# Patient Record
Sex: FEMALE | Race: WHITE | Employment: OTHER | URBAN - METROPOLITAN AREA
[De-identification: names, ages, dates, MRNs, and addresses within clinical notes are randomized per-mention and may not be internally consistent; named-entity substitution may affect disease eponyms.]

---

## 2019-07-12 ENCOUNTER — HOSPITAL ENCOUNTER (OUTPATIENT)
Dept: MAMMOGRAPHY | Age: 79
Discharge: HOME | End: 2019-07-12
Attending: FAMILY MEDICINE
Payer: MEDICARE

## 2019-07-12 DIAGNOSIS — Z78.0 POSTMENOPAUSAL: ICD-10-CM

## 2019-07-12 PROCEDURE — 77080 DXA BONE DENSITY AXIAL: CPT

## 2019-08-09 ENCOUNTER — HOSPITAL ENCOUNTER (OUTPATIENT)
Dept: MAMMOGRAPHY | Age: 79
Discharge: HOME | End: 2019-08-09
Attending: FAMILY MEDICINE
Payer: MEDICARE

## 2019-08-09 DIAGNOSIS — R92.8 ABNORMAL MAMMOGRAM: ICD-10-CM

## 2019-08-09 DIAGNOSIS — Z85.3 PERSONAL HISTORY OF MALIGNANT NEOPLASM OF BREAST: ICD-10-CM

## 2019-08-09 PROCEDURE — 77062 BREAST TOMOSYNTHESIS BI: CPT

## 2019-08-12 ENCOUNTER — OFFICE VISIT (OUTPATIENT)
Dept: SURGERY | Age: 79
End: 2019-08-12

## 2019-08-12 VITALS
WEIGHT: 152 LBS | BODY MASS INDEX: 25.95 KG/M2 | DIASTOLIC BLOOD PRESSURE: 67 MMHG | SYSTOLIC BLOOD PRESSURE: 133 MMHG | HEART RATE: 78 BPM | HEIGHT: 64 IN

## 2019-08-12 DIAGNOSIS — Z17.0 STAGE 1 BREAST CANCER, ER+, RIGHT (HCC): Primary | ICD-10-CM

## 2019-08-12 DIAGNOSIS — C50.911 STAGE 1 BREAST CANCER, ER+, RIGHT (HCC): Primary | ICD-10-CM

## 2019-08-12 PROBLEM — Z85.3 HISTORY OF RIGHT BREAST CANCER: Status: ACTIVE | Noted: 2019-08-12

## 2019-08-12 RX ORDER — OLMESARTAN MEDOXOMIL AND HYDROCHLOROTHIAZIDE 20/12.5 20; 12.5 MG/1; MG/1
TABLET ORAL DAILY
COMMUNITY
End: 2022-04-30

## 2019-08-12 RX ORDER — ERGOCALCIFEROL 1.25 MG/1
CAPSULE ORAL
COMMUNITY
End: 2022-09-14

## 2019-08-12 RX ORDER — ALBUTEROL SULFATE 90 UG/1
AEROSOL, METERED RESPIRATORY (INHALATION)
Status: ON HOLD | COMMUNITY
End: 2022-10-27

## 2019-08-12 RX ORDER — ACETAMINOPHEN 500 MG
500 TABLET ORAL
COMMUNITY

## 2019-08-12 RX ORDER — ESOMEPRAZOLE MAGNESIUM 40 MG/1
CAPSULE, DELAYED RELEASE ORAL
COMMUNITY
End: 2021-01-13 | Stop reason: SDUPTHER

## 2019-08-12 RX ORDER — CALCITONIN SALMON 200 [IU]/.09ML
1 SPRAY, METERED NASAL DAILY
COMMUNITY
End: 2022-09-14

## 2019-08-12 RX ORDER — METRONIDAZOLE 7.5 MG/G
CREAM TOPICAL 2 TIMES DAILY
COMMUNITY

## 2019-08-12 RX ORDER — BISMUTH SUBSALICYLATE 262 MG
1 TABLET,CHEWABLE ORAL DAILY
COMMUNITY
End: 2022-09-28

## 2019-08-12 RX ORDER — ANASTROZOLE 1 MG/1
TABLET ORAL
COMMUNITY
End: 2019-12-11 | Stop reason: SDUPTHER

## 2019-08-12 RX ORDER — MOMETASONE FUROATE 50 UG/1
SPRAY, METERED NASAL
COMMUNITY
End: 2022-09-14

## 2019-08-12 RX ORDER — LEVOTHYROXINE SODIUM 25 UG/1
TABLET ORAL
COMMUNITY
End: 2022-09-14

## 2019-08-12 RX ORDER — CELECOXIB 200 MG/1
CAPSULE ORAL
COMMUNITY
End: 2020-08-17 | Stop reason: ALTCHOICE

## 2019-08-12 RX ORDER — DICLOFENAC SODIUM 75 MG/1
TABLET, DELAYED RELEASE ORAL
COMMUNITY
End: 2022-05-01

## 2019-08-12 RX ORDER — MONTELUKAST SODIUM 10 MG/1
10 TABLET ORAL DAILY
COMMUNITY

## 2019-08-12 RX ORDER — IBANDRONATE SODIUM 150 MG/1
TABLET, FILM COATED ORAL
COMMUNITY
End: 2022-09-14

## 2019-08-12 RX ORDER — DICLOFENAC SODIUM 10 MG/G
GEL TOPICAL
COMMUNITY

## 2019-08-12 RX ORDER — FLUTICASONE PROPIONATE AND SALMETEROL XINAFOATE 230; 21 UG/1; UG/1
AEROSOL, METERED RESPIRATORY (INHALATION)
Refills: 2 | Status: ON HOLD | COMMUNITY
Start: 2019-07-22 | End: 2021-06-14

## 2019-08-12 NOTE — LETTER
8/14/2019 1:15 PM 
 
Patient:  Cornel Ceja YOB: 1940 Date of Visit: 8/12/2019 Dear Dr. Pasquale Stone: Thank you for referring Ms. Gilda Wang to me for evaluation/treatment. Below are the relevant portions of my assessment and plan of care. HISTORY OF PRESENT ILLNESS Cornel Ceja is a 78 y.o. female. HPI 
NEW patient presents for consultation and to establish care, at the request of Dr. Kyle Romo, for RIGHT breast cancer. She is s/p lumpectomy, SNBX and then re-excision in 2015 when she was living in [place]. She did not have XRT following the lumpectomy but was placed on anastrozole. Does have osteoporosis and is on Boniva and Miacalcin for this. Pt states she will see Dr. Carter Haney on 08/26/19 to discuss adjuvant treatment and bone density. She is not having any problems today. Feels no breast lumps, has no nipple discharge or skin change. Says that her RIGHT nipple has been retracted after surgery and still is retracted. Surgeon suggested surgery for this, however the patient cares for her  with dementia so is not interested in this. MammaPrint 9/11/2015 was low risk Luminal A. Is here today with her daughter-in-law. She has recently moved to [place]. 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 MammaPrint: Low risk, luminal type A. Treated in [place] with lumpectomy and SLNBx, re-excision, and adjuvant Anastrozole. On Boniva and Miacalcin for osteoporosis. [facility] Results (most recent): 
    
Results from Hospital Encounter encounter on 08/09/19 [facility] 3D BETTY W MAMMO BI DX INCL CAD  
  Narrative INDICATION:  Personal history of malignant neoplasm of breast and abnormal 
mammo. Personal history of malignant neoplasm of breast.  
COMPARISON: Mammogram(s), most recent, 7-18. Felicia Red COMPOSITION:  
There are scattered fibroglandular densities.  
. 
TECHNIQUE:  
Standard 2D, CC and MLO views of the each breast as well as spot magnification 
views of the right breast were performed with digital technique and subjected to 
computer-aided detection. Tomosynthesis of each breast was performed in CC and 
MLO projections. Azul Del Toro FINDINGS:   
Postsurgical changes in the retroareolar right breast 
No new visualized mass, suspicious microcalcification or architectural 
distortion. .  
  Impression IMPRESSION:    
1. BI-RADS Category 2 - Benign findings. . 
RECOMMENDATION:   
Routine surveillance with a diagnostic mammogram in 1 year. 
   
  
 
Past Medical History:  
Diagnosis Date  Breast cancer ([facility] [address] 2015 Right Past Surgical History:  
Procedure Laterality Date  HX BREAST LUMPECTOMY Right 08/24/2015  HX CATARACT REMOVAL  2013 Bilateral   
 HX DILATION AND CURETTAGE    
 x2  
 HX ORTHOPAEDIC  2017 RIGHT foot  hammertoe Social History Socioeconomic History  Marital status:  Spouse name: Not on file  Number of children: Not on file  Years of education: Not on file  Highest education level: Not on file Occupational History  Not on file Social Needs  Financial resource strain: Not on file  Food insecurity:  
  Worry: Not on file Inability: Not on file  Transportation needs:  
  Medical: Not on file Non-medical: Not on file Tobacco Use  Smoking status: Former Smoker Types: Cigarettes Start date: 8/12/2000  Smokeless tobacco: Never Used Substance and Sexual Activity  Alcohol use: Yes  Drug use: Not on file  Sexual activity: Not on file Lifestyle  Physical activity:  
  Days per week: Not on file Minutes per session: Not on file  Stress: Not on file Relationships  Social connections:  
  Talks on phone: Not on file Gets together: Not on file Attends Mosque service: Not on file Active member of club or organization: Not on file Attends meetings of clubs or organizations: Not on file Relationship status: Not on file  Intimate partner violence:  
  Fear of current or ex partner: Not on file Emotionally abused: Not on file Physically abused: Not on file Forced sexual activity: Not on file Other Topics Concern  Not on file Social History Narrative  Not on file Current Outpatient Medications on File Prior to Visit Medication Sig Dispense Refill  metroNIDAZOLE (METROCREAM) 0.75 % topical cream metronidazole 0.75 % topical cream    
 anastrozole (ARIMIDEX) 1 mg tablet anastrozole 1 mg tablet  celecoxib (CELEBREX) 200 mg capsule celecoxib 200 mg capsule  diclofenac EC (VOLTAREN) 75 mg EC tablet diclofenac sodium 75 mg tablet,delayed release  ergocalciferol (ERGOCALCIFEROL) 50,000 unit capsule ergocalciferol (vitamin D2) 50,000 unit capsule  esomeprazole (NEXIUM) 40 mg capsule esomeprazole magnesium 40 mg capsule,delayed release  ibandronate (BONIVA) 150 mg tablet ibandronate 150 mg tablet  levothyroxine (SYNTHROID) 25 mcg tablet levothyroxine 25 mcg tablet  mometasone (NASONEX) 50 mcg/actuation nasal spray mometasone 50 mcg/actuation nasal spray    
 montelukast (SINGULAIR) 10 mg tablet montelukast 10 mg tablet  olmesartan-hydroCHLOROthiazide (BENICAR HCT) 20-12.5 mg per tablet olmesartan 20 mg-hydrochlorothiazide 12.5 mg tablet  tiotropium bromide (SPIRIVA RESPIMAT) 2.5 mcg/actuation inhaler Spiriva Respimat 2.5 mcg/actuation solution for inhalation [address] 053-40 mcg/actuation inhaler INHALE 2 PUFFS BY MOUTH TWICE A DAY  2  
 calcitonin, salmon, (MIACALCIN) nasal 1 [place] by IntraNASal route daily.  multivitamin (ONE A DAY) tablet Take 1 Tab by mouth daily.  vitamin E acetate (VITAMIN E PO) Take  by mouth.  docosahexanoic acid/epa (FISH OIL PO) Take  by mouth.  calcium carbonate/vitamin D3 (CALCIUM + D PO) Take  by mouth.  vitamin B complex (VITAMINS B COMPLEX PO) Take  by mouth.  albuterol (PROAIR HFA) 90 mcg/actuation inhaler Take  by inhalation.  diclofenac (VOLTAREN) 1 % gel Apply  to affected area four (4) times daily.  polysorbate 80/glycerin (REFRESH DRY EYE THERAPY OP) Apply  to eye.  acetaminophen (TYLENOL EXTRA STRENGTH PO) Take  by mouth. No current facility-administered medications on file prior to visit. Allergies Allergen Reactions  Ace Inhibitors Cough  Iodine Rash  Penicillins Rash OB History None Obstetric Comments Menarche?, LMP 1989, # of children 2, age of 1st delivery ?, Hysterectomy/oophorectomy No/No, Breast bx Yes, history of breast feeding ?, BCP ?, Hormone therapy No 
  
  
  
 
 
ROS Constitutional: Negative. HENT: Negative. Eyes: Negative. Respiratory: Negative. Cardiovascular: Negative. Gastrointestinal: Positive for heartburn. Genitourinary: Positive for frequency. Musculoskeletal: Positive for back pain, joint pain and myalgias. Skin: Negative. Neurological: Negative. Endo/Heme/Allergies: Negative. Psychiatric/Behavioral: Negative. Physical Exam  
Cardiovascular: Normal rate, regular rhythm and normal heart sounds. Pulmonary/Chest: Breath sounds normal. Right breast exhibits inverted nipple. Right breast exhibits no mass, no nipple discharge, no skin change and no tenderness. Left breast exhibits no inverted nipple, no mass, no nipple discharge, no skin change and no tenderness. Breasts are symmetrical.  
 
 
Lymphadenopathy:  
  She has no cervical adenopathy. Right cervical: No superficial cervical, no deep cervical and no posterior cervical adenopathy present. Left cervical: No superficial cervical, no deep cervical and no posterior cervical adenopathy present. She has no axillary adenopathy. Right axillary: No pectoral and no lateral adenopathy present. Left axillary: No pectoral and no lateral adenopathy present. ASSESSMENT and PLAN 
  ICD-10-CM ICD-9-CM 1. Stage 1 breast cancer, ER+, right (HCC) C50.911 174.9 [facility] 3D BETTY W MAMMO BI DX INCL CAD  
 Z17.0 V86.0 New patient presents for consultation and to establish care for RIGHT breast cancer, and is doing well overall. Well healed circumareolar incision at RIGHT breast 12:00 s/p lumpectomy, no evidence of local recurrence. RIGHT post-op nipple inversion, pt states she is not interested in further surgery to correct this. Recent mammogram was normal. F/U in 1 year, after BL diagnostic mammogram. This plan was reviewed with the patient and patient agrees. All questions were answered. Written by Valerio Palomares, as dictated by Dr. Madelaine Medina MD. 
 
 
 
 
If you have questions, please do not hesitate to call me. I look forward to following Ms. Shazia Roman along with you.  
 
 
 
Sincerely, 
 
 
Colin Hdz MD

## 2019-08-12 NOTE — PROGRESS NOTES
HISTORY OF PRESENT ILLNESS Ruddy Adams is a 78 y.o. female. Physical Exam 
 
ASSESSMENT and PLAN 
{ASSESSMENT/PLAN:[id number]}

## 2019-08-12 NOTE — PROGRESS NOTES
HISTORY OF PRESENT ILLNESS  Fiona Smart is a 78 y.o. female. HPI  NEW patient presents for consultation and to establish care, at the request of Dr. Rose Deal, for RIGHT breast cancer. She is s/p lumpectomy, SNBX and then re-excision in 2015 when she was living in [place]. She did not have XRT following the lumpectomy but was placed on anastrozole. Does have osteoporosis and is on Boniva and Miacalcin for this. Pt states she will see Dr. Singh Walker on 08/26/19 to discuss adjuvant treatment and bone density. She is not having any problems today. Feels no breast lumps, has no nipple discharge or skin change. Says that her RIGHT nipple has been retracted after surgery and still is retracted. Surgeon suggested surgery for this, however the patient cares for her  with dementia so is not interested in this. MammaPrint 9/11/2015 was low risk Luminal A. Is here today with her daughter-in-law. She has recently moved to [facility]. 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 MammaPrint: Low risk, luminal type A. Treated in [place] with lumpectomy and SLNBx, re-excision, and adjuvant Anastrozole. On Boniva and Miacalcin for osteoporosis. [facility] Results (most recent):       Results from [place] encounter on 08/09/19   [facility] 3D BETTY W MAMMO BI DX INCL CAD     Narrative INDICATION:  Personal history of malignant neoplasm of breast and abnormal  mammo. Personal history of malignant neoplasm of breast.   COMPARISON: Mammogram(s), most recent, 7-18. Susie Santizo COMPOSITION:   There are scattered fibroglandular densities. .  TECHNIQUE:   Standard 2D, CC and MLO views of the each breast as well as spot magnification  views of the right breast were performed with digital technique and subjected to  computer-aided detection. Tomosynthesis of each breast was performed in CC and  MLO projections. Susie Santizo   FINDINGS:    Postsurgical changes in the retroareolar right breast  No new visualized mass, suspicious microcalcification or architectural  distortion. .     Impression IMPRESSION:     1. BI-RADS Category 2 - Benign findings. .  RECOMMENDATION:    Routine surveillance with a diagnostic mammogram in 1 year.           Past Medical History:   Diagnosis Date    Breast cancer ([facility] [address] 2015    Right        Past Surgical History:   Procedure Laterality Date    HX BREAST LUMPECTOMY Right 08/24/2015    HX CATARACT REMOVAL  2013    Bilateral     HX DILATION AND CURETTAGE      x2    HX ORTHOPAEDIC  2017    RIGHT foot  hammertoe       Social History     Socioeconomic History    Marital status:      Spouse name: Not on file    Number of children: Not on file    Years of education: Not on file    Highest education level: Not on file   Occupational History    Not on file   Social Needs    Financial resource strain: Not on file    Food insecurity:     Worry: Not on file     Inability: Not on file    Transportation needs:     Medical: Not on file     Non-medical: Not on file   Tobacco Use    Smoking status: Former Smoker     Types: Cigarettes     Start date: 8/12/2000    Smokeless tobacco: Never Used   Substance and Sexual Activity    Alcohol use:  Yes    Drug use: Not on file    Sexual activity: Not on file   Lifestyle    Physical activity:     Days per week: Not on file     Minutes per session: Not on file    Stress: Not on file   Relationships    Social connections:     Talks on phone: Not on file     Gets together: Not on file     Attends Anabaptist service: Not on file     Active member of club or organization: Not on file     Attends meetings of clubs or organizations: Not on file     Relationship status: Not on file    Intimate partner violence:     Fear of current or ex partner: Not on file     Emotionally abused: Not on file     Physically abused: Not on file     Forced sexual activity: Not on file   Other Topics Concern    Not on file   Social History Narrative    Not on file       Current Outpatient Medications on File Prior to Visit   Medication Sig Dispense Refill    metroNIDAZOLE (METROCREAM) 0.75 % topical cream metronidazole 0.75 % topical cream      anastrozole (ARIMIDEX) 1 mg tablet anastrozole 1 mg tablet      celecoxib (CELEBREX) 200 mg capsule celecoxib 200 mg capsule      diclofenac EC (VOLTAREN) 75 mg EC tablet diclofenac sodium 75 mg tablet,delayed release      ergocalciferol (ERGOCALCIFEROL) 50,000 unit capsule ergocalciferol (vitamin D2) 50,000 unit capsule      esomeprazole (NEXIUM) 40 mg capsule esomeprazole magnesium 40 mg capsule,delayed release      ibandronate (BONIVA) 150 mg tablet ibandronate 150 mg tablet      levothyroxine (SYNTHROID) 25 mcg tablet levothyroxine 25 mcg tablet      mometasone (NASONEX) 50 mcg/actuation nasal spray mometasone 50 mcg/actuation nasal spray      montelukast (SINGULAIR) 10 mg tablet montelukast 10 mg tablet      olmesartan-hydroCHLOROthiazide (BENICAR HCT) 20-12.5 mg per tablet olmesartan 20 mg-hydrochlorothiazide 12.5 mg tablet      tiotropium bromide (SPIRIVA RESPIMAT) 2.5 mcg/actuation inhaler Spiriva Respimat 2.5 mcg/actuation solution for inhalation      ADVAIR -21 mcg/actuation inhaler INHALE 2 PUFFS BY MOUTH TWICE A DAY  2    calcitonin, salmon, (MIACALCIN) nasal 1 [place] by IntraNASal route daily.  multivitamin (ONE A DAY) tablet Take 1 Tab by mouth daily.  vitamin E acetate (VITAMIN E PO) Take  by mouth.  docosahexanoic acid/epa (FISH OIL PO) Take  by mouth.  calcium carbonate/vitamin D3 (CALCIUM + D PO) Take  by mouth.  vitamin B complex (VITAMINS B COMPLEX PO) Take  by mouth.  albuterol (PROAIR HFA) 90 mcg/actuation inhaler Take  by inhalation.  diclofenac (VOLTAREN) 1 % gel Apply  to affected area four (4) times daily.  polysorbate 80/glycerin (REFRESH DRY EYE THERAPY OP) Apply  to eye.  acetaminophen (TYLENOL EXTRA STRENGTH PO) Take  by mouth. No current facility-administered medications on file prior to visit. Allergies   Allergen Reactions    Ace Inhibitors Cough    Iodine Rash    Penicillins Rash       OB History    None      Obstetric Comments   Menarche?, LMP 1989, # of children 2, age of 1st delivery ?, Hysterectomy/oophorectomy No/No, Breast bx Yes, history of breast feeding ?, BCP ?, Hormone therapy No               ROS  Constitutional: Negative. HENT: Negative. Eyes: Negative. Respiratory: Negative. Cardiovascular: Negative. Gastrointestinal: Positive for heartburn. Genitourinary: Positive for frequency. Musculoskeletal: Positive for back pain, joint pain and myalgias. Skin: Negative. Neurological: Negative. Endo/Heme/Allergies: Negative. Psychiatric/Behavioral: Negative. Physical Exam   Cardiovascular: Normal rate, regular rhythm and normal heart sounds. Pulmonary/Chest: Breath sounds normal. Right breast exhibits inverted nipple. Right breast exhibits no mass, no nipple discharge, no skin change and no tenderness. Left breast exhibits no inverted nipple, no mass, no nipple discharge, no skin change and no tenderness. Breasts are symmetrical.       Lymphadenopathy:     She has no cervical adenopathy. Right cervical: No superficial cervical, no deep cervical and no posterior cervical adenopathy present. Left cervical: No superficial cervical, no deep cervical and no posterior cervical adenopathy present. She has no axillary adenopathy. Right axillary: No pectoral and no lateral adenopathy present. Left axillary: No pectoral and no lateral adenopathy present. ASSESSMENT and PLAN    ICD-10-CM ICD-9-CM    1. Stage 1 breast cancer, ER+, right ([facility]) C50.911 174.9 [facility] 3D BETTY W MAMMO BI DX INCL CAD    Z17.0 V86.0       New patient presents for consultation and to establish care for RIGHT breast cancer, and is doing well overall.  Well healed circumareolar incision at RIGHT breast 12:00 s/p lumpectomy, no evidence of local recurrence. RIGHT post-op nipple inversion, pt states she is not interested in further surgery to correct this. Recent mammogram was normal. F/U in 1 year, after BL diagnostic mammogram. This plan was reviewed with the patient and patient agrees. All questions were answered.     Written by Loly Whittington, as dictated by Dr. Venus Henry MD.

## 2019-08-12 NOTE — PROGRESS NOTES
HISTORY OF PRESENT ILLNESS Rabia Cotton is a 78 y.o. female. HPI    NEW patient presents for consultation at the request of Dr. Hilda Novoa for RIGHT breast cancer, S/P lumpectomy, SNBX and then re-excision in 2015 when she was living in [place]. She did not have XRT following the lumpectomy but was placed on anastrozole. Does have osteoporosis and is on Boniva and Miacalcin for this. She is not having any problems today. Feels no breast lumps, has no nipple discharge or skin change. Says that her RIGHT nipple has been retracted after surgery and still is retracted. Surgeon suggested surgery for this, however the patient cares for her  with dementia so is not interested in this. MammaPrint 9/11/2015 was low risk Pj ZUÑIGA Is here today with her daughter-in-law. She has recently moved to [place]. [facility] Results (most recent): 
Results from Hospital Encounter encounter on 08/09/19 [facility] 3D BETTY W MAMMO BI DX INCL CAD Narrative INDICATION:  Personal history of malignant neoplasm of breast and abnormal 
mammo. Personal history of malignant neoplasm of breast.  
COMPARISON: Mammogram(s), most recent, 7-18. Palak Hoyles COMPOSITION:  
There are scattered fibroglandular densities. . 
TECHNIQUE:  
Standard 2D, CC and MLO views of the each breast as well as spot magnification 
views of the right breast were performed with digital technique and subjected to 
computer-aided detection. Tomosynthesis of each breast was performed in CC and 
MLO projections. Palak Hoyles FINDINGS:   
Postsurgical changes in the retroareolar right breast 
No new visualized mass, suspicious microcalcification or architectural 
distortion. .  
 Impression IMPRESSION:    
1. BI-RADS Category 2 - Benign findings. . 
RECOMMENDATION:   
Routine surveillance with a diagnostic mammogram in 1 year. Review of Systems Constitutional: Negative. HENT: Negative. Eyes: Negative. Respiratory: Negative. Cardiovascular: Negative. Gastrointestinal: Positive for heartburn. Genitourinary: Positive for frequency. Musculoskeletal: Positive for back pain, joint pain and myalgias. Skin: Negative. Neurological: Negative. Endo/Heme/Allergies: Negative. Psychiatric/Behavioral: Negative. Physical Exam 
 
ASSESSMENT and PLAN 
{ASSESSMENT/PLAN:[id number]}

## 2019-08-14 PROBLEM — C50.911 INVASIVE DUCTAL CARCINOMA OF RIGHT BREAST (HCC): Status: ACTIVE | Noted: 2019-08-12

## 2019-08-14 NOTE — COMMUNICATION BODY
HISTORY OF PRESENT ILLNESS  Gus Ewing is a 78 y.o. female. HPI  NEW patient presents for consultation and to establish care, at the request of Dr. Patricia Dooley, for RIGHT breast cancer. She is s/p lumpectomy, SNBX and then re-excision in 2015 when she was living in [place]. She did not have XRT following the lumpectomy but was placed on anastrozole. Does have osteoporosis and is on Boniva and Miacalcin for this. Pt states she will see Dr. Livia Pascal on 08/26/19 to discuss adjuvant treatment and bone density. She is not having any problems today. Feels no breast lumps, has no nipple discharge or skin change. Says that her RIGHT nipple has been retracted after surgery and still is retracted. Surgeon suggested surgery for this, however the patient cares for her  with dementia so is not interested in this. MammaPrint 9/11/2015 was low risk Luminal A. Is here today with her daughter-in-law. She has recently moved to [place]. 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 MammaPrint: Low risk, luminal type A. Treated in [place] with lumpectomy and SLNBx, re-excision, and adjuvant Anastrozole. On Boniva and Miacalcin for osteoporosis. [facility] Results (most recent):       Results from [place] encounter on 08/09/19   [facility] 3D BETTY W MAMMO BI DX INCL CAD     Narrative INDICATION:  Personal history of malignant neoplasm of breast and abnormal  mammo. Personal history of malignant neoplasm of breast.   COMPARISON: Mammogram(s), most recent, 7-18. Erika Doll COMPOSITION:   There are scattered fibroglandular densities. .  TECHNIQUE:   Standard 2D, CC and MLO views of the each breast as well as spot magnification  views of the right breast were performed with digital technique and subjected to  computer-aided detection. Tomosynthesis of each breast was performed in CC and  MLO projections. Erika Doll   FINDINGS:    Postsurgical changes in the retroareolar right breast  No new visualized mass, suspicious microcalcification or architectural  distortion. .     Impression IMPRESSION:     1. BI-RADS Category 2 - Benign findings. .  RECOMMENDATION:    Routine surveillance with a diagnostic mammogram in 1 year.           Past Medical History:   Diagnosis Date    Breast cancer ([facility] [address] 2015    Right        Past Surgical History:   Procedure Laterality Date    HX BREAST LUMPECTOMY Right 08/24/2015    HX CATARACT REMOVAL  2013    Bilateral     HX DILATION AND CURETTAGE      x2    HX ORTHOPAEDIC  2017    RIGHT foot  hammertoe       Social History     Socioeconomic History    Marital status:      Spouse name: Not on file    Number of children: Not on file    Years of education: Not on file    Highest education level: Not on file   Occupational History    Not on file   Social Needs    Financial resource strain: Not on file    Food insecurity:     Worry: Not on file     Inability: Not on file    Transportation needs:     Medical: Not on file     Non-medical: Not on file   Tobacco Use    Smoking status: Former Smoker     Types: Cigarettes     Start date: 8/12/2000    Smokeless tobacco: Never Used   Substance and Sexual Activity    Alcohol use:  Yes    Drug use: Not on file    Sexual activity: Not on file   Lifestyle    Physical activity:     Days per week: Not on file     Minutes per session: Not on file    Stress: Not on file   Relationships    Social connections:     Talks on phone: Not on file     Gets together: Not on file     Attends Confucianism service: Not on file     Active member of club or organization: Not on file     Attends meetings of clubs or organizations: Not on file     Relationship status: Not on file    Intimate partner violence:     Fear of current or ex partner: Not on file     Emotionally abused: Not on file     Physically abused: Not on file     Forced sexual activity: Not on file   Other Topics Concern    Not on file   Social History Narrative    Not on file       Current Outpatient Medications on File Prior to Visit   Medication Sig Dispense Refill    metroNIDAZOLE (METROCREAM) 0.75 % topical cream metronidazole 0.75 % topical cream      anastrozole (ARIMIDEX) 1 mg tablet anastrozole 1 mg tablet      celecoxib (CELEBREX) 200 mg capsule celecoxib 200 mg capsule      diclofenac EC (VOLTAREN) 75 mg EC tablet diclofenac sodium 75 mg tablet,delayed release      ergocalciferol (ERGOCALCIFEROL) 50,000 unit capsule ergocalciferol (vitamin D2) 50,000 unit capsule      esomeprazole (NEXIUM) 40 mg capsule esomeprazole magnesium 40 mg capsule,delayed release      ibandronate (BONIVA) 150 mg tablet ibandronate 150 mg tablet      levothyroxine (SYNTHROID) 25 mcg tablet levothyroxine 25 mcg tablet      mometasone (NASONEX) 50 mcg/actuation nasal spray mometasone 50 mcg/actuation nasal spray      montelukast (SINGULAIR) 10 mg tablet montelukast 10 mg tablet      olmesartan-hydroCHLOROthiazide (BENICAR HCT) 20-12.5 mg per tablet olmesartan 20 mg-hydrochlorothiazide 12.5 mg tablet      tiotropium bromide (SPIRIVA RESPIMAT) 2.5 mcg/actuation inhaler Spiriva Respimat 2.5 mcg/actuation solution for inhalation      ADVAIR -21 mcg/actuation inhaler INHALE 2 PUFFS BY MOUTH TWICE A DAY  2    calcitonin, salmon, (MIACALCIN) nasal 1 [place] by IntraNASal route daily.  multivitamin (ONE A DAY) tablet Take 1 Tab by mouth daily.  vitamin E acetate (VITAMIN E PO) Take  by mouth.  docosahexanoic acid/epa (FISH OIL PO) Take  by mouth.  calcium carbonate/vitamin D3 (CALCIUM + D PO) Take  by mouth.  vitamin B complex (VITAMINS B COMPLEX PO) Take  by mouth.  albuterol (PROAIR HFA) 90 mcg/actuation inhaler Take  by inhalation.  diclofenac (VOLTAREN) 1 % gel Apply  to affected area four (4) times daily.  polysorbate 80/glycerin (REFRESH DRY EYE THERAPY OP) Apply  to eye.  acetaminophen (TYLENOL EXTRA STRENGTH PO) Take  by mouth. No current facility-administered medications on file prior to visit. Allergies   Allergen Reactions    Ace Inhibitors Cough    Iodine Rash    Penicillins Rash       OB History    None      Obstetric Comments   Menarche?, LMP 1989, # of children 2, age of 1st delivery ?, Hysterectomy/oophorectomy No/No, Breast bx Yes, history of breast feeding ?, BCP ?, Hormone therapy No               ROS  Constitutional: Negative. HENT: Negative. Eyes: Negative. Respiratory: Negative. Cardiovascular: Negative. Gastrointestinal: Positive for heartburn. Genitourinary: Positive for frequency. Musculoskeletal: Positive for back pain, joint pain and myalgias. Skin: Negative. Neurological: Negative. Endo/Heme/Allergies: Negative. Psychiatric/Behavioral: Negative. Physical Exam   Cardiovascular: Normal rate, regular rhythm and normal heart sounds. Pulmonary/Chest: Breath sounds normal. Right breast exhibits inverted nipple. Right breast exhibits no mass, no nipple discharge, no skin change and no tenderness. Left breast exhibits no inverted nipple, no mass, no nipple discharge, no skin change and no tenderness. Breasts are symmetrical.       Lymphadenopathy:     She has no cervical adenopathy. Right cervical: No superficial cervical, no deep cervical and no posterior cervical adenopathy present. Left cervical: No superficial cervical, no deep cervical and no posterior cervical adenopathy present. She has no axillary adenopathy. Right axillary: No pectoral and no lateral adenopathy present. Left axillary: No pectoral and no lateral adenopathy present. ASSESSMENT and PLAN    ICD-10-CM ICD-9-CM    1. Stage 1 breast cancer, ER+, right ([facility]) C50.911 174.9 [facility] 3D BETTY W MAMMO BI DX INCL CAD    Z17.0 V86.0       New patient presents for consultation and to establish care for RIGHT breast cancer, and is doing well overall.  Well healed circumareolar incision at RIGHT breast 12:00 s/p lumpectomy, no evidence of local recurrence. RIGHT post-op nipple inversion, pt states she is not interested in further surgery to correct this. Recent mammogram was normal. F/U in 1 year, after BL diagnostic mammogram. This plan was reviewed with the patient and patient agrees. All questions were answered.     Written by Wilner Tucker, as dictated by Dr. Franky Hardwick MD.

## 2019-08-26 ENCOUNTER — OFFICE VISIT (OUTPATIENT)
Dept: ONCOLOGY | Age: 79
End: 2019-08-26

## 2019-08-26 VITALS
WEIGHT: 154 LBS | DIASTOLIC BLOOD PRESSURE: 74 MMHG | HEIGHT: 64 IN | HEART RATE: 76 BPM | RESPIRATION RATE: 16 BRPM | OXYGEN SATURATION: 99 % | BODY MASS INDEX: 26.29 KG/M2 | TEMPERATURE: 97.6 F | SYSTOLIC BLOOD PRESSURE: 139 MMHG

## 2019-08-26 DIAGNOSIS — C50.911 INVASIVE DUCTAL CARCINOMA OF RIGHT BREAST (HCC): Primary | ICD-10-CM

## 2019-08-26 RX ORDER — VENLAFAXINE HYDROCHLORIDE 37.5 MG/1
37.5 CAPSULE, EXTENDED RELEASE ORAL DAILY
Qty: 30 CAP | Refills: 6 | Status: SHIPPED | OUTPATIENT
Start: 2019-08-26 | End: 2019-09-20 | Stop reason: SDUPTHER

## 2019-08-26 RX ORDER — ANASTROZOLE 1 MG/1
1 TABLET ORAL DAILY
Qty: 90 TAB | Refills: 3 | Status: SHIPPED | OUTPATIENT
Start: 2019-08-26 | End: 2020-02-26 | Stop reason: SDUPTHER

## 2019-08-26 NOTE — PROGRESS NOTES
[facility] [place] at [facility]  [address], [address] [facility] [address] [facility] [place]: [phone]  F: [phone]      Reason for Visit:   Sona Lundy is a 78 y.o. female who is seen in consultation at the request of Dr. Odette Jauregui for evaluation of right breast cancer. Treatment History:   · 7/1/15 right breast bx:  IDC, gr 2, ER + at 89%, [place] + at 40%, HER 2 negative by IHC and FISH; ki67 34%  · S/p lumpectomy, SNBX, re-excision in 2015 in [place]:  8/10/15 Right breast IDC, 1 cm, and 0.3 cm,  0/2 LN, pT1b pN0 cM0  · Re-ex negative on 8/24/15  · anatrozole 2015-  · mammaprint 9/11/15 low risk luminal A    History of Present Illness:   She recently moved to [place] to be near her daughter in law and son. Has mild hot flashes with anastrozole. Past Medical History:   Diagnosis Date    Breast cancer ([address] 2015    Right       Past Surgical History:   Procedure Laterality Date    HX BREAST LUMPECTOMY Right 08/24/2015    HX CATARACT REMOVAL  2013    Bilateral     HX DILATION AND CURETTAGE      x2    HX ORTHOPAEDIC  2017    RIGHT foot  hammertoe      Social History     Tobacco Use    Smoking status: Former Smoker     Types: Cigarettes     Start date: 8/12/2000    Smokeless tobacco: Never Used   Substance Use Topics    Alcohol use: Yes      No family history on file. Current Outpatient Medications   Medication Sig    venlafaxine-SR (EFFEXOR-XR) 37.5 mg capsule Take 1 Cap by mouth daily.  anastrozole (ARIMIDEX) 1 mg tablet Take 1 mg by mouth daily.     anastrozole (ARIMIDEX) 1 mg tablet anastrozole 1 mg tablet    celecoxib (CELEBREX) 200 mg capsule celecoxib 200 mg capsule    diclofenac EC (VOLTAREN) 75 mg EC tablet diclofenac sodium 75 mg tablet,delayed release    ergocalciferol (ERGOCALCIFEROL) 50,000 unit capsule ergocalciferol (vitamin D2) 50,000 unit capsule    esomeprazole (NEXIUM) 40 mg capsule esomeprazole magnesium 40 mg capsule,delayed release    ibandronate (BONIVA) 150 mg tablet ibandronate 150 mg tablet    levothyroxine (SYNTHROID) 25 mcg tablet levothyroxine 25 mcg tablet    mometasone (NASONEX) 50 mcg/actuation nasal spray mometasone 50 mcg/actuation nasal spray    montelukast (SINGULAIR) 10 mg tablet montelukast 10 mg tablet    olmesartan-hydroCHLOROthiazide (BENICAR HCT) 20-12.5 mg per tablet olmesartan 20 mg-hydrochlorothiazide 12.5 mg tablet    tiotropium bromide (SPIRIVA RESPIMAT) 2.5 mcg/actuation inhaler Spiriva Respimat 2.5 mcg/actuation solution for inhalation    ADVAIR -21 mcg/actuation inhaler INHALE 2 PUFFS BY MOUTH TWICE A DAY    calcitonin, salmon, (MIACALCIN) nasal 1 [place] by IntraNASal route daily.  multivitamin (ONE A DAY) tablet Take 1 Tab by mouth daily.  calcium carbonate/vitamin D3 (CALCIUM + D PO) Take  by mouth.  vitamin B complex (VITAMINS B COMPLEX PO) Take  by mouth.  albuterol (PROAIR HFA) 90 mcg/actuation inhaler Take  by inhalation.  diclofenac (VOLTAREN) 1 % gel Apply  to affected area four (4) times daily.  polysorbate 80/glycerin (REFRESH DRY EYE THERAPY OP) Apply  to eye.  acetaminophen (TYLENOL EXTRA STRENGTH PO) Take  by mouth.  metroNIDAZOLE (METROCREAM) 0.75 % topical cream metronidazole 0.75 % topical cream    vitamin E acetate (VITAMIN E PO) Take  by mouth.  docosahexanoic acid/epa (FISH OIL PO) Take  by mouth. No current facility-administered medications for this visit. Allergies   Allergen Reactions    Ace Inhibitors Cough    Iodine Rash    Penicillins Rash        Review of Systems: A complete review of systems was obtained, negative except as described above.     Physical Exam:     Visit Vitals  /74   Pulse 76   Temp 97.6 °F (36.4 °C) (Oral)   Resp 16   Ht 5' 4\" (1.626 m)   Wt 154 lb (69.9 kg)   SpO2 99%   BMI 26.43 kg/m²     ECOG PS: 0  General: No distress  Eyes: PERRLA, anicteric sclerae  HENT: Atraumatic, OP clear  Neck: Supple  Lymphatic: No cervical, supraclavicular adenopathy  Respiratory: CTAB, normal respiratory effort  CV: Normal rate, regular rhythm, no murmurs, no peripheral edema  GI: Soft, nontender, nondistended, no masses, no hepatomegaly, no splenomegaly; + BS  MS:  Digits without clubbing or cyanosis. Skin: No rashes, ecchymoses, or petechiae. Normal temperature, turgor, and texture. Psych: Alert, oriented, appropriate affect, normal judgment/insight    Results:   No results found for: WBC, HGB, HCT, PLT, MCV, ANEU, HGBPOC, HCTPOC, HGBEXT, HCTEXT, PLTEXT, HGBEXT, HCTEXT, PLTEXT  No results found for: NA, K, CL, CO2, GLU, BUN, CREA, GFRAA, GFRNA, CA, NAPOC, KPOCT, CLPOC, GLUCPOC, IBUN, CREAPOC, ICAI  No results found for: TBILI, ALT, SGOT, AP, TP, ALB, GLOB     8/9/19 3D mammogram  Negative    7/12/19 dexa     Femoral Neck Left:  Bone mineral density (gm/cm2):  0.769  % of peak bone mass:  74  % for age matched controls:  101  T-score:  -1.9  Z-score:  0.1     Femoral Neck Right:  Bone mineral density (gm/cm2):  0.757  % of peak bone mass:  73  % for age matched controls:  100  T-score:  -2.0  Z-score:  0.0     Total Hip Left:  Bone mineral density (gm/cm2):  0.879  % of peak bone mass:  87  % for age matched controls:  113  T-score:  -1.0  Z-score:  0.8     Total Hip Right:  Bone mineral density (gm/cm2):  0.842  % of peak bone mass:  84  % for age matched controls:  108  T-score:  -1.3  Z-score:  0.5     33% Radius Left:  Bone mineral density (gm/cm2):  0.497  % of peak bone mass:  70  % for age matched controls:  70  T-score:  -3.0  Z-score:  -0.4     IMPRESSION  Impression: This patient is osteoporotic using the World Health Organization criteria        Records reviewed and summarized above. Pathology report(s) reviewed above. Radiology report(s) reviewed above. Assessment/plan:   1.  Right breast cancer:  Stage IA (both), 1 cm, ER +, MO +, HER 2 negative, 0/2 LN, low risk mammaprint    We explained to the patient that the goal of systemic adjuvant therapy is to improve the chances for cure and decrease the risk of relapse. We explained why a patient can have microscopic cancer spread now even though physical examination, laboratory studies and imaging studies are negative for cancer. We explained that the same treatments used now as adjuvant or preventive treatments rarely if ever are curative in women who develop metastases. The risks and benefits of aromatase inhibitors (anastrozole, letrozole, and exemestane) were discussed in detail and the patient was informed of the following: Risks include the development of painful muscles and joints (arthralgia/myalgia) and bone loss. Muscle and joint pain can be severe but rarely result in any tissue damage; symptoms usually resolve in several weeks when the medication is stopped. Bone loss is common and a bone density test is recommended as a baseline and then yearly to every several years depending on initial results. The risk of fractures is increased by a few percent in patients taking these drugs, but careful monitoring of bone density and using bone protecting agents when indicated can minimize these risks. Unlike tamoxifen there is no increased risk of blood clots or endometrial cancer. AIs can cause or worsen vaginal dryness but women using these drugs should not use vaginal estrogen preparations for these symptoms. AIs can also cause or increase hot flashes. Any other symptoms should be reported. Would recommend to continue anastrozole for a total of 5 years, until fall of 2020. Refilled today    DENYS    2. Emotional well being:  She has excellent support and is coping well with her disease    3. Osteoporosis:  L wrist only, rest osteopenia; On boniva; has been on since 2016; just started on miacalcin with Dr. Mary Miller    4. Hot flashes:  Mild, discussed effexor XR 37.5 mg daily    I appreciate the opportunity to participate in Ms. Kelley Frausto's care.     Signed By: Arminda Medina MD      No orders of the defined types were placed in this encounter.

## 2019-09-20 DIAGNOSIS — C50.911 INVASIVE DUCTAL CARCINOMA OF RIGHT BREAST (HCC): ICD-10-CM

## 2019-09-20 RX ORDER — VENLAFAXINE HYDROCHLORIDE 37.5 MG/1
37.5 CAPSULE, EXTENDED RELEASE ORAL DAILY
Qty: 90 CAP | Refills: 3 | Status: SHIPPED | OUTPATIENT
Start: 2019-09-20 | End: 2020-08-26

## 2019-12-11 DIAGNOSIS — C50.911 INVASIVE DUCTAL CARCINOMA OF RIGHT BREAST (HCC): ICD-10-CM

## 2019-12-11 RX ORDER — ANASTROZOLE 1 MG/1
TABLET ORAL
Qty: 90 TAB | Refills: 1 | Status: SHIPPED | OUTPATIENT
Start: 2019-12-11 | End: 2020-08-26

## 2020-02-26 ENCOUNTER — OFFICE VISIT (OUTPATIENT)
Dept: ONCOLOGY | Age: 80
End: 2020-02-26

## 2020-02-26 VITALS
DIASTOLIC BLOOD PRESSURE: 89 MMHG | TEMPERATURE: 96.2 F | BODY MASS INDEX: 27.01 KG/M2 | SYSTOLIC BLOOD PRESSURE: 166 MMHG | OXYGEN SATURATION: 95 % | WEIGHT: 158.2 LBS | HEIGHT: 64 IN | RESPIRATION RATE: 16 BRPM | HEART RATE: 78 BPM

## 2020-02-26 DIAGNOSIS — T45.1X5A HOT FLASHES RELATED TO AROMATASE INHIBITOR THERAPY: ICD-10-CM

## 2020-02-26 DIAGNOSIS — C50.911 INVASIVE DUCTAL CARCINOMA OF RIGHT BREAST (HCC): Primary | ICD-10-CM

## 2020-02-26 DIAGNOSIS — R23.2 HOT FLASHES RELATED TO AROMATASE INHIBITOR THERAPY: ICD-10-CM

## 2020-02-26 NOTE — PROGRESS NOTES
Merwin Fleischer is a 78 y.o. female Follow up for the Evaluation for Breast Cancer. 1. Have you been to the ER, urgent care clinic since your last visit? Hospitalized since your last visit? No    2. Have you seen or consulted any other health care providers outside of the [address] [place] [address] since your last visit? Include any pap smears or colon screening.  No

## 2020-02-26 NOTE — PROGRESS NOTES
[facility] [place] at [address], [address] [facility] [address] [facility]  [place]: [phone]  F: [phone]      Reason for Visit:   Sherlean Gosselin is a 78 y.o. female who is seen in consultation at the request of Dr. Renae Leone for evaluation of right breast cancer. Treatment History:   · 7/1/15 right breast bx:  IDC, gr 2, ER + at 89%, AR + at 40%, HER 2 negative by IHC and FISH; ki67 34%  · S/p lumpectomy, SNBX, re-excision in 2015 in [place]:  8/10/15 Right breast IDC, 1 cm, and 0.3 cm,  0/2 LN, pT1b pN0 cM0  · Re-ex negative on 8/24/15  · anatrozole 2015-  · mammaprint 9/11/15 low risk luminal A    History of Present Illness:   She recently moved to [place] to be near her daughter in law and son. Has mild hot flashes with anastrozole. Interval history: In today for follow up. Complains of gr 2 hot flashes, gr 1 insomnia, gr 1 urinary frequency, gr 1 urinary leakage. Past Medical History:   Diagnosis Date    Breast cancer ([facility] [address] 2015    Right       Past Surgical History:   Procedure Laterality Date    HX BREAST LUMPECTOMY Right 08/24/2015    HX CATARACT REMOVAL  2013    Bilateral     HX DILATION AND CURETTAGE      x2    HX ORTHOPAEDIC  2017    RIGHT foot  hammertoe      Social History     Tobacco Use    Smoking status: Former Smoker     Types: Cigarettes     Start date: 8/12/2000    Smokeless tobacco: Never Used   Substance Use Topics    Alcohol use: Yes      History reviewed. No pertinent family history.   Current Outpatient Medications   Medication Sig    anastrozole (ARIMIDEX) 1 mg tablet anastrozole 1 mg tablet    metroNIDAZOLE (METROCREAM) 0.75 % topical cream metronidazole 0.75 % topical cream    celecoxib (CELEBREX) 200 mg capsule celecoxib 200 mg capsule    diclofenac EC (VOLTAREN) 75 mg EC tablet diclofenac sodium 75 mg tablet,delayed release    ergocalciferol (ERGOCALCIFEROL) 50,000 unit capsule ergocalciferol (vitamin D2) 50,000 unit capsule    esomeprazole (NEXIUM) 40 mg capsule esomeprazole magnesium 40 mg capsule,delayed release    ibandronate (BONIVA) 150 mg tablet ibandronate 150 mg tablet    levothyroxine (SYNTHROID) 25 mcg tablet levothyroxine 25 mcg tablet    montelukast (SINGULAIR) 10 mg tablet montelukast 10 mg tablet    olmesartan-hydroCHLOROthiazide (BENICAR HCT) 20-12.5 mg per tablet olmesartan 20 mg-hydrochlorothiazide 12.5 mg tablet    tiotropium bromide (SPIRIVA RESPIMAT) 2.5 mcg/actuation inhaler Spiriva Respimat 2.5 mcg/actuation solution for inhalation    ADVAIR -21 mcg/actuation inhaler INHALE 2 PUFFS BY MOUTH TWICE A DAY    calcitonin, salmon, (MIACALCIN) nasal 1 [place] by IntraNASal route daily.  multivitamin (ONE A DAY) tablet Take 1 Tab by mouth daily.  vitamin E acetate (VITAMIN E PO) Take  by mouth.  docosahexanoic acid/epa (FISH OIL PO) Take  by mouth.  calcium carbonate/vitamin D3 (CALCIUM + D PO) Take  by mouth.  vitamin B complex (VITAMINS B COMPLEX PO) Take  by mouth.  albuterol (PROAIR HFA) 90 mcg/actuation inhaler Take  by inhalation.  diclofenac (VOLTAREN) 1 % gel Apply  to affected area four (4) times daily.  polysorbate 80/glycerin (REFRESH DRY EYE THERAPY OP) Apply  to eye.  acetaminophen (TYLENOL EXTRA STRENGTH PO) Take  by mouth.  venlafaxine-SR (EFFEXOR-XR) 37.5 mg capsule Take 1 Cap by mouth daily.  mometasone (NASONEX) 50 mcg/actuation nasal spray mometasone 50 mcg/actuation nasal spray     No current facility-administered medications for this visit. Allergies   Allergen Reactions    Ace Inhibitors Cough    Iodine Rash    Penicillins Rash        Review of Systems: A complete review of systems was obtained, negative except as described above.     Physical Exam:     Visit Vitals  /89 (BP 1 Location: Left arm, BP Patient Position: Sitting)   Pulse 78   Temp 96.2 °F (35.7 °C) (Temporal)   Resp 16   Ht 5' 4\" (1.626 m)   Wt 158 lb 3.2 oz (71.8 kg)   SpO2 95% BMI 27.15 kg/m²     ECOG PS: 0  General: No distress  Eyes: PERRLA, anicteric sclerae  HENT: Atraumatic, OP clear  Neck: Supple  Lymphatic: No cervical, supraclavicular adenopathy  Respiratory: CTAB, normal respiratory effort  CV: Normal rate, regular rhythm, no murmurs, no peripheral edema  GI: Soft, nontender, nondistended, no masses, no hepatomegaly, no splenomegaly; + BS  MS:  Digits without clubbing or cyanosis. Skin: No rashes, ecchymoses, or petechiae. Normal temperature, turgor, and texture. Psych: Alert, oriented, appropriate affect, normal judgment/insight    Results:   No results found for: WBC, HGB, HCT, PLT, MCV, ANEU, HGBPOC, HCTPOC, HGBEXT, HCTEXT, PLTEXT, HGBEXT, HCTEXT, PLTEXT  No results found for: NA, K, CL, CO2, GLU, BUN, CREA, GFRAA, GFRNA, CA, NAPOC, KPOCT, CLPOC, GLUCPOC, IBUN, CREAPOC, ICAI  No results found for: TBILI, ALT, SGOT, AP, TP, ALB, GLOB     8/9/19 3D mammogram  Negative    7/12/19 dexa     Femoral Neck Left:  Bone mineral density (gm/cm2):  0.769  % of peak bone mass:  74  % for age matched controls:  101  T-score:  -1.9  Z-score:  0.1     Femoral Neck Right:  Bone mineral density (gm/cm2):  0.757  % of peak bone mass:  73  % for age matched controls:  100  T-score:  -2.0  Z-score:  0.0     Total Hip Left:  Bone mineral density (gm/cm2):  0.879  % of peak bone mass:  87  % for age matched controls:  113  T-score:  -1.0  Z-score:  0.8     Total Hip Right:  Bone mineral density (gm/cm2):  0.842  % of peak bone mass:  84  % for age matched controls:  108  T-score:  -1.3  Z-score:  0.5     33% Radius Left:  Bone mineral density (gm/cm2):  0.497  % of peak bone mass:  70  % for age matched controls:  70  T-score:  -3.0  Z-score:  -0.4     IMPRESSION  Impression: This patient is osteoporotic using the World Health Organization criteria    8/9/19 Bilateral mammo: benign          Records reviewed and summarized above. Pathology report(s) reviewed above.   Radiology report(s) reviewed above. Assessment/plan:   1. Right breast cancer:  Stage IA (both), 1 cm, ER +, [place] +, HER 2 negative, 0/2 LN, low risk mammaprint    We explained to the patient that the goal of systemic adjuvant therapy is to improve the chances for cure and decrease the risk of relapse. We explained why a patient can have microscopic cancer spread now even though physical examination, laboratory studies and imaging studies are negative for cancer. We explained that the same treatments used now as adjuvant or preventive treatments rarely if ever are curative in women who develop metastases. Would recommend to continue anastrozole for a total of 5 years, until fall of 2020, will see her back in 6 months and stop this then.     DENYS. Bilateral mammo in 8/2019 benign, next scheduled for 6/95/7722 with . 2. Emotional well being:  She has excellent support and is coping well with her disease    3. Osteoporosis:  L wrist only, rest osteopenia; On boniva; has been on since 2016; just started on miacalcin with Dr. Ramonita Brittle. Last DEXA 7/2019. 4. Hot flashes:  Mild, discussed effexor XR 37.5 mg daily, rx in but she did not start this. She will think about this, she is leaning towards not starting this. > 15 min were spent with this patient with > 50% of that time spent in face to face counseling      I appreciate the opportunity to participate in Ms. Helena Frausto's care. Signed By: Nataliya Willett MD      No orders of the defined types were placed in this encounter.

## 2020-07-16 ENCOUNTER — HOSPITAL ENCOUNTER (OUTPATIENT)
Dept: MRI IMAGING | Age: 80
Discharge: HOME | End: 2020-07-16
Attending: ORTHOPAEDIC SURGERY
Payer: MEDICARE

## 2020-07-16 DIAGNOSIS — M48.062 SPINAL STENOSIS OF LUMBAR REGION WITH NEUROGENIC CLAUDICATION: ICD-10-CM

## 2020-07-16 PROCEDURE — 72148 MRI LUMBAR SPINE W/O DYE: CPT

## 2020-08-17 ENCOUNTER — HOSPITAL ENCOUNTER (OUTPATIENT)
Dept: MAMMOGRAPHY | Age: 80
Discharge: HOME | End: 2020-08-17
Attending: SURGERY
Payer: MEDICARE

## 2020-08-17 ENCOUNTER — OFFICE VISIT (OUTPATIENT)
Dept: SURGERY | Age: 80
End: 2020-08-17
Payer: MEDICARE

## 2020-08-17 VITALS
SYSTOLIC BLOOD PRESSURE: 128 MMHG | DIASTOLIC BLOOD PRESSURE: 68 MMHG | HEART RATE: 74 BPM | BODY MASS INDEX: 28 KG/M2 | WEIGHT: 164 LBS | HEIGHT: 64 IN | TEMPERATURE: 97.3 F

## 2020-08-17 DIAGNOSIS — Z85.3 HX OF BREAST CANCER: ICD-10-CM

## 2020-08-17 DIAGNOSIS — C50.911 INVASIVE DUCTAL CARCINOMA OF RIGHT BREAST (HCC): ICD-10-CM

## 2020-08-17 PROCEDURE — 1090F PRES/ABSN URINE INCON ASSESS: CPT | Performed by: SURGERY

## 2020-08-17 PROCEDURE — G8427 DOCREV CUR MEDS BY ELIG CLIN: HCPCS | Performed by: SURGERY

## 2020-08-17 PROCEDURE — G8536 NO DOC ELDER MAL SCRN: HCPCS | Performed by: SURGERY

## 2020-08-17 PROCEDURE — 1101F PT FALLS ASSESS-DOCD LE1/YR: CPT | Performed by: SURGERY

## 2020-08-17 PROCEDURE — G8419 CALC BMI OUT NRM PARAM NOF/U: HCPCS | Performed by: SURGERY

## 2020-08-17 PROCEDURE — G8432 DEP SCR NOT DOC, RNG: HCPCS | Performed by: SURGERY

## 2020-08-17 PROCEDURE — G8399 PT W/DXA RESULTS DOCUMENT: HCPCS | Performed by: SURGERY

## 2020-08-17 PROCEDURE — 77062 BREAST TOMOSYNTHESIS BI: CPT

## 2020-08-17 PROCEDURE — 99213 OFFICE O/P EST LOW 20 MIN: CPT | Performed by: SURGERY

## 2020-08-17 RX ORDER — METOPROLOL SUCCINATE 25 MG/1
25 TABLET, EXTENDED RELEASE ORAL DAILY
COMMUNITY
Start: 2020-08-09 | End: 2022-05-01

## 2020-08-17 RX ORDER — RIVAROXABAN 20 MG/1
20 TABLET, FILM COATED ORAL
COMMUNITY
Start: 2020-07-31

## 2020-08-17 RX ORDER — AMIODARONE HYDROCHLORIDE 200 MG/1
200 TABLET ORAL DAILY
COMMUNITY
Start: 2020-07-29 | End: 2021-04-28

## 2020-08-17 NOTE — PROGRESS NOTES
HISTORY OF PRESENT ILLNESS Mariely Dillon is a [de-identified] y.o. female. HPI  ESTABLISHED patient here for annual follow up for LEFT breast cancer. Denies palpable lumps. No pain. Saw dermatologist for yeast infection to her LEFT breast. Treated with  Ketocanazole cream.  
 
Recent history of Afib and hospitalized at [address] [place] [address]. 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 MammaPrint: Low risk, luminal type A. Treated in [place] with lumpectomy and SLNBx, re-excision, and adjuvant Anastrozole. On Boniva and Miacalcin for osteoporosis. [facility] Results (most recent): 
Results from Hospital Encounter encounter on 08/17/20 [facility] 3D BETTY W MAMMO BI DX INCL CAD Narrative INDICATION: . Personal history of malignant neoplasm of breast. 
COMPARISON: Mammogram(s), most recent, 8/9/2019. Lesle Gulling COMPOSITION:  
There are scattered fibroglandular densities. . 
TECHNIQUE:  
Standard 2D, CC and MLO views of the each breast as well as spot magnification 
views of the right breast were performed with digital technique and subjected to 
computer-aided detection. Tomosynthesis of the each breast was performed in CC 
and MLO projections. Lesle Gulling FINDINGS:   
Postsurgical changes in the right breast. 
Asymmetry in the left breast is not significant changed. No new visualized mass, suspicious microcalcification or architectural 
distortion. .  
 Impression IMPRESSION:    
1. BI-RADS Category 2 - Benign findings. . 
RECOMMENDATION:   
Routine surveillance with a screening mammogram in one year. ROS Physical Exam 
 
ASSESSMENT and PLAN 
{ASSESSMENT/PLAN:[id number]}

## 2020-08-17 NOTE — PROGRESS NOTES
HISTORY OF PRESENT ILLNESS  Miriam Garcia is a [de-identified] y.o. female. HPI  ESTABLISHED patient here for annual follow up for RIGHT breast cancer. Denies palpable lumps. No pain. Saw dermatologist for yeast infection to her LEFT breast. Treated with  Ketocanazole cream.      Recent history of Afib and hospitalized at [facility]. Taking Xarelto.       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 MammaPrint: Low risk, luminal type A. Treated in [place] with lumpectomy and SLNBx, re-excision, and adjuvant Anastrozole. On Boniva and Miacalcin for osteoporosis.     [facility] Results (most recent):       Results from Hospital Encounter encounter on 08/17/20   [facility] 3D BETTY W MAMMO BI DX INCL CAD     Narrative INDICATION: . Personal history of malignant neoplasm of breast.  COMPARISON: Mammogram(s), most recent, 8/9/2019. Yi Dat COMPOSITION:   There are scattered fibroglandular densities. .  TECHNIQUE:   Standard 2D, CC and MLO views of the each breast as well as spot magnification  views of the right breast were performed with digital technique and subjected to  computer-aided detection. Tomosynthesis of the each breast was performed in CC  and MLO projections. Yi Dat FINDINGS:    Postsurgical changes in the right breast.  Asymmetry in the left breast is not significant changed. No new visualized mass, suspicious microcalcification or architectural  distortion. .     Impression IMPRESSION:     1. BI-RADS Category 2 - Benign findings. .  RECOMMENDATION:    Routine surveillance with a screening mammogram in one year.        Past Medical History:   Diagnosis Date    Arrhythmia 05/2020    afib    Breast cancer ([address] 2015    Right     Congestive heart failure ([address] 05/2020       Past Surgical History:   Procedure Laterality Date    HX BREAST LUMPECTOMY Right 08/24/2015    HX CATARACT REMOVAL  2013    Bilateral     HX DILATION AND CURETTAGE      x2    HX ORTHOPAEDIC  2017    RIGHT foot  hammertoe Social History     Socioeconomic History    Marital status:      Spouse name: Not on file    Number of children: Not on file    Years of education: Not on file    Highest education level: Not on file   Occupational History    Not on file   Social Needs    Financial resource strain: Not on file    Food insecurity     Worry: Not on file     Inability: Not on file    Transportation needs     Medical: Not on file     Non-medical: Not on file   Tobacco Use    Smoking status: Former Smoker     Types: Cigarettes     Start date: 8/12/2000    Smokeless tobacco: Never Used   Substance and Sexual Activity    Alcohol use: Yes    Drug use: Not on file    Sexual activity: Not on file   Lifestyle    Physical activity     Days per week: Not on file     Minutes per session: Not on file    Stress: Not on file   Relationships    Social connections     Talks on phone: Not on file     Gets together: Not on file     Attends Muslim service: Not on file     Active member of club or organization: Not on file     Attends meetings of clubs or organizations: Not on file     Relationship status: Not on file    Intimate partner violence     Fear of current or ex partner: Not on file     Emotionally abused: Not on file     Physically abused: Not on file     Forced sexual activity: Not on file   Other Topics Concern    Not on file   Social History Narrative    Not on file       Current Outpatient Medications on File Prior to Visit   Medication Sig Dispense Refill    amiodarone (CORDARONE) 200 mg tablet Take 200 mg by mouth daily.  metoprolol succinate (TOPROL-XL) 25 mg XL tablet Take 25 mg by mouth daily. 1/2 tab daily      Xarelto 20 mg tab tablet Take 20 mg by mouth daily (with breakfast).       anastrozole (ARIMIDEX) 1 mg tablet anastrozole 1 mg tablet 90 Tab 1    metroNIDAZOLE (METROCREAM) 0.75 % topical cream metronidazole 0.75 % topical cream      diclofenac EC (VOLTAREN) 75 mg EC tablet diclofenac sodium 75 mg tablet,delayed release      ergocalciferol (ERGOCALCIFEROL) 50,000 unit capsule ergocalciferol (vitamin D2) 50,000 unit capsule      esomeprazole (NEXIUM) 40 mg capsule esomeprazole magnesium 40 mg capsule,delayed release      ibandronate (BONIVA) 150 mg tablet ibandronate 150 mg tablet      levothyroxine (SYNTHROID) 25 mcg tablet levothyroxine 25 mcg tablet      mometasone (NASONEX) 50 mcg/actuation nasal spray mometasone 50 mcg/actuation nasal spray      montelukast (SINGULAIR) 10 mg tablet montelukast 10 mg tablet      olmesartan-hydroCHLOROthiazide (BENICAR HCT) 20-12.5 mg per tablet Take  by mouth daily. Takes 1/2 tab daily      tiotropium bromide (SPIRIVA RESPIMAT) 2.5 mcg/actuation inhaler Spiriva Respimat 2.5 mcg/actuation solution for inhalation      ADVAIR -21 mcg/actuation inhaler INHALE 2 PUFFS BY MOUTH TWICE A DAY  2    calcitonin, salmon, (MIACALCIN) nasal 1 [place] by IntraNASal route daily.  multivitamin (ONE A DAY) tablet Take 1 Tab by mouth daily.  vitamin E acetate (VITAMIN E PO) Take  by mouth.  docosahexanoic acid/epa (FISH OIL PO) Take  by mouth.  calcium carbonate/vitamin D3 (CALCIUM + D PO) Take  by mouth.  vitamin B complex (VITAMINS B COMPLEX PO) Take  by mouth.  albuterol (PROAIR HFA) 90 mcg/actuation inhaler Take  by inhalation.  diclofenac (VOLTAREN) 1 % gel Apply  to affected area four (4) times daily.  polysorbate 80/glycerin (REFRESH DRY EYE THERAPY OP) Apply  to eye.  acetaminophen (TYLENOL EXTRA STRENGTH PO) Take  by mouth.  venlafaxine-SR (EFFEXOR-XR) 37.5 mg capsule Take 1 Cap by mouth daily. 90 Cap 3    [DISCONTINUED] celecoxib (CELEBREX) 200 mg capsule celecoxib 200 mg capsule       No current facility-administered medications on file prior to visit. Allergies   Allergen Reactions    Ace Inhibitors Cough    Iodine Rash    Penicillins Rash       OB History    No obstetric history on file. Obstetric Comments   Menarche?, LMP 1989, # of children 2, age of 1st delivery ?, Hysterectomy/oophorectomy No/No, Breast bx Yes, history of breast feeding ?, BCP ?, Hormone therapy No               ROS    Physical Exam  Exam conducted with a chaperone present. Cardiovascular:      Rate and Rhythm: Normal rate and regular rhythm. Heart sounds: Normal heart sounds. Pulmonary:      Breath sounds: Normal breath sounds. Chest:      Breasts: Breasts are symmetrical.         Right: Inverted nipple present. No swelling, bleeding, mass, nipple discharge, skin change or tenderness. Left: Normal. No swelling, bleeding, inverted nipple, mass, nipple discharge, skin change or tenderness. Lymphadenopathy:      Cervical:      Right cervical: No superficial, deep or posterior cervical adenopathy. Left cervical: No superficial, deep or posterior cervical adenopathy. Upper Body:      Right upper body: No supraclavicular or axillary adenopathy. Left upper body: No supraclavicular or axillary adenopathy. ASSESSMENT and PLAN    ICD-10-CM ICD-9-CM    1. Invasive ductal carcinoma of right breast ([facility] [address]  C50.911 174.9       Patient presents to f/u on RIGHT breast cancer, and is doing well overall. Well healed incision s/p lumpectomy, no evidence of local recurrence. Pt is 5 years DENYS. Pt to continue annual mammography. F/U PRN. This plan was reviewed with the patient and patient agrees. All questions were answered.     Written by Tra Mendosa, as dictated by Dr. David Alfaro MD.

## 2020-08-17 NOTE — PATIENT INSTRUCTIONS

## 2020-08-19 ENCOUNTER — TELEPHONE (OUTPATIENT)
Dept: ONCOLOGY | Age: 80
End: 2020-08-19

## 2020-08-19 NOTE — TELEPHONE ENCOUNTER
Patient called and wanted to know if she still needs to be on the anastrozole. She also wanted to know if she could virtual for her appointment of next Wednesday.   CB#[phone]

## 2020-08-21 ENCOUNTER — TELEPHONE (OUTPATIENT)
Dept: ONCOLOGY | Age: 80
End: 2020-08-21

## 2020-08-21 NOTE — TELEPHONE ENCOUNTER
LVM for patient about a virtual appointment but need to be scheduled from 8:15am though 8:30am then 2:45pm though 3pm every 15 minutes but do not double book

## 2020-08-26 ENCOUNTER — VIRTUAL VISIT (OUTPATIENT)
Dept: ONCOLOGY | Age: 80
End: 2020-08-26
Payer: MEDICARE

## 2020-08-26 DIAGNOSIS — C50.911 INVASIVE DUCTAL CARCINOMA OF RIGHT BREAST (HCC): Primary | ICD-10-CM

## 2020-08-26 DIAGNOSIS — I48.91 ATRIAL FIBRILLATION, UNSPECIFIED TYPE (HCC): ICD-10-CM

## 2020-08-26 PROCEDURE — G8432 DEP SCR NOT DOC, RNG: HCPCS | Performed by: INTERNAL MEDICINE

## 2020-08-26 PROCEDURE — G8427 DOCREV CUR MEDS BY ELIG CLIN: HCPCS | Performed by: INTERNAL MEDICINE

## 2020-08-26 PROCEDURE — 1101F PT FALLS ASSESS-DOCD LE1/YR: CPT | Performed by: INTERNAL MEDICINE

## 2020-08-26 PROCEDURE — 1090F PRES/ABSN URINE INCON ASSESS: CPT | Performed by: INTERNAL MEDICINE

## 2020-08-26 PROCEDURE — G8536 NO DOC ELDER MAL SCRN: HCPCS | Performed by: INTERNAL MEDICINE

## 2020-08-26 PROCEDURE — 99214 OFFICE O/P EST MOD 30 MIN: CPT | Performed by: INTERNAL MEDICINE

## 2020-08-26 PROCEDURE — G8419 CALC BMI OUT NRM PARAM NOF/U: HCPCS | Performed by: INTERNAL MEDICINE

## 2020-08-26 PROCEDURE — G8399 PT W/DXA RESULTS DOCUMENT: HCPCS | Performed by: INTERNAL MEDICINE

## 2020-08-26 NOTE — PROGRESS NOTES
Cancer [place] [facility]  [address] [facility], [address] [facility] Aw: [phone]  F: [phone]        Reason for Visit:   Annalise Avila is a [de-identified] y.o. female who is seen by synchronous (real-time) audio-video technology for follow up of breast cancer    Breast surgeon:  Dr. Yola Apple    Treatment History:   · 7/1/15 right breast bx:  IDC, gr 2, ER + at 89%, TX + at 40%, HER 2 negative by IHC and FISH; ki67 34%  · S/p lumpectomy, SNBX, re-excision in 2015 in [place]:  8/10/15 Right breast IDC, 1 cm, and 0.3 cm,  0/2 LN, pT1b pN0 cM0  · Re-ex negative on 8/24/15  · anatrozole 2015-8/31/20  · mammaprint 9/11/15 low risk luminal A    History of Present Illness:   She moved to [place] to be near her daughter in law and son. Has mild hot flashes with anastrozole. Interval history: complains of hot flashes, gr 2, worse at night    Past Medical History:   Diagnosis Date    Arrhythmia 05/2020    afib    Breast cancer ([facility] 2015    Right     Congestive heart failure ([address] 05/2020      Past Surgical History:   Procedure Laterality Date    HX BREAST LUMPECTOMY Right 08/24/2015    HX CATARACT REMOVAL  2013    Bilateral     HX DILATION AND CURETTAGE      x2    HX ORTHOPAEDIC  2017    RIGHT foot  hammertoe      Social History     Tobacco Use    Smoking status: Former Smoker     Types: Cigarettes     Start date: 8/12/2000    Smokeless tobacco: Never Used   Substance Use Topics    Alcohol use: Yes      History reviewed. No pertinent family history. Current Outpatient Medications   Medication Sig    amiodarone (CORDARONE) 200 mg tablet Take 200 mg by mouth daily.  metoprolol succinate (TOPROL-XL) 25 mg XL tablet Take 25 mg by mouth daily. 1/2 tab daily    Xarelto 20 mg tab tablet Take 20 mg by mouth daily (with breakfast).     anastrozole (ARIMIDEX) 1 mg tablet anastrozole 1 mg tablet    metroNIDAZOLE (METROCREAM) 0.75 % topical cream metronidazole 0.75 % topical cream    diclofenac EC (VOLTAREN) 75 mg EC tablet diclofenac sodium 75 mg tablet,delayed release    ergocalciferol (ERGOCALCIFEROL) 50,000 unit capsule ergocalciferol (vitamin D2) 50,000 unit capsule    esomeprazole (NEXIUM) 40 mg capsule esomeprazole magnesium 40 mg capsule,delayed release    ibandronate (BONIVA) 150 mg tablet ibandronate 150 mg tablet    levothyroxine (SYNTHROID) 25 mcg tablet levothyroxine 25 mcg tablet    mometasone (NASONEX) 50 mcg/actuation nasal spray mometasone 50 mcg/actuation nasal spray    montelukast (SINGULAIR) 10 mg tablet montelukast 10 mg tablet    olmesartan-hydroCHLOROthiazide (BENICAR HCT) 20-12.5 mg per tablet Take  by mouth daily. Takes 1/2 tab daily    tiotropium bromide (SPIRIVA RESPIMAT) 2.5 mcg/actuation inhaler Spiriva Respimat 2.5 mcg/actuation solution for inhalation    ADVAIR -21 mcg/actuation inhaler INHALE 2 PUFFS BY MOUTH TWICE A DAY    calcitonin, salmon, (MIACALCIN) nasal 1 [place] by IntraNASal route daily.  multivitamin (ONE A DAY) tablet Take 1 Tab by mouth daily.  vitamin E acetate (VITAMIN E PO) Take  by mouth.  docosahexanoic acid/epa (FISH OIL PO) Take  by mouth.  calcium carbonate/vitamin D3 (CALCIUM + D PO) Take  by mouth.  vitamin B complex (VITAMINS B COMPLEX PO) Take  by mouth.  albuterol (PROAIR HFA) 90 mcg/actuation inhaler Take  by inhalation.  diclofenac (VOLTAREN) 1 % gel Apply  to affected area four (4) times daily.  polysorbate 80/glycerin (REFRESH DRY EYE THERAPY OP) Apply  to eye.  acetaminophen (TYLENOL EXTRA STRENGTH PO) Take  by mouth. No current facility-administered medications for this visit. Allergies   Allergen Reactions    Ace Inhibitors Cough    Iodine Rash    Penicillins Rash        Review of Systems: A complete review of systems was obtained, negative except as described above. Physical Exam:     There were no vitals taken for this visit.   ECOG PS: 0  General: alert, cooperative, no distress   Mental  status: normal mood, behavior, speech, dress, motor activity, and thought processes, able to follow commands   HENT: NCAT   Neck: no visualized mass   Resp: no respiratory distress   Neuro: no gross deficits   Skin: no discoloration or lesions of concern on visible areas   Psychiatric: normal affect, consistent with stated mood, no evidence of hallucinations       Due to this being a TeleHealth evaluation (During [facility]- public health emergency), many elements of the physical examination are unable to be assessed. Evaluation of the following organ systems was limited: Vitals/Constitutional/EENT/Resp/CV/GI//MS/Neuro/Skin/Heme-Lymph-Imm. Results:   No results found for: WBC, HGB, HCT, PLT, MCV, ANEU, HGBPOC, HCTPOC, HGBEXT, HCTEXT, PLTEXT, HGBEXT, HCTEXT, PLTEXT  No results found for: NA, K, CL, CO2, GLU, BUN, CREA, GFRAA, GFRNA, CA, NAPOC, KPOCT, CLPOC, GLUCPOC, IBUN, CREAPOC, ICAI  No results found for: TBILI, ALT, AP, TP, ALB, GLOB     8/9/19 3D mammogram  Negative    7/12/19 dexa     Femoral Neck Left:  Bone mineral density (gm/cm2):  0.769  % of peak bone mass:  74  % for age matched controls:  101  T-score:  -1.9  Z-score:  0.1     Femoral Neck Right:  Bone mineral density (gm/cm2):  0.757  % of peak bone mass:  73  % for age matched controls:  100  T-score:  -2.0  Z-score:  0.0     Total Hip Left:  Bone mineral density (gm/cm2):  0.879  % of peak bone mass:  87  % for age matched controls:  113  T-score:  -1.0  Z-score:  0.8     Total Hip Right:  Bone mineral density (gm/cm2):  0.842  % of peak bone mass:  84  % for age matched controls:  108  T-score:  -1.3  Z-score:  0.5     33% Radius Left:  Bone mineral density (gm/cm2):  0.497  % of peak bone mass:  70  % for age matched controls:  70  T-score:  -3.0  Z-score:  -0.4     IMPRESSION  Impression:      This patient is osteoporotic using the World Health Organization criteria    8/9/19 Bilateral mammo: benign          Records reviewed and summarized above. Pathology report(s) reviewed above. Radiology report(s) reviewed above. Assessment/plan:   1. Right breast cancer:  Stage IA (both), 1 cm, ER +, ID +, HER 2 negative, 0/2 LN, low risk mammaprint    We explained to the patient that the goal of systemic adjuvant therapy is to improve the chances for cure and decrease the risk of relapse. We explained why a patient can have microscopic cancer spread now even though physical examination, laboratory studies and imaging studies are negative for cancer. We explained that the same treatments used now as adjuvant or preventive treatments rarely if ever are curative in women who develop metastases. Would recommend to continue anastrozole for a total of 5 years, until fall of 2020, will stop at the end of aug 2020    DENYS. Bilateral mammo in 8/2020 benign, repeat yearly    She will now follow up her PCP. 2. Emotional well being:  She has excellent support and is coping well with her disease    3. Osteoporosis:  L wrist only, rest osteopenia; On boniva; has been on since 2016; just started on miacalcin with Dr. Lou Shipley. Last DEXA 7/2019. 4. Hot flashes:  Will stop anastrozole    5. Afib: on xarelto    I was in the office while conducting this encounter. The patient was at her home. Consent:  She and/or her healthcare decision maker is aware that this patient-initiated Telehealth encounter is a billable service, with coverage as determined by her insurance carrier. She is aware that she may receive a bill and has provided verbal consent to proceed: Yes    Pursuant to the emergency declaration under the [address] and the [facility] [address] waiver authority and the [employer] Act, this Virtual  Visit was conducted, with patient's (and/or legal guardian's) consent, to reduce the patient's risk of exposure to COVID-19 and provide necessary medical care. Services were provided through a video synchronous discussion virtually to substitute for in-person visit. I appreciate the opportunity to participate in Ms. Carol Frausto's care. Signed By: Manuela Gatica MD      No orders of the defined types were placed in this encounter.

## 2020-11-16 ENCOUNTER — TRANSCRIBE ORDER (OUTPATIENT)
Dept: SCHEDULING | Age: 80
End: 2020-11-16

## 2020-11-16 DIAGNOSIS — R05.9 COUGH: Primary | ICD-10-CM

## 2020-11-19 ENCOUNTER — HOSPITAL ENCOUNTER (OUTPATIENT)
Dept: CT IMAGING | Age: 80
Discharge: HOME | End: 2020-11-19
Attending: FAMILY MEDICINE
Payer: MEDICARE

## 2020-11-19 DIAGNOSIS — R05.9 COUGH: ICD-10-CM

## 2020-11-19 PROCEDURE — 71250 CT THORAX DX C-: CPT

## 2020-12-28 ENCOUNTER — TELEPHONE (OUTPATIENT)
Dept: SURGERY | Age: 80
End: 2020-12-28

## 2020-12-28 DIAGNOSIS — Z12.31 VISIT FOR SCREENING MAMMOGRAM: Primary | ICD-10-CM

## 2020-12-28 NOTE — TELEPHONE ENCOUNTER
Returned patient's call. I let her know that she does not need to be seen since she is not having any problems. She is also due for a screening mammogram this year instead of a diagnostic. I will put the order in for her so she can schedule. She was appreciative.

## 2021-01-06 ENCOUNTER — HOSPITAL ENCOUNTER (EMERGENCY)
Age: 81
Discharge: HOME | End: 2021-01-06
Attending: EMERGENCY MEDICINE | Admitting: EMERGENCY MEDICINE
Payer: MEDICARE

## 2021-01-06 ENCOUNTER — APPOINTMENT (OUTPATIENT)
Dept: GENERAL RADIOLOGY | Age: 81
End: 2021-01-06
Attending: EMERGENCY MEDICINE
Payer: MEDICARE

## 2021-01-06 ENCOUNTER — APPOINTMENT (OUTPATIENT)
Dept: ULTRASOUND IMAGING | Age: 81
End: 2021-01-06
Attending: EMERGENCY MEDICINE
Payer: MEDICARE

## 2021-01-06 VITALS
RESPIRATION RATE: 18 BRPM | HEART RATE: 80 BPM | BODY MASS INDEX: 29.02 KG/M2 | SYSTOLIC BLOOD PRESSURE: 168 MMHG | OXYGEN SATURATION: 96 % | HEIGHT: 64 IN | WEIGHT: 170 LBS | TEMPERATURE: 97.3 F | DIASTOLIC BLOOD PRESSURE: 81 MMHG

## 2021-01-06 DIAGNOSIS — R11.0 NAUSEA WITHOUT VOMITING: ICD-10-CM

## 2021-01-06 DIAGNOSIS — R05.9 COUGH: Primary | ICD-10-CM

## 2021-01-06 LAB
ALBUMIN SERPL-MCNC: 3.5 G/DL (ref 3.5–5)
ALBUMIN/GLOB SERPL: 1.1 {RATIO} (ref 1.1–2.2)
ALP SERPL-CCNC: 87 U/L (ref 45–117)
ALT SERPL-CCNC: 19 U/L (ref 12–78)
ANION GAP SERPL CALC-SCNC: 6 MMOL/L (ref 5–15)
AST SERPL-CCNC: 17 U/L (ref 15–37)
BASOPHILS # BLD: 0.1 K/UL (ref 0–0.1)
BASOPHILS NFR BLD: 1 % (ref 0–1)
BILIRUB SERPL-MCNC: 0.4 MG/DL (ref 0.2–1)
BNP SERPL-MCNC: 415 PG/ML
BUN SERPL-MCNC: 7 MG/DL (ref 6–20)
BUN/CREAT SERPL: 13 (ref 12–20)
CALCIUM SERPL-MCNC: 8.2 MG/DL (ref 8.5–10.1)
CHLORIDE SERPL-SCNC: 93 MMOL/L (ref 97–108)
CO2 SERPL-SCNC: 25 MMOL/L (ref 21–32)
COMMENT, HOLDF: NORMAL
CREAT SERPL-MCNC: 0.55 MG/DL (ref 0.55–1.02)
DIFFERENTIAL METHOD BLD: ABNORMAL
EOSINOPHIL # BLD: 0.1 K/UL (ref 0–0.4)
EOSINOPHIL NFR BLD: 1 % (ref 0–7)
ERYTHROCYTE [DISTWIDTH] IN BLOOD BY AUTOMATED COUNT: 12.9 % (ref 11.5–14.5)
GLOBULIN SER CALC-MCNC: 3.3 G/DL (ref 2–4)
GLUCOSE SERPL-MCNC: 110 MG/DL (ref 65–100)
HCT VFR BLD AUTO: 36.7 % (ref 35–47)
HGB BLD-MCNC: 12.3 G/DL (ref 11.5–16)
IMM GRANULOCYTES # BLD AUTO: 0.1 K/UL (ref 0–0.04)
IMM GRANULOCYTES NFR BLD AUTO: 1 % (ref 0–0.5)
LIPASE SERPL-CCNC: 75 U/L (ref 73–393)
LYMPHOCYTES # BLD: 0.8 K/UL (ref 0.8–3.5)
LYMPHOCYTES NFR BLD: 10 % (ref 12–49)
MAGNESIUM SERPL-MCNC: 1.8 MG/DL (ref 1.6–2.4)
MCH RBC QN AUTO: 31.1 PG (ref 26–34)
MCHC RBC AUTO-ENTMCNC: 33.5 G/DL (ref 30–36.5)
MCV RBC AUTO: 92.9 FL (ref 80–99)
MONOCYTES # BLD: 0.6 K/UL (ref 0–1)
MONOCYTES NFR BLD: 8 % (ref 5–13)
NEUTS SEG # BLD: 6.3 K/UL (ref 1.8–8)
NEUTS SEG NFR BLD: 79 % (ref 32–75)
NRBC # BLD: 0 K/UL (ref 0–0.01)
NRBC BLD-RTO: 0 PER 100 WBC
PLATELET # BLD AUTO: 351 K/UL (ref 150–400)
PMV BLD AUTO: 9 FL (ref 8.9–12.9)
POTASSIUM SERPL-SCNC: 3.8 MMOL/L (ref 3.5–5.1)
PROT SERPL-MCNC: 6.8 G/DL (ref 6.4–8.2)
RBC # BLD AUTO: 3.95 M/UL (ref 3.8–5.2)
RBC MORPH BLD: ABNORMAL
SAMPLES BEING HELD,HOLD: NORMAL
SODIUM SERPL-SCNC: 124 MMOL/L (ref 136–145)
TROPONIN I SERPL-MCNC: <0.05 NG/ML
WBC # BLD AUTO: 8 K/UL (ref 3.6–11)

## 2021-01-06 PROCEDURE — 74018 RADEX ABDOMEN 1 VIEW: CPT

## 2021-01-06 PROCEDURE — 85025 COMPLETE CBC W/AUTO DIFF WBC: CPT

## 2021-01-06 PROCEDURE — 99283 EMERGENCY DEPT VISIT LOW MDM: CPT

## 2021-01-06 PROCEDURE — 84484 ASSAY OF TROPONIN QUANT: CPT

## 2021-01-06 PROCEDURE — 96374 THER/PROPH/DIAG INJ IV PUSH: CPT

## 2021-01-06 PROCEDURE — 76705 ECHO EXAM OF ABDOMEN: CPT

## 2021-01-06 PROCEDURE — 74011250636 HC RX REV CODE- 250/636: Performed by: EMERGENCY MEDICINE

## 2021-01-06 PROCEDURE — 71046 X-RAY EXAM CHEST 2 VIEWS: CPT

## 2021-01-06 PROCEDURE — 80053 COMPREHEN METABOLIC PANEL: CPT

## 2021-01-06 PROCEDURE — 83690 ASSAY OF LIPASE: CPT

## 2021-01-06 PROCEDURE — 93005 ELECTROCARDIOGRAM TRACING: CPT

## 2021-01-06 PROCEDURE — 36415 COLL VENOUS BLD VENIPUNCTURE: CPT

## 2021-01-06 PROCEDURE — 83880 ASSAY OF NATRIURETIC PEPTIDE: CPT

## 2021-01-06 PROCEDURE — 83735 ASSAY OF MAGNESIUM: CPT

## 2021-01-06 RX ORDER — ONDANSETRON 4 MG/1
4 TABLET, ORALLY DISINTEGRATING ORAL
Qty: 20 TAB | Refills: 0 | Status: SHIPPED | OUTPATIENT
Start: 2021-01-06 | End: 2021-04-28 | Stop reason: SDUPTHER

## 2021-01-06 RX ORDER — ONDANSETRON 2 MG/ML
4 INJECTION INTRAMUSCULAR; INTRAVENOUS
Status: COMPLETED | OUTPATIENT
Start: 2021-01-06 | End: 2021-01-06

## 2021-01-06 RX ADMIN — ONDANSETRON 4 MG: 2 INJECTION INTRAMUSCULAR; INTRAVENOUS at 21:48

## 2021-01-07 ENCOUNTER — TRANSCRIBE ORDER (OUTPATIENT)
Dept: SCHEDULING | Age: 81
End: 2021-01-07

## 2021-01-07 DIAGNOSIS — J18.0 BRONCHIAL PNEUMONIA: Primary | ICD-10-CM

## 2021-01-07 LAB
ATRIAL RATE: 79 BPM
CALCULATED P AXIS, ECG09: 35 DEGREES
CALCULATED R AXIS, ECG10: -16 DEGREES
CALCULATED T AXIS, ECG11: 26 DEGREES
DIAGNOSIS, 93000: NORMAL
P-R INTERVAL, ECG05: 164 MS
Q-T INTERVAL, ECG07: 400 MS
QRS DURATION, ECG06: 86 MS
QTC CALCULATION (BEZET), ECG08: 458 MS
VENTRICULAR RATE, ECG03: 79 BPM

## 2021-01-07 NOTE — ED TRIAGE NOTES
Pt ambulatory to triage with mask, c/o nausea, cough, elevated BP, and upper back pain on and off x 1 month. Pt states \" my nausea is so bad and had dizziness this afternoon. \"

## 2021-01-07 NOTE — ED PROVIDER NOTES
59-year-old female with a history of A. fib and CHF presents with a chief complaint of cough. The patient has had a cough since May. She has been seen by cardiology and her family physician for this. She has had multiple imaging studies including chest x-ray and CT scans. She was treated with a course of steroids and antibiotics for presumed pneumonia. Her cough is persisted. Today she developed some nausea which is new. She also complains of some right upper quadrant abdominal pain. She states that it is worse when she lays on her right side. She is currently anticoagulated due to A. fib. She has not had any fevers, diarrhea, constipation or urinary symptoms. Past Medical History:   Diagnosis Date    Arrhythmia 05/2020    afib    Breast cancer ([facility] [address] 2015    Right     Congestive heart failure ([facility] [address] 05/2020       Past Surgical History:   Procedure Laterality Date    HX BREAST LUMPECTOMY Right 08/24/2015    HX CATARACT REMOVAL  2013    Bilateral     HX DILATION AND CURETTAGE      x2    HX ORTHOPAEDIC  2017    RIGHT foot  hammertoe         No family history on file. Social History     Socioeconomic History    Marital status:      Spouse name: Not on file    Number of children: Not on file    Years of education: Not on file    Highest education level: Not on file   Occupational History    Not on file   Social Needs    Financial resource strain: Not on file    Food insecurity     Worry: Not on file     Inability: Not on file    Transportation needs     Medical: Not on file     Non-medical: Not on file   Tobacco Use    Smoking status: Former Smoker     Types: Cigarettes     Start date: 8/12/2000    Smokeless tobacco: Never Used   Substance and Sexual Activity    Alcohol use:  Yes    Drug use: Not on file    Sexual activity: Not on file   Lifestyle    Physical activity     Days per week: Not on file     Minutes per session: Not on file    Stress: Not on file Relationships    Social connections     Talks on phone: Not on file     Gets together: Not on file     Attends Baptism service: Not on file     Active member of club or organization: Not on file     Attends meetings of clubs or organizations: Not on file     Relationship status: Not on file    Intimate partner violence     Fear of current or ex partner: Not on file     Emotionally abused: Not on file     Physically abused: Not on file     Forced sexual activity: Not on file   Other Topics Concern    Not on file   Social History Narrative    Not on file         ALLERGIES: Ace inhibitors, Iodine, and Penicillins    Review of Systems   Constitutional: Negative for fever. HENT: Negative for rhinorrhea. Respiratory: Positive for cough and shortness of breath. Cardiovascular: Negative for chest pain. Gastrointestinal: Positive for abdominal pain. Genitourinary: Negative for dysuria. Musculoskeletal: Negative for back pain. Skin: Negative for wound. Neurological: Negative for headaches. Psychiatric/Behavioral: Negative for confusion. Vitals:    01/1940   BP: (!) 168/81   Pulse: 80   Resp: 18   Temp: 97.3 °F (36.3 °C)   SpO2: 96%   Weight: 77.1 kg (170 lb)   Height: 5' 4\" (1.626 m)            Physical Exam  Vitals signs and nursing note reviewed. Constitutional:       General: She is not in acute distress. Appearance: Normal appearance. She is not ill-appearing, toxic-appearing or diaphoretic. HENT:      Head: Normocephalic and atraumatic. Eyes:      Extraocular Movements: Extraocular movements intact. Neck:      Musculoskeletal: Normal range of motion. Cardiovascular:      Rate and Rhythm: Normal rate and regular rhythm. Pulses: Normal pulses. Heart sounds: Normal heart sounds. No murmur. No friction rub. No gallop. Pulmonary:      Effort: Pulmonary effort is normal. No respiratory distress. Breath sounds: Normal breath sounds.  No wheezing, rhonchi or rales.   Abdominal:      General: Abdomen is flat. Bowel sounds are normal. There is no distension. Palpations: Abdomen is soft. Tenderness: There is abdominal tenderness (Right upper quadrant). Musculoskeletal: Normal range of motion. Skin:     General: Skin is warm and dry. Neurological:      Mental Status: She is alert and oriented to person, place, and time. Psychiatric:         Mood and Affect: Mood normal.          MDM  Number of Diagnoses or Management Options  Cough  Nausea without vomiting  Diagnosis management comments: EKG shows a normal sinus rhythm at a rate of 79, normal intervals, normal axis, no evidence of active ischemia. 35-year-old female presents with chronic cough and nausea. Her list of differentials is quite large. She does have some right upper quadrant tenderness. Pneumonia and cholecystitis are the first things that come to mind. Chest x-ray will be obtained along with right upper quadrant ultrasound labs. Laboratory studies are unremarkable. KUB, chest x-ray and right upper quadrant ultrasound showed no acute abnormality. At this point the patient is stable for discharge for follow-up with family medicine. She is comfortable and agreeable to plan of care. I did prescribe her Zofran for nausea control at home.        Amount and/or Complexity of Data Reviewed  Clinical lab tests: ordered and reviewed  Tests in the radiology section of CPT®: ordered           Procedures

## 2021-01-08 ENCOUNTER — HOSPITAL ENCOUNTER (OUTPATIENT)
Dept: CT IMAGING | Age: 81
Discharge: HOME | End: 2021-01-08
Attending: FAMILY MEDICINE
Payer: MEDICARE

## 2021-01-08 DIAGNOSIS — J18.0 BRONCHIAL PNEUMONIA: ICD-10-CM

## 2021-01-08 PROCEDURE — 71250 CT THORAX DX C-: CPT

## 2021-01-08 NOTE — ROUTINE PROCESS
Tired to contact office to confirm order since patient is allergic to contrast. Was in the queue for 30 minutes and then asked to leave a message. Pt will be scanned without contrast since without was done on 19 Nov 2020. If with contrast is needed.  Premedicated pt and schedule for CT Chest with contrast

## 2021-01-11 ENCOUNTER — APPOINTMENT (OUTPATIENT)
Dept: GENERAL RADIOLOGY | Age: 81
End: 2021-01-11
Attending: EMERGENCY MEDICINE
Payer: MEDICARE

## 2021-01-11 ENCOUNTER — APPOINTMENT (OUTPATIENT)
Dept: CT IMAGING | Age: 81
End: 2021-01-11
Attending: EMERGENCY MEDICINE
Payer: MEDICARE

## 2021-01-11 ENCOUNTER — HOSPITAL ENCOUNTER (EMERGENCY)
Age: 81
Discharge: HOME | End: 2021-01-11
Attending: EMERGENCY MEDICINE
Payer: MEDICARE

## 2021-01-11 VITALS
RESPIRATION RATE: 16 BRPM | TEMPERATURE: 98 F | OXYGEN SATURATION: 92 % | SYSTOLIC BLOOD PRESSURE: 152 MMHG | HEART RATE: 69 BPM | BODY MASS INDEX: 29.18 KG/M2 | WEIGHT: 170 LBS | DIASTOLIC BLOOD PRESSURE: 66 MMHG

## 2021-01-11 DIAGNOSIS — R07.9 CHEST PAIN, UNSPECIFIED TYPE: Primary | ICD-10-CM

## 2021-01-11 DIAGNOSIS — K80.20 CALCULUS OF GALLBLADDER WITHOUT CHOLECYSTITIS WITHOUT OBSTRUCTION: ICD-10-CM

## 2021-01-11 DIAGNOSIS — R10.11 RUQ ABDOMINAL PAIN: ICD-10-CM

## 2021-01-11 LAB
ALBUMIN SERPL-MCNC: 3.8 G/DL (ref 3.5–5)
ALBUMIN/GLOB SERPL: 1.2 {RATIO} (ref 1.1–2.2)
ALP SERPL-CCNC: 76 U/L (ref 45–117)
ALT SERPL-CCNC: 27 U/L (ref 12–78)
ANION GAP SERPL CALC-SCNC: 7 MMOL/L (ref 5–15)
AST SERPL-CCNC: 23 U/L (ref 15–37)
ATRIAL RATE: 74 BPM
ATRIAL RATE: 87 BPM
BASOPHILS # BLD: 0 K/UL (ref 0–0.1)
BASOPHILS NFR BLD: 0 % (ref 0–1)
BILIRUB SERPL-MCNC: 0.5 MG/DL (ref 0.2–1)
BNP SERPL-MCNC: 615 PG/ML
BUN SERPL-MCNC: 11 MG/DL (ref 6–20)
BUN/CREAT SERPL: 18 (ref 12–20)
CALCIUM SERPL-MCNC: 9 MG/DL (ref 8.5–10.1)
CALCULATED P AXIS, ECG09: 32 DEGREES
CALCULATED R AXIS, ECG10: -11 DEGREES
CALCULATED R AXIS, ECG10: -12 DEGREES
CALCULATED T AXIS, ECG11: 28 DEGREES
CALCULATED T AXIS, ECG11: 55 DEGREES
CHLORIDE SERPL-SCNC: 95 MMOL/L (ref 97–108)
CO2 SERPL-SCNC: 27 MMOL/L (ref 21–32)
COMMENT, HOLDF: NORMAL
CREAT SERPL-MCNC: 0.61 MG/DL (ref 0.55–1.02)
DIAGNOSIS, 93000: NORMAL
DIAGNOSIS, 93000: NORMAL
DIFFERENTIAL METHOD BLD: ABNORMAL
EOSINOPHIL # BLD: 0 K/UL (ref 0–0.4)
EOSINOPHIL NFR BLD: 0 % (ref 0–7)
ERYTHROCYTE [DISTWIDTH] IN BLOOD BY AUTOMATED COUNT: 13.2 % (ref 11.5–14.5)
GLOBULIN SER CALC-MCNC: 3.2 G/DL (ref 2–4)
GLUCOSE SERPL-MCNC: 128 MG/DL (ref 65–100)
HCT VFR BLD AUTO: 37.8 % (ref 35–47)
HGB BLD-MCNC: 12.9 G/DL (ref 11.5–16)
IMM GRANULOCYTES # BLD AUTO: 0.1 K/UL (ref 0–0.04)
IMM GRANULOCYTES NFR BLD AUTO: 1 % (ref 0–0.5)
LIPASE SERPL-CCNC: 120 U/L (ref 73–393)
LYMPHOCYTES # BLD: 0.9 K/UL (ref 0.8–3.5)
LYMPHOCYTES NFR BLD: 12 % (ref 12–49)
MCH RBC QN AUTO: 30.9 PG (ref 26–34)
MCHC RBC AUTO-ENTMCNC: 34.1 G/DL (ref 30–36.5)
MCV RBC AUTO: 90.6 FL (ref 80–99)
MONOCYTES # BLD: 0.7 K/UL (ref 0–1)
MONOCYTES NFR BLD: 9 % (ref 5–13)
NEUTS SEG # BLD: 5.8 K/UL (ref 1.8–8)
NEUTS SEG NFR BLD: 78 % (ref 32–75)
NRBC # BLD: 0 K/UL (ref 0–0.01)
NRBC BLD-RTO: 0 PER 100 WBC
P-R INTERVAL, ECG05: 134 MS
PLATELET # BLD AUTO: 452 K/UL (ref 150–400)
PMV BLD AUTO: 8.8 FL (ref 8.9–12.9)
POTASSIUM SERPL-SCNC: 3.7 MMOL/L (ref 3.5–5.1)
PROT SERPL-MCNC: 7 G/DL (ref 6.4–8.2)
Q-T INTERVAL, ECG07: 346 MS
Q-T INTERVAL, ECG07: 406 MS
QRS DURATION, ECG06: 86 MS
QRS DURATION, ECG06: 86 MS
QTC CALCULATION (BEZET), ECG08: 418 MS
QTC CALCULATION (BEZET), ECG08: 450 MS
RBC # BLD AUTO: 4.17 M/UL (ref 3.8–5.2)
SAMPLES BEING HELD,HOLD: NORMAL
SODIUM SERPL-SCNC: 129 MMOL/L (ref 136–145)
TROPONIN I SERPL-MCNC: <0.05 NG/ML
TROPONIN I SERPL-MCNC: <0.05 NG/ML
VENTRICULAR RATE, ECG03: 74 BPM
VENTRICULAR RATE, ECG03: 88 BPM
WBC # BLD AUTO: 7.4 K/UL (ref 3.6–11)

## 2021-01-11 PROCEDURE — 80053 COMPREHEN METABOLIC PANEL: CPT

## 2021-01-11 PROCEDURE — 70450 CT HEAD/BRAIN W/O DYE: CPT

## 2021-01-11 PROCEDURE — 93005 ELECTROCARDIOGRAM TRACING: CPT

## 2021-01-11 PROCEDURE — 83690 ASSAY OF LIPASE: CPT

## 2021-01-11 PROCEDURE — 85025 COMPLETE CBC W/AUTO DIFF WBC: CPT

## 2021-01-11 PROCEDURE — 99285 EMERGENCY DEPT VISIT HI MDM: CPT

## 2021-01-11 PROCEDURE — 83880 ASSAY OF NATRIURETIC PEPTIDE: CPT

## 2021-01-11 PROCEDURE — 96375 TX/PRO/DX INJ NEW DRUG ADDON: CPT

## 2021-01-11 PROCEDURE — 74011250637 HC RX REV CODE- 250/637: Performed by: EMERGENCY MEDICINE

## 2021-01-11 PROCEDURE — 36415 COLL VENOUS BLD VENIPUNCTURE: CPT

## 2021-01-11 PROCEDURE — 71045 X-RAY EXAM CHEST 1 VIEW: CPT

## 2021-01-11 PROCEDURE — 96372 THER/PROPH/DIAG INJ SC/IM: CPT

## 2021-01-11 PROCEDURE — 84484 ASSAY OF TROPONIN QUANT: CPT

## 2021-01-11 PROCEDURE — 74011250636 HC RX REV CODE- 250/636: Performed by: EMERGENCY MEDICINE

## 2021-01-11 PROCEDURE — 96374 THER/PROPH/DIAG INJ IV PUSH: CPT

## 2021-01-11 RX ORDER — ONDANSETRON 2 MG/ML
4 INJECTION INTRAMUSCULAR; INTRAVENOUS
Status: COMPLETED | OUTPATIENT
Start: 2021-01-11 | End: 2021-01-11

## 2021-01-11 RX ORDER — FAMOTIDINE 10 MG/ML
20 INJECTION INTRAVENOUS
Status: COMPLETED | OUTPATIENT
Start: 2021-01-11 | End: 2021-01-11

## 2021-01-11 RX ORDER — DICYCLOMINE HYDROCHLORIDE 10 MG/ML
20 INJECTION INTRAMUSCULAR
Status: COMPLETED | OUTPATIENT
Start: 2021-01-11 | End: 2021-01-11

## 2021-01-11 RX ORDER — ONDANSETRON 4 MG/1
4 TABLET, ORALLY DISINTEGRATING ORAL
Qty: 20 TAB | Refills: 0 | Status: SHIPPED | OUTPATIENT
Start: 2021-01-11 | End: 2022-09-14

## 2021-01-11 RX ORDER — GUAIFENESIN 100 MG/5ML
324 LIQUID (ML) ORAL
Status: COMPLETED | OUTPATIENT
Start: 2021-01-11 | End: 2021-01-11

## 2021-01-11 RX ORDER — DICYCLOMINE HYDROCHLORIDE 20 MG/1
20 TABLET ORAL
Qty: 20 TAB | Refills: 0 | Status: SHIPPED | OUTPATIENT
Start: 2021-01-11 | End: 2022-09-14

## 2021-01-11 RX ORDER — SODIUM CHLORIDE 0.9 % (FLUSH) 0.9 %
5-40 SYRINGE (ML) INJECTION AS NEEDED
Status: DISCONTINUED | OUTPATIENT
Start: 2021-01-11 | End: 2021-01-11 | Stop reason: HOSPADM

## 2021-01-11 RX ORDER — SODIUM CHLORIDE 0.9 % (FLUSH) 0.9 %
5-40 SYRINGE (ML) INJECTION EVERY 8 HOURS
Status: DISCONTINUED | OUTPATIENT
Start: 2021-01-11 | End: 2021-01-11 | Stop reason: HOSPADM

## 2021-01-11 RX ORDER — MECLIZINE HYDROCHLORIDE 25 MG/1
50 TABLET ORAL
Status: COMPLETED | OUTPATIENT
Start: 2021-01-11 | End: 2021-01-11

## 2021-01-11 RX ADMIN — DICYCLOMINE HYDROCHLORIDE 20 MG: 20 INJECTION, SOLUTION INTRAMUSCULAR at 05:53

## 2021-01-11 RX ADMIN — NITROGLYCERIN 1 INCH: 20 OINTMENT TOPICAL at 04:19

## 2021-01-11 RX ADMIN — MECLIZINE HYDROCHLORIDE 50 MG: 25 TABLET ORAL at 06:53

## 2021-01-11 RX ADMIN — ONDANSETRON 4 MG: 2 INJECTION INTRAMUSCULAR; INTRAVENOUS at 04:18

## 2021-01-11 RX ADMIN — ASPIRIN 324 MG: 81 TABLET, CHEWABLE ORAL at 04:17

## 2021-01-11 RX ADMIN — Medication 10 ML: at 04:19

## 2021-01-11 RX ADMIN — FAMOTIDINE 20 MG: 10 INJECTION INTRAVENOUS at 05:53

## 2021-01-11 NOTE — DISCHARGE INSTRUCTIONS
We hope that we have addressed all of your medical concerns. The examination and treatment you received in the Emergency Department were for an emergent problem and were not intended as complete care. It is important that you follow up with your healthcare provider(s) for ongoing care. If your symptoms worsen or do not improve as expected, and you are unable to reach your usual health care provider(s), you should return to the Emergency Department. Today's healthcare is undergoing tremendous change, and patient satisfaction surveys are one of the many tools to assess the quality of medical care. You may receive a survey from the [employer] regarding your experience in the Emergency Department. I hope that your experience has been completely positive, particularly the medical care that I provided. As such, please participate in the survey; anything less than excellent does not meet my expectations or intentions. [facility] and [facility] participate in nationally recognized quality of care measures. If your blood pressure is greater than 120/80, as reported below, we urge that you seek medical care to address the potential of high blood pressure, commonly known as hypertension. Hypertension can be hereditary or can be caused by certain medical conditions, pain, stress, or \"white coat syndrome. \"       Please make an appointment with your health care provider(s) for follow up of your Emergency Department visit. VITALS:   Patient Vitals for the past 8 hrs:   Temp Pulse Resp BP SpO2   01/11/21 0445 -- 75 16 (!) 167/82 95 %   01/11/21 0415 -- 77 15 (!) 166/85 94 %   01/11/21 0332 98 °F (36.7 °C) 94 20 (!) 175/92 98 %          Thank you for allowing us to provide you with medical care today. We realize that you have many choices for your emergency care needs. Please choose us in the future for any continued health care needs.   Regards,           Miki Mcpherson DO    [facility]   Office: [phone]            Recent Results (from the past 24 hour(s))   EKG, 12 LEAD, INITIAL    Collection Time: 01/11/21  3:33 AM   Result Value Ref Range    Ventricular Rate 88 BPM    Atrial Rate 87 BPM    QRS Duration 86 ms    Q-T Interval 346 ms    QTC Calculation (Bezet) 418 ms    Calculated R Axis -12 degrees    Calculated T Axis 55 degrees    Diagnosis       Accelerated Junctional rhythm  ST elevation, consider lateral injury or acute infarct  ** ** ACUTE MI / STEMI ** **  Abnormal ECG  When compared with ECG of 06-JAN-2021 21:53,  Junctional rhythm has replaced Sinus rhythm  Non-specific change in ST segment in Lateral leads     SAMPLES BEING HELD    Collection Time: 01/11/21  3:40 AM   Result Value Ref Range    SAMPLES BEING HELD 1SST     COMMENT        Add-on orders for these samples will be processed based on acceptable specimen integrity and analyte stability, which may vary by analyte.   TROPONIN I    Collection Time: 01/11/21  3:40 AM   Result Value Ref Range    Troponin-I, Qt. <0.05 <0.05 ng/mL   CBC WITH AUTOMATED DIFF    Collection Time: 01/11/21  3:40 AM   Result Value Ref Range    WBC 7.4 3.6 - 11.0 K/uL    RBC 4.17 3.80 - 5.20 M/uL    HGB 12.9 11.5 - 16.0 g/dL    HCT 37.8 35.0 - 47.0 %    MCV 90.6 80.0 - 99.0 FL    MCH 30.9 26.0 - 34.0 PG    MCHC 34.1 30.0 - 36.5 g/dL    RDW 13.2 11.5 - 14.5 %    PLATELET 452 (H) 150 - 400 K/uL    MPV 8.8 (L) 8.9 - 12.9 FL    NRBC 0.0 0  WBC    ABSOLUTE NRBC 0.00 0.00 - 0.01 K/uL    NEUTROPHILS 78 (H) 32 - 75 %    LYMPHOCYTES 12 12 - 49 %    MONOCYTES 9 5 - 13 %    EOSINOPHILS 0 0 - 7 %    BASOPHILS 0 0 - 1 %    IMMATURE GRANULOCYTES 1 (H) 0.0 - 0.5 %    ABS. NEUTROPHILS 5.8 1.8 - 8.0 K/UL    ABS. LYMPHOCYTES 0.9 0.8 - 3.5 K/UL    ABS. MONOCYTES 0.7 0.0 - 1.0 K/UL    ABS. EOSINOPHILS 0.0 0.0 - 0.4 K/UL    ABS. BASOPHILS 0.0 0.0 - 0.1 K/UL    ABS. IMM. GRANS. 0.1  (H) 0.00 - 0.04 K/UL    DF AUTOMATED     METABOLIC PANEL, COMPREHENSIVE    Collection Time: 01/11/21  3:40 AM   Result Value Ref Range    Sodium 129 (L) 136 - 145 mmol/L    Potassium 3.7 3.5 - 5.1 mmol/L    Chloride 95 (L) 97 - 108 mmol/L    CO2 27 21 - 32 mmol/L    Anion gap 7 5 - 15 mmol/L    Glucose 128 (H) 65 - 100 mg/dL    BUN 11 6 - 20 MG/DL    Creatinine 0.61 0.55 - 1.02 MG/DL    BUN/Creatinine ratio 18 12 - 20      GFR est AA >60 >60 ml/min/1.73m2    GFR est non-AA >60 >60 ml/min/1.73m2    Calcium 9.0 8.5 - 10.1 MG/DL    Bilirubin, total 0.5 0.2 - 1.0 MG/DL    ALT (SGPT) 27 12 - 78 U/L    AST (SGOT) 23 15 - 37 U/L    Alk. phosphatase 76 45 - 117 U/L    Protein, total 7.0 6.4 - 8.2 g/dL    Albumin 3.8 3.5 - 5.0 g/dL    Globulin 3.2 2.0 - 4.0 g/dL    A-G Ratio 1.2 1.1 - 2.2     LIPASE    Collection Time: 01/11/21  3:40 AM   Result Value Ref Range    Lipase 120 73 - 393 U/L   NT-PRO BNP    Collection Time: 01/11/21  3:45 AM   Result Value Ref Range    NT pro- (H) <450 PG/ML   TROPONIN I    Collection Time: 01/11/21  5:59 AM   Result Value Ref Range    Troponin-I, Qt. <0.05 <0.05 ng/mL       Ct Head Wo Cont    Result Date: 1/11/2021  EXAM: CT HEAD WO CONT INDICATION: Dizziness, palpitations, nausea, and right breast carcinoma in remission. COMPARISON: None TECHNIQUE: Noncontrast head CT. Coronal and sagittal reformats. CT dose reduction was achieved through the use of a standardized protocol tailored for this examination and automatic exposure control for dose modulation. FINDINGS: The ventricles and sulci are age-appropriate without hydrocephalus. There is no mass effect or midline shift. There is no intracranial hemorrhage or extra-axial fluid collection. Patchy hypoattenuation in the cerebral white matter is periventricular and subcortical. No loss of gray-white differentiation. The calvarium is intact. The visualized paranasal sinuses and mastoid air cells are clear.      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 No acute intracranial hemorrhage or mass effect. Xr Chest Port    Result Date: 1/11/2021  EXAM: XR CHEST PORT INDICATION: Palpitations, dizziness, chest pain, and nausea. Right breast carcinoma. CHF. COMPARISON: CT chest on 1/8/2021. Chest views on 1/6/2021. TECHNIQUE: Upright portable chest AP view FINDINGS: Cardiac monitoring wires overlie the thorax. The cardiomediastinal and hilar contours are within normal limits. The pulmonary vasculature is within normal limits. Pulmonary edema is moderate and increased. No focal pneumonia. No pneumothorax. The visualized bones and upper abdomen are age-appropriate. IMPRESSION: Increased moderate pulmonary edema more likely than interstitial pneumonia.

## 2021-01-11 NOTE — ED NOTES
Discharge paperwork provided and reviewed    Patient awaiting daughter in law to      Verbal report given to "[employer]" RN  (oncoming nurse) by Fco Fajardo RN (offgoing nurse).  Report included the following information Elia DELUNA

## 2021-01-11 NOTE — ED PROVIDER NOTES
This is a 72-year-old female comes emergency room with chief complaint of palpitations, dizziness, and nausea. Patient also complains of chest pain. Patient is also complained of epigastric pain. Patient was recently diagnosed with gallstones. Patient denies any fever chills. Patient denies any urinary symptoms. Palpitations   Associated symptoms include chest pain, irregular heartbeat, nausea and dizziness. Pertinent negatives include no diaphoresis, no fever, no numbness, no claudication, no near-syncope, no abdominal pain, no vomiting, no back pain, no weakness, no cough and no shortness of breath. She has tried nothing for the symptoms. Dizziness  This is a new problem. The current episode started more than 2 days ago. The problem has not changed since onset. There was no focality noted. Pertinent negatives include no focal weakness, no loss of sensation, no loss of balance, no slurred speech, no speech difficulty, no memory loss, no movement disorder, no agitation, no visual change, no auditory change, no mental status change, no unresponsiveness and no disorientation. There has been no fever. Associated symptoms include chest pain and nausea. Pertinent negatives include no shortness of breath, no vomiting and no confusion. Past Medical History:   Diagnosis Date    Arrhythmia 05/2020    afib    Breast cancer ([facility] [address] 2015    Right     Congestive heart failure ([address] 05/2020       Past Surgical History:   Procedure Laterality Date    HX BREAST LUMPECTOMY Right 08/24/2015    HX CATARACT REMOVAL  2013    Bilateral     HX DILATION AND CURETTAGE      x2    HX ORTHOPAEDIC  2017    RIGHT foot  hammertoe         No family history on file.     Social History     Socioeconomic History    Marital status:      Spouse name: Not on file    Number of children: Not on file    Years of education: Not on file    Highest education level: Not on file   Occupational History    Not on file   Social Needs    Financial resource strain: Not on file    Food insecurity     Worry: Not on file     Inability: Not on file    Transportation needs     Medical: Not on file     Non-medical: Not on file   Tobacco Use    Smoking status: Former Smoker     Types: Cigarettes     Start date: 8/12/2000    Smokeless tobacco: Never Used   Substance and Sexual Activity    Alcohol use: Yes    Drug use: Not on file    Sexual activity: Not on file   Lifestyle    Physical activity     Days per week: Not on file     Minutes per session: Not on file    Stress: Not on file   Relationships    Social connections     Talks on phone: Not on file     Gets together: Not on file     Attends Sabianism service: Not on file     Active member of club or organization: Not on file     Attends meetings of clubs or organizations: Not on file     Relationship status: Not on file    Intimate partner violence     Fear of current or ex partner: Not on file     Emotionally abused: Not on file     Physically abused: Not on file     Forced sexual activity: Not on file   Other Topics Concern    Not on file   Social History Narrative    Not on file     ALLERGIES: Ace inhibitors, Iodine, and Penicillins    Review of Systems   Constitutional: Negative for appetite change, chills, diaphoresis, fever and unexpected weight change. HENT: Negative for ear pain, hearing loss, rhinorrhea and trouble swallowing. Eyes: Negative for pain and visual disturbance. Respiratory: Negative for cough, chest tightness and shortness of breath. Cardiovascular: Positive for chest pain and palpitations. Negative for claudication and near-syncope. Gastrointestinal: Positive for nausea. Negative for abdominal distention, abdominal pain, blood in stool and vomiting. Genitourinary: Negative for dysuria, hematuria and urgency. Musculoskeletal: Negative for back pain and myalgias. Skin: Negative for rash. Neurological: Positive for dizziness.  Negative for focal weakness, syncope, speech difficulty, weakness, numbness and loss of balance. Psychiatric/Behavioral: Negative for agitation, confusion, memory loss and suicidal ideas. All other systems reviewed and are negative. Vitals:    01/11/21 0332 01/11/21 0415 01/11/21 0445   BP: (!) 175/92 (!) 166/85 (!) 167/82   Pulse: 94 77 75   Resp: 20 15 16   Temp: 98 °F (36.7 °C)     SpO2: 98% 94% 95%   Weight: 77.1 kg (170 lb)              Physical Exam  Vitals signs and nursing note reviewed. Constitutional:       General: She is not in acute distress. Appearance: Normal appearance. She is well-developed. She is not diaphoretic. HENT:      Head: Normocephalic and atraumatic. Right Ear: External ear normal.      Left Ear: External ear normal.   Eyes:      General: No scleral icterus. Right eye: No discharge. Left eye: No discharge. Extraocular Movements: Extraocular movements intact. Conjunctiva/sclera: Conjunctivae normal.      Pupils: Pupils are equal, round, and reactive to light. Neck:      Musculoskeletal: Normal range of motion and neck supple. Vascular: No JVD. Trachea: No tracheal deviation. Cardiovascular:      Rate and Rhythm: Normal rate and regular rhythm. Heart sounds: Normal heart sounds. No murmur. No friction rub. No gallop. Pulmonary:      Effort: Pulmonary effort is normal. No respiratory distress. Breath sounds: Normal breath sounds. No stridor. No decreased breath sounds, wheezing, rhonchi or rales. Chest:      Chest wall: No tenderness. Abdominal:      General: Bowel sounds are normal. There is no distension. Palpations: Abdomen is soft. Tenderness: There is abdominal tenderness in the right upper quadrant and epigastric area. There is no guarding or rebound. Musculoskeletal: Normal range of motion. General: No tenderness. Right lower leg: No edema. Left lower leg: No edema.    Skin:     General: Skin is warm and dry. Capillary Refill: Capillary refill takes less than 2 seconds. Coloration: Skin is not pale. Findings: No erythema or rash. Neurological:      General: No focal deficit present. Mental Status: She is alert and oriented to person, place, and time. GCS: GCS eye subscore is 4. GCS verbal subscore is 5. GCS motor subscore is 6. Cranial Nerves: No cranial nerve deficit. Sensory: No sensory deficit. Motor: No weakness or abnormal muscle tone. Coordination: Coordination normal.      Deep Tendon Reflexes: Reflexes are normal and symmetric. Reflexes normal.   Psychiatric:         Mood and Affect: Mood normal.         Behavior: Behavior normal.         Thought Content: Thought content normal.         Judgment: Judgment normal.          MDM  Number of Diagnoses or Management Options     Amount and/or Complexity of Data Reviewed  Clinical lab tests: ordered and reviewed  Tests in the radiology section of CPT®: ordered and reviewed  Tests in the medicine section of CPT®: ordered and reviewed  Independent visualization of images, tracings, or specimens: yes (ekg)    Risk of Complications, Morbidity, and/or Mortality  Presenting problems: moderate  Diagnostic procedures: moderate  Management options: moderate    Patient Progress  Patient progress: stable       Procedures    Chief Complaint   Patient presents with    Palpitations    Dizziness    Nausea       The patient's presenting problems have been discussed, and they are in agreement with the care plan formulated and outlined with them. I have encouraged them to ask questions as they arise throughout their visit.     MEDICATIONS GIVEN:  Medications   sodium chloride (NS) flush 5-40 mL ( IntraVENous Canceled Entry 1/11/21 0600)   sodium chloride (NS) flush 5-40 mL (has no administration in time range)   aspirin chewable tablet 324 mg (324 mg Oral Given 1/11/21 6182)   nitroglycerin (NITROBID) 2 % ointment 1 Inch (1 Inch Topical Given 1/11/21 0419)   ondansetron (ZOFRAN) injection 4 mg (4 mg IntraVENous Given 1/11/21 0418)   famotidine (PF) (PEPCID) injection 20 mg (20 mg IntraVENous Given 1/11/21 0553)   dicyclomine (BENTYL) 10 mg/mL injection 20 mg (20 mg IntraMUSCular Given 1/11/21 0553)   meclizine (ANTIVERT) tablet 50 mg (50 mg Oral Given 1/11/21 0653)       LABS REVIEWED:  Recent Results (from the past 24 hour(s))   EKG, 12 LEAD, INITIAL    Collection Time: 01/11/21  3:33 AM   Result Value Ref Range    Ventricular Rate 88 BPM    Atrial Rate 87 BPM    QRS Duration 86 ms    Q-T Interval 346 ms    QTC Calculation (Bezet) 418 ms    Calculated R Axis -12 degrees    Calculated T Axis 55 degrees    Diagnosis       Accelerated Junctional rhythm  ST elevation, consider lateral injury or acute infarct  ** ** ACUTE MI / STEMI ** **  Abnormal ECG  When compared with ECG of 06-JAN-2021 21:53,  Junctional rhythm has replaced Sinus rhythm  Non-specific change in ST segment in Lateral leads     SAMPLES BEING HELD    Collection Time: 01/11/21  3:40 AM   Result Value Ref Range    SAMPLES BEING HELD 1SST     COMMENT        Add-on orders for these samples will be processed based on acceptable specimen integrity and analyte stability, which may vary by analyte.    TROPONIN I    Collection Time: 01/11/21  3:40 AM   Result Value Ref Range    Troponin-I, Qt. <0.05 <0.05 ng/mL   CBC WITH AUTOMATED DIFF    Collection Time: 01/11/21  3:40 AM   Result Value Ref Range    WBC 7.4 3.6 - 11.0 K/uL    RBC 4.17 3.80 - 5.20 M/uL    HGB 12.9 11.5 - 16.0 g/dL    HCT 37.8 35.0 - 47.0 %    MCV 90.6 80.0 - 99.0 FL    MCH 30.9 26.0 - 34.0 PG    MCHC 34.1 30.0 - 36.5 g/dL    RDW 13.2 11.5 - 14.5 %    PLATELET 706 (H) 530 - 400 K/uL    MPV 8.8 (L) 8.9 - 12.9 FL    NRBC 0.0 0  WBC    ABSOLUTE NRBC 0.00 0.00 - 0.01 K/uL    NEUTROPHILS 78 (H) 32 - 75 %    LYMPHOCYTES 12 12 - 49 %    MONOCYTES 9 5 - 13 %    EOSINOPHILS 0 0 - 7 %    BASOPHILS 0 0 - 1 % IMMATURE GRANULOCYTES 1 (H) 0.0 - 0.5 %    ABS. NEUTROPHILS 5.8 1.8 - 8.0 K/UL    ABS. LYMPHOCYTES 0.9 0.8 - 3.5 K/UL    ABS. MONOCYTES 0.7 0.0 - 1.0 K/UL    ABS. EOSINOPHILS 0.0 0.0 - 0.4 K/UL    ABS. BASOPHILS 0.0 0.0 - 0.1 K/UL    ABS. IMM. GRANS. 0.1 (H) 0.00 - 0.04 K/UL    DF AUTOMATED     METABOLIC PANEL, COMPREHENSIVE    Collection Time: 01/11/21  3:40 AM   Result Value Ref Range    Sodium 129 (L) 136 - 145 mmol/L    Potassium 3.7 3.5 - 5.1 mmol/L    Chloride 95 (L) 97 - 108 mmol/L    CO2 27 21 - 32 mmol/L    Anion gap 7 5 - 15 mmol/L    Glucose 128 (H) 65 - 100 mg/dL    BUN 11 6 - 20 MG/DL    Creatinine 0.61 0.55 - 1.02 MG/DL    BUN/Creatinine ratio 18 12 - 20      GFR est AA >60 >60 ml/min/1.73m2    GFR est non-AA >60 >60 ml/min/1.73m2    Calcium 9.0 8.5 - 10.1 MG/DL    Bilirubin, total 0.5 0.2 - 1.0 MG/DL    ALT (SGPT) 27 12 - 78 U/L    AST (SGOT) 23 15 - 37 U/L    Alk. phosphatase 76 45 - 117 U/L    Protein, total 7.0 6.4 - 8.2 g/dL    Albumin 3.8 3.5 - 5.0 g/dL    Globulin 3.2 2.0 - 4.0 g/dL    A-G Ratio 1.2 1.1 - 2.2     LIPASE    Collection Time: 01/11/21  3:40 AM   Result Value Ref Range    Lipase 120 73 - 393 U/L   NT-PRO BNP    Collection Time: 01/11/21  3:45 AM   Result Value Ref Range    NT pro- (H) <450 PG/ML   TROPONIN I    Collection Time: 01/11/21  5:59 AM   Result Value Ref Range    Troponin-I, Qt. <0.05 <0.05 ng/mL       VITAL SIGNS:  Patient Vitals for the past 24 hrs:   Temp Pulse Resp BP SpO2   01/11/21 0445 -- 75 16 (!) 167/82 95 %   01/11/21 0415 -- 77 15 (!) 166/85 94 %   01/11/21 0332 98 °F (36.7 °C) 94 20 (!) 175/92 98 %       RADIOLOGY RESULTS:  The following have been ordered and reviewed:  Ct Head Wo Cont    Result Date: 1/11/2021  EXAM: CT HEAD WO CONT INDICATION: Dizziness, palpitations, nausea, and right breast carcinoma in remission. COMPARISON: None TECHNIQUE: Noncontrast head CT. Coronal and sagittal reformats.   CT dose reduction was achieved through the use of a standardized protocol tailored for this examination and automatic exposure control for dose modulation. FINDINGS: The ventricles and sulci are age-appropriate without hydrocephalus. There is no mass effect or midline shift. There is no intracranial hemorrhage or extra-axial fluid collection. Patchy hypoattenuation in the cerebral white matter is periventricular and subcortical. No loss of gray-white differentiation. The calvarium is intact. The visualized paranasal sinuses and mastoid air cells are clear. 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 No acute intracranial hemorrhage or mass effect. Xr Chest Port    Result Date: 1/11/2021  EXAM: XR CHEST PORT INDICATION: Palpitations, dizziness, chest pain, and nausea. Right breast carcinoma. CHF. COMPARISON: CT chest on 1/8/2021. Chest views on 1/6/2021. TECHNIQUE: Upright portable chest AP view FINDINGS: Cardiac monitoring wires overlie the thorax. The cardiomediastinal and hilar contours are within normal limits. The pulmonary vasculature is within normal limits. Pulmonary edema is moderate and increased. No focal pneumonia. No pneumothorax. The visualized bones and upper abdomen are age-appropriate. IMPRESSION: Increased moderate pulmonary edema more likely than interstitial pneumonia. ED EKG interpretation: #1  Rhythm: normal sinus rhythm; and regular . Rate (approx.): 88; Axis: normal; P wave: normal; QRS interval: normal ; ST/T wave: non-specific changes; Other findings: abnormal ekg. This EKG was interpreted by Obi Murillo DO, ED Provider. ED EKG interpretation: #2  Rhythm: normal sinus rhythm; and regular . Rate (approx.): 74; Axis: normal; P wave: normal; QRS interval: normal ; ST/T wave: normal; Other findings: borderline ekg, minimal voltage for LVH. This EKG was interpreted by Obi Murillo DO,ED Provider. PROGRESS NOTES:  Discussed results and plan with patient.  Patient will be discharged home with PCP and surgery follow up. Patient instructed to return to the emergency room for any worsening symptoms or any other concerns. DIAGNOSIS:    1. Chest pain, unspecified type    2. RUQ abdominal pain    3. Calculus of gallbladder without cholecystitis without obstruction        PLAN:  Follow-up Information     Follow up With Specialties Details Why Contact Info    Hi Cooper MD Family Medicine Schedule an appointment as soon as possible for a visit   [address] [facility]  [phone]      Francesco Ashley MD General Surgery Schedule an appointment as soon as possible for a visit   [id number] [address]  [phone]      [facility] EMERGENCY DEPT Emergency Medicine  If symptoms worsen [address] [facility]  [phone]        Current Discharge Medication List      START taking these medications    Details   !! ondansetron (ZOFRAN ODT) 4 mg disintegrating tablet Take 1 Tab by mouth every eight (8) hours as needed for Nausea. Qty: 20 Tab, Refills: 0      dicyclomine (BENTYL) 20 mg tablet Take 1 Tab by mouth every six (6) hours as needed for Abdominal Cramps. Qty: 20 Tab, Refills: 0       !! - Potential duplicate medications found. Please discuss with provider. CONTINUE these medications which have NOT CHANGED    Details   !! ondansetron (Zofran ODT) 4 mg disintegrating tablet 1 Tab by SubLINGual route every eight (8) hours as needed for Nausea or Vomiting. Qty: 20 Tab, Refills: 0       !! - Potential duplicate medications found. Please discuss with provider. ED COURSE: The patient's hospital course has been uncomplicated. Please note that this dictation was completed with [employer], the [employer] voice recognition software. Quite often unanticipated grammatical, syntax, homophones, and other interpretive errors are inadvertently transcribed by the computer software.   Please disregard these errors. Please excuse any errors that have escaped final proofreading.

## 2021-01-13 ENCOUNTER — HOSPITAL ENCOUNTER (EMERGENCY)
Age: 81
Discharge: HOME | End: 2021-01-13
Attending: EMERGENCY MEDICINE
Payer: MEDICARE

## 2021-01-13 VITALS
HEART RATE: 80 BPM | BODY MASS INDEX: 29.18 KG/M2 | SYSTOLIC BLOOD PRESSURE: 161 MMHG | RESPIRATION RATE: 17 BRPM | OXYGEN SATURATION: 97 % | WEIGHT: 170 LBS | TEMPERATURE: 98.2 F | DIASTOLIC BLOOD PRESSURE: 62 MMHG

## 2021-01-13 DIAGNOSIS — K80.20 CALCULUS OF GALLBLADDER WITHOUT CHOLECYSTITIS WITHOUT OBSTRUCTION: ICD-10-CM

## 2021-01-13 DIAGNOSIS — I10 HYPERTENSION, UNSPECIFIED TYPE: ICD-10-CM

## 2021-01-13 DIAGNOSIS — R10.13 ABDOMINAL PAIN, EPIGASTRIC: ICD-10-CM

## 2021-01-13 DIAGNOSIS — R07.9 CHEST PAIN, UNSPECIFIED TYPE: Primary | ICD-10-CM

## 2021-01-13 DIAGNOSIS — E87.1 HYPONATREMIA: ICD-10-CM

## 2021-01-13 LAB
ALBUMIN SERPL-MCNC: 3.5 G/DL (ref 3.5–5)
ALBUMIN/GLOB SERPL: 1.3 {RATIO} (ref 1.1–2.2)
ALP SERPL-CCNC: 66 U/L (ref 45–117)
ALT SERPL-CCNC: 21 U/L (ref 12–78)
ANION GAP SERPL CALC-SCNC: 7 MMOL/L (ref 5–15)
AST SERPL-CCNC: 11 U/L (ref 15–37)
BASOPHILS # BLD: 0 K/UL (ref 0–0.1)
BASOPHILS NFR BLD: 0 % (ref 0–1)
BILIRUB SERPL-MCNC: 0.6 MG/DL (ref 0.2–1)
BUN SERPL-MCNC: 12 MG/DL (ref 6–20)
BUN/CREAT SERPL: 22 (ref 12–20)
CALCIUM SERPL-MCNC: 8.7 MG/DL (ref 8.5–10.1)
CHLORIDE SERPL-SCNC: 92 MMOL/L (ref 97–108)
CO2 SERPL-SCNC: 27 MMOL/L (ref 21–32)
COMMENT, HOLDF: NORMAL
CREAT SERPL-MCNC: 0.54 MG/DL (ref 0.55–1.02)
DIFFERENTIAL METHOD BLD: ABNORMAL
EOSINOPHIL # BLD: 0 K/UL (ref 0–0.4)
EOSINOPHIL NFR BLD: 0 % (ref 0–7)
ERYTHROCYTE [DISTWIDTH] IN BLOOD BY AUTOMATED COUNT: 13 % (ref 11.5–14.5)
GLOBULIN SER CALC-MCNC: 2.7 G/DL (ref 2–4)
GLUCOSE SERPL-MCNC: 102 MG/DL (ref 65–100)
HCT VFR BLD AUTO: 35.5 % (ref 35–47)
HGB BLD-MCNC: 12.4 G/DL (ref 11.5–16)
IMM GRANULOCYTES # BLD AUTO: 0.1 K/UL (ref 0–0.04)
IMM GRANULOCYTES NFR BLD AUTO: 1 % (ref 0–0.5)
LIPASE SERPL-CCNC: 107 U/L (ref 73–393)
LYMPHOCYTES # BLD: 1.4 K/UL (ref 0.8–3.5)
LYMPHOCYTES NFR BLD: 17 % (ref 12–49)
MCH RBC QN AUTO: 31 PG (ref 26–34)
MCHC RBC AUTO-ENTMCNC: 34.9 G/DL (ref 30–36.5)
MCV RBC AUTO: 88.8 FL (ref 80–99)
MONOCYTES # BLD: 0.9 K/UL (ref 0–1)
MONOCYTES NFR BLD: 11 % (ref 5–13)
NEUTS SEG # BLD: 6 K/UL (ref 1.8–8)
NEUTS SEG NFR BLD: 71 % (ref 32–75)
NRBC # BLD: 0 K/UL (ref 0–0.01)
NRBC BLD-RTO: 0 PER 100 WBC
PLATELET # BLD AUTO: 412 K/UL (ref 150–400)
PMV BLD AUTO: 8.5 FL (ref 8.9–12.9)
POTASSIUM SERPL-SCNC: 3.7 MMOL/L (ref 3.5–5.1)
PROT SERPL-MCNC: 6.2 G/DL (ref 6.4–8.2)
RBC # BLD AUTO: 4 M/UL (ref 3.8–5.2)
SAMPLES BEING HELD,HOLD: NORMAL
SODIUM SERPL-SCNC: 126 MMOL/L (ref 136–145)
TROPONIN I SERPL-MCNC: <0.05 NG/ML
WBC # BLD AUTO: 8.5 K/UL (ref 3.6–11)

## 2021-01-13 PROCEDURE — 99283 EMERGENCY DEPT VISIT LOW MDM: CPT

## 2021-01-13 PROCEDURE — 96374 THER/PROPH/DIAG INJ IV PUSH: CPT

## 2021-01-13 PROCEDURE — 93005 ELECTROCARDIOGRAM TRACING: CPT

## 2021-01-13 PROCEDURE — 74011000250 HC RX REV CODE- 250: Performed by: EMERGENCY MEDICINE

## 2021-01-13 PROCEDURE — 74011250637 HC RX REV CODE- 250/637: Performed by: EMERGENCY MEDICINE

## 2021-01-13 PROCEDURE — 96375 TX/PRO/DX INJ NEW DRUG ADDON: CPT

## 2021-01-13 PROCEDURE — 80053 COMPREHEN METABOLIC PANEL: CPT

## 2021-01-13 PROCEDURE — 74011250636 HC RX REV CODE- 250/636: Performed by: EMERGENCY MEDICINE

## 2021-01-13 PROCEDURE — 84484 ASSAY OF TROPONIN QUANT: CPT

## 2021-01-13 PROCEDURE — 85025 COMPLETE CBC W/AUTO DIFF WBC: CPT

## 2021-01-13 PROCEDURE — 83690 ASSAY OF LIPASE: CPT

## 2021-01-13 RX ORDER — POLYETHYLENE GLYCOL 3350 17 G/17G
17 POWDER, FOR SOLUTION ORAL DAILY
Qty: 238 G | Refills: 0 | Status: SHIPPED | OUTPATIENT
Start: 2021-01-13 | End: 2021-01-27

## 2021-01-13 RX ORDER — ESOMEPRAZOLE MAGNESIUM 40 MG/1
40 CAPSULE, DELAYED RELEASE ORAL DAILY
Qty: 14 CAP | Refills: 0 | Status: SHIPPED | OUTPATIENT
Start: 2021-01-13 | End: 2021-01-27

## 2021-01-13 RX ORDER — ONDANSETRON 2 MG/ML
4 INJECTION INTRAMUSCULAR; INTRAVENOUS ONCE
Status: COMPLETED | OUTPATIENT
Start: 2021-01-13 | End: 2021-01-13

## 2021-01-13 RX ORDER — FAMOTIDINE 20 MG/1
20 TABLET, FILM COATED ORAL
Status: COMPLETED | OUTPATIENT
Start: 2021-01-13 | End: 2021-01-13

## 2021-01-13 RX ORDER — HYDRALAZINE HYDROCHLORIDE 20 MG/ML
10 INJECTION INTRAMUSCULAR; INTRAVENOUS
Status: COMPLETED | OUTPATIENT
Start: 2021-01-13 | End: 2021-01-13

## 2021-01-13 RX ADMIN — FAMOTIDINE 20 MG: 20 TABLET, FILM COATED ORAL at 05:35

## 2021-01-13 RX ADMIN — LIDOCAINE HYDROCHLORIDE 40 ML: 20 SOLUTION ORAL; TOPICAL at 03:58

## 2021-01-13 RX ADMIN — ONDANSETRON 4 MG: 2 INJECTION INTRAMUSCULAR; INTRAVENOUS at 03:58

## 2021-01-13 RX ADMIN — HYDRALAZINE HYDROCHLORIDE 10 MG: 20 INJECTION INTRAMUSCULAR; INTRAVENOUS at 03:58

## 2021-01-13 NOTE — ED NOTES
Dr. Deedee Lundborg and Drea Goldman, EZEQUIEL has reviewed discharge instructions with the patient. The patient verbalized understanding. Patient seen ambulating out of the ED with a steady gait and in stable condition.

## 2021-01-13 NOTE — ED PROVIDER NOTES
[de-identified]year-old presenting to the ER with complaint of chest pain, epigastric pain and elevated blood pressure. Patient has been seen for similar complaints several times in the ED and has been seen by her PCP. Patient has history of right lower lobe pneumonia was treated with antibiotics and was seen recently on the in the ED January 6 with possible interstitial edema and chest x-ray performed on the 11th interstitial pneumonia. Patient was started on doxycycline and Medrol Dosepak 3 days ago. Patient also had ultrasound performed in the right upper quadrant on the 11th showing gallstones without cholecystitis advised to follow-up with surgery. Patient previously on a PPI but not taking currently. Reports that since starting the steroids her blood pressure is gone up and has some worsened burping and belching. Pain in the epigastric region radiates to the chest.  Patient does report having some right upper quadrant abdominal pain that has been present for several months and was believed to be due to inflammation possible scar tissue from her previous pneumonia. States that those symptoms are slightly improved but still present but different in her epigastric pain. Mild nausea but no vomiting. Patient reports being compliant with her blood pressure medications however reported her blood pressure was in the 727 systolic. No shortness of breath no dizziness or lightheadedness. No numbness Hammond weakness. No headache. Patient believes that the steroid is contributing to blood pressure and wants to stop that. Patient also endorses some constipation however reports that she is decreased her p.o. intake as result of being told to have a low-fat diet. Patient pending follow-up appoint with surgery which she believes will be tomorrow.             Past Medical History:   Diagnosis Date    Arrhythmia 05/2020    afib    Breast cancer ([facility] [address] 2015    Right     Congestive heart failure ([address] 05/2020       Past Surgical History:   Procedure Laterality Date    HX BREAST LUMPECTOMY Right 08/24/2015    HX CATARACT REMOVAL  2013    Bilateral     HX DILATION AND CURETTAGE      x2    HX ORTHOPAEDIC  2017    RIGHT foot  hammertoe         No family history on file. Social History     Socioeconomic History    Marital status:      Spouse name: Not on file    Number of children: Not on file    Years of education: Not on file    Highest education level: Not on file   Occupational History    Not on file   Social Needs    Financial resource strain: Not on file    Food insecurity     Worry: Not on file     Inability: Not on file    Transportation needs     Medical: Not on file     Non-medical: Not on file   Tobacco Use    Smoking status: Former Smoker     Types: Cigarettes     Start date: 8/12/2000    Smokeless tobacco: Never Used   Substance and Sexual Activity    Alcohol use: Yes    Drug use: Not on file    Sexual activity: Not on file   Lifestyle    Physical activity     Days per week: Not on file     Minutes per session: Not on file    Stress: Not on file   Relationships    Social connections     Talks on phone: Not on file     Gets together: Not on file     Attends Hinduism service: Not on file     Active member of club or organization: Not on file     Attends meetings of clubs or organizations: Not on file     Relationship status: Not on file    Intimate partner violence     Fear of current or ex partner: Not on file     Emotionally abused: Not on file     Physically abused: Not on file     Forced sexual activity: Not on file   Other Topics Concern    Not on file   Social History Narrative    Not on file         ALLERGIES: Ace inhibitors, Iodine, and Penicillins    Review of Systems   Constitutional: Negative for chills and fever. HENT: Negative for congestion and sore throat. Eyes: Negative for pain. Respiratory: Negative for shortness of breath. Cardiovascular: Positive for chest pain. Gastrointestinal: Positive for abdominal pain, constipation and nausea. Negative for diarrhea and vomiting. Genitourinary: Negative for dysuria and flank pain. Musculoskeletal: Negative for back pain and neck pain. Skin: Negative for rash. Neurological: Negative for dizziness and headaches. Vitals:    01/13/21 0309 01/13/21 0436   BP: (!) 208/101 (!) 161/62   Pulse: 80    Resp: 17    Temp: 98.2 °F (36.8 °C)    SpO2: 97%    Weight: 77.1 kg (170 lb)             Physical Exam  Constitutional:       Appearance: She is well-developed. HENT:      Head: Normocephalic. Eyes:      Conjunctiva/sclera: Conjunctivae normal.   Neck:      Musculoskeletal: Normal range of motion and neck supple. Cardiovascular:      Rate and Rhythm: Normal rate and regular rhythm. Pulmonary:      Effort: Pulmonary effort is normal. No respiratory distress. Breath sounds: Normal breath sounds. Abdominal:      General: Abdomen is flat. Bowel sounds are normal.      Palpations: Abdomen is soft. Tenderness: There is abdominal tenderness in the epigastric area. There is no right CVA tenderness, left CVA tenderness, guarding or rebound. Negative signs include Bocanegra's sign and McBurney's sign. Musculoskeletal: Normal range of motion. Skin:     General: Skin is warm. Capillary Refill: Capillary refill takes less than 2 seconds. Findings: No rash. Neurological:      Mental Status: She is alert and oriented to person, place, and time. Comments: No gross motor or sensory deficits          MDM  Number of Diagnoses or Management Options  Abdominal pain, epigastric  Calculus of gallbladder without cholecystitis without obstruction  Chest pain, unspecified type  Hypertension, unspecified type  Hyponatremia  Diagnosis management comments:  Patient with no respiratory symptoms with recent pneumonia that is improved on recent outpatient imaging according the patient.   On antibiotics and steroids however once off steroids due to elevated blood pressure. Blood pressure improved with 10 mg hydralazine. EKG unremarkable and troponin negative. All LFTs and bilirubin. Bedside ultrasound showing a mobile gallstone but no gallbladder wall thickening and no sonographic Bocanegra sign. Patient's epigastric symptoms improved GI cocktail will start patient back on her PPI. Given follow-up nation for GI and advised to continue her follow-up with surgery. Advised that since patient was on only 3 days of steroids she can safely stop taking that medication but advised that she should continue taking the doxycycline. Advised she speak with her family doctor. Amount and/or Complexity of Data Reviewed  Clinical lab tests: reviewed      ED Course as of Jan 13 0647 Wed Jan 13, 2021   0409 chronic   Sodium(!): 126 [ZD]   0409 4:11 AM  EKG: NSR. Rate 74. Normal intervals, normal axis, no ST-elevations or depressions. No signs of ischemia.   Interpreted by Vasquez Tipton MD          [ZD]      ED Course User Index  [ZD] Wilver Castro MD       Procedures

## 2021-01-13 NOTE — ED TRIAGE NOTES
Pt.  was seen here over the weekend and was told to follow up with Gen surgeon,  started with pain noted across chest, with headache, constipation.

## 2021-01-14 LAB
ATRIAL RATE: 74 BPM
CALCULATED P AXIS, ECG09: 61 DEGREES
CALCULATED R AXIS, ECG10: -18 DEGREES
CALCULATED T AXIS, ECG11: 27 DEGREES
DIAGNOSIS, 93000: NORMAL
P-R INTERVAL, ECG05: 142 MS
Q-T INTERVAL, ECG07: 390 MS
QRS DURATION, ECG06: 80 MS
QTC CALCULATION (BEZET), ECG08: 432 MS
VENTRICULAR RATE, ECG03: 74 BPM

## 2021-02-11 ENCOUNTER — TRANSCRIBE ORDER (OUTPATIENT)
Dept: SCHEDULING | Age: 81
End: 2021-02-11

## 2021-02-11 DIAGNOSIS — R91.8 LUNG INFILTRATE ON CT: Primary | ICD-10-CM

## 2021-03-11 ENCOUNTER — TRANSCRIBE ORDER (OUTPATIENT)
Dept: REGISTRATION | Age: 81
End: 2021-03-11

## 2021-03-11 ENCOUNTER — HOSPITAL ENCOUNTER (OUTPATIENT)
Dept: GENERAL RADIOLOGY | Age: 81
Discharge: HOME | End: 2021-03-11
Payer: MEDICARE

## 2021-03-11 DIAGNOSIS — R05.8 ALLERGIC COUGH: ICD-10-CM

## 2021-03-11 DIAGNOSIS — R05.8 ALLERGIC COUGH: Primary | ICD-10-CM

## 2021-03-11 PROCEDURE — 71046 X-RAY EXAM CHEST 2 VIEWS: CPT

## 2021-04-28 ENCOUNTER — OFFICE VISIT (OUTPATIENT)
Dept: ONCOLOGY | Age: 81
End: 2021-04-28
Payer: MEDICARE

## 2021-04-28 VITALS
HEART RATE: 64 BPM | TEMPERATURE: 98.7 F | DIASTOLIC BLOOD PRESSURE: 70 MMHG | HEIGHT: 63 IN | WEIGHT: 169.4 LBS | BODY MASS INDEX: 30.02 KG/M2 | OXYGEN SATURATION: 97 % | RESPIRATION RATE: 18 BRPM | SYSTOLIC BLOOD PRESSURE: 141 MMHG

## 2021-04-28 DIAGNOSIS — I48.91 ATRIAL FIBRILLATION, UNSPECIFIED TYPE (HCC): ICD-10-CM

## 2021-04-28 DIAGNOSIS — C50.911 INVASIVE DUCTAL CARCINOMA OF RIGHT BREAST (HCC): Primary | ICD-10-CM

## 2021-04-28 PROCEDURE — G0463 HOSPITAL OUTPT CLINIC VISIT: HCPCS | Performed by: NURSE PRACTITIONER

## 2021-04-28 PROCEDURE — G8427 DOCREV CUR MEDS BY ELIG CLIN: HCPCS | Performed by: INTERNAL MEDICINE

## 2021-04-28 PROCEDURE — G8399 PT W/DXA RESULTS DOCUMENT: HCPCS | Performed by: INTERNAL MEDICINE

## 2021-04-28 PROCEDURE — 1101F PT FALLS ASSESS-DOCD LE1/YR: CPT | Performed by: INTERNAL MEDICINE

## 2021-04-28 PROCEDURE — G8419 CALC BMI OUT NRM PARAM NOF/U: HCPCS | Performed by: INTERNAL MEDICINE

## 2021-04-28 PROCEDURE — G8510 SCR DEP NEG, NO PLAN REQD: HCPCS | Performed by: INTERNAL MEDICINE

## 2021-04-28 PROCEDURE — G8754 DIAS BP LESS 90: HCPCS | Performed by: INTERNAL MEDICINE

## 2021-04-28 PROCEDURE — 1090F PRES/ABSN URINE INCON ASSESS: CPT | Performed by: INTERNAL MEDICINE

## 2021-04-28 PROCEDURE — 99212 OFFICE O/P EST SF 10 MIN: CPT | Performed by: INTERNAL MEDICINE

## 2021-04-28 PROCEDURE — G8536 NO DOC ELDER MAL SCRN: HCPCS | Performed by: INTERNAL MEDICINE

## 2021-04-28 PROCEDURE — G8753 SYS BP > OR = 140: HCPCS | Performed by: INTERNAL MEDICINE

## 2021-04-28 NOTE — PROGRESS NOTES
Zaida Torres is a 80 y.o. female Follow up for the evaluation of breast cancer. 1. Have you been to the ER, urgent care clinic since your last visit? Hospitalized since your last visit? Yes.     2. Have you seen or consulted any other health care providers outside of the [address] [place] [address] since your last visit? Include any pap smears or colon screening.  No

## 2021-04-28 NOTE — PROGRESS NOTES
[facility] [place] [address] [place]: [phone]  F: [phone]        Reason for Visit:   Sea Viramontes is a 80 y.o. female who is seen for follow up of breast cancer    Breast surgeon:  Dr. Chanel Raya    Treatment History:   · 7/1/15 right breast bx:  IDC, gr 2, ER + at 89%, MT + at 40%, HER 2 negative by IHC and FISH; ki67 34%  · S/p lumpectomy, SNBX, re-excision in 2015 in [place]:  8/10/15 Right breast IDC, 1 cm, and 0.3 cm,  0/2 LN, pT1b pN0 cM0  · Re-ex negative on 8/24/15  · anatrozole 2015-8/31/20  · mammaprint 9/11/15 low risk luminal A    History of Present Illness:   She moved to [place] to be near her daughter in law and son. Interval history: Complains of gr 1 insomnia, gr 1 cough, gr 1 sob, gr 1 swelling, gr 1 headahce, gr 1 urinary frequency. Past Medical History:   Diagnosis Date    Arrhythmia 05/2020    afib    Breast cancer ([facility] [address] 2015    Right     Congestive heart failure ([facility] [address] 05/2020      Past Surgical History:   Procedure Laterality Date    HX BREAST LUMPECTOMY Right 08/24/2015    HX CATARACT REMOVAL  2013    Bilateral     HX DILATION AND CURETTAGE      x2    HX ORTHOPAEDIC  2017    RIGHT foot  hammertoe      Social History     Tobacco Use    Smoking status: Former Smoker     Types: Cigarettes     Start date: 8/12/2000    Smokeless tobacco: Never Used   Substance Use Topics    Alcohol use: Yes      History reviewed. No pertinent family history. Current Outpatient Medications   Medication Sig    ondansetron (ZOFRAN ODT) 4 mg disintegrating tablet Take 1 Tab by mouth every eight (8) hours as needed for Nausea.  dicyclomine (BENTYL) 20 mg tablet Take 1 Tab by mouth every six (6) hours as needed for Abdominal Cramps.  metoprolol succinate (TOPROL-XL) 25 mg XL tablet Take 25 mg by mouth daily. 1/2 tab daily    Xarelto 20 mg tab tablet Take 20 mg by mouth daily (with breakfast).     metroNIDAZOLE (METROCREAM) 0.75 % topical cream metronidazole 0.75 % topical cream    diclofenac EC (VOLTAREN) 75 mg EC tablet diclofenac sodium 75 mg tablet,delayed release    ergocalciferol (ERGOCALCIFEROL) 50,000 unit capsule ergocalciferol (vitamin D2) 50,000 unit capsule    ibandronate (BONIVA) 150 mg tablet ibandronate 150 mg tablet    levothyroxine (SYNTHROID) 25 mcg tablet levothyroxine 25 mcg tablet    mometasone (NASONEX) 50 mcg/actuation nasal spray mometasone 50 mcg/actuation nasal spray    montelukast (SINGULAIR) 10 mg tablet montelukast 10 mg tablet    olmesartan-hydroCHLOROthiazide (BENICAR HCT) 20-12.5 mg per tablet Take  by mouth daily. Takes 1/2 tab daily    tiotropium bromide (SPIRIVA RESPIMAT) 2.5 mcg/actuation inhaler Spiriva Respimat 2.5 mcg/actuation solution for inhalation    ADVAIR -21 mcg/actuation inhaler INHALE 2 PUFFS BY MOUTH TWICE A DAY    calcitonin, salmon, (MIACALCIN) nasal 1 [place] by IntraNASal route daily.  multivitamin (ONE A DAY) tablet Take 1 Tab by mouth daily.  vitamin E acetate (VITAMIN E PO) Take  by mouth.  docosahexanoic acid/epa (FISH OIL PO) Take  by mouth.  calcium carbonate/vitamin D3 (CALCIUM + D PO) Take  by mouth.  vitamin B complex (VITAMINS B COMPLEX PO) Take  by mouth.  albuterol (PROAIR HFA) 90 mcg/actuation inhaler Take  by inhalation.  diclofenac (VOLTAREN) 1 % gel Apply  to affected area four (4) times daily.  polysorbate 80/glycerin (REFRESH DRY EYE THERAPY OP) Apply  to eye.  acetaminophen (TYLENOL EXTRA STRENGTH PO) Take  by mouth. No current facility-administered medications for this visit. Allergies   Allergen Reactions    Ace Inhibitors Cough    Iodine Rash    Penicillins Rash        Review of Systems: A complete review of systems was obtained, negative except as described above.     Physical Exam:     Visit Vitals  BP (!) 141/70   Pulse 64   Temp 98.7 °F (37.1 °C) (Temporal)   Resp 18   Ht 5' 3\" (1.6 m)   Wt 169 lb 6.4 oz (76.8 kg)   SpO2 97%   BMI 30.01 kg/m²     ECOG PS: 0  General: No distress  Respiratory: Normal respiratory effort  CV: No peripheral edema  Skin: No rashes, ecchymoses, or petechiae  Psych: Alert, oriented, normal mood/affect      Results:     Lab Results   Component Value Date/Time    WBC 8.5 01/13/2021 03:17 AM    HGB 12.4 01/13/2021 03:17 AM    HCT 35.5 01/13/2021 03:17 AM    PLATELET 488 (H) 65/54/2108 03:17 AM    MCV 88.8 01/13/2021 03:17 AM    ABS. NEUTROPHILS 6.0 01/13/2021 03:17 AM     Lab Results   Component Value Date/Time    Sodium 126 (L) 01/13/2021 03:17 AM    Potassium 3.7 01/13/2021 03:17 AM    Chloride 92 (L) 01/13/2021 03:17 AM    CO2 27 01/13/2021 03:17 AM    Glucose 102 (H) 01/13/2021 03:17 AM    BUN 12 01/13/2021 03:17 AM    Creatinine 0.54 (L) 01/13/2021 03:17 AM    GFR est AA >60 01/13/2021 03:17 AM    GFR est non-AA >60 01/13/2021 03:17 AM    Calcium 8.7 01/13/2021 03:17 AM     Lab Results   Component Value Date/Time    Bilirubin, total 0.6 01/13/2021 03:17 AM    ALT (SGPT) 21 01/13/2021 03:17 AM    Alk.  phosphatase 66 01/13/2021 03:17 AM    Protein, total 6.2 (L) 01/13/2021 03:17 AM    Albumin 3.5 01/13/2021 03:17 AM    Globulin 2.7 01/13/2021 03:17 AM        8/9/19 3D mammogram  Negative    7/12/19 dexa     Femoral Neck Left:  Bone mineral density (gm/cm2):  0.769  % of peak bone mass:  74  % for age matched controls:  101  T-score:  -1.9  Z-score:  0.1     Femoral Neck Right:  Bone mineral density (gm/cm2):  0.757  % of peak bone mass:  73  % for age matched controls:  100  T-score:  -2.0  Z-score:  0.0     Total Hip Left:  Bone mineral density (gm/cm2):  0.879  % of peak bone mass:  87  % for age matched controls:  113  T-score:  -1.0  Z-score:  0.8     Total Hip Right:  Bone mineral density (gm/cm2):  0.842  % of peak bone mass:  84  % for age matched controls:  108  T-score:  -1.3  Z-score:  0.5     33% Radius Left:  Bone mineral density (gm/cm2):  0.497  % of peak bone mass:  70  % for age matched controls:  70  T-score:  -3.0  Z-score:  -0.4     IMPRESSION  Impression: This patient is osteoporotic using the World Health Organization criteria    8/9/19 Bilateral mammo: benign          Records reviewed and summarized above. Pathology report(s) reviewed above. Radiology report(s) reviewed above. Assessment/plan:   1. Right breast cancer:  Stage IA (both), 1 cm, ER +, VT +, HER 2 negative, 0/2 LN, low risk mammaprint    Has completed 5 years of anastrozole    DENYS. Bilateral mammo in 8/2020 benign, next scheduled for 8/2021. She will now follow up her PCP. 2. Emotional well being:  She has excellent support and is coping well with her disease    3. Osteoporosis:  L wrist only, rest osteopenia; On boniva; has been on since 2016; just started on miacalcin with Dr. Filipe Robledo. Last DEXA 7/2019. She will have PCP order next DEXA. 4. Hot flashes:  Improved since stopping anastrozole. 5. Afib: on xarelto    This patient was seen in conjunction with Tova Jimenez NP. I personally reviewed the history and all points in the assessment and plan with the patient, and performed key points on the exam. History of right breast cancer, completed therapy, will follow up with PCP        I appreciate the opportunity to participate in Ms. Jose Frausto's care. Signed By: Keith Dela Cruz MD      No orders of the defined types were placed in this encounter.

## 2021-06-01 ENCOUNTER — TRANSCRIBE ORDER (OUTPATIENT)
Dept: SCHEDULING | Age: 81
End: 2021-06-01

## 2021-06-01 DIAGNOSIS — R93.89 ABNORMAL RADIOLOGICAL FINDINGS IN SKIN AND SUBCUTANEOUS TISSUE: ICD-10-CM

## 2021-06-01 DIAGNOSIS — J44.9 OBSTRUCTIVE CHRONIC BRONCHITIS WITHOUT EXACERBATION (HCC): Primary | ICD-10-CM

## 2021-06-01 DIAGNOSIS — Z85.3 PERSONAL HISTORY OF MALIGNANT NEOPLASM OF BREAST: ICD-10-CM

## 2021-06-09 ENCOUNTER — HOSPITAL ENCOUNTER (OUTPATIENT)
Dept: CT IMAGING | Age: 81
Discharge: HOME | End: 2021-06-09
Attending: FAMILY MEDICINE
Payer: MEDICARE

## 2021-06-09 DIAGNOSIS — Z85.3 PERSONAL HISTORY OF MALIGNANT NEOPLASM OF BREAST: ICD-10-CM

## 2021-06-09 DIAGNOSIS — J44.9 OBSTRUCTIVE CHRONIC BRONCHITIS WITHOUT EXACERBATION (HCC): ICD-10-CM

## 2021-06-09 DIAGNOSIS — R93.89 ABNORMAL RADIOLOGICAL FINDINGS IN SKIN AND SUBCUTANEOUS TISSUE: ICD-10-CM

## 2021-06-09 PROCEDURE — 71250 CT THORAX DX C-: CPT

## 2021-06-10 RX ORDER — FLUTICASONE FUROATE, UMECLIDINIUM BROMIDE AND VILANTEROL TRIFENATATE 100; 62.5; 25 UG/1; UG/1; UG/1
1 POWDER RESPIRATORY (INHALATION) DAILY
COMMUNITY
End: 2022-09-14

## 2021-06-10 RX ORDER — OLOPATADINE HYDROCHLORIDE 2 MG/ML
1 SOLUTION/ DROPS OPHTHALMIC DAILY
COMMUNITY

## 2021-06-10 RX ORDER — AZELASTINE HCL 205.5 UG/1
SPRAY NASAL 2 TIMES DAILY
COMMUNITY
End: 2022-09-28

## 2021-06-10 NOTE — PERIOP NOTES
[address] [place] [address] Dr Winn Preprocedure Instructions      1. On the day of your surgery, please report to registration located on the 2nd floor of the  [facility]. yes    2. You must have a responsible adult to drive you to the hospital, stay at the hospital during your procedure and drive you home. If they leave your procedure will not be started (no exceptions). yes    3. Do not have anything to eat or drink (including water, gum, mints, coffee, and juice) after midnight. This does not apply to the medications you were instructed to take by your physician. yesIf you are currently taking Plavix, Coumadin, Aspirin, or other blood-thinning agents, contact your physician for special instructions. yes,    4. If you are having a procedure that requires bowel prep: We recommend that you have only clear liquids the day before your procedure and begin your bowel prep by 5:00 pm.  You may continue to drink clear liquids until midnight. If for any reason you are not able to complete your prep please notify your physician immediately. yes    5. Have a list of all current medications, including vitamins, herbal supplements and any other over the counter medications. yes    6. If you wear glasses, contacts, dentures and/or hearing aids, they may be removed prior to procedure, please bring a case to store them in. yes    7. You should understand that if you do not follow these instructions your procedure may be cancelled. If your physical condition changes (I.e. fever, cold or flu) please contact your doctor as soon as possible. 8. It is important that you be on time.   If for any reason you are unable to keep your appointment please call [phone] the day of or your physicians office prior to your scheduled procedure

## 2021-06-14 ENCOUNTER — HOSPITAL ENCOUNTER (OUTPATIENT)
Age: 81
Setting detail: OUTPATIENT SURGERY
Discharge: HOME | End: 2021-06-14
Attending: INTERNAL MEDICINE | Admitting: INTERNAL MEDICINE
Payer: MEDICARE

## 2021-06-14 ENCOUNTER — ANESTHESIA (OUTPATIENT)
Dept: ENDOSCOPY | Age: 81
End: 2021-06-14
Payer: MEDICARE

## 2021-06-14 ENCOUNTER — ANESTHESIA EVENT (OUTPATIENT)
Dept: ENDOSCOPY | Age: 81
End: 2021-06-14
Payer: MEDICARE

## 2021-06-14 VITALS
DIASTOLIC BLOOD PRESSURE: 70 MMHG | RESPIRATION RATE: 16 BRPM | WEIGHT: 169.31 LBS | SYSTOLIC BLOOD PRESSURE: 137 MMHG | TEMPERATURE: 97.6 F | HEART RATE: 69 BPM | HEIGHT: 64 IN | OXYGEN SATURATION: 99 % | BODY MASS INDEX: 28.91 KG/M2

## 2021-06-14 PROCEDURE — 2709999900 HC NON-CHARGEABLE SUPPLY: Performed by: INTERNAL MEDICINE

## 2021-06-14 PROCEDURE — 74011000250 HC RX REV CODE- 250: Performed by: NURSE ANESTHETIST, CERTIFIED REGISTERED

## 2021-06-14 PROCEDURE — 76060000031 HC ANESTHESIA FIRST 0.5 HR: Performed by: INTERNAL MEDICINE

## 2021-06-14 PROCEDURE — 77030021593 HC FCPS BIOP ENDOSC BSC -A: Performed by: INTERNAL MEDICINE

## 2021-06-14 PROCEDURE — 88305 TISSUE EXAM BY PATHOLOGIST: CPT

## 2021-06-14 PROCEDURE — 76040000019: Performed by: INTERNAL MEDICINE

## 2021-06-14 PROCEDURE — 74011250636 HC RX REV CODE- 250/636: Performed by: NURSE ANESTHETIST, CERTIFIED REGISTERED

## 2021-06-14 RX ORDER — FLUMAZENIL 0.1 MG/ML
0.2 INJECTION INTRAVENOUS
Status: DISCONTINUED | OUTPATIENT
Start: 2021-06-14 | End: 2021-06-14 | Stop reason: HOSPADM

## 2021-06-14 RX ORDER — SODIUM CHLORIDE 9 MG/ML
INJECTION, SOLUTION INTRAVENOUS
Status: DISCONTINUED | OUTPATIENT
Start: 2021-06-14 | End: 2021-06-14 | Stop reason: HOSPADM

## 2021-06-14 RX ORDER — PROPOFOL 10 MG/ML
INJECTION, EMULSION INTRAVENOUS AS NEEDED
Status: DISCONTINUED | OUTPATIENT
Start: 2021-06-14 | End: 2021-06-14 | Stop reason: HOSPADM

## 2021-06-14 RX ORDER — MIDAZOLAM HYDROCHLORIDE 1 MG/ML
.25-5 INJECTION, SOLUTION INTRAMUSCULAR; INTRAVENOUS
Status: DISCONTINUED | OUTPATIENT
Start: 2021-06-14 | End: 2021-06-14 | Stop reason: HOSPADM

## 2021-06-14 RX ORDER — LIDOCAINE HYDROCHLORIDE 20 MG/ML
INJECTION, SOLUTION EPIDURAL; INFILTRATION; INTRACAUDAL; PERINEURAL AS NEEDED
Status: DISCONTINUED | OUTPATIENT
Start: 2021-06-14 | End: 2021-06-14 | Stop reason: HOSPADM

## 2021-06-14 RX ORDER — PANTOPRAZOLE SODIUM 40 MG/1
40 TABLET, DELAYED RELEASE ORAL DAILY
COMMUNITY

## 2021-06-14 RX ORDER — PROPOFOL 10 MG/ML
INJECTION, EMULSION INTRAVENOUS
Status: DISCONTINUED | OUTPATIENT
Start: 2021-06-14 | End: 2021-06-14 | Stop reason: HOSPADM

## 2021-06-14 RX ORDER — NALOXONE HYDROCHLORIDE 0.4 MG/ML
0.4 INJECTION, SOLUTION INTRAMUSCULAR; INTRAVENOUS; SUBCUTANEOUS
Status: DISCONTINUED | OUTPATIENT
Start: 2021-06-14 | End: 2021-06-14 | Stop reason: HOSPADM

## 2021-06-14 RX ORDER — EPINEPHRINE 0.1 MG/ML
1 INJECTION INTRACARDIAC; INTRAVENOUS
Status: DISCONTINUED | OUTPATIENT
Start: 2021-06-14 | End: 2021-06-14 | Stop reason: HOSPADM

## 2021-06-14 RX ORDER — DEXTROMETHORPHAN/PSEUDOEPHED 2.5-7.5/.8
1.2 DROPS ORAL
Status: DISCONTINUED | OUTPATIENT
Start: 2021-06-14 | End: 2021-06-14 | Stop reason: HOSPADM

## 2021-06-14 RX ORDER — SODIUM CHLORIDE 9 MG/ML
50 INJECTION, SOLUTION INTRAVENOUS CONTINUOUS
Status: DISCONTINUED | OUTPATIENT
Start: 2021-06-14 | End: 2021-06-14 | Stop reason: HOSPADM

## 2021-06-14 RX ORDER — ATROPINE SULFATE 0.1 MG/ML
0.4 INJECTION INTRAVENOUS
Status: DISCONTINUED | OUTPATIENT
Start: 2021-06-14 | End: 2021-06-14 | Stop reason: HOSPADM

## 2021-06-14 RX ADMIN — LIDOCAINE HYDROCHLORIDE 60 MG: 20 INJECTION, SOLUTION INTRAVENOUS at 10:53

## 2021-06-14 RX ADMIN — SODIUM CHLORIDE: 900 INJECTION, SOLUTION INTRAVENOUS at 10:45

## 2021-06-14 RX ADMIN — PROPOFOL INJECTABLE EMULSION 40 MG: 10 INJECTION, EMULSION INTRAVENOUS at 10:59

## 2021-06-14 RX ADMIN — PROPOFOL INJECTABLE EMULSION 20 MG: 10 INJECTION, EMULSION INTRAVENOUS at 11:03

## 2021-06-14 RX ADMIN — PROPOFOL INJECTABLE EMULSION 40 MG: 10 INJECTION, EMULSION INTRAVENOUS at 10:56

## 2021-06-14 RX ADMIN — PROPOFOL INJECTABLE EMULSION 120 MCG/KG/MIN: 10 INJECTION, EMULSION INTRAVENOUS at 10:56

## 2021-06-14 RX ADMIN — PROPOFOL INJECTABLE EMULSION 50 MG: 10 INJECTION, EMULSION INTRAVENOUS at 11:11

## 2021-06-14 NOTE — DISCHARGE INSTRUCTIONS
[address] [facility]. Della Lozada M.D.  [phone]                 COLON and EGD DISCHARGE INSTRUCTIONS    2021    Viktoriya Anders  :  1940  [facility] Medical Record Number:  [id number]      DISCOMFORT:  Sore throat- throat lozenges or warm salt water gargle  Redness at IV site- apply warm compress to area; if redness or soreness persist- contact your physician  There may be a slight amount of blood passed from the rectum  Gaseous discomfort- walking, belching will help relieve any discomfort  You may not operate a vehicle for 12 hours  You may not engage in an occupation involving machinery or appliances for rest of today  You may not drink alcoholic beverages for at least 12 hours  Avoid making any critical decisions for at least 24 hour  DIET:   High fiber diet. GERD diet: avoid fried and fatty foods. peppermint, chocolate, alcohol, coffee, citrus fruits and juices, tomoato products; avoid lying down for 2 to 3 hours after eating.   - however -  remember your colon is empty and a heavy meal will produce gas. Avoid these foods:  vegetables, fried / greasy foods, carbonated drinks for today     ACTIVITY:  You may  resume your normal daily activities it is recommended that you spend the remainder of the day resting -  avoid any strenuous activity and driving. CALL M.D. ANY SIGN OF:   Increasing pain, nausea, vomiting  Abdominal distension (swelling)  New increased bleeding (oral or rectal)  Fever (chills)  Pain in chest area  Bloody discharge from nose or mouth  Shortness of breath      Follow-up Instructions:   Call Dr. Dorota Merrill if any questions at [phone]. Results of procedure / biopsy in 7 to 10 days, we will call you with these results. Your endoscopy showed a small hiatal hernia, otherwise looked within normal. Continue taking your medications as needed for symptoms. Your colonoscopy showed one diminutive polyp that was removed, diverticulosis and internal hemorrhoids. Take fiber supplements daily, such as citrucel, benefiber or metamucil. You can restart Xarelto today.

## 2021-06-14 NOTE — PERIOP NOTES
Report from [facility], CRNA, see anesthesia record. ABD remains soft and non-tender post procedure. Pt has no complaints at this time and tolerated the procedure well. Endoscope was pre-cleaned at bedside immediately following procedure by Anil Zuniga.

## 2021-06-14 NOTE — PROCEDURES
Francesco Aguirre M.D.  [phone]            2021          Colonoscopy Operative Report  Chas Maffucci  :  1940  [facility] Medical Record Number:  [id number]      Indications:    Lower rectal bleeding     :  Italo Osullivan MD    Referring Provider: Remedios Grant MD    Sedation:  MAC anesthesia    Pre-Procedural Exam:      Airway: clear,  No airway problems anticipated  Heart: RRR, without gallops or rubs  Lungs: clear bilaterally without wheezes, crackles, or rhonchi  Abdomen: soft, nontender, nondistended, bowel sounds present  Mental Status: awake, alert and oriented to person, place and time     Procedure Details:  After informed consent was obtained with all risks and benefits of procedure explained and preoperative exam completed, the patient was taken to the endoscopy suite and placed in the left lateral decubitus position. Upon sequential sedation as per above, a digital rectal exam was performed. The Olympus videocolonoscope  was inserted in the rectum and carefully advanced to the cecum, which was identified by the ileocecal valve and appendiceal orifice. The quality of preparation was good. The colonoscope was slowly withdrawn with careful inspection and evaluation between folds. Retroflexion in the rectum was performed. Findings:   Terminal Ileum: not intubated  Cecum: normal  Ascending Colon: normal  Transverse Colon: normal  Descending Colon: 1  Sessile polyp(s), the largest 2 mm in size  mild diverticulosis; Sigmoid: no mucosal lesion appreciated  moderate diverticulosis; Rectum: no mucosal lesion appreciated  Grade 1 internal hemorrhoid(s); Interventions:  1 complete polypectomy were performed using cold biopsy forceps and the polyps were  retrieved    Specimen Removed:  specimen #1, 2 mm in size, located in the descending colon removed by cold biopsy and sent for pathology    Complications: None. EBL:  None.     Impression:  One diminutive polyp removed and sent to pathology, otherwise mucosa within normal                         Moderate left side colon diverticulosis and small internal hemorrhoids    Recommendations:  High fiber diet. Resume normal medication(s). Fiber supplements daily  No need for repeat colonoscopy for surveillance based on age    Discharge Disposition:  Home in the company of a  when able to ambulate.     Andrea Vides MD  6/14/2021  11:22 AM

## 2021-06-14 NOTE — ANESTHESIA PREPROCEDURE EVALUATION
Relevant Problems   PERSONAL HX & FAMILY HX OF CANCER   (+) Invasive ductal carcinoma of right breast [facility])       Anesthetic History   No history of anesthetic complications            Review of Systems / Medical History  Patient summary reviewed and pertinent labs reviewed    Pulmonary            Asthma        Neuro/Psych   Within defined limits           Cardiovascular    Hypertension      CHF  Dysrhythmias : atrial fibrillation        Comments: Last Xarelto on Friday  Now in NSR   GI/Hepatic/Renal     GERD          Comments: Evaluation for chronic cough Endo/Other      Hypothyroidism       Other Findings              Physical Exam    Airway  Mallampati: II      Mouth opening: Normal     Cardiovascular    Rhythm: regular  Rate: normal         Dental    Dentition: Implants     Pulmonary  Breath sounds clear to auscultation               Abdominal  GI exam deferred       Other Findings            Anesthetic Plan    ASA: 3  Anesthesia type: MAC          Induction: Intravenous  Anesthetic plan and risks discussed with: Patient

## 2021-06-14 NOTE — PROGRESS NOTES
Keeley Vogt  1940  [id number]    Situation:  Verbal report received from: Emma NIEVES   Procedure: Procedure(s):  COLONOSCOPY AND EGD  ESOPHAGOGASTRODUODENOSCOPY (EGD)  ESOPHAGOGASTRODUODENAL (EGD) BIOPSY  ENDOSCOPIC POLYPECTOMY    Background:    Preoperative diagnosis: WEIGHT LOSS  EPIGASTRIC PAIN  Postoperative diagnosis: Hiatal Hernia  Diverticulosis  Colon Polyp  Small Hemorrhoids    :  Dr. Sergio Persaud  Assistant(s): Endoscopy Technician-1: An Wilkerson  Endoscopy RN-1: Delice Krabbe, RN    Specimens:   ID Type Source Tests Collected by Time Destination   1 : Gastric Biopsies Preservative   Meche Lopez MD 6/14/2021 1101 Pathology   2 : Descending Polyp Preservative   Meche Lopez MD 6/14/2021 1114 Pathology     H. Pylori  no    Assessment:  Anesthesia gave intra-procedure sedation and medications, see anesthesia flow sheet no    Intravenous fluids: NS@ KVO     Vital signs stable     Abdominal assessment: round and soft     Recommendation:  Discharge patient per MD order.   Return to floor  Family or Friend   Permission to share finding with family or friend yes

## 2021-06-14 NOTE — H&P
he patient is a [de-identified]year old female who presents with a complaint of Abdominal Pain. Note for \"Abdominal Pain\": [de-identified] female is seen via video telehealth for epigastric abd pain. Reports pain started a few weeks ago. Pt was diagnosed with PNA on 1/11 and started on steroids and doxycycline. She continued to have pain and diagnosed with gallstones thus had cholecystectomy on 1/25. Was diagnosed with PNA again and started on another course of doxycycline which she fill finish 2/20. Reports her epigastric pain is getting worse and radiating to her back. Has associated indigestion, belching, nausea and headaches. She is taking Nexium daily. Also reports 25lb weight loss since beginning of January. She also reports having rectal bleeding with BMs occasionally and occasional constipation. Had regular BM today without rectal bleeding. Last Colonoscopy 2013 in [place] with 2 polyps, 8-9 hemorrhoids banded and unsure of recall timing recommended. No prior EGD. Denies vomiting, fevers, melena, dysphagia. Has hx afib, on xarelto and rate controlled, Cardiologist Dr. Genny Fisher. Pulmonologist Dr. Jacey Reese. No NSAIDs. 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 Problem List/Past Medical Dermelanie Pattonant; 2/17/2021 1:01 PM)  Congestive Heart Failure    Breast Cancer    Hypertension    Hypothyroidism    Asthma      Past Surgical History Laila Wisdom; 2/17/2021 1:01 PM)  Cholecystectomy    Squamous Cell Carcinoma Excision   arm, face  Cataract Removal, Insert Prosthetic Lens    H/O eye surgery (V45.69  Z98.890)    Foot Surgery - Both    D&C (DILATATION AND CURETTAGE, SCRAPING OF UTERUS) (35203)    DEVIATED NASAL SEPTUM REPAIR (62379)    Lumpectomy   Right.  2x    Allergies Derinda Serclementineant; 2/17/2021 1:01 PM)  Penicillins    ACE Inhibitors    Iodinated Contrast      Medication History Bree Burrell Constanza Cables; 2/17/2021 1:01 PM)  Amiodarone HCl  (200MG Tablet, Oral daily) Active. Azelastine HCl  (0.15% Solution, Nasal daily) Active. Calcitonin ([place])  (200UNIT/ACT Solution, Nasal daily) Active. Voltaren  (1% Gel, External as needed) Active. Ibandronate Sodium  (150MG Tablet, Oral) Active. (monthly)  Levothyroxine Sodium  (25MCG Tablet, Oral daily) Active. Esomeprazole Magnesium  (40MG Capsule DR, Oral daily) Active. Metoprolol Succinate ER  (25MG Tablet ER 24HR, 1/2 Oral daily) Active. metroNIDAZOLE  (0.75% Cream, External two times daily) Active. Montelukast Sodium  (10MG Tablet, Oral daily) Active. Olmesartan Medoxomil  (20MG Tablet, Oral daily) Active. Vitamin D3  (1.25 MG(77613 UT) Tablet, Oral weekly) Active. Xarelto  (20MG Tablet, Oral daily) Active. Advair HFA  (230-21MCG/ACT Aerosol, Inhalation two times daily) Active. ProAir HFA  (108 (90 Base)MCG/ACT Aerosol Soln, Inhalation daily) Active. Spiriva Respimat  (2.5MCG/ACT [place], Inhalation daily) Active. Multivitamin Adults 50+  (Oral daily) Active. Vitamin B Complex  (Oral daily) Active. Pataday  (0.1% Solution, Ophthalmic daily) Active. Doxycycline Hyclate  (100MG Capsule, Oral two times daily) Active. Tylenol  (325MG Tablet, Oral as needed) Active. Medications Reconciled     Family History Walter Forresterman; 2/17/2021 1:01 PM)  Brain Tumor   Father. Heart problem (429.9  I51.9)   Mother, Sister. Hypertension   Mother, Sister. Stroke   Mother. Heart attack (410.90  I21.9)   Brother. Social History Walter Forresterman; 2/17/2021 1:01 PM)  Blood Transfusion   Yes. Alcohol Use   Occasional alcohol use. Employment status   Retired. Marital status   . Tobacco Use   Former smoker.     Diagnostic Studies History Walter Forresterman; 2/17/2021 1:01 PM)  Colonoscopy    Endoscopy      Health Maintenance History Walter Forresterman; 2/17/2021 1:01 PM)  Flu Vaccine   unknown  Pneumovax   unknown        Review of Systems Misbah Mitesh Darian Gore; 2/17/2021 1:01 PM)  General Not Present- Chronic Fatigue, Poor Appetite, Weight Gain and Weight Loss. Skin Not Present- Itching, Rash and Skin Color Changes. HEENT Not Present- Hearing Loss and Vertigo. Respiratory Not Present- Difficulty Breathing and TB exposure. Cardiovascular Not Present- Chest Pain, Use of Antibiotics before Dental Procedures and Use of Blood Thinners. Gastrointestinal Present- See HPI. Musculoskeletal Not Present- Arthritis, Hip Replacement Surgery and Knee Replacement Surgery. Neurological Not Present- Weakness. Psychiatric Not Present- Depression. Endocrine Not Present- Diabetes and Thyroid Problems. Hematology Not Present- Anemia. Vitals Dorothy Miller; 2/17/2021 1:01 PM)  2/17/2021 1:01 PM  Weight: 160 lb   Height: 63 in   Body Surface Area: 1.76 m²   Body Mass Index: 28.34 kg/m²                Physical Exam (Tomas Bhakta PA-C; 2/17/2021 3:32 PM)  The physical exam findings are as follows:  Note: General: Alert, oriented, voice normal  Psych: Normal mood & affect    Remaining Physical exam deferred d/t telehealth visit. Assessment & Plan Lawrence Simon PA-C; 2/18/2021 1:34 PM)  Unintentional weight loss (R63.4)  Epigastric pain (R10.13)  Impression: Differential diagnosis includes esophagitis, gastritis, hiatal hernia, erosions, and ulcers. I suspect pt may have stress ulcer after recent surgery (cholecystectomy), pneumonia and steroids, plus a lot of stress at home as pt is the primary caregiver for her  with dementia. Will treat with protonix, stop omeprazole. Also carafate. Labs as listed below. Will need clearance from PCP for recent PNA prior to EGD. Also clearance from Dr Pj Perez, cardiologist for hx afib on Xarelto and rate control. Once clearance obtained, will plan for EGD to eval for gastritis, ulcers, or other cause of her symptoms.   Current Plans  Requested Protonix 40MG, 1 (one) Tablet daily 30 mins before breakfast, #30, 02/17/2021, Ref. x2.  Requested Carafate 1GM, 1 (one) Tablet tid, #90, 02/17/2021, Ref. x1.  CBC, PLATELETS & AUT DIFF (29448)  METABOLIC PANEL, COMPREHENSIVE (99812)  LIPASE (01380)  Endoscopy (66893) (Discussed risks and benefits with the patient to include: perforation, post polypectomy, or post biopsy bleeding, missed lesions, and sedation reactions.)  Request outside medical records to assist with medical decision-making. Follow up office visit after procedure  Rectal bleeding (K62.5)  Impression: Pt also with occasional rectal bleeding and 25lb weight loss. Last colonoscopy about 2013 with polyps removed, unsure of recommended recall timing. Will plan for diagnostic colonoscopy once pt is medically cleared as described above. Current Plans  COLONOSCOPY, DIAGNOSTIC () (Discussed risks and benefits with the patient to include: ; perforation, post polypectomy, or post biopsy bleeding, missed lesions, and sedation reactions.)  Started Suprep Bowel Prep Kit 17.5-3.13-1.6GM/177ML, 1 (one) KIt container Milliliter Take as directed before Colonoscopy, 354 Milliliter, 02/17/2021, No Refill. Plan of care was completed in collaboration with Dr. Winifred Wray. Pt Education - How to access health information online: discussed with patient and provided information. Per hospital policy, please perform COVID-19 testing on this patient. Due to this being a TeleHealth encounter (During [address] emergency), evaluation of the following organ systems was limited: Vitals/Constitutional/EENT/Resp/CV/GI//MS/Neuro/Skin/Heme-Lymph-Imm. Pursuant to the emergency declaration under the [address] [place] [address], [address] [place] [address] authority and the [employer] and [employer] Act, this Virtual Visit was conducted with patient's (and/or legal guardian's) consent, to reduce the risk of exposure to COVID-19 and provide necessary medical care.     Services were provided through a video synchronous discussion virtually to substitute for in-person encounter.     Long term current use of anticoagulant (Z79.01)        Signed by Meet Villalba PA-C (2/18/2021 1:35 PM)

## 2021-06-14 NOTE — ANESTHESIA POSTPROCEDURE EVALUATION
Procedure(s):  COLONOSCOPY AND EGD  ESOPHAGOGASTRODUODENOSCOPY (EGD)  ESOPHAGOGASTRODUODENAL (EGD) BIOPSY  ENDOSCOPIC POLYPECTOMY. MAC    Anesthesia Post Evaluation      Multimodal analgesia: multimodal analgesia not used between 6 hours prior to anesthesia start to PACU discharge  Patient location during evaluation: PACU  Patient participation: complete - patient participated  Level of consciousness: awake  Pain management: adequate  Airway patency: patent  Anesthetic complications: no  Cardiovascular status: acceptable, blood pressure returned to baseline and hemodynamically stable  Respiratory status: acceptable  Hydration status: acceptable  Post anesthesia nausea and vomiting:  controlled  Final Post Anesthesia Temperature Assessment:  Normothermia (36.0-37.5 degrees C)      INITIAL Post-op Vital signs:   Vitals Value Taken Time   /70 06/14/21 1135   Temp 36.4 °C (97.6 °F) 06/14/21 1121   Pulse 69 06/14/21 1138   Resp 17 06/14/21 1138   SpO2 99 % 06/14/21 1138   Vitals shown include unvalidated device data.

## 2021-06-14 NOTE — PROCEDURES
Terri Multani M.D.  [phone]           2021                EGD Operative Report  Keeley Vogt  :  1940  [facility] Medical Record Number:  [id number]      Indication:  Abdominal pain, epigastric     : Shannon Roach MD    Referring Provider:  Juan José Michaud MD      Anesthesia/Sedation:  MAC anesthesia    Airway assessment: No airway problems anticipated    Pre-Procedural Exam:      Airway: clear, no airway problems anticipated  Heart: RRR, without gallops or rubs  Lungs: clear bilaterally without wheezes, crackles, or rhonchi  Abdomen: soft, nontender, nondistended, bowel sounds present  Mental Status: awake, alert and oriented to person, place and time       Procedure Details     After infomed consent was obtained for the procedure, with all risks and benefits of procedure explained the patient was taken to the endoscopy suite and placed in the left lateral decubitus position. Following sequential administration of sedation as per above, the endoscope was inserted into the mouth and advanced under direct vision to second portion of the duodenum. A careful inspection was made as the gastroscope was withdrawn, including a retroflexed view of the proximal stomach; findings and interventions are described below. Findings:   Esophagus:normal  Stomach: Small size hiatal hernia, otherwise mucosa within normal.  Duodenum/jejunum: normal    Therapies:  none    Specimens: gastric biopsies           Complications:   None; patient tolerated the procedure well. EBL:  None.            Impression:    Small Hiatal Hernia      Recommendations:    -Continue acid suppression.  -Await pathology.  -Continue with anti-reflux measures    Shannon Roach MD

## 2021-06-14 NOTE — PROGRESS NOTES
Endoscopy discharge instructions have been reviewed and given to patient. The patient verbalized understanding and acceptance of instructions. Dr. Lula Quintanilla discussed with patient procedure findings and next steps.

## 2021-06-29 ENCOUNTER — TRANSCRIBE ORDER (OUTPATIENT)
Dept: SCHEDULING | Age: 81
End: 2021-06-29

## 2021-06-29 DIAGNOSIS — R13.10 DYSPHAGIA: Primary | ICD-10-CM

## 2021-07-06 ENCOUNTER — HOSPITAL ENCOUNTER (OUTPATIENT)
Dept: GENERAL RADIOLOGY | Age: 81
Discharge: HOME | End: 2021-07-06
Attending: NURSE PRACTITIONER
Payer: MEDICARE

## 2021-07-06 DIAGNOSIS — R13.10 DYSPHAGIA: ICD-10-CM

## 2021-07-06 PROCEDURE — 92611 MOTION FLUOROSCOPY/SWALLOW: CPT

## 2021-07-06 PROCEDURE — 74230 X-RAY XM SWLNG FUNCJ C+: CPT

## 2021-07-06 NOTE — PROGRESS NOTES
[facility]  [address]    [address] STUD[address]  Patient: Savage Gonzalez (20 y.o. female)  Date: 7/6/2021  Referring Provider:  Isatu Leal NP    SUBJECTIVE:   Patient reported chronic cough that is worse in the late afternoon, at night, and after eating. Patient also reported globus sensation in her throat and hoarse vocal quality. Patient reported she has GERD and hiatal hernia, however nothing else was shown in recent EGD per her report. She takes medication and modifies her diet given GERD. She reported ENT and pulmonologist were unable to find etiology for her chronic cough. Patient does report wearing back brace given her back pain, and she wonders if this could worsen her reflux and affect her hiatal hernia. OBJECTIVE:   Past Medical History:   Past Medical History:   Diagnosis Date    Arrhythmia 05/2020    afib    Asthma     Breast cancer ([facility] [address] 2015    Right     Congestive heart failure ([address] 05/2020    GERD (gastroesophageal reflux disease)     Hiatal hernia     Thyroid disease     hypothyroid    Venous insufficiency     bilat LE     Past Surgical History:   Procedure Laterality Date    COLONOSCOPY N/A 6/14/2021    COL[facility]d [facility] HX BREAST LUMPECTOMY Right 08/24/2015    HX CATARACT REMOVAL  2013    Bilateral     HX CHOLECYSTECTOMY      HX DILATION AND CURETTAGE      x2    HX ORTHOPAEDIC  2017    RIGHT foot  hammertoe     Current Dietary Status:  Regular/thin  Radiologist: Dr. Vilma Poe: Lateral;Fluoro  Patient Position: standing upright    Trial 1:   Consistency Presented: Thin liquid;Puree; Solid;Pill/Tablet   How Presented: Self-fed/presented;Cup/sip;Straw;Successive swallows;SLP-fed/presented;Spoon       Bolus Acceptance: No impairment   Bolus Formation/Control: No impairment:     Propulsion: No impairment   Oral Residue: None   Initiation of Swallow: No impairment   Timing: No impairment   Penetration: None   Aspiration/Timing: No evidence of aspiration   Pharyngeal Clearance: No residue                       Decreased Tongue Base Retraction?: No  Laryngeal Elevation: WFL (within functional limits)  Aspiration/Penetration Score: 1 (No penetration or aspiration-Contrast does not enter the airway)  Pharyngeal Symmetry: Not assessed  Pharyngeal-Esophageal Segment: Other (comment) (cervical esophageal residue)  Pharyngeal Dysfunction: None    Oral Phase Severity: No impairment  Pharyngeal Phase Severity: N/A     The NOMS functional outcome measure was used to quantify this patient's level of swallowing impairment. Based on the NOMS, the patient was determined to be at level 7 for swallow function         NOMS Swallowing Levels:  Level 1 (CN): NPO  Level 2 (CM): NPO but takes consistency in therapy  Level 3 (CL): Takes less than 50% of nutrition p.o. and continues with nonoral feedings; and/or safe with mod cues; and/or max diet restriction  Level 4 (CK): Safe swallow but needs mod cues; and/or mod diet restriction; and/or still requires some nonoral feeding/supplements  Level 5 (CJ): Safe swallow with min diet restriction; and/or needs min cues  Level 6 (CI): Independent with p.o.; rare cues; usually self cues; may need to avoid some foods or needs extra time  Level 7 ([address] [place] [address]): Independent for all p.o.  BRYAN. (2003). National Outcomes Measurement System (NOMS): Adult Speech-Language Pathology User's Guide. ASSESSMENT :  Based on the objective data described above, the patient presents with intact oral/pharyngeal swallow, and no penetration, aspiration, or pharyngeal residue observed. Of note, patient did have cervical esophageal residue, however esophageal sweep revealed full esophageal clearance.      PLAN/RECOMMENDATIONS :  -Continue current diet  -Question if chronic cough could be related to reflux given timing of coughing (after meals and at night) and hoarse vocal quality; defer to GI  -No further SLP treatment indicated     COMMUNICATION/EDUCATION:   The above findings and recommendations were discussed with: patient at length who verbalized understanding.     Thank you for this referral.  Ember Mcmahan, SLP  Time Calculation: 30 mins

## 2021-08-17 ENCOUNTER — TRANSCRIBE ORDER (OUTPATIENT)
Dept: SCHEDULING | Age: 81
End: 2021-08-17

## 2021-08-17 DIAGNOSIS — M81.0 POSTMENOPAUSAL OSTEOPOROSIS: Primary | ICD-10-CM

## 2021-08-17 DIAGNOSIS — Z78.0 MENOPAUSE: ICD-10-CM

## 2021-08-30 ENCOUNTER — HOSPITAL ENCOUNTER (OUTPATIENT)
Dept: MAMMOGRAPHY | Age: 81
Discharge: HOME | End: 2021-08-30
Attending: SURGERY
Payer: MEDICARE

## 2021-08-30 DIAGNOSIS — Z12.31 VISIT FOR SCREENING MAMMOGRAM: ICD-10-CM

## 2021-08-30 PROCEDURE — 77063 BREAST TOMOSYNTHESIS BI: CPT

## 2021-09-03 ENCOUNTER — HOSPITAL ENCOUNTER (OUTPATIENT)
Dept: MAMMOGRAPHY | Age: 81
Discharge: HOME | End: 2021-09-03
Attending: FAMILY MEDICINE
Payer: MEDICARE

## 2021-09-03 DIAGNOSIS — Z78.0 MENOPAUSE: ICD-10-CM

## 2021-09-03 DIAGNOSIS — M81.0 POSTMENOPAUSAL OSTEOPOROSIS: ICD-10-CM

## 2021-09-03 PROCEDURE — 77080 DXA BONE DENSITY AXIAL: CPT

## 2021-09-16 ENCOUNTER — TRANSCRIBE ORDER (OUTPATIENT)
Dept: SCHEDULING | Age: 81
End: 2021-09-16

## 2021-09-16 DIAGNOSIS — A43.9 NOCARDIA INFECTION: Primary | ICD-10-CM

## 2021-09-23 ENCOUNTER — TRANSCRIBE ORDER (OUTPATIENT)
Dept: SCHEDULING | Age: 81
End: 2021-09-23

## 2021-09-23 DIAGNOSIS — J18.9 PULMONARY INFECTION: Primary | ICD-10-CM

## 2021-09-24 ENCOUNTER — TRANSCRIBE ORDER (OUTPATIENT)
Dept: SCHEDULING | Age: 81
End: 2021-09-24

## 2021-09-24 DIAGNOSIS — A43.9 NOCARDIA INFECTION: Primary | ICD-10-CM

## 2021-09-27 ENCOUNTER — TRANSCRIBE ORDER (OUTPATIENT)
Dept: SCHEDULING | Age: 81
End: 2021-09-27

## 2021-09-27 DIAGNOSIS — J18.9 LUNG INFECTION: Primary | ICD-10-CM

## 2021-10-05 ENCOUNTER — HOSPITAL ENCOUNTER (OUTPATIENT)
Dept: CT IMAGING | Age: 81
Discharge: HOME | End: 2021-10-05
Attending: INTERNAL MEDICINE
Payer: MEDICARE

## 2021-10-05 DIAGNOSIS — J18.9 LUNG INFECTION: ICD-10-CM

## 2021-10-05 PROCEDURE — 71250 CT THORAX DX C-: CPT

## 2022-01-05 ENCOUNTER — TRANSCRIBE ORDER (OUTPATIENT)
Dept: SCHEDULING | Age: 82
End: 2022-01-05

## 2022-01-05 DIAGNOSIS — J18.1 UNRESOLVED LOBAR PNEUMONIA (HCC): ICD-10-CM

## 2022-01-05 DIAGNOSIS — R10.9 ABDOMINAL PAIN: ICD-10-CM

## 2022-01-05 DIAGNOSIS — R07.89 CHEST WALL PAIN: Primary | ICD-10-CM

## 2022-01-06 ENCOUNTER — TRANSCRIBE ORDER (OUTPATIENT)
Dept: SCHEDULING | Age: 82
End: 2022-01-06

## 2022-01-06 DIAGNOSIS — R07.89 OTHER CHEST PAIN: Primary | ICD-10-CM

## 2022-01-06 DIAGNOSIS — R10.9 STOMACH ACHE: ICD-10-CM

## 2022-01-07 ENCOUNTER — HOSPITAL ENCOUNTER (OUTPATIENT)
Dept: CT IMAGING | Age: 82
Discharge: HOME | End: 2022-01-07
Attending: FAMILY MEDICINE
Payer: MEDICARE

## 2022-01-07 DIAGNOSIS — R10.9 STOMACH ACHE: ICD-10-CM

## 2022-01-07 DIAGNOSIS — R91.8 LUNG INFILTRATE ON CT: ICD-10-CM

## 2022-01-07 PROCEDURE — 74176 CT ABD & PELVIS W/O CONTRAST: CPT

## 2022-01-07 PROCEDURE — 71250 CT THORAX DX C-: CPT

## 2022-01-07 NOTE — PROGRESS NOTES
Pt's son came 1/6/2022 to  oral contrast and I advised him the orders are for CT scans with IV contrast and if that is the case, pt will have to be premedicated. Gave him my phone number and explained to him the CTA of the Chest, they are usually looking for blood clots and we can't see those if we do scan without. He understood and will go to Dr's off and give them the information. He then called me back, said he spoke with Dr. Rosenda Michael office and they do not want pt to have contrast and no need to premedicate. I then called the Dr. Nickolas Barnett and left message for new order. Pt arrived today and the new order was still not in connect care and the [facility] off was closed for the day. I will leave for Monday staff to get the correct order.

## 2022-01-20 ENCOUNTER — TRANSCRIBE ORDER (OUTPATIENT)
Dept: SCHEDULING | Age: 82
End: 2022-01-20

## 2022-01-20 DIAGNOSIS — R91.8 LUNG INFILTRATE: Primary | ICD-10-CM

## 2022-01-27 ENCOUNTER — TRANSCRIBE ORDER (OUTPATIENT)
Dept: SCHEDULING | Age: 82
End: 2022-01-27

## 2022-01-27 DIAGNOSIS — R11.0 NAUSEA: ICD-10-CM

## 2022-01-27 DIAGNOSIS — K44.9 HIATAL HERNIA: Primary | ICD-10-CM

## 2022-01-28 ENCOUNTER — TRANSCRIBE ORDER (OUTPATIENT)
Dept: SCHEDULING | Age: 82
End: 2022-01-28

## 2022-01-28 DIAGNOSIS — Z12.31 SCREENING MAMMOGRAM FOR HIGH-RISK PATIENT: Primary | ICD-10-CM

## 2022-02-04 ENCOUNTER — HOSPITAL ENCOUNTER (OUTPATIENT)
Dept: GENERAL RADIOLOGY | Age: 82
Discharge: HOME | End: 2022-02-04
Attending: PHYSICIAN ASSISTANT
Payer: MEDICARE

## 2022-02-04 DIAGNOSIS — R11.0 NAUSEA: ICD-10-CM

## 2022-02-04 DIAGNOSIS — K44.9 HIATAL HERNIA: ICD-10-CM

## 2022-02-04 PROCEDURE — 74240 X-RAY XM UPR GI TRC 1CNTRST: CPT

## 2022-03-18 ENCOUNTER — HOSPITAL ENCOUNTER (OUTPATIENT)
Dept: CT IMAGING | Age: 82
Discharge: HOME | End: 2022-03-18
Attending: NURSE PRACTITIONER
Payer: MEDICARE

## 2022-03-18 DIAGNOSIS — R91.8 LUNG INFILTRATE: ICD-10-CM

## 2022-03-18 PROCEDURE — 71250 CT THORAX DX C-: CPT

## 2022-04-30 ENCOUNTER — HOSPITAL ENCOUNTER (OUTPATIENT)
Age: 82
Setting detail: OBSERVATION
Discharge: HOME | End: 2022-05-01
Attending: EMERGENCY MEDICINE | Admitting: HOSPITALIST
Payer: MEDICARE

## 2022-04-30 ENCOUNTER — APPOINTMENT (OUTPATIENT)
Dept: GENERAL RADIOLOGY | Age: 82
End: 2022-04-30
Attending: EMERGENCY MEDICINE
Payer: MEDICARE

## 2022-04-30 ENCOUNTER — APPOINTMENT (OUTPATIENT)
Dept: CT IMAGING | Age: 82
End: 2022-04-30
Attending: EMERGENCY MEDICINE
Payer: MEDICARE

## 2022-04-30 ENCOUNTER — APPOINTMENT (OUTPATIENT)
Dept: NON INVASIVE DIAGNOSTICS | Age: 82
End: 2022-04-30
Attending: HOSPITALIST
Payer: MEDICARE

## 2022-04-30 DIAGNOSIS — I48.91 ATRIAL FIBRILLATION WITH RAPID VENTRICULAR RESPONSE (HCC): Primary | ICD-10-CM

## 2022-04-30 PROBLEM — C50.911 INVASIVE DUCTAL CARCINOMA OF RIGHT BREAST (HCC): Status: RESOLVED | Noted: 2019-08-12 | Resolved: 2022-04-30

## 2022-04-30 PROBLEM — I48.0 PAROXYSMAL ATRIAL FIBRILLATION (HCC): Status: RESOLVED | Noted: 2022-04-30 | Resolved: 2022-04-30

## 2022-04-30 PROBLEM — I48.0 PAROXYSMAL ATRIAL FIBRILLATION (HCC): Status: ACTIVE | Noted: 2022-04-30

## 2022-04-30 PROBLEM — I50.9 CONGESTIVE HEART FAILURE (HCC): Status: ACTIVE | Noted: 2020-05-01

## 2022-04-30 PROBLEM — E87.1 HYPONATREMIA: Status: ACTIVE | Noted: 2022-04-30

## 2022-04-30 PROBLEM — D64.9 ANEMIA: Status: ACTIVE | Noted: 2022-04-30

## 2022-04-30 LAB
ALBUMIN SERPL-MCNC: 3.3 G/DL (ref 3.5–5)
ALBUMIN/GLOB SERPL: 1.1 {RATIO} (ref 1.1–2.2)
ALP SERPL-CCNC: 62 U/L (ref 45–117)
ALT SERPL-CCNC: 19 U/L (ref 12–78)
AMPHET UR QL SCN: NEGATIVE
ANION GAP SERPL CALC-SCNC: 6 MMOL/L (ref 5–15)
APPEARANCE UR: CLEAR
AST SERPL-CCNC: 15 U/L (ref 15–37)
BACTERIA URNS QL MICRO: NEGATIVE /HPF
BARBITURATES UR QL SCN: NEGATIVE
BASOPHILS # BLD: 0.1 K/UL (ref 0–0.1)
BASOPHILS NFR BLD: 1 % (ref 0–1)
BENZODIAZ UR QL: NEGATIVE
BILIRUB SERPL-MCNC: 0.3 MG/DL (ref 0.2–1)
BILIRUB UR QL: NEGATIVE
BUN SERPL-MCNC: 13 MG/DL (ref 6–20)
BUN/CREAT SERPL: 23 (ref 12–20)
CALCIUM SERPL-MCNC: 8.8 MG/DL (ref 8.5–10.1)
CANNABINOIDS UR QL SCN: NEGATIVE
CHLORIDE SERPL-SCNC: 102 MMOL/L (ref 97–108)
CO2 SERPL-SCNC: 25 MMOL/L (ref 21–32)
COCAINE UR QL SCN: NEGATIVE
COLOR UR: ABNORMAL
COMMENT, HOLDF: NORMAL
CREAT SERPL-MCNC: 0.56 MG/DL (ref 0.55–1.02)
DIFFERENTIAL METHOD BLD: ABNORMAL
DRUG SCRN COMMENT,DRGCM: NORMAL
ECHO AO ARCH DIAM: 2.3 CM
ECHO AO ASC DIAM: 3.7 CM
ECHO AO ASCENDING AORTA INDEX: 2.11 CM/M2
ECHO AV AREA PEAK VELOCITY: 1.8 CM2
ECHO AV AREA/BSA PEAK VELOCITY: 1 CM2/M2
ECHO AV PEAK GRADIENT: 7 MMHG
ECHO AV PEAK VELOCITY: 1.4 M/S
ECHO AV VELOCITY RATIO: 0.57
ECHO LA DIAMETER INDEX: 1.6 CM/M2
ECHO LA DIAMETER: 2.8 CM
ECHO LA VOL 2C: 29 ML (ref 22–52)
ECHO LA VOL 4C: 28 ML (ref 22–52)
ECHO LA VOL BP: 29 ML (ref 22–52)
ECHO LA VOL/BSA BIPLANE: 17 ML/M2 (ref 16–34)
ECHO LA VOLUME AREA LENGTH: 32 ML
ECHO LA VOLUME INDEX A2C: 17 ML/M2 (ref 16–34)
ECHO LA VOLUME INDEX A4C: 16 ML/M2 (ref 16–34)
ECHO LA VOLUME INDEX AREA LENGTH: 18 ML/M2 (ref 16–34)
ECHO LV E' LATERAL VELOCITY: 12 CM/S
ECHO LV E' SEPTAL VELOCITY: 9 CM/S
ECHO LV EDV A2C: 64 ML
ECHO LV EDV A4C: 68 ML
ECHO LV EDV BP: 67 ML (ref 56–104)
ECHO LV EDV INDEX A4C: 39 ML/M2
ECHO LV EDV INDEX BP: 38 ML/M2
ECHO LV EDV NDEX A2C: 37 ML/M2
ECHO LV EJECTION FRACTION A2C: 50 %
ECHO LV EJECTION FRACTION A4C: 31 %
ECHO LV EJECTION FRACTION BIPLANE: 39 % (ref 55–100)
ECHO LV ESV A2C: 32 ML
ECHO LV ESV A4C: 47 ML
ECHO LV ESV BP: 41 ML (ref 19–49)
ECHO LV ESV INDEX A2C: 18 ML/M2
ECHO LV ESV INDEX A4C: 27 ML/M2
ECHO LV ESV INDEX BP: 23 ML/M2
ECHO LV FRACTIONAL SHORTENING: 16 % (ref 28–44)
ECHO LV INTERNAL DIMENSION DIASTOLE INDEX: 2.91 CM/M2
ECHO LV INTERNAL DIMENSION DIASTOLIC: 5.1 CM (ref 3.9–5.3)
ECHO LV INTERNAL DIMENSION SYSTOLIC INDEX: 2.46 CM/M2
ECHO LV INTERNAL DIMENSION SYSTOLIC: 4.3 CM
ECHO LV IVSD: 0.8 CM (ref 0.6–0.9)
ECHO LV MASS 2D: 140.5 G (ref 67–162)
ECHO LV MASS INDEX 2D: 80.3 G/M2 (ref 43–95)
ECHO LV POSTERIOR WALL DIASTOLIC: 0.8 CM (ref 0.6–0.9)
ECHO LV RELATIVE WALL THICKNESS RATIO: 0.31
ECHO LVOT AREA: 3.1 CM2
ECHO LVOT DIAM: 2 CM
ECHO LVOT PEAK GRADIENT: 2 MMHG
ECHO LVOT PEAK VELOCITY: 0.8 M/S
ECHO MV E DECELERATION TIME (DT): 198.1 MS
ECHO MV E VELOCITY: 1.09 M/S
ECHO MV E/E' LATERAL: 9.08
ECHO MV E/E' RATIO (AVERAGED): 10.6
ECHO MV E/E' SEPTAL: 12.11
ECHO MV REGURGITANT PEAK GRADIENT: 112 MMHG
ECHO MV REGURGITANT PEAK VELOCITY: 5.3 M/S
ECHO PULMONARY ARTERY END DIASTOLIC PRESSURE: 3 MMHG
ECHO PV MAX VELOCITY: 0.9 M/S
ECHO PV PEAK GRADIENT: 4 MMHG
ECHO PV REGURGITANT MAX VELOCITY: 0.8 M/S
ECHO RV FREE WALL PEAK S': 9 CM/S
ECHO RV INTERNAL DIMENSION: 3.5 CM
ECHO RV TAPSE: 1.3 CM (ref 1.7–?)
ECHO TV REGURGITANT MAX VELOCITY: 2.48 M/S
ECHO TV REGURGITANT PEAK GRADIENT: 25 MMHG
EOSINOPHIL # BLD: 0.2 K/UL (ref 0–0.4)
EOSINOPHIL NFR BLD: 3 % (ref 0–7)
EPITH CASTS URNS QL MICRO: ABNORMAL /LPF
ERYTHROCYTE [DISTWIDTH] IN BLOOD BY AUTOMATED COUNT: 12.9 % (ref 11.5–14.5)
FERRITIN SERPL-MCNC: 70 NG/ML (ref 8–252)
FOLATE SERPL-MCNC: 27.5 NG/ML (ref 5–21)
GLOBULIN SER CALC-MCNC: 2.9 G/DL (ref 2–4)
GLUCOSE SERPL-MCNC: 126 MG/DL (ref 65–100)
GLUCOSE UR STRIP.AUTO-MCNC: NEGATIVE MG/DL
HAPTOGLOB SERPL-MCNC: 189 MG/DL (ref 30–200)
HCT VFR BLD AUTO: 31 % (ref 35–47)
HGB BLD-MCNC: 10.4 G/DL (ref 11.5–16)
HGB UR QL STRIP: NEGATIVE
IMM GRANULOCYTES # BLD AUTO: 0 K/UL (ref 0–0.04)
IMM GRANULOCYTES NFR BLD AUTO: 0 % (ref 0–0.5)
IRON SATN MFR SERPL: 21 % (ref 20–50)
IRON SERPL-MCNC: 52 UG/DL (ref 35–150)
KETONES UR QL STRIP.AUTO: NEGATIVE MG/DL
LDH SERPL L TO P-CCNC: 178 U/L (ref 81–246)
LEUKOCYTE ESTERASE UR QL STRIP.AUTO: ABNORMAL
LYMPHOCYTES # BLD: 0.9 K/UL (ref 0.8–3.5)
LYMPHOCYTES NFR BLD: 12 % (ref 12–49)
MAGNESIUM SERPL-MCNC: 2 MG/DL (ref 1.6–2.4)
MCH RBC QN AUTO: 30 PG (ref 26–34)
MCHC RBC AUTO-ENTMCNC: 33.5 G/DL (ref 30–36.5)
MCV RBC AUTO: 89.3 FL (ref 80–99)
METHADONE UR QL: NEGATIVE
MONOCYTES # BLD: 0.6 K/UL (ref 0–1)
MONOCYTES NFR BLD: 9 % (ref 5–13)
NEUTS SEG # BLD: 5.5 K/UL (ref 1.8–8)
NEUTS SEG NFR BLD: 75 % (ref 32–75)
NITRITE UR QL STRIP.AUTO: NEGATIVE
NRBC # BLD: 0 K/UL (ref 0–0.01)
NRBC BLD-RTO: 0 PER 100 WBC
OPIATES UR QL: NEGATIVE
OTHER,OTHU: ABNORMAL
PCP UR QL: NEGATIVE
PH UR STRIP: 8 [PH] (ref 5–8)
PLATELET # BLD AUTO: 283 K/UL (ref 150–400)
PMV BLD AUTO: 9.4 FL (ref 8.9–12.9)
POTASSIUM SERPL-SCNC: 3.6 MMOL/L (ref 3.5–5.1)
PROCALCITONIN SERPL-MCNC: <0.05 NG/ML
PROT SERPL-MCNC: 6.2 G/DL (ref 6.4–8.2)
PROT UR STRIP-MCNC: NEGATIVE MG/DL
RBC # BLD AUTO: 3.47 M/UL (ref 3.8–5.2)
RBC #/AREA URNS HPF: ABNORMAL /HPF (ref 0–5)
RETICS # AUTO: 0.05 M/UL (ref 0.02–0.08)
RETICS/RBC NFR AUTO: 1.5 % (ref 0.7–2.1)
SAMPLES BEING HELD,HOLD: NORMAL
SODIUM SERPL-SCNC: 133 MMOL/L (ref 136–145)
SP GR UR REFRACTOMETRY: 1.01 (ref 1–1.03)
TIBC SERPL-MCNC: 253 UG/DL (ref 250–450)
TROPONIN-HIGH SENSITIVITY: 11 NG/L (ref 0–51)
TROPONIN-HIGH SENSITIVITY: 22 NG/L (ref 0–51)
TROPONIN-HIGH SENSITIVITY: 23 NG/L (ref 0–51)
TSH SERPL DL<=0.05 MIU/L-ACNC: 1.6 UIU/ML (ref 0.36–3.74)
UROBILINOGEN UR QL STRIP.AUTO: 0.2 EU/DL (ref 0.2–1)
VIT B12 SERPL-MCNC: 643 PG/ML (ref 193–986)
WBC # BLD AUTO: 7.3 K/UL (ref 3.6–11)
WBC URNS QL MICRO: ABNORMAL /HPF (ref 0–4)

## 2022-04-30 PROCEDURE — 80053 COMPREHEN METABOLIC PANEL: CPT

## 2022-04-30 PROCEDURE — 96372 THER/PROPH/DIAG INJ SC/IM: CPT

## 2022-04-30 PROCEDURE — 97530 THERAPEUTIC ACTIVITIES: CPT

## 2022-04-30 PROCEDURE — 74011250637 HC RX REV CODE- 250/637: Performed by: HOSPITALIST

## 2022-04-30 PROCEDURE — 83540 ASSAY OF IRON: CPT

## 2022-04-30 PROCEDURE — 80307 DRUG TEST PRSMV CHEM ANLYZR: CPT

## 2022-04-30 PROCEDURE — G0378 HOSPITAL OBSERVATION PER HR: HCPCS

## 2022-04-30 PROCEDURE — 83615 LACTATE (LD) (LDH) ENZYME: CPT

## 2022-04-30 PROCEDURE — 77030013140 HC MSK NEB VYRM -A

## 2022-04-30 PROCEDURE — 74011250636 HC RX REV CODE- 250/636: Performed by: HOSPITALIST

## 2022-04-30 PROCEDURE — 74011000250 HC RX REV CODE- 250: Performed by: INTERNAL MEDICINE

## 2022-04-30 PROCEDURE — 83735 ASSAY OF MAGNESIUM: CPT

## 2022-04-30 PROCEDURE — 83010 ASSAY OF HAPTOGLOBIN QUANT: CPT

## 2022-04-30 PROCEDURE — 94664 DEMO&/EVAL PT USE INHALER: CPT

## 2022-04-30 PROCEDURE — 74011000250 HC RX REV CODE- 250: Performed by: EMERGENCY MEDICINE

## 2022-04-30 PROCEDURE — 99285 EMERGENCY DEPT VISIT HI MDM: CPT

## 2022-04-30 PROCEDURE — 81001 URINALYSIS AUTO W/SCOPE: CPT

## 2022-04-30 PROCEDURE — 93306 TTE W/DOPPLER COMPLETE: CPT

## 2022-04-30 PROCEDURE — 36415 COLL VENOUS BLD VENIPUNCTURE: CPT

## 2022-04-30 PROCEDURE — 74011000250 HC RX REV CODE- 250: Performed by: HOSPITALIST

## 2022-04-30 PROCEDURE — 74011250636 HC RX REV CODE- 250/636: Performed by: INTERNAL MEDICINE

## 2022-04-30 PROCEDURE — 99223 1ST HOSP IP/OBS HIGH 75: CPT | Performed by: INTERNAL MEDICINE

## 2022-04-30 PROCEDURE — 82728 ASSAY OF FERRITIN: CPT

## 2022-04-30 PROCEDURE — 84443 ASSAY THYROID STIM HORMONE: CPT

## 2022-04-30 PROCEDURE — 84145 PROCALCITONIN (PCT): CPT

## 2022-04-30 PROCEDURE — 96375 TX/PRO/DX INJ NEW DRUG ADDON: CPT

## 2022-04-30 PROCEDURE — 96366 THER/PROPH/DIAG IV INF ADDON: CPT

## 2022-04-30 PROCEDURE — 96374 THER/PROPH/DIAG INJ IV PUSH: CPT

## 2022-04-30 PROCEDURE — 87086 URINE CULTURE/COLONY COUNT: CPT

## 2022-04-30 PROCEDURE — 74011250637 HC RX REV CODE- 250/637: Performed by: INTERNAL MEDICINE

## 2022-04-30 PROCEDURE — 97116 GAIT TRAINING THERAPY: CPT

## 2022-04-30 PROCEDURE — 70450 CT HEAD/BRAIN W/O DYE: CPT

## 2022-04-30 PROCEDURE — 97161 PT EVAL LOW COMPLEX 20 MIN: CPT

## 2022-04-30 PROCEDURE — 96365 THER/PROPH/DIAG IV INF INIT: CPT

## 2022-04-30 PROCEDURE — 65270000046 HC RM TELEMETRY

## 2022-04-30 PROCEDURE — 94761 N-INVAS EAR/PLS OXIMETRY MLT: CPT

## 2022-04-30 PROCEDURE — 94640 AIRWAY INHALATION TREATMENT: CPT

## 2022-04-30 PROCEDURE — 74011250636 HC RX REV CODE- 250/636: Performed by: EMERGENCY MEDICINE

## 2022-04-30 PROCEDURE — 85045 AUTOMATED RETICULOCYTE COUNT: CPT

## 2022-04-30 PROCEDURE — 85027 COMPLETE CBC AUTOMATED: CPT

## 2022-04-30 PROCEDURE — 84484 ASSAY OF TROPONIN QUANT: CPT

## 2022-04-30 PROCEDURE — 82607 VITAMIN B-12: CPT

## 2022-04-30 PROCEDURE — 96376 TX/PRO/DX INJ SAME DRUG ADON: CPT

## 2022-04-30 PROCEDURE — 93306 TTE W/DOPPLER COMPLETE: CPT | Performed by: INTERNAL MEDICINE

## 2022-04-30 PROCEDURE — 85025 COMPLETE CBC W/AUTO DIFF WBC: CPT

## 2022-04-30 PROCEDURE — 71045 X-RAY EXAM CHEST 1 VIEW: CPT

## 2022-04-30 PROCEDURE — 74011000258 HC RX REV CODE- 258: Performed by: EMERGENCY MEDICINE

## 2022-04-30 PROCEDURE — 93005 ELECTROCARDIOGRAM TRACING: CPT

## 2022-04-30 PROCEDURE — 82746 ASSAY OF FOLIC ACID SERUM: CPT

## 2022-04-30 RX ORDER — SODIUM CHLORIDE 0.9 % (FLUSH) 0.9 %
5-40 SYRINGE (ML) INJECTION AS NEEDED
Status: DISCONTINUED | OUTPATIENT
Start: 2022-04-30 | End: 2022-05-01 | Stop reason: HOSPADM

## 2022-04-30 RX ORDER — BUDESONIDE 0.5 MG/2ML
500 INHALANT ORAL
Status: DISCONTINUED | OUTPATIENT
Start: 2022-04-30 | End: 2022-05-01 | Stop reason: HOSPADM

## 2022-04-30 RX ORDER — ARFORMOTEROL TARTRATE 15 UG/2ML
15 SOLUTION RESPIRATORY (INHALATION)
Status: DISCONTINUED | OUTPATIENT
Start: 2022-04-30 | End: 2022-05-01 | Stop reason: HOSPADM

## 2022-04-30 RX ORDER — SODIUM CHLORIDE 0.9 % (FLUSH) 0.9 %
5-40 SYRINGE (ML) INJECTION EVERY 8 HOURS
Status: DISCONTINUED | OUTPATIENT
Start: 2022-04-30 | End: 2022-05-01 | Stop reason: HOSPADM

## 2022-04-30 RX ORDER — ACETAMINOPHEN 325 MG/1
650 TABLET ORAL
Status: DISCONTINUED | OUTPATIENT
Start: 2022-04-30 | End: 2022-05-01 | Stop reason: HOSPADM

## 2022-04-30 RX ORDER — IPRATROPIUM BROMIDE AND ALBUTEROL SULFATE 2.5; .5 MG/3ML; MG/3ML
3 SOLUTION RESPIRATORY (INHALATION)
Status: DISCONTINUED | OUTPATIENT
Start: 2022-04-30 | End: 2022-05-01

## 2022-04-30 RX ORDER — DILTIAZEM HYDROCHLORIDE 5 MG/ML
10 INJECTION INTRAVENOUS ONCE
Status: COMPLETED | OUTPATIENT
Start: 2022-04-30 | End: 2022-04-30

## 2022-04-30 RX ORDER — LEVOTHYROXINE SODIUM 50 UG/1
25 TABLET ORAL
Status: DISCONTINUED | OUTPATIENT
Start: 2022-04-30 | End: 2022-04-30

## 2022-04-30 RX ORDER — PANTOPRAZOLE SODIUM 40 MG/1
40 TABLET, DELAYED RELEASE ORAL DAILY
Status: DISCONTINUED | OUTPATIENT
Start: 2022-04-30 | End: 2022-05-01 | Stop reason: HOSPADM

## 2022-04-30 RX ORDER — ALBUTEROL SULFATE 0.83 MG/ML
3 SOLUTION RESPIRATORY (INHALATION)
Status: ON HOLD | COMMUNITY
Start: 2022-03-29 | End: 2022-10-27

## 2022-04-30 RX ORDER — MONTELUKAST SODIUM 10 MG/1
10 TABLET ORAL
Status: DISCONTINUED | OUTPATIENT
Start: 2022-04-30 | End: 2022-05-01 | Stop reason: HOSPADM

## 2022-04-30 RX ORDER — SODIUM CHLORIDE 9 MG/ML
125 INJECTION, SOLUTION INTRAVENOUS CONTINUOUS
Status: DISCONTINUED | OUTPATIENT
Start: 2022-04-30 | End: 2022-05-01

## 2022-04-30 RX ORDER — ONDANSETRON 2 MG/ML
4 INJECTION INTRAMUSCULAR; INTRAVENOUS
Status: COMPLETED | OUTPATIENT
Start: 2022-04-30 | End: 2022-04-30

## 2022-04-30 RX ORDER — ONDANSETRON 2 MG/ML
6 INJECTION INTRAMUSCULAR; INTRAVENOUS
Status: DISCONTINUED | OUTPATIENT
Start: 2022-04-30 | End: 2022-05-01 | Stop reason: HOSPADM

## 2022-04-30 RX ORDER — ENOXAPARIN SODIUM 100 MG/ML
40 INJECTION SUBCUTANEOUS EVERY 24 HOURS
Status: DISCONTINUED | OUTPATIENT
Start: 2022-04-30 | End: 2022-05-01

## 2022-04-30 RX ORDER — METOPROLOL SUCCINATE 25 MG/1
25 TABLET, EXTENDED RELEASE ORAL DAILY
Status: DISCONTINUED | OUTPATIENT
Start: 2022-04-30 | End: 2022-05-01

## 2022-04-30 RX ADMIN — SODIUM CHLORIDE, PRESERVATIVE FREE 10 ML: 5 INJECTION INTRAVENOUS at 21:13

## 2022-04-30 RX ADMIN — SODIUM CHLORIDE 125 ML/HR: 9 INJECTION, SOLUTION INTRAVENOUS at 16:20

## 2022-04-30 RX ADMIN — METOPROLOL SUCCINATE 25 MG: 25 TABLET, EXTENDED RELEASE ORAL at 08:23

## 2022-04-30 RX ADMIN — SODIUM CHLORIDE, PRESERVATIVE FREE 10 ML: 5 INJECTION INTRAVENOUS at 07:38

## 2022-04-30 RX ADMIN — SODIUM CHLORIDE, PRESERVATIVE FREE 10 ML: 5 INJECTION INTRAVENOUS at 14:50

## 2022-04-30 RX ADMIN — DILTIAZEM HYDROCHLORIDE 7.5 MG/HR: 5 INJECTION INTRAVENOUS at 17:04

## 2022-04-30 RX ADMIN — MONTELUKAST 10 MG: 10 TABLET, FILM COATED ORAL at 21:13

## 2022-04-30 RX ADMIN — ONDANSETRON 6 MG: 2 INJECTION INTRAMUSCULAR; INTRAVENOUS at 21:28

## 2022-04-30 RX ADMIN — DILTIAZEM HYDROCHLORIDE 10 MG: 5 INJECTION INTRAVENOUS at 05:08

## 2022-04-30 RX ADMIN — ACETAMINOPHEN 650 MG: 325 TABLET ORAL at 09:16

## 2022-04-30 RX ADMIN — PANTOPRAZOLE SODIUM 40 MG: 40 TABLET, DELAYED RELEASE ORAL at 08:23

## 2022-04-30 RX ADMIN — SODIUM CHLORIDE 125 ML/HR: 9 INJECTION, SOLUTION INTRAVENOUS at 07:45

## 2022-04-30 RX ADMIN — ONDANSETRON 4 MG: 2 INJECTION INTRAMUSCULAR; INTRAVENOUS at 05:21

## 2022-04-30 RX ADMIN — ACETAMINOPHEN 650 MG: 325 TABLET ORAL at 21:18

## 2022-04-30 RX ADMIN — ARFORMOTEROL TARTRATE 15 MCG: 15 SOLUTION RESPIRATORY (INHALATION) at 08:12

## 2022-04-30 RX ADMIN — DILTIAZEM HYDROCHLORIDE 2.5 MG/HR: 5 INJECTION INTRAVENOUS at 05:12

## 2022-04-30 RX ADMIN — ARFORMOTEROL TARTRATE 15 MCG: 15 SOLUTION RESPIRATORY (INHALATION) at 19:40

## 2022-04-30 RX ADMIN — BUDESONIDE 500 MCG: 0.5 SUSPENSION RESPIRATORY (INHALATION) at 08:12

## 2022-04-30 RX ADMIN — ENOXAPARIN SODIUM 40 MG: 100 INJECTION SUBCUTANEOUS at 21:00

## 2022-04-30 RX ADMIN — BUDESONIDE 500 MCG: 0.5 SUSPENSION RESPIRATORY (INHALATION) at 19:40

## 2022-04-30 NOTE — PROGRESS NOTES
[facility]    Medical Progress Note      NAME: Anneliese Sanchez   :  1940  MRM:  [id number]    Date/Time of service 2022  11:00 AM          Assessment and Plan:     Paroxysmal atrial fibrillation with RVR / Chronic anticoagulation - POA, triggered by a combination of her stopping metoprolol, adding prednisone, q4hr albuterol and cough. Check TSH, ECHO, Procalcitonin, UA. Monitor tele. Cardiology consult. Diltiazem drip. Continue BB. Hold Xarelto and add lovenox, since she plans a spinal procedure in 5 days. Anemia - POA, mild, no active bleeding. Check serologies and hemoccult, but she did have recent negative endoscopies. Hyponatremia - POA, mild and likely reflects mild IVVF. Hydrate. COPD / Chronic bronchitis with bronchiectasis / Asthma -  For now use brovana and pulmicort in place of Trelegy and Spiriva. Prn duonebs. Flonase. Followed by pulmonary. Has recently been on prenisone and prophylactic tobramycin nebs. Hypothyroid - Stop homeopathic dose of synthroid. Check TSH    Congestive heart failure / hypotension - Dx in chart, but no ECHO in chart. Check ECHO. Her metoprolol was stopped, but olmesartan continued, recently when she recently had orthostatic hypotension and dizziness. GERD (gastroesophageal reflux disease) / Hiatal hernia - Recent negative EGD and colon by Dr Elida Gambino. PPI. Hold NSAIDS and bentyl    Venous insufficiency - Supportive care    HX: breast cancer - In remission. Followed by Dr Nunez Figures       Subjective:     Chief Complaint:  Palpitations gonr    ROS:  (bold if positive, if negative)    Tolerating PT  Tolerating Diet        Objective:     Last 24hrs VS reviewed since prior progress note.  Most recent are:    Visit Vitals  /61 (BP 1 Location: Left upper arm, BP Patient Position: At rest)   Pulse 86   Temp 98.2 °F (36.8 °C)   Resp 18   Ht 5' 4\" (1.626 m)   Wt 69.9 kg (154 lb 1.6 oz)   SpO2 96%   BMI 26.45 kg/m²     SpO2 Readings from Last 6 Encounters:   04/30/22 96%   06/14/21 99%   04/28/21 97%   01/13/21 97%   01/11/21 92%   01/06/21 96%            Intake/Output Summary (Last 24 hours) at 4/30/2022 1228  Last data filed at 4/30/2022 1025  Gross per 24 hour   Intake 240 ml   Output 150 ml   Net 90 ml        Physical Exam:    Gen:  Well-developed, well-nourished, in no acute distress  HEENT:  Pink conjunctivae, PERRL, hearing intact to voice, moist mucous membranes  Neck:  Supple, without masses, thyroid non-tender  Resp:  No accessory muscle use, clear breath sounds without wheezes rales or rhonchi  Card:  No murmurs, irregular tachycardic S1, S2 without thrills, bruits or peripheral edema  Abd:  Soft, non-tender, non-distended, normoactive bowel sounds are present, no mass  Lymph:  No cervical or inguinal adenopathy  Musc:  No cyanosis or clubbing  Skin:  No rashes or ulcers, skin turgor is good  Neuro:  Cranial nerves are grossly intact, no focal motor weakness, follows commands appropriately  Psych:  Good insight, oriented to person, place and time, alert    Telemetry reviewed:   AFIB  __________________________________________________________________  Medications Reviewed: (see below)  Medications:     Current Facility-Administered Medications   Medication Dose Route Frequency    dilTIAZem (CARDIZEM) 125 mg in 0.9% sodium chloride 125 mL infusion  0-15 mg/hr IntraVENous TITRATE    metoprolol succinate (TOPROL-XL) XL tablet 25 mg  25 mg Oral DAILY    montelukast (SINGULAIR) tablet 10 mg  10 mg Oral QHS    pantoprazole (PROTONIX) tablet 40 mg  40 mg Oral DAILY    rivaroxaban (XARELTO) tablet 20 mg  20 mg Oral DAILY    sodium chloride (NS) flush 5-40 mL  5-40 mL IntraVENous Q8H    sodium chloride (NS) flush 5-40 mL  5-40 mL IntraVENous PRN    0.9% sodium chloride infusion  125 mL/hr IntraVENous CONTINUOUS    arformoteroL (BROVANA) neb solution 15 mcg  15 mcg Nebulization BID RT    budesonide (PULMICORT) 500 mcg/2 ml nebulizer suspension  500 mcg Nebulization BID RT    albuterol-ipratropium (DUO-NEB) 2.5 MG-0.5 MG/3 ML  3 mL Nebulization Q6H PRN    acetaminophen (TYLENOL) tablet 650 mg  650 mg Oral Q6H PRN    ondansetron (ZOFRAN) injection 6 mg  6 mg IntraVENous Q6H PRN        Lab Data Reviewed: (see below)  Lab Review:     Recent Labs     04/30/22 0417   WBC 7.3   HGB 10.4*   HCT 31.0*        Recent Labs     04/30/22 0417   *   K 3.6      CO2 25   *   BUN 13   CREA 0.56   CA 8.8   MG 2.0   ALB 3.3*   TBILI 0.3   ALT 19     No results found for: GLUCPOC  No results for input(s): PH, PCO2, PO2, HCO3, FIO2 in the last 72 hours. No results for input(s): INR, INREXT, INREXT in the last 72 hours. All Micro Results     Procedure Component Value Units Date/Time    CULTURE, URINE [[id number]] Collected: 04/30/22 0948    Order Status: Completed Specimen: Urine from Clean catch Updated: 04/30/22 1008          Other pertinent lab: none    Total time spent with patient: 45 Minutes I personally rounded from 10:15 to 11:00 AM, in addition to the time spent by my partner, Dr Jt Cook, earlier today. I personally reviewed chart, notes, data and current medications in the medical record. I have personally examined and treated the patient at bedside during this period. I personally made additional diagnoses, placed additional orders and had additional discussions.                  Care Plan discussed with: Patient, Care Manager, Nursing Staff, Consultant/Specialist and >50% of time spent in counseling and coordination of care    Discussed:  Care Plan and D/C Planning    Prophylaxis:  H2B/PPI and xarelto    Disposition:  Home w/Family           ___________________________________________________    Attending Physician: Patricia Castillo MD

## 2022-04-30 NOTE — H&P
Vishal [facility] [place], [place] [address]  [address] [facility], [address] [place] [address]  [phone]    Admission History and Physical      NAME:  Elie Bingham   :   [birth year]   MRN:  [id number]     PCP:  Amelia Moncada MD     Date/Time of service:  2022  6:59 AM        Subjective:     CHIEF COMPLAINT: palpitation, chest tightness     HISTORY OF PRESENT ILLNESS:     Ms. Santo Brown is a 80 y.o.  female who is admitted with afib rvr . Ms. Santo Brown presented to the Emergency Department today complaining of  Palpitation which started this morning. Patient was awoken from the palpation this morning. She has noticed also a chest tightness since yesterday across her upper chest. She has not had the tightness before. She does has hx of afib 2 years ago needing admission and was treated with Cardizem and metoprolol. Patient is also on xarelto chronically. She was noticed to have increased breathing problems since yesterday. When she checked her pulse this morning it was in the 150's and she presented to the ED. She was started on Cardizem drip in the ed. Of note patient had previous cardioversion in the hospital but was unsuccessful. She was on anticoagulation and it was repeated several weeks later and she was in NSR. She was recently taken of metoprolol due to low bp according to the patient. She has been having nausea for the past 2 years and has been worse over the past 6 months. She had egd and colonoscopy which was normal. She has lost 35 lb as a result which has lead her to come of bp medication. Allergies   Allergen Reactions    Ace Inhibitors Cough    Iodine Rash    Penicillins Rash       Prior to Admission medications    Medication Sig Start Date End Date Taking? Authorizing Provider   albuterol 2.5 mg /3 mL (0.083 %) nebu 3 mL, albuterol-ipratropium 2.5 mg-0.5 mg/3 ml nebu 3 mL Take 1 Inhalation by inhalation every four (4) hours as needed.  10/1/21  Yes Other, MD Abby   albuterol (PROVENTIL VENTOLIN) 2.5 mg /3 mL (0.083 %) nebu Take 3 mL by inhalation every four (4) hours as needed. 3/29/22  Yes Other, MD Abby   diclofenac EC (VOLTAREN) 75 mg EC tablet diclofenac sodium 75 mg tablet,delayed release   Yes Provider, Historical   pantoprazole (PROTONIX) 40 mg tablet Take 40 mg by mouth daily. Provider, Historical   azelastine (ASTEPRO) 205.5 mcg (0.15 %) two (2) times a day. Provider, Historical   fluticasone (VERAMYST) 27.5 mcg/actuation nasal spray 2 Sprays by Nasal route daily. Provider, Historical   olopatadine (Pataday) 0.2 % drop ophthalmic solution Administer 1 Drop to both eyes daily. Provider, Historical   fluticasone-umeclidinium-vilanterol (Trelegy Ellipta) 100-62.5-25 mcg inhaler Take 1 Puff by inhalation daily. Provider, Historical   ondansetron (ZOFRAN ODT) 4 mg disintegrating tablet Take 1 Tab by mouth every eight (8) hours as needed for Nausea. Patient not taking: Reported on 6/10/2021 1/11/21   Savannah Turpin, DO   dicyclomine (BENTYL) 20 mg tablet Take 1 Tab by mouth every six (6) hours as needed for Abdominal Cramps. Patient not taking: Reported on 6/10/2021 1/11/21   Savannah Turpin, DO   metoprolol succinate (TOPROL-XL) 25 mg XL tablet Take 25 mg by mouth daily. 1/2 tab daily 8/9/20   Provider, Historical   Xarelto 20 mg tab tablet Take 20 mg by mouth daily (with breakfast).  7/31/20   Provider, Historical   metroNIDAZOLE (METROCREAM) 0.75 % topical cream metronidazole 0.75 % topical cream    Provider, Historical   ergocalciferol (ERGOCALCIFEROL) 50,000 unit capsule ergocalciferol (vitamin D2) 50,000 unit capsule    Provider, Historical   ibandronate (BONIVA) 150 mg tablet ibandronate 150 mg tablet    Provider, Historical   levothyroxine (SYNTHROID) 25 mcg tablet levothyroxine 25 mcg tablet    Provider, Historical   mometasone (NASONEX) 50 mcg/actuation nasal spray mometasone 50 mcg/actuation nasal spray    Provider, Historical   montelukast (SINGULAIR) 10 mg tablet montelukast 10 mg tablet    Provider, Historical   tiotropium bromide (SPIRIVA RESPIMAT) 2.5 mcg/actuation inhaler Spiriva Respimat 2.5 mcg/actuation solution for inhalation  Patient not taking: Reported on 6/10/2021    Provider, Historical   calcitonin, salmon, (MIACALCIN) nasal 1 [place] by IntraNASal route daily. Provider, Historical   multivitamin (ONE A DAY) tablet Take 1 Tab by mouth daily. Provider, Historical   vitamin E acetate (VITAMIN E PO) Take  by mouth. Patient not taking: Reported on 6/10/2021    Provider, Historical   docosahexanoic acid/epa (FISH OIL PO) Take  by mouth. Patient not taking: Reported on 6/10/2021    Provider, Historical   calcium carbonate/vitamin D3 (CALCIUM + D PO) Take  by mouth. Provider, Historical   vitamin B complex (VITAMINS B COMPLEX PO) Take  by mouth. Provider, Historical   albuterol (PROAIR HFA) 90 mcg/actuation inhaler Take  by inhalation. Provider, Historical   diclofenac (VOLTAREN) 1 % gel Apply  to affected area four (4) times daily. Provider, Historical   polysorbate 80/glycerin (REFRESH DRY EYE THERAPY OP) Apply  to eye. Patient not taking: Reported on 6/10/2021    Provider, Historical   acetaminophen (TYLENOL EXTRA STRENGTH PO) Take  by mouth.     Provider, Historical       Past Medical History:   Diagnosis Date    Arrhythmia 05/2020    afib    Asthma     Breast cancer ([facility] [address] 2015    Right     Congestive heart failure ([facility] [address] 05/2020    GERD (gastroesophageal reflux disease)     Hiatal hernia     Thyroid disease     hypothyroid    Venous insufficiency     bilat LE        Past Surgical History:   Procedure Laterality Date    COLONOSCOPY N/A 6/14/2021    COLONOSCOPY AND EGD performed by Mary Sexton MD at [facility] HX BREAST LUMPECTOMY Right 08/24/2015    HX CATARACT REMOVAL  2013    Bilateral     HX CHOLECYSTECTOMY      HX DILATION AND CURETTAGE      x2    HX ORTHOPAEDIC  2017    RIGHT foot  hammertoe Social History     Tobacco Use    Smoking status: Former Smoker     Types: Cigarettes     Start date: 2000     Quit date:      Years since quittin.3    Smokeless tobacco: Never Used   Substance Use Topics    Alcohol use: Not Currently        No family history on file. Review of Systems:  Review of Systems   Constitutional: Negative. HENT: Negative. Eyes: Negative. Respiratory: Positive for shortness of breath. Cardiovascular: Positive for chest pain and palpitations. Gastrointestinal: Negative. Genitourinary: Negative. Musculoskeletal: Negative. Skin: Negative. Neurological: Negative. Endo/Heme/Allergies: Negative. Psychiatric/Behavioral: Negative.              Objective:      VITALS:    Vital signs reviewed; most recent are:    Visit Vitals  BP (!) 141/85   Pulse (!) 141   Temp 97.6 °F (36.4 °C)   Resp 19   Ht 5' 4\" (1.626 m)   Wt 69.9 kg (154 lb)   SpO2 93%   BMI 26.43 kg/m²     SpO2 Readings from Last 6 Encounters:   22 93%   21 99%   21 97%   21 97%   21 92%   21 96%        No intake or output data in the 24 hours ending 22 0659     Exam:     Physical Exam:    Gen:  Well-developed, well-nourished, in no acute distress  HEENT:  Pink conjunctivae, PERRL, hearing intact to voice, moist mucous membranes  Neck:  Supple, without masses, thyroid non-tender  Resp:  No accessory muscle use, breath sounds decreased throughout   Card:  No murmurs, normal S1, S2 without thrills, bruits or peripheral edema, irregularly irregular rate, tachycardic   Abd:  Soft, non-tender, non-distended, normoactive bowel sounds are present, no palpable organomegaly and no detectable hernias  Lymph:  No cervical adenopathy  Musc:  No cyanosis or clubbing  Skin:  No rashes or ulcers, skin turgor is good  Neuro:  Cranial nerves 3-12 are grossly intact,  strength is 5/5 bilaterally, dorsi / plantarflexion strength is 5/5 bilaterally, follows commands appropriately  Psych:  Alert with good insight. Oriented to person, place, and time      Labs:    Recent Labs     04/30/22 0417   WBC 7.3   HGB 10.4*   HCT 31.0*        Recent Labs     04/30/22 0417   *   K 3.6      CO2 25   *   BUN 13   CREA 0.56   CA 8.8   MG 2.0   ALB 3.3*   TBILI 0.3   ALT 19     No results found for: GLUCPOC  No results for input(s): PH, PCO2, PO2, HCO3, FIO2 in the last 72 hours. No results for input(s): INR, INREXT in the last 72 hours.     Radiology and EKG reviewed:   Chest X-ray: Normal. EKG shows afib rvr, no acute ischemic changes noticed     **Old Records reviewed in [facility]**       Assessment/Plan:       Active Problems:    Paroxysmal atrial fibrillation ([facility] [address] (4/30/2022)      A-fib ([facility] [address] (4/30/2022)         Risk of deterioration: medium      Total time spent with patient: 30 Minutes **I personally saw and examined the patient during this time period**                 Care Plan discussed with: Patient and Family    Discussed:  Code Status and Care Plan    Prophylaxis:  xarelto    Probable Disposition:  Home w/Family           ___________________________________________________    Attending Physician: Dahlia Tom MD

## 2022-04-30 NOTE — ED TRIAGE NOTES
Pt presents to ED c/o headache, neck pain, and possible afib. Hx of afib. Losartan has been decreased by her doctor, and metoprolol was stopped x2 months ago. Takes xarelto. Pt woke up from sleep and has a machine that told her she was possibly in afib so she came to ED.

## 2022-04-30 NOTE — ED PROVIDER NOTES
70-year-old female with a history of atrial fibrillation, congestive heart failure, venous insufficiency presents with palpitations and heart racing. States that it started a couple hours ago. She also complains of headache and neck pain. She states that is chronic. Her neck pain is in the middle and goes across to her shoulders. States that it is worse than normal.  She also has a headache which is similar to previous but worse. She is on Xarelto. Headache     Neck Pain   Associated symptoms include headaches. Past Medical History:   Diagnosis Date    Arrhythmia 2020    afib    Asthma     Breast cancer ([facility] [address] 2015    Right     Congestive heart failure ([facility] [address] 2020    GERD (gastroesophageal reflux disease)     Hiatal hernia     Thyroid disease     hypothyroid    Venous insufficiency     bilat LE       Past Surgical History:   Procedure Laterality Date    COLONOSCOPY N/A 2021    COLONOSCOPY AND EGD performed by Arvin Chawla MD at [address] HX BREAST LUMPECTOMY Right 2015    HX CATARACT REMOVAL  2013    Bilateral     HX CHOLECYSTECTOMY      HX DILATION AND CURETTAGE      x2    HX ORTHOPAEDIC  2017    RIGHT foot  hammertoe         No family history on file.     Social History     Socioeconomic History    Marital status:      Spouse name: Not on file    Number of children: Not on file    Years of education: Not on file    Highest education level: Not on file   Occupational History    Not on file   Tobacco Use    Smoking status: Former Smoker     Types: Cigarettes     Start date: 2000     Quit date: 2000     Years since quittin.3    Smokeless tobacco: Never Used   Vaping Use    Vaping Use: Never used   Substance and Sexual Activity    Alcohol use: Not Currently    Drug use: Never    Sexual activity: Not on file   Other Topics Concern    Not on file   Social History Narrative    Not on file     Social Determinants of Health     Financial Resource Strain:     Difficulty of Paying Living Expenses: Not on file   Food Insecurity:     Worried About Running Out of Food in the Last Year: Not on file    [place] of Food in the Last Year: Not on file   Transportation Needs:     Lack of Transportation (Medical): Not on file    Lack of Transportation (Non-Medical): Not on file   Physical Activity:     Days of Exercise per Week: Not on file    Minutes of Exercise per Session: Not on file   Stress:     Feeling of Stress : Not on file   Social Connections:     Frequency of Communication with Friends and Family: Not on file    Frequency of Social Gatherings with Friends and Family: Not on file    Attends Gnosticist Services: Not on file    Active Member of [address] [place] [address] [employer] or Organizations: Not on file    Attends Club or Organization Meetings: Not on file    Marital Status: Not on file   Intimate Partner Violence:     Fear of Current or Ex-Partner: Not on file    Emotionally Abused: Not on file    Physically Abused: Not on file    Sexually Abused: Not on file   Housing Stability:     Unable to Pay for Housing in the Last Year: Not on file    Number of [place] in the Last Year: Not on file    Unstable Housing in the Last Year: Not on file         ALLERGIES: Ace inhibitors, Iodine, and Penicillins    Review of Systems   Musculoskeletal: Positive for neck pain. Neurological: Positive for headaches. All other systems reviewed and are negative. Vitals:    04/30/22 0407 04/30/22 0408 04/30/22 0508 04/30/22 0509   BP:   121/65 121/65   Pulse:   (!) 165 (!) 148   Resp:    22   Temp:  97.6 °F (36.4 °C)     SpO2:    98%   Weight: 69.9 kg (154 lb)      Height: 5' 4\" (1.626 m)               Physical Exam  Vitals and nursing note reviewed. Constitutional:       General: She is not in acute distress. HENT:      Head: Normocephalic and atraumatic. Mouth/Throat:      Mouth: Mucous membranes are moist.   Eyes:      General: No scleral icterus. Conjunctiva/sclera: Conjunctivae normal.      Pupils: Pupils are equal, round, and reactive to light. Neck:      Trachea: No tracheal deviation. Cardiovascular:      Rate and Rhythm: Normal rate and regular rhythm. Pulmonary:      Effort: Pulmonary effort is normal. No respiratory distress. Breath sounds: No wheezing or rales. Abdominal:      General: There is no distension. Palpations: Abdomen is soft. Tenderness: There is no abdominal tenderness. Genitourinary:     Comments: deferred  Musculoskeletal:         General: No deformity. Cervical back: Neck supple. Skin:     General: Skin is warm and dry. Neurological:      General: No focal deficit present. Mental Status: She is alert and oriented to person, place, and time. Cranial Nerves: No cranial nerve deficit. Sensory: No sensory deficit. Motor: No weakness. Coordination: Coordination normal.      Comments: Normal finger-to-nose, no pronator drift   Psychiatric:         Mood and Affect: Mood normal.          MDM       Procedures               Perfect Serve Consult for Admission  5:22 AM    ED Room Number: ER06/06  Patient Name and age: Gilberto Franklin 80 y.o.  female  Working Diagnosis:   1. Atrial fibrillation with rapid ventricular response ([address]        COVID-19 Suspicion:  no  Sepsis present:  no  Reassessment needed: N/A  Code Status:  Full Code  Readmission: no  Isolation Requirements:  no  Recommended Level of Care:  telemetry  Department:[facility] ED - [phone]  Other: On cardizem gtt      PROGRESS NOTE:  5:29 AM he did patient meets criteria for admission. Discussed with patient/ family and they are in agreement with plan.       LABORATORY TESTS:  Labs Reviewed   CBC WITH AUTOMATED DIFF - Abnormal; Notable for the following components:       Result Value    RBC 3.47 (*)     HGB 10.4 (*)     HCT 31.0 (*)     All other components within normal limits   METABOLIC PANEL, COMPREHENSIVE - Abnormal; Notable for the following components:    Sodium 133 (*)     Glucose 126 (*)     BUN/Creatinine ratio 23 (*)     Protein, total 6.2 (*)     Albumin 3.3 (*)     All other components within normal limits   TROPONIN-HIGH SENSITIVITY   MAGNESIUM   SAMPLES BEING HELD   TSH 3RD GENERATION       IMAGING RESULTS:  XR CHEST PORT   Final Result      No acute process. CT HEAD WO CONT    (Results Pending)       MEDICATIONS GIVEN:  Medications   dilTIAZem (CARDIZEM) 125 mg in 0.9% sodium chloride 125 mL infusion (2.5 mg/hr IntraVENous New Bag 4/30/22 0512)   dilTIAZem (CARDIZEM) injection 10 mg (10 mg IntraVENous Given 4/30/22 1568)   ondansetron (ZOFRAN) injection 4 mg (4 mg IntraVENous Given 4/30/22 5283)         IMPRESSION/ PLAN:  1. Atrial fibrillation with rapid ventricular response ([facility] [address]      - admit to hospitalist    Total critical care time spent exclusive of procedures:  32 minutes      Ra Yeboah MD

## 2022-04-30 NOTE — PROGRESS NOTES
Problem: Mobility Impaired (Adult and Pediatric)  Goal: *Acute Goals and Plan of Care (Insert Text)  Description: FUNCTIONAL STATUS PRIOR TO ADMISSION: Patient was independent and active without use of DME.    HOME SUPPORT PRIOR TO ADMISSION: The patient lived with  but did not require assist. Patient is primary caregiver for  who has dementia. Physical Therapy Goals  Initiated 4/30/2022  1. Patient will move from supine to sit and sit to supine  in bed with independence within 7 day(s). 2.  Patient will transfer from bed to chair and chair to bed with independence using the least restrictive device within 7 day(s). 3.  Patient will perform sit to stand with independence within 7 day(s). 4.  Patient will ambulate with independence for 350 feet with the least restrictive device within 7 day(s). 5.  Patient will ascend/descend 4 stairs with bilateral handrail(s) with independence within 7 day(s). 4/30/2022 1337 by Raji Mckeon PT  Outcome: Progressing Towards Goal     PHYSICAL THERAPY EVALUATION  Patient: Wilber Fragoso (80 y.o. female)  Date: 4/30/2022  Primary Diagnosis: A-fib [facility]) [I48.91]        Precautions: falls         ASSESSMENT  Based on the objective data described below, the patient presents with decreased activity tolerance and high risk for falls with acute a-fib in setting of history of chronic a-fib. Patient found supine in bed with son in room and agreeable to mobilizing with PT. Patient states previously she was independent without use of AD within her home, but does use a rollator for ambulation in the community. Patient transferred from supine > sit with supervision and assistance managing lines/leads. Patient completed sit <> stand with SBA and RW, and ambulated a total of approximately 25 feet in room with RW and CGA with need for safe management of lines and leads.  Per RN request patient did nor participate in extended gait training due to patient receiving IV treatment for HR management. Patient completed stand > sit with supervision and was positioned to comfort and safety in recliner chair in room with call bell in reach and all needs met. Patient will require further gait training and stair training. Of note, patient stated she was primary caregiver for her  who she reports to have dementia, including assistance with transfers and ADLs. Current Level of Function Impacting Discharge (mobility/balance): ambulates 25 feet with CGA and RW; needs stairs     Functional Outcome Measure: The patient scored Total Score: 19/28 on the Tinetti outcome measure which is indicative of moderate fall risk. Other factors to consider for discharge: Patient states she is primary caregiver for  who has dementia. Patient will benefit from skilled therapy intervention to address the above noted impairments. PLAN :  Recommendations and Planned Interventions: bed mobility training, transfer training, gait training, therapeutic exercises, neuromuscular re-education, patient and family training/education, and therapeutic activities      Frequency/Duration: Patient will be followed by physical therapy:  3 times a week to address goals. Recommendation for discharge: (in order for the patient to meet his/her long term goals)  No skilled physical therapy/ follow up rehabilitation needs identified at this time. This discharge recommendation:  A follow-up discussion with the attending provider and/or case management is planned    IF patient discharges home will need the following DME: patient owns DME required for discharge         SUBJECTIVE:   Patient stated I use my rollator when I go places but I don't use it at home.     OBJECTIVE DATA SUMMARY:   HISTORY:    Past Medical History:   Diagnosis Date    Asthma     Chronic a-fib (HCC)     Chronic anticoagulation     Congestive heart failure ([facility] [address] 05/2020    GERD (gastroesophageal reflux disease)     Hiatal hernia     HX: breast cancer 2015    Right     Hypothyroid     Paroxysmal A-fib (HCC)     Venous insufficiency     bilat LE     Past Surgical History:   Procedure Laterality Date    COLONOSCOPY N/A 6/14/2021    COLONOSCOPY AND EGD performed by Jeanmarie Pizano MD at [address] HX BREAST LUMPECTOMY Right 08/24/2015    HX CATARACT REMOVAL  2013    Bilateral     HX CHOLECYSTECTOMY      HX DILATION AND CURETTAGE      x2    HX ORTHOPAEDIC  2017    RIGHT foot  hammertoe       Personal factors and/or comorbidities impacting plan of care: see above    Home Situation  Home Environment: Private residence  # Steps to Enter: 4  Rails to Enter: Yes  Hand Rails : Bilateral  One/Two Story Residence: One story  Living Alone: No  Support Systems: Child(cornell) (Patient is caregiver to  with dementia)  Patient Expects to be Discharged to[de-identified] Home  Current DME Used/Available at Home: [place], rollator (Only out of the house)  Tub or Shower Type: Shower    EXAMINATION/PRESENTATION/DECISION MAKING:   Critical Behavior:              Hearing:     Skin:    Edema:   Range Of Motion:  AROM: Within functional limits  RLE Assessment (WDL): Exceptions to WDL        PROM: Within functional limits     LLE Assessment (WDL): Exception to WDL     Strength:    Strength:  Within functional limits     RLE Assessment (WDL): Exceptions to WDL  RLE Strength  R Hip Flexion: 3+  R Hip ABduction: 4  R Hip ADduction: 5  R Knee Flexion: 4  R Knee Extension: 4+  R Ankle Dorsiflexion: 5  R Ankle Plantar Flexion: 5     LLE Assessment (WDL): Exception to WDL  LLE Strength  L Hip Flexion: 3+  L Hip ABduction: 4  L Hip ADduction: 5  L Knee Flexion: 4  L Knee Extension: 4+  L Ankle Dorsiflexion: 5  L Ankle Plantar Flexion: 5  Tone & Sensation:   Tone: Normal              Sensation: Intact  RLE Assessment (WDL): Exceptions to WDL     LLE Assessment (WDL): Exception to WDL      Coordination:  Coordination: Within functional limits  Vision: Functional Mobility:  Bed Mobility:     Supine to Sit: Supervision  Sit to Supine: Supervision     Transfers:  Sit to Stand: Stand-by assistance  Stand to Sit: Stand-by assistance        Bed to Chair: Contact guard assistance              Balance:   Sitting: Intact  Standing: Intact; With support  Ambulation/Gait Training:  Distance (ft): 25 Feet (ft)  Assistive Device: Walker, rolling;Gait belt  Ambulation - Level of Assistance: Contact guard assistance        Gait Abnormalities: Decreased step clearance        Base of Support: Widened     Speed/Ivory: Slow          Therapeutic Exercises:       Functional Measure:  Tinetti test:    Sitting Balance: 1  Arises: 1  Attempts to Rise: 1  Immediate Standing Balance: 1  Standing Balance: 1  Nudged: 2  Eyes Closed: 1  Turn 360 Degrees - Continuous/Discontinuous: 1  Turn 360 Degrees - Steady/Unsteady: 1  Sitting Down: 1  Balance Score: 11 Balance total score  Indication of Gait: 1  R Step Length/Height: 1  L Step Length/Height: 1  R Foot Clearance: 1  L Foot Clearance: 1  Step Symmetry: 1  Step Continuity: 1  Path: 1  Trunk: 0  Walking Time: 0  Gait Score: 8 Gait total score  Total Score: 19/28 Overall total score         Tinetti Tool Score Risk of Falls  <19 = High Fall Risk  19-24 = Moderate Fall Risk  25-28 = Low Fall Risk  Tinetti ME. Performance-Oriented Assessment of Mobility Problems in Elderly Patients. [place] [address]; [id number].  (Scoring Description: PT Bulletin Feb. 10, 1993)    Older adults: Xavier Bowie et al, 2009; n = 1000 [facility] elderly evaluated with ABC, CARLOTTA, ADL, and IADL)  · Mean CARLOTTA score for males aged 69-68 years = 26.21(3.40)  · Mean CARLOTTA score for females age 69-68 years = 25.16(4.30)  · Mean CARLOTTA score for males over 80 years = 23.29(6.02)  · Mean CARLOTTA score for females over 80 years = 17.20(8.32)        Physical Therapy Evaluation Charge Determination   History Examination Presentation Decision-Making   MEDIUM  Complexity : 1-2 comorbidities / personal factors will impact the outcome/ POC  LOW Complexity : 1-2 Standardized tests and measures addressing body structure, function, activity limitation and / or participation in recreation  LOW Complexity : Stable, uncomplicated  Other outcome measures Tinetti  MEDIUM      Based on the above components, the patient evaluation is determined to be of the following complexity level: LOW     Pain Ratin/10    Activity Tolerance:   Fair      After treatment patient left in no apparent distress:   Sitting in chair and Call bell within reach    COMMUNICATION/EDUCATION:   The patients plan of care was discussed with: Registered nurse. Fall prevention education was provided and the patient/caregiver indicated understanding.     Thank you for this referral.  Leo Khan, PT   Time Calculation: 37 mins

## 2022-04-30 NOTE — PROGRESS NOTES
2593: Report received from [facility], [address] [facility]. Troponin labs completed as well as urine sample to be sent prior to coming to floor. 1836: Placed phone call to Dr. Xochitl Love MD in regards to patients BP. No answer, left voicemail. Will continue to monitor.

## 2022-04-30 NOTE — ED NOTES
Patient care transferred to [facility]. Report accepted with all questions answered.

## 2022-04-30 NOTE — PROGRESS NOTES
Reason to see patient: Chest pain, Palpitations. HPI: Jim Zimmerman is a 80 y.o. female with PMH of PAF, CHF, admitted with symptoms of palpitations and chest pain. She was awaken by palpitations today morning and felt chest tightness across her chest. No SOB or lightheadedness. Had PAF 2 yrs ago and follows with Dr. Sumi Hendricks. She had failed DCCV 2 yrs ago and was placed on medications and had a successful DCCV after that she remained in SR. She was on Xeralto prior to admissions. EKG: Afib, , Nonspecific ST changes. Cbc, cmp personally reviewed. Plan:    1. Afib with RVR: Started on Cardizem gtt for rate control. Continue home Metoprolol. Holding Mullin for upcoming spine injection. Stared on Lovenox . Echo pending. ATTENTION:   This medical record was transcribed using an electronic medical records/speech recognition system. Although proofread, it may and can contain electronic, spelling and other errors. Corrections may be executed at a later time. Please feel free to contact us for any clarifications as needed. Past Medical History:   Diagnosis Date    Asthma     Chronic a-fib (HCC)     Chronic anticoagulation     Congestive heart failure ([address] 05/2020    GERD (gastroesophageal reflux disease)     Hiatal hernia     HX: breast cancer 2015    Right     Hypothyroid     Paroxysmal A-fib ([address]     Venous insufficiency     bilat LE            Past Surgical History:   Procedure Laterality Date    COLONOSCOPY N/A 6/14/2021    COLONOSCOPY AND EGD performed by Mary Sexton MD at [address] [facility] HX BREAST LUMPECTOMY Right 08/24/2015    HX CATARACT REMOVAL  2013    Bilateral     HX CHOLECYSTECTOMY      HX DILATION AND CURETTAGE      x2    HX ORTHOPAEDIC  2017    RIGHT foot  hammertoe             No family history on file.         Social History     Socioeconomic History    Marital status:      Spouse name: Not on file    Number of children: Not on file    Years of education: Not on file    Highest education level: Not on file   Occupational History    Not on file   Tobacco Use    Smoking status: Former Smoker     Types: Cigarettes     Start date: 2000     Quit date:      Years since quittin.3    Smokeless tobacco: Never Used   Vaping Use    Vaping Use: Never used   Substance and Sexual Activity    Alcohol use: Not Currently    Drug use: Never    Sexual activity: Not on file   Other Topics Concern    Not on file   Social History Narrative    Not on file     Social Determinants of Health     Financial Resource Strain:     Difficulty of Paying Living Expenses: Not on file   Food Insecurity:     Worried About [address] [employer] in the Last Year: Not on file    [place] of Food in the Last Year: Not on file   Transportation Needs:     Lack of Transportation (Medical): Not on file    Lack of Transportation (Non-Medical):  Not on file   Physical Activity:     Days of Exercise per Week: Not on file    Minutes of Exercise per Session: Not on file   Stress:     Feeling of Stress : Not on file   Social Connections:     Frequency of Communication with Friends and Family: Not on file    Frequency of Social Gatherings with Friends and Family: Not on file    Attends [employer] Services: Not on file    Active Member of [address] [place] [address] [employer] or Organizations: Not on file    Attends Club or Organization Meetings: Not on file    Marital Status: Not on file   Intimate Partner Violence:     Fear of Current or Ex-Partner: Not on file    Emotionally Abused: Not on file    Physically Abused: Not on file    Sexually Abused: Not on file   Housing Stability:     Unable to Pay for Housing in the Last Year: Not on file    Number of [place] in the Last Year: Not on file    Unstable Housing in the Last Year: Not on file         Allergies   Allergen Reactions    Ace Inhibitors Cough    Iodine Rash    Penicillins Rash            Current Facility-Administered Medications Medication Dose Route Frequency Provider Last Rate Last Admin    dilTIAZem (CARDIZEM) 125 mg in 0.9% sodium chloride 125 mL infusion  0-15 mg/hr IntraVENous TITRATE Rosalie Jiang MD 10 mL/hr at 04/30/22 1132 10 mg/hr at 04/30/22 1132    metoprolol succinate (TOPROL-XL) XL tablet 25 mg  25 mg Oral DAILY Tita Carmen MD   25 mg at 04/30/22 0823    montelukast (SINGULAIR) tablet 10 mg  10 mg Oral QHS Tita Carmen MD        pantoprazole (PROTONIX) tablet 40 mg  40 mg Oral DAILY Tita Carmen MD   40 mg at 04/30/22 0823    sodium chloride (NS) flush 5-40 mL  5-40 mL IntraVENous Q8H Tita Carmen MD   10 mL at 04/30/22 0738    sodium chloride (NS) flush 5-40 mL  5-40 mL IntraVENous PRN Tita Carmen MD        0.9% sodium chloride infusion  125 mL/hr IntraVENous CONTINUOUS Tita Carmen  mL/hr at 04/30/22 0745 125 mL/hr at 04/30/22 0745    arformoteroL (BROVANA) neb solution 15 mcg  15 mcg Nebulization BID RT Shweta Holloway MD   15 mcg at 04/30/22 1188    budesonide (PULMICORT) 500 mcg/2 ml nebulizer suspension  500 mcg Nebulization BID RT Shweta Holloway MD   500 mcg at 04/30/22 2592    albuterol-ipratropium (DUO-NEB) 2.5 MG-0.5 MG/3 ML  3 mL Nebulization Q6H PRN Shweta Holloway MD        acetaminophen (TYLENOL) tablet 650 mg  650 mg Oral Q6H PRN Shweta Holloway MD   650 mg at 04/30/22 0916    ondansetron (ZOFRAN) injection 6 mg  6 mg IntraVENous Q6H PRN Shweta Holloway MD        enoxaparin (LOVENOX) injection 40 mg  40 mg SubCUTAneous Q24H Shweta Holloway MD            ROS:  12 point review of systems was performed.  All negative except for HPI     Physical Exam:  Visit Vitals  /61 (BP 1 Location: Left upper arm, BP Patient Position: At rest)   Pulse 86   Temp 98.2 °F (36.8 °C)   Resp 18   Ht 5' 4\" (1.626 m)   Wt 154 lb 1.6 oz (69.9 kg)   SpO2 96%   BMI 26.45 kg/m²       Gen:  Well-developed, well-nourished, in no acute distress  HEENT:  Pink conjunctivae, PERRL, hearing intact to voice, moist mucous membranes  Neck:  Supple, without masses, thyroid non-tender  Resp:  No accessory muscle use, clear breath sounds without wheezes rales or rhonchi  Card:  No murmurs, normal S1, S2 without thrills, bruits or peripheral edema  Abd:  Soft, non-tender, non-distended, normoactive bowel sounds are present, no palpable organomegaly and no detectable hernias  Lymph:  No cervical or inguinal adenopathy  Musc:  No cyanosis or clubbing  Skin:  No rashes or ulcers, skin turgor is good  Neuro:  Cranial nerves are grossly intact, no focal motor weakness, follows commands appropriately  Psych:  Good insight, oriented to person, place and time, alert     Labs:     Lab Results   Component Value Date/Time    WBC 7.3 04/30/2022 04:17 AM    HGB 10.4 (L) 04/30/2022 04:17 AM    HCT 31.0 (L) 04/30/2022 04:17 AM    PLATELET 923 52/58/9137 04:17 AM    MCV 89.3 04/30/2022 04:17 AM     Lab Results   Component Value Date/Time    Glucose 126 (H) 04/30/2022 04:17 AM    Creatinine 0.56 04/30/2022 04:17 AM      No results found for: CHOL, CHOLPOCT, HDL, LDL, LDLC, LDLCPOC, LDLCEXT, TRIGL, TGLPOCT, CHHD, CHHDX  Lab Results   Component Value Date/Time    ALT (SGPT) 19 04/30/2022 04:17 AM    Alk.  phosphatase 62 04/30/2022 04:17 AM    Bilirubin, total 0.3 04/30/2022 04:17 AM    Albumin 3.3 (L) 04/30/2022 04:17 AM    Protein, total 6.2 (L) 04/30/2022 04:17 AM    PLATELET 454 07/21/6012 04:17 AM     No results found for: INR, INREXT, PTMR, PTP, PT1, PT2, INREXT   Lab Results   Component Value Date/Time    GFR est non-AA >60 04/30/2022 04:17 AM    GFR est AA >60 04/30/2022 04:17 AM    Creatinine 0.56 04/30/2022 04:17 AM    BUN 13 04/30/2022 04:17 AM    Sodium 133 (L) 04/30/2022 04:17 AM    Potassium 3.6 04/30/2022 04:17 AM    Chloride 102 04/30/2022 04:17 AM    CO2 25 04/30/2022 04:17 AM    Magnesium 2.0 04/30/2022 04:17 AM     No results found for: PSA, PSA2, PSAR1, PSA1, PSAR2, PSA3, PSAR3, [id number], [id number]  Lab Results   Component Value Date/Time    TSH 1.60 04/30/2022 07:34 AM      Lab Results   Component Value Date/Time    Glucose 126 (H) 04/30/2022 04:17 AM      Lab Results   Component Value Date/Time    Troponin-I, Qt. <0.05 01/13/2021 03:17 AM      Lab Results   Component Value Date/Time    NT pro- (H) 01/11/2021 03:45 AM    NT pro- 01/06/2021 08:51 PM      Lab Results   Component Value Date/Time    Sodium 133 (L) 04/30/2022 04:17 AM    Potassium 3.6 04/30/2022 04:17 AM    Chloride 102 04/30/2022 04:17 AM    CO2 25 04/30/2022 04:17 AM    Anion gap 6 04/30/2022 04:17 AM    Glucose 126 (H) 04/30/2022 04:17 AM    BUN 13 04/30/2022 04:17 AM    Creatinine 0.56 04/30/2022 04:17 AM    BUN/Creatinine ratio 23 (H) 04/30/2022 04:17 AM    GFR est AA >60 04/30/2022 04:17 AM    GFR est non-AA >60 04/30/2022 04:17 AM    Calcium 8.8 04/30/2022 04:17 AM      Lab Results   Component Value Date/Time    Sodium 133 (L) 04/30/2022 04:17 AM    Potassium 3.6 04/30/2022 04:17 AM    Chloride 102 04/30/2022 04:17 AM    CO2 25 04/30/2022 04:17 AM    Anion gap 6 04/30/2022 04:17 AM    Glucose 126 (H) 04/30/2022 04:17 AM    BUN 13 04/30/2022 04:17 AM    Creatinine 0.56 04/30/2022 04:17 AM    BUN/Creatinine ratio 23 (H) 04/30/2022 04:17 AM    GFR est AA >60 04/30/2022 04:17 AM    GFR est non-AA >60 04/30/2022 04:17 AM    Calcium 8.8 04/30/2022 04:17 AM    Bilirubin, total 0.3 04/30/2022 04:17 AM    ALT (SGPT) 19 04/30/2022 04:17 AM    Alk. phosphatase 62 04/30/2022 04:17 AM    Protein, total 6.2 (L) 04/30/2022 04:17 AM    Albumin 3.3 (L) 04/30/2022 04:17 AM    Globulin 2.9 04/30/2022 04:17 AM    A-G Ratio 1.1 04/30/2022 04:17 AM      No results found for: HBA1C, WXF0XAUR, DMB2QIMQ, UEW1ZUGR      No results for input(s): CPK, CKMB, TROIQ in the last 72 hours.     No lab exists for component: CKQMB, CPKMB        Problem List:     Problem List  Date Reviewed: 4/30/2022          Codes Class Noted    Asthma ICD-10-CM: J45.909  ICD-9-CM: 493.90  Unknown        * (Principal) Paroxysmal atrial fibrillation with RVR (HCC) ICD-10-CM: I48.0  ICD-9-CM: 427.31  Unknown        GERD (gastroesophageal reflux disease) ICD-10-CM: K21.9  ICD-9-CM: 530.81  Unknown        Hiatal hernia ICD-10-CM: K44.9  ICD-9-CM: 553.3  Unknown        Hypothyroid ICD-10-CM: E03.9  ICD-9-CM: 244.9  Unknown        Venous insufficiency ICD-10-CM: I87.2  ICD-9-CM: 459.81  Unknown    Overview Signed 4/30/2022  7:09 AM by Stacy Rivera MD     bilat LE             Anemia ICD-10-CM: D64.9  ICD-9-CM: 285.9  4/30/2022        Chronic anticoagulation ICD-10-CM: Z79.01  ICD-9-CM: V58.61  Unknown        Hyponatremia ICD-10-CM: E87.1  ICD-9-CM: 276.1  4/30/2022        Congestive heart failure ([facility] [address] ICD-10-CM: I50.9  ICD-9-CM: 428.0  5/2020        HX: breast cancer ICD-10-CM: Z85.3  ICD-9-CM: V10.3  2015    Overview Signed 4/30/2022  7:09 AM by Stacy Rivera MD     Right                      Óscar Aguilar MD, [facility]

## 2022-05-01 VITALS
SYSTOLIC BLOOD PRESSURE: 104 MMHG | RESPIRATION RATE: 21 BRPM | TEMPERATURE: 98 F | HEIGHT: 64 IN | OXYGEN SATURATION: 96 % | DIASTOLIC BLOOD PRESSURE: 66 MMHG | BODY MASS INDEX: 25.78 KG/M2 | HEART RATE: 97 BPM | WEIGHT: 151.01 LBS

## 2022-05-01 LAB
ANION GAP SERPL CALC-SCNC: 5 MMOL/L (ref 5–15)
BACTERIA SPEC CULT: NORMAL
BASOPHILS # BLD: 0.1 K/UL (ref 0–0.1)
BASOPHILS NFR BLD: 1 % (ref 0–1)
BNP SERPL-MCNC: 2784 PG/ML
BUN SERPL-MCNC: 6 MG/DL (ref 6–20)
BUN/CREAT SERPL: 12 (ref 12–20)
CALCIUM SERPL-MCNC: 8.4 MG/DL (ref 8.5–10.1)
CC UR VC: NORMAL
CHLORIDE SERPL-SCNC: 107 MMOL/L (ref 97–108)
CO2 SERPL-SCNC: 23 MMOL/L (ref 21–32)
COMMENT, HOLDF: NORMAL
CREAT SERPL-MCNC: 0.51 MG/DL (ref 0.55–1.02)
DIFFERENTIAL METHOD BLD: ABNORMAL
EOSINOPHIL # BLD: 0.2 K/UL (ref 0–0.4)
EOSINOPHIL NFR BLD: 4 % (ref 0–7)
ERYTHROCYTE [DISTWIDTH] IN BLOOD BY AUTOMATED COUNT: 12.9 % (ref 11.5–14.5)
ERYTHROCYTE [DISTWIDTH] IN BLOOD BY AUTOMATED COUNT: 13 % (ref 11.5–14.5)
GLUCOSE SERPL-MCNC: 93 MG/DL (ref 65–100)
HCT VFR BLD AUTO: 30.4 % (ref 35–47)
HCT VFR BLD AUTO: 32.6 % (ref 35–47)
HGB BLD-MCNC: 10.8 G/DL (ref 11.5–16)
HGB BLD-MCNC: 9.7 G/DL (ref 11.5–16)
IMM GRANULOCYTES # BLD AUTO: 0 K/UL (ref 0–0.04)
IMM GRANULOCYTES NFR BLD AUTO: 1 % (ref 0–0.5)
LYMPHOCYTES # BLD: 1 K/UL (ref 0.8–3.5)
LYMPHOCYTES NFR BLD: 17 % (ref 12–49)
MAGNESIUM SERPL-MCNC: 2 MG/DL (ref 1.6–2.4)
MCH RBC QN AUTO: 29.8 PG (ref 26–34)
MCH RBC QN AUTO: 30.3 PG (ref 26–34)
MCHC RBC AUTO-ENTMCNC: 31.9 G/DL (ref 30–36.5)
MCHC RBC AUTO-ENTMCNC: 33.1 G/DL (ref 30–36.5)
MCV RBC AUTO: 91.3 FL (ref 80–99)
MCV RBC AUTO: 93.5 FL (ref 80–99)
MONOCYTES # BLD: 0.5 K/UL (ref 0–1)
MONOCYTES NFR BLD: 9 % (ref 5–13)
NEUTS SEG # BLD: 3.9 K/UL (ref 1.8–8)
NEUTS SEG NFR BLD: 68 % (ref 32–75)
NRBC # BLD: 0 K/UL (ref 0–0.01)
NRBC # BLD: 0 K/UL (ref 0–0.01)
NRBC BLD-RTO: 0 PER 100 WBC
NRBC BLD-RTO: 0 PER 100 WBC
PLATELET # BLD AUTO: 301 K/UL (ref 150–400)
PLATELET # BLD AUTO: 314 K/UL (ref 150–400)
PMV BLD AUTO: 9.3 FL (ref 8.9–12.9)
PMV BLD AUTO: 9.6 FL (ref 8.9–12.9)
POTASSIUM SERPL-SCNC: 4.1 MMOL/L (ref 3.5–5.1)
RBC # BLD AUTO: 3.25 M/UL (ref 3.8–5.2)
RBC # BLD AUTO: 3.57 M/UL (ref 3.8–5.2)
SAMPLES BEING HELD,HOLD: NORMAL
SERVICE CMNT-IMP: NORMAL
SODIUM SERPL-SCNC: 135 MMOL/L (ref 136–145)
WBC # BLD AUTO: 5.3 K/UL (ref 3.6–11)
WBC # BLD AUTO: 5.7 K/UL (ref 3.6–11)

## 2022-05-01 PROCEDURE — 96366 THER/PROPH/DIAG IV INF ADDON: CPT

## 2022-05-01 PROCEDURE — 74011000250 HC RX REV CODE- 250: Performed by: EMERGENCY MEDICINE

## 2022-05-01 PROCEDURE — 74011250636 HC RX REV CODE- 250/636: Performed by: HOSPITALIST

## 2022-05-01 PROCEDURE — 74011000250 HC RX REV CODE- 250: Performed by: INTERNAL MEDICINE

## 2022-05-01 PROCEDURE — 97530 THERAPEUTIC ACTIVITIES: CPT

## 2022-05-01 PROCEDURE — 80048 BASIC METABOLIC PNL TOTAL CA: CPT

## 2022-05-01 PROCEDURE — 83735 ASSAY OF MAGNESIUM: CPT

## 2022-05-01 PROCEDURE — 74011250637 HC RX REV CODE- 250/637: Performed by: INTERNAL MEDICINE

## 2022-05-01 PROCEDURE — 74011000258 HC RX REV CODE- 258: Performed by: EMERGENCY MEDICINE

## 2022-05-01 PROCEDURE — 97535 SELF CARE MNGMENT TRAINING: CPT

## 2022-05-01 PROCEDURE — 85025 COMPLETE CBC W/AUTO DIFF WBC: CPT

## 2022-05-01 PROCEDURE — 99232 SBSQ HOSP IP/OBS MODERATE 35: CPT | Performed by: INTERNAL MEDICINE

## 2022-05-01 PROCEDURE — 97165 OT EVAL LOW COMPLEX 30 MIN: CPT

## 2022-05-01 PROCEDURE — 74011000250 HC RX REV CODE- 250: Performed by: HOSPITALIST

## 2022-05-01 PROCEDURE — 74011250637 HC RX REV CODE- 250/637: Performed by: HOSPITALIST

## 2022-05-01 PROCEDURE — G0378 HOSPITAL OBSERVATION PER HR: HCPCS

## 2022-05-01 PROCEDURE — 83880 ASSAY OF NATRIURETIC PEPTIDE: CPT

## 2022-05-01 PROCEDURE — 94640 AIRWAY INHALATION TREATMENT: CPT

## 2022-05-01 RX ORDER — METOPROLOL SUCCINATE 50 MG/1
50 TABLET, EXTENDED RELEASE ORAL DAILY
Status: DISCONTINUED | OUTPATIENT
Start: 2022-05-02 | End: 2022-05-01 | Stop reason: HOSPADM

## 2022-05-01 RX ORDER — FUROSEMIDE 20 MG/1
20 TABLET ORAL DAILY
Status: DISCONTINUED | OUTPATIENT
Start: 2022-05-01 | End: 2022-05-01 | Stop reason: HOSPADM

## 2022-05-01 RX ORDER — LEVOTHYROXINE SODIUM 25 UG/1
25 TABLET ORAL
Status: DISCONTINUED | OUTPATIENT
Start: 2022-05-02 | End: 2022-05-01 | Stop reason: HOSPADM

## 2022-05-01 RX ORDER — FUROSEMIDE 20 MG/1
20 TABLET ORAL DAILY
Status: DISCONTINUED | OUTPATIENT
Start: 2022-05-02 | End: 2022-05-01

## 2022-05-01 RX ORDER — ALBUTEROL SULFATE 1.25 MG/3ML
1.25 SOLUTION RESPIRATORY (INHALATION)
Status: DISCONTINUED | OUTPATIENT
Start: 2022-05-01 | End: 2022-05-01 | Stop reason: HOSPADM

## 2022-05-01 RX ORDER — METOPROLOL SUCCINATE 25 MG/1
25 TABLET, EXTENDED RELEASE ORAL ONCE
Status: COMPLETED | OUTPATIENT
Start: 2022-05-01 | End: 2022-05-01

## 2022-05-01 RX ORDER — METOPROLOL SUCCINATE 50 MG/1
50 TABLET, EXTENDED RELEASE ORAL DAILY
Qty: 30 TABLET | Refills: 0 | Status: SHIPPED | OUTPATIENT
Start: 2022-05-02 | End: 2022-08-23

## 2022-05-01 RX ADMIN — ACETAMINOPHEN 650 MG: 325 TABLET ORAL at 05:02

## 2022-05-01 RX ADMIN — ARFORMOTEROL TARTRATE 15 MCG: 15 SOLUTION RESPIRATORY (INHALATION) at 07:25

## 2022-05-01 RX ADMIN — SODIUM CHLORIDE 125 ML/HR: 9 INJECTION, SOLUTION INTRAVENOUS at 00:34

## 2022-05-01 RX ADMIN — IPRATROPIUM BROMIDE AND ALBUTEROL SULFATE 3 ML: .5; 3 SOLUTION RESPIRATORY (INHALATION) at 08:18

## 2022-05-01 RX ADMIN — PANTOPRAZOLE SODIUM 40 MG: 40 TABLET, DELAYED RELEASE ORAL at 09:02

## 2022-05-01 RX ADMIN — SODIUM CHLORIDE, PRESERVATIVE FREE 10 ML: 5 INJECTION INTRAVENOUS at 13:01

## 2022-05-01 RX ADMIN — METOPROLOL SUCCINATE 25 MG: 25 TABLET, EXTENDED RELEASE ORAL at 09:02

## 2022-05-01 RX ADMIN — FUROSEMIDE 20 MG: 20 TABLET ORAL at 15:07

## 2022-05-01 RX ADMIN — METOPROLOL SUCCINATE 25 MG: 25 TABLET, EXTENDED RELEASE ORAL at 13:00

## 2022-05-01 RX ADMIN — SODIUM CHLORIDE, PRESERVATIVE FREE 10 ML: 5 INJECTION INTRAVENOUS at 06:03

## 2022-05-01 RX ADMIN — DILTIAZEM HYDROCHLORIDE 2.5 MG/HR: 5 INJECTION INTRAVENOUS at 06:03

## 2022-05-01 RX ADMIN — BUDESONIDE 500 MCG: 0.5 SUSPENSION RESPIRATORY (INHALATION) at 07:25

## 2022-05-01 NOTE — PROGRESS NOTES
Vishal Colunga [facility] 79  [address]  [phone]      Medical Progress Note      NAME: Jeanie Durán   :  1940  MRM:  [id number]    Date/Time: 2022  1:42 PM         Subjective:     Chief Complaint:  \"I'm okay\"     Pt seen and examined. HR improving. Off dilt gtt. HR mostly in the 90s but reaching the 110's at times without activity. Denies CP/SOB     ROS:  (bold if positive, if negative)           Objective:       Vitals:          Last 24hrs VS reviewed since prior progress note. Most recent are:    Visit Vitals  /64 (BP 1 Location: Left lower arm, BP Patient Position: Sitting)   Pulse (!) 109   Temp 97.6 °F (36.4 °C)   Resp 17   Ht 5' 4\" (1.626 m)   Wt 69.9 kg (154 lb 1.6 oz)   SpO2 96%   BMI 26.45 kg/m²     SpO2 Readings from Last 6 Encounters:   22 96%   21 99%   21 97%   21 97%   21 92%   21 96%            Intake/Output Summary (Last 24 hours) at 2022 1342  Last data filed at 2022 0400  Gross per 24 hour   Intake 3183.79 ml   Output 600 ml   Net 2583.79 ml          Exam:     Physical Exam:    Gen:  Well-developed, well-nourished, in no acute distress  HEENT:  Pink conjunctivae, PERRL, hearing intact to voice, moist mucous membranes  Neck:  Supple, without masses, thyroid non-tender  Resp:  No accessory muscle use, clear breath sounds without wheezes rales or rhonchi  Card: Tachycardic.  Irregularly irregular   Abd:  Soft, non-tender, non-distended, normoactive bowel sounds are present  Musc:  No cyanosis or clubbing  Skin:  No rashes or ulcers, skin turgor is good  Neuro:  Cranial nerves 3-12 are grossly intact,  strength is 5/5 bilaterally and dorsi / plantarflexion is 5/5 bilaterally, follows commands appropriately  Psych:  Good insight, oriented to person, place and time, alert      Medications Reviewed: (see below)    Lab Data Reviewed: (see below)    ______________________________________________________________________    Medications:     Current Facility-Administered Medications   Medication Dose Route Frequency    [START ON 5/2/2022] metoprolol succinate (TOPROL-XL) XL tablet 50 mg  50 mg Oral DAILY    rivaroxaban (XARELTO) tablet 20 mg  20 mg Oral QHS    albuterol (ACCUNEB) nebulizer solution 1.25 mg  1.25 mg Nebulization Q2H PRN    montelukast (SINGULAIR) tablet 10 mg  10 mg Oral QHS    pantoprazole (PROTONIX) tablet 40 mg  40 mg Oral DAILY    sodium chloride (NS) flush 5-40 mL  5-40 mL IntraVENous Q8H    sodium chloride (NS) flush 5-40 mL  5-40 mL IntraVENous PRN    arformoteroL (BROVANA) neb solution 15 mcg  15 mcg Nebulization BID RT    budesonide (PULMICORT) 500 mcg/2 ml nebulizer suspension  500 mcg Nebulization BID RT    acetaminophen (TYLENOL) tablet 650 mg  650 mg Oral Q6H PRN    ondansetron (ZOFRAN) injection 6 mg  6 mg IntraVENous Q6H PRN            Lab Review:     Recent Labs     05/01/22  1206 04/30/22  2350 04/30/22  0417   WBC 5.7 5.3 7.3   HGB 10.8* 9.7* 10.4*   HCT 32.6* 30.4* 31.0*    301 283     Recent Labs     05/01/22  1206 04/30/22  0417   * 133*   K 4.1 3.6    102   CO2 23 25   GLU 93 126*   BUN 6 13   CREA 0.51* 0.56   CA 8.4* 8.8   MG 2.0 2.0   ALB  --  3.3*   ALT  --  19     No components found for: GLPOC         Assessment / Plan:   A-fib with RVR   -s/p dilt gtt   -on metoprolol; uptitrate to 50mg daily   -TSH WNL  -K and Mg WNL   -TTE with EF 40-45%       Asthma () - NOT wheezing   -continue outpt meds       Congestive heart failure (HCC) (5/2020) - TTE with EF 40-45%  -on beta blocker   -resume ARB if BP will tolerate; she will log BPs for outpt cardiologist       GERD (gastroesophageal reflux disease) ()  -on PPI       Hypothyroid ()  -TSH WNL   -continue levothyroxine       Venous insufficiency ()      Overview: bilat LE  -elevate LE's       Anemia (4/30/2022) - at baseline   -monitor Chronic anticoagulation ()  -resume Xarelto as was on hold for spinal procedure that now is on hold for a-fib       Hyponatremia (4/30/2022)  -resolved; monitor     Total time spent with patient: 35 [address] [place] [address] discussed with: Patient and Family    Discussed:  Care Plan    Prophylaxis:  Xarelto    Disposition:  Home w/Family           ___________________________________________________    Attending Physician: Jasmyne Morfin MD

## 2022-05-01 NOTE — PROGRESS NOTES
Cardiology Daily Progress Note      Aye Klein is a 80 y.o. female with PMH of PAF, CHF, admitted with symptoms of palpitations and chest pain.           Visit Vitals  /64 (BP 1 Location: Left lower arm, BP Patient Position: Sitting)   Pulse (!) 109   Temp 97.6 °F (36.4 °C)   Resp 17   Ht 5' 4\" (1.626 m)   Wt 154 lb 1.6 oz (69.9 kg)   SpO2 96%   BMI 26.45 kg/m²       Intake/Output Summary (Last 24 hours) at 5/1/2022 1418  Last data filed at 5/1/2022 0400  Gross per 24 hour   Intake 3183.79 ml   Output 600 ml   Net 2583.79 ml     General appearance - alert, well appearing, and in no distress  Mental status - affect appropriate to mood  Eyes - sclera anicteric, moist mucous membranes  Neck - supple, no significant adenopathy  Chest - clear to auscultation, no wheezes, rales or rhonchi  Heart - normal rate, regular rhythm, normal S1, S2, no murmurs, rubs, clicks or gallops  Abdomen - soft, nontender, nondistended, no masses or organomegaly  Extremities - peripheral pulses normal, no pedal edema    Current Facility-Administered Medications   Medication Dose Route Frequency    [START ON 5/2/2022] metoprolol succinate (TOPROL-XL) XL tablet 50 mg  50 mg Oral DAILY    rivaroxaban (XARELTO) tablet 20 mg  20 mg Oral QHS    albuterol (ACCUNEB) nebulizer solution 1.25 mg  1.25 mg Nebulization Q2H PRN    furosemide (LASIX) tablet 20 mg  20 mg Oral DAILY    [START ON 5/2/2022] levothyroxine (SYNTHROID) tablet 25 mcg  25 mcg Oral 6am    montelukast (SINGULAIR) tablet 10 mg  10 mg Oral QHS    pantoprazole (PROTONIX) tablet 40 mg  40 mg Oral DAILY    sodium chloride (NS) flush 5-40 mL  5-40 mL IntraVENous Q8H    sodium chloride (NS) flush 5-40 mL  5-40 mL IntraVENous PRN    arformoteroL (BROVANA) neb solution 15 mcg  15 mcg Nebulization BID RT    budesonide (PULMICORT) 500 mcg/2 ml nebulizer suspension  500 mcg Nebulization BID RT    acetaminophen (TYLENOL) tablet 650 mg  650 mg Oral Q6H PRN    ondansetron [facility] injection 6 mg  6 mg IntraVENous Q6H PRN        No specialty comments available. Assessment:    - Afib      Plan:     - Rate conrolled. DC Cardizem. Increase Lopressor to 50mg po daily. Resume Xeralto. - OK to DC home today. F/u with Dr. Nidhi Delacruz.            ___________________________________________________    Dave Zaidi.  Bridget Ashby MD, [facility]

## 2022-05-01 NOTE — PROGRESS NOTES
OCCUPATIONAL THERAPY EVALUATION/DISCHARGE  Patient: Dwain Mcintosh (38 y.o. female)  Date: 5/1/2022  Primary Diagnosis: A-fib [facility]) [I48.91]       Precautions: fall       ASSESSMENT  Based on the objective data described below, the patient presents with good overall activity tolerance following admission on 4/30/22 for Afib with c/o headache and chest tightness. Patient was received up to the chair with daughter-in-law present. She was alert, oriented x4, following all commands, and highly motivated for activity. She performed transfers without LOB or physical assistance needed. Patient elevated HR ranging from  bpm with activity; All other vitals stable. Patient was educated on energy conservation techniques, general home safety, and general home adaptations as they relate to her caregiver responsibilities. Patient has no further skilled OT needs and cleared from OT services at this time. Current Level of Function (ADLs/self-care): Patient is independent to mod I level for ADLs and functional mobility. Functional Outcome Measure: The patient scored 95/100 on the Barthel Index outcome measure. PLAN :    Recommendation for discharge: (in order for the patient to meet his/her long term goals)  No skilled occupational therapy/ follow up rehabilitation needs identified at this time. This discharge recommendation:  Has been made in collaboration with the attending provider and/or case management    IF patient discharges home will need the following DME: none       SUBJECTIVE:   Patient stated I write everything down.   Patient is a great historian and has a good understanding as far as her medical literacy.       OBJECTIVE DATA SUMMARY:   HISTORY:   Past Medical History:   Diagnosis Date    Asthma     [facility] ([facility] [address]     Chronic anticoagulation     Congestive heart failure ([facility] [address] 05/2020    GERD (gastroesophageal reflux disease)     Hiatal hernia     HX: breast cancer 2015 Right     Hypothyroid     Paroxysmal A-fib (HCC)     Venous insufficiency     bilat LE     Past Surgical History:   Procedure Laterality Date    COLONOSCOPY N/A 6/14/2021    COLONOSCOPY AND EGD performed by Sakshi Martinez MD at [facility] HX BREAST LUMPECTOMY Right 08/24/2015    HX CATARACT REMOVAL  2013    Bilateral     HX CHOLECYSTECTOMY      HX DILATION AND CURETTAGE      x2    HX ORTHOPAEDIC  2017    RIGHT foot  hammertoe       Prior Level of Function/Environment/Context: Patient lives with ;  has dementia and she 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 Expanded or extensive additional review of patient history:   Home Situation  Home Environment: Private residence  # Steps to Enter: 4  Rails to Enter: Yes  Hand Rails : Bilateral  One/Two Story Residence: One story  Living Alone: No  Support Systems: Child(cornell) (Patient is caregiver to  with dementia)  Patient Expects to be Discharged to[de-identified] Home  Current DME Used/Available at Home: Velta Aver, rollator (Only out of the house)  Tub or Shower Type: Shower    Hand dominance: Right    EXAMINATION OF PERFORMANCE DEFICITS:  Cognitive/Behavioral Status:  Neurologic State: Alert  Orientation Level: Oriented X4  Cognition: Follows commands  Perception: Appears intact  Perseveration: No perseveration noted  Safety/Judgement: Awareness of environment    Skin: Intact in the uppers    Edema: None noted in the uppers    Vision/Perceptual:    Tracking: Able to track stimulus in all quadrants w/o difficulty    Diplopia: No    Acuity: Within Defined Limits       Range of Motion:  WDL in the uppers    Strength:  WDL in the uppers     Coordination:  Fine Motor Skills-Upper: Left Intact; Right Intact    Gross Motor Skills-Upper: Left Intact; Right Intact    Tone & Sensation:  Tone: normal  Sensation: intact     Balance:  Sitting: Intact  Standing: Intact    Functional Mobility and Transfers for ADLs:  Bed Mobility:  Rolling:  (pt was received up and remained up)  Scooting: Modified independent    Transfers:  Sit to Stand: Modified independent  Stand to Sit: Modified independent  Bed to Chair: Modified independent  Bathroom Mobility: Modified independent  Toilet Transfer : Modified independent    ADL Assessment:  Feeding: Independent    Oral Facial Hygiene/Grooming: Independent    Bathing: Modified independent    Upper Body Dressing: Independent    Lower Body Dressing: Modified independent    Toileting: Modified independent     Cognitive Retraining  Safety/Judgement: Awareness of environment    Functional Measure:    Barthel Index:  Bathin  Bladder: 10  Bowels: 10  Groomin  Dressing: 10  Feeding: 10  Mobility: 15  Stairs: 5  Toilet Use: 10  Transfer (Bed to Chair and Back): 15  Total: 95/100      The Barthel ADL Index: Guidelines  1. The index should be used as a record of what a patient does, not as a record of what a patient could do. 2. The main aim is to establish degree of independence from any help, physical or verbal, however minor and for whatever reason. 3. The need for supervision renders the patient not independent. 4. A patient's performance should be established using the best available evidence. Asking the patient, friends/relatives and nurses are the usual sources, but direct observation and common sense are also important. However direct testing is not needed. 5. Usually the patient's performance over the preceding 24-48 hours is important, but occasionally longer periods will be relevant. 6. Middle categories imply that the patient supplies over 50 per cent of the effort. 7. Use of aids to be independent is allowed. Score Interpretation (from [address] [address])    Independent   60-79 Minimally independent   40-59 Partially dependent   20-39 Very dependent   <20 Totally dependent     -Real Olivas., Barthel, D.W. (1965). Functional evaluation: the Barthel Index. [address] [facility] ([address] [facility] [address] (1997). The Barthel activities of daily living index: self-reporting versus actual performance in the old (> or = 75 years). Journal [address]), [address] [facility], .STEPHANIE, Estrellita Foley., Praveen Arriola. (1999). Measuring the change in disability after inpatient rehabilitation; comparison of the responsiveness of the Barthel Index and Functional [place] Measure. Journal of Neurology, Neurosurgery, and Psychiatry, 66(4), 606-850. JEF Cat, RUBEN Bermudez, & Soniya Johnston M.A. (2004) Assessment of post-stroke quality of life in cost-effectiveness studies: The usefulness of the Barthel Index and the EuroQoL-5D. Quality of Life Research, 15, 574-09       Occupational Therapy Evaluation Charge Determination   History Examination Decision-Making   LOW Complexity : Brief history review  LOW Complexity : 1-3 performance deficits relating to physical, cognitive , or psychosocial skils that result in activity limitations and / or participation restrictions  LOW Complexity : No comorbidities that affect functional and no verbal or physical assistance needed to complete eval tasks       Based on the above components, the patient evaluation is determined to be of the following complexity level: LOW     Activity Tolerance:   Good    After treatment patient left in no apparent distress:    Sitting in chair, Call bell within reach, Bed / chair alarm activated and Caregiver / family present    COMMUNICATION/EDUCATION:   The patients plan of care was discussed with: Registered nurse and patient. .     Thank you for this referral.  ESTHER Hidalgo/L  Time Calculation: 39 mins

## 2022-05-01 NOTE — DISCHARGE INSTRUCTIONS
HOSPITALIST DISCHARGE INSTRUCTIONS  NAME: Vicenta Runner   :  1940   MRN:  [id number]     Date/Time:  2022 4:10 PM    ADMIT DATE: 2022     DISCHARGE DATE: 2022     ADMITTING DIAGNOSIS:  Atrial fibrillation with rapid ventricular response     DISCHARGE DIAGNOSIS:  As above     MEDICATIONS:    · It is important that you take the medication exactly as they are prescribed. · Keep your medication in the bottles provided by the pharmacist and keep a list of the medication names, dosages, and times to be taken in your wallet. · Do not take other medications without consulting your doctor. Pain Management: per above medications    What to do at Home    Recommended diet: regular diet    Recommended activity: as tolerated     If you experience any of the following symptoms then please call your primary care physician or return to the emergency room if you cannot get hold of your doctor:  Fever, chills, nausea, vomiting, diarrhea, change in mentation, falling, bleeding, shortness of breath, chest pain     Follow Up:  Dr. Denise Anders MD  you are to call and set up an appointment to see them in 2 weeks. Please follow-up with your cardiologist as soon as able     Information obtained by :  I understand that if any problems occur once I am at home I am to contact my physician. I understand and acknowledge receipt of the instructions indicated above.                                                                                                                                            Physician's or R.N.'s Signature                                                                  Date/Time                                                                                                                                              Patient or Representative Signature                                                          Date/Time

## 2022-05-01 NOTE — PROGRESS NOTES
Bedside and Verbal shift change report given to EZEQUIEL Hernández (oncoming nurse) by Praveena Carter (offgoing nurse). Report included the following information SBAR, Kardex, ED Summary, MAR, Recent Results and Cardiac Rhythm A Fib. This patient was assisted with Intentional Toileting every 2 hours during this shift as appropriate. Documentation of ambulation and output reflected on Flowsheet as appropriate. Purposeful hourly rounding was completed using AIDET and 5Ps. Outcomes of PHR documented as they occurred. Bed alarm in use as appropriate. Dual Suction and ambubag in place. I have reviewed discharge instructions with the patient. The patient verbalized understanding.

## 2022-05-01 NOTE — PROGRESS NOTES
1905 Bedside and Verbal shift change report given to EZEQUIEL Monique (oncoming nurse) by RN (offgoing nurse). Report included the following information SBAR, Intake/Output, Med Rec Status, Cardiac Rhythm Afib and Alarm Parameters . See flow sheet for assessment and documentation  See MAR for medication administration    0700 Bedside and Verbal shift change report given to ,RN (oncoming nurse) by Penny Michael (offgoing nurse). Report included the following information SBAR, Intake/Output, Recent Results, Cardiac Rhythm afib and Alarm Parameters . Problem: Patient Education: Go to Patient Education Activity  Goal: Patient/Family Education  Outcome: Progressing Towards Goal     Problem: Falls - Risk of  Goal: *Absence of Falls  Description: Document Minda Locket Fall Risk and appropriate interventions in the flowsheet.   Outcome: Progressing Towards Goal  Note: Fall Risk Interventions:            Medication Interventions: Assess postural VS orthostatic hypotension,Bed/chair exit alarm,Evaluate medications/consider consulting pharmacy,Teach patient to arise slowly,Utilize gait belt for transfers/ambulation,Patient to call before getting OOB                   Problem: Patient Education: Go to Patient Education Activity  Goal: Patient/Family Education  Outcome: Progressing Towards Goal

## 2022-05-02 LAB
CALCULATED R AXIS, ECG10: 9 DEGREES
CALCULATED T AXIS, ECG11: 90 DEGREES
DIAGNOSIS, 93000: NORMAL
Q-T INTERVAL, ECG07: 268 MS
QRS DURATION, ECG06: 70 MS
QTC CALCULATION (BEZET), ECG08: 437 MS
VENTRICULAR RATE, ECG03: 160 BPM

## 2022-05-06 NOTE — DISCHARGE SUMMARY
Physician Discharge Summary     Patient ID:  Vicenta Runner  [id number]  43 y.o.  1940    Admit date: 4/30/2022    Discharge date and time: 5/1/2022    Admission Diagnoses: A-fib [facility]) [I48.91]    Discharge Diagnoses/Hospital Course   Mr. Chely Villegas is a 80 y female with PMH with a-fib on anticoagulation, asthma, CHF, GERD, hypothyroid admitted with a-fib on RVR. Initially started on dilt gtt and transitioned to home metoprolol. TSH WNL. K and Mg WNL. TTE with EF 40-45%. Since BP would not tolerate re-initiation of ARB will hold for now until able to adequate assess BP trends with increased dose of metoprolol/resumption of metoprolol    PCP: Denise Anders MD     Consults: Cardiology    Discharge Exam:  Visit Vitals  /66 (BP 1 Location: Left lower arm, BP Patient Position: Sitting)   Pulse 97   Temp 98 °F (36.7 °C)   Resp 21   Ht 5' 4\" (1.626 m)   Wt 68.5 kg (151 lb 0.2 oz)   SpO2 96%   BMI 25.92 kg/m²     Gen:  Well-developed, well-nourished, in no acute distress  HEENT:  Pink conjunctivae, PERRL, hearing intact to voice, moist mucous membranes  Neck:  Supple, without masses, thyroid non-tender  Resp:  No accessory muscle use, clear breath sounds without wheezes rales or rhonchi  Card: HR . Irregularly irregular  Abd:  Soft, non-tender, non-distended, normoactive bowel sounds are present  Musc:  No cyanosis or clubbing  Skin:  No rashes or ulcers, skin turgor is good  Neuro:  Cranial nerves 3-12 are grossly intact,  strength is 5/5 bilaterally and dorsi / plantarflexion is 5/5 bilaterally, follows commands appropriately  Psych:  Good insight, oriented to person, place and time, alert    Disposition: home    Patient Instructions:   Discharge Medication List as of 5/1/2022  4:37 PM      CONTINUE these medications which have CHANGED    Details   metoprolol succinate (TOPROL-XL) 50 mg XL tablet Take 1 Tablet by mouth daily. , Normal, Disp-30 Tablet, R-0         CONTINUE these medications which have NOT CHANGED    Details   albuterol 2.5 mg /3 mL (0.083 %) nebu 3 mL, albuterol-ipratropium 2.5 mg-0.5 mg/3 ml nebu 3 mL Take 1 Inhalation by inhalation every four (4) hours as needed., Historical Med      albuterol (PROVENTIL VENTOLIN) 2.5 mg /3 mL (0.083 %) nebu Take 3 mL by inhalation every four (4) hours as needed., Historical Med      pantoprazole (PROTONIX) 40 mg tablet Take 40 mg by mouth daily. , Historical Med      azelastine (ASTEPRO) 205.5 mcg (0.15 %) two (2) times a day., Historical Med      fluticasone (VERAMYST) 27.5 mcg/actuation nasal spray 2 Sprays by Nasal route daily. , Historical Med      olopatadine (Pataday) 0.2 % drop ophthalmic solution Administer 1 Drop to both eyes daily. , Historical Med      fluticasone-umeclidinium-vilanterol (Trelegy Ellipta) 100-62.5-25 mcg inhaler Take 1 Puff by inhalation daily. , Historical Med      ondansetron (ZOFRAN ODT) 4 mg disintegrating tablet Take 1 Tab by mouth every eight (8) hours as needed for Nausea. , Print, Disp-20 Tab, R-0      dicyclomine (BENTYL) 20 mg tablet Take 1 Tab by mouth every six (6) hours as needed for Abdominal Cramps., Print, Disp-20 Tab, R-0      Xarelto 20 mg tab tablet Take 20 mg by mouth daily (with dinner). , Historical Med, HELEN      metroNIDAZOLE (METROCREAM) 0.75 % topical cream metronidazole 0.75 % topical cream, Historical Med      ergocalciferol (ERGOCALCIFEROL) 50,000 unit capsule ergocalciferol (vitamin D2) 50,000 unit capsule, Historical Med      ibandronate (BONIVA) 150 mg tablet ibandronate 150 mg tablet, Historical Med      levothyroxine (SYNTHROID) 25 mcg tablet levothyroxine 25 mcg tablet, Historical Med      mometasone (NASONEX) 50 mcg/actuation nasal spray mometasone 50 mcg/actuation nasal spray, Historical Med      montelukast (SINGULAIR) 10 mg tablet montelukast 10 mg tablet, Historical Med      tiotropium bromide (SPIRIVA RESPIMAT) 2.5 mcg/actuation inhaler Spiriva Respimat 2.5 mcg/actuation solution for inhalation, Historical Med      calcitonin, salmon, (MIACALCIN) nasal 1 [place] by IntraNASal route daily. , Historical Med      multivitamin (ONE A DAY) tablet Take 1 Tab by mouth daily. , Historical Med      vitamin E acetate (VITAMIN E PO) Take  by mouth., Historical Med      docosahexanoic acid/epa (FISH OIL PO) Take  by mouth., Historical Med      calcium carbonate/vitamin D3 (CALCIUM + D PO) Take  by mouth., Historical Med      vitamin B complex (VITAMINS B COMPLEX PO) Take  by mouth., Historical Med      albuterol (PROAIR HFA) 90 mcg/actuation inhaler Take  by inhalation. , Historical Med      diclofenac (VOLTAREN) 1 % gel Apply  to affected area four (4) times daily. , Historical Med      polysorbate 80/glycerin (REFRESH DRY EYE THERAPY OP) Apply  to eye., Historical Med      acetaminophen (TYLENOL EXTRA STRENGTH PO) Take  by mouth., Historical Med         STOP taking these medications       diclofenac EC (VOLTAREN) 75 mg EC tablet Comments:   Reason for Stopping:               Activity: Activity as tolerated  Diet: Resume previous diet  Wound Care: None needed    Follow-up with Marshall Ashraf MD   Follow-up Information     Follow up With Specialties Details Why Contact Info    Marshall Ashraf, [facility] [address]  [phone]            Approximate time spent in patient care on day of discharge: 35 minutes     Signed:  Parris Diaz MD  5/6/2022  1:24 PM

## 2022-07-15 ENCOUNTER — TRANSCRIBE ORDER (OUTPATIENT)
Dept: SCHEDULING | Age: 82
End: 2022-07-15

## 2022-07-15 DIAGNOSIS — R19.4 CHANGE IN BOWEL HABITS: ICD-10-CM

## 2022-07-15 DIAGNOSIS — R11.0 NAUSEA: Primary | ICD-10-CM

## 2022-07-15 DIAGNOSIS — R63.4 UNINTENTIONAL WEIGHT LOSS: ICD-10-CM

## 2022-07-18 ENCOUNTER — HOSPITAL ENCOUNTER (OUTPATIENT)
Dept: GENERAL RADIOLOGY | Age: 82
Discharge: HOME | End: 2022-07-18
Payer: MEDICARE

## 2022-07-18 ENCOUNTER — TRANSCRIBE ORDER (OUTPATIENT)
Dept: REGISTRATION | Age: 82
End: 2022-07-18

## 2022-07-18 DIAGNOSIS — R05.9 COUGH: ICD-10-CM

## 2022-07-18 DIAGNOSIS — R05.9 COUGH: Primary | ICD-10-CM

## 2022-07-18 PROCEDURE — 71046 X-RAY EXAM CHEST 2 VIEWS: CPT

## 2022-07-20 ENCOUNTER — HOSPITAL ENCOUNTER (OUTPATIENT)
Dept: GENERAL RADIOLOGY | Age: 82
Discharge: HOME | End: 2022-07-20
Attending: PHYSICIAN ASSISTANT
Payer: MEDICARE

## 2022-07-20 DIAGNOSIS — R63.4 UNINTENTIONAL WEIGHT LOSS: ICD-10-CM

## 2022-07-20 DIAGNOSIS — R11.0 NAUSEA: ICD-10-CM

## 2022-07-20 DIAGNOSIS — R19.4 CHANGE IN BOWEL HABITS: ICD-10-CM

## 2022-07-20 PROCEDURE — 74240 X-RAY XM UPR GI TRC 1CNTRST: CPT

## 2022-08-05 ENCOUNTER — TRANSCRIBE ORDER (OUTPATIENT)
Dept: SCHEDULING | Age: 82
End: 2022-08-05

## 2022-08-05 DIAGNOSIS — R91.8 PULMONARY INFILTRATE: Primary | ICD-10-CM

## 2022-08-10 ENCOUNTER — ANESTHESIA EVENT (OUTPATIENT)
Dept: ENDOSCOPY | Age: 82
End: 2022-08-10
Payer: MEDICARE

## 2022-08-10 ENCOUNTER — ANESTHESIA (OUTPATIENT)
Dept: ENDOSCOPY | Age: 82
End: 2022-08-10
Payer: MEDICARE

## 2022-08-10 ENCOUNTER — HOSPITAL ENCOUNTER (OUTPATIENT)
Age: 82
Setting detail: OUTPATIENT SURGERY
Discharge: HOME | End: 2022-08-10
Attending: SPECIALIST | Admitting: SPECIALIST
Payer: MEDICARE

## 2022-08-10 VITALS
HEART RATE: 80 BPM | HEIGHT: 64 IN | DIASTOLIC BLOOD PRESSURE: 84 MMHG | RESPIRATION RATE: 20 BRPM | BODY MASS INDEX: 23.71 KG/M2 | SYSTOLIC BLOOD PRESSURE: 142 MMHG | OXYGEN SATURATION: 94 % | WEIGHT: 138.89 LBS | TEMPERATURE: 98.6 F

## 2022-08-10 PROCEDURE — 74011000250 HC RX REV CODE- 250: Performed by: NURSE ANESTHETIST, CERTIFIED REGISTERED

## 2022-08-10 PROCEDURE — 76060000031 HC ANESTHESIA FIRST 0.5 HR: Performed by: SPECIALIST

## 2022-08-10 PROCEDURE — 74011250636 HC RX REV CODE- 250/636: Performed by: NURSE ANESTHETIST, CERTIFIED REGISTERED

## 2022-08-10 PROCEDURE — 76040000019: Performed by: SPECIALIST

## 2022-08-10 PROCEDURE — 2709999900 HC NON-CHARGEABLE SUPPLY: Performed by: SPECIALIST

## 2022-08-10 PROCEDURE — 88305 TISSUE EXAM BY PATHOLOGIST: CPT

## 2022-08-10 PROCEDURE — 77030021593 HC FCPS BIOP ENDOSC BSC -A: Performed by: SPECIALIST

## 2022-08-10 RX ORDER — PROPOFOL 10 MG/ML
INJECTION, EMULSION INTRAVENOUS AS NEEDED
Status: DISCONTINUED | OUTPATIENT
Start: 2022-08-10 | End: 2022-08-10 | Stop reason: HOSPADM

## 2022-08-10 RX ORDER — MIDAZOLAM HYDROCHLORIDE 1 MG/ML
.25-5 INJECTION, SOLUTION INTRAMUSCULAR; INTRAVENOUS AS NEEDED
Status: DISCONTINUED | OUTPATIENT
Start: 2022-08-10 | End: 2022-08-10 | Stop reason: HOSPADM

## 2022-08-10 RX ORDER — DEXTROMETHORPHAN/PSEUDOEPHED 2.5-7.5/.8
1.2 DROPS ORAL
Status: DISCONTINUED | OUTPATIENT
Start: 2022-08-10 | End: 2022-08-10 | Stop reason: HOSPADM

## 2022-08-10 RX ORDER — SODIUM CHLORIDE 9 MG/ML
INJECTION, SOLUTION INTRAVENOUS
Status: DISCONTINUED | OUTPATIENT
Start: 2022-08-10 | End: 2022-08-10 | Stop reason: HOSPADM

## 2022-08-10 RX ORDER — NALOXONE HYDROCHLORIDE 0.4 MG/ML
0.4 INJECTION, SOLUTION INTRAMUSCULAR; INTRAVENOUS; SUBCUTANEOUS
Status: DISCONTINUED | OUTPATIENT
Start: 2022-08-10 | End: 2022-08-10 | Stop reason: HOSPADM

## 2022-08-10 RX ORDER — SODIUM CHLORIDE 9 MG/ML
50 INJECTION, SOLUTION INTRAVENOUS CONTINUOUS
Status: DISCONTINUED | OUTPATIENT
Start: 2022-08-10 | End: 2022-08-10 | Stop reason: HOSPADM

## 2022-08-10 RX ORDER — GLUCOSAMINE SULFATE 1500 MG
POWDER IN PACKET (EA) ORAL DAILY
COMMUNITY
End: 2022-09-28

## 2022-08-10 RX ORDER — FENTANYL CITRATE 50 UG/ML
25 INJECTION, SOLUTION INTRAMUSCULAR; INTRAVENOUS AS NEEDED
Status: DISCONTINUED | OUTPATIENT
Start: 2022-08-10 | End: 2022-08-10 | Stop reason: HOSPADM

## 2022-08-10 RX ORDER — SOTALOL HYDROCHLORIDE 80 MG/1
TABLET ORAL 2 TIMES DAILY
Status: ON HOLD | COMMUNITY
End: 2022-10-27

## 2022-08-10 RX ORDER — FLUMAZENIL 0.1 MG/ML
0.2 INJECTION INTRAVENOUS
Status: DISCONTINUED | OUTPATIENT
Start: 2022-08-10 | End: 2022-08-10 | Stop reason: HOSPADM

## 2022-08-10 RX ORDER — LIDOCAINE HYDROCHLORIDE 20 MG/ML
INJECTION, SOLUTION EPIDURAL; INFILTRATION; INTRACAUDAL; PERINEURAL AS NEEDED
Status: DISCONTINUED | OUTPATIENT
Start: 2022-08-10 | End: 2022-08-10 | Stop reason: HOSPADM

## 2022-08-10 RX ADMIN — LIDOCAINE HYDROCHLORIDE 60 MG: 20 INJECTION, SOLUTION EPIDURAL; INFILTRATION; INTRACAUDAL; PERINEURAL at 10:31

## 2022-08-10 RX ADMIN — PROPOFOL 150 MCG/KG/MIN: 10 INJECTION, EMULSION INTRAVENOUS at 10:32

## 2022-08-10 RX ADMIN — SODIUM CHLORIDE: 9 INJECTION, SOLUTION INTRAVENOUS at 10:30

## 2022-08-10 RX ADMIN — PROPOFOL 70 MG: 10 INJECTION, EMULSION INTRAVENOUS at 10:31

## 2022-08-10 NOTE — ANESTHESIA POSTPROCEDURE EVALUATION
Procedure(s):  ESOPHAGOGASTRODUODENOSCOPY (EGD)  ESOPHAGOGASTRODUODENAL (EGD) BIOPSY. MAC, general - backup    Anesthesia Post Evaluation      Multimodal analgesia: multimodal analgesia not used between 6 hours prior to anesthesia start to PACU discharge  Patient location during evaluation: bedside  Patient participation: complete - patient participated  Level of consciousness: awake  Pain score: 0  Pain management: adequate  Airway patency: patent  Anesthetic complications: no  Cardiovascular status: acceptable  Respiratory status: acceptable  Hydration status: acceptable  Post anesthesia nausea and vomiting:  none  Final Post Anesthesia Temperature Assessment:  Normothermia (36.0-37.5 degrees C)      INITIAL Post-op Vital signs:   Vitals Value Taken Time   /74 08/10/22 1108   Temp 37 °C (98.6 °F) 08/10/22 1045   Pulse 78 08/10/22 1113   Resp 25 08/10/22 1113   SpO2 96 % 08/10/22 1113   Vitals shown include unvalidated device data.

## 2022-08-10 NOTE — H&P
Date: 2022 11:00 AM  Patient Name: Braulio Archer  Account #: [id number]  Gender: Female   (age): 1940 (82)  Provider:    Franklyn Gee PA-C     Referring Physician:    [facility]    [address], [place], [address] [facility]  [phone] (phone)  [phone] (fax)     Chief Complaint:    2nd opinion on nausa     History of Present Illness:  Here with daughter for second opinion on nausea, lack of appetite, weight loss, bowel habit change. Seen by Latonia a couple times in clinic and had EGD/colonoscopy in . She had a normal EGD with benign stomach bx. She had one sessile polyp in the descending colon, which was an adenoma, mild diverticula and internal hemorrhoids. Went on to have a barium swallow/UGI after that showing a small paraesophageal hernia and a small amt of reflux. Small amts of penetration and possible aspiration noted-silent with recommendation for MBS and also a prominent cricopharyngeas was noted likely from reflux. She had a CT a/p without contrast in January of this year which showed a large paraesophageal hernia and prominent sigmoid diverticula but otherwise normal from GI standpoint. CT head w/o contrast in april of this year negative.  symptoms are not post prandial. Some days has no nausea and never vomiting. Has burning/tenderness/soreness below her right ribs and fullness in the epigastric area. She is s/p azael in effort to control these symptoms but no affect. Zofran doesn't seem to help any longer. She takes pantoprazole daily, unsure if it's helpful. She thinks carafate was most helpful but is not on that any longer. She is followed by pulm. for chronic cough and bronchiectasis. She takes albuterol daily and tobramycin every couple of days for that. Says she is constipated. Has to take miralax daily and stool softeners and if she misses these meds, she has rock hard stools. USed to have several BMS/day. She is on xarelto for AFib. Possible [place] in the near future. Past Medical History  Medical Conditions:   Arthritis  Asthma  Atrial Fibrillation  Congestive Heart Failure  COPD/Emphysema  Diverticulitis  High blood pressure  Thyroid disease  Surgical Procedures:   R Foot Hammertoe Reconstruction, 2017  KISHORE Eye Cataract, 2013  Dx Studies:   Abdominal U/S  Barium Swallow  Colonoscopy, 06/2021  CT Scan  Enteroscopy, 6/2021  MRI  Medications:   albuterol sulfate 2.5 mg/3 mL (0.083 %) Administer 1 applicator into mouth once a day  calcitonin (salmon) 200 unit/actuation Instill 1 spray into both nostrils once a day  diclofenac sodium 1% Apply 1 gram to affected area as needed  ibandronate 150 mg Take 1 tablet by mouth once a day  levothyroxine 25 mcg Take 1 tablet by mouth at bedtime  metronidazole 0.75% Apply 1 gram to affected area twice a day  montelukast 10 mg Take 1 tablet by mouth once a day  olmesartan 20 mg Take 1 tablet by mouth once a day  ondansetron HCl 8 mg Take 1 tablet by mouth as needed  pantoprazole 40 mg Take 1 tablet by mouth every morning  sotalol 80 mg Take 1 tablet by mouth twice a day  tobramycin in 0.225 % NaCl 300 mg/5 mL Apply 1 applicator into both nostrils once a day  Trelegy Ellipta 200-62.5-25 mcg Inhale 1 puff by mouth as needed  Xarelto 20 mg Take 1 tablet by mouth once a day  Allergies: Ace Inhibitors  Iodine And Iodide Containing Products  Penicillins  Immunizations:   COVID Vaccine, 4/02/2022  Influenza, seasonal, injectable, 1/05/2021  Social History  Alcohol:   None  Tobacco:   Former smoker  Drugs:   None  Exercise:   Exercise 3 or more times a week. Caffeine:   None  Marital Status:         Family History   No Knowledge Of Family History  Review of Systems:  Cardiovascular: Presents suffers from irregular heart beat, peripheral edema, Sweats. Denies chest pain, palpitations, syncope. Constitutional: Presents suffers from fatigue, loss of appetite, weight loss.  Denies fever, weight gain.  ENMT: Denies nose bleeds, sore throat, hearing loss. Endocrine: Denies excessive thirst, heat intolerance. Eyes: Denies loss of vision. Gastrointestinal: Presents suffers from change in bowel habits, constipation, diarrhea, Bloating/gas, heartburn, nausea. Denies abdominal pain, abdominal swelling, jaundice, rectal bleeding, stomach cramps, vomiting, dysphagia, rectal pain, Stool incontinence, hematemesis. Genitourinary: Presents suffers from incontinence. Denies dark urine, dysuria, frequent urination, hematuria. Hematologic/Lymphatic: Presents suffers from easy bruising. Denies prolonged bleeding. Integumentary: Denies itching, rashes, sun sensitivity. Musculoskeletal: Presents suffers from arthritis, back pain, joint pain, muscle weakness, stiffness. Denies gout. Neurological: Presents suffers from frequent headaches. Denies dizziness, fainting, memory loss. Psychiatric: Presents suffers from difficulty sleeping. Denies anxiety, depression, hallucinations, nervousness, panic attacks, paranoia. Respiratory: Presents suffers from cough, dyspnea, wheezing. .  Vital Signs:  BP  (mmHg)  Pulse  (bpm) Rhythm Weight (lbs/oz) Height (ft/in) BMI Temp  125/66 66 Regular 142 / 8 5 / 4 24.46 98.4 (F)  Physical Exam:  Constitutional:  Appearance: No distress, appears comfortable. Communication: Understands/receives spoken information. Skin:  Inspection: No rash, no jaundice. Palpation: No subcutaneous nodules. Head/face: Inspection: Normacephalic, atraumatic. Palpation: normal.  Facial strength: appears normal.  Eyes:  Conjunctivae/lids: Normal.  Pupils/irises: Pupils equal, round and normal.  ENMT:  External: Normal.  Hearing: Normal.  Oropharynx: normal tongue, hard and soft palate; posterior pharynx without erythema, exudate or lesions. Neck:  Neck: Normal appearance, trachea midline. Thyroid: Normal to palpation.   Respiratory:  Effort: Normal respiratory effort, comfortable, speaks in complete sentences. Auscultation: normal breath sounds, no rubs, wheezes or rhonchi. Cardiovascular: Auscultation: normal, S1 and S2,no gallops,no rubs or murmurs. Peripheral: no edema. Gastrointestinal/Abdomen:  Abdomen: non-distended, nontender. Liver/Spleen: normal,normal size,Liver size and consistency normal, spleen is non-palpable. Hernias: no hernias appreciated. Musculoskeletal:  Gait/station: normal.  Muscles: normal strength and tone, no atrophy or abnormal movements. Extremities:  RLE: Normal.  LLE: Normal.  Digits/Nails: Normal.  Psychiatric:  Judgment/insight: Normal,normal judgement, normal insight. Orientation: oriented to time, space and person. Memory: normal short term memory, normal long term memory, no memory loss. Mood and affect: Normal mood, affect full,no evidence of depression, anxiety or agitation. Lymphatic:  Neck: No lymphadenopathy in the cervical or supraclavicular chain. Other: No periumbilic lymphadenopathy. Lab Results:   No Electronic Results  Impressions:   Nausea  Unintentional weight loss  Change in bowel habit  Assessment:   REviewed prior testing. On one report noted to have a small hernia, on another it is noted to be large. ? if this is contributing to symptoms. Will rule out endocrine sources of nausea with labs. Already had brain imaging. Get GES. will also repeat Bariumswallow/UGI again and offer repeat EGD.   Refilled pantoprazole though can start to taper given she is unsure it is effective and give Carafate QID  Plan:   TSH, Third Generation ([facility] #[id number])  Parathyroid plus Calcium ([facility] #[id number])  Cortisol, AM ([facility] #[id number])  4 Hour Gastric Emptying Scan-[facility] if able  pantoprazole 40 mg Take 1 by mouth every morning for 30 days  sucralfate 1 gram Take 1 tablet by mouth four times a day as directed for 180 days  Celiac Disease Comprehensive Antibody Profile ([facility] #[id number])  Barium Swallow with Barium Tablet  Upper GI Series  EGD  Risk & Medical Necessity:    The level of medical decision making for this visit is moderate. The number and complexity of problems addressed are moderate. The amount and/or complexity of data to be reviewed and analyzed is moderate. The risk of complications and/or morbidity or mortality of patient management is moderate. Notes:    Dr. Sugey Kent was available for consultation during the visit.

## 2022-08-10 NOTE — PROGRESS NOTES
Report from [address], CRNA, see anesthesia record. ABD remains soft and non-tender post procedure. Pt has no complaints at this time and tolerated the procedure well. Endoscope was pre-cleaned at bedside immediately following procedure by BERNIE AT [facility].

## 2022-08-10 NOTE — INTERVAL H&P NOTE
Pre-Endoscopy H&P Update  Chief complaint/HPI/ROS:  The indication for the procedure, the patient's history and the patient's current medications are reviewed prior to the procedure and that data is reported on the H&P to which this document is attached. Any significant complaints with regard to organ systems will be noted, and if not mentioned then a review of systems is not contributory. Past Medical History:   Diagnosis Date    Asthma     Chronic a-fib (HCC)     Chronic anticoagulation     Congestive heart failure ([address] 2020    GERD (gastroesophageal reflux disease)     Hiatal hernia     HX: breast cancer     Right     Hypothyroid     Paroxysmal A-fib (HCC)     Venous insufficiency     bilat LE      Past Surgical History:   Procedure Laterality Date    COLONOSCOPY N/A 2021    COLONOSCOPY AND EGD performed by Sara Portillo MD at [facility] 2    HX BREAST LUMPECTOMY Right 2015    HX CATARACT REMOVAL  2013    Bilateral     HX CHOLECYSTECTOMY      HX DILATION AND CURETTAGE      x2    HX ORTHOPAEDIC  2017    RIGHT foot  hammertoe     Social   Social History     Tobacco Use    Smoking status: Former     Types: Cigarettes     Start date: 2000     Quit date: 2000     Years since quittin.7    Smokeless tobacco: Never   Substance Use Topics    Alcohol use: Not Currently      History reviewed. No pertinent family history. Allergies   Allergen Reactions    Ace Inhibitors Cough    Iodine Rash    Penicillins Rash      Prior to Admission Medications   Prescriptions Last Dose Informant Patient Reported? Taking? Xarelto 20 mg tab tablet 2022  Yes No   Sig: Take 20 mg by mouth daily (with dinner). acetaminophen (TYLENOL EXTRA STRENGTH PO) 2022  Yes Yes   Sig: Take  by mouth. albuterol (PROVENTIL HFA, VENTOLIN HFA, PROAIR HFA) 90 mcg/actuation inhaler 2022  Yes Yes   Sig: Take  by inhalation.    albuterol (PROVENTIL VENTOLIN) 2.5 mg /3 mL (0.083 %) nebu 2022  Yes Yes   Sig: Take 3 mL by inhalation every four (4) hours as needed. albuterol 2.5 mg /3 mL (0.083 %) nebu 3 mL, albuterol-ipratropium 2.5 mg-0.5 mg/3 ml nebu 3 mL 2022  Yes Yes   Sig: Take 1 Inhalation by inhalation every four (4) hours as needed. azelastine (ASTEPRO) 205.5 mcg (0.15 %) 2022  Yes Yes   Sig: two (2) times a day. calcitonin, salmon, (MIACALCIN) nasal Not Taking  Yes No   Si [place] by IntraNASal route daily. Patient not taking: Reported on 8/10/2022   calcium carbonate/vitamin D3 (CALCIUM + D PO) Not Taking  Yes No   Sig: Take  by mouth. Patient not taking: Reported on 8/10/2022   cholecalciferol (Vitamin D3) 25 mcg (1,000 unit) cap 8/3/2022  Yes Yes   Sig: Take  by mouth daily. diclofenac (VOLTAREN) 1 % gel 8/3/2022  Yes Yes   Sig: Apply  to affected area four (4) times daily. dicyclomine (BENTYL) 20 mg tablet Not Taking  No No   Sig: Take 1 Tab by mouth every six (6) hours as needed for Abdominal Cramps. Patient not taking: No sig reported   docosahexanoic acid/epa (FISH OIL PO) Not Taking  Yes No   Sig: Take  by mouth. Patient not taking: No sig reported   ergocalciferol (ERGOCALCIFEROL) 50,000 unit capsule Not Taking  Yes No   Sig: ergocalciferol (vitamin D2) 50,000 unit capsule   Patient not taking: Reported on 8/10/2022   fluticasone (VERAMYST) 27.5 mcg/actuation nasal spray Not Taking  Yes No   Si Sprays by Nasal route daily. Patient not taking: Reported on 8/10/2022   fluticasone-umeclidinium-vilanterol (Trelegy Ellipta) 100-62.5-25 mcg inhaler 8/3/2022  Yes Yes   Sig: Take 1 Puff by inhalation daily. ibandronate (BONIVA) 150 mg tablet Not Taking  Yes No   Sig: ibandronate 150 mg tablet   Patient not taking: Reported on 8/10/2022   levothyroxine (SYNTHROID) 25 mcg tablet 2022  Yes Yes   Sig: levothyroxine 25 mcg tablet   metoprolol succinate (TOPROL-XL) 50 mg XL tablet Not Taking  No No   Sig: Take 1 Tablet by mouth daily.    Patient not taking: Reported on 8/10/2022 metroNIDAZOLE (METROCREAM) 0.75 % topical cream 8/10/2022  Yes Yes   Sig: metronidazole 0.75 % topical cream   mometasone (NASONEX) 50 mcg/actuation nasal spray Not Taking  Yes No   Sig: mometasone 50 mcg/actuation nasal spray   Patient not taking: Reported on 8/10/2022   montelukast (SINGULAIR) 10 mg tablet 8/9/2022  Yes Yes   Sig: montelukast 10 mg tablet   multivitamin (ONE A DAY) tablet 8/3/2022  Yes Yes   Sig: Take 1 Tab by mouth daily. olopatadine (PATADAY) 0.2 % drop ophthalmic solution Not Taking  Yes No   Sig: Administer 1 Drop to both eyes daily. Patient not taking: Reported on 8/10/2022   ondansetron (ZOFRAN ODT) 4 mg disintegrating tablet 8/9/2022  No Yes   Sig: Take 1 Tab by mouth every eight (8) hours as needed for Nausea. pantoprazole (PROTONIX) 40 mg tablet 8/9/2022  Yes Yes   Sig: Take 40 mg by mouth daily. polysorbate 80/glycerin (REFRESH DRY EYE THERAPY OP) Not Taking  Yes No   Sig: Apply  to eye. Patient not taking: No sig reported   sotaloL (BETAPACE) 80 mg tablet 8/10/2022  Yes Yes   Sig: Take  by mouth two (2) times a day. tiotropium bromide (SPIRIVA RESPIMAT) 2.5 mcg/actuation inhaler Not Taking  Yes No   Sig: Spiriva Respimat 2.5 mcg/actuation solution for inhalation   Patient not taking: No sig reported   vitamin B complex (VITAMINS B COMPLEX PO) Not Taking  Yes No   Sig: Take  by mouth. Patient not taking: Reported on 8/10/2022   vitamin E acetate (VITAMIN E PO) Not Taking  Yes No   Sig: Take  by mouth. Patient not taking: No sig reported      Facility-Administered Medications: None       PHYSICAL EXAM:  The patient is examined immediately prior to the procedure. Visit Vitals  Pulse 76   Temp 98 °F (36.7 °C)   Resp 21   Ht 5' 4\" (1.626 m)   Wt 63 kg (138 lb 14.2 oz)   SpO2 97%   Breastfeeding No   BMI 23.84 kg/m²     Gen: Appears comfortable, no distress.   Pulm: comfortable respirations with no abnormal audible breath sounds  HEART: well perfused, no abnormal audible heart sounds  GI: abdomen flat. PLAN:  Informed consent discussion held, patient afforded an opportunity to ask questions and all questions answered. After being advised of the risks, benefits, and alternatives, the patient requested that we proceed and indicated so on a written consent form. Will proceed with procedure as planned.   Jonah Metz MD

## 2022-08-10 NOTE — ANESTHESIA PREPROCEDURE EVALUATION
Relevant Problems   RESPIRATORY SYSTEM   (+) Asthma      CARDIOVASCULAR   (+) Congestive heart failure (HCC)   (+) Paroxysmal atrial fibrillation with RVR (HCC)      GASTROINTESTINAL   (+) GERD (gastroesophageal reflux disease)   (+) Hiatal hernia      ENDOCRINE   (+) Hypothyroid      HEMATOLOGY   (+) Anemia       Anesthetic History   No history of anesthetic complications            Review of Systems / Medical History  Patient summary reviewed, nursing notes reviewed and pertinent labs reviewed    Pulmonary    COPD: moderate        Asthma : well controlled  Pertinent negatives: No smoker (quit 20 years ago)  Comments: covid in June, lung infection in beginning of July. Per patient she feels like she has been in her normal state of health for the past few weeks   Neuro/Psych              Cardiovascular          CHF  Dysrhythmias : atrial fibrillation      Exercise tolerance: >4 METS  Comments: Echo 4/30/22: Moderately reduced left ventricular systolic function with a visually estimated EF of 40 - 45%. Left ventricle size is normal. Normal wall thickness. Mild global hypokinesis present.   Aortic Valve: Valve structure is normal. Mild sclerosis of the aortic valve cusp.   Mitral Valve: Mild regurgitation.      GI/Hepatic/Renal     GERD      Hiatal hernia     Endo/Other      Hypothyroidism  Cancer (breast CA)     Other Findings            Physical Exam    Airway  Mallampati: II  TM Distance: 4 - 6 cm  Neck ROM: normal range of motion   Mouth opening: Normal     Cardiovascular  Regular rate and rhythm,  S1 and S2 normal,  no murmur, click, rub, or gallop  Rhythm: regular  Rate: normal         Dental    Dentition: Lower partial plate     Pulmonary  Breath sounds clear to auscultation               Abdominal  GI exam deferred       Other Findings            Anesthetic Plan    ASA: 3  Anesthesia type: MAC and general - backup          Induction: Intravenous  Anesthetic plan and risks discussed with: Patient

## 2022-08-10 NOTE — PROGRESS NOTES
Robin Christianson  1940  [id number]    Situation:  Verbal report received from:   Carmelita Andrea RN   Procedure: Procedure(s):  ESOPHAGOGASTRODUODENOSCOPY (EGD)  ESOPHAGOGASTRODUODENAL (EGD) BIOPSY    Background:    Preoperative diagnosis: Unintentional weight loss [R63.4]  Change in bowel habit [R19.4]  Postoperative diagnosis: Hiatal Hernia    :  Dr. Moises Costa   Assistant(s): Endoscopy Technician-1: Yassine Villeda  Endoscopy RN-1: Jennifer Correia RN    Specimens:   ID Type Source Tests Collected by Time Destination   1 : Duodenum Biopsies Preservative   Ceci Vargas MD 8/10/2022 1034 Pathology   2 : Gastric Antrum Biopsies Preservative   Ceci Vargas MD 8/10/2022 1034 Pathology   3 : EG Junction Biopsies Preservative   Ceci Vargas MD 8/10/2022 1036 Pathology     H. Pylori  no    Assessment:  Intra-procedure medications     Anesthesia gave intra-procedure sedation and medications, see anesthesia flow sheet yes    Intravenous fluids: NS@ KVO     Vital signs stable   yes    Abdominal assessment: round and soft   yes    Recommendation:  Discharge patient per MD order  yes.   Return to floor  outpatient  Family or Friend   family  Permission to share finding with family or friend yes

## 2022-08-10 NOTE — PROGRESS NOTES
Endoscopy discharge instructions have been reviewed and given to patient. The patient verbalized understanding and acceptance of instructions. Dr. Sam Olivera discussed with family procedure findings and next steps.

## 2022-08-10 NOTE — PROCEDURES
[address], [place]  [phone]      August 10, 2022    Esophagogastroduodenoscopy (EGD) Procedure Note  Roena Oppenheim  : 1940  [facility] Medical Record Number: [id number]      Indications:    Nausea  Referring Physician:  Breanne Keen MD  Anesthesia/Sedation:  Conscious sedation/deep sedation/monitored anesthesia -- see notes. Endoscopist:  Dr. Santo Vargas  Complications:  None  Estimated Blood Loss:  None    Permit:  The indications, risks, benefits and alternatives were reviewed with the patient or their decision maker who was provided an opportunity to ask questions and all questions were answered. The specific risks of esophagogastroduodenoscopy with conscious sedation were reviewed, including but not limited to anesthetic complication, bleeding, adverse drug reaction, missed lesion, infection, IV site reactions, and intestinal perforation which would lead to the need for surgical repair. Alternatives to EGD including radiographic imaging, observation without testing, or laboratory testing were reviewed as well as the limitations of those alternatives discussed. After considering the options and having all their questions answered, the patient or their decision maker provided both verbal and written consent to proceed. Procedure in Detail:  After obtaining informed consent, positioning of the patient in the left lateral decubitus position, and conduction of a pre-procedure pause or \"time out\" the endoscope was introduced into the mouth and advanced to the duodenum. A careful inspection was made, and findings or interventions are described below. Findings:   Esophagus:Hiatal hernia, without any visual evidence of paraesophageal hernia nor gastric torsion. Normal mucosa aside from dried saliva/secretions. Cold forceps biopsies at the EG junction.     Stomach: normal - cold forceps biopsies of the antrum taken for histology. Duodenum/jejunum: normal - cold forceps biopsies of D2 taken for histology. Specimens: See above    Impression: Hiatus hernia without other findings. Recommendations:  -Await pathology. Thank you for entrusting me with this patient's care. Please do not hesitate to contact me with any questions or if I can be of assistance with any of your other patients' GI needs. Signed By: Jacquelyn Lopez MD                        August 10, 2022     Surgical assistant none. Implants none unless specified.

## 2022-08-10 NOTE — DISCHARGE INSTRUCTIONS
[address] [facility] BENITO Silva MD  [phone]      August 10, 2022     [address]: 1940    ENDOSCOPY DISCHARGE INSTRUCTIONS    If there is redness at IV site you should apply warm compress to area. If redness or soreness persist contact Dr. Gogo Silva' or your primary care doctor. Gaseous discomfort may develop, but walking, belching will help relieve this. You may not operate a vehicle for 12 hours  You may not operate machinery or dangerous appliances for rest of today  You may not drink alcoholic beverages for 12 hours  Avoid making any critical decisions for 24 hours    DIET:  You may resume your normal diet, but some patients find that heavy or large meals may lead to indigestion or vomiting. I suggest a light meal as first food intake. MEDICATIONS:  The use of some over-the-counter pain medication may lead to bleeding after biopsies or other procedures you may have had done. Tylenol (also called acetaminophen) is safe to take and will not lead to bleeding. Based on the procedure you had today you may not safely take aspirin or aspirin-like products for the next ten (10) days. ACTIVITY:  You may resume your normal household activities, but it is recommended that you spend the remainder of the day resting -  avoid any strenuous activity. CALL DR. Myrtle Sky' OFFICE IF:  Increasing pain, nausea, vomiting  Abdominal distension (swelling)  Significant new or increased bleeding (oral or rectal)  Fever/Chills  Chest pain/shortness of breath                   Additional instructions:   No aspirin 10 days. We found no serious problems today, just a small hiatal hernia; but I took a number of biopsies to make sure that the tissue is healthy under the microscope and I'll contact you with those results in 10-14 days. It was an honor to be your doctor today.   Please let me or my office staff know if you have any feedback about today's procedure.     Raina Chicas MD

## 2022-08-19 ENCOUNTER — HOSPITAL ENCOUNTER (INPATIENT)
Age: 82
LOS: 4 days | Discharge: HOME | DRG: 194 | End: 2022-08-23
Attending: EMERGENCY MEDICINE | Admitting: FAMILY MEDICINE
Payer: MEDICARE

## 2022-08-19 ENCOUNTER — APPOINTMENT (OUTPATIENT)
Dept: GENERAL RADIOLOGY | Age: 82
DRG: 194 | End: 2022-08-19
Attending: EMERGENCY MEDICINE
Payer: MEDICARE

## 2022-08-19 DIAGNOSIS — E87.1 HYPONATREMIA: ICD-10-CM

## 2022-08-19 DIAGNOSIS — J18.9 PNEUMONIA OF LEFT UPPER LOBE DUE TO INFECTIOUS ORGANISM: Primary | ICD-10-CM

## 2022-08-19 DIAGNOSIS — R11.2 NAUSEA AND VOMITING, UNSPECIFIED VOMITING TYPE: ICD-10-CM

## 2022-08-19 LAB
ALBUMIN SERPL-MCNC: 2.5 G/DL (ref 3.5–5)
ALBUMIN/GLOB SERPL: 0.6 {RATIO} (ref 1.1–2.2)
ALP SERPL-CCNC: 118 U/L (ref 45–117)
ALT SERPL-CCNC: 12 U/L (ref 12–78)
ANION GAP SERPL CALC-SCNC: 7 MMOL/L (ref 5–15)
AST SERPL-CCNC: 15 U/L (ref 15–37)
BASOPHILS # BLD: 0 K/UL (ref 0–0.1)
BASOPHILS NFR BLD: 0 % (ref 0–1)
BILIRUB SERPL-MCNC: 0.4 MG/DL (ref 0.2–1)
BUN SERPL-MCNC: 8 MG/DL (ref 6–20)
BUN/CREAT SERPL: 16 (ref 12–20)
CALCIUM SERPL-MCNC: 9 MG/DL (ref 8.5–10.1)
CHLORIDE SERPL-SCNC: 92 MMOL/L (ref 97–108)
CO2 SERPL-SCNC: 29 MMOL/L (ref 21–32)
COMMENT, HOLDF: NORMAL
COVID-19 RAPID TEST, COVR: NOT DETECTED
CREAT SERPL-MCNC: 0.5 MG/DL (ref 0.55–1.02)
DIFFERENTIAL METHOD BLD: ABNORMAL
EOSINOPHIL # BLD: 0.1 K/UL (ref 0–0.4)
EOSINOPHIL NFR BLD: 1 % (ref 0–7)
ERYTHROCYTE [DISTWIDTH] IN BLOOD BY AUTOMATED COUNT: 14.5 % (ref 11.5–14.5)
GLOBULIN SER CALC-MCNC: 4.5 G/DL (ref 2–4)
GLUCOSE SERPL-MCNC: 102 MG/DL (ref 65–100)
HCT VFR BLD AUTO: 31.1 % (ref 35–47)
HGB BLD-MCNC: 10.3 G/DL (ref 11.5–16)
IMM GRANULOCYTES # BLD AUTO: 0.1 K/UL (ref 0–0.04)
IMM GRANULOCYTES NFR BLD AUTO: 1 % (ref 0–0.5)
LIPASE SERPL-CCNC: 86 U/L (ref 73–393)
LYMPHOCYTES # BLD: 1.2 K/UL (ref 0.8–3.5)
LYMPHOCYTES NFR BLD: 11 % (ref 12–49)
MAGNESIUM SERPL-MCNC: 1.7 MG/DL (ref 1.6–2.4)
MCH RBC QN AUTO: 28.3 PG (ref 26–34)
MCHC RBC AUTO-ENTMCNC: 33.1 G/DL (ref 30–36.5)
MCV RBC AUTO: 85.4 FL (ref 80–99)
MONOCYTES # BLD: 1.1 K/UL (ref 0–1)
MONOCYTES NFR BLD: 10 % (ref 5–13)
NEUTS SEG # BLD: 8.2 K/UL (ref 1.8–8)
NEUTS SEG NFR BLD: 77 % (ref 32–75)
NRBC # BLD: 0 K/UL (ref 0–0.01)
NRBC BLD-RTO: 0 PER 100 WBC
PLATELET # BLD AUTO: 625 K/UL (ref 150–400)
PMV BLD AUTO: 8.7 FL (ref 8.9–12.9)
POTASSIUM SERPL-SCNC: 3.3 MMOL/L (ref 3.5–5.1)
PROT SERPL-MCNC: 7 G/DL (ref 6.4–8.2)
RBC # BLD AUTO: 3.64 M/UL (ref 3.8–5.2)
RBC MORPH BLD: ABNORMAL
SAMPLES BEING HELD,HOLD: NORMAL
SODIUM SERPL-SCNC: 128 MMOL/L (ref 136–145)
SOURCE, COVRS: NORMAL
TROPONIN-HIGH SENSITIVITY: 14 NG/L (ref 0–51)
WBC # BLD AUTO: 10.7 K/UL (ref 3.6–11)

## 2022-08-19 PROCEDURE — 71046 X-RAY EXAM CHEST 2 VIEWS: CPT

## 2022-08-19 PROCEDURE — 93005 ELECTROCARDIOGRAM TRACING: CPT

## 2022-08-19 PROCEDURE — 84484 ASSAY OF TROPONIN QUANT: CPT

## 2022-08-19 PROCEDURE — 83690 ASSAY OF LIPASE: CPT

## 2022-08-19 PROCEDURE — 96361 HYDRATE IV INFUSION ADD-ON: CPT

## 2022-08-19 PROCEDURE — 96375 TX/PRO/DX INJ NEW DRUG ADDON: CPT

## 2022-08-19 PROCEDURE — 99285 EMERGENCY DEPT VISIT HI MDM: CPT

## 2022-08-19 PROCEDURE — 96366 THER/PROPH/DIAG IV INF ADDON: CPT

## 2022-08-19 PROCEDURE — 87635 SARS-COV-2 COVID-19 AMP PRB: CPT

## 2022-08-19 PROCEDURE — 74011250636 HC RX REV CODE- 250/636: Performed by: EMERGENCY MEDICINE

## 2022-08-19 PROCEDURE — 74011250637 HC RX REV CODE- 250/637: Performed by: FAMILY MEDICINE

## 2022-08-19 PROCEDURE — 83735 ASSAY OF MAGNESIUM: CPT

## 2022-08-19 PROCEDURE — 74011000250 HC RX REV CODE- 250: Performed by: EMERGENCY MEDICINE

## 2022-08-19 PROCEDURE — 80053 COMPREHEN METABOLIC PANEL: CPT

## 2022-08-19 PROCEDURE — 85025 COMPLETE CBC W/AUTO DIFF WBC: CPT

## 2022-08-19 PROCEDURE — 84145 PROCALCITONIN (PCT): CPT

## 2022-08-19 PROCEDURE — 74011000258 HC RX REV CODE- 258: Performed by: EMERGENCY MEDICINE

## 2022-08-19 PROCEDURE — 74011250636 HC RX REV CODE- 250/636: Performed by: FAMILY MEDICINE

## 2022-08-19 PROCEDURE — 96365 THER/PROPH/DIAG IV INF INIT: CPT

## 2022-08-19 PROCEDURE — 65270000029 HC RM PRIVATE

## 2022-08-19 PROCEDURE — 36415 COLL VENOUS BLD VENIPUNCTURE: CPT

## 2022-08-19 RX ORDER — SOTALOL HYDROCHLORIDE 80 MG/1
80 TABLET ORAL EVERY 12 HOURS
Status: DISCONTINUED | OUTPATIENT
Start: 2022-08-19 | End: 2022-08-19

## 2022-08-19 RX ORDER — POLYETHYLENE GLYCOL 3350 17 G/17G
17 POWDER, FOR SOLUTION ORAL DAILY PRN
Status: DISCONTINUED | OUTPATIENT
Start: 2022-08-19 | End: 2022-08-23 | Stop reason: HOSPADM

## 2022-08-19 RX ORDER — GUAIFENESIN 600 MG/1
600 TABLET, EXTENDED RELEASE ORAL EVERY 12 HOURS
Status: DISCONTINUED | OUTPATIENT
Start: 2022-08-19 | End: 2022-08-23 | Stop reason: HOSPADM

## 2022-08-19 RX ORDER — VANCOMYCIN/0.9 % SOD CHLORIDE 1.5G/250ML
1500 PLASTIC BAG, INJECTION (ML) INTRAVENOUS
Status: COMPLETED | OUTPATIENT
Start: 2022-08-19 | End: 2022-08-19

## 2022-08-19 RX ORDER — LEVOTHYROXINE SODIUM 25 UG/1
25 TABLET ORAL
Status: DISCONTINUED | OUTPATIENT
Start: 2022-08-20 | End: 2022-08-23 | Stop reason: HOSPADM

## 2022-08-19 RX ORDER — ONDANSETRON 2 MG/ML
4 INJECTION INTRAMUSCULAR; INTRAVENOUS
Status: DISCONTINUED | OUTPATIENT
Start: 2022-08-19 | End: 2022-08-23 | Stop reason: HOSPADM

## 2022-08-19 RX ORDER — ACETAMINOPHEN 650 MG/1
650 SUPPOSITORY RECTAL
Status: DISCONTINUED | OUTPATIENT
Start: 2022-08-19 | End: 2022-08-23 | Stop reason: HOSPADM

## 2022-08-19 RX ORDER — SODIUM CHLORIDE 450 MG/100ML
75 INJECTION, SOLUTION INTRAVENOUS CONTINUOUS
Status: DISCONTINUED | OUTPATIENT
Start: 2022-08-19 | End: 2022-08-19

## 2022-08-19 RX ORDER — ONDANSETRON 4 MG/1
4 TABLET, ORALLY DISINTEGRATING ORAL
Status: DISCONTINUED | OUTPATIENT
Start: 2022-08-19 | End: 2022-08-23 | Stop reason: HOSPADM

## 2022-08-19 RX ORDER — AZITHROMYCIN 250 MG/1
1000 TABLET, FILM COATED ORAL
Status: DISCONTINUED | OUTPATIENT
Start: 2022-08-19 | End: 2022-08-19

## 2022-08-19 RX ORDER — SODIUM CHLORIDE 0.9 % (FLUSH) 0.9 %
5-40 SYRINGE (ML) INJECTION EVERY 8 HOURS
Status: DISCONTINUED | OUTPATIENT
Start: 2022-08-19 | End: 2022-08-23 | Stop reason: HOSPADM

## 2022-08-19 RX ORDER — ACETAMINOPHEN 325 MG/1
650 TABLET ORAL
Status: DISCONTINUED | OUTPATIENT
Start: 2022-08-19 | End: 2022-08-23 | Stop reason: HOSPADM

## 2022-08-19 RX ORDER — METOPROLOL SUCCINATE 25 MG/1
50 TABLET, EXTENDED RELEASE ORAL DAILY
Status: DISCONTINUED | OUTPATIENT
Start: 2022-08-20 | End: 2022-08-19

## 2022-08-19 RX ORDER — FUROSEMIDE 10 MG/ML
20 INJECTION INTRAMUSCULAR; INTRAVENOUS DAILY
Status: DISCONTINUED | OUTPATIENT
Start: 2022-08-20 | End: 2022-08-21

## 2022-08-19 RX ORDER — SODIUM CHLORIDE 0.9 % (FLUSH) 0.9 %
5-40 SYRINGE (ML) INJECTION AS NEEDED
Status: DISCONTINUED | OUTPATIENT
Start: 2022-08-19 | End: 2022-08-23 | Stop reason: HOSPADM

## 2022-08-19 RX ORDER — ENOXAPARIN SODIUM 100 MG/ML
40 INJECTION SUBCUTANEOUS DAILY
Status: DISCONTINUED | OUTPATIENT
Start: 2022-08-20 | End: 2022-08-19

## 2022-08-19 RX ORDER — DICYCLOMINE HYDROCHLORIDE 20 MG/1
20 TABLET ORAL
Status: DISCONTINUED | OUTPATIENT
Start: 2022-08-19 | End: 2022-08-23 | Stop reason: HOSPADM

## 2022-08-19 RX ORDER — POTASSIUM CHLORIDE 750 MG/1
10 TABLET, FILM COATED, EXTENDED RELEASE ORAL
Status: COMPLETED | OUTPATIENT
Start: 2022-08-19 | End: 2022-08-19

## 2022-08-19 RX ORDER — PANTOPRAZOLE SODIUM 40 MG/1
40 TABLET, DELAYED RELEASE ORAL DAILY
Status: DISCONTINUED | OUTPATIENT
Start: 2022-08-20 | End: 2022-08-23 | Stop reason: HOSPADM

## 2022-08-19 RX ADMIN — GUAIFENESIN 600 MG: 600 TABLET ORAL at 20:54

## 2022-08-19 RX ADMIN — POTASSIUM CHLORIDE 10 MEQ: 750 TABLET, FILM COATED, EXTENDED RELEASE ORAL at 19:10

## 2022-08-19 RX ADMIN — DOXYCYCLINE 100 MG: 100 INJECTION, POWDER, LYOPHILIZED, FOR SOLUTION INTRAVENOUS at 15:47

## 2022-08-19 RX ADMIN — VANCOMYCIN HYDROCHLORIDE 1500 MG: 10 INJECTION, POWDER, LYOPHILIZED, FOR SOLUTION INTRAVENOUS at 19:12

## 2022-08-19 RX ADMIN — ACETAMINOPHEN 650 MG: 325 TABLET ORAL at 19:11

## 2022-08-19 RX ADMIN — SODIUM CHLORIDE 1 G: 9 INJECTION INTRAMUSCULAR; INTRAVENOUS; SUBCUTANEOUS at 15:47

## 2022-08-19 RX ADMIN — SODIUM CHLORIDE 500 ML: 9 INJECTION, SOLUTION INTRAVENOUS at 11:04

## 2022-08-19 NOTE — H&P
Admission History and Physical      Patient: Gab Campoverde               Sex: female          DOA: 8/19/2022       YOB: 1940      Age:  80 y.o.        LOS:  LOS: 0 days      HPI:     CHIEF COMPLAINT:   Persistent nausea and vomiting  Cough    HISTORY OF PRESENT ILLNESS:     Ms. Malcolm Mendoza is a 80 y.o.  female who presents with history significant for COPD and asthma not on home oxygen follows up with Dr. Marlow Phoenix, paroxysmal atrial fibrillation on chronic anticoagulation with Xarelto, hypothyroidism, reflux disease, congestive heart failure who was brought to the emergency department because of persistent nausea and vomiting, she recently had upper endoscopy done August 10, 2022 and was told she has thrush she was told to continue to use nystatin swish and swallow by gastroenterology. She was recently diagnosed with COVID 19 infection. She presents to the emergency department today with persistent nausea and vomiting, she had recently stopped her Trelegy and with some adjustment to COPD medication thinking that was was making her have persistent nausea and vomiting. She did come in today because she is not better she is lost about 40 pounds in the last couple of months. Associated chest pressure. In the emergency department she was given medication for nausea, with minimal elevated BNP  Patient has been admitted to the hospital for further management.       Past Medical History:   Diagnosis Date    Asthma     Chronic a-fib (HCC)     Chronic anticoagulation     Congestive heart failure ([address] 05/2020    GERD (gastroesophageal reflux disease)     Hiatal hernia     HX: breast cancer 2015    Right     Hypothyroid     Paroxysmal A-fib (HCC)     Venous insufficiency     bilat LE        Past Surgical History:   Procedure Laterality Date    COLONOSCOPY N/A 6/14/2021    COLONOSCOPY AND EGD performed by Flaco Contreras MD at [address] LUMPECTOMY Right 08/24/2015    HX CATARACT REMOVAL 2013    Bilateral     HX CHOLECYSTECTOMY      HX DILATION AND CURETTAGE      x2    HX ORTHOPAEDIC  2017    RIGHT foot  hammertoe       Social History     Tobacco Use    Smoking status: Former     Types: Cigarettes     Start date: 2000     Quit date: 2000     Years since quittin.7    Smokeless tobacco: Never   Substance Use Topics    Alcohol use: Not Currently        No family history on file. Allergies   Allergen Reactions    Ace Inhibitors Cough    Iodine Rash    Penicillins Rash        Prior to Admission medications    Medication Sig Start Date End Date Taking? Authorizing Provider   sotaloL (BETAPACE) 80 mg tablet Take  by mouth two (2) times a day. Provider, Historical   cholecalciferol (Vitamin D3) 25 mcg (1,000 unit) cap Take  by mouth daily. Provider, Historical   metoprolol succinate (TOPROL-XL) 50 mg XL tablet Take 1 Tablet by mouth daily. Patient not taking: Reported on 8/10/2022 5/2/22   Magalis Vizcaino MD   albuterol 2.5 mg /3 mL (0.083 %) nebu 3 mL, albuterol-ipratropium 2.5 mg-0.5 mg/3 ml nebu 3 mL Take 1 Inhalation by inhalation every four (4) hours as needed. 10/1/21   Other, MD Abby   albuterol (PROVENTIL VENTOLIN) 2.5 mg /3 mL (0.083 %) nebu Take 3 mL by inhalation every four (4) hours as needed. 3/29/22   Other, MD Abby   pantoprazole (PROTONIX) 40 mg tablet Take 40 mg by mouth daily. Provider, Historical   azelastine (ASTEPRO) 205.5 mcg (0.15 %) two (2) times a day. Provider, Historical   fluticasone (VERAMYST) 27.5 mcg/actuation nasal spray 2 Sprays by Nasal route daily. Patient not taking: Reported on 8/10/2022    Provider, Historical   olopatadine (PATADAY) 0.2 % drop ophthalmic solution Administer 1 Drop to both eyes daily. Patient not taking: Reported on 8/10/2022    Provider, Historical   fluticasone-umeclidinium-vilanterol (Trelegy Ellipta) 100-62.5-25 mcg inhaler Take 1 Puff by inhalation daily.     Provider, Historical   ondansetron (ZOFRAN ODT) 4 mg disintegrating tablet Take 1 Tab by mouth every eight (8) hours as needed for Nausea. 1/11/21   Maria Victoria Barriosk, DO   dicyclomine (BENTYL) 20 mg tablet Take 1 Tab by mouth every six (6) hours as needed for Abdominal Cramps. Patient not taking: No sig reported 1/11/21   Maria Victoria Denisk, DO   Xarelto 20 mg tab tablet Take 20 mg by mouth daily (with dinner). 7/31/20   Provider, Historical   metroNIDAZOLE (METROCREAM) 0.75 % topical cream metronidazole 0.75 % topical cream    Provider, Historical   ergocalciferol (ERGOCALCIFEROL) 50,000 unit capsule ergocalciferol (vitamin D2) 50,000 unit capsule  Patient not taking: Reported on 8/10/2022    Provider, Historical   ibandronate (BONIVA) 150 mg tablet ibandronate 150 mg tablet  Patient not taking: Reported on 8/10/2022    Provider, Historical   levothyroxine (SYNTHROID) 25 mcg tablet levothyroxine 25 mcg tablet    Provider, Historical   mometasone (NASONEX) 50 mcg/actuation nasal spray mometasone 50 mcg/actuation nasal spray  Patient not taking: Reported on 8/10/2022    Provider, Historical   montelukast (SINGULAIR) 10 mg tablet montelukast 10 mg tablet    Provider, Historical   tiotropium bromide (SPIRIVA RESPIMAT) 2.5 mcg/actuation inhaler Spiriva Respimat 2.5 mcg/actuation solution for inhalation  Patient not taking: No sig reported    Provider, Historical   calcitonin, salmon, (MIACALCIN) nasal 1 [place] by IntraNASal route daily. Patient not taking: Reported on 8/10/2022    Provider, Historical   multivitamin (ONE A DAY) tablet Take 1 Tab by mouth daily. Provider, Historical   vitamin E acetate (VITAMIN E PO) Take  by mouth. Patient not taking: No sig reported    Provider, Historical   docosahexanoic acid/epa (FISH OIL PO) Take  by mouth. Patient not taking: No sig reported    Provider, Historical   calcium carbonate/vitamin D3 (CALCIUM + D PO) Take  by mouth.   Patient not taking: Reported on 8/10/2022    Provider, Historical   vitamin B complex (VITAMINS B COMPLEX PO) Take  by mouth. Patient not taking: Reported on 8/10/2022    Provider, Historical   albuterol (PROVENTIL HFA, VENTOLIN HFA, PROAIR HFA) 90 mcg/actuation inhaler Take  by inhalation. Provider, Historical   diclofenac (VOLTAREN) 1 % gel Apply  to affected area four (4) times daily. Provider, Historical   polysorbate 80/glycerin (REFRESH DRY EYE THERAPY OP) Apply  to eye. Patient not taking: No sig reported    Provider, Historical   acetaminophen (TYLENOL EXTRA STRENGTH PO) Take  by mouth.     Provider, Historical       REVIEW OF SYSTEMS:     General: negative for fever, chills, sweats, weakness  Eyes: negative for blurred vision, eye pain, loss of vision, diplopia  Ear Nose and Throat: negative for rhinorrhea, pharyngitis, otalgia, tinnitus, speech or swallowing difficulties  Respiratory:  positive for cough and sob  Cardiology:  negative for chest pain, palpitations, orthopnea, PND, edema, syncope   Gastrointestinal: negative for abdominal pain, dysphagia, change in bowel habits, bleeding + Nausea and vomiting  Genitourinary: negative for frequency, urgency, dysuria, hematuria, incontinence  Muskuloskeletal : negative for arthralgia, myalgia  Hematology: negative for easy bruising, bleeding, lymphadenopathy  Dermatological: negative for rash, ulceration, mole change, new lesion  Endocrine: negative for hot flashes or polydipsia  Neurological: negative for headache, dizziness, confusion, focal weakness, paresthesia, memory loss, gait disturbance  Psychological: negative for anxiety, depression, agitation      Physical Exam:     VITALS:    Vital signs reviewed; most recent are:    Visit Vitals  BP (!) 150/86 (BP 1 Location: Right arm, BP Patient Position: At rest)   Pulse 74   Temp 97 °F (36.1 °C)   Resp 24   Ht 5' 4\" (1.626 m)   Wt 62.6 kg (138 lb)   SpO2 92%   BMI 23.69 kg/m²     SpO2 Readings from Last 6 Encounters:   08/19/22 92%   08/10/22 94%   05/01/22 96%   06/14/21 99%   04/28/21 97%   01/13/21 97%        No intake or output data in the 24 hours ending 08/19/22 1830     Physical Exam:   General:  Alert, cooperative, no distress, appears stated age. Eyes:  Conjunctivae/corneas clear. PERRL, EOMs intact. Fundi benign   Ears:  Normal TMs and external ear canals both ears. Nose: Nares normal. Septum midline. Mucosa normal. No drainage or sinus tenderness. Mouth/Throat: Lips, mucosa, and tongue normal. Teeth and gums normal.   Neck: Supple, symmetrical, trachea midline, no adenopathy, thyroid: no enlargement/tenderness/nodules, no carotid bruit and no JVD. Back:   Symmetric, no curvature. ROM normal. No CVA tenderness. Lungs:   Clear to auscultation bilaterally. Heart:  Regular rate and rhythm, S1, S2 normal, no murmur, click, rub or gallop. Abdomen:   Soft, non-tender. Bowel sounds normal. No masses,  No organomegaly. Extremities: Extremities normal, atraumatic, no cyanosis or edema. Pulses: 2+ and symmetric all extremities. Skin: Skin color, texture, turgor normal. No rashes or lesions   Lymph nodes: Cervical, supraclavicular, and axillary nodes normal.   Neurologic: CNII-XII intact. Normal strength, sensation and reflexes throughout.        Labs:  BMP:   Lab Results   Component Value Date/Time     (L) 08/19/2022 10:54 AM    K 3.3 (L) 08/19/2022 10:54 AM    CL 92 (L) 08/19/2022 10:54 AM    CO2 29 08/19/2022 10:54 AM    AGAP 7 08/19/2022 10:54 AM     (H) 08/19/2022 10:54 AM    BUN 8 08/19/2022 10:54 AM    CREA 0.50 (L) 08/19/2022 10:54 AM    GFRAA >60 08/19/2022 10:54 AM    GFRNA >60 08/19/2022 10:54 AM     CBC:   Lab Results   Component Value Date/Time    WBC 10.7 08/19/2022 10:54 AM    HGB 10.3 (L) 08/19/2022 10:54 AM    HCT 31.1 (L) 08/19/2022 10:54 AM     (H) 08/19/2022 10:54 AM     All Cardiac Markers in the last 24 hours: No results found for: CPK, CK, CKMMB, CKMB, RCK3, CKMBT, CKNDX, CKND1, GUDELIA, TROPT, TROIQ, CINDY, TROPT, TNIPOC, BNP, BNPP  Recent Glucose Results:   Lab Results   Component Value Date/Time     (H) 08/19/2022 10:54 AM     ABG: No results found for: PH, PHI, PCO2, PCO2I, PO2, PO2I, HCO3, HCO3I, FIO2, FIO2I  COAGS: No results found for: APTT, PTP, INR, INREXT, INREXT    Telemetry reviewed:   normal sinus rhythm  Tubes:    None  X-Ray:    PCXR  Left upper lobe parenchymal opacity is increased most likely related to  pneumonia. Emphysematous changes    Procedures:    None    Assessment:       Active Problems:    Pneumonia (8/19/2022)  Admit and monitor medical floor  Patient presenting with persistent cough, history of nausea and vomiting rule out aspiration pneumonia  Patient started on vancomycin and Zithromax (allergy to penicillin), she was given doxycycline emergency department  Send sputum for culture send urine for strep and Legionella  Continue oxygen via nasal cannula. Portable chest x-ray shows Left upper lobe parenchymal opacity is increased most likely related to  pneumonia.  Emphysematous changes  Patient followed by pulmonary    COPD with mild exacerbation  Continue breathing treatment  Continue oxygen management:  Continue steroid taper  Continue Mucinex    Acute on chronic CHF exacerbation with systolic dysfunction  EF from April 2020 to about 40 to 45%  Continue diuresis with Lasix  Continue daily weights  Continue treatment and output  Continue beta-blocker    Paroxysmal atrial fibrillation  Well-controlled with metoprolol  Continue anticoagulation with Xarelto    Hypothyroidism  Continue Synthroid    Persistent nausea and vomiting secondary to esophageal candidiasis  Continue nystatin swish and swallow  Follow-up with GI, consult placed  Recent endoscopy April 10, 2022    Hypokalemia  Potassium of 3.3  Will correct and recheck    Plan:      Risk of deterioration: high      Total time spent with patient: [phone] discussed with: Patient, Family, Nursing Staff, Consultant/Specialist, and >50% of time spent in counseling and coordination of care    Discussed:  Code Status, Care Plan, and D/C Planning    Prophylaxis:  SCD's and Xarelto     Disposition:  Home w/Family           ___________________________________________________    Attending Physician: Kari Arredondo MD

## 2022-08-19 NOTE — ED PROVIDER NOTES
Indu Betancourt is an 81 yo F with h/o COPD, bronchiectasis, CHF,  paroxysmal atrial fibrillation and recent diagnoses of candidal infection at the Gastroesophageal junction who presents to the ED following episode of coughing, gagging  and spitting up mucous. She is not sure if it was coming from her lungs for her stomach but she was worried that the strain was putting her into atrial fibrillation. She is feeling better now. She has had chronic nausea for over a year and 47lb weight loss over the past 6 months. Past Medical History:   Diagnosis Date    Asthma     Chronic a-fib (HCC)     Chronic anticoagulation     Congestive heart failure [address] [address] 2020    GERD (gastroesophageal reflux disease)     Hiatal hernia     HX: breast cancer     Right     Hypothyroid     Paroxysmal A-fib ([facility] [address]     Venous insufficiency     bilat LE       Past Surgical History:   Procedure Laterality Date    COLONOSCOPY N/A 2021    COLONOSCOPY AND EGD performed by Majo Hazel MD at [address] BREAST LUMPECTOMY Right 2015    HX CATARACT REMOVAL  2013    Bilateral     HX CHOLECYSTECTOMY      HX DILATION AND CURETTAGE      x2    HX ORTHOPAEDIC  2017    RIGHT foot  hammertoe         No family history on file.     Social History     Socioeconomic History    Marital status:      Spouse name: Not on file    Number of children: Not on file    Years of education: Not on file    Highest education level: Not on file   Occupational History    Not on file   Tobacco Use    Smoking status: Former     Types: Cigarettes     Start date: 2000     Quit date: 2000     Years since quittin.7    Smokeless tobacco: Never   Vaping Use    Vaping Use: Never used   Substance and Sexual Activity    Alcohol use: Not Currently    Drug use: Never    Sexual activity: Not on file   Other Topics Concern    Not on file   Social History Narrative    Not on file     Social Determinants of Health     Financial Resource Strain: Not on file   Food Insecurity: Not on file   Transportation Needs: Not on file   Physical Activity: Not on file   Stress: Not on file   Social Connections: Not on file   Intimate Partner Violence: Not on file   Housing Stability: Not on file         ALLERGIES: Ace inhibitors, Iodine, and Penicillins    Review of Systems   Constitutional:  Negative for fever. HENT:  Negative for sore throat. Eyes:  Negative for visual disturbance. Respiratory:  Negative for cough. Cardiovascular:  Negative for chest pain. Gastrointestinal:  Positive for nausea. Negative for abdominal pain. Genitourinary:  Negative for dysuria. Musculoskeletal:  Negative for back pain. Skin:  Negative for rash. Neurological:  Negative for headaches. Vitals:    08/19/22 1043   BP: (!) 150/86   Pulse: 74   Resp: 24   Temp: 97 °F (36.1 °C)   SpO2: 92%   Weight: 62.6 kg (138 lb)   Height: 5' 4\" (1.626 m)            Physical Exam  Vitals and nursing note reviewed. Constitutional:       General: She is not in acute distress. Appearance: She is well-developed. HENT:      Head: Normocephalic and atraumatic. Mouth/Throat:      Mouth: Mucous membranes are moist.   Eyes:      Extraocular Movements: Extraocular movements intact. Conjunctiva/sclera: Conjunctivae normal.   Neck:      Trachea: Phonation normal.   Cardiovascular:      Rate and Rhythm: Normal rate. Pulmonary:      Effort: Pulmonary effort is normal. No respiratory distress. Breath sounds: Rhonchi present. No wheezing. Abdominal:      General: There is no distension. Tenderness: There is abdominal tenderness in the epigastric area. Musculoskeletal:         General: No tenderness. Normal range of motion. Cervical back: Normal range of motion. Skin:     General: Skin is warm and dry. Neurological:      General: No focal deficit present. Mental Status: She is alert. She is not disoriented. Motor: No abnormal muscle tone. MDM       Perfect Serve Consult for Admission  2:09 PM    ED Room Number: CW/CW  Patient Name and age: Gab Campoverde 80 y.o.  female  Working Diagnosis:   1. Pneumonia of left upper lobe due to infectious organism    2. Hyponatremia    3. Nausea and vomiting, unspecified vomiting type        COVID-19 Suspicion:  Other Pending  Sepsis present:  no  Reassessment needed: N/A  Code Status:  Full Code  Readmission: no  Isolation Requirements:  Other PENDING COVID  Recommended Level of Care:  telemetry  Department:[facility] - [phone]  Other:  Has had chronic nausea x 1 year. Recently diagnosed with candida at gastroesophageal junction on  endoscopy. Increased dyspnea with exertion and had coughing episode bringing up clear phlegm with brown chucks and then vomited. , CXR with JONEL PNA. Have ordered 500ml IVNS bolus and rocephin and doxycycline IV.     Procedures

## 2022-08-19 NOTE — ED TRIAGE NOTES
Pt arrives to ED with complaints of nausea and was told she has thrush at the bottom of her esophagus. Pt has hx of COPD. Pt reports having episodes of gagging with mucus. Pt had endoscopy and has been started on Nystatin.

## 2022-08-19 NOTE — PROGRESS NOTES
[address] [id number] [facility] Pharmacy Progress Note: Antimicrobial Stewardship    Consult for antibiotic dosing of vancomycin by Dr. Shira Patton  Indication: CAP  Day of Therapy: 1    Plan:  Vancomycin therapy:   Start with loading dose of vancomycin 1500 mg IV (25 mg/kg, max 2.5 gm) x 1 now   Follow with maintenance dose of vancomycin 750 mg IV every 12 hours    Dose calculated to approximate a   Target AUC/RISHI of 400-600  Trough n/a    Plan:  Current regimen predicts  per Bayesian kinetics calculations. SCr ordered every other day per protocol. Pharmacy to follow daily and will make changes to dose and/or frequency based on clinical status. Date Dose & Interval Measured (mcg/mL) Extrapolated (mcg/mL)   ? ? ? ?   ? ? ? ?   ? ? ? ? Other Antimicrobial  (not dosed by pharmacist)   Azithromycin 500 mg IV Q24H  Doxy x 1   Ceftriaxone x 1   Cultures        Serum Creatinine     Lab Results   Component Value Date/Time    Creatinine 0.50 (L) 08/19/2022 10:54 AM       Creatinine Clearance Estimated Creatinine Clearance: 53.5 mL/min (A) (by C-G formula based on SCr of 0.5 mg/dL (L)). Procalcitonin    Lab Results   Component Value Date/Time    Procalcitonin <0.05 04/30/2022 07:34 AM        Temp   97 °F (36.1 °C)    WBC   Lab Results   Component Value Date/Time    WBC 10.7 08/19/2022 10:54 AM       For Antifungals, Metronidazole and Nafcillin: Lab Results   Component Value Date/Time    ALT (SGPT) 12 08/19/2022 10:54 AM    AST (SGOT) 15 08/19/2022 10:54 AM    Alk.  phosphatase 118 (H) 08/19/2022 10:54 AM    Bilirubin, total 0.4 08/19/2022 10:54 AM         Pharmacist: Signed [address] [facility]

## 2022-08-20 LAB
ALBUMIN SERPL-MCNC: 2.2 G/DL (ref 3.5–5)
ALBUMIN/GLOB SERPL: 0.5 {RATIO} (ref 1.1–2.2)
ALP SERPL-CCNC: 105 U/L (ref 45–117)
ALT SERPL-CCNC: 10 U/L (ref 12–78)
ANION GAP SERPL CALC-SCNC: 9 MMOL/L (ref 5–15)
AST SERPL-CCNC: 11 U/L (ref 15–37)
BILIRUB SERPL-MCNC: 0.4 MG/DL (ref 0.2–1)
BUN SERPL-MCNC: 5 MG/DL (ref 6–20)
BUN/CREAT SERPL: 14 (ref 12–20)
CALCIUM SERPL-MCNC: 8.6 MG/DL (ref 8.5–10.1)
CHLORIDE SERPL-SCNC: 97 MMOL/L (ref 97–108)
CO2 SERPL-SCNC: 25 MMOL/L (ref 21–32)
CREAT SERPL-MCNC: 0.37 MG/DL (ref 0.55–1.02)
ERYTHROCYTE [DISTWIDTH] IN BLOOD BY AUTOMATED COUNT: 14.1 % (ref 11.5–14.5)
GLOBULIN SER CALC-MCNC: 4.1 G/DL (ref 2–4)
GLUCOSE SERPL-MCNC: 116 MG/DL (ref 65–100)
HCT VFR BLD AUTO: 28.2 % (ref 35–47)
HGB BLD-MCNC: 9.3 G/DL (ref 11.5–16)
MCH RBC QN AUTO: 27.8 PG (ref 26–34)
MCHC RBC AUTO-ENTMCNC: 33 G/DL (ref 30–36.5)
MCV RBC AUTO: 84.4 FL (ref 80–99)
NRBC # BLD: 0 K/UL (ref 0–0.01)
NRBC BLD-RTO: 0 PER 100 WBC
PLATELET # BLD AUTO: 522 K/UL (ref 150–400)
PMV BLD AUTO: 8.7 FL (ref 8.9–12.9)
POTASSIUM SERPL-SCNC: 3.3 MMOL/L (ref 3.5–5.1)
PROCALCITONIN SERPL-MCNC: <0.05 NG/ML
PROT SERPL-MCNC: 6.3 G/DL (ref 6.4–8.2)
RBC # BLD AUTO: 3.34 M/UL (ref 3.8–5.2)
SODIUM SERPL-SCNC: 131 MMOL/L (ref 136–145)
WBC # BLD AUTO: 8.3 K/UL (ref 3.6–11)

## 2022-08-20 PROCEDURE — 77030027138 HC INCENT SPIROMETER -A

## 2022-08-20 PROCEDURE — 74011000250 HC RX REV CODE- 250: Performed by: FAMILY MEDICINE

## 2022-08-20 PROCEDURE — 74011250636 HC RX REV CODE- 250/636: Performed by: FAMILY MEDICINE

## 2022-08-20 PROCEDURE — 74011000258 HC RX REV CODE- 258: Performed by: FAMILY MEDICINE

## 2022-08-20 PROCEDURE — 94640 AIRWAY INHALATION TREATMENT: CPT

## 2022-08-20 PROCEDURE — 74011000250 HC RX REV CODE- 250: Performed by: INTERNAL MEDICINE

## 2022-08-20 PROCEDURE — 36415 COLL VENOUS BLD VENIPUNCTURE: CPT

## 2022-08-20 PROCEDURE — 85027 COMPLETE CBC AUTOMATED: CPT

## 2022-08-20 PROCEDURE — 80053 COMPREHEN METABOLIC PANEL: CPT

## 2022-08-20 PROCEDURE — 94761 N-INVAS EAR/PLS OXIMETRY MLT: CPT

## 2022-08-20 PROCEDURE — 94664 DEMO&/EVAL PT USE INHALER: CPT

## 2022-08-20 PROCEDURE — 74011250637 HC RX REV CODE- 250/637: Performed by: INTERNAL MEDICINE

## 2022-08-20 PROCEDURE — 74011250637 HC RX REV CODE- 250/637: Performed by: FAMILY MEDICINE

## 2022-08-20 PROCEDURE — 65270000029 HC RM PRIVATE

## 2022-08-20 PROCEDURE — 74011250636 HC RX REV CODE- 250/636: Performed by: INTERNAL MEDICINE

## 2022-08-20 RX ORDER — POTASSIUM CHLORIDE 750 MG/1
40 TABLET, FILM COATED, EXTENDED RELEASE ORAL
Status: COMPLETED | OUTPATIENT
Start: 2022-08-20 | End: 2022-08-20

## 2022-08-20 RX ORDER — IPRATROPIUM BROMIDE AND ALBUTEROL SULFATE 2.5; .5 MG/3ML; MG/3ML
3 SOLUTION RESPIRATORY (INHALATION)
Status: DISCONTINUED | OUTPATIENT
Start: 2022-08-20 | End: 2022-08-23 | Stop reason: HOSPADM

## 2022-08-20 RX ORDER — IPRATROPIUM BROMIDE 0.5 MG/2.5ML
0.5 SOLUTION RESPIRATORY (INHALATION)
Status: DISCONTINUED | OUTPATIENT
Start: 2022-08-20 | End: 2022-08-20

## 2022-08-20 RX ORDER — BUDESONIDE 0.5 MG/2ML
500 INHALANT ORAL
Status: DISCONTINUED | OUTPATIENT
Start: 2022-08-20 | End: 2022-08-20

## 2022-08-20 RX ORDER — SOTALOL HYDROCHLORIDE 80 MG/1
80 TABLET ORAL EVERY 12 HOURS
Status: DISCONTINUED | OUTPATIENT
Start: 2022-08-20 | End: 2022-08-23 | Stop reason: HOSPADM

## 2022-08-20 RX ORDER — ARFORMOTEROL TARTRATE 15 UG/2ML
15 SOLUTION RESPIRATORY (INHALATION)
Status: DISCONTINUED | OUTPATIENT
Start: 2022-08-20 | End: 2022-08-20

## 2022-08-20 RX ORDER — NYSTATIN 100000 [USP'U]/ML
500000 SUSPENSION ORAL 4 TIMES DAILY
Status: DISCONTINUED | OUTPATIENT
Start: 2022-08-20 | End: 2022-08-23 | Stop reason: HOSPADM

## 2022-08-20 RX ORDER — IPRATROPIUM BROMIDE AND ALBUTEROL SULFATE 2.5; .5 MG/3ML; MG/3ML
3 SOLUTION RESPIRATORY (INHALATION)
Status: DISCONTINUED | OUTPATIENT
Start: 2022-08-20 | End: 2022-08-20

## 2022-08-20 RX ORDER — NYSTATIN 100000 [USP'U]/ML
500000 SUSPENSION ORAL 4 TIMES DAILY
Status: DISCONTINUED | OUTPATIENT
Start: 2022-08-20 | End: 2022-08-20

## 2022-08-20 RX ADMIN — DOXYCYCLINE 100 MG: 100 INJECTION, POWDER, LYOPHILIZED, FOR SOLUTION INTRAVENOUS at 09:14

## 2022-08-20 RX ADMIN — GUAIFENESIN 600 MG: 600 TABLET ORAL at 09:08

## 2022-08-20 RX ADMIN — NYSTATIN 500000 UNITS: 100000 SUSPENSION ORAL at 20:29

## 2022-08-20 RX ADMIN — SODIUM CHLORIDE, PRESERVATIVE FREE 10 ML: 5 INJECTION INTRAVENOUS at 22:19

## 2022-08-20 RX ADMIN — SOTALOL HYDROCHLORIDE 80 MG: 80 TABLET ORAL at 15:46

## 2022-08-20 RX ADMIN — FLUTICASONE FUROATE, UMECLIDINIUM BROMIDE AND VILANTEROL TRIFENATATE 1 PUFF: 200; 62.5; 25 POWDER RESPIRATORY (INHALATION) at 13:39

## 2022-08-20 RX ADMIN — POTASSIUM CHLORIDE 40 MEQ: 750 TABLET, FILM COATED, EXTENDED RELEASE ORAL at 09:08

## 2022-08-20 RX ADMIN — METHYLPREDNISOLONE SODIUM SUCCINATE 30 MG: 40 INJECTION, POWDER, FOR SOLUTION INTRAMUSCULAR; INTRAVENOUS at 22:15

## 2022-08-20 RX ADMIN — SODIUM CHLORIDE, PRESERVATIVE FREE 10 ML: 5 INJECTION INTRAVENOUS at 13:50

## 2022-08-20 RX ADMIN — VANCOMYCIN HYDROCHLORIDE 750 MG: 750 INJECTION, POWDER, LYOPHILIZED, FOR SOLUTION INTRAVENOUS at 09:10

## 2022-08-20 RX ADMIN — NYSTATIN 500000 UNITS: 100000 SUSPENSION ORAL at 15:45

## 2022-08-20 RX ADMIN — FUROSEMIDE 20 MG: 10 INJECTION, SOLUTION INTRAMUSCULAR; INTRAVENOUS at 09:09

## 2022-08-20 RX ADMIN — GUAIFENESIN 600 MG: 600 TABLET ORAL at 21:13

## 2022-08-20 RX ADMIN — VANCOMYCIN HYDROCHLORIDE 750 MG: 750 INJECTION, POWDER, LYOPHILIZED, FOR SOLUTION INTRAVENOUS at 21:12

## 2022-08-20 RX ADMIN — IPRATROPIUM BROMIDE AND ALBUTEROL SULFATE 3 ML: .5; 3 SOLUTION RESPIRATORY (INHALATION) at 11:01

## 2022-08-20 RX ADMIN — METHYLPREDNISOLONE SODIUM SUCCINATE 40 MG: 40 INJECTION, POWDER, FOR SOLUTION INTRAMUSCULAR; INTRAVENOUS at 00:18

## 2022-08-20 RX ADMIN — RIVAROXABAN 20 MG: 10 TABLET, FILM COATED ORAL at 09:07

## 2022-08-20 RX ADMIN — SODIUM CHLORIDE, PRESERVATIVE FREE 10 ML: 5 INJECTION INTRAVENOUS at 00:19

## 2022-08-20 RX ADMIN — DOXYCYCLINE 100 MG: 100 INJECTION, POWDER, LYOPHILIZED, FOR SOLUTION INTRAVENOUS at 21:12

## 2022-08-20 RX ADMIN — BUDESONIDE 500 MCG: 0.5 SUSPENSION RESPIRATORY (INHALATION) at 11:06

## 2022-08-20 RX ADMIN — LEVOTHYROXINE SODIUM 25 MCG: 0.03 TABLET ORAL at 05:59

## 2022-08-20 RX ADMIN — PANTOPRAZOLE SODIUM 40 MG: 40 TABLET, DELAYED RELEASE ORAL at 09:09

## 2022-08-20 RX ADMIN — ARFORMOTEROL TARTRATE 15 MCG: 15 SOLUTION RESPIRATORY (INHALATION) at 11:06

## 2022-08-20 RX ADMIN — SODIUM CHLORIDE, PRESERVATIVE FREE 10 ML: 5 INJECTION INTRAVENOUS at 05:59

## 2022-08-20 RX ADMIN — IPRATROPIUM BROMIDE AND ALBUTEROL SULFATE 3 ML: .5; 3 SOLUTION RESPIRATORY (INHALATION) at 21:47

## 2022-08-20 RX ADMIN — METHYLPREDNISOLONE SODIUM SUCCINATE 40 MG: 40 INJECTION, POWDER, FOR SOLUTION INTRAMUSCULAR; INTRAVENOUS at 05:57

## 2022-08-20 NOTE — PROGRESS NOTES
[facility]  [address] [facility] [address] [place] [address]  [phone]      Medical Progress Note      NAME: Juan Maciel   :  1940  MRM:  [id number]    Date of service: 2022  9:22 AM       Assessment and Plan:   Pneumonia (2022). Check pneumonia workup, procal.sputum for gram stain and culture. chest x-ray shows Left upper lobe parenchymal opacity is increased most likely related to pneumonia. Cont ABx. 2.  Acute COPD with mild exacerbation. Cont duo neb, pulmicort, brovana and solumderol(taper). 3.  Chronic systolic CHF. EF from 2020 to about 40 to 45%. Continue Lasix and BB. Monitor dilay wt and I/O     4. Paroxysmal atrial fibrillation. Rate is well controlled. On metoprolol and xarelto. 5.  Hypothyroidism. Continue Synthroid     6. Persistent nausea and vomiting secondary to esophageal candidiasis. Continue nystatin swish and swallow. GI consulted. Had recent endoscopy April 10, 2022     7. Hypokalemia. Replete           Subjective:     Chief Complaint[de-identified] Patient was seen and examined as a follow up for CAP. Chart was reviewed. feels better, but still has cough     ROS:  (bold if positive, if negative)    Cough Tolerating PT  Tolerating Diet        Objective:     Last 24hrs VS reviewed since prior progress note.  Most recent are:    Visit Vitals  BP (!) 152/68 (BP 1 Location: Left upper arm, BP Patient Position: At rest;Semi fowlers)   Pulse 83   Temp 97.9 °F (36.6 °C)   Resp 18   Ht 5' 4\" (1.626 m)   Wt 62.6 kg (138 lb)   SpO2 94%   BMI 23.69 kg/m²     SpO2 Readings from Last 6 Encounters:   22 94%   08/10/22 94%   22 96%   21 99%   21 97%   21 97%          Intake/Output Summary (Last 24 hours) at 2022 6793  Last data filed at 2022 0626  Gross per 24 hour   Intake --   Output 0 ml   Net 0 ml        Physical Exam:    Gen:  Well-developed, well-nourished, in no acute distress  HEENT:  Pink conjunctivae, PERRL, hearing intact to voice, moist mucous membranes  Neck:  Supple, without masses, thyroid non-tender  Resp:  No accessory muscle use,  rales BL.   Card:  No murmurs, normal S1, S2 without thrills, bruits or peripheral edema  Abd:  Soft, non-tender, non-distended, normoactive bowel sounds are present, no palpable organomegaly and no detectable hernias  Lymph:  No cervical or inguinal adenopathy  Musc:  No cyanosis or clubbing  Skin:  No rashes or ulcers, skin turgor is good  Neuro:  Cranial nerves are grossly intact, no focal motor weakness, follows commands appropriately  Psych:  Good insight, oriented to person, place and time, alert  __________________________________________________________________  Medications Reviewed: (see below)  Medications:     Current Facility-Administered Medications   Medication Dose Route Frequency    ondansetron (ZOFRAN) injection 4 mg  4 mg IntraVENous Q4H PRN    dicyclomine (BENTYL) tablet 20 mg  20 mg Oral Q6H PRN    sodium chloride (NS) flush 5-40 mL  5-40 mL IntraVENous Q8H    sodium chloride (NS) flush 5-40 mL  5-40 mL IntraVENous PRN    acetaminophen (TYLENOL) tablet 650 mg  650 mg Oral Q6H PRN    Or    acetaminophen (TYLENOL) suppository 650 mg  650 mg Rectal Q6H PRN    polyethylene glycol (MIRALAX) packet 17 g  17 g Oral DAILY PRN    ondansetron (ZOFRAN ODT) tablet 4 mg  4 mg Oral Q8H PRN    Or    ondansetron (ZOFRAN) injection 4 mg  4 mg IntraVENous Q6H PRN    guaiFENesin ER (MUCINEX) tablet 600 mg  600 mg Oral Q12H    methylPREDNISolone (PF) (SOLU-MEDROL) injection 40 mg  40 mg IntraVENous Q8H    levothyroxine (SYNTHROID) tablet 25 mcg  25 mcg Oral 6am    rivaroxaban (XARELTO) tablet 20 mg  20 mg Oral DAILY WITH BREAKFAST    pantoprazole (PROTONIX) tablet 40 mg  40 mg Oral DAILY    vancomycin (VANCOCIN) 750 mg in 0.9% sodium chloride 250 mL (Gneq2Lwt)  750 mg IntraVENous Q12H    furosemide (LASIX) injection 20 mg  20 mg IntraVENous DAILY    doxycycline (VIBRAMYCIN) 100 mg in 0.9% sodium chloride (MBP/ADV) 100 mL MBP  100 mg IntraVENous Q12H        Lab Data Reviewed: (see below)  Lab Review:     Recent Labs     08/20/22  0519 08/19/22  1054   WBC 8.3 10.7   HGB 9.3* 10.3*   HCT 28.2* 31.1*   * 625*     Recent Labs     08/20/22  0519 08/19/22  1054   * 128*   K 3.3* 3.3*   CL 97 92*   CO2 25 29   * 102*   BUN 5* 8   CREA 0.37* 0.50*   CA 8.6 9.0   MG  --  1.7   ALB 2.2* 2.5*   TBILI 0.4 0.4   ALT 10* 12     No results found for: GLUCPOC  No results for input(s): PH, PCO2, PO2, HCO3, FIO2 in the last 72 hours. No results for input(s): INR, INREXT in the last 72 hours. All Micro Results       Procedure Component Value Units Date/Time    COVID-19 RAPID TEST [[id number]] Collected: 08/19/22 1758    Order Status: Completed Specimen: Nasopharyngeal Updated: 08/19/22 1827     Specimen source Nasopharyngeal        COVID-19 rapid test Not detected        Comment: Rapid Abbott ID Now       Rapid NAAT:  The specimen is NEGATIVE for SARS-CoV-2, the novel coronavirus associated with COVID-19. Negative results should be treated as presumptive and, if inconsistent with clinical signs and symptoms or necessary for patient management, should be tested with an alternative molecular assay. Negative results do not preclude SARS-CoV-2 infection and should not be used as the sole basis for patient management decisions. This test has been authorized by the FDA under an Emergency Use Authorization (EUA) for use by authorized laboratories. Fact sheet for Healthcare Providers: ConventionUpdate.co.nz  Fact sheet for Patients: ConventionUpdate.co.nz       Methodology: Isothermal Nucleic Acid Amplification                 I have reviewed notes of prior 24hr. Other pertinent lab: Total time spent with patient: 35 minutes I personally reviewed chart, notes, data and current medications in the medical record.   I have personally examined and treated the patient at bedside during this period.                  Care Plan discussed with: Patient, Nursing Staff, and >50% of time spent in counseling and coordination of care    Discussed:  Care Plan    Prophylaxis:   Xarelto     Disposition:  Home w/Family           ___________________________________________________    Attending Physician: Fifi Victor MD

## 2022-08-20 NOTE — PROGRESS NOTES
8/20/22  12:31 PM    Care Management Initial Evaluation:    CM reviewed EMR and met with patient in the room to complete the initial evaluation. Confirmed charted demographics and explained role of CM. Reason for Admission:  Pneumonia  PMH of COPD, Asthma, Chronic anticoagulation, Congestive heart failure, GERD, Hiatal Hernia, hx of breast cancer, Hypothyroid, Paroxysmal A-Fib, Venous insufficiency                   RUR Score:   14%  Level: Low    Payor: [employer] Medicare        Plan for utilizing home health:   Patient used [facility] in home therapy about three years ago, no recent in home therapy. PCP: First and Last name:  Kristy Mendenhall MD     Name of Practice:    Are you a current patient: Yes/No: Yes   Approximate date of last visit: No recent in office visit, communicates via the portal   Can you participate in a virtual visit with your PCP:                     Current Advanced Directive/Advance Care Plan: Full Code      Healthcare Decision Maker:   Click here to complete [address] including selection of the Healthcare Decision Maker Relationship (ie \"Primary\")    Ahmed Libman: Son- [phone]                          Transition of Care Plan:                    Patient lives with her spouse in a one story residence with 4 entry steps. 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 Her son and DIL live nearby and provide support when needed. Patient is usually independent with all ADL's and drives. She has had caregivers sporadically to assist in caring for her spouse but no regular assistance other than family. She uses a rollator when out in the community to assist with stability but is otherwise independent with ambulation. She has a diagnosis of COPD but does not need O2. She uses [facility] [address] for prescription needs. 1). Patient was admitted for emergent care  2). GI and pulmonary consulted  3). Family will transport at dc  4).  CM following for dc needs    Cecilia Lynne, MSW  Care Manager

## 2022-08-20 NOTE — PROGRESS NOTES
Received order change to perform duoNeb Q4h. In speaking with patient she is very concerned with doing treatment so often as previously she went into A-Fib performing DuoNeb Q4h. Nurse & CM made aware and call made to MD.  Patient in no acute respiratory distress. 1113hrs. MD changed DuoNeb aerosol tx to Q12hr.

## 2022-08-20 NOTE — ED NOTES
TRANSFER - OUT REPORT:    Verbal report given to IJ RN(name) on [place]  being transferred to Med Surg(unit) for routine progression of care       Report consisted of patients Situation, Background, Assessment and   Recommendations(SBAR). Information from the following report(s) SBAR, Kardex, ED Summary, Intake/Output, MAR, and Recent Results was reviewed with the receiving nurse. Lines:   Peripheral IV 08/19/22 Left Antecubital (Active)        Opportunity for questions and clarification was provided.       Patient transported with:   Registered Nurse

## 2022-08-20 NOTE — PROGRESS NOTES
Bedside and Verbal shift change report given to [address] [facility] [address] (oncoming nurse) by Earl Ronquillo RN (offgoing nurse). Report included the following information SBAR, Kardex, Intake/Output, and Recent Results.

## 2022-08-20 NOTE — PROGRESS NOTES
Around 0005, pt c/o of excessive cough and sob. Assessment completed; vitals stable, O2sat sitting at 92, head of bed elevated. Incentive spirometer provided,patient was health educated on using them and verbaliseed understanding. Patient reported feeling better afterwards. Will continue to monitor and f/up with needs as they arise. Rechecked later;pt sleeping calmly in bed and appears to be in no obvious distress. Bedside, Verbal, and Written shift change report given to Tatyana NIEVES (oncoming nurse) by Reuben Waters RN (offgoing nurse). Report included the following information SBAR, Kardex, Procedure Summary, Intake/Output, MAR, Accordion, Recent Results, Med Rec Status, Alarm Parameters , Pre Procedure Checklist, Procedure Verification, and Quality Measures.

## 2022-08-20 NOTE — CONSULTS
Nicole Grande. Bryan Correa MD  [phone] office  [phone] voicemail   Gastroenterology Consultation Note      Admit Date: 8/19/2022  Consult Date: 8/20/2022   I greatly appreciate your asking me to see Lorna Ignacio, thank you very much for the opportunity to participate in her care. Narrative Assessment and Plan   Chronic nausea - etiology not clear, but may be related to electrolyte abnormalities, candidal esophagitis (just discovered on EGD this month) side effect of medications. Pneumonia and other pulmonary problems (COPD, bronchiectasis, pseudomonas colonization)  Osteoarthritis  Atrial dysrhythmia - her use of sotalol precludes treatment with fluconazole due to drug/drug interaction  Would suggest treatment of pneumonia, correction of electrolytes, supportive care and continuation of nystatin therapy for plan of at least 21 days  If nausea symptoms persist would arrange gastric emptying study as next step. Subjective:     Chief Complaint: Nausea    History of Present Illness: Nausea since 2019. Recent egd showing uncomplicated hiatal hernia and candidal esophagitis. Admitted with cough and imaging suggestive of pulmonary infection, na/k outside normal (she reports this is common).     PCP:  Annita Cogan, MD    Past Medical History:   Diagnosis Date    Asthma     Chronic a-fib ([address]     Chronic anticoagulation     Congestive heart failure ([address] 05/2020    GERD (gastroesophageal reflux disease)     Hiatal hernia     HX: breast cancer 2015    Right     Hypothyroid     Paroxysmal A-fib ([address]     Venous insufficiency     bilat LE        Past Surgical History:   Procedure Laterality Date    COLONOSCOPY N/A 6/14/2021    COLONOSCOPY AND EGD performed by Italo Steven MD at [address] [place] [address],[address] BREAST LUMPECTOMY Right 08/24/2015    HX CATARACT REMOVAL  2013    Bilateral     HX CHOLECYSTECTOMY      HX DILATION AND CURETTAGE      x2    HX ORTHOPAEDIC  2017    RIGHT foot kina       Social History     Tobacco Use    Smoking status: Former     Types: Cigarettes     Start date: 2000     Quit date: 2000     Years since quittin.7    Smokeless tobacco: Never   Substance Use Topics    Alcohol use: Not Currently        No family history on file. Allergies   Allergen Reactions    Ace Inhibitors Cough    Iodine Rash    Penicillins Rash            Home Medications:  Prior to Admission Medications   Prescriptions Last Dose Informant Patient Reported? Taking? Xarelto 20 mg tab tablet 2022  Yes Yes   Sig: Take 20 mg by mouth daily (with dinner). acetaminophen (TYLENOL EXTRA STRENGTH PO) 2022  Yes Yes   Sig: Take  by mouth. albuterol (PROVENTIL HFA, VENTOLIN HFA, PROAIR HFA) 90 mcg/actuation inhaler 2022  Yes Yes   Sig: Take  by inhalation. albuterol (PROVENTIL VENTOLIN) 2.5 mg /3 mL (0.083 %) nebu 2022  Yes Yes   Sig: Take 3 mL by inhalation every four (4) hours as needed. albuterol 2.5 mg /3 mL (0.083 %) nebu 3 mL, albuterol-ipratropium 2.5 mg-0.5 mg/3 ml nebu 3 mL 2022  Yes Yes   Sig: Take 1 Inhalation by inhalation every four (4) hours as needed. azelastine (ASTEPRO) 205.5 mcg (0.15 %)   Yes No   Sig: two (2) times a day. calcitonin, salmon, (MIACALCIN) nasal   Yes No   Si McRoberts by IntraNASal route daily. Patient not taking: Reported on 8/10/2022   calcium carbonate/vitamin D3 (CALCIUM + D PO) Not Taking  Yes No   Sig: Take  by mouth. Patient not taking: No sig reported   cholecalciferol (VITAMIN D3) 25 mcg (1,000 unit) cap 2022  Yes Yes   Sig: Take  by mouth daily. diclofenac (VOLTAREN) 1 % gel 2022  Yes Yes   Sig: Apply  to affected area four (4) times daily. dicyclomine (BENTYL) 20 mg tablet Not Taking  No No   Sig: Take 1 Tab by mouth every six (6) hours as needed for Abdominal Cramps. Patient not taking: No sig reported   docosahexanoic acid/epa (FISH OIL PO) Not Taking  Yes No   Sig: Take  by mouth. Patient not taking: Reported on 2022   ergocalciferol (ERGOCALCIFEROL) 50,000 unit capsule Not Taking  Yes No   Sig: ergocalciferol (vitamin D2) 50,000 unit capsule   Patient not taking: No sig reported   fluticasone (VERAMYST) 27.5 mcg/actuation nasal spray Not Taking  Yes No   Si Sprays by Nasal route daily. Patient not taking: No sig reported   fluticasone-umeclidinium-vilanterol (Trelegy Ellipta) 100-62.5-25 mcg inhaler 2022  Yes Yes   Sig: Take 1 Puff by inhalation daily. ibandronate (BONIVA) 150 mg tablet Not Taking  Yes No   Sig: ibandronate 150 mg tablet   Patient not taking: No sig reported   levothyroxine (SYNTHROID) 25 mcg tablet 2022  Yes Yes   Sig: levothyroxine 25 mcg tablet   metoprolol succinate (TOPROL-XL) 50 mg XL tablet Not Taking  No No   Sig: Take 1 Tablet by mouth daily. Patient not taking: No sig reported   metroNIDAZOLE (METROCREAM) 0.75 % topical cream 2022  Yes Yes   Sig: metronidazole 0.75 % topical cream   mometasone (NASONEX) 50 mcg/actuation nasal spray Not Taking  Yes No   Sig: mometasone 50 mcg/actuation nasal spray   Patient not taking: No sig reported   montelukast (SINGULAIR) 10 mg tablet 2022  Yes Yes   Sig: montelukast 10 mg tablet   multivitamin (ONE A DAY) tablet 2022  Yes Yes   Sig: Take 1 Tab by mouth daily. olopatadine (PATADAY) 0.2 % drop ophthalmic solution Not Taking  Yes No   Sig: Administer 1 Drop to both eyes daily. Patient not taking: No sig reported   ondansetron (ZOFRAN ODT) 4 mg disintegrating tablet   No No   Sig: Take 1 Tab by mouth every eight (8) hours as needed for Nausea. pantoprazole (PROTONIX) 40 mg tablet 2022  Yes Yes   Sig: Take 40 mg by mouth daily. polysorbate 80/glycerin (REFRESH DRY EYE THERAPY OP) Not Taking  Yes No   Sig: Apply  to eye. Patient not taking: No sig reported   sotaloL (BETAPACE) 80 mg tablet 2022  Yes Yes   Sig: Take  by mouth two (2) times a day.    tiotropium bromide (SPIRIVA RESPIMAT) 2.5 mcg/actuation inhaler 8/19/2022  Yes Yes   Sig: Spiriva Respimat 2.5 mcg/actuation solution for inhalation   vitamin B complex (VITAMINS B COMPLEX PO) Not Taking  Yes No   Sig: Take  by mouth. Patient not taking: No sig reported   vitamin E acetate (VITAMIN E PO) Not Taking  Yes No   Sig: Take  by mouth.    Patient not taking: No sig reported      Facility-Administered Medications: None       Hospital Medications:  Current Facility-Administered Medications   Medication Dose Route Frequency    methylPREDNISolone (PF) (SOLU-MEDROL) injection 30 mg  30 mg IntraVENous Q12H    albuterol-ipratropium (DUO-NEB) 2.5 MG-0.5 MG/3 ML  3 mL Nebulization BID RT    fluticasone-umeclidin-vilanter (TRELEGY ELLIPTA) inhaler 1 Puff  1 Puff Inhalation DAILY    ondansetron (ZOFRAN) injection 4 mg  4 mg IntraVENous Q4H PRN    dicyclomine (BENTYL) tablet 20 mg  20 mg Oral Q6H PRN    sodium chloride (NS) flush 5-40 mL  5-40 mL IntraVENous Q8H    sodium chloride (NS) flush 5-40 mL  5-40 mL IntraVENous PRN    acetaminophen (TYLENOL) tablet 650 mg  650 mg Oral Q6H PRN    Or    acetaminophen (TYLENOL) suppository 650 mg  650 mg Rectal Q6H PRN    polyethylene glycol (MIRALAX) packet 17 g  17 g Oral DAILY PRN    ondansetron (ZOFRAN ODT) tablet 4 mg  4 mg Oral Q8H PRN    Or    ondansetron (ZOFRAN) injection 4 mg  4 mg IntraVENous Q6H PRN    guaiFENesin ER (MUCINEX) tablet 600 mg  600 mg Oral Q12H    levothyroxine (SYNTHROID) tablet 25 mcg  25 mcg Oral 6am    rivaroxaban (XARELTO) tablet 20 mg  20 mg Oral DAILY WITH BREAKFAST    pantoprazole (PROTONIX) tablet 40 mg  40 mg Oral DAILY    vancomycin (VANCOCIN) 750 mg in 0.9% sodium chloride 250 mL ([id number])  750 mg IntraVENous Q12H    furosemide (LASIX) injection 20 mg  20 mg IntraVENous DAILY    doxycycline (VIBRAMYCIN) 100 mg in 0.9% sodium chloride (MBP/ADV) 100 mL MBP  100 mg IntraVENous Q12H       Review of Systems: Admission ROS by Chioma Gastelum MD from 8/19/2022 were reviewed with the patient and changes (other than per HPI) include: none      Objective:     Physical Exam:  Visit Vitals  BP (!) 145/70 (BP 1 Location: Left upper arm, BP Patient Position: At rest;Semi fowlers)   Pulse 86   Temp 98.3 °F (36.8 °C)   Resp 18   Ht 5' 4\" (1.626 m)   Wt 62.6 kg (138 lb)   SpO2 93%   BMI 23.69 kg/m²     SpO2 Readings from Last 6 Encounters:   08/20/22 93%   08/10/22 94%   05/01/22 96%   06/14/21 99%   04/28/21 97%   01/13/21 97%          Intake/Output Summary (Last 24 hours) at 8/20/2022 1429  Last data filed at 8/20/2022 1400  Gross per 24 hour   Intake 830 ml   Output 0 ml   Net 830 ml      General: no distress, comfortable  Skin:  No rash or palpable dermatologic mass lesions  HEENT: Pupils equal, sclera anicteric, oropharynx with no gross lesions  Cardiovascular: No abnormal audible heart sounds, well perfused, no edema  Respiratory:  No abnormal audible breath sounds, normal respiratory effort, no throacic deformity  GI:  Abdomen nondistended, nontender, no mass, no free fluid, no rebound or guarding. Musculoskeletal:  No skeletal deformity nor acute arthritis noted.   Neurological:  Motor and sensory function intact in upper extremeties  Psychiatric:  Normal affect, memory intact, appears to have insight into current illness  Lymphatic:  No cervical, supraclavicular, or periumbilic lymphadenopathy    Laboratory:    Recent Results (from the past 24 hour(s))   COVID-19 RAPID TEST    Collection Time: 08/19/22  5:58 PM   Result Value Ref Range    Specimen source Nasopharyngeal      COVID-19 rapid test Not detected NOTD     METABOLIC PANEL, COMPREHENSIVE    Collection Time: 08/20/22  5:19 AM   Result Value Ref Range    Sodium 131 (L) 136 - 145 mmol/L    Potassium 3.3 (L) 3.5 - 5.1 mmol/L    Chloride 97 97 - 108 mmol/L    CO2 25 21 - 32 mmol/L    Anion gap 9 5 - 15 mmol/L    Glucose 116 (H) 65 - 100 mg/dL    BUN 5 (L) 6 - 20 MG/DL    Creatinine 0.37 (L) 0.55 - 1.02 MG/DL BUN/Creatinine ratio 14 12 - 20      GFR est AA >60 >60 ml/min/1.73m2    GFR est non-AA >60 >60 ml/min/1.73m2    Calcium 8.6 8.5 - 10.1 MG/DL    Bilirubin, total 0.4 0.2 - 1.0 MG/DL    ALT (SGPT) 10 (L) 12 - 78 U/L    AST (SGOT) 11 (L) 15 - 37 U/L    Alk. phosphatase 105 45 - 117 U/L    Protein, total 6.3 (L) 6.4 - 8.2 g/dL    Albumin 2.2 (L) 3.5 - 5.0 g/dL    Globulin 4.1 (H) 2.0 - 4.0 g/dL    A-G Ratio 0.5 (L) 1.1 - 2.2     CBC W/O DIFF    Collection Time: 08/20/22  5:19 AM   Result Value Ref Range    WBC 8.3 3.6 - 11.0 K/uL    RBC 3.34 (L) 3.80 - 5.20 M/uL    HGB 9.3 (L) 11.5 - 16.0 g/dL    HCT 28.2 (L) 35.0 - 47.0 %    MCV 84.4 80.0 - 99.0 FL    MCH 27.8 26.0 - 34.0 PG    MCHC 33.0 30.0 - 36.5 g/dL    RDW 14.1 11.5 - 14.5 %    PLATELET 931 (H) 120 - 400 K/uL    MPV 8.7 (L) 8.9 - 12.9 FL    NRBC 0.0 0  WBC    ABSOLUTE NRBC 0.00 0.00 - 0.01 K/uL         Assessment/Plan:     Active Problems:    Pneumonia (8/19/2022)         See above narrative for full detail.

## 2022-08-21 LAB
ANION GAP SERPL CALC-SCNC: 9 MMOL/L (ref 5–15)
BASOPHILS # BLD: 0 K/UL (ref 0–0.1)
BASOPHILS NFR BLD: 0 % (ref 0–1)
BUN SERPL-MCNC: 10 MG/DL (ref 6–20)
BUN/CREAT SERPL: 20 (ref 12–20)
CALCIUM SERPL-MCNC: 8.8 MG/DL (ref 8.5–10.1)
CHLORIDE SERPL-SCNC: 98 MMOL/L (ref 97–108)
CO2 SERPL-SCNC: 25 MMOL/L (ref 21–32)
CREAT SERPL-MCNC: 0.49 MG/DL (ref 0.55–1.02)
DIFFERENTIAL METHOD BLD: ABNORMAL
EOSINOPHIL # BLD: 0 K/UL (ref 0–0.4)
EOSINOPHIL NFR BLD: 0 % (ref 0–7)
ERYTHROCYTE [DISTWIDTH] IN BLOOD BY AUTOMATED COUNT: 14.3 % (ref 11.5–14.5)
GLUCOSE SERPL-MCNC: 160 MG/DL (ref 65–100)
HCT VFR BLD AUTO: 28.4 % (ref 35–47)
HGB BLD-MCNC: 9.4 G/DL (ref 11.5–16)
IMM GRANULOCYTES # BLD AUTO: 0.2 K/UL (ref 0–0.04)
IMM GRANULOCYTES NFR BLD AUTO: 1 % (ref 0–0.5)
LYMPHOCYTES # BLD: 1 K/UL (ref 0.8–3.5)
LYMPHOCYTES NFR BLD: 6 % (ref 12–49)
MAGNESIUM SERPL-MCNC: 1.6 MG/DL (ref 1.6–2.4)
MCH RBC QN AUTO: 28.8 PG (ref 26–34)
MCHC RBC AUTO-ENTMCNC: 33.1 G/DL (ref 30–36.5)
MCV RBC AUTO: 87.1 FL (ref 80–99)
MONOCYTES # BLD: 0.5 K/UL (ref 0–1)
MONOCYTES NFR BLD: 3 % (ref 5–13)
NEUTS SEG # BLD: 15.5 K/UL (ref 1.8–8)
NEUTS SEG NFR BLD: 90 % (ref 32–75)
NRBC # BLD: 0 K/UL (ref 0–0.01)
NRBC BLD-RTO: 0 PER 100 WBC
PLATELET # BLD AUTO: 600 K/UL (ref 150–400)
PMV BLD AUTO: 8.9 FL (ref 8.9–12.9)
POTASSIUM SERPL-SCNC: 3.6 MMOL/L (ref 3.5–5.1)
RBC # BLD AUTO: 3.26 M/UL (ref 3.8–5.2)
RBC MORPH BLD: ABNORMAL
SODIUM SERPL-SCNC: 132 MMOL/L (ref 136–145)
VANCOMYCIN SERPL-MCNC: 17.7 UG/ML
WBC # BLD AUTO: 17.2 K/UL (ref 3.6–11)

## 2022-08-21 PROCEDURE — 94640 AIRWAY INHALATION TREATMENT: CPT

## 2022-08-21 PROCEDURE — 80202 ASSAY OF VANCOMYCIN: CPT

## 2022-08-21 PROCEDURE — 74011250636 HC RX REV CODE- 250/636: Performed by: INTERNAL MEDICINE

## 2022-08-21 PROCEDURE — 65270000029 HC RM PRIVATE

## 2022-08-21 PROCEDURE — 85025 COMPLETE CBC W/AUTO DIFF WBC: CPT

## 2022-08-21 PROCEDURE — 74011000250 HC RX REV CODE- 250: Performed by: FAMILY MEDICINE

## 2022-08-21 PROCEDURE — 74011250636 HC RX REV CODE- 250/636: Performed by: FAMILY MEDICINE

## 2022-08-21 PROCEDURE — 94761 N-INVAS EAR/PLS OXIMETRY MLT: CPT

## 2022-08-21 PROCEDURE — 74011250637 HC RX REV CODE- 250/637: Performed by: INTERNAL MEDICINE

## 2022-08-21 PROCEDURE — 74011000258 HC RX REV CODE- 258: Performed by: FAMILY MEDICINE

## 2022-08-21 PROCEDURE — 74011000250 HC RX REV CODE- 250: Performed by: INTERNAL MEDICINE

## 2022-08-21 PROCEDURE — 74011250637 HC RX REV CODE- 250/637: Performed by: FAMILY MEDICINE

## 2022-08-21 PROCEDURE — 36415 COLL VENOUS BLD VENIPUNCTURE: CPT

## 2022-08-21 PROCEDURE — 80048 BASIC METABOLIC PNL TOTAL CA: CPT

## 2022-08-21 PROCEDURE — 83735 ASSAY OF MAGNESIUM: CPT

## 2022-08-21 RX ORDER — AMLODIPINE BESYLATE 5 MG/1
5 TABLET ORAL DAILY
Status: DISCONTINUED | OUTPATIENT
Start: 2022-08-21 | End: 2022-08-23 | Stop reason: HOSPADM

## 2022-08-21 RX ADMIN — NYSTATIN 500000 UNITS: 100000 SUSPENSION ORAL at 13:00

## 2022-08-21 RX ADMIN — ACETAMINOPHEN 650 MG: 325 TABLET ORAL at 21:24

## 2022-08-21 RX ADMIN — NYSTATIN 500000 UNITS: 100000 SUSPENSION ORAL at 21:24

## 2022-08-21 RX ADMIN — AMLODIPINE BESYLATE 5 MG: 5 TABLET ORAL at 12:13

## 2022-08-21 RX ADMIN — SOTALOL HYDROCHLORIDE 80 MG: 80 TABLET ORAL at 06:28

## 2022-08-21 RX ADMIN — NYSTATIN 500000 UNITS: 100000 SUSPENSION ORAL at 09:57

## 2022-08-21 RX ADMIN — VANCOMYCIN HYDROCHLORIDE 750 MG: 750 INJECTION, POWDER, LYOPHILIZED, FOR SOLUTION INTRAVENOUS at 20:05

## 2022-08-21 RX ADMIN — SODIUM CHLORIDE, PRESERVATIVE FREE 10 ML: 5 INJECTION INTRAVENOUS at 21:31

## 2022-08-21 RX ADMIN — SODIUM CHLORIDE, PRESERVATIVE FREE 10 ML: 5 INJECTION INTRAVENOUS at 06:29

## 2022-08-21 RX ADMIN — LEVOTHYROXINE SODIUM 25 MCG: 0.03 TABLET ORAL at 06:25

## 2022-08-21 RX ADMIN — GUAIFENESIN 600 MG: 600 TABLET ORAL at 09:56

## 2022-08-21 RX ADMIN — METHYLPREDNISOLONE SODIUM SUCCINATE 30 MG: 40 INJECTION, POWDER, FOR SOLUTION INTRAMUSCULAR; INTRAVENOUS at 09:57

## 2022-08-21 RX ADMIN — VANCOMYCIN HYDROCHLORIDE 750 MG: 750 INJECTION, POWDER, LYOPHILIZED, FOR SOLUTION INTRAVENOUS at 09:58

## 2022-08-21 RX ADMIN — GUAIFENESIN 600 MG: 600 TABLET ORAL at 20:13

## 2022-08-21 RX ADMIN — NYSTATIN 500000 UNITS: 100000 SUSPENSION ORAL at 16:51

## 2022-08-21 RX ADMIN — DOXYCYCLINE 100 MG: 100 INJECTION, POWDER, LYOPHILIZED, FOR SOLUTION INTRAVENOUS at 09:58

## 2022-08-21 RX ADMIN — IPRATROPIUM BROMIDE AND ALBUTEROL SULFATE 3 ML: .5; 3 SOLUTION RESPIRATORY (INHALATION) at 08:02

## 2022-08-21 RX ADMIN — METHYLPREDNISOLONE SODIUM SUCCINATE 30 MG: 40 INJECTION, POWDER, FOR SOLUTION INTRAMUSCULAR; INTRAVENOUS at 20:05

## 2022-08-21 RX ADMIN — ACETAMINOPHEN 650 MG: 325 TABLET ORAL at 11:05

## 2022-08-21 RX ADMIN — FUROSEMIDE 20 MG: 10 INJECTION, SOLUTION INTRAMUSCULAR; INTRAVENOUS at 09:56

## 2022-08-21 RX ADMIN — RIVAROXABAN 20 MG: 10 TABLET, FILM COATED ORAL at 09:58

## 2022-08-21 RX ADMIN — PANTOPRAZOLE SODIUM 40 MG: 40 TABLET, DELAYED RELEASE ORAL at 09:57

## 2022-08-21 RX ADMIN — SODIUM CHLORIDE, PRESERVATIVE FREE 10 ML: 5 INJECTION INTRAVENOUS at 13:00

## 2022-08-21 RX ADMIN — SOTALOL HYDROCHLORIDE 80 MG: 80 TABLET ORAL at 16:51

## 2022-08-21 RX ADMIN — FLUTICASONE FUROATE, UMECLIDINIUM BROMIDE AND VILANTEROL TRIFENATATE 1 PUFF: 200; 62.5; 25 POWDER RESPIRATORY (INHALATION) at 08:13

## 2022-08-21 RX ADMIN — DOXYCYCLINE 100 MG: 100 INJECTION, POWDER, LYOPHILIZED, FOR SOLUTION INTRAVENOUS at 21:24

## 2022-08-21 RX ADMIN — ACETAMINOPHEN 650 MG: 325 TABLET ORAL at 04:24

## 2022-08-21 RX ADMIN — IPRATROPIUM BROMIDE AND ALBUTEROL SULFATE 3 ML: .5; 3 SOLUTION RESPIRATORY (INHALATION) at 21:28

## 2022-08-21 NOTE — PROGRESS NOTES
Vishal Colunga [facility] [address]  [address] [facility], [place], [address] [place] [address]  [phone]      Medical Progress Note      NAME: Lorna Ignacio   :  1940  MRM:  [id number]    Date of service: 2022  9:22 AM       Assessment and Plan:   Pneumonia (2022). Check pneumonia workup, procal.sputum for gram stain and culture. chest x-ray shows Left upper lobe parenchymal opacity is increased most likely related to pneumonia. Cont ABx. Consult pulmonary      2. Acute COPD with mild exacerbation. Cont duo neb, pulmicort, brovana and solumderol(taper). 3.  Chronic systolic CHF. EF from 2020 to about 40 to 45%. Monitor dilay wt and I/O     4. Paroxysmal atrial fibrillation. Rate is well controlled. On sotolol and xarelto. 5.  Hypothyroidism. Continue Synthroid     6. Persistent nausea and vomiting secondary to esophageal candidiasis. Continue nystatin swish and swallow. Evaluated by GI. Had recent endoscopy April 10, 2022     7. Hypokalemia. Replete           Subjective:     Chief Complaint[de-identified] Patient was seen and examined as a follow up for CAP. Chart was reviewed. cough is getting better     ROS:  (bold if positive, if negative)    Cough Tolerating PT  Tolerating Diet        Objective:     Last 24hrs VS reviewed since prior progress note.  Most recent are:    Visit Vitals  BP (!) 144/70 (BP 1 Location: Left upper arm)   Pulse 71   Temp 97.6 °F (36.4 °C)   Resp 18   Ht 5' 4\" (1.626 m)   Wt 61.6 kg (135 lb 11.8 oz)   SpO2 94%   BMI 23.30 kg/m²     SpO2 Readings from Last 6 Encounters:   22 94%   08/10/22 94%   22 96%   21 99%   21 97%   21 97%          Intake/Output Summary (Last 24 hours) at 2022 0959  Last data filed at 2022 1753  Gross per 24 hour   Intake 480 ml   Output --   Net 480 ml          Physical Exam:    Gen:  Well-developed, well-nourished, in no acute distress  HEENT:  Pink conjunctivae, PERRL, hearing intact to voice, moist mucous membranes  Neck:  Supple, without masses, thyroid non-tender  Resp:  No accessory muscle use,  rales BL.   Card:  No murmurs, normal S1, S2 without thrills, bruits or peripheral edema  Abd:  Soft, non-tender, non-distended, normoactive bowel sounds are present, no palpable organomegaly and no detectable hernias  Lymph:  No cervical or inguinal adenopathy  Musc:  No cyanosis or clubbing  Skin:  No rashes or ulcers, skin turgor is good  Neuro:  Cranial nerves are grossly intact, no focal motor weakness, follows commands appropriately  Psych:  Good insight, oriented to person, place and time, alert  __________________________________________________________________  Medications Reviewed: (see below)  Medications:     Current Facility-Administered Medications   Medication Dose Route Frequency    methylPREDNISolone (PF) (SOLU-MEDROL) injection 30 mg  30 mg IntraVENous Q12H    sotaloL (BETAPACE) tablet 80 mg  80 mg Oral Q12H    albuterol-ipratropium (DUO-NEB) 2.5 MG-0.5 MG/3 ML  3 mL Nebulization BID RT    fluticasone-umeclidin-vilanter (TRELEGY ELLIPTA) inhaler 1 Puff  1 Puff Inhalation DAILY    nystatin (MYCOSTATIN) 100,000 unit/mL oral suspension 500,000 Units  500,000 Units Oral QID    ondansetron (ZOFRAN) injection 4 mg  4 mg IntraVENous Q4H PRN    dicyclomine (BENTYL) tablet 20 mg  20 mg Oral Q6H PRN    sodium chloride (NS) flush 5-40 mL  5-40 mL IntraVENous Q8H    sodium chloride (NS) flush 5-40 mL  5-40 mL IntraVENous PRN    acetaminophen (TYLENOL) tablet 650 mg  650 mg Oral Q6H PRN    Or    acetaminophen (TYLENOL) suppository 650 mg  650 mg Rectal Q6H PRN    polyethylene glycol (MIRALAX) packet 17 g  17 g Oral DAILY PRN    ondansetron (ZOFRAN ODT) tablet 4 mg  4 mg Oral Q8H PRN    Or    ondansetron (ZOFRAN) injection 4 mg  4 mg IntraVENous Q6H PRN    guaiFENesin ER (MUCINEX) tablet 600 mg  600 mg Oral Q12H    levothyroxine (SYNTHROID) tablet 25 mcg  25 mcg Oral 6am    rivaroxaban (XARELTO) tablet 20 mg  20 mg Oral DAILY WITH BREAKFAST    pantoprazole (PROTONIX) tablet 40 mg  40 mg Oral DAILY    vancomycin (VANCOCIN) 750 mg in 0.9% sodium chloride 250 mL (Aeri4Ars)  750 mg IntraVENous Q12H    furosemide (LASIX) injection 20 mg  20 mg IntraVENous DAILY    doxycycline (VIBRAMYCIN) 100 mg in 0.9% sodium chloride (MBP/ADV) 100 mL MBP  100 mg IntraVENous Q12H        Lab Data Reviewed: (see below)  Lab Review:     Recent Labs     08/21/22 0432 08/20/22 0519 08/19/22  1054   WBC 17.2* 8.3 10.7   HGB 9.4* 9.3* 10.3*   HCT 28.4* 28.2* 31.1*   * 522* 625*       Recent Labs     08/21/22 0432 08/20/22 0519 08/19/22  1054   * 131* 128*   K 3.6 3.3* 3.3*   CL 98 97 92*   CO2 25 25 29   * 116* 102*   BUN 10 5* 8   CREA 0.49* 0.37* 0.50*   CA 8.8 8.6 9.0   MG 1.6  --  1.7   ALB  --  2.2* 2.5*   TBILI  --  0.4 0.4   ALT  --  10* 12       No results found for: GLUCPOC  No results for input(s): PH, PCO2, PO2, HCO3, FIO2 in the last 72 hours. No results for input(s): INR, INREXT, INREXT in the last 72 hours. All Micro Results       Procedure Component Value Units Date/Time    COVID-19 RAPID TEST [[id number]] Collected: 08/19/22 7078    Order Status: Completed Specimen: Nasopharyngeal Updated: 08/19/22 7001     Specimen source Nasopharyngeal        COVID-19 rapid test Not detected        Comment: Rapid Abbott ID Now       Rapid NAAT:  The specimen is NEGATIVE for SARS-CoV-2, the novel coronavirus associated with COVID-19. Negative results should be treated as presumptive and, if inconsistent with clinical signs and symptoms or necessary for patient management, should be tested with an alternative molecular assay. Negative results do not preclude SARS-CoV-2 infection and should not be used as the sole basis for patient management decisions. This test has been authorized by the FDA under an Emergency Use Authorization (EUA) for use by authorized laboratories.    Fact sheet for Healthcare Providers: ConventionUpdate.co.nz  Fact sheet for Patients: ConventionUpdate.co.nz       Methodology: Isothermal Nucleic Acid Amplification                 I have reviewed notes of prior 24hr. Other pertinent lab: Total time spent with patient: 35 minutes I personally reviewed chart, notes, data and current medications in the medical record. I have personally examined and treated the patient at bedside during this period.                  Care Plan discussed with: Patient, Nursing Staff, and >50% of time spent in counseling and coordination of care    Discussed:  Care Plan    Prophylaxis:   Xarelto     Disposition:  Home w/Family           ___________________________________________________    Attending Physician: Dayton Oneil MD

## 2022-08-21 NOTE — PROGRESS NOTES
Arnaldo Gilmore. Reanna Balderrama MD  [phone] office  [phone] [facility]     Gastroenterology Progress Note    August 21, 2022  Admit Date: 8/19/2022         Narrative Assessment and Plan   Nausea - seems improved. She reports she is eating  Candidal esophagitis in concert with oropharyngeal candidiasis - nystatin swish/swallow 21 days    She seems to be of the mind that she is being discharged. I have no objections but see no orders for such. GI will see again if needed, call if so; but for now I am signing off. Subjective:   CC: nausea, but she reports improvement. Location:  no vomiting  Duration: good breakfast this am  Timing: looking forward to lunch  Radiation: no bleeding   Associated Symptoms:   Aggravating/Alleviating factors:    ROS:  The previous review of systems on initial consultation / H&P is noted and reviewed. Specific changes noted above in HPI.     Current Medications:     Current Facility-Administered Medications   Medication Dose Route Frequency    amLODIPine (NORVASC) tablet 5 mg  5 mg Oral DAILY    methylPREDNISolone (PF) (SOLU-MEDROL) injection 30 mg  30 mg IntraVENous Q12H    sotaloL (BETAPACE) tablet 80 mg  80 mg Oral Q12H    albuterol-ipratropium (DUO-NEB) 2.5 MG-0.5 MG/3 ML  3 mL Nebulization BID RT    fluticasone-umeclidin-vilanter (TRELEGY ELLIPTA) inhaler 1 Puff  1 Puff Inhalation DAILY    nystatin (MYCOSTATIN) 100,000 unit/mL oral suspension 500,000 Units  500,000 Units Oral QID    ondansetron (ZOFRAN) injection 4 mg  4 mg IntraVENous Q4H PRN    dicyclomine (BENTYL) tablet 20 mg  20 mg Oral Q6H PRN    sodium chloride (NS) flush 5-40 mL  5-40 mL IntraVENous Q8H    sodium chloride (NS) flush 5-40 mL  5-40 mL IntraVENous PRN    acetaminophen (TYLENOL) tablet 650 mg  650 mg Oral Q6H PRN    Or    acetaminophen (TYLENOL) suppository 650 mg  650 mg Rectal Q6H PRN    polyethylene glycol (MIRALAX) packet 17 g  17 g Oral DAILY PRN    ondansetron (ZOFRAN ODT) tablet 4 mg  4 mg Oral Q8H PRN    Or    ondansetron (ZOFRAN) injection 4 mg  4 mg IntraVENous Q6H PRN    guaiFENesin ER (MUCINEX) tablet 600 mg  600 mg Oral Q12H    levothyroxine (SYNTHROID) tablet 25 mcg  25 mcg Oral 6am    rivaroxaban (XARELTO) tablet 20 mg  20 mg Oral DAILY WITH BREAKFAST    pantoprazole (PROTONIX) tablet 40 mg  40 mg Oral DAILY    vancomycin (VANCOCIN) 750 mg in 0.9% sodium chloride 250 mL ([id number])  750 mg IntraVENous Q12H    doxycycline (VIBRAMYCIN) 100 mg in 0.9% sodium chloride (MBP/ADV) 100 mL MBP  100 mg IntraVENous Q12H       Objective:     VITALS:   Last 24hrs VS reviewed since prior progress note. Most recent are:  Visit Vitals  BP (!) 153/66   Pulse 64   Temp 97.7 °F (36.5 °C)   Resp 18   Ht 5' 4\" (1.626 m)   Wt 61.6 kg (135 lb 11.8 oz)   SpO2 91%   BMI 23.30 kg/m²     Temp (24hrs), Av °F (36.7 °C), Min:97.6 °F (36.4 °C), Max:98.9 °F (37.2 °C)      Intake/Output Summary (Last 24 hours) at 2022 1304  Last data filed at 2022 1753  Gross per 24 hour   Intake 480 ml   Output --   Net 480 ml       EXAM:  General:  Comfortable, no distress    HEENT:  Atraumatic skull, pupils equal  Psych:    Good insight. Not anxious nor agitated  Derm:   No rash, jaundice, nor palpable dermatologic mass    Lab Data Reviewed:   Recent Labs     22  0432 22  0519 22  1054   WBC 17.2* 8.3 10.7   HGB 9.4* 9.3* 10.3*   HCT 28.4* 28.2* 31.1*   * 522* 625*     Recent Labs     22  0432 22  0519 22  1054   * 131* 128*   K 3.6 3.3* 3.3*   CL 98 97 92*   CO2 25 25 29   * 116* 102*   BUN 10 5* 8   CREA 0.49* 0.37* 0.50*   CA 8.8 8.6 9.0   MG 1.6  --  1.7   ALB  --  2.2* 2.5*   TBILI  --  0.4 0.4   ALT  --  10* 12     No results found for: GLUCPOC  No results for input(s): PH, PCO2, PO2, HCO3, FIO2 in the last 72 hours. No results for input(s): INR, INREXT in the last 72 hours.         Assessment:   (See above)  Active Problems:    Pneumonia (8/19/2022)        Plan:   (See above)      Signed By: Dinah Mota MD     8/21/2022  1:04 PM

## 2022-08-21 NOTE — PROGRESS NOTES
Problem: Falls - Risk of  Goal: *Absence of Falls  Description: Document Greene Reason Fall Risk and appropriate interventions in the flowsheet.   8/21/2022 0935 by Audrey Johnson RN  Outcome: Progressing Towards Goal  Note: Fall Risk Interventions:  Mobility Interventions: Bed/chair exit alarm, Patient to call before getting OOB         Medication Interventions: Evaluate medications/consider consulting pharmacy, Teach patient to arise slowly         History of Falls Interventions: Door open when patient unattended, Investigate reason for fall, Evaluate medications/consider consulting pharmacy      8/21/2022 0935 by Audrey Johnson RN  Outcome: Progressing Towards Goal  Note: Fall Risk Interventions:  Mobility Interventions: Bed/chair exit alarm, Patient to call before getting OOB         Medication Interventions: Evaluate medications/consider consulting pharmacy, Teach patient to arise slowly         History of Falls Interventions: Door open when patient unattended, Investigate reason for fall, Evaluate medications/consider consulting pharmacy         Problem: Patient Education: Go to Patient Education Activity  Goal: Patient/Family Education  8/21/2022 0935 by Audrey Johnson RN  Outcome: Progressing Towards Goal  8/21/2022 0935 by Audrey Johnson RN  Outcome: Progressing Towards Goal     Problem: Pain  Goal: *Control of Pain  Outcome: Progressing Towards Goal

## 2022-08-21 NOTE — PROGRESS NOTES
[facility] RX Pharmacy Progress Note: Antimicrobial Stewardship    Consult for antibiotic dosing of vancomycin by Dr. Mariza Damico  Indication: CAP  Day of Therapy: 3    Plan:  Vancomycin therapy:  Start with loading dose of vancomycin 1500 mg IV (25 mg/kg, max 2.5 gm) x 1; Given 8/19 @ 1912  Follow with maintenance dose of vancomycin 750 mg IV every 12 hours   Dose calculated to approximate a   Target AUC/RISHI of 400-600  Trough n/a   Assess/Plan:  Vancomycin random level drawn 8/21 at 0432 = 17.7 mcg/mL. [facility] calculated AUC to be 535, which is within therapeutic target goal of 400-600. Continue current regimen - Dose = 12 mg/kg. Renal stable; SCr ordered every other day per protocol. Subsequent level in ~7 days or sooner if clinically warranted. Pharmacy to follow daily and will make changes to dose and/or frequency based on clinical status. Date Dose & Interval Measured (mcg/mL) Extrapolated (mcg/mL)   ? ? ? ?   ? ? ? ?   ? ? ? ? Other Antimicrobial  (not dosed by pharmacist)   Doxy 100mg IV Q12H  Ceftriaxone IV x 1 (8/19)   Cultures     None   Serum Creatinine     Lab Results   Component Value Date/Time    Creatinine 0.37 (L) 08/20/2022 05:19 AM       Creatinine Clearance Estimated Creatinine Clearance: 53.5 mL/min (A) (by C-G formula based on SCr of 0.37 mg/dL (L)). Procalcitonin    Lab Results   Component Value Date/Time    Procalcitonin <0.05 08/19/2022 10:54 AM        Temp   98.3 °F (36.8 °C)    WBC   Lab Results   Component Value Date/Time    WBC 8.3 08/20/2022 05:19 AM       For Antifungals, Metronidazole and Nafcillin: Lab Results   Component Value Date/Time    ALT (SGPT) 10 (L) 08/20/2022 05:19 AM    AST (SGOT) 11 (L) 08/20/2022 05:19 AM    Alk.  phosphatase 105 08/20/2022 05:19 AM    Bilirubin, total 0.4 08/20/2022 05:19 AM         Pharmacist: Marifer Chandra

## 2022-08-21 NOTE — ROUTINE PROCESS
Bedside and verbal shift change report given to EZEQUIEL Lozada (oncoming nurse) by Katerine Sandoval RN (offgoing nurse). Report included the following information: SBAR, Kardex, Intake/Output, MAR, Recent Results, and Med Rec Status.

## 2022-08-21 NOTE — PROGRESS NOTES
Bedside and Verbal shift change report given to Cheryl Clarke RN (oncoming nurse) by Antoinette Herzog RN (offgoing nurse). Report included the following information SBAR, Kardex, ED Summary, Intake/Output, MAR, and Recent Results.

## 2022-08-21 NOTE — PROGRESS NOTES
Vancomycin Pharmacy Dosing Note    Vancomycin random level drawn 8/21 at 0432 = 17.7 mcg/mL. [facility] calculates AUC to be 535, which is within therapeutic target goal of 400-600. Continue current regimen - dose = 12 mg/kg.     Thank you,  Yury Moss, PHARMD

## 2022-08-22 LAB
ANION GAP SERPL CALC-SCNC: 8 MMOL/L (ref 5–15)
ATRIAL RATE: 74 BPM
BASOPHILS # BLD: 0 K/UL (ref 0–0.1)
BASOPHILS NFR BLD: 0 % (ref 0–1)
BUN SERPL-MCNC: 14 MG/DL (ref 6–20)
BUN/CREAT SERPL: 25 (ref 12–20)
CALCIUM SERPL-MCNC: 8.5 MG/DL (ref 8.5–10.1)
CALCULATED P AXIS, ECG09: 25 DEGREES
CALCULATED R AXIS, ECG10: -9 DEGREES
CALCULATED T AXIS, ECG11: 35 DEGREES
CHLORIDE SERPL-SCNC: 94 MMOL/L (ref 97–108)
CO2 SERPL-SCNC: 26 MMOL/L (ref 21–32)
COMMENT, HOLDF: NORMAL
CREAT SERPL-MCNC: 0.56 MG/DL (ref 0.55–1.02)
DIAGNOSIS, 93000: NORMAL
DIFFERENTIAL METHOD BLD: ABNORMAL
EOSINOPHIL # BLD: 0 K/UL (ref 0–0.4)
EOSINOPHIL NFR BLD: 0 % (ref 0–7)
ERYTHROCYTE [DISTWIDTH] IN BLOOD BY AUTOMATED COUNT: 14.5 % (ref 11.5–14.5)
GLUCOSE SERPL-MCNC: 153 MG/DL (ref 65–100)
HCT VFR BLD AUTO: 31.2 % (ref 35–47)
HGB BLD-MCNC: 10.2 G/DL (ref 11.5–16)
IMM GRANULOCYTES # BLD AUTO: 0.1 K/UL (ref 0–0.04)
IMM GRANULOCYTES NFR BLD AUTO: 1 % (ref 0–0.5)
LYMPHOCYTES # BLD: 1 K/UL (ref 0.8–3.5)
LYMPHOCYTES NFR BLD: 7 % (ref 12–49)
MCH RBC QN AUTO: 29 PG (ref 26–34)
MCHC RBC AUTO-ENTMCNC: 32.7 G/DL (ref 30–36.5)
MCV RBC AUTO: 88.6 FL (ref 80–99)
MONOCYTES # BLD: 0.4 K/UL (ref 0–1)
MONOCYTES NFR BLD: 3 % (ref 5–13)
NEUTS SEG # BLD: 12.2 K/UL (ref 1.8–8)
NEUTS SEG NFR BLD: 89 % (ref 32–75)
NRBC # BLD: 0 K/UL (ref 0–0.01)
NRBC BLD-RTO: 0 PER 100 WBC
OSMOLALITY SERPL: 274 MOSM/KG H2O
OSMOLALITY UR: 342 MOSM/KG H2O
P-R INTERVAL, ECG05: 148 MS
PLATELET # BLD AUTO: 629 K/UL (ref 150–400)
PMV BLD AUTO: 9.1 FL (ref 8.9–12.9)
POTASSIUM SERPL-SCNC: 3.6 MMOL/L (ref 3.5–5.1)
Q-T INTERVAL, ECG07: 364 MS
QRS DURATION, ECG06: 78 MS
QTC CALCULATION (BEZET), ECG08: 404 MS
RBC # BLD AUTO: 3.52 M/UL (ref 3.8–5.2)
RBC MORPH BLD: ABNORMAL
SAMPLES BEING HELD,HOLD: NORMAL
SODIUM SERPL-SCNC: 128 MMOL/L (ref 136–145)
SODIUM UR-SCNC: 37 MMOL/L
UR CULT HOLD, URHOLD: NORMAL
VENTRICULAR RATE, ECG03: 74 BPM
WBC # BLD AUTO: 13.7 K/UL (ref 3.6–11)

## 2022-08-22 PROCEDURE — 36415 COLL VENOUS BLD VENIPUNCTURE: CPT

## 2022-08-22 PROCEDURE — 83930 ASSAY OF BLOOD OSMOLALITY: CPT

## 2022-08-22 PROCEDURE — 74011250637 HC RX REV CODE- 250/637: Performed by: INTERNAL MEDICINE

## 2022-08-22 PROCEDURE — 86738 MYCOPLASMA ANTIBODY: CPT

## 2022-08-22 PROCEDURE — 74011250636 HC RX REV CODE- 250/636: Performed by: FAMILY MEDICINE

## 2022-08-22 PROCEDURE — 84300 ASSAY OF URINE SODIUM: CPT

## 2022-08-22 PROCEDURE — 87899 AGENT NOS ASSAY W/OPTIC: CPT

## 2022-08-22 PROCEDURE — 87186 SC STD MICRODIL/AGAR DIL: CPT

## 2022-08-22 PROCEDURE — 65270000029 HC RM PRIVATE

## 2022-08-22 PROCEDURE — 85025 COMPLETE CBC W/AUTO DIFF WBC: CPT

## 2022-08-22 PROCEDURE — 74011250636 HC RX REV CODE- 250/636: Performed by: INTERNAL MEDICINE

## 2022-08-22 PROCEDURE — 94640 AIRWAY INHALATION TREATMENT: CPT

## 2022-08-22 PROCEDURE — 74011000250 HC RX REV CODE- 250: Performed by: FAMILY MEDICINE

## 2022-08-22 PROCEDURE — 87070 CULTURE OTHR SPECIMN AEROBIC: CPT

## 2022-08-22 PROCEDURE — 74011000258 HC RX REV CODE- 258: Performed by: FAMILY MEDICINE

## 2022-08-22 PROCEDURE — 83935 ASSAY OF URINE OSMOLALITY: CPT

## 2022-08-22 PROCEDURE — 74011000250 HC RX REV CODE- 250: Performed by: INTERNAL MEDICINE

## 2022-08-22 PROCEDURE — 74011250637 HC RX REV CODE- 250/637: Performed by: FAMILY MEDICINE

## 2022-08-22 PROCEDURE — 87077 CULTURE AEROBIC IDENTIFY: CPT

## 2022-08-22 PROCEDURE — 87449 NOS EACH ORGANISM AG IA: CPT

## 2022-08-22 PROCEDURE — 80048 BASIC METABOLIC PNL TOTAL CA: CPT

## 2022-08-22 RX ORDER — MAGNESIUM SULFATE HEPTAHYDRATE 40 MG/ML
2 INJECTION, SOLUTION INTRAVENOUS ONCE
Status: COMPLETED | OUTPATIENT
Start: 2022-08-22 | End: 2022-08-23

## 2022-08-22 RX ORDER — SODIUM CHLORIDE 9 MG/ML
75 INJECTION, SOLUTION INTRAVENOUS CONTINUOUS
Status: DISCONTINUED | OUTPATIENT
Start: 2022-08-22 | End: 2022-08-23 | Stop reason: HOSPADM

## 2022-08-22 RX ADMIN — ACETAMINOPHEN 650 MG: 325 TABLET ORAL at 04:49

## 2022-08-22 RX ADMIN — VANCOMYCIN HYDROCHLORIDE 750 MG: 750 INJECTION, POWDER, LYOPHILIZED, FOR SOLUTION INTRAVENOUS at 09:40

## 2022-08-22 RX ADMIN — DOXYCYCLINE 100 MG: 100 INJECTION, POWDER, LYOPHILIZED, FOR SOLUTION INTRAVENOUS at 22:33

## 2022-08-22 RX ADMIN — METHYLPREDNISOLONE SODIUM SUCCINATE 30 MG: 40 INJECTION, POWDER, FOR SOLUTION INTRAMUSCULAR; INTRAVENOUS at 09:40

## 2022-08-22 RX ADMIN — SODIUM CHLORIDE, PRESERVATIVE FREE 10 ML: 5 INJECTION INTRAVENOUS at 05:49

## 2022-08-22 RX ADMIN — NYSTATIN 500000 UNITS: 100000 SUSPENSION ORAL at 09:41

## 2022-08-22 RX ADMIN — SODIUM CHLORIDE, PRESERVATIVE FREE 10 ML: 5 INJECTION INTRAVENOUS at 15:40

## 2022-08-22 RX ADMIN — RIVAROXABAN 20 MG: 10 TABLET, FILM COATED ORAL at 09:40

## 2022-08-22 RX ADMIN — GUAIFENESIN 600 MG: 600 TABLET ORAL at 09:41

## 2022-08-22 RX ADMIN — AMLODIPINE BESYLATE 5 MG: 5 TABLET ORAL at 09:41

## 2022-08-22 RX ADMIN — NYSTATIN 500000 UNITS: 100000 SUSPENSION ORAL at 12:37

## 2022-08-22 RX ADMIN — GUAIFENESIN 600 MG: 600 TABLET ORAL at 22:33

## 2022-08-22 RX ADMIN — FLUTICASONE FUROATE, UMECLIDINIUM BROMIDE AND VILANTEROL TRIFENATATE 1 PUFF: 200; 62.5; 25 POWDER RESPIRATORY (INHALATION) at 07:51

## 2022-08-22 RX ADMIN — POLYETHYLENE GLYCOL 3350 17 G: 17 POWDER, FOR SOLUTION ORAL at 17:55

## 2022-08-22 RX ADMIN — IPRATROPIUM BROMIDE AND ALBUTEROL SULFATE 3 ML: .5; 3 SOLUTION RESPIRATORY (INHALATION) at 20:31

## 2022-08-22 RX ADMIN — IPRATROPIUM BROMIDE AND ALBUTEROL SULFATE 3 ML: .5; 3 SOLUTION RESPIRATORY (INHALATION) at 07:44

## 2022-08-22 RX ADMIN — VANCOMYCIN HYDROCHLORIDE 750 MG: 750 INJECTION, POWDER, LYOPHILIZED, FOR SOLUTION INTRAVENOUS at 22:33

## 2022-08-22 RX ADMIN — LEVOTHYROXINE SODIUM 25 MCG: 0.03 TABLET ORAL at 05:49

## 2022-08-22 RX ADMIN — NYSTATIN 500000 UNITS: 100000 SUSPENSION ORAL at 22:33

## 2022-08-22 RX ADMIN — SODIUM CHLORIDE 75 ML/HR: 9 INJECTION, SOLUTION INTRAVENOUS at 12:37

## 2022-08-22 RX ADMIN — MAGNESIUM SULFATE HEPTAHYDRATE 2 G: 40 INJECTION, SOLUTION INTRAVENOUS at 12:37

## 2022-08-22 RX ADMIN — SODIUM CHLORIDE, PRESERVATIVE FREE 10 ML: 5 INJECTION INTRAVENOUS at 22:35

## 2022-08-22 RX ADMIN — DOXYCYCLINE 100 MG: 100 INJECTION, POWDER, LYOPHILIZED, FOR SOLUTION INTRAVENOUS at 09:40

## 2022-08-22 RX ADMIN — NYSTATIN 500000 UNITS: 100000 SUSPENSION ORAL at 17:56

## 2022-08-22 RX ADMIN — SOTALOL HYDROCHLORIDE 80 MG: 80 TABLET ORAL at 17:56

## 2022-08-22 RX ADMIN — METHYLPREDNISOLONE SODIUM SUCCINATE 30 MG: 40 INJECTION, POWDER, FOR SOLUTION INTRAMUSCULAR; INTRAVENOUS at 22:33

## 2022-08-22 RX ADMIN — SOTALOL HYDROCHLORIDE 80 MG: 80 TABLET ORAL at 05:49

## 2022-08-22 RX ADMIN — PANTOPRAZOLE SODIUM 40 MG: 40 TABLET, DELAYED RELEASE ORAL at 09:41

## 2022-08-22 NOTE — PROGRESS NOTES
[address] [address] RX Pharmacy Progress Note: Antimicrobial Stewardship 8/22/2022    Consult for antibiotic dosing of vancomycin by Dr. Tari Boone  Indication: CAP  Day of Therapy: 4    Plan:  Vancomycin therapy:  Start with loading dose of vancomycin 1500 mg IV (25 mg/kg, max 2.5 gm) x 1; Given 8/19 @ 1912  Follow with maintenance dose of vancomycin 750 mg IV every 12 hours   Dose calculated to approximate a   Target AUC/RISHI of 400-600  Trough n/a   Assess/Plan:  Renal fxn stable, vancomycin level therapeutic on current dose. Noted likely plan to discharge soon on PO abx on rounds. MRSA screen pending. Pharmacy to follow daily and will make changes to dose and/or frequency based on clinical status. Date Dose & Interval Measured (mcg/mL) Extrapolated (mcg/mL)   ? 8/21 750 mg Q12 hrs? 17.7? ?   ? ? ? ?   ? ? ? ? Other Antimicrobial  (not dosed by pharmacist)   Doxy 100mg IV Q12H  Ceftriaxone IV x 1 (8/19)   Cultures     None   Serum Creatinine     Lab Results   Component Value Date/Time    Creatinine 0.37 (L) 08/20/2022 05:19 AM       Creatinine Clearance Estimated Creatinine Clearance: 53.5 mL/min (A) (by C-G formula based on SCr of 0.37 mg/dL (L)). Procalcitonin    Lab Results   Component Value Date/Time    Procalcitonin <0.05 08/19/2022 10:54 AM        Temp   98.3 °F (36.8 °C)    WBC   Lab Results   Component Value Date/Time    WBC 8.3 08/20/2022 05:19 AM       For Antifungals, Metronidazole and Nafcillin: Lab Results   Component Value Date/Time    ALT (SGPT) 10 (L) 08/20/2022 05:19 AM    AST (SGOT) 11 (L) 08/20/2022 05:19 AM    Alk.  phosphatase 105 08/20/2022 05:19 AM    Bilirubin, total 0.4 08/20/2022 05:19 AM         Thanks,  Pharmacist: Roosevelt Hamman

## 2022-08-22 NOTE — PROGRESS NOTES
Comprehensive Nutrition Assessment    Type and Reason for Visit: Initial, Positive nutrition screen    Nutrition Recommendations/Plan:   Continue regular diet  Declined ONS  Monitor Na, nausea, PO intake     Malnutrition Assessment:  Malnutrition Status: Moderate malnutrition (08/22/22 1540)    Context:  Chronic illness     Findings of the 6 clinical characteristics of malnutrition:   Energy Intake:  75% or less est energy requirements for 1 month or longer  Weight Loss:  7.5% over 3 months     Body Fat Loss:  Mild body fat loss,     Muscle Mass Loss:  Mild muscle mass loss,    Fluid Accumulation:  No significant fluid accumulation,     Strength:  Not performed     Nutrition Assessment:         Pt admitted with Pneumonia [J18.9]    Past Medical History:   Diagnosis Date    Asthma     Chronic a-fib (HCC)     Chronic anticoagulation     Congestive heart failure ([facility] [address] 05/2020    GERD (gastroesophageal reflux disease)     Hiatal hernia     HX: breast cancer 2015    Right     Hypothyroid     Paroxysmal A-fib (HCC)     Venous insufficiency     bilat LE     Pt reports significant wt loss \"50 lbs in less than 6 months. \" EMR indicates pt has lost 12 lbs over last 3 months and 30 lbs over the last 14 months. Pt with poor PO PTA, pt states possibly related to nausea caused by low Na. 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 Pt does drink some Ensure at home but lucero not want any during admission. Pt reports feeling better and eating fair/well currently. Denies n/v currently, No known food allergies, denies chew/swallow difficulties.          Last BM: 08/21/22, Soft    PO intake: Patient Vitals for the past 168 hrs:   % Diet Eaten   08/22/22 0908 76 - 100%   08/20/22 1753 51 - 75%   08/20/22 1400 51 - 75%   08/20/22 0902 26 - 50%       Wt Readings from Last 30 Encounters:   08/21/22 63.2 kg (139 lb 6.4 oz)   08/10/22 63 kg (138 lb 14.2 oz)   05/01/22 68.5 kg (151 lb 0.2 oz)   06/14/21 76.8 kg (169 lb 5 oz)   04/28/21 76.8 kg (169 lb 6.4 oz)   01/13/21 77.1 kg (170 lb)   01/11/21 77.1 kg (170 lb)   01/06/21 77.1 kg (170 lb)   08/17/20 74.4 kg (164 lb)   02/26/20 71.8 kg (158 lb 3.2 oz)   08/26/19 69.9 kg (154 lb)   08/12/19 68.9 kg (152 lb)           Nutrition Related Findings:      Wound Type: None    Edema: LLE: Trace (8/21/2022  7:38 PM)  RLE: Trace (8/21/2022  7:38 PM)        Current Nutrition Intake & Therapies:  Average Meal Intake: 51-75%  Average Supplement Intake: None ordered  ADULT DIET Regular    Anthropometric Measures:  Height: 5' 4.02\" (162.6 cm)  Ideal Body Weight (IBW): 120 lbs (55 kg)     Current Body Wt:  63.2 kg (139 lb 5.3 oz), 116.1 % IBW.  Bed scale  Current BMI (kg/m2): 23.9        Weight Adjustment: No adjustment                 BMI Category: Normal weight (BMI 22.0-24.9) age over 72    Estimated Daily Nutrient Needs:  Energy Requirements Based On: Kcal/kg  Weight Used for Energy Requirements: Current  Energy (kcal/day): 1580  Weight Used for Protein Requirements: Current  Protein (g/day): 63-76  Method Used for Fluid Requirements: 1 ml/kcal  Fluid (ml/day): 2226    Nutrition Diagnosis:   Inadequate energy intake related to altered GI function as evidenced by poor intake prior to admission, weight loss    Nutrition Interventions:   Food and/or Nutrient Delivery: Continue current diet  Nutrition Education/Counseling: No recommendations at this time  Coordination of Nutrition Care: Continue to monitor while inpatient, Interdisciplinary rounds       Goals:     Goals: PO intake 50% or greater, Meet at least 75% of estimated needs, by next RD assessment       Nutrition Monitoring and Evaluation:   Behavioral-Environmental Outcomes: None identified  Food/Nutrient Intake Outcomes: Food and nutrient intake  Physical Signs/Symptoms Outcomes: Weight, GI status, Biochemical data    Discharge Planning:    Continue current diet    Mariam Lamar, [address]: [phone]

## 2022-08-22 NOTE — PROGRESS NOTES
Bedside and Verbal shift change report given to Lonnie Bermeo RN (oncoming nurse) by Manjinder Fairbanks RN (offgoing nurse). Report included the following information SBAR, Kardex, ED Summary, Intake/Output, MAR, and Recent Results.

## 2022-08-22 NOTE — PROGRESS NOTES
8/22/2022   CARE MANAGEMENT NOTE:  CM reviewed EMR and handoff received from previous  Tye Diamond). Pt was admitted with PNA, acute COPD, CHF, Afib. Reportedly, pt resides with her  who has dementia. Son Jono Duarte ([phone]) is the primary family contact. RUR 13%    Transition Plan of Care:  GI, Pulmonary following for medical management  Plan is for pt to return home with her   Pt does not have home oxygen (currently on room air)  Outpatient follow up  Family will transport pt home    CM will continue to follow pt until discharged.   Filiberto

## 2022-08-22 NOTE — PROGRESS NOTES
Vishal Colunga el [address] 79  [address] [facility], [place], [address] [place] [address]  [phone]      Medical Progress Note      NAME: Gab Campoverde   :  1940  MRM:  [id number]    Date of service: 2022  9:22 AM       Assessment and Plan:   Pneumonia (2022). Check pneumonia workup, procal.sputum for gram stain and culture. chest x-ray shows Left upper lobe parenchymal opacity is increased most likely related to pneumonia. Cont ABx. Consult pulmonary      2. Acute COPD with mild exacerbation. Cont duo neb, pulmicort, brovana and solumderol(taper). 3.  Chronic systolic CHF. EF from 2020 to about 40 to 45%. Monitor dilay wt and I/O     4. Paroxysmal atrial fibrillation. Rate is well controlled. On sotolol and xarelto. 5.  Hypothyroidism. Continue Synthroid     6. Persistent nausea and vomiting secondary to esophageal candidiasis. Continue nystatin swish and swallow. Evaluated by GI. Had recent endoscopy April 10, 2022     7. Hypokalemia. Replete      8. Hyponatremia, this is chronic. Check urin and serum osmolalities, urine sodium and TSH . Subjective:     Chief Complaint[de-identified] Patient was seen and examined as a follow up for CAP. Chart was reviewed. feels better     ROS:  (bold if positive, if negative)    Cough Tolerating PT  Tolerating Diet        Objective:     Last 24hrs VS reviewed since prior progress note.  Most recent are:    Visit Vitals  BP (!) 147/75 (BP 1 Location: Right upper arm, BP Patient Position: Sitting)   Pulse 67   Temp 97.4 °F (36.3 °C)   Resp 18   Ht 5' 4\" (1.626 m)   Wt 63.2 kg (139 lb 6.4 oz)   SpO2 94%   BMI 23.93 kg/m²     SpO2 Readings from Last 6 Encounters:   22 94%   08/10/22 94%   22 96%   21 99%   21 97%   21 97%          Intake/Output Summary (Last 24 hours) at 2022 0938  Last data filed at 2022 0908  Gross per 24 hour   Intake 340 ml   Output --   Net 340 ml          Physical Exam:    Gen: Well-developed, well-nourished, in no acute distress  HEENT:  Pink conjunctivae, PERRL, hearing intact to voice, moist mucous membranes  Neck:  Supple, without masses, thyroid non-tender  Resp:  No accessory muscle use,  rales BL.   Card:  No murmurs, normal S1, S2 without thrills, bruits or peripheral edema  Abd:  Soft, non-tender, non-distended, normoactive bowel sounds are present, no palpable organomegaly and no detectable hernias  Lymph:  No cervical or inguinal adenopathy  Musc:  No cyanosis or clubbing  Skin:  No rashes or ulcers, skin turgor is good  Neuro:  Cranial nerves are grossly intact, no focal motor weakness, follows commands appropriately  Psych:  Good insight, oriented to person, place and time, alert  __________________________________________________________________  Medications Reviewed: (see below)  Medications:     Current Facility-Administered Medications   Medication Dose Route Frequency    amLODIPine (NORVASC) tablet 5 mg  5 mg Oral DAILY    methylPREDNISolone (PF) (SOLU-MEDROL) injection 30 mg  30 mg IntraVENous Q12H    sotaloL (BETAPACE) tablet 80 mg  80 mg Oral Q12H    albuterol-ipratropium (DUO-NEB) 2.5 MG-0.5 MG/3 ML  3 mL Nebulization BID RT    fluticasone-umeclidin-vilanter (TRELEGY ELLIPTA) inhaler 1 Puff  1 Puff Inhalation DAILY    nystatin (MYCOSTATIN) 100,000 unit/mL oral suspension 500,000 Units  500,000 Units Oral QID    ondansetron (ZOFRAN) injection 4 mg  4 mg IntraVENous Q4H PRN    dicyclomine (BENTYL) tablet 20 mg  20 mg Oral Q6H PRN    sodium chloride (NS) flush 5-40 mL  5-40 mL IntraVENous Q8H    sodium chloride (NS) flush 5-40 mL  5-40 mL IntraVENous PRN    acetaminophen (TYLENOL) tablet 650 mg  650 mg Oral Q6H PRN    Or    acetaminophen (TYLENOL) suppository 650 mg  650 mg Rectal Q6H PRN    polyethylene glycol (MIRALAX) packet 17 g  17 g Oral DAILY PRN    ondansetron (ZOFRAN ODT) tablet 4 mg  4 mg Oral Q8H PRN    Or    ondansetron (ZOFRAN) injection 4 mg  4 mg IntraVENous Q6H PRN    guaiFENesin ER (MUCINEX) tablet 600 mg  600 mg Oral Q12H    levothyroxine (SYNTHROID) tablet 25 mcg  25 mcg Oral 6am    rivaroxaban (XARELTO) tablet 20 mg  20 mg Oral DAILY WITH BREAKFAST    pantoprazole (PROTONIX) tablet 40 mg  40 mg Oral DAILY    vancomycin (VANCOCIN) 750 mg in 0.9% sodium chloride 250 mL ([id number])  750 mg IntraVENous Q12H    doxycycline (VIBRAMYCIN) 100 mg in 0.9% sodium chloride (MBP/ADV) 100 mL MBP  100 mg IntraVENous Q12H        Lab Data Reviewed: (see below)  Lab Review:     Recent Labs     08/22/22 0200 08/21/22 0432 08/20/22 0519   WBC 13.7* 17.2* 8.3   HGB 10.2* 9.4* 9.3*   HCT 31.2* 28.4* 28.2*   * 600* 522*       Recent Labs     08/22/22 0200 08/21/22 0432 08/20/22 0519 08/19/22  1054   * 132* 131* 128*   K 3.6 3.6 3.3* 3.3*   CL 94* 98 97 92*   CO2 26 25 25 29   * 160* 116* 102*   BUN 14 10 5* 8   CREA 0.56 0.49* 0.37* 0.50*   CA 8.5 8.8 8.6 9.0   MG  --  1.6  --  1.7   ALB  --   --  2.2* 2.5*   TBILI  --   --  0.4 0.4   ALT  --   --  10* 12       No results found for: GLUCPOC  No results for input(s): PH, PCO2, PO2, HCO3, FIO2 in the last 72 hours. No results for input(s): INR, INREXT, INREXT in the last 72 hours. All Micro Results       Procedure Component Value Units Date/Time    LEGIONELLA PNEUMOPHILA Mynor Roe URINE [[id number]]     Order Status: Sent Specimen: Urine     CULTURE, RESPIRATORY/SPUTUM/BRONCH Zygmunt Liner [[id number]]     Order Status: Sent Specimen: Sputum     CULTURE, MRSA [[id number]]     Order Status: Sent Specimen: Nasal from Nares     PAKO Santiago Numbers, UR/CSF [[id number]]     Order Status: Sent     COVID-19 RAPID TEST [[id number]] Collected: 08/19/22 1758    Order Status: Completed Specimen: Nasopharyngeal Updated: 08/19/22 1827     Specimen source Nasopharyngeal        COVID-19 rapid test Not detected        Comment: Rapid Abbott ID Now       Rapid NAAT:  The specimen is NEGATIVE for SARS-CoV-2, the novel coronavirus associated with COVID-19. Negative results should be treated as presumptive and, if inconsistent with clinical signs and symptoms or necessary for patient management, should be tested with an alternative molecular assay. Negative results do not preclude SARS-CoV-2 infection and should not be used as the sole basis for patient management decisions. This test has been authorized by the FDA under an Emergency Use Authorization (EUA) for use by authorized laboratories. Fact sheet for Healthcare Providers: ConventionDataMotiondate.co.nz  Fact sheet for Patients: ConventionDataMotiondate.co.nz       Methodology: Isothermal Nucleic Acid Amplification                 I have reviewed notes of prior 24hr. Other pertinent lab: Total time spent with patient: 35 minutes I personally reviewed chart, notes, data and current medications in the medical record. I have personally examined and treated the patient at bedside during this period.                  Care Plan discussed with: Patient, Nursing Staff, and >50% of time spent in counseling and coordination of care    Discussed:  Care Plan    Prophylaxis:   Xarelto     Disposition:  Home w/Family           ___________________________________________________    Attending Physician: Edison Rankin MD

## 2022-08-22 NOTE — CONSULTS
Name: Pat Rodriguez: [facility]   : 1940 Admit Date: 2022   Phone: [phone]  Room: [facility]/   PCP: Lieutenant Esau MD  MRN: [id number]   Date: 2022  Code: Full Code          Chart and notes reviewed. Data reviewed. I review the patient's current medications in the medical record at each encounter. I have evaluated and examined the patient. HPI:    8:07 AM       History was obtained from patient. I was asked by Alva Olivera MD to see Paige Lade in consultation for a chief complaint of pneumonia. History of Present Illness:  Ms. Radha Osborne is an  yo woman with a history of asthma/COPD, pseudomonas pneumonia/colonization, bronchiectasis, former tobacco use, afib, breast cancer s/p chemo/radiation, seronegative RA and GERD who presented with nausea/vomiting and found to have PNA. She is followed by Dr. Figueroa Reynoso by Ruba Garcia and was last seen . She is managed on Trelegy (this was stopped for a period of time due to concern for GI issues, but pulmonary symptoms worsened when on Advair/Spiriva). She is on jeff nebs (this is her rest week) for pseudomonas. She recently had an EGD that showed candidal esophagitis. On Friday she describes that she felt like she was choking on her sputum and then subsequently had several episodes of emesis. She describes the sputum as initially thick and brown, it was somewhat yellow over the weekend and for the past day she hasn't been expectorating much sputum. The cough is improved and she denies nausea/vomiting. She feels as if she is getting her appetite back. Denies shortness of breat at rest. Has had some intermittent wheezing, but none currently.     Labs: WBC 13.7 (improved from 17.2), Hgb 10.2, , Na 128, cr 0.56, rapid COVID negative    Images reviewed:  CXR 2022: JONEL opacity on the background of chronic changes      Past Medical History:   Diagnosis Date    Asthma     Chronic a-fib (HCC)     Chronic anticoagulation     Congestive heart failure ([facility] [address] 2020    GERD (gastroesophageal reflux disease)     Hiatal hernia     HX: breast cancer     Right     Hypothyroid     Paroxysmal A-fib ([facility] [address]     Venous insufficiency     bilat LE       Past Surgical History:   Procedure Laterality Date    COLONOSCOPY N/A 2021    COLONOSCOPY AND EGD performed by Clark Reeves MD at [id number] [place] [address] Drive[address] BREAST LUMPECTOMY Right 2015    HX CATARACT REMOVAL  2013    Bilateral     HX CHOLECYSTECTOMY      HX DILATION AND CURETTAGE      x2    HX ORTHOPAEDIC  2017    RIGHT foot  hammertoe       No family history on file.     Social History     Tobacco Use    Smoking status: Former     Types: Cigarettes     Start date: 2000     Quit date: 2000     Years since quittin.7    Smokeless tobacco: Never   Substance Use Topics    Alcohol use: Not Currently       Allergies   Allergen Reactions    Ace Inhibitors Cough    Iodine Rash    Penicillins Rash       Current Facility-Administered Medications   Medication Dose Route Frequency    amLODIPine (NORVASC) tablet 5 mg  5 mg Oral DAILY    methylPREDNISolone (PF) (SOLU-MEDROL) injection 30 mg  30 mg IntraVENous Q12H    sotaloL (BETAPACE) tablet 80 mg  80 mg Oral Q12H    albuterol-ipratropium (DUO-NEB) 2.5 MG-0.5 MG/3 ML  3 mL Nebulization BID RT    fluticasone-umeclidin-vilanter (TRELEGY ELLIPTA) inhaler 1 Puff  1 Puff Inhalation DAILY    nystatin (MYCOSTATIN) 100,000 unit/mL oral suspension 500,000 Units  500,000 Units Oral QID    ondansetron (ZOFRAN) injection 4 mg  4 mg IntraVENous Q4H PRN    dicyclomine (BENTYL) tablet 20 mg  20 mg Oral Q6H PRN    sodium chloride (NS) flush 5-40 mL  5-40 mL IntraVENous Q8H    sodium chloride (NS) flush 5-40 mL  5-40 mL IntraVENous PRN    acetaminophen (TYLENOL) tablet 650 mg  650 mg Oral Q6H PRN    Or    acetaminophen (TYLENOL) suppository 650 mg  650 mg Rectal Q6H PRN    polyethylene glycol (MIRALAX) packet 17 g  17 g Oral DAILY PRN ondansetron (ZOFRAN ODT) tablet 4 mg  4 mg Oral Q8H PRN    Or    ondansetron (ZOFRAN) injection 4 mg  4 mg IntraVENous Q6H PRN    guaiFENesin ER (MUCINEX) tablet 600 mg  600 mg Oral Q12H    levothyroxine (SYNTHROID) tablet 25 mcg  25 mcg Oral 6am    rivaroxaban (XARELTO) tablet 20 mg  20 mg Oral DAILY WITH BREAKFAST    pantoprazole (PROTONIX) tablet 40 mg  40 mg Oral DAILY    vancomycin (VANCOCIN) 750 mg in 0.9% sodium chloride 250 mL ([id number])  750 mg IntraVENous Q12H    doxycycline (VIBRAMYCIN) 100 mg in 0.9% sodium chloride (MBP/ADV) 100 mL MBP  100 mg IntraVENous Q12H         REVIEW OF SYSTEMS   12 point ROS negative except as stated in the HPI. Physical Exam:   Visit Vitals  /79 (BP 1 Location: Left upper arm, BP Patient Position: At rest)   Pulse (!) 104   Temp 97.6 °F (36.4 °C)   Resp 18   Ht 5' 4\" (1.626 m)   Wt 63.2 kg (139 lb 6.4 oz)   SpO2 94%   BMI 23.93 kg/m²       General:  Alert, cooperative, no distress, appears stated age. Head:  Normocephalic, without obvious abnormality, atraumatic. Eyes:  Conjunctivae/corneas clear. Nose: Nares normal. Septum midline. Throat: Lips, mucosa, and tongue normal.    Neck: Supple, symmetrical, trachea midline   Lungs:   Clear to auscultation bilaterally. Chest wall:  No tenderness or deformity. Heart:  Regular rate and rhythm, S1, S2 normal, no murmur, click, rub or gallop. Abdomen:   Soft, non-tender. Bowel sounds normal.    Extremities: Extremities normal, atraumatic, no cyanosis or edema. Pulses: 2+ and symmetric all extremities.    Skin: Skin color, texture, turgor normal. No rashes or lesions       Neurologic: Grossly nonfocal       Lab Results   Component Value Date/Time    Sodium 128 (L) 08/22/2022 02:00 AM    Potassium 3.6 08/22/2022 02:00 AM    Chloride 94 (L) 08/22/2022 02:00 AM    CO2 26 08/22/2022 02:00 AM    BUN 14 08/22/2022 02:00 AM    Creatinine 0.56 08/22/2022 02:00 AM    Glucose 153 (H) 08/22/2022 02:00 AM    Calcium 8.5 08/22/2022 02:00 AM    Magnesium 1.6 08/21/2022 04:32 AM       Lab Results   Component Value Date/Time    WBC 13.7 (H) 08/22/2022 02:00 AM    HGB 10.2 (L) 08/22/2022 02:00 AM    PLATELET 552 (H) 91/72/2976 02:00 AM    MCV 88.6 08/22/2022 02:00 AM       Lab Results   Component Value Date/Time    Alk.  phosphatase 105 08/20/2022 05:19 AM    Protein, total 6.3 (L) 08/20/2022 05:19 AM    Albumin 2.2 (L) 08/20/2022 05:19 AM    Globulin 4.1 (H) 08/20/2022 05:19 AM       Lab Results   Component Value Date/Time    Iron 52 04/30/2022 07:34 AM    TIBC 253 04/30/2022 07:34 AM    Iron % saturation 21 04/30/2022 07:34 AM    Ferritin 70 04/30/2022 07:36 AM       Lab Results   Component Value Date/Time    TSH 1.60 04/30/2022 07:34 AM        No results found for: PH, PHI, PCO2, PCO2I, PO2, PO2I, HCO3, HCO3I, FIO2, FIO2I    Lab Results   Component Value Date/Time    Troponin-I, Qt. <0.05 01/13/2021 03:17 AM        Lab Results   Component Value Date/Time    Culture result: MIXED UROGENITAL CANDELARIO ISOLATED 04/30/2022 09:48 AM       No results found for: TOXA1, RPR, HBCM, HBSAG, HAAB, HCAB1, HAAT, G6PD, CRYAC, HIVGT, HIVR, HIV1, HIV12, HIVPC, HIVRPI    No results found for: VANCT, CPK    Lab Results   Component Value Date/Time    Color YELLOW/STRAW 04/30/2022 09:48 AM    Appearance CLEAR 04/30/2022 09:48 AM    pH (UA) 8.0 04/30/2022 09:48 AM    Protein Negative 04/30/2022 09:48 AM    Glucose Negative 04/30/2022 09:48 AM    Ketone Negative 04/30/2022 09:48 AM    Bilirubin Negative 04/30/2022 09:48 AM    Blood Negative 04/30/2022 09:48 AM    Urobilinogen 0.2 04/30/2022 09:48 AM    Nitrites Negative 04/30/2022 09:48 AM    Leukocyte Esterase SMALL (A) 04/30/2022 09:48 AM    WBC 0-4 04/30/2022 09:48 AM    RBC 0-5 04/30/2022 09:48 AM    Bacteria Negative 04/30/2022 09:48 AM       IMPRESSION  Pneumonia  COPD/asthma  History of pseudomonas pneumonia/colonization  GERD  Nausea/vomiting  Candidal esophagitis    PLAN  Goal sats 88% or higher, doing well on RA  She is clinically improving from a pulmonary standpoint, so I would continue the Doxy. Given history of pseudomonas, considered levquin but she describes significant should pain when taking that. She has a history of penicillin allergies and would be interested in outpatient allergy testing to see if she could potentially take this is then future. Will check MRSA screen and can stop vanc if negative. Will check PNA labs and sputum culture if she does produce a sample  Trelegy, she prefers to take this than long acting nebs and has been taking BID duonebs without issues (has afib and is concerned about taking them more frequently)  IV steroids, can likely transition to PO tomorrow  Needs follow-up CXR in 4-6 weeks  She is due to see Dr. Figueroa Reynoso tomorrow and will have this recheduled      Thank you for allowing us to participate in the care of this patient. We will be happy to follow along in his/her progress with you.     Lizy Pastrana MD

## 2022-08-22 NOTE — PROGRESS NOTES
Problem: Falls - Risk of  Goal: *Absence of Falls  Description: Document Xuan Wang Fall Risk and appropriate interventions in the flowsheet.   8/22/2022 0844 by Mallory Wagoner RN  Outcome: Progressing Towards Goal  Note: Fall Risk Interventions:  Mobility Interventions: Bed/chair exit alarm, Patient to call before getting OOB         Medication Interventions: Evaluate medications/consider consulting pharmacy, Teach patient to arise slowly         History of Falls Interventions: Door open when patient unattended, Evaluate medications/consider consulting pharmacy, Investigate reason for fall      8/22/2022 0550 by Mallory Wagoner RN  Outcome: Progressing Towards Goal  Note: Fall Risk Interventions:  Mobility Interventions: Bed/chair exit alarm, Patient to call before getting OOB         Medication Interventions: Evaluate medications/consider consulting pharmacy, Teach patient to arise slowly         History of Falls Interventions: Door open when patient unattended, Evaluate medications/consider consulting pharmacy, Investigate reason for fall         Problem: Pain  Goal: *Control of Pain  8/22/2022 0844 by Mallory Wagoner RN  Outcome: Progressing Towards Goal  8/22/2022 0550 by Mallory Wagoner RN  Outcome: Progressing Towards Goal     Problem: Patient Education: Go to Patient Education Activity  Goal: Patient/Family Education  8/22/2022 0844 by Mallory Wagoner RN  Outcome: Progressing Towards Goal  8/22/2022 0550 by Mallory Wagoner RN  Outcome: Progressing Towards Goal

## 2022-08-23 VITALS
RESPIRATION RATE: 18 BRPM | TEMPERATURE: 97.8 F | HEIGHT: 64 IN | WEIGHT: 141.31 LBS | DIASTOLIC BLOOD PRESSURE: 66 MMHG | SYSTOLIC BLOOD PRESSURE: 135 MMHG | BODY MASS INDEX: 24.13 KG/M2 | OXYGEN SATURATION: 95 % | HEART RATE: 82 BPM

## 2022-08-23 LAB
ANION GAP SERPL CALC-SCNC: 8 MMOL/L (ref 5–15)
BACTERIA SPEC CULT: NORMAL
BACTERIA SPEC CULT: NORMAL
BUN SERPL-MCNC: 16 MG/DL (ref 6–20)
BUN/CREAT SERPL: 30 (ref 12–20)
CALCIUM SERPL-MCNC: 8.8 MG/DL (ref 8.5–10.1)
CHLORIDE SERPL-SCNC: 100 MMOL/L (ref 97–108)
CO2 SERPL-SCNC: 23 MMOL/L (ref 21–32)
CREAT SERPL-MCNC: 0.54 MG/DL (ref 0.55–1.02)
GLUCOSE SERPL-MCNC: 113 MG/DL (ref 65–100)
M PNEUMO IGG SER IA-ACNC: <100 U/ML (ref 0–99)
M PNEUMO IGM SER IA-ACNC: <770 U/ML (ref 0–769)
MAGNESIUM SERPL-MCNC: 2 MG/DL (ref 1.6–2.4)
POTASSIUM SERPL-SCNC: 3.6 MMOL/L (ref 3.5–5.1)
SERVICE CMNT-IMP: NORMAL
SODIUM SERPL-SCNC: 131 MMOL/L (ref 136–145)

## 2022-08-23 PROCEDURE — 36415 COLL VENOUS BLD VENIPUNCTURE: CPT

## 2022-08-23 PROCEDURE — 80048 BASIC METABOLIC PNL TOTAL CA: CPT

## 2022-08-23 PROCEDURE — 83735 ASSAY OF MAGNESIUM: CPT

## 2022-08-23 PROCEDURE — 94640 AIRWAY INHALATION TREATMENT: CPT

## 2022-08-23 PROCEDURE — 74011250637 HC RX REV CODE- 250/637: Performed by: INTERNAL MEDICINE

## 2022-08-23 PROCEDURE — 74011250636 HC RX REV CODE- 250/636: Performed by: FAMILY MEDICINE

## 2022-08-23 PROCEDURE — 74011000258 HC RX REV CODE- 258: Performed by: FAMILY MEDICINE

## 2022-08-23 PROCEDURE — 74011000250 HC RX REV CODE- 250: Performed by: INTERNAL MEDICINE

## 2022-08-23 PROCEDURE — 74011250637 HC RX REV CODE- 250/637: Performed by: FAMILY MEDICINE

## 2022-08-23 PROCEDURE — 94664 DEMO&/EVAL PT USE INHALER: CPT

## 2022-08-23 PROCEDURE — 74011250636 HC RX REV CODE- 250/636: Performed by: INTERNAL MEDICINE

## 2022-08-23 RX ORDER — DOXYCYCLINE 100 MG/1
100 CAPSULE ORAL 2 TIMES DAILY
Qty: 14 CAPSULE | Refills: 0 | Status: SHIPPED | OUTPATIENT
Start: 2022-08-23 | End: 2022-09-14

## 2022-08-23 RX ORDER — NYSTATIN 100000 [USP'U]/ML
500000 SUSPENSION ORAL 4 TIMES DAILY
Qty: 400 ML | Refills: 0 | Status: ON HOLD
Start: 2022-08-23 | End: 2022-09-14

## 2022-08-23 RX ORDER — PREDNISONE 5 MG/1
10 TABLET ORAL
Status: DISCONTINUED | OUTPATIENT
Start: 2022-08-29 | End: 2022-08-23 | Stop reason: HOSPADM

## 2022-08-23 RX ORDER — PREDNISONE 20 MG/1
20 TABLET ORAL
Status: DISCONTINUED | OUTPATIENT
Start: 2022-08-26 | End: 2022-08-23 | Stop reason: HOSPADM

## 2022-08-23 RX ADMIN — SODIUM CHLORIDE 75 ML/HR: 9 INJECTION, SOLUTION INTRAVENOUS at 06:20

## 2022-08-23 RX ADMIN — ACETAMINOPHEN 650 MG: 325 TABLET ORAL at 02:29

## 2022-08-23 RX ADMIN — NYSTATIN 500000 UNITS: 100000 SUSPENSION ORAL at 08:11

## 2022-08-23 RX ADMIN — LEVOTHYROXINE SODIUM 25 MCG: 0.03 TABLET ORAL at 06:18

## 2022-08-23 RX ADMIN — SOTALOL HYDROCHLORIDE 80 MG: 80 TABLET ORAL at 06:18

## 2022-08-23 RX ADMIN — VANCOMYCIN HYDROCHLORIDE 750 MG: 750 INJECTION, POWDER, LYOPHILIZED, FOR SOLUTION INTRAVENOUS at 08:11

## 2022-08-23 RX ADMIN — METHYLPREDNISOLONE SODIUM SUCCINATE 30 MG: 40 INJECTION, POWDER, FOR SOLUTION INTRAMUSCULAR; INTRAVENOUS at 08:11

## 2022-08-23 RX ADMIN — PANTOPRAZOLE SODIUM 40 MG: 40 TABLET, DELAYED RELEASE ORAL at 08:11

## 2022-08-23 RX ADMIN — RIVAROXABAN 20 MG: 10 TABLET, FILM COATED ORAL at 08:11

## 2022-08-23 RX ADMIN — IPRATROPIUM BROMIDE AND ALBUTEROL SULFATE 3 ML: .5; 3 SOLUTION RESPIRATORY (INHALATION) at 07:33

## 2022-08-23 RX ADMIN — DOXYCYCLINE 100 MG: 100 INJECTION, POWDER, LYOPHILIZED, FOR SOLUTION INTRAVENOUS at 08:11

## 2022-08-23 RX ADMIN — AMLODIPINE BESYLATE 5 MG: 5 TABLET ORAL at 08:11

## 2022-08-23 RX ADMIN — FLUTICASONE FUROATE, UMECLIDINIUM BROMIDE AND VILANTEROL TRIFENATATE 1 PUFF: 200; 62.5; 25 POWDER RESPIRATORY (INHALATION) at 07:34

## 2022-08-23 RX ADMIN — GUAIFENESIN 600 MG: 600 TABLET ORAL at 08:11

## 2022-08-23 NOTE — PROGRESS NOTES
Problem: Falls - Risk of  Goal: *Absence of Falls  Description: Document Dimitris Vides Fall Risk and appropriate interventions in the flowsheet.   Outcome: Progressing Towards Goal  Note: Fall Risk Interventions:  Mobility Interventions: Bed/chair exit alarm, Patient to call before getting OOB         Medication Interventions: Evaluate medications/consider consulting pharmacy         History of Falls Interventions: Door open when patient unattended         Problem: Patient Education: Go to Patient Education Activity  Goal: Patient/Family Education  Outcome: Progressing Towards Goal     Problem: Pain  Goal: *Control of Pain  Outcome: Progressing Towards Goal     Problem: Patient Education: Go to Patient Education Activity  Goal: Patient/Family Education  Outcome: Progressing Towards Goal

## 2022-08-23 NOTE — PROGRESS NOTES
8/23/2022   CARE MANAGEMENT NOTE:  CM reviewed EMR. Pt was admitted with PNA, acute COPD, CHF, Afib. Reportedly, pt resides with her  who has dementia. Son Mariluz Wiley ([phone]) is the primary family contact. RUR 12%     Transition Plan of Care:  GI, Pulmonary following for medical management  Plan is for pt to return home with her   Pt does not have home oxygen (currently on room air)  Outpatient follow up  Family will transport pt home     No further post discharge needs indicated.   Filiberto

## 2022-08-23 NOTE — PROGRESS NOTES
AVS reviewed with pt at bedside, verbalized understanding of new medications and follow up appointments. Denies further questions at this time. PIV removed, belongings gathered at bedside.  To be discharged to main entrance for d/c when ride is available

## 2022-08-23 NOTE — CONSULTS
Name: Cecilio Wiggins: Life[employer]   : 1940 Admit Date: 2022   Phone: [phone]  Room: On license of [facility]/   PCP: Keeley Mcmahan MD  MRN: [id number]   Date: 2022  Code: Full Code          Chart and notes reviewed. Data reviewed. I review the patient's current medications in the medical record at each encounter. I have evaluated and examined the patient. History of Present Illness:  Ms. Rich Ingram is an  yo woman with a history of asthma/COPD, pseudomonas pneumonia/colonization, bronchiectasis, former tobacco use, afib, breast cancer s/p chemo/radiation, seronegative RA and GERD who presented with nausea/vomiting and found to have PNA. She is followed by Dr. Author Xiao by [facility] and was last seen . She is managed on Trelegy (this was stopped for a period of time due to concern for GI issues, but pulmonary symptoms worsened when on Advair/Spiriva). She is on jeff nebs (this is her rest week) for pseudomonas. She recently had an EGD that showed candidal esophagitis. On Friday she describes that she felt like she was choking on her sputum and then subsequently had several episodes of emesis. She describes the sputum as initially thick and brown, it was somewhat yellow over the weekend and for the past day she hasn't been expectorating much sputum. The cough is improved and she denies nausea/vomiting. She feels as if she is getting her appetite back. Denies shortness of breat at rest. Has had some intermittent wheezing, but none currently. Labs: WBC 13.7 (improved from 17.2), Hgb 10.2, , Na 128, cr 0.56, rapid COVID negative    Images reviewed:  CXR 2022: JONEL opacity on the background of chronic changes    Interval history  Afebrile  BP stable  Sats 94-95% on RA  Na 131 - slightly better  MRSA cx in process  Sputum cx in process  Pna work up in process    ROS: Reports some dyspnea, but remains on RA.   Reports productive cough with darker sputum initially, now a lighter green.  Denies fever or chills. Ambulated to the bathroom. Denies CP. Reports some LE edema. Past Medical History:   Diagnosis Date    Asthma     Chronic a-fib (HCC)     Chronic anticoagulation     Congestive heart failure ([facility] 2020    GERD (gastroesophageal reflux disease)     Hiatal hernia     HX: breast cancer     Right     Hypothyroid     Paroxysmal A-fib ([facility]     Venous insufficiency     bilat LE       Past Surgical History:   Procedure Laterality Date    COLONOSCOPY N/A 2021    COLONOSCOPY AND EGD performed by Magalie Sierra MD at [address] [place] [address] Drive,[address] BREAST LUMPECTOMY Right 2015    HX CATARACT REMOVAL  2013    Bilateral     HX CHOLECYSTECTOMY      HX DILATION AND CURETTAGE      x2    HX ORTHOPAEDIC  2017    RIGHT foot  hammertoe       No family history on file.     Social History     Tobacco Use    Smoking status: Former     Types: Cigarettes     Start date: 2000     Quit date: 2000     Years since quittin.7    Smokeless tobacco: Never   Substance Use Topics    Alcohol use: Not Currently       Allergies   Allergen Reactions    Ace Inhibitors Cough    Iodine Rash    Penicillins Rash       Current Facility-Administered Medications   Medication Dose Route Frequency    0.9% sodium chloride infusion  75 mL/hr IntraVENous CONTINUOUS    amLODIPine (NORVASC) tablet 5 mg  5 mg Oral DAILY    methylPREDNISolone (PF) (SOLU-MEDROL) injection 30 mg  30 mg IntraVENous Q12H    sotaloL (BETAPACE) tablet 80 mg  80 mg Oral Q12H    albuterol-ipratropium (DUO-NEB) 2.5 MG-0.5 MG/3 ML  3 mL Nebulization BID RT    fluticasone-umeclidin-vilanter (TRELEGY ELLIPTA) inhaler 1 Puff  1 Puff Inhalation DAILY    nystatin (MYCOSTATIN) 100,000 unit/mL oral suspension 500,000 Units  500,000 Units Oral QID    ondansetron (ZOFRAN) injection 4 mg  4 mg IntraVENous Q4H PRN    dicyclomine (BENTYL) tablet 20 mg  20 mg Oral Q6H PRN    sodium chloride (NS) flush 5-40 mL  5-40 mL IntraVENous Q8H    sodium chloride (NS) flush 5-40 mL  5-40 mL IntraVENous PRN    acetaminophen (TYLENOL) tablet 650 mg  650 mg Oral Q6H PRN    Or    acetaminophen (TYLENOL) suppository 650 mg  650 mg Rectal Q6H PRN    polyethylene glycol (MIRALAX) packet 17 g  17 g Oral DAILY PRN    ondansetron (ZOFRAN ODT) tablet 4 mg  4 mg Oral Q8H PRN    Or    ondansetron (ZOFRAN) injection 4 mg  4 mg IntraVENous Q6H PRN    guaiFENesin ER (MUCINEX) tablet 600 mg  600 mg Oral Q12H    levothyroxine (SYNTHROID) tablet 25 mcg  25 mcg Oral 6am    rivaroxaban (XARELTO) tablet 20 mg  20 mg Oral DAILY WITH BREAKFAST    pantoprazole (PROTONIX) tablet 40 mg  40 mg Oral DAILY    vancomycin (VANCOCIN) 750 mg in 0.9% sodium chloride 250 mL (Fdly0Eok)  750 mg IntraVENous Q12H    doxycycline (VIBRAMYCIN) 100 mg in 0.9% sodium chloride (MBP/ADV) 100 mL MBP  100 mg IntraVENous Q12H         REVIEW OF SYSTEMS   12 point ROS negative except as stated in the HPI. Physical Exam:   Visit Vitals  /66 (BP 1 Location: Left upper arm, BP Patient Position: At rest)   Pulse 82   Temp 97.8 °F (36.6 °C)   Resp 18   Ht 5' 4.02\" (1.626 m)   Wt 64.1 kg (141 lb 5 oz)   SpO2 95%   BMI 24.24 kg/m²       General:  Alert, cooperative, no distress, appears stated age. Head:  Normocephalic, without obvious abnormality, atraumatic. Eyes:  Conjunctivae/corneas clear. Nose: Nares normal. Septum midline. Throat: Lips, mucosa, and tongue normal.    Neck: Supple, symmetrical, trachea midline   Lungs:   Clear to auscultation bilaterally. Chest wall:  No tenderness or deformity. Heart:  Regular rate and rhythm, S1, S2 normal, no murmur, click, rub or gallop. Abdomen:   Soft, non-tender. Bowel sounds normal.    Extremities: Extremities normal, atraumatic, no cyanosis or edema. Pulses: 2+ and symmetric all extremities.    Skin: Skin color, texture, turgor normal. No rashes or lesions   Neurologic: Grossly nonfocal       Lab Results   Component Value Date/Time    Sodium 131 (L) 08/23/2022 02:23 AM    Potassium 3.6 08/23/2022 02:23 AM    Chloride 100 08/23/2022 02:23 AM    CO2 23 08/23/2022 02:23 AM    BUN 16 08/23/2022 02:23 AM    Creatinine 0.54 (L) 08/23/2022 02:23 AM    Glucose 113 (H) 08/23/2022 02:23 AM    Calcium 8.8 08/23/2022 02:23 AM    Magnesium 2.0 08/23/2022 02:23 AM       Lab Results   Component Value Date/Time    WBC 13.7 (H) 08/22/2022 02:00 AM    HGB 10.2 (L) 08/22/2022 02:00 AM    PLATELET 427 (H) 44/64/7308 02:00 AM    MCV 88.6 08/22/2022 02:00 AM       Lab Results   Component Value Date/Time    Alk.  phosphatase 105 08/20/2022 05:19 AM    Protein, total 6.3 (L) 08/20/2022 05:19 AM    Albumin 2.2 (L) 08/20/2022 05:19 AM    Globulin 4.1 (H) 08/20/2022 05:19 AM       Lab Results   Component Value Date/Time    Iron 52 04/30/2022 07:34 AM    TIBC 253 04/30/2022 07:34 AM    Iron % saturation 21 04/30/2022 07:34 AM    Ferritin 70 04/30/2022 07:36 AM       Lab Results   Component Value Date/Time    TSH 1.60 04/30/2022 07:34 AM        No results found for: PH, PHI, PCO2, PCO2I, PO2, PO2I, HCO3, HCO3I, FIO2, FIO2I    Lab Results   Component Value Date/Time    Troponin-I, Qt. <0.05 01/13/2021 03:17 AM        Lab Results   Component Value Date/Time    Culture result: PENDING 08/22/2022 12:30 PM    Culture result: MIXED UROGENITAL CANDELARIO ISOLATED 04/30/2022 09:48 AM       No results found for: TOXA1, RPR, HBCM, HBSAG, HAAB, HCAB1, HAAT, G6PD, CRYAC, HIVGT, HIVR, HIV1, HIV12, HIVPC, HIVRPI    No results found for: VANCT, CPK    Lab Results   Component Value Date/Time    Color YELLOW/STRAW 04/30/2022 09:48 AM    Appearance CLEAR 04/30/2022 09:48 AM    pH (UA) 8.0 04/30/2022 09:48 AM    Protein Negative 04/30/2022 09:48 AM    Glucose Negative 04/30/2022 09:48 AM    Ketone Negative 04/30/2022 09:48 AM    Bilirubin Negative 04/30/2022 09:48 AM    Blood Negative 04/30/2022 09:48 AM    Urobilinogen 0.2 04/30/2022 09:48 AM    Nitrites Negative 04/30/2022 09:48 AM    Leukocyte Esterase SMALL (A) 04/30/2022 09:48 AM    WBC 0-4 04/30/2022 09:48 AM    RBC 0-5 04/30/2022 09:48 AM    Bacteria Negative 04/30/2022 09:48 AM       IMPRESSION  Pneumonia  COPD/asthma  History of pseudomonas pneumonia/colonization  GERD  Nausea/vomiting  Candidal esophagitis    PLAN  Goal sats 88% or higher, doing well on RA  She is clinically improving from a pulmonary standpoint, so would continue the Doxy. Given history of pseudomonas, considered levquin but she describes significant should pain when taking that. She has a history of penicillin allergies and would be interested in outpatient allergy testing to see if she could potentially take this in the future. Fu MRSA screen and can stop vanc if negative. Fu PNA labs and sputum culture  Continue Trelegy.   She prefers to take this than long acting nebs and has been taking BID duonebs without issues (has afib and is concerned about taking them more frequently)  Switch IV steroids to prednisone taper  Needs follow-up CXR in 4-6 weeks      Clint Baezama

## 2022-08-23 NOTE — ROUTINE PROCESS
Bedside and Verbal shift change report given to Geraldo Mcgraw RN (oncoming nurse) by Donna Mendoza RN (offgoing nurse). Report included the following information SBAR, Kardex, ED Summary, Intake/Output, MAR, and Recent Results.

## 2022-08-23 NOTE — DISCHARGE INSTRUCTIONS
ACUTE DIAGNOSES:  Pneumonia [J18.9]    CHRONIC MEDICAL DIAGNOSES:  [unfilled]    DISCHARGE MEDICATIONS:          It is important that you take the medication exactly as they are prescribed. Keep your medication in the bottles provided by the pharmacist and keep a list of the medication names, dosages, and times to be taken in your wallet. Do not take other medications without consulting your doctor. DIET:  Regular Diet    ACTIVITY: Activity as tolerated    ADDITIONAL INFORMATION: If you experience any of the following symptoms then please call your primary care physician or return to the emergency room if you cannot get hold of your doctor: Fever, chills, nausea, vomiting, diarrhea, change in mentation, falling, bleeding, shortness of breath. FOLLOW UP CARE:  Primary care physician. you are to call and set up an appointment to see them in 5 days. Follow-up with Dr Yonathan Pineda obtained by :  I understand that if any problems occur once I am at home I am to contact my physician. I understand and acknowledge receipt of the instructions indicated above.                                                                                                                                            Physician's or R.N.'s Signature                                                                  Date/Time                                                                                                                                              Patient or Representative Signature                                                          Date/Time

## 2022-08-23 NOTE — PROGRESS NOTES
Bedside shift change report given to Geno RN (oncoming nurse) by Amy Card RN (offgoing nurse). Report included the following information SBAR, Kardex, Procedure Summary, Intake/Output, MAR, Recent Results, and Med Rec Status.

## 2022-08-23 NOTE — PROGRESS NOTES
Physician Progress Note      PATIENT:               Beverley Solomon  [facility] #:                  [id number]  :                       1940  ADMIT DATE:       2022 10:46 AM  DISCH DATE:  RESPONDING  PROVIDER #:        DAJUAN Barnett MD          QUERY TEXT:    Patient admitted with pneumonia, noted  RD assessment and reports of recent weight loss. If possible, please document in progress notes and discharge summary if you are evaluating and /or treating any of the following: The medical record reflects the following:  Risk Factors: advanced age, weight loss, COPD  Clinical Indicators:  RD- Malnutrition Status: Moderate malnutrition (22 1540)  Context:  Chronic illness  Findings of the 6 clinical characteristics of malnutrition:  Energy Intake:  75% or less est energy requirements for 1 month or longer  Weight Loss:  7.5% over 3 months  Body Fat Loss:  Mild body fat loss,  Muscle Mass Loss:  Mild muscle mass loss  Treatment: RD assessment with recommendation to continue regular diet- pt declined ONS, monitoring    ASPEN Criteria:  https://aspenjournals. onlinelibrary. solis. com/doi/full/10.1177/6202530983958882      Thank you,    Murray Carey RN  CDI  [phone]  Options provided:  -- Protein calorie malnutrition mild  -- Protein calorie malnutrition moderate  -- Other - I will add my own diagnosis  -- Disagree - Not applicable / Not valid  -- Disagree - Clinically unable to determine / Unknown  -- Refer to Clinical Documentation Reviewer    PROVIDER RESPONSE TEXT:    This patient has moderate protein calorie malnutrition.     Query created by: Aliene Dandy on 2022 11:25 AM      Electronically signed by:  Pastor Jah NARVAEZ 2022 11:39 AM

## 2022-08-23 NOTE — DISCHARGE SUMMARY
Hospitalist Discharge Summary     Patient ID:    Mino Mayes  [id number]  80 y.o.  1940    Admit date of service: 8/19/2022    Discharge date of service: 8/23/2022    Admission Diagnoses: Pneumonia [J18.9]    Chronic Diagnoses:    Problem List as of 8/23/2022 Date Reviewed: 8/10/2022            Codes Class Noted - Resolved    Pneumonia ICD-10-CM: J18.9  ICD-9-CM: 486  8/19/2022 - Present        Asthma ICD-10-CM: J45.909  ICD-9-CM: 493.90  Unknown - Present        Paroxysmal atrial fibrillation with RVR ([facility] ICD-10-CM: I48.0  ICD-9-CM: 427.31  Unknown - Present        GERD (gastroesophageal reflux disease) ICD-10-CM: K21.9  ICD-9-CM: 530.81  Unknown - Present        Hiatal hernia ICD-10-CM: K44.9  ICD-9-CM: 553.3  Unknown - Present        Hypothyroid ICD-10-CM: E03.9  ICD-9-CM: 244.9  Unknown - Present        Venous insufficiency ICD-10-CM: I87.2  ICD-9-CM: 459.81  Unknown - Present    Overview Signed 4/30/2022  7:09 AM by James Champion MD     bilat LE             Anemia ICD-10-CM: D64.9  ICD-9-CM: 285.9  4/30/2022 - Present        Chronic anticoagulation ICD-10-CM: Z79.01  ICD-9-CM: V58.61  Unknown - Present        Hyponatremia ICD-10-CM: E87.1  ICD-9-CM: 276.1  4/30/2022 - Present        Congestive heart failure ([facility] ICD-10-CM: I50.9  ICD-9-CM: 428.0  5/2020 - Present        HX: breast cancer ICD-10-CM: Z85.3  ICD-9-CM: V10.3  2015 - Present    Overview Signed 4/30/2022  7:09 AM by James Champion MD     Right              RESOLVED: Paroxysmal atrial fibrillation ([facility] ICD-10-CM: I48.0  ICD-9-CM: 427.31  4/30/2022 - 4/30/2022        RESOLVED: A-fib ([facility] ICD-10-CM: I48.91  ICD-9-CM: 427.31  4/30/2022 - 4/30/2022        RESOLVED: Invasive ductal carcinoma of right breast ([facility] ICD-10-CM: C50.911  ICD-9-CM: 174.9  8/12/2019 - 4/30/2022    Overview Addendum 8/17/2020 10:59 AM by Elmira Camp RN     2015: RIGHT breast IDC, 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 MammaPrint: Low risk, luminal type A. Treated in [place] with lumpectomy and SLNBx, re-excision, and adjuvant Anastrozole. On Boniva and Miacalcin for osteoporosis. Discharge Medications:   Current Discharge Medication List        START taking these medications    Details   nystatin (MYCOSTATIN) 100,000 unit/mL suspension Take 5 mL by mouth four (4) times daily for 20 days. swish and spit  Qty: 400 mL, Refills: 0           CONTINUE these medications which have NOT CHANGED    Details   sotaloL (BETAPACE) 80 mg tablet Take  by mouth two (2) times a day. cholecalciferol (VITAMIN D3) 25 mcg (1,000 unit) cap Take  by mouth daily. albuterol 2.5 mg /3 mL (0.083 %) nebu 3 mL, albuterol-ipratropium 2.5 mg-0.5 mg/3 ml nebu 3 mL Take 1 Inhalation by inhalation every four (4) hours as needed. albuterol (PROVENTIL VENTOLIN) 2.5 mg /3 mL (0.083 %) nebu Take 3 mL by inhalation every four (4) hours as needed. pantoprazole (PROTONIX) 40 mg tablet Take 40 mg by mouth daily. fluticasone-umeclidinium-vilanterol (Trelegy Ellipta) 100-62.5-25 mcg inhaler Take 1 Puff by inhalation daily. Xarelto 20 mg tab tablet Take 20 mg by mouth daily (with dinner). metroNIDAZOLE (METROCREAM) 0.75 % topical cream metronidazole 0.75 % topical cream      levothyroxine (SYNTHROID) 25 mcg tablet levothyroxine 25 mcg tablet      montelukast (SINGULAIR) 10 mg tablet montelukast 10 mg tablet      tiotropium bromide (SPIRIVA RESPIMAT) 2.5 mcg/actuation inhaler Spiriva Respimat 2.5 mcg/actuation solution for inhalation      multivitamin (ONE A DAY) tablet Take 1 Tab by mouth daily. albuterol (PROVENTIL HFA, VENTOLIN HFA, PROAIR HFA) 90 mcg/actuation inhaler Take  by inhalation. diclofenac (VOLTAREN) 1 % gel Apply  to affected area four (4) times daily. acetaminophen (TYLENOL EXTRA STRENGTH PO) Take  by mouth. azelastine (ASTEPRO) 205.5 mcg (0.15 %) two (2) times a day.       fluticasone (VERAMYST) 27.5 mcg/actuation nasal spray 2 Sprays by Nasal route daily. olopatadine (PATADAY) 0.2 % drop ophthalmic solution Administer 1 Drop to both eyes daily. ondansetron (ZOFRAN ODT) 4 mg disintegrating tablet Take 1 Tab by mouth every eight (8) hours as needed for Nausea. Qty: 20 Tab, Refills: 0      dicyclomine (BENTYL) 20 mg tablet Take 1 Tab by mouth every six (6) hours as needed for Abdominal Cramps. Qty: 20 Tab, Refills: 0      ergocalciferol (ERGOCALCIFEROL) 50,000 unit capsule ergocalciferol (vitamin D2) 50,000 unit capsule      ibandronate (BONIVA) 150 mg tablet ibandronate 150 mg tablet      mometasone (NASONEX) 50 mcg/actuation nasal spray mometasone 50 mcg/actuation nasal spray      calcitonin, salmon, (MIACALCIN) nasal 1 [place] by IntraNASal route daily. vitamin E acetate (VITAMIN E PO) Take  by mouth. docosahexanoic acid/epa (FISH OIL PO) Take  by mouth.      calcium carbonate/vitamin D3 (CALCIUM + D PO) Take  by mouth.      vitamin B complex (VITAMINS B COMPLEX PO) Take  by mouth.      polysorbate 80/glycerin (REFRESH DRY EYE THERAPY OP) Apply  to eye. STOP taking these medications       metoprolol succinate (TOPROL-XL) 50 mg XL tablet Comments:   Reason for Stopping: Follow up Care:    1. Brenden Sin MD in 1-2 weeks  2. Pulmonary      Diet:  Cardiac Diet    Disposition:  Home. Advanced Directive:    Discharge Exam:  See today's note. CONSULTATIONS: Pulmonary/Intensive care    Significant Diagnostic Studies:   Recent Labs     08/22/22  0200 08/21/22  0432   WBC 13.7* 17.2*   HGB 10.2* 9.4*   HCT 31.2* 28.4*   * 600*     Recent Labs     08/23/22  0223 08/22/22  0200 08/21/22  0432   * 128* 132*   K 3.6 3.6 3.6    94* 98   CO2 23 26 25   BUN 16 14 10   CREA 0.54* 0.56 0.49*   * 153* 160*   CA 8.8 8.5 8.8   MG 2.0  --  1.6     No results for input(s): ALT, AP, TBIL, TBILI, TP, ALB, GLOB, GGT, AML, LPSE in the last 72 hours.     No lab exists for component: SGOT, GPT, AMYP, HLPSE  No results for input(s): INR, PTP, APTT, INREXT in the last 72 hours. No results for input(s): FE, TIBC, PSAT, FERR in the last 72 hours. No results for input(s): PH, PCO2, PO2 in the last 72 hours. No results for input(s): CPK, CKMB in the last 72 hours. No lab exists for component: TROPONINI  No results found for: GLUCPOC  . Cardiology         HOSPITAL COURSE & TREATMENT RENDERED:   Pneumonia (8/19/2022). chest x-ray shows Left upper lobe parenchymal opacity is increased most likely related to pneumonia. Cont doxycycline. Evaluated by pulmonary      2. Acute COPD with mild exacerbation. Cont neb, pulmicort, brovana. Finished solumderol      3. Chronic systolic CHF. EF from April 2020 to about 40 to 45%. Monitor dilay wt and I/O     4. Paroxysmal atrial fibrillation. Rate is well controlled. On sotolol and xarelto. 5.  Hypothyroidism. Continue Synthroid     6. Persistent nausea and vomiting secondary to esophageal candidiasis. Continue nystatin swish and swallow. Evaluated by GI. Had recent endoscopy April 10, 2022     7. Hypokalemia. Replete       8. Hyponatremia, this is chronic. Possible SAIDH. Was on fluid restriction. Discharged in improved condition.     Spent 35 minutes    Signed:  Yael Carroll MD  8/23/2022  10:59 AM

## 2022-08-23 NOTE — PROGRESS NOTES
Vishal Colunga [facility] 79  [address] [facility], [employer], [address] [place] [address]  [phone]      Medical Progress Note      NAME: Carlus Dandy   :  1940  MRM:  [id number]    Date of service: 2022  9:22 AM       Assessment and Plan:   Pneumonia (2022). chest x-ray shows Left upper lobe parenchymal opacity is increased most likely related to pneumonia. Cont ABx. Evaluated by pulmonary      2. Acute COPD with mild exacerbation. Cont duo neb, pulmicort, brovana and solumderol(taper). 3.  Chronic systolic CHF. EF from 2020 to about 40 to 45%. Monitor dilay wt and I/O     4. Paroxysmal atrial fibrillation. Rate is well controlled. On sotolol and xarelto. 5.  Hypothyroidism. Continue Synthroid     6. Persistent nausea and vomiting secondary to esophageal candidiasis. Continue nystatin swish and swallow. Evaluated by GI. Had recent endoscopy April 10, 2022     7. Hypokalemia. Replete      8. Hyponatremia, this is chronic. Check urin and serum osmolalities, urine sodium and TSH . Subjective:     Chief Complaint[de-identified] Patient was seen and examined as a follow up for CAP. Chart was reviewed. feels better     ROS:  (bold if positive, if negative)    Cough Tolerating PT  Tolerating Diet        Objective:     Last 24hrs VS reviewed since prior progress note.  Most recent are:    Visit Vitals  /66 (BP 1 Location: Left upper arm, BP Patient Position: At rest)   Pulse 82   Temp 97.8 °F (36.6 °C)   Resp 18   Ht 5' 4.02\" (1.626 m)   Wt 64.1 kg (141 lb 5 oz)   SpO2 95%   BMI 24.24 kg/m²     SpO2 Readings from Last 6 Encounters:   22 95%   08/10/22 94%   22 96%   21 99%   21 97%   21 97%          Intake/Output Summary (Last 24 hours) at 2022 1019  Last data filed at 2022 3185  Gross per 24 hour   Intake 1040 ml   Output 400 ml   Net 640 ml          Physical Exam:    Gen:  Well-developed, well-nourished, in no acute distress  HEENT:  Pink conjunctivae, PERRL, hearing intact to voice, moist mucous membranes  Neck:  Supple, without masses, thyroid non-tender  Resp:  No accessory muscle use,  rales BL.   Card:  No murmurs, normal S1, S2 without thrills, bruits or peripheral edema  Abd:  Soft, non-tender, non-distended, normoactive bowel sounds are present, no palpable organomegaly and no detectable hernias  Lymph:  No cervical or inguinal adenopathy  Musc:  No cyanosis or clubbing  Skin:  No rashes or ulcers, skin turgor is good  Neuro:  Cranial nerves are grossly intact, no focal motor weakness, follows commands appropriately  Psych:  Good insight, oriented to person, place and time, alert  __________________________________________________________________  Medications Reviewed: (see below)  Medications:     Current Facility-Administered Medications   Medication Dose Route Frequency    predniSONE (DELTASONE) tablet 30 mg  30 mg Oral DAILY WITH BREAKFAST    [START ON 8/26/2022] predniSONE (DELTASONE) tablet 20 mg  20 mg Oral DAILY WITH BREAKFAST    [START ON 8/29/2022] predniSONE (DELTASONE) tablet 10 mg  10 mg Oral DAILY WITH BREAKFAST    0.9% sodium chloride infusion  75 mL/hr IntraVENous CONTINUOUS    amLODIPine (NORVASC) tablet 5 mg  5 mg Oral DAILY    sotaloL (BETAPACE) tablet 80 mg  80 mg Oral Q12H    albuterol-ipratropium (DUO-NEB) 2.5 MG-0.5 MG/3 ML  3 mL Nebulization BID RT    fluticasone-umeclidin-vilanter (TRELEGY ELLIPTA) inhaler 1 Puff  1 Puff Inhalation DAILY    nystatin (MYCOSTATIN) 100,000 unit/mL oral suspension 500,000 Units  500,000 Units Oral QID    ondansetron (ZOFRAN) injection 4 mg  4 mg IntraVENous Q4H PRN    dicyclomine (BENTYL) tablet 20 mg  20 mg Oral Q6H PRN    sodium chloride (NS) flush 5-40 mL  5-40 mL IntraVENous Q8H    sodium chloride (NS) flush 5-40 mL  5-40 mL IntraVENous PRN    acetaminophen (TYLENOL) tablet 650 mg  650 mg Oral Q6H PRN    Or    acetaminophen (TYLENOL) suppository 650 mg  650 mg Rectal Q6H PRN polyethylene glycol (MIRALAX) packet 17 g  17 g Oral DAILY PRN    ondansetron (ZOFRAN ODT) tablet 4 mg  4 mg Oral Q8H PRN    Or    ondansetron (ZOFRAN) injection 4 mg  4 mg IntraVENous Q6H PRN    guaiFENesin ER (MUCINEX) tablet 600 mg  600 mg Oral Q12H    levothyroxine (SYNTHROID) tablet 25 mcg  25 mcg Oral 6am    rivaroxaban (XARELTO) tablet 20 mg  20 mg Oral DAILY WITH BREAKFAST    pantoprazole (PROTONIX) tablet 40 mg  40 mg Oral DAILY    vancomycin (VANCOCIN) 750 mg in 0.9% sodium chloride 250 mL (Txod0Bqt)  750 mg IntraVENous Q12H    doxycycline (VIBRAMYCIN) 100 mg in 0.9% sodium chloride (MBP/ADV) 100 mL MBP  100 mg IntraVENous Q12H        Lab Data Reviewed: (see below)  Lab Review:     Recent Labs     08/22/22  0200 08/21/22  0432   WBC 13.7* 17.2*   HGB 10.2* 9.4*   HCT 31.2* 28.4*   * 600*       Recent Labs     08/23/22  0223 08/22/22  0200 08/21/22  0432   * 128* 132*   K 3.6 3.6 3.6    94* 98   CO2 23 26 25   * 153* 160*   BUN 16 14 10   CREA 0.54* 0.56 0.49*   CA 8.8 8.5 8.8   MG 2.0  --  1.6       No results found for: GLUCPOC  No results for input(s): PH, PCO2, PO2, HCO3, FIO2 in the last 72 hours. No results for input(s): INR, INREXT, INREXT in the last 72 hours.   All Micro Results       Procedure Component Value Units Date/Time    CULTURE, RESPIRATORY/SPUTUM/BRONCH Sharlot Jason [[id number]] Collected: 08/22/22 1230    Order Status: Completed Specimen: Sputum Updated: 08/22/22 2310     Special Requests: NO SPECIAL REQUESTS        GRAM STAIN OCCASIONAL WBCS SEEN               OCCASIONAL EPITHELIAL CELLS SEEN            2+ GRAM NEGATIVE RODS        Culture result: PENDING    CULTURE, MRSA [[id number]] Collected: 08/22/22 1000    Order Status: Sent Specimen: Nasal from Nares Updated: 08/22/22 1554    URINE CULTURE HOLD SAMPLE [[id number]] Collected: 08/22/22 0956    Order Status: Completed Specimen: Serum Updated: 08/22/22 1247     Urine culture hold       Urine on hold in Microbiology dept for 2 days. If unpreserved urine is submitted, it cannot be used for addtional testing after 24 hours, recollection will be required. PAKO Ashford, UR/CSF [[id number]] Collected: 08/22/22 2784    Order Status: Sent Updated: 08/22/22 1213    LEGIONELLA PNEUMOPHILA Neri Childes [[id number]] Collected: 08/22/22 0956    Order Status: Sent Specimen: Urine Updated: 08/22/22 1212    COVID-19 RAPID TEST [[id number]] Collected: 08/19/22 1758    Order Status: Completed Specimen: Nasopharyngeal Updated: 08/19/22 1827     Specimen source Nasopharyngeal        COVID-19 rapid test Not detected        Comment: Rapid Abbott ID Now       Rapid NAAT:  The specimen is NEGATIVE for SARS-CoV-2, the novel coronavirus associated with COVID-19. Negative results should be treated as presumptive and, if inconsistent with clinical signs and symptoms or necessary for patient management, should be tested with an alternative molecular assay. Negative results do not preclude SARS-CoV-2 infection and should not be used as the sole basis for patient management decisions. This test has been authorized by the FDA under an Emergency Use Authorization (EUA) for use by authorized laboratories. Fact sheet for Healthcare Providers: ConventionUpdate.co.nz  Fact sheet for Patients: ConventionUpdate.co.nz       Methodology: Isothermal Nucleic Acid Amplification                 I have reviewed notes of prior 24hr. Other pertinent lab: Total time spent with patient: 35 minutes I personally reviewed chart, notes, data and current medications in the medical record. I have personally examined and treated the patient at bedside during this period.                  Care Plan discussed with: Patient, Nursing Staff, and >50% of time spent in counseling and coordination of care    Discussed:  Care Plan    Prophylaxis:   Xarelto     Disposition:  Home w/Family           ___________________________________________________    Attending Physician: Bethany Drew MD

## 2022-08-24 LAB
FLUID CULTURE, SPNG2: NORMAL
L PNEUMO1 AG UR QL IA: NEGATIVE
ORGANISM ID, SPNG3: NORMAL
PLEASE NOTE, SPNG4: NORMAL
S PNEUM AG SPEC QL LA: NEGATIVE
SPECIMEN SOURCE: NORMAL
SPECIMEN SOURCE: NORMAL
SPECIMEN, SPNG1: NORMAL

## 2022-08-24 NOTE — PROGRESS NOTES
Physician Progress Note      PATIENT:               Suhail Box  [facility] #:                  [id number]  :                       1940  ADMIT DATE:       2022 10:46 AM  DISCH DATE:        2022 1:31 PM  RESPONDING  PROVIDER #:        Julianna Gilmore MD          QUERY TEXT:    Patient admitted with pna. Documentation reflects \"acute on chronic CHF exacerbation with systolic dysfunction\" in H&P and dropped from subsequent PN and DCS. If possible, please document in the progress notes and discharge summary if CHF exacerbation was: The medical record reflects the following:  Risk Factors: CHF, advanced age  Clinical Indicators: pt admitted with pna  - ED notes breath sounds with rhonchi  - H&P notes assessment without edema  - H&P with acute on chronic CHF exacerbation with systolic dysfunction and dropped from subsequent PN  - Lasix 20 mg IV x2  Treatment: CXR, Lasix 20 mg IV x2, monitoring      Thank you,    Alanna Thakur RN  CDI  [phone]  Options provided:  -- Acute on chronic CHF exacerbation with systolic dysfunction confirmed after study as evidenced by, Please document evidence. -- Acute CHF exacerbation with systolic dysfunction ruled out after study  -- Other - I will add my own diagnosis  -- Disagree - Not applicable / Not valid  -- Disagree - Clinically unable to determine / Unknown  -- Refer to Clinical Documentation Reviewer    PROVIDER RESPONSE TEXT:    Acute CHF exacerbation with systolic dysfunction ruled out after study.     Query created by: Luiz Vieira on 2022 9:15 AM      Electronically signed by:  Julianna Gilmore MD 2022 9:49 AM

## 2022-08-28 LAB
BACTERIA SPEC CULT: ABNORMAL
BACTERIA SPEC CULT: ABNORMAL
GRAM STN SPEC: ABNORMAL
SERVICE CMNT-IMP: ABNORMAL

## 2022-09-01 ENCOUNTER — HOSPITAL ENCOUNTER (OUTPATIENT)
Dept: MAMMOGRAPHY | Age: 82
Discharge: HOME | End: 2022-09-01
Attending: FAMILY MEDICINE
Payer: MEDICARE

## 2022-09-01 DIAGNOSIS — Z12.31 SCREENING MAMMOGRAM FOR HIGH-RISK PATIENT: ICD-10-CM

## 2022-09-01 PROCEDURE — 77063 BREAST TOMOSYNTHESIS BI: CPT

## 2022-09-04 ENCOUNTER — APPOINTMENT (OUTPATIENT)
Dept: GENERAL RADIOLOGY | Age: 82
DRG: 391 | End: 2022-09-04
Attending: INTERNAL MEDICINE
Payer: MEDICARE

## 2022-09-04 ENCOUNTER — HOSPITAL ENCOUNTER (INPATIENT)
Age: 82
LOS: 9 days | Discharge: HOME HEALTH | DRG: 391 | End: 2022-09-14
Attending: EMERGENCY MEDICINE | Admitting: INTERNAL MEDICINE
Payer: MEDICARE

## 2022-09-04 ENCOUNTER — APPOINTMENT (OUTPATIENT)
Dept: GENERAL RADIOLOGY | Age: 82
DRG: 391 | End: 2022-09-04
Attending: EMERGENCY MEDICINE
Payer: MEDICARE

## 2022-09-04 DIAGNOSIS — I48.19 PERSISTENT ATRIAL FIBRILLATION (HCC): ICD-10-CM

## 2022-09-04 DIAGNOSIS — G93.40 ENCEPHALOPATHY ACUTE: ICD-10-CM

## 2022-09-04 DIAGNOSIS — I48.0 PAROXYSMAL ATRIAL FIBRILLATION (HCC): ICD-10-CM

## 2022-09-04 DIAGNOSIS — E87.6 HYPOKALEMIA: ICD-10-CM

## 2022-09-04 DIAGNOSIS — J15.9 BACTERIAL PNEUMONIA: ICD-10-CM

## 2022-09-04 DIAGNOSIS — E83.42 HYPOMAGNESEMIA: ICD-10-CM

## 2022-09-04 DIAGNOSIS — E87.1 HYPONATREMIA: ICD-10-CM

## 2022-09-04 DIAGNOSIS — I50.9 CONGESTIVE HEART FAILURE, UNSPECIFIED HF CHRONICITY, UNSPECIFIED HEART FAILURE TYPE (HCC): Primary | ICD-10-CM

## 2022-09-04 PROBLEM — D72.829 LEUKOCYTOSIS: Status: ACTIVE | Noted: 2022-09-04

## 2022-09-04 PROBLEM — R07.9 CHEST PAIN: Status: ACTIVE | Noted: 2022-09-04

## 2022-09-04 PROBLEM — R06.00 DYSPNEA: Status: ACTIVE | Noted: 2022-09-04

## 2022-09-04 PROBLEM — R63.4 WEIGHT LOSS: Status: ACTIVE | Noted: 2022-09-04

## 2022-09-04 PROBLEM — J18.9 PNEUMONIA: Status: RESOLVED | Noted: 2022-01-01 | Resolved: 2022-01-01

## 2022-09-04 PROBLEM — R11.0 NAUSEA: Status: ACTIVE | Noted: 2022-09-04

## 2022-09-04 PROBLEM — I50.22 CHF (CONGESTIVE HEART FAILURE), NYHA CLASS I, CHRONIC, SYSTOLIC (HCC): Status: ACTIVE | Noted: 2022-01-01

## 2022-09-04 LAB
ALBUMIN SERPL-MCNC: 2.4 G/DL (ref 3.5–5)
ALBUMIN/GLOB SERPL: 0.6 {RATIO} (ref 1.1–2.2)
ALP SERPL-CCNC: 135 U/L (ref 45–117)
ALT SERPL-CCNC: 11 U/L (ref 12–78)
ANION GAP SERPL CALC-SCNC: 6 MMOL/L (ref 5–15)
AST SERPL-CCNC: 13 U/L (ref 15–37)
BASOPHILS # BLD: 0 K/UL (ref 0–0.1)
BASOPHILS NFR BLD: 0 % (ref 0–1)
BILIRUB SERPL-MCNC: 0.5 MG/DL (ref 0.2–1)
BNP SERPL-MCNC: 2712 PG/ML
BUN SERPL-MCNC: 7 MG/DL (ref 6–20)
BUN/CREAT SERPL: 14 (ref 12–20)
CALCIUM SERPL-MCNC: 8.5 MG/DL (ref 8.5–10.1)
CHLORIDE SERPL-SCNC: 93 MMOL/L (ref 97–108)
CO2 SERPL-SCNC: 30 MMOL/L (ref 21–32)
COMMENT, HOLDF: NORMAL
COVID-19 RAPID TEST, COVR: NOT DETECTED
CREAT SERPL-MCNC: 0.49 MG/DL (ref 0.55–1.02)
D DIMER PPP FEU-MCNC: 0.44 MG/L FEU (ref 0–0.65)
DIFFERENTIAL METHOD BLD: ABNORMAL
EOSINOPHIL # BLD: 0.2 K/UL (ref 0–0.4)
EOSINOPHIL NFR BLD: 1 % (ref 0–7)
ERYTHROCYTE [DISTWIDTH] IN BLOOD BY AUTOMATED COUNT: 14.8 % (ref 11.5–14.5)
FERRITIN SERPL-MCNC: 196 NG/ML (ref 8–252)
FOLATE SERPL-MCNC: 25.7 NG/ML (ref 5–21)
GLOBULIN SER CALC-MCNC: 4.1 G/DL (ref 2–4)
GLUCOSE SERPL-MCNC: 134 MG/DL (ref 65–100)
HAPTOGLOB SERPL-MCNC: 418 MG/DL (ref 30–200)
HCT VFR BLD AUTO: 29 % (ref 35–47)
HGB BLD-MCNC: 9.7 G/DL (ref 11.5–16)
IMM GRANULOCYTES # BLD AUTO: 0.1 K/UL (ref 0–0.04)
IMM GRANULOCYTES NFR BLD AUTO: 1 % (ref 0–0.5)
IRON SATN MFR SERPL: 18 % (ref 20–50)
IRON SERPL-MCNC: 35 UG/DL (ref 35–150)
LACTATE BLD-SCNC: 0.97 MMOL/L (ref 0.4–2)
LDH SERPL L TO P-CCNC: 191 U/L (ref 81–246)
LYMPHOCYTES # BLD: 1.1 K/UL (ref 0.8–3.5)
LYMPHOCYTES NFR BLD: 8 % (ref 12–49)
MAGNESIUM SERPL-MCNC: 1.4 MG/DL (ref 1.6–2.4)
MCH RBC QN AUTO: 28.6 PG (ref 26–34)
MCHC RBC AUTO-ENTMCNC: 33.4 G/DL (ref 30–36.5)
MCV RBC AUTO: 85.5 FL (ref 80–99)
MONOCYTES # BLD: 1 K/UL (ref 0–1)
MONOCYTES NFR BLD: 7 % (ref 5–13)
NEUTS SEG # BLD: 10.7 K/UL (ref 1.8–8)
NEUTS SEG NFR BLD: 83 % (ref 32–75)
NRBC # BLD: 0 K/UL (ref 0–0.01)
NRBC BLD-RTO: 0 PER 100 WBC
PLATELET # BLD AUTO: 445 K/UL (ref 150–400)
PMV BLD AUTO: 8.9 FL (ref 8.9–12.9)
POTASSIUM SERPL-SCNC: 3.3 MMOL/L (ref 3.5–5.1)
PROCALCITONIN SERPL-MCNC: 0.17 NG/ML
PROT SERPL-MCNC: 6.5 G/DL (ref 6.4–8.2)
RBC # BLD AUTO: 3.39 M/UL (ref 3.8–5.2)
RETICS # AUTO: 0.1 M/UL (ref 0.02–0.08)
RETICS/RBC NFR AUTO: 2.7 % (ref 0.7–2.1)
SAMPLES BEING HELD,HOLD: NORMAL
SODIUM SERPL-SCNC: 129 MMOL/L (ref 136–145)
SOURCE, COVRS: NORMAL
TIBC SERPL-MCNC: 193 UG/DL (ref 250–450)
TROPONIN-HIGH SENSITIVITY: 7 NG/L (ref 0–51)
TSH SERPL DL<=0.05 MIU/L-ACNC: 2.2 UIU/ML (ref 0.36–3.74)
VIT B12 SERPL-MCNC: 1529 PG/ML (ref 193–986)
WBC # BLD AUTO: 13 K/UL (ref 3.6–11)

## 2022-09-04 PROCEDURE — 87040 BLOOD CULTURE FOR BACTERIA: CPT

## 2022-09-04 PROCEDURE — 74011250636 HC RX REV CODE- 250/636: Performed by: EMERGENCY MEDICINE

## 2022-09-04 PROCEDURE — 85045 AUTOMATED RETICULOCYTE COUNT: CPT

## 2022-09-04 PROCEDURE — 82746 ASSAY OF FOLIC ACID SERUM: CPT

## 2022-09-04 PROCEDURE — 83615 LACTATE (LD) (LDH) ENZYME: CPT

## 2022-09-04 PROCEDURE — 83605 ASSAY OF LACTIC ACID: CPT

## 2022-09-04 PROCEDURE — 74011250637 HC RX REV CODE- 250/637: Performed by: INTERNAL MEDICINE

## 2022-09-04 PROCEDURE — 36415 COLL VENOUS BLD VENIPUNCTURE: CPT

## 2022-09-04 PROCEDURE — G0378 HOSPITAL OBSERVATION PER HR: HCPCS

## 2022-09-04 PROCEDURE — 96376 TX/PRO/DX INJ SAME DRUG ADON: CPT

## 2022-09-04 PROCEDURE — 96374 THER/PROPH/DIAG INJ IV PUSH: CPT

## 2022-09-04 PROCEDURE — 93005 ELECTROCARDIOGRAM TRACING: CPT

## 2022-09-04 PROCEDURE — 94640 AIRWAY INHALATION TREATMENT: CPT

## 2022-09-04 PROCEDURE — 80053 COMPREHEN METABOLIC PANEL: CPT

## 2022-09-04 PROCEDURE — 74011250636 HC RX REV CODE- 250/636: Performed by: INTERNAL MEDICINE

## 2022-09-04 PROCEDURE — 87635 SARS-COV-2 COVID-19 AMP PRB: CPT

## 2022-09-04 PROCEDURE — 85379 FIBRIN DEGRADATION QUANT: CPT

## 2022-09-04 PROCEDURE — 84443 ASSAY THYROID STIM HORMONE: CPT

## 2022-09-04 PROCEDURE — 96375 TX/PRO/DX INJ NEW DRUG ADDON: CPT

## 2022-09-04 PROCEDURE — 71045 X-RAY EXAM CHEST 1 VIEW: CPT

## 2022-09-04 PROCEDURE — 82728 ASSAY OF FERRITIN: CPT

## 2022-09-04 PROCEDURE — 82607 VITAMIN B-12: CPT

## 2022-09-04 PROCEDURE — 74011250637 HC RX REV CODE- 250/637: Performed by: EMERGENCY MEDICINE

## 2022-09-04 PROCEDURE — 83880 ASSAY OF NATRIURETIC PEPTIDE: CPT

## 2022-09-04 PROCEDURE — 84145 PROCALCITONIN (PCT): CPT

## 2022-09-04 PROCEDURE — 83010 ASSAY OF HAPTOGLOBIN QUANT: CPT

## 2022-09-04 PROCEDURE — 83735 ASSAY OF MAGNESIUM: CPT

## 2022-09-04 PROCEDURE — 83540 ASSAY OF IRON: CPT

## 2022-09-04 PROCEDURE — 99285 EMERGENCY DEPT VISIT HI MDM: CPT

## 2022-09-04 PROCEDURE — 84484 ASSAY OF TROPONIN QUANT: CPT

## 2022-09-04 PROCEDURE — 74011000250 HC RX REV CODE- 250: Performed by: INTERNAL MEDICINE

## 2022-09-04 PROCEDURE — C9113 INJ PANTOPRAZOLE SODIUM, VIA: HCPCS | Performed by: INTERNAL MEDICINE

## 2022-09-04 PROCEDURE — 85025 COMPLETE CBC W/AUTO DIFF WBC: CPT

## 2022-09-04 PROCEDURE — 74011000250 HC RX REV CODE- 250: Performed by: EMERGENCY MEDICINE

## 2022-09-04 RX ORDER — SODIUM CHLORIDE 0.9 % (FLUSH) 0.9 %
5-40 SYRINGE (ML) INJECTION AS NEEDED
Status: DISCONTINUED | OUTPATIENT
Start: 2022-09-04 | End: 2022-09-14 | Stop reason: HOSPADM

## 2022-09-04 RX ORDER — POLYETHYLENE GLYCOL 3350 17 G/17G
17 POWDER, FOR SOLUTION ORAL DAILY PRN
Status: DISCONTINUED | OUTPATIENT
Start: 2022-09-04 | End: 2022-09-14 | Stop reason: HOSPADM

## 2022-09-04 RX ORDER — IPRATROPIUM BROMIDE AND ALBUTEROL SULFATE 2.5; .5 MG/3ML; MG/3ML
3 SOLUTION RESPIRATORY (INHALATION)
Status: DISCONTINUED | OUTPATIENT
Start: 2022-09-04 | End: 2022-09-11

## 2022-09-04 RX ORDER — SOTALOL HYDROCHLORIDE 80 MG/1
80 TABLET ORAL EVERY 12 HOURS
Status: DISCONTINUED | OUTPATIENT
Start: 2022-09-04 | End: 2022-09-14 | Stop reason: HOSPADM

## 2022-09-04 RX ORDER — IPRATROPIUM BROMIDE AND ALBUTEROL SULFATE 2.5; .5 MG/3ML; MG/3ML
3 SOLUTION RESPIRATORY (INHALATION)
Status: COMPLETED | OUTPATIENT
Start: 2022-09-04 | End: 2022-09-04

## 2022-09-04 RX ORDER — MAGNESIUM SULFATE 1 G/100ML
1 INJECTION INTRAVENOUS
Status: COMPLETED | OUTPATIENT
Start: 2022-09-04 | End: 2022-09-04

## 2022-09-04 RX ORDER — ACETAMINOPHEN 325 MG/1
650 TABLET ORAL
Status: DISCONTINUED | OUTPATIENT
Start: 2022-09-04 | End: 2022-09-14 | Stop reason: HOSPADM

## 2022-09-04 RX ORDER — SODIUM CHLORIDE 0.9 % (FLUSH) 0.9 %
5-40 SYRINGE (ML) INJECTION EVERY 8 HOURS
Status: DISCONTINUED | OUTPATIENT
Start: 2022-09-04 | End: 2022-09-14 | Stop reason: HOSPADM

## 2022-09-04 RX ORDER — MONTELUKAST SODIUM 10 MG/1
10 TABLET ORAL
Status: DISCONTINUED | OUTPATIENT
Start: 2022-09-04 | End: 2022-09-14 | Stop reason: HOSPADM

## 2022-09-04 RX ORDER — ARFORMOTEROL TARTRATE 15 UG/2ML
15 SOLUTION RESPIRATORY (INHALATION)
Status: DISCONTINUED | OUTPATIENT
Start: 2022-09-04 | End: 2022-09-14 | Stop reason: HOSPADM

## 2022-09-04 RX ORDER — BUDESONIDE 0.5 MG/2ML
500 INHALANT ORAL
Status: DISCONTINUED | OUTPATIENT
Start: 2022-09-04 | End: 2022-09-14 | Stop reason: HOSPADM

## 2022-09-04 RX ORDER — ACETAMINOPHEN 650 MG/1
650 SUPPOSITORY RECTAL
Status: DISCONTINUED | OUTPATIENT
Start: 2022-09-04 | End: 2022-09-05

## 2022-09-04 RX ORDER — POTASSIUM CHLORIDE 750 MG/1
20 TABLET, FILM COATED, EXTENDED RELEASE ORAL 3 TIMES DAILY
Status: DISCONTINUED | OUTPATIENT
Start: 2022-09-04 | End: 2022-09-06

## 2022-09-04 RX ORDER — ONDANSETRON 4 MG/1
4 TABLET, ORALLY DISINTEGRATING ORAL
Status: DISCONTINUED | OUTPATIENT
Start: 2022-09-04 | End: 2022-09-05

## 2022-09-04 RX ORDER — POTASSIUM CHLORIDE 750 MG/1
40 TABLET, FILM COATED, EXTENDED RELEASE ORAL
Status: COMPLETED | OUTPATIENT
Start: 2022-09-04 | End: 2022-09-04

## 2022-09-04 RX ORDER — FUROSEMIDE 10 MG/ML
40 INJECTION INTRAMUSCULAR; INTRAVENOUS ONCE
Status: COMPLETED | OUTPATIENT
Start: 2022-09-04 | End: 2022-09-04

## 2022-09-04 RX ORDER — ONDANSETRON 2 MG/ML
4 INJECTION INTRAMUSCULAR; INTRAVENOUS
Status: DISCONTINUED | OUTPATIENT
Start: 2022-09-04 | End: 2022-09-14 | Stop reason: HOSPADM

## 2022-09-04 RX ADMIN — BUDESONIDE 500 MCG: 0.5 SUSPENSION RESPIRATORY (INHALATION) at 20:16

## 2022-09-04 RX ADMIN — SODIUM CHLORIDE 40 MG: 9 INJECTION, SOLUTION INTRAMUSCULAR; INTRAVENOUS; SUBCUTANEOUS at 18:23

## 2022-09-04 RX ADMIN — ARFORMOTEROL TARTRATE 15 MCG: 15 SOLUTION RESPIRATORY (INHALATION) at 20:16

## 2022-09-04 RX ADMIN — SODIUM CHLORIDE, PRESERVATIVE FREE 10 ML: 5 INJECTION INTRAVENOUS at 20:45

## 2022-09-04 RX ADMIN — MAGNESIUM SULFATE HEPTAHYDRATE 1 G: 1 INJECTION, SOLUTION INTRAVENOUS at 16:05

## 2022-09-04 RX ADMIN — RIVAROXABAN 20 MG: 10 TABLET, FILM COATED ORAL at 18:25

## 2022-09-04 RX ADMIN — FUROSEMIDE 40 MG: 10 INJECTION, SOLUTION INTRAMUSCULAR; INTRAVENOUS at 15:55

## 2022-09-04 RX ADMIN — IPRATROPIUM BROMIDE AND ALBUTEROL SULFATE 3 ML: .5; 3 SOLUTION RESPIRATORY (INHALATION) at 13:57

## 2022-09-04 RX ADMIN — POTASSIUM CHLORIDE 20 MEQ: 750 TABLET, FILM COATED, EXTENDED RELEASE ORAL at 20:44

## 2022-09-04 RX ADMIN — ACETAMINOPHEN 650 MG: 325 TABLET, FILM COATED ORAL at 18:18

## 2022-09-04 RX ADMIN — POTASSIUM CHLORIDE 40 MEQ: 750 TABLET, FILM COATED, EXTENDED RELEASE ORAL at 15:44

## 2022-09-04 RX ADMIN — SOTALOL HYDROCHLORIDE 80 MG: 80 TABLET ORAL at 18:26

## 2022-09-04 RX ADMIN — MAGNESIUM SULFATE HEPTAHYDRATE 1 G: 1 INJECTION, SOLUTION INTRAVENOUS at 18:27

## 2022-09-04 RX ADMIN — MAGNESIUM SULFATE HEPTAHYDRATE 1 G: 1 INJECTION, SOLUTION INTRAVENOUS at 20:44

## 2022-09-04 NOTE — ED TRIAGE NOTES
Pt to ED for SOB, CP, nausea worsening last several days.  Recently treated for pneumonia and completed antibiotics

## 2022-09-04 NOTE — ED PROVIDER NOTES
Lianne Cope is an 81 yo F with h/o CHF, GERD, COPD and paroxysmal atrial fibrillation who presents to the ED with 2 days shortness of breath, nausea and palpitations. She was admitted to the hospital on  and discharged on  on doxycycline which she completed on .  2 nights ago she developed worsened cough and shortness of breath. She messaged her doctor yesterday and today she called in a prescription for a new antibiotic but she has not picked it up yet. She has felt her heart racing, nausea and short of breath. Past Medical History:   Diagnosis Date    Asthma     Chronic a-fib (HCC)     Chronic anticoagulation     Congestive heart failure ([facility] [address] 2020    GERD (gastroesophageal reflux disease)     Hiatal hernia     HX: breast cancer     Right     Hypothyroid     Paroxysmal A-fib ([facility] [address]     Venous insufficiency     bilat LE       Past Surgical History:   Procedure Laterality Date    COLONOSCOPY N/A 2021    COLONOSCOPY AND EGD performed by Sanjiv Alcantara MD at [address] [facility] HX BREAST LUMPECTOMY Right 2015    HX CATARACT REMOVAL  2013    Bilateral     HX CHOLECYSTECTOMY      HX DILATION AND CURETTAGE      x2    HX ORTHOPAEDIC  2017    RIGHT foot  hammertoe         No family history on file.     Social History     Socioeconomic History    Marital status:      Spouse name: Not on file    Number of children: Not on file    Years of education: Not on file    Highest education level: Not on file   Occupational History    Not on file   Tobacco Use    Smoking status: Former     Types: Cigarettes     Start date: 2000     Quit date: 2000     Years since quittin.7    Smokeless tobacco: Never   Vaping Use    Vaping Use: Never used   Substance and Sexual Activity    Alcohol use: Not Currently    Drug use: Never    Sexual activity: Not on file   Other Topics Concern    Not on file   Social History Narrative    Not on file     Social Determinants of Health     Financial Resource Strain: Not on file   Food Insecurity: Not on file   Transportation Needs: Not on file   Physical Activity: Not on file   Stress: Not on file   Social Connections: Not on file   Intimate Partner Violence: Not on file   Housing Stability: Not on file         ALLERGIES: Ace inhibitors, Iodine, and Penicillins    Review of Systems   Constitutional:  Negative for fever. HENT:  Negative for sore throat. Eyes:  Negative for visual disturbance. Respiratory:  Positive for shortness of breath. Negative for cough. Cardiovascular:  Positive for palpitations. Negative for chest pain. Gastrointestinal:  Positive for nausea. Negative for abdominal pain. Genitourinary:  Negative for dysuria. Musculoskeletal:  Negative for back pain. Skin:  Negative for rash. Neurological:  Negative for headaches. Vitals:    09/04/22 1323   BP: (!) 152/88   Pulse: 80   Resp: (!) 34   Temp: 98.8 °F (37.1 °C)   SpO2: 93%            Physical Exam  Vitals and nursing note reviewed. Constitutional:       General: She is not in acute distress. Appearance: She is well-developed. HENT:      Head: Normocephalic and atraumatic. Mouth/Throat:      Mouth: Mucous membranes are moist.   Eyes:      Extraocular Movements: Extraocular movements intact. Conjunctiva/sclera: Conjunctivae normal.   Neck:      Trachea: Phonation normal.   Cardiovascular:      Rate and Rhythm: Normal rate. Pulmonary:      Effort: Pulmonary effort is normal. No respiratory distress. Breath sounds: Wheezing present. Abdominal:      General: There is no distension. Musculoskeletal:         General: No tenderness. Normal range of motion. Cervical back: Normal range of motion. Skin:     General: Skin is warm and dry. Neurological:      General: No focal deficit present. Mental Status: She is alert. She is not disoriented. Motor: No abnormal muscle tone.         ED EKG interpretation:  Rhythm: normal sinus rhythm and PAC's; and regular . Rate (approx.): 84; Axis: normal; P wave: normal; QRS interval: normal ; ST/T wave: normal; Other findings: otherwise normal. This EKG was interpreted by Selene Castillo MD,ED Provider. MDM           Perfect Serve Consult for Admission  3:22 PM    ED Room Number: ER09/09  Patient Name and age: Katlyn Jackson 80 y.o.  female  Working Diagnosis:   1. Congestive heart failure, unspecified HF chronicity, unspecified heart failure type ([address]    2. Hypokalemia    3. Hypomagnesemia    4. Hyponatremia        COVID-19 Suspicion:  no  Sepsis present:  no  Reassessment needed: N/A  Code Status:  Full Code  Readmission: yes  Isolation Requirements:  no  Recommended Level of Care:  telemetry  Department:[facility] ED - [phone]  Other:  h/o  COPD and recent admissions for pneumonia. Dx CHF in May as well. Has increased shortness of breath for past 2 days. Worse when lying down. Wheezing on arrival but cleared with 1 neb but persistent rales, O2 sat 91% on room air. CXR with pulmonary edema. BNP 2712 Na 129, K 3.3, Mg 1.4. I have ordered lasix 40mgIV, PO 1g Magnesium IV and 40meq potassium PO.     Procedures

## 2022-09-04 NOTE — H&P
[facility]  [address], [place] [address]  [phone]    Hospitalist Admission Note      NAME:  Samy Lopez   :      MRN:  [id number]     PCP:  Claire Oppenheim, MD     Date/Time of service:  2022 4:15 PM          Subjective:     CHIEF COMPLAINT: dyspnea     HISTORY OF PRESENT ILLNESS:     Ms. Bella Almaraz is a 80 y.o.  female who presented to the Emergency Department complaining of dyspnea. Worsening over a 2 days. She was discharged from here 2 weeks ago after treatment for PNA. Persistent coughing, particularly triggered when she tries to lay flat. She has had to sleep in a recliner. She also reports mild midsternal constant chest pain and nausea. She reports 50lb wt loss over 6 months that is being aggressively worked up by GI. A 4 hr gastric transit study has been scheduled in 10 days. ER finds slightly abnormal chest xray, but otherwise workup is unremarkable. We will admit her for observation.       Past Medical History:   Diagnosis Date    Asthma     CHF (congestive heart failure), NYHA class I, chronic, systolic (HCC)     Chronic anticoagulation     GERD (gastroesophageal reflux disease)     Hiatal hernia     HX: breast cancer 2015    Right     Hypothyroid     Paroxysmal A-fib (HCC)     Venous insufficiency     bilat LE        Past Surgical History:   Procedure Laterality Date    COLONOSCOPY N/A 2021    COLONOSCOPY AND EGD performed by Bernardino Hazel MD at [facility]    HX BREAST LUMPECTOMY Right 2015    HX CATARACT REMOVAL  2013    Bilateral     HX CHOLECYSTECTOMY      HX DILATION AND CURETTAGE      x2    HX ORTHOPAEDIC  2017    RIGHT foot  hammertoe       Social History     Tobacco Use    Smoking status: Former     Types: Cigarettes     Start date: 2000     Quit date: 2000     Years since quittin.7    Smokeless tobacco: Never   Substance Use Topics    Alcohol use: Not Currently        No family history on file.   Family hx cannot be fully assessed, since the patient cannot provide information    Allergies   Allergen Reactions    Ace Inhibitors Cough    Iodine Rash    Penicillins Rash        Prior to Admission medications    Medication Sig Start Date End Date Taking? Authorizing Provider   nystatin (MYCOSTATIN) 100,000 unit/mL suspension Take 5 mL by mouth four (4) times daily for 20 days. swish and spit 8/23/22 9/12/22  Renetta Mahoney MD   doxycycline (MONODOX) 100 mg capsule Take 1 Capsule by mouth two (2) times a day. 8/23/22   Renetta Mahoney MD   sotaloL (BETAPACE) 80 mg tablet Take  by mouth two (2) times a day. Provider, Historical   cholecalciferol (VITAMIN D3) 25 mcg (1,000 unit) cap Take  by mouth daily. Provider, Historical   albuterol 2.5 mg /3 mL (0.083 %) nebu 3 mL, albuterol-ipratropium 2.5 mg-0.5 mg/3 ml nebu 3 mL Take 1 Inhalation by inhalation every four (4) hours as needed. 10/1/21   Other, MD Abby   albuterol (PROVENTIL VENTOLIN) 2.5 mg /3 mL (0.083 %) nebu Take 3 mL by inhalation every four (4) hours as needed. 3/29/22   Abby Huntley MD   pantoprazole (PROTONIX) 40 mg tablet Take 40 mg by mouth daily. Provider, Historical   azelastine (ASTEPRO) 205.5 mcg (0.15 %) two (2) times a day. Provider, Historical   fluticasone (VERAMYST) 27.5 mcg/actuation nasal spray 2 Sprays by Nasal route daily. Patient not taking: No sig reported    Provider, Historical   olopatadine (PATADAY) 0.2 % drop ophthalmic solution Administer 1 Drop to both eyes daily. Patient not taking: No sig reported    Provider, Historical   fluticasone-umeclidinium-vilanterol (Trelegy Ellipta) 100-62.5-25 mcg inhaler Take 1 Puff by inhalation daily. Provider, Historical   ondansetron (ZOFRAN ODT) 4 mg disintegrating tablet Take 1 Tab by mouth every eight (8) hours as needed for Nausea.  1/11/21   Jer Paredes DO   dicyclomine (BENTYL) 20 mg tablet Take 1 Tab by mouth every six (6) hours as needed for Abdominal Cramps. Patient not taking: No sig reported 1/11/21   Keshawn Sheppard,    Xarelto 20 mg tab tablet Take 20 mg by mouth daily (with dinner). 7/31/20   Provider, Historical   metroNIDAZOLE (METROCREAM) 0.75 % topical cream metronidazole 0.75 % topical cream    Provider, Historical   ergocalciferol (ERGOCALCIFEROL) 50,000 unit capsule ergocalciferol (vitamin D2) 50,000 unit capsule  Patient not taking: No sig reported    Provider, Historical   ibandronate (BONIVA) 150 mg tablet ibandronate 150 mg tablet  Patient not taking: No sig reported    Provider, Historical   levothyroxine (SYNTHROID) 25 mcg tablet levothyroxine 25 mcg tablet    Provider, Historical   mometasone (NASONEX) 50 mcg/actuation nasal spray mometasone 50 mcg/actuation nasal spray  Patient not taking: No sig reported    Provider, Historical   montelukast (SINGULAIR) 10 mg tablet montelukast 10 mg tablet    Provider, Historical   tiotropium bromide (SPIRIVA RESPIMAT) 2.5 mcg/actuation inhaler Spiriva Respimat 2.5 mcg/actuation solution for inhalation    Provider, Historical   calcitonin, salmon, (MIACALCIN) nasal 1 [place] by IntraNASal route daily. Patient not taking: Reported on 8/10/2022    Provider, Historical   multivitamin (ONE A DAY) tablet Take 1 Tab by mouth daily. Provider, Historical   vitamin E acetate (VITAMIN E PO) Take  by mouth. Patient not taking: No sig reported    Provider, Historical   docosahexanoic acid/epa (FISH OIL PO) Take  by mouth. Patient not taking: Reported on 8/19/2022    Provider, Historical   calcium carbonate/vitamin D3 (CALCIUM + D PO) Take  by mouth. Patient not taking: No sig reported    Provider, Historical   vitamin B complex (VITAMINS B COMPLEX PO) Take  by mouth. Patient not taking: No sig reported    Provider, Historical   albuterol (PROVENTIL HFA, VENTOLIN HFA, PROAIR HFA) 90 mcg/actuation inhaler Take  by inhalation.     Provider, Historical   diclofenac (VOLTAREN) 1 % gel Apply  to affected area four (4) times daily. Provider, Historical   polysorbate 80/glycerin (REFRESH DRY EYE THERAPY OP) Apply  to eye. Patient not taking: No sig reported    Provider, Historical   acetaminophen (TYLENOL EXTRA STRENGTH PO) Take  by mouth.     Provider, Historical       Review of Systems:  (bold if positive, if negative)    Gen:  weight loss,fatigue  Eyes:  ENT:  CVS:   chest pain,Pulm:  Cough,dyspneaGI:  nauseaGERDGU:  MS:  Skin:  Psych:  Endo:  Hem:  Renal:  Neuro:  weakness      Objective:      VITALS:    Vital signs reviewed; most recent are:    Visit Vitals  BP (!) 161/71   Pulse 82   Temp 98.8 °F (37.1 °C)   Resp 24   Ht 5' 2\" (1.575 m)   Wt 60.8 kg (134 lb)   SpO2 95%   BMI 24.51 kg/m²     SpO2 Readings from Last 6 Encounters:   09/04/22 95%   08/23/22 95%   08/10/22 94%   05/01/22 96%   06/14/21 99%   04/28/21 97%        No intake or output data in the 24 hours ending 09/04/22 1615     Exam:     Physical Exam:    Gen:  Frail, in no acute distress  HEENT:  Pink conjunctivae, PERRL, hearing intact to voice, moist mucous membranes  Neck:  Supple, without masses, thyroid non-tender  Resp:  No accessory muscle use, clear breath sounds without wheezes rales or rhonchi  Card:  No murmurs, normal S1, S2 without thrills, bruits or peripheral edema  Abd:  Soft, non-tender, non-distended, normoactive bowel sounds are present, no mass  Lymph:  No cervical or inguinal adenopathy  Musc:  No cyanosis or clubbing  Skin:  No rashes or ulcers, skin turgor is good  Neuro:  Cranial nerves are grossly intact, mild motor weakness, follows commands appropriately  Psych:  Good insight, oriented to person, place and time, alert     Labs:    Recent Labs     09/04/22  1339   WBC 13.0*   HGB 9.7*   HCT 29.0*   *     Recent Labs     09/04/22  1339   *   K 3.3*   CL 93*   CO2 30   *   BUN 7   CREA 0.49*   CA 8.5   MG 1.4*   ALB 2.4*   TBILI 0.5   ALT 11*     No results found for: GLUCPOC  No results for input(s): PH, PCO2, PO2, HCO3, FIO2 in the last 72 hours. No results for input(s): INR, INREXT in the last 72 hours. All Micro Results       Procedure Component Value Units Date/Time    CULTURE, BLOOD, PAIRED [[id number]] Collected: 09/04/22 1339    Order Status: Sent Specimen: Blood Updated: 09/04/22 1426    COVID-19 RAPID TEST [[id number]] Collected: 09/04/22 1339    Order Status: Completed Specimen: Nasopharyngeal Updated: 09/04/22 1414     Specimen source Nasopharyngeal        COVID-19 rapid test Not detected        Comment: Rapid Abbott ID Now       Rapid NAAT:  The specimen is NEGATIVE for SARS-CoV-2, the novel coronavirus associated with COVID-19. Negative results should be treated as presumptive and, if inconsistent with clinical signs and symptoms or necessary for patient management, should be tested with an alternative molecular assay. Negative results do not preclude SARS-CoV-2 infection and should not be used as the sole basis for patient management decisions. This test has been authorized by the FDA under an Emergency Use Authorization (EUA) for use by authorized laboratories. Fact sheet for Healthcare Providers: ConventionUpdate.co.nz  Fact sheet for Patients: ConventionUpdate.co.nz       Methodology: Isothermal Nucleic Acid Amplification                 I have reviewed previous records       Assessment and Plan:      Dyspnea / Nausea / Chest pain - Unclear etiology of these symptoms. She is not hypoxic at rest, her sats are equal to those when she discharged recently. Xray with only mild general edema pattern. pBNP slightly elevated, but it has been so in the past too. She has hiatal hernia and GERD, and this may contribute to nausea and chest pain. She has been more immobile, but she take xarelto so DVT/PE is unlikley.   DDX thus includes GERD (which I think is the primary problem), vs PNA, CHF, PE, asthma    CHF (congestive heart failure), NYHA class I, chronic, systolic / Venous insufficiency - Unclear if any exacerbation, I do not think so. Check ECHO. Consult cariology. ER gave lasix, but with her normal sats, low NA and low K, wt loss, 2 days no PO intake, I will not continue that for now. Continue BB      Hyponatremia / Hypokalemia / Hypomagnesemia - POA, likely due to poor PO intake. Weight recorded in ER is actually several pounds lighter than at discharge. She has eaten or drank very little in last 2 days. I do not think she is fluid overloaded. If acute CHF ruled out, I will hydrate. For now add PO K and IV Mg    Anemia - Stable since last discharge, but check serologies. Weight loss / GERD (gastroesophageal reflux disease) / Hiatal hernia - She has very concerning wt loss, anorexia, nausea that persists. Consult GI, who is following her outpatient. PPI by IV. At home she takes bentyl and Zofran as needed. Paroxysmal A-fib / Chronic anticoagulation - Continue sotalol and xarelto    Asthma - Appears stable. For her home Trelegy substitute Brovana, Pulmicort and prn Duonebs    Leukocytosis - POA, mild, without bands, and no other SIRS criteria. Procalcitonin is low. She ha completed Abx for PNA. No Abx for now. I think she is at risk of aspiration PNA. HX: breast cancer - Outpatient follow up. Has been on Boniva, increasing risk of PUD and esophagitis      Hypothyroid - TSH was normal a few months ago. Stop the homeopathic dose of synthroid. Telemetry reviewed:   normal sinus rhythm    Risk of deterioration: high      Total time spent with patient: 48 Minutes I personally reviewed chart, notes, data and current medications in the medical record. I have personally examined and treated the patient at bedside during this period. To assist coordination of care and communication with nursing and staff, this note may be preliminary early in the day, but finalized by end of the day.                  Care Plan discussed with: Patient, Family, Nursing Staff, and >50% of time spent in counseling and coordination of care    Discussed:  Care Plan and D/C Planning       ___________________________________________________    Attending Physician: Jori Deluca MD

## 2022-09-04 NOTE — ED NOTES
TRANSFER - OUT REPORT:    Verbal report given to Mony Ornelas RN(name) on Gab Campoverde  being transferred to 5th floor, [phone] (unit) for routine progression of care       Report consisted of patients Situation, Background, Assessment and   Recommendations(SBAR). Information from the following report(s) SBAR, Kardex, ED Summary, [facility] and Recent Results was reviewed with the receiving nurse. Lines:   Peripheral IV 09/04/22 Left Antecubital (Active)        Opportunity for questions and clarification was provided.       Patient transported with:   Registered Nurse

## 2022-09-05 ENCOUNTER — APPOINTMENT (OUTPATIENT)
Dept: CT IMAGING | Age: 82
DRG: 391 | End: 2022-09-05
Attending: INTERNAL MEDICINE
Payer: MEDICARE

## 2022-09-05 LAB
ALBUMIN SERPL-MCNC: 2.2 G/DL (ref 3.5–5)
ALBUMIN/GLOB SERPL: 0.6 {RATIO} (ref 1.1–2.2)
ALP SERPL-CCNC: 127 U/L (ref 45–117)
ALT SERPL-CCNC: 9 U/L (ref 12–78)
ANION GAP SERPL CALC-SCNC: 8 MMOL/L (ref 5–15)
AST SERPL-CCNC: 12 U/L (ref 15–37)
B PERT DNA SPEC QL NAA+PROBE: NOT DETECTED
BILIRUB SERPL-MCNC: 0.6 MG/DL (ref 0.2–1)
BORDETELLA PARAPERTUSSIS PCR, BORPAR: NOT DETECTED
BUN SERPL-MCNC: 6 MG/DL (ref 6–20)
BUN/CREAT SERPL: 15 (ref 12–20)
C PNEUM DNA SPEC QL NAA+PROBE: NOT DETECTED
CALCIUM SERPL-MCNC: 8.5 MG/DL (ref 8.5–10.1)
CHLORIDE SERPL-SCNC: 89 MMOL/L (ref 97–108)
CO2 SERPL-SCNC: 31 MMOL/L (ref 21–32)
CREAT SERPL-MCNC: 0.41 MG/DL (ref 0.55–1.02)
ERYTHROCYTE [DISTWIDTH] IN BLOOD BY AUTOMATED COUNT: 14.7 % (ref 11.5–14.5)
FLUAV H1 2009 PAND RNA SPEC QL NAA+PROBE: NOT DETECTED
FLUAV H1 RNA SPEC QL NAA+PROBE: NOT DETECTED
FLUAV H3 RNA SPEC QL NAA+PROBE: NOT DETECTED
FLUAV SUBTYP SPEC NAA+PROBE: NOT DETECTED
FLUBV RNA SPEC QL NAA+PROBE: NOT DETECTED
GLOBULIN SER CALC-MCNC: 4 G/DL (ref 2–4)
GLUCOSE SERPL-MCNC: 96 MG/DL (ref 65–100)
HADV DNA SPEC QL NAA+PROBE: NOT DETECTED
HCOV 229E RNA SPEC QL NAA+PROBE: NOT DETECTED
HCOV HKU1 RNA SPEC QL NAA+PROBE: NOT DETECTED
HCOV NL63 RNA SPEC QL NAA+PROBE: NOT DETECTED
HCOV OC43 RNA SPEC QL NAA+PROBE: NOT DETECTED
HCT VFR BLD AUTO: 28.8 % (ref 35–47)
HGB BLD-MCNC: 9.5 G/DL (ref 11.5–16)
HMPV RNA SPEC QL NAA+PROBE: NOT DETECTED
HPIV1 RNA SPEC QL NAA+PROBE: NOT DETECTED
HPIV2 RNA SPEC QL NAA+PROBE: NOT DETECTED
HPIV3 RNA SPEC QL NAA+PROBE: NOT DETECTED
HPIV4 RNA SPEC QL NAA+PROBE: NOT DETECTED
M PNEUMO DNA SPEC QL NAA+PROBE: NOT DETECTED
MAGNESIUM SERPL-MCNC: 2 MG/DL (ref 1.6–2.4)
MCH RBC QN AUTO: 28.2 PG (ref 26–34)
MCHC RBC AUTO-ENTMCNC: 33 G/DL (ref 30–36.5)
MCV RBC AUTO: 85.5 FL (ref 80–99)
NRBC # BLD: 0 K/UL (ref 0–0.01)
NRBC BLD-RTO: 0 PER 100 WBC
PLATELET # BLD AUTO: 408 K/UL (ref 150–400)
PMV BLD AUTO: 8.7 FL (ref 8.9–12.9)
POTASSIUM SERPL-SCNC: 3.9 MMOL/L (ref 3.5–5.1)
PROT SERPL-MCNC: 6.2 G/DL (ref 6.4–8.2)
RBC # BLD AUTO: 3.37 M/UL (ref 3.8–5.2)
RSV RNA SPEC QL NAA+PROBE: NOT DETECTED
RV+EV RNA SPEC QL NAA+PROBE: NOT DETECTED
SARS-COV-2 PCR, COVPCR: NOT DETECTED
SODIUM SERPL-SCNC: 128 MMOL/L (ref 136–145)
WBC # BLD AUTO: 10.1 K/UL (ref 3.6–11)

## 2022-09-05 PROCEDURE — 97530 THERAPEUTIC ACTIVITIES: CPT

## 2022-09-05 PROCEDURE — 83735 ASSAY OF MAGNESIUM: CPT

## 2022-09-05 PROCEDURE — 74011000250 HC RX REV CODE- 250: Performed by: INTERNAL MEDICINE

## 2022-09-05 PROCEDURE — 71250 CT THORAX DX C-: CPT

## 2022-09-05 PROCEDURE — 97116 GAIT TRAINING THERAPY: CPT

## 2022-09-05 PROCEDURE — 36415 COLL VENOUS BLD VENIPUNCTURE: CPT

## 2022-09-05 PROCEDURE — 96376 TX/PRO/DX INJ SAME DRUG ADON: CPT

## 2022-09-05 PROCEDURE — G0378 HOSPITAL OBSERVATION PER HR: HCPCS

## 2022-09-05 PROCEDURE — 74011250636 HC RX REV CODE- 250/636: Performed by: INTERNAL MEDICINE

## 2022-09-05 PROCEDURE — 94640 AIRWAY INHALATION TREATMENT: CPT

## 2022-09-05 PROCEDURE — 0202U NFCT DS 22 TRGT SARS-COV-2: CPT

## 2022-09-05 PROCEDURE — 97535 SELF CARE MNGMENT TRAINING: CPT

## 2022-09-05 PROCEDURE — 97162 PT EVAL MOD COMPLEX 30 MIN: CPT

## 2022-09-05 PROCEDURE — 65270000029 HC RM PRIVATE

## 2022-09-05 PROCEDURE — 97165 OT EVAL LOW COMPLEX 30 MIN: CPT

## 2022-09-05 PROCEDURE — 74011250637 HC RX REV CODE- 250/637: Performed by: INTERNAL MEDICINE

## 2022-09-05 PROCEDURE — 77030027138 HC INCENT SPIROMETER -A

## 2022-09-05 PROCEDURE — 94761 N-INVAS EAR/PLS OXIMETRY MLT: CPT

## 2022-09-05 PROCEDURE — APPSS30 APP SPLIT SHARED TIME 16-30 MINUTES: Performed by: NURSE PRACTITIONER

## 2022-09-05 PROCEDURE — 80053 COMPREHEN METABOLIC PANEL: CPT

## 2022-09-05 PROCEDURE — C9113 INJ PANTOPRAZOLE SODIUM, VIA: HCPCS | Performed by: INTERNAL MEDICINE

## 2022-09-05 PROCEDURE — 85027 COMPLETE CBC AUTOMATED: CPT

## 2022-09-05 PROCEDURE — 99223 1ST HOSP IP/OBS HIGH 75: CPT | Performed by: INTERNAL MEDICINE

## 2022-09-05 RX ORDER — KETOTIFEN FUMARATE 0.35 MG/ML
1 SOLUTION/ DROPS OPHTHALMIC
Status: DISCONTINUED | OUTPATIENT
Start: 2022-09-05 | End: 2022-09-14 | Stop reason: HOSPADM

## 2022-09-05 RX ORDER — TOBRAMYCIN 40 MG/ML
300 INJECTION INTRAMUSCULAR; INTRAVENOUS
Status: DISPENSED | OUTPATIENT
Start: 2022-09-05 | End: 2022-09-09

## 2022-09-05 RX ORDER — NYSTATIN 100000 [USP'U]/ML
500000 SUSPENSION ORAL 4 TIMES DAILY
Status: DISCONTINUED | OUTPATIENT
Start: 2022-09-05 | End: 2022-09-14 | Stop reason: HOSPADM

## 2022-09-05 RX ORDER — IPRATROPIUM BROMIDE AND ALBUTEROL SULFATE 2.5; .5 MG/3ML; MG/3ML
3 SOLUTION RESPIRATORY (INHALATION)
Status: DISCONTINUED | OUTPATIENT
Start: 2022-09-05 | End: 2022-09-11

## 2022-09-05 RX ADMIN — SODIUM CHLORIDE 40 MG: 9 INJECTION, SOLUTION INTRAMUSCULAR; INTRAVENOUS; SUBCUTANEOUS at 20:53

## 2022-09-05 RX ADMIN — POLYETHYLENE GLYCOL 3350 17 G: 17 POWDER, FOR SOLUTION ORAL at 12:05

## 2022-09-05 RX ADMIN — IPRATROPIUM BROMIDE AND ALBUTEROL SULFATE 3 ML: .5; 3 SOLUTION RESPIRATORY (INHALATION) at 13:01

## 2022-09-05 RX ADMIN — NYSTATIN 500000 UNITS: 100000 SUSPENSION ORAL at 18:27

## 2022-09-05 RX ADMIN — SULFAMETHOXAZOLE AND TRIMETHOPRIM 320 MG: 80; 16 INJECTION, SOLUTION, CONCENTRATE INTRAVENOUS at 20:53

## 2022-09-05 RX ADMIN — SODIUM CHLORIDE 40 MG: 9 INJECTION, SOLUTION INTRAMUSCULAR; INTRAVENOUS; SUBCUTANEOUS at 10:16

## 2022-09-05 RX ADMIN — NYSTATIN 500000 UNITS: 100000 SUSPENSION ORAL at 10:16

## 2022-09-05 RX ADMIN — SODIUM CHLORIDE, PRESERVATIVE FREE 10 ML: 5 INJECTION INTRAVENOUS at 21:02

## 2022-09-05 RX ADMIN — ONDANSETRON HYDROCHLORIDE 4 MG: 2 SOLUTION INTRAMUSCULAR; INTRAVENOUS at 18:30

## 2022-09-05 RX ADMIN — POTASSIUM CHLORIDE 20 MEQ: 750 TABLET, FILM COATED, EXTENDED RELEASE ORAL at 10:16

## 2022-09-05 RX ADMIN — ACETAMINOPHEN 650 MG: 325 TABLET, FILM COATED ORAL at 18:30

## 2022-09-05 RX ADMIN — ONDANSETRON HYDROCHLORIDE 4 MG: 2 SOLUTION INTRAMUSCULAR; INTRAVENOUS at 12:15

## 2022-09-05 RX ADMIN — IPRATROPIUM BROMIDE AND ALBUTEROL SULFATE 3 ML: .5; 3 SOLUTION RESPIRATORY (INHALATION) at 20:33

## 2022-09-05 RX ADMIN — BUDESONIDE 500 MCG: 0.5 SUSPENSION RESPIRATORY (INHALATION) at 07:49

## 2022-09-05 RX ADMIN — SODIUM CHLORIDE, PRESERVATIVE FREE 10 ML: 5 INJECTION INTRAVENOUS at 16:08

## 2022-09-05 RX ADMIN — RIVAROXABAN 20 MG: 10 TABLET, FILM COATED ORAL at 16:08

## 2022-09-05 RX ADMIN — ACETAMINOPHEN 650 MG: 325 TABLET, FILM COATED ORAL at 11:01

## 2022-09-05 RX ADMIN — BUDESONIDE 500 MCG: 0.5 SUSPENSION RESPIRATORY (INHALATION) at 20:27

## 2022-09-05 RX ADMIN — TOBRAMYCIN SULFATE 300 MG: 40 INJECTION, SOLUTION INTRAMUSCULAR; INTRAVENOUS at 20:43

## 2022-09-05 RX ADMIN — ONDANSETRON HYDROCHLORIDE 4 MG: 2 SOLUTION INTRAMUSCULAR; INTRAVENOUS at 23:20

## 2022-09-05 RX ADMIN — ACETAMINOPHEN 650 MG: 325 TABLET, FILM COATED ORAL at 01:33

## 2022-09-05 RX ADMIN — POTASSIUM CHLORIDE 20 MEQ: 750 TABLET, FILM COATED, EXTENDED RELEASE ORAL at 21:02

## 2022-09-05 RX ADMIN — MONTELUKAST 10 MG: 10 TABLET, FILM COATED ORAL at 21:03

## 2022-09-05 RX ADMIN — SODIUM CHLORIDE, PRESERVATIVE FREE 10 ML: 5 INJECTION INTRAVENOUS at 23:21

## 2022-09-05 RX ADMIN — SOTALOL HYDROCHLORIDE 80 MG: 80 TABLET ORAL at 10:16

## 2022-09-05 RX ADMIN — ARFORMOTEROL TARTRATE 15 MCG: 15 SOLUTION RESPIRATORY (INHALATION) at 20:27

## 2022-09-05 RX ADMIN — NYSTATIN 500000 UNITS: 100000 SUSPENSION ORAL at 22:05

## 2022-09-05 RX ADMIN — SULFAMETHOXAZOLE AND TRIMETHOPRIM 320 MG: 80; 16 INJECTION, SOLUTION, CONCENTRATE INTRAVENOUS at 12:05

## 2022-09-05 RX ADMIN — ARFORMOTEROL TARTRATE 15 MCG: 15 SOLUTION RESPIRATORY (INHALATION) at 07:49

## 2022-09-05 RX ADMIN — SOTALOL HYDROCHLORIDE 80 MG: 80 TABLET ORAL at 21:02

## 2022-09-05 RX ADMIN — POTASSIUM CHLORIDE 20 MEQ: 750 TABLET, FILM COATED, EXTENDED RELEASE ORAL at 16:08

## 2022-09-05 RX ADMIN — NYSTATIN 500000 UNITS: 100000 SUSPENSION ORAL at 13:55

## 2022-09-05 RX ADMIN — SODIUM CHLORIDE, PRESERVATIVE FREE 10 ML: 5 INJECTION INTRAVENOUS at 05:17

## 2022-09-05 NOTE — PROGRESS NOTES
Cardiology Initial Care Encounter    Patient: Laurel Silva MRN: [id number]     YOB: 1940  Age: 80 y.o. Sex: female      Admit Date: 9/4/2022       Assessment/Plan     Increasing dyspnea. Acute on chronic HF mildly reduced EF: pBNP 2712, TTE ordered. Last EF 40-45% in April while she was in atrial fibrillation. Otherwise previously has had normal LV function. She was diuresed with IV diuretics yesterday and with that her lower remedy edema has improved. Shortness of breath and cough persists and I suspect are mostly driven by nonresolving pneumonia. CT chest was personally reviewed and appears to be more consistent with pneumonia rather than pulmonary edema. She has had poor oral intake for the past many days and overall has lost significant weight as well. My recommendation would be to just continue maintenance diuretic and not give any additional IV diuretic as symptoms are mostly driven by pneumonia rather than heart failure. 2. PAF: on sotalol, Xarelto - cont     3. Recent PNA: admitted about 2 weeks ago, completed abx. Was on steroids. Xray revealing interstitial edema vs PNA, for chest CT this morning. CT scan was personally reviewed and also shows evidence of pneumonia. 4. Weight loss, decreased appetite, nausea: GI consulted   MD time 38 minutes  NP spent 10 minutes reviewing chart, labs, diagnostics and coordination of care . NP spent 12 minutes with pt/family in examination and care plan review. HPI     Laurel Silva is a 80 y.o.  female  with PMH significant for CHF, PAF on Xarelto, asthma, GERD, anemia, hypothyroid and breast CA. Pt presented to the ED with complaints of worsening dyspnea, cough, chest pressure and nausea. Symptoms are associated with feeling lightheaded. She was recently admitted and treated for pneumonia. She had been doing well with return of her appetite until Friday when her symptoms began again.  She notes about a 50 lb weight loss over the last 6 months or so. She received IV diuresis in the ED yesterday and thinks her breathing is somewhat better but cont with persistent cough. Denies any fever, chills. ECHO 22    ECHO ADULT COMPLETE 2022    Interpretation Summary  Formatting of this result is different from the original.      Left Ventricle: Moderately reduced left ventricular systolic function with a visually estimated EF of 40 - 45%. Left ventricle size is normal. Normal wall thickness. Mild global hypokinesis present. Aortic Valve: Valve structure is normal. Mild sclerosis of the aortic valve cusp. Mitral Valve: Mild regurgitation. Signed by: Mert Lester MD on 2022  2:20 PM    The patient has been referred to cardiology for on going management of CHF. Review of Symptoms:  Constitutional: negative  ENT: negative   Respiratory:  per HPI  Gastrointestinal:  +nausea  Genitourinary: dysuria, hematuria, frequency   Musculoskeletal:negative  Neurological: negative  Other systems reviewed and negative except as above. Previous cardiac hx  No specialty comments available. Risk factors: age    Social History     Tobacco Use    Smoking status: Former     Types: Cigarettes     Start date: 2000     Quit date: 2000     Years since quittin.7    Smokeless tobacco: Never   Substance Use Topics    Alcohol use: Not Currently     No family history on file.     Current Facility-Administered Medications   Medication Dose Route Frequency    nystatin (MYCOSTATIN) 100,000 unit/mL oral suspension 500,000 Units  500,000 Units Oral QID    ketotifen (ZADITOR) 0.025 % (0.035 %) ophthalmic solution 1 Drop  1 Drop Both Eyes BID PRN    potassium chloride SR (KLOR-CON 10) tablet 20 mEq  20 mEq Oral TID    arformoteroL (BROVANA) neb solution 15 mcg  15 mcg Nebulization BID RT    budesonide (PULMICORT) 500 mcg/2 ml nebulizer suspension  500 mcg Nebulization BID RT    albuterol-ipratropium (DUO-NEB) 2.5 MG-0.5 MG/3 ML  3 mL Nebulization Q6H PRN    montelukast (SINGULAIR) tablet 10 mg  10 mg Oral QHS    sotaloL (BETAPACE) tablet 80 mg  80 mg Oral Q12H    sodium chloride (NS) flush 5-40 mL  5-40 mL IntraVENous Q8H    sodium chloride (NS) flush 5-40 mL  5-40 mL IntraVENous PRN    acetaminophen (TYLENOL) tablet 650 mg  650 mg Oral Q6H PRN    polyethylene glycol (MIRALAX) packet 17 g  17 g Oral DAILY PRN    ondansetron (ZOFRAN) injection 4 mg  4 mg IntraVENous Q6H PRN    pantoprazole (PROTONIX) 40 mg in 0.9% sodium chloride 10 mL injection  40 mg IntraVENous Q12H    rivaroxaban (XARELTO) tablet 20 mg  20 mg Oral DAILY WITH DINNER       Objective:     Vitals:    09/05/22 0700 09/05/22 0749 09/05/22 0853 09/05/22 0934   BP:    (!) 159/81   Pulse: 75  81 86   Resp:       Temp:   97.4 °F (36.3 °C)    SpO2:  99% 91% 97%   Weight:  58.4 kg (128 lb 12 oz)     Height:            Intake and Output:  Current Shift: No intake/output data recorded. Last three shifts: 09/03 1901 - 09/05 0700  In: 200 [P.O.:200]  Out: -           Gen: Well-developed, well-nourished, in no acute distress  Neck: Supple,No JVD, No Carotid Bruit,   Resp: No accessory muscle use, +rales  Card: Regular Rate,Rythm,Normal S1, S2, No murmurs, rubs or gallop. No thrills. Abd:  Soft, non-tender, non-distended,BS+,   MSK: No cyanosis  Skin: No rashes    Neuro: moving all four extremities , follows commands appropriately  Psych:  Good insight, oriented to person, place , alert, Nml Affect  LE: trace edema    EKG: normal sinus rhythm. TELEMETRY SR    Lab/Data Review: All lab results for the last 24 hours reviewed.      Signed By: Han Johns NP     September 5, 2022

## 2022-09-05 NOTE — PROGRESS NOTES
CT concerning for infection. Will check sputum culture (if able to produce a sample) and check RVP. Given history of pseudomonas and nausea/vomiting, would have a low threshold to broaden if she decompensates.

## 2022-09-05 NOTE — PROGRESS NOTES
Problem: Self Care Deficits Care Plan (Adult)  Goal: *Acute Goals and Plan of Care (Insert Text)  Description: FUNCTIONAL STATUS PRIOR TO ADMISSION: Patient was independent and active without use of DME. She reports recent use of spouse's rollator to move things within home, and sit down as needed. HOME SUPPORT: The patient lived with spouse but did not require assist. Patient spouse reports. Occupational Therapy Goals  Initiated 9/5/2022  1. Patient will perform lower body dressing with modified independence within 7 day(s). 2.  Patient will perform upper body dressing with supervision/set-up within 7 day(s). 3.  Patient will perform toilet transfers with supervision/set-up within 7 day(s). 4.  Patient will perform all aspects of toileting with supervision/set-up within 7 day(s). 5.  Patient will participate in upper extremity therapeutic exercise/activities with supervision/set-up for 10 minutes within 7 day(s). 6.  Patient will utilize energy conservation techniques during functional activities with verbal cues within 7 day(s). Outcome: Progressing Towards Goal   OCCUPATIONAL THERAPY EVALUATION  Patient: Elois Gaucher (69 y.o. female)  Date: 9/5/2022  Primary Diagnosis: Dyspnea [R06.00]       Precautions:         ASSESSMENT  Based on the objective data described below, the patient presents with decreased functional mobility, SOB, and decreased functional mobility. Patient requires only SBA for mobility and ADL tasks today. She reports using spouse's rollator at home due to fatigue, and use of seat option, and to carry items around home. Patient with decreased O2 sats with activity on room air and required 1L supplemental O2. Patient provided with incentive spirometer. Patient with difficulty performing/following instruction for use, but with practice, patient able to perform. She will require reinforcement of tasks.     Current Level of Function Impacting Discharge (ADLs/self-care): SBA    Functional Outcome Measure: The patient scored 70/100 on the Barthel INdex  outcome measure. Other factors to consider for discharge:      Patient will benefit from skilled therapy intervention to address the above noted impairments. PLAN :  Recommendations and Planned Interventions: self care training, functional mobility training, therapeutic exercise, balance training, therapeutic activities, endurance activities, patient education, home safety training, and family training/education    Frequency/Duration: Patient will be followed by occupational therapy 5 times a week to address goals. Recommendation for discharge: (in order for the patient to meet his/her long term goals)  Occupational therapy at least 2 days/week in the home vs none    This discharge recommendation:  Has not yet been discussed the attending provider and/or case management    IF patient discharges home will need the following DME: TBD       SUBJECTIVE:   Patient stated That's been happening too. I'm a little nauseous.     OBJECTIVE DATA SUMMARY:   HISTORY:   Past Medical History:   Diagnosis Date    Asthma     CHF (congestive heart failure), NYHA class I, chronic, systolic (HCC)     Chronic anticoagulation     GERD (gastroesophageal reflux disease)     Hiatal hernia     HX: breast cancer 2015    Right     Hypothyroid     Paroxysmal A-fib (HCC)     Venous insufficiency     bilat LE    Weight loss      Past Surgical History:   Procedure Laterality Date    COLONOSCOPY N/A 6/14/2021    COLONOSCOPY AND EGD performed by Clark Reeves MD at [address] [place], [place] [address]way [address]    HX BREAST LUMPECTOMY Right 08/24/2015    HX CATARACT REMOVAL  2013    Bilateral     HX CHOLECYSTECTOMY      HX DILATION AND CURETTAGE      x2    HX ORTHOPAEDIC  2017    RIGHT foot  hammertoe       Expanded or extensive additional review of patient history:     Home Situation  Home Environment: (P) Private residence ([facility])  # Steps to Enter: (P) 4  One/Two Story Residence: (P) One story  Living Alone: No  Support Systems: (P) Spouse/Significant Other, Child(cornell), Caregiver/Home Care Staff  Patient Expects to be Discharged to[de-identified] Unable to determine at this time  Current DME Used/Available at Home: Rosenda Heber, rollator  Tub or Shower Type: (P) Shower    Hand dominance: Right    EXAMINATION OF PERFORMANCE DEFICITS:  Cognitive/Behavioral Status:  Neurologic State: Alert  Orientation Level: Oriented X4  Cognition: Follows commands  Perception: Appears intact  Perseveration: No perseveration noted  Safety/Judgement: Awareness of environment    Skin: intact as seen    Edema: none noted     Hearing: Auditory  Auditory Impairment: None    Vision/Perceptual:                                     Range of Motion:  AROM: Within functional limits                         Strength:  Strength: Generally decreased, functional                Coordination:  Coordination: Within functional limits  Fine Motor Skills-Upper: Left Intact; Right Intact    Gross Motor Skills-Upper: Left Intact; Right Intact    Tone & Sensation:  Tone: Normal  Sensation: Intact                      Balance:  Sitting: Intact  Standing: Intact    Functional Mobility and Transfers for ADLs:  Bed Mobility:  Supine to Sit: Supervision  Scooting: Supervision    Transfers:  Sit to Stand: Stand-by assistance  Stand to Sit: Stand-by assistance  Bed to Chair: Stand-by assistance  Bathroom Mobility: Stand-by assistance    ADL Assessment:  Feeding: Independent    Oral Facial Hygiene/Grooming: Stand-by assistance    Bathing: Stand-by assistance         Upper Body Dressing: Stand-by assistance    Lower Body Dressing: Stand-by assistance    Toileting: Stand by assistance                  ADL Intervention and task modifications:                                          Cognitive Retraining  Safety/Judgement: Awareness of environment      Therapeutic Exercise:   Functional Measure:    Barthel Index:  Bathin  Bladder: 10  Bowels: 10  Groomin  Dressing: 10  Feeding: 10  Mobility: 0  Stairs: 5  Toilet Use: 10  Transfer (Bed to Chair and Back): 10  Total: 70/100      The Barthel ADL Index: Guidelines  1. The index should be used as a record of what a patient does, not as a record of what a patient could do. 2. The main aim is to establish degree of independence from any help, physical or verbal, however minor and for whatever reason. 3. The need for supervision renders the patient not independent. 4. A patient's performance should be established using the best available evidence. Asking the patient, friends/relatives and nurses are the usual sources, but direct observation and common sense are also important. However direct testing is not needed. 5. Usually the patient's performance over the preceding 24-48 hours is important, but occasionally longer periods will be relevant. 6. Middle categories imply that the patient supplies over 50 per cent of the effort. 7. Use of aids to be independent is allowed. Score Interpretation (from [address] [address])    Independent   60-79 Minimally independent   40-59 Partially dependent   20-39 Very dependent   <20 Totally dependent     -Real Olivas., Barthel, DAnaW. (1965). Functional evaluation: the Barthel Index. [facility] ([address] (). The Barthel activities of daily living index: self-reporting versus actual performance in the old (> or = 75 years). Journal of [address] [place] [address] 45(7), [facility], J.YOVANY, Yonatan Garcia., Pratik Hallman. (1999). Measuring the change in disability after inpatient rehabilitation; comparison of the responsiveness of the Barthel Index and Functional Irion Measure. Journal of Neurology, Neurosurgery, and Psychiatry, 66(4), 452-711. Eddye Helio NDARREN.A, AMAIRANI Bermudez.YE, & Ronald Torres M.A. (2004) Assessment of post-stroke quality of life in cost-effectiveness studies:  The usefulness of the Barthel Index and the EuroQoL-5D. Quality of Life Research, 15, 720-64         Occupational Therapy Evaluation Charge Determination   History Examination Decision-Making   LOW Complexity : Brief history review  LOW Complexity : 1-3 performance deficits relating to physical, cognitive , or psychosocial skils that result in activity limitations and / or participation restrictions  LOW Complexity : No comorbidities that affect functional and no verbal or physical assistance needed to complete eval tasks       Based on the above components, the patient evaluation is determined to be of the following complexity level: LOW   Pain Rating:  None reported     Activity Tolerance:   Good and desaturates with exertion and requires oxygen    After treatment patient left in no apparent distress:    Sitting in chair, Call bell within reach, and Bed / chair alarm activated    COMMUNICATION/EDUCATION:   The patients plan of care was discussed with: Physical therapist and Registered nurse. Home safety education was provided and the patient/caregiver indicated understanding., Patient/family have participated as able in goal setting and plan of care. , and Patient/family agree to work toward stated goals and plan of care. This patients plan of care is appropriate for delegation to [facility].     Thank you for this referral.  Wade Canales OTR/L  Time Calculation: 27 mins

## 2022-09-05 NOTE — PROGRESS NOTES
[address] [address] [facility] Progress Note: Antimicrobial Stewardship    Consult for antibiotic dosing of tobramycin nebs by Dr. Colletta Ridge  Indication: chronic colonization of pseudomonas   Day of Therapy: resumed outpatient, on week is 9/3/22-9/9/22    Plan:    Non-Kinetic Antimicrobial Dosing:   Recommendation: Start tobramycin 300mg via neb BID 9/3/22-9/9/22      Pharmacist: Marifer Coon

## 2022-09-05 NOTE — PROGRESS NOTES
Problem: Mobility Impaired (Adult and Pediatric)  Goal: *Acute Goals and Plan of Care (Insert Text)  Description: FUNCTIONAL STATUS PRIOR TO ADMISSION: Patient was independent and active without use of DME. She reports intermittent use of rollator in the home to assist with brining trays to her spouse or when she needs a seated rest break. HOME SUPPORT PRIOR TO ADMISSION: The patient lived with her spouse with history of dementia and provided meals and ADL assistance for him. Physical Therapy Goals  Initiated 9/5/2022  All goals with intention of being met with least restrictive supplemental oxygen and spo2 >90% or room air  1. Patient will move from supine to sit and sit to supine , scoot up and down, and roll side to side in bed with independence within 7 day(s). 2.  Patient will transfer from bed to chair and chair to bed with modified independence using the least restrictive device within 7 day(s). 3.  Patient will perform sit to stand with modified independence within 7 day(s). 4.  Patient will ambulate with modified independence for 200 feet with the least restrictive device within 7 day(s). 5.  Patient will ascend/descend 3 stairs with 1-2 handrail(s) with minimal assistance/contact guard assist within 7 day(s). Outcome: Not Met   PHYSICAL THERAPY EVALUATION  Patient: Ray Arellano (71 y.o. female)  Date: 9/5/2022  Primary Diagnosis: Dyspnea [R06.00]       Precautions:   Fall (droplet +)      ASSESSMENT  Based on the objective data described below, the patient presents with increased supplemental oxygen needs from baseline (none), good strength, mildly reduced balance, limited activity tolerance and overall functional mobility that is somewhat below her baseline in the setting of hospital admission for dyspnea and + COVID-19 infection. Patient today received on room air, with spo2 sats >90%.  With activity, patient dropping to low 80s as below and requires up to 1L NC to maintain oxygen >90% with activity. She ambulates several times around the room with CGA and no AD. Encouraged pursed lip breathing throughout and provided incentive spirometer. Educated on importance of oxygen perfusion throughout body to meet it's needs. Left on 1 L NC at conclusion of session. Recommend HHPT vs. None upon d/c.     Pulse oximetry assessment   98% at rest on room air (if 88% or less, skip next steps)  82% while ambulating on room air  90-92% while ambulating on 1L NC  98% while at rest on 1L NC      Current Level of Function Impacting Discharge (mobility/balance): CGA/SBA    Functional Outcome Measure: The patient scored Total Score: 24/28 on the Tinetti outcome measure which is indicative of moderate fall risk. Other factors to consider for discharge: none additional     Patient will benefit from skilled therapy intervention to address the above noted impairments. PLAN :  Recommendations and Planned Interventions: bed mobility training, transfer training, gait training, therapeutic exercises, patient and family training/education, and therapeutic activities      Frequency/Duration: Patient will be followed by physical therapy:  5 times a week to address goals. Recommendation for discharge: (in order for the patient to meet his/her long term goals)  Physical therapy at least 2 days/week in the home vs. None pending progress    This discharge recommendation:  Has not yet been discussed the attending provider and/or case management    IF patient discharges home will need the following DME: portable oxygen         SUBJECTIVE:   Patient stated oxygen scares me.     OBJECTIVE DATA SUMMARY:   Patient received supine in bed and was agreeable to participate in PT session. Patient was cleared by nursing to participate in PT session.      Vitals:    09/05/22 0853 09/05/22 0934 09/05/22 1301   BP:  (!) 159/81    BP 1 Location: Left upper arm Left upper arm    BP Patient Position: Lying Sitting  Comment: after activity    Pulse: 81 86    Temp: 97.4 °F (36.3 °C)     Resp:      Height:      Weight:      SpO2: 91% 97% 98%   '    HISTORY:    Past Medical History:   Diagnosis Date    Asthma     CHF (congestive heart failure), NYHA class I, chronic, systolic (HCC)     Chronic anticoagulation     GERD (gastroesophageal reflux disease)     Hiatal hernia     HX: breast cancer 2015    Right     Hypothyroid     Paroxysmal A-fib (HCC)     Venous insufficiency     bilat LE    Weight loss      Past Surgical History:   Procedure Laterality Date    COLONOSCOPY N/A 6/14/2021    COLONOSCOPY AND EGD performed by Meagan Mackenzie MD at [facility] ENDOSCOPY    HX BREAST LUMPECTOMY Right 08/24/2015    HX CATARACT REMOVAL  2013    Bilateral     HX CHOLECYSTECTOMY      HX DILATION AND CURETTAGE      x2    HX ORTHOPAEDIC  2017    RIGHT foot  hammertoe       Personal factors and/or comorbidities impacting plan of care: none additional    Home Situation  Home Environment: Private residence ([facility])  # Steps to Enter: 4  One/Two Story Residence: One story  Living Alone: No  Support Systems: Spouse/Significant Other, Child([place]), Caregiver/Home Care Staff  Patient Expects to be Discharged to[de-identified] Unable to determine at this time  Current DME Used/Available at Home: Theo Opal, rollator  Tub or Shower Type: Shower    EXAMINATION/PRESENTATION/DECISION MAKING:   Critical Behavior:  Neurologic State: Alert  Orientation Level: Oriented X4  Cognition: Follows commands  Safety/Judgement: Awareness of environment  Hearing:   Auditory  Auditory Impairment: None  Skin:  all observed intact  Edema: none apparent   Range Of Motion:  AROM: Within functional limits                       Strength:    Strength: Generally decreased, functional                    Tone & Sensation:   Tone: Normal              Sensation: Intact               Coordination:  Coordination: Within functional limits  Vision:      Functional Mobility:  Bed Mobility:     Supine to Sit: Supervision Scooting: Supervision  Transfers:  Sit to Stand: Stand-by assistance  Stand to Sit: Stand-by assistance        Bed to Chair: Stand-by assistance              Balance:   Sitting: Intact  Standing: Intact  Ambulation/Gait Training:  Distance (ft): 35 Feet (ft) (x4)  Assistive Device: Gait belt ((Patient declines RW use))  Ambulation - Level of Assistance: Contact guard assistance        Gait Abnormalities: Decreased step clearance;Trunk sway increased              Speed/Ivory: Pace decreased (<100 feet/min)  Step Length: Right shortened;Left shortened                     Stairs: Therapeutic Exercises:   Extensively educated on incentive spirometer use    Functional Measure:  Tinetti test:    Sitting Balance: 1  Arises: 2  Attempts to Rise: 2  Immediate Standing Balance: 2  Standing Balance: 1  Nudged: 0  Eyes Closed: 0  Turn 360 Degrees - Continuous/Discontinuous: 1  Turn 360 Degrees - Steady/Unsteady: 1  Sitting Down: 2  Balance Score: 12 Balance total score  Indication of Gait: 1  R Step Length/Height: 1  L Step Length/Height: 1  R Foot Clearance: 1  L Foot Clearance: 1  Step Symmetry: 1  Step Continuity: 1  Path: 2  Trunk: 2  Walking Time: 1  Gait Score: 12 Gait total score  Total Score: 24/28 Overall total score         Tinetti Tool Score Risk of Falls  <19 = High Fall Risk  19-24 = Moderate Fall Risk  25-28 = Low Fall Risk  Tinetti ME. Performance-Oriented Assessment of Mobility Problems in Elderly Patients. [place] 66; [id number].  (Scoring Description: PT Bulletin Feb. 10, 1993)    Older adults: Yonis Noel et al, 2009; n = 1000 [facility] elderly evaluated with ABC, CARLOTTA, ADL, and IADL)  · Mean CARLOTTA score for males aged 69-68 years = 26.21(3.40)  · Mean CARLOTTA score for females age 69-68 years = 25.16(4.30)  · Mean CARLOTTA score for males over 80 years = 23.29(6.02)  · Mean CARLOTTA score for females over 80 years = 17.20(8.32)        Physical Therapy Evaluation Charge Determination   History Examination Presentation Decision-Making   MEDIUM  Complexity : 1-2 comorbidities / personal factors will impact the outcome/ POC  MEDIUM Complexity : 3 Standardized tests and measures addressing body structure, function, activity limitation and / or participation in recreation  MEDIUM Complexity : Evolving with changing characteristics  MEDIUM Complexity : FOTO score of 26-74      Based on the above components, the patient evaluation is determined to be of the following complexity level: MEDIUM    Pain Rating:  Patient without reports of pain during therapy      Activity Tolerance:   Fair, desaturates with exertion and requires oxygen, and requires rest breaks      After treatment patient left in no apparent distress:   Sitting in chair, Call bell within reach, and Bed / chair alarm activated    COMMUNICATION/EDUCATION:   The patients plan of care was discussed with: Occupational therapist and Registered nurse. Fall prevention education was provided and the patient/caregiver indicated understanding. and Patient/family have participated as able in goal setting and plan of care.     Thank you for this referral.  Alec Iglesias PT, DPT   Time Calculation: 30 mins

## 2022-09-05 NOTE — ROUTINE PROCESS
Bedside and Verbal shift change report given to Lisa Gleason (oncoming nurse) by Brendan Barker (offgoing nurse). Report included the following information SBAR and Kardex.

## 2022-09-05 NOTE — CONSULTS
Aris Hodgson. Bill Camargo MD  [phone] office  [phone] voicemail   Gastroenterology Consultation Note      Admit Date: 2022  Consult Date: 2022   I greatly appreciate your asking me to see Dave Phillips, thank you very much for the opportunity to participate in her care. Narrative Assessment and Plan   Nausea  Weight loss    I met her for EGD recently, after her having been evaluated by my colleagues at [facility] for same complaint. EGD showed candidal esophagitis but she cannot take fluconazole in concert with sotalol. Ongoing symptoms despite nystatin. I recommend  1) gastric emptying study, if normal  2) upper GI study as complimentary evaluation of her hiatal hernia  3) consideration for surgical repair of hiatal hernia vs d/c sotalol for fluconazole depending on results    Subjective:     Chief Complaint: Nausea    History of Present Illness: Ongoing nausea, lack of appetite weight loss despite therapy thus far.     PCP:  Gita Cornelius MD    Past Medical History:   Diagnosis Date    Asthma     CHF (congestive heart failure), NYHA class I, chronic, systolic (HCC)     Chronic anticoagulation     GERD (gastroesophageal reflux disease)     Hiatal hernia     HX: breast cancer     Right     Hypothyroid     Paroxysmal A-fib (HCC)     Venous insufficiency     bilat LE    Weight loss         Past Surgical History:   Procedure Laterality Date    COLONOSCOPY N/A 2021    COLONOSCOPY AND EGD performed by Kaylee Cordova MD at [facility] [address]    HX BREAST LUMPECTOMY Right 2015    HX CATARACT REMOVAL  2013    Bilateral     HX CHOLECYSTECTOMY      HX DILATION AND CURETTAGE      x2    HX ORTHOPAEDIC  2017    RIGHT foot  hammertoe       Social History     Tobacco Use    Smoking status: Former     Types: Cigarettes     Start date: 2000     Quit date: 2000     Years since quittin.7    Smokeless tobacco: Never   Substance Use Topics    Alcohol use: Not Currently        No family history on file. Allergies   Allergen Reactions    Ace Inhibitors Cough    Iodine Rash    Penicillins Rash            Home Medications:  Prior to Admission Medications   Prescriptions Last Dose Informant Patient Reported? Taking? Xarelto 20 mg tab tablet 9/3/2022 at [id number]  Yes Yes   Sig: Take 20 mg by mouth daily (with dinner). acetaminophen (TYLENOL EXTRA STRENGTH PO) Unknown  Yes No   Sig: Take  by mouth. albuterol (PROVENTIL HFA, VENTOLIN HFA, PROAIR HFA) 90 mcg/actuation inhaler   Yes No   Sig: Take  by inhalation. albuterol (PROVENTIL VENTOLIN) 2.5 mg /3 mL (0.083 %) nebu Unknown  Yes No   Sig: Take 3 mL by inhalation every four (4) hours as needed. albuterol 2.5 mg /3 mL (0.083 %) nebu 3 mL, albuterol-ipratropium 2.5 mg-0.5 mg/3 ml nebu 3 mL   Yes No   Sig: Take 1 Inhalation by inhalation every four (4) hours as needed. azelastine (ASTEPRO) 205.5 mcg (0.15 %)   Yes No   Sig: two (2) times a day. calcitonin, salmon, (MIACALCIN) nasal Not Taking  Yes No   [place] by IntraNASal route daily. Patient not taking: No sig reported   calcium carbonate/vitamin D3 (CALCIUM + D PO) 2022 at 0800  Yes Yes   Sig: Take  by mouth. cholecalciferol (VITAMIN D3) 25 mcg (1,000 unit) cap   Yes No   Sig: Take  by mouth daily. diclofenac (VOLTAREN) 1 % gel 9/3/2022  Yes Yes   Sig: Apply  to affected area four (4) times daily. dicyclomine (BENTYL) 20 mg tablet Not Taking  No No   Sig: Take 1 Tab by mouth every six (6) hours as needed for Abdominal Cramps. Patient not taking: No sig reported   docosahexanoic acid/epa (FISH OIL PO) Not Taking  Yes No   Sig: Take  by mouth. Patient not taking: No sig reported   doxycycline (MONODOX) 100 mg capsule Not Taking  No No   Sig: Take 1 Capsule by mouth two (2) times a day.    Patient not taking: Reported on 2022   ergocalciferol (ERGOCALCIFEROL) 50,000 unit capsule Not Taking  Yes No   Sig: ergocalciferol (vitamin D2) 50,000 unit capsule   Patient not taking: No sig reported   fluticasone (VERAMYST) 27.5 mcg/actuation nasal spray Not Taking  Yes No   Si Sprays by Nasal route daily. Patient not taking: No sig reported   fluticasone-umeclidinium-vilanterol (Trelegy Ellipta) 100-62.5-25 mcg inhaler Unknown  Yes No   Sig: Take 1 Puff by inhalation daily. Patient not taking: Reported on 2022   ibandronate (BONIVA) 150 mg tablet Not Taking  Yes No   Sig: ibandronate 150 mg tablet   Patient not taking: No sig reported   levothyroxine (SYNTHROID) 25 mcg tablet 9/3/2022 at 2100  Yes Yes   Sig: levothyroxine 25 mcg tablet   metroNIDAZOLE (METROCREAM) 0.75 % topical cream 9/3/2022 at 2100  Yes Yes   Sig: metronidazole 0.75 % topical cream   mometasone (NASONEX) 50 mcg/actuation nasal spray Not Taking  Yes No   Sig: mometasone 50 mcg/actuation nasal spray   Patient not taking: No sig reported   montelukast (SINGULAIR) 10 mg tablet 2022 at 0800  Yes Yes   Sig: montelukast 10 mg tablet   multivitamin (ONE A DAY) tablet 9/3/2022 at 0800  Yes Yes   Sig: Take 1 Tab by mouth daily. nystatin (MYCOSTATIN) 100,000 unit/mL suspension 2022 at 0800  No Yes   Sig: Take 5 mL by mouth four (4) times daily for 20 days. swish and spit   olopatadine (PATADAY) 0.2 % drop ophthalmic solution 2022 at 0800  Yes Yes   Sig: Administer 1 Drop to both eyes daily. ondansetron (ZOFRAN ODT) 4 mg disintegrating tablet Not Taking  No No   Sig: Take 1 Tab by mouth every eight (8) hours as needed for Nausea. Patient not taking: Reported on 2022   pantoprazole (PROTONIX) 40 mg tablet 2022 at 0800  Yes Yes   Sig: Take 40 mg by mouth daily. polysorbate 80/glycerin (REFRESH DRY EYE THERAPY OP) Not Taking  Yes No   Sig: Apply  to eye. Patient not taking: No sig reported   sotaloL (BETAPACE) 80 mg tablet 2022 at 0800  Yes Yes   Sig: Take  by mouth two (2) times a day.    tiotropium bromide (SPIRIVA RESPIMAT) 2.5 mcg/actuation inhaler   Yes No Sig: Spiriva Respimat 2.5 mcg/actuation solution for inhalation   vitamin B complex (VITAMINS B COMPLEX PO) Not Taking  Yes No   Sig: Take  by mouth. Patient not taking: No sig reported   vitamin E acetate (VITAMIN E PO) Not Taking  Yes No   Sig: Take  by mouth.    Patient not taking: No sig reported      Facility-Administered Medications: None       Hospital Medications:  Current Facility-Administered Medications   Medication Dose Route Frequency    nystatin (MYCOSTATIN) 100,000 unit/mL oral suspension 500,000 Units  500,000 Units Oral QID    ketotifen (ZADITOR) 0.025 % (0.035 %) ophthalmic solution 1 Drop  1 Drop Both Eyes BID PRN    potassium chloride SR (KLOR-CON 10) tablet 20 mEq  20 mEq Oral TID    arformoteroL (BROVANA) neb solution 15 mcg  15 mcg Nebulization BID RT    budesonide (PULMICORT) 500 mcg/2 ml nebulizer suspension  500 mcg Nebulization BID RT    albuterol-ipratropium (DUO-NEB) 2.5 MG-0.5 MG/3 ML  3 mL Nebulization Q6H PRN    montelukast (SINGULAIR) tablet 10 mg  10 mg Oral QHS    sotaloL (BETAPACE) tablet 80 mg  80 mg Oral Q12H    sodium chloride (NS) flush 5-40 mL  5-40 mL IntraVENous Q8H    sodium chloride (NS) flush 5-40 mL  5-40 mL IntraVENous PRN    acetaminophen (TYLENOL) tablet 650 mg  650 mg Oral Q6H PRN    polyethylene glycol (MIRALAX) packet 17 g  17 g Oral DAILY PRN    ondansetron (ZOFRAN) injection 4 mg  4 mg IntraVENous Q6H PRN    pantoprazole (PROTONIX) 40 mg in 0.9% sodium chloride 10 mL injection  40 mg IntraVENous Q12H    rivaroxaban (XARELTO) tablet 20 mg  20 mg Oral DAILY WITH DINNER       Review of Systems: Admission ROS by Hyun Sykes MD from 9/4/2022 were reviewed with the patient and changes (other than per HPI) include: none      Objective:     Physical Exam:  Visit Vitals  BP (!) 159/81 (BP 1 Location: Left upper arm, BP Patient Position: Sitting)   Pulse 86   Temp 97.4 °F (36.3 °C)   Resp 17   Ht 5' 2\" (1.575 m)   Wt 58.4 kg (128 lb 12 oz)   SpO2 97%   BMI 23.55 kg/m²     SpO2 Readings from Last 6 Encounters:   09/05/22 97%   08/23/22 95%   08/10/22 94%   05/01/22 96%   06/14/21 99%   04/28/21 97%    O2 Flow Rate (L/min): 1 l/min     Intake/Output Summary (Last 24 hours) at 9/5/2022 1018  Last data filed at 9/5/2022 0331  Gross per 24 hour   Intake 200 ml   Output --   Net 200 ml      General: no distress, comfortable  Skin:  No rash or palpable dermatologic mass lesions  HEENT: Pupils equal, sclera anicteric, oropharynx with no gross lesions  Cardiovascular: No abnormal audible heart sounds, well perfused, no edema  Respiratory:  No abnormal audible breath sounds, normal respiratory effort, no throacic deformity  GI:   Abdomen nondistended, nontender, no mass, no free fluid, no rebound or guarding. Musculoskeletal:  No skeletal deformity nor acute arthritis noted. Neurological:  Motor and sensory function intact in upper extremeties  Psychiatric:  Normal affect, memory intact, appears to have insight into current illness  Lymphatic:  No cervical, supraclavicular, or periumbilic lymphadenopathy    Laboratory:    Recent Results (from the past 24 hour(s))   CBC WITH AUTOMATED DIFF    Collection Time: 09/04/22  1:39 PM   Result Value Ref Range    WBC 13.0 (H) 3.6 - 11.0 K/uL    RBC 3.39 (L) 3.80 - 5.20 M/uL    HGB 9.7 (L) 11.5 - 16.0 g/dL    HCT 29.0 (L) 35.0 - 47.0 %    MCV 85.5 80.0 - 99.0 FL    MCH 28.6 26.0 - 34.0 PG    MCHC 33.4 30.0 - 36.5 g/dL    RDW 14.8 (H) 11.5 - 14.5 %    PLATELET 697 (H) 410 - 400 K/uL    MPV 8.9 8.9 - 12.9 FL    NRBC 0.0 0  WBC    ABSOLUTE NRBC 0.00 0.00 - 0.01 K/uL    NEUTROPHILS 83 (H) 32 - 75 %    LYMPHOCYTES 8 (L) 12 - 49 %    MONOCYTES 7 5 - 13 %    EOSINOPHILS 1 0 - 7 %    BASOPHILS 0 0 - 1 %    IMMATURE GRANULOCYTES 1 (H) 0.0 - 0.5 %    ABS. NEUTROPHILS 10.7 (H) 1.8 - 8.0 K/UL    ABS. LYMPHOCYTES 1.1 0.8 - 3.5 K/UL    ABS. MONOCYTES 1.0 0.0 - 1.0 K/UL    ABS. EOSINOPHILS 0.2 0.0 - 0.4 K/UL    ABS.  BASOPHILS 0.0 0.0 - 0.1 K/UL ABS. IMM. GRANS. 0.1 (H) 0.00 - 0.04 K/UL    DF AUTOMATED     METABOLIC PANEL, COMPREHENSIVE    Collection Time: 09/04/22  1:39 PM   Result Value Ref Range    Sodium 129 (L) 136 - 145 mmol/L    Potassium 3.3 (L) 3.5 - 5.1 mmol/L    Chloride 93 (L) 97 - 108 mmol/L    CO2 30 21 - 32 mmol/L    Anion gap 6 5 - 15 mmol/L    Glucose 134 (H) 65 - 100 mg/dL    BUN 7 6 - 20 MG/DL    Creatinine 0.49 (L) 0.55 - 1.02 MG/DL    BUN/Creatinine ratio 14 12 - 20      GFR est AA >60 >60 ml/min/1.73m2    GFR est non-AA >60 >60 ml/min/1.73m2    Calcium 8.5 8.5 - 10.1 MG/DL    Bilirubin, total 0.5 0.2 - 1.0 MG/DL    ALT (SGPT) 11 (L) 12 - 78 U/L    AST (SGOT) 13 (L) 15 - 37 U/L    Alk. phosphatase 135 (H) 45 - 117 U/L    Protein, total 6.5 6.4 - 8.2 g/dL    Albumin 2.4 (L) 3.5 - 5.0 g/dL    Globulin 4.1 (H) 2.0 - 4.0 g/dL    A-G Ratio 0.6 (L) 1.1 - 2.2     CULTURE, BLOOD, PAIRED    Collection Time: 09/04/22  1:39 PM    Specimen: Blood   Result Value Ref Range    Special Requests: NO SPECIAL REQUESTS      Culture result: NO GROWTH AFTER 18 HOURS     TROPONIN-HIGH SENSITIVITY    Collection Time: 09/04/22  1:39 PM   Result Value Ref Range    Troponin-High Sensitivity 7 0 - 51 ng/L   PROCALCITONIN    Collection Time: 09/04/22  1:39 PM   Result Value Ref Range    Procalcitonin 0.17 ng/mL   COVID-19 RAPID TEST    Collection Time: 09/04/22  1:39 PM   Result Value Ref Range    Specimen source Nasopharyngeal      COVID-19 rapid test Not detected NOTD     MAGNESIUM    Collection Time: 09/04/22  1:39 PM   Result Value Ref Range    Magnesium 1.4 (L) 1.6 - 2.4 mg/dL   SAMPLES BEING HELD    Collection Time: 09/04/22  1:39 PM   Result Value Ref Range    SAMPLES BEING HELD 1BL,1SST     COMMENT        Add-on orders for these samples will be processed based on acceptable specimen integrity and analyte stability, which may vary by analyte.    NT-PRO BNP    Collection Time: 09/04/22  1:39 PM   Result Value Ref Range    NT pro-BNP 2,712 (H) <450 PG/ML   D DIMER    Collection Time: 09/04/22  1:39 PM   Result Value Ref Range    D-dimer 0.44 0.00 - 0.65 mg/L FEU   POC LACTIC ACID    Collection Time: 09/04/22  1:57 PM   Result Value Ref Range    Lactic Acid (POC) 0.97 0.40 - 2.00 mmol/L   FERRITIN    Collection Time: 09/04/22  5:26 PM   Result Value Ref Range    Ferritin 196 8 - 252 NG/ML   FOLATE    Collection Time: 09/04/22  5:26 PM   Result Value Ref Range    Folate 25.7 (H) 5.0 - 21.0 ng/mL   HAPTOGLOBIN    Collection Time: 09/04/22  5:26 PM   Result Value Ref Range    Haptoglobin 418 (H) 30 - 200 mg/dL   IRON PROFILE    Collection Time: 09/04/22  5:26 PM   Result Value Ref Range    Iron 35 35 - 150 ug/dL    TIBC 193 (L) 250 - 450 ug/dL    Iron % saturation 18 (L) 20 - 50 %   VITAMIN B12    Collection Time: 09/04/22  5:26 PM   Result Value Ref Range    Vitamin B12 1,529 (H) 193 - 986 pg/mL   RETICULOCYTE COUNT    Collection Time: 09/04/22  5:26 PM   Result Value Ref Range    Reticulocyte count 2.7 (H) 0.7 - 2.1 %    Absolute Retic Cnt. 0.0958 (H) 0.0164 - 0.0776 M/ul   LD    Collection Time: 09/04/22  5:26 PM   Result Value Ref Range     81 - 246 U/L   TSH 3RD GENERATION    Collection Time: 09/04/22  5:26 PM   Result Value Ref Range    TSH 2.20 0.36 - 0.04 uIU/mL   METABOLIC PANEL, COMPREHENSIVE    Collection Time: 09/05/22  5:18 AM   Result Value Ref Range    Sodium 128 (L) 136 - 145 mmol/L    Potassium 3.9 3.5 - 5.1 mmol/L    Chloride 89 (L) 97 - 108 mmol/L    CO2 31 21 - 32 mmol/L    Anion gap 8 5 - 15 mmol/L    Glucose 96 65 - 100 mg/dL    BUN 6 6 - 20 MG/DL    Creatinine 0.41 (L) 0.55 - 1.02 MG/DL    BUN/Creatinine ratio 15 12 - 20      GFR est AA >60 >60 ml/min/1.73m2    GFR est non-AA >60 >60 ml/min/1.73m2    Calcium 8.5 8.5 - 10.1 MG/DL    Bilirubin, total 0.6 0.2 - 1.0 MG/DL    ALT (SGPT) 9 (L) 12 - 78 U/L    AST (SGOT) 12 (L) 15 - 37 U/L    Alk.  phosphatase 127 (H) 45 - 117 U/L    Protein, total 6.2 (L) 6.4 - 8.2 g/dL    Albumin 2.2 (L) 3.5 - 5.0 g/dL Globulin 4.0 2.0 - 4.0 g/dL    A-G Ratio 0.6 (L) 1.1 - 2.2     MAGNESIUM    Collection Time: 09/05/22  5:18 AM   Result Value Ref Range    Magnesium 2.0 1.6 - 2.4 mg/dL   CBC W/O DIFF    Collection Time: 09/05/22  5:18 AM   Result Value Ref Range    WBC 10.1 3.6 - 11.0 K/uL    RBC 3.37 (L) 3.80 - 5.20 M/uL    HGB 9.5 (L) 11.5 - 16.0 g/dL    HCT 28.8 (L) 35.0 - 47.0 %    MCV 85.5 80.0 - 99.0 FL    MCH 28.2 26.0 - 34.0 PG    MCHC 33.0 30.0 - 36.5 g/dL    RDW 14.7 (H) 11.5 - 14.5 %    PLATELET 898 (H) 297 - 400 K/uL    MPV 8.7 (L) 8.9 - 12.9 FL    NRBC 0.0 0  WBC    ABSOLUTE NRBC 0.00 0.00 - 0.01 K/uL         Assessment/Plan:     Principal Problem:    Dyspnea (9/4/2022)    Active Problems:    Asthma ()      Paroxysmal A-fib (HCC) ()      GERD (gastroesophageal reflux disease) ()      Hiatal hernia ()      HX: breast cancer (2015)      Overview: Right       Hypothyroid ()      Venous insufficiency ()      Overview: bilat LE      Anemia (4/30/2022)      Chronic anticoagulation ()      Hyponatremia (4/30/2022)      Nausea (9/4/2022)      Chest pain (9/4/2022)      Hypokalemia (9/4/2022)      Leukocytosis (9/4/2022)      CHF (congestive heart failure), NYHA class I, chronic, systolic ([facility] (5/2/0956)         See above narrative for full detail.

## 2022-09-05 NOTE — PROGRESS NOTES
Bedside and Verbal shift change report given to   EZEQUIEL Solis (oncoming nurse) by Zack Foss RN (offgoing nurse). Report included the following information SBAR, Kardex, ED Summary, Procedure Summary, Intake/Output, MAR, and Recent Results.

## 2022-09-05 NOTE — PROGRESS NOTES
[facility]  [address]  [phone]    Medical Progress Note      NAME: Sy Costa   :  1940  MRM:  [id number]    Date/Time of service 2022  8:25 AM          Assessment and Plan:     Dyspnea / Nausea / Chest pain - Unclear etiology of these symptoms. She is not hypoxic at rest, her sats are equal to those when she discharged recently. Xray with only mild general edema pattern. pBNP slightly elevated, but it has been so in the past too. She has hiatal hernia and GERD, and this may contribute to cough, nausea and chest pain. She has been more immobile, but she takes xarelto, so DVT/PE is [place]. DDX thus includes GERD (which I think is the primary problem), vs PNA, CHF, PE, asthma     CHF (congestive heart failure), NYHA class I, chronic, systolic / Venous insufficiency - Unclear if any exacerbation, I do not think so. Check ECHO. Consult cariology. ER gave lasix, but with her normal oxygen sats, low NA and low K, wt loss, 2 days no PO intake, I did not continue that. Continue BB      Hyponatremia / Hypokalemia / Hypomagnesemia - POA, likely due to poor PO intake. Weight recorded in ER is actually several pounds lighter than at discharge 2 weeks ago. She has eaten or drank very little in last 2 days. I do not think she is fluid overloaded. If acute CHF ruled out, I will hydrate. For now add PO K and IV Mg     Anemia - Stable since last discharge, and normal serologies. Weight loss / GERD (gastroesophageal reflux disease) / Hiatal hernia - She has very concerning unintentional wt loss, anorexia, nausea that persists. She weighed 170lb last year. 128lb now. Consult GI, who is following her outpatient. PPI by IV. At home she takes bentyl and Zofran as needed. Paroxysmal A-fib / Chronic anticoagulation - Continue sotalol and xarelto     Asthma - Appears stable.   For her home Trelegy substitute Brovana, Pulmicort and prn Duonebs     Leukocytosis - POA, mild, without bands, and no other SIRS criteria, and WBC normalized overnight without any ABx. Procalcitonin is low. She had completed Abx for PNA. No Abx for now. But I think she is at risk of aspiration PNA. HX: breast cancer - Outpatient follow up. Has been on Boniva, increasing risk of PUD and esophagitis      Hypothyroid - TSH was normal a few months ago. Stop the homeopathic dose of synthroid. Subjective:     Chief Complaint:  dyspnea    ROS:  (bold if positive, if negative)    Cough      Sputum   Abd Pain  SOB/DOETolerating some PT  Tolerating minimal Diet        Objective:     Last 24hrs VS reviewed since prior progress note.  Most recent are:    Visit Vitals  /70 (BP 1 Location: Right upper arm, BP Patient Position: At rest)   Pulse 75   Temp 97.6 °F (36.4 °C)   Resp 17   Ht 5' 2\" (1.575 m)   Wt 58.4 kg (128 lb 12 oz)   SpO2 99%   BMI 23.55 kg/m²     SpO2 Readings from Last 6 Encounters:   09/05/22 99%   08/23/22 95%   08/10/22 94%   05/01/22 96%   06/14/21 99%   04/28/21 97%          Intake/Output Summary (Last 24 hours) at 9/5/2022 0825  Last data filed at 9/5/2022 0331  Gross per 24 hour   Intake 200 ml   Output --   Net 200 ml        Physical Exam:    Gen:  Frail, in no acute distress  HEENT:  Pink conjunctivae, PERRL, hearing intact to voice, moist mucous membranes  Neck:  Supple, without masses, thyroid non-tender  Resp:  No accessory muscle use, clear breath sounds without wheezes rales or rhonchi  Card:  No murmurs, normal S1, S2 without thrills, bruits or peripheral edema  Abd:  Soft, non-tender, non-distended, normoactive bowel sounds are present, no mass  Lymph:  No cervical or inguinal adenopathy  Musc:  No cyanosis or clubbing  Skin:  No rashes or ulcers, skin turgor is good  Neuro:  Cranial nerves are grossly intact, general motor weakness, follows commands   Psych:  Good insight, oriented to person, place and time, alert    Telemetry reviewed:   normal sinus rhythm  __________________________________________________________________  Medications Reviewed: (see below)  Medications:     Current Facility-Administered Medications   Medication Dose Route Frequency    potassium chloride SR (KLOR-CON 10) tablet 20 mEq  20 mEq Oral TID    arformoteroL (BROVANA) neb solution 15 mcg  15 mcg Nebulization BID RT    budesonide (PULMICORT) 500 mcg/2 ml nebulizer suspension  500 mcg Nebulization BID RT    albuterol-ipratropium (DUO-NEB) 2.5 MG-0.5 MG/3 ML  3 mL Nebulization Q6H PRN    montelukast (SINGULAIR) tablet 10 mg  10 mg Oral QHS    sotaloL (BETAPACE) tablet 80 mg  80 mg Oral Q12H    sodium chloride (NS) flush 5-40 mL  5-40 mL IntraVENous Q8H    sodium chloride (NS) flush 5-40 mL  5-40 mL IntraVENous PRN    acetaminophen (TYLENOL) tablet 650 mg  650 mg Oral Q6H PRN    Or    acetaminophen (TYLENOL) suppository 650 mg  650 mg Rectal Q6H PRN    polyethylene glycol (MIRALAX) packet 17 g  17 g Oral DAILY PRN    ondansetron (ZOFRAN ODT) tablet 4 mg  4 mg Oral Q8H PRN    Or    ondansetron (ZOFRAN) injection 4 mg  4 mg IntraVENous Q6H PRN    pantoprazole (PROTONIX) 40 mg in 0.9% sodium chloride 10 mL injection  40 mg IntraVENous Q12H    rivaroxaban (XARELTO) tablet 20 mg  20 mg Oral DAILY WITH DINNER        Lab Data Reviewed: (see below)  Lab Review:     Recent Labs     09/05/22 0518 09/04/22  1339   WBC 10.1 13.0*   HGB 9.5* 9.7*   HCT 28.8* 29.0*   * 445*     Recent Labs     09/05/22 0518 09/04/22  1339   * 129*   K 3.9 3.3*   CL 89* 93*   CO2 31 30   GLU 96 134*   BUN 6 7   CREA 0.41* 0.49*   CA 8.5 8.5   MG 2.0 1.4*   ALB 2.2* 2.4*   TBILI 0.6 0.5   ALT 9* 11*     No results found for: GLUCPOC  No results for input(s): PH, PCO2, PO2, HCO3, FIO2 in the last 72 hours. No results for input(s): INR, INREXT in the last 72 hours.   All Micro Results       Procedure Component Value Units Date/Time    CULTURE, BLOOD, PAIRED [[id number]] Collected: 09/04/22 1339    Order Status: Completed Specimen: Blood Updated: 09/05/22 0643     Special Requests: NO SPECIAL REQUESTS        Culture result: NO GROWTH AFTER 16 HOURS       COVID-19 RAPID TEST [[id number]] Collected: 09/04/22 1339    Order Status: Completed Specimen: Nasopharyngeal Updated: 09/04/22 1414     Specimen source Nasopharyngeal        COVID-19 rapid test Not detected        Comment: Rapid Abbott ID Now       Rapid NAAT:  The specimen is NEGATIVE for SARS-CoV-2, the novel coronavirus associated with COVID-19. Negative results should be treated as presumptive and, if inconsistent with clinical signs and symptoms or necessary for patient management, should be tested with an alternative molecular assay. Negative results do not preclude SARS-CoV-2 infection and should not be used as the sole basis for patient management decisions. This test has been authorized by the FDA under an Emergency Use Authorization (EUA) for use by authorized laboratories. Fact sheet for Healthcare Providers: ConventionUpdate.co.nz  Fact sheet for Patients: ConventionUpdate.co.nz       Methodology: Isothermal Nucleic Acid Amplification                 Other pertinent lab: none    Total time spent with patient: 30 Minutes I personally reviewed chart, notes, data and current medications in the medical record. I have personally examined and treated the patient at bedside during this period. To assist coordination of care and communication with nursing and staff, this note may be preliminary early in the day, but finalized by end of the day.                  Care Plan discussed with: Patient, Family, Care Manager, Nursing Staff, Consultant/Specialist, and >50% of time spent in counseling and coordination of care    Discussed:  Care Plan and D/C Planning    Prophylaxis:  Lovenox and H2B/PPI    Disposition:  Home w/Family           ___________________________________________________    Attending Physician: Jadiel Plata MD

## 2022-09-05 NOTE — PROGRESS NOTES
Problem: Falls - Risk of  Goal: *Absence of Falls  Outcome: Progressing Towards Goal  Note: Fall Risk Interventions:  Mobility Interventions: Bed/chair exit alarm, Communicate number of staff needed for ambulation/transfer, Patient to call before getting OOB         Medication Interventions: Patient to call before getting OOB, Bed/chair exit alarm    Elimination Interventions: Bed/chair exit alarm, Call light in reach, Stay With Me (per policy)              Problem: Patient Education: Go to Patient Education Activity  Goal: Patient/Family Education  Outcome: Progressing Towards Goal     Problem: Heart Failure: Discharge Outcomes  Goal: *Demonstrates ability to perform prescribed activity without shortness of breath or discomfort  Outcome: Progressing Towards Goal  Goal: *Left ventricular function assessment completed prior to or during stay, or planned for post-discharge  Outcome: Progressing Towards Goal  Goal: *ACEI prescribed if LVEF less than 40% and no contraindications or ARB prescribed  Outcome: Progressing Towards Goal  Goal: *Verbalizes understanding and describes prescribed diet  Outcome: Progressing Towards Goal  Goal: *Verbalizes understanding/describes prescribed medications  Outcome: Progressing Towards Goal  Goal: *Describes available resources and support systems  Outcome: Progressing Towards Goal  Goal: *Describes smoking cessation resources  Outcome: Progressing Towards Goal  Goal: *Understands and describes signs and symptoms to report to providers(Stroke Metric)  Outcome: Progressing Towards Goal  Goal: *Describes/verbalizes understanding of follow-up/return appt  Outcome: Progressing Towards Goal  Goal: *Describes importance of continuing daily weights and changes to report to physician  Outcome: Progressing Towards Goal     Problem: Heart Failure: Discharge Outcomes  Goal: *Demonstrates ability to perform prescribed activity without shortness of breath or discomfort  Outcome: Progressing Towards Goal  Goal: *Left ventricular function assessment completed prior to or during stay, or planned for post-discharge  Outcome: Progressing Towards Goal  Goal: *ACEI prescribed if LVEF less than 40% and no contraindications or ARB prescribed  Outcome: Progressing Towards Goal  Goal: *Verbalizes understanding and describes prescribed diet  Outcome: Progressing Towards Goal  Goal: *Verbalizes understanding/describes prescribed medications  Outcome: Progressing Towards Goal  Goal: *Describes available resources and support systems  Outcome: Progressing Towards Goal  Goal: *Describes smoking cessation resources  Outcome: Progressing Towards Goal  Goal: *Understands and describes signs and symptoms to report to providers(Stroke Metric)  Outcome: Progressing Towards Goal  Goal: *Describes/verbalizes understanding of follow-up/return appt  Outcome: Progressing Towards Goal  Goal: *Describes importance of continuing daily weights and changes to report to physician  Outcome: Progressing Towards Goal     Problem: Pain  Goal: *Control of Pain  Outcome: Progressing Towards Goal     Problem: Patient Education: Go to Patient Education Activity  Goal: Patient/Family Education  Outcome: Progressing Towards Goal

## 2022-09-05 NOTE — ROUTINE PROCESS
Pt in the unit before 1900 doing her med rec.pt has a lot of questions about her medication. Pt stated we are giving her wrong meds,wrong time and her meds before previous admission ( a week and a half ago) not been ordered.

## 2022-09-05 NOTE — CONSULTS
Name: Julia Moore: [address]   : 1940 Admit Date: 2022   Phone: [phone]  Room: [facility]/   PCP: Breanne Keen MD  MRN: [id number]   Date: 2022  Code: Full Code          Chart and notes reviewed. Data reviewed. I review the patient's current medications in the medical record at each encounter. I have evaluated and examined the patient. HPI:    10:32 AM       History was obtained from patient. I was asked by Art Peck MD to see Roena Oppenheim in consultation for a chief complaint of cough. History of Present Illness:  Ms. Frantz Granado is an  yo woman with a history of asthma/COPD, pseudomonas pneumonia/colonization, bronchiectasis, former tobacco use, afib, breast cancer s/p chemo/radiation, seronegative RA and GERD who presented with dyspnea and cough. She is followed by Dr. Mingo Ramirez by Jennifer Garcia and was last seen . She is managed on Trelegy (this was stopped for a period of time due to concern for GI issues, but pulmonary symptoms worsened when on Advair/Spiriva). She is on jeff nebs for pseudomonas. She recently had an EGD that showed candidal esophagitis, but unable to take fluconazole due to sotalol. She was recently admitted and discharged  for nausea/vomiting and treated for PNA. She was doing well until Friday when she began to feel weak, more short of breath and had increased cough with some chest tightness. Her cough is wet, but has not been notably productive. Has loss additional weight since she was last admitted. Planned for Gastric emptying study and possible upper GI study.      Labs: WBC 10.1, Hgb 9.5, , d-dimer 0.44, cr 0.41, , proBNP 2712, sputum culture  with stenotrophamonas     Images reviewed:  CXR 2022: increased interstitial marking and mild patchy infiltrates (greatest on the R), these are a change from most recent CXR       Past Medical History:   Diagnosis Date    Asthma     CHF (congestive heart failure), NYHA class I, chronic, systolic (HCC)     Chronic anticoagulation     GERD (gastroesophageal reflux disease)     Hiatal hernia     HX: breast cancer     Right     Hypothyroid     Paroxysmal A-fib (HCC)     Venous insufficiency     bilat LE    Weight loss        Past Surgical History:   Procedure Laterality Date    COLONOSCOPY N/A 2021    COLONOSCOPY AND EGD performed by Husam Comer MD at [address] [facility] [address] BREAST LUMPECTOMY Right 2015    HX CATARACT REMOVAL  2013    Bilateral     HX CHOLECYSTECTOMY      HX DILATION AND CURETTAGE      x2    HX ORTHOPAEDIC  2017    RIGHT foot  hammertoe       No family history on file.     Social History     Tobacco Use    Smoking status: Former     Types: Cigarettes     Start date: 2000     Quit date: 2000     Years since quittin.7    Smokeless tobacco: Never   Substance Use Topics    Alcohol use: Not Currently       Allergies   Allergen Reactions    Ace Inhibitors Cough    Iodine Rash    Penicillins Rash       Current Facility-Administered Medications   Medication Dose Route Frequency    nystatin (MYCOSTATIN) 100,000 unit/mL oral suspension 500,000 Units  500,000 Units Oral QID    ketotifen (ZADITOR) 0.025 % (0.035 %) ophthalmic solution 1 Drop  1 Drop Both Eyes BID PRN    potassium chloride SR (KLOR-CON 10) tablet 20 mEq  20 mEq Oral TID    arformoteroL (BROVANA) neb solution 15 mcg  15 mcg Nebulization BID RT    budesonide (PULMICORT) 500 mcg/2 ml nebulizer suspension  500 mcg Nebulization BID RT    albuterol-ipratropium (DUO-NEB) 2.5 MG-0.5 MG/3 ML  3 mL Nebulization Q6H PRN    montelukast (SINGULAIR) tablet 10 mg  10 mg Oral QHS    sotaloL (BETAPACE) tablet 80 mg  80 mg Oral Q12H    sodium chloride (NS) flush 5-40 mL  5-40 mL IntraVENous Q8H    sodium chloride (NS) flush 5-40 mL  5-40 mL IntraVENous PRN    acetaminophen (TYLENOL) tablet 650 mg  650 mg Oral Q6H PRN    polyethylene glycol (MIRALAX) packet 17 g  17 g Oral DAILY PRN ondansetron (ZOFRAN) injection 4 mg  4 mg IntraVENous Q6H PRN    pantoprazole (PROTONIX) 40 mg in 0.9% sodium chloride 10 mL injection  40 mg IntraVENous Q12H    rivaroxaban (XARELTO) tablet 20 mg  20 mg Oral DAILY WITH DINNER         REVIEW OF SYSTEMS   12 point ROS negative except as stated in the HPI. Physical Exam:   Visit Vitals  BP (!) 159/81 (BP 1 Location: Left upper arm, BP Patient Position: Sitting)   Pulse 86   Temp 97.4 °F (36.3 °C)   Resp 17   Ht 5' 2\" (1.575 m)   Wt 58.4 kg (128 lb 12 oz)   SpO2 97%   BMI 23.55 kg/m²       General:  Alert, cooperative, no distress, appears stated age. Head:  Normocephalic, without obvious abnormality, atraumatic. Eyes:  Conjunctivae/corneas clear. Nose: Nares normal. Septum midline. Mucosa normal.    Throat: Lips, mucosa, and tongue normal.    Neck: Supple, symmetrical, trachea midline, no adenopathy. Lungs:   Scattered rhonchi, wet cough   Chest wall:  No tenderness or deformity. Heart:  Regular rate and rhythm, S1, S2 normal, no murmur, click, rub or gallop. Abdomen:   Soft, non-tender. Bowel sounds normal.    Extremities: Extremities normal, atraumatic, no cyanosis or edema. Pulses: 2+ and symmetric all extremities. Skin: Skin color, texture, turgor normal. No rashes or lesions       Neurologic: Grossly nonfocal       Lab Results   Component Value Date/Time    Sodium 128 (L) 09/05/2022 05:18 AM    Potassium 3.9 09/05/2022 05:18 AM    Chloride 89 (L) 09/05/2022 05:18 AM    CO2 31 09/05/2022 05:18 AM    BUN 6 09/05/2022 05:18 AM    Creatinine 0.41 (L) 09/05/2022 05:18 AM    Glucose 96 09/05/2022 05:18 AM    Calcium 8.5 09/05/2022 05:18 AM    Magnesium 2.0 09/05/2022 05:18 AM       Lab Results   Component Value Date/Time    WBC 10.1 09/05/2022 05:18 AM    HGB 9.5 (L) 09/05/2022 05:18 AM    PLATELET 156 (H) 14/86/9183 05:18 AM    MCV 85.5 09/05/2022 05:18 AM       Lab Results   Component Value Date/Time    Alk.  phosphatase 127 (H) 09/05/2022 05:18 AM    Protein, total 6.2 (L) 09/05/2022 05:18 AM    Albumin 2.2 (L) 09/05/2022 05:18 AM    Globulin 4.0 09/05/2022 05:18 AM       Lab Results   Component Value Date/Time    Iron 35 09/04/2022 05:26 PM    TIBC 193 (L) 09/04/2022 05:26 PM    Iron % saturation 18 (L) 09/04/2022 05:26 PM    Ferritin 196 09/04/2022 05:26 PM       Lab Results   Component Value Date/Time    TSH 2.20 09/04/2022 05:26 PM        No results found for: PH, PHI, PCO2, PCO2I, PO2, PO2I, HCO3, HCO3I, FIO2, FIO2I    Lab Results   Component Value Date/Time    Troponin-I, Qt. <0.05 01/13/2021 03:17 AM        Lab Results   Component Value Date/Time    Culture result: NO GROWTH AFTER 18 HOURS 09/04/2022 01:39 PM    Culture result: MODERATE Stenotrophomonas (Xantho.) maltophilia (A) 08/22/2022 12:30 PM    Culture result: MODERATE NORMAL RESPIRATORY CANDELARIO 08/22/2022 12:30 PM       No results found for: TOXA1, RPR, HBCM, HBSAG, HAAB, HCAB1, HAAT, G6PD, CRYAC, HIVGT, HIVR, HIV1, HIV12, HIVPC, HIVRPI    No results found for: VANCT, CPK    Lab Results   Component Value Date/Time    Color YELLOW/STRAW 04/30/2022 09:48 AM    Appearance CLEAR 04/30/2022 09:48 AM    pH (UA) 8.0 04/30/2022 09:48 AM    Protein Negative 04/30/2022 09:48 AM    Glucose Negative 04/30/2022 09:48 AM    Ketone Negative 04/30/2022 09:48 AM    Bilirubin Negative 04/30/2022 09:48 AM    Blood Negative 04/30/2022 09:48 AM    Urobilinogen 0.2 04/30/2022 09:48 AM    Nitrites Negative 04/30/2022 09:48 AM    Leukocyte Esterase SMALL (A) 04/30/2022 09:48 AM    WBC 0-4 04/30/2022 09:48 AM    RBC 0-5 04/30/2022 09:48 AM    Bacteria Negative 04/30/2022 09:48 AM       IMPRESSION  Acute respiratory failure with hypoxia  Abnormal chest imaging  COPD/asthma  Bronchiectasis  History of pseudomonas pneumonia/colonization  GERD  Candidal esophagitis  Nauseas/vomiting  Weight loss    PLAN  Goal sats 88% or higher  Will obtain CT chest to further evaluate new changes demonstrated on CXR  Recent sputum culture grew stenotrophomonas, not treated with bactrim as she was clinically improving at that time but will start this now given CXR findings and clinical change  Would obtain sputum culture if she expectorates sputum  Brovana/Pulmicort, dunoebs  No indication for steroids at this time  Started her week on jeff nebs on 9/3  GI evaluating, gastric emptying study ordered  TTE  Cardiology consulted      Thank you for allowing us to participate in the care of this patient. We will be happy to follow along in his/her progress with you.     Rylee Castaneda MD

## 2022-09-06 ENCOUNTER — APPOINTMENT (OUTPATIENT)
Dept: NON INVASIVE DIAGNOSTICS | Age: 82
DRG: 391 | End: 2022-09-06
Attending: INTERNAL MEDICINE
Payer: MEDICARE

## 2022-09-06 ENCOUNTER — APPOINTMENT (OUTPATIENT)
Dept: NUCLEAR MEDICINE | Age: 82
DRG: 391 | End: 2022-09-06
Attending: SPECIALIST
Payer: MEDICARE

## 2022-09-06 ENCOUNTER — APPOINTMENT (OUTPATIENT)
Dept: MRI IMAGING | Age: 82
DRG: 391 | End: 2022-09-06
Attending: INTERNAL MEDICINE
Payer: MEDICARE

## 2022-09-06 ENCOUNTER — APPOINTMENT (OUTPATIENT)
Dept: CT IMAGING | Age: 82
DRG: 391 | End: 2022-09-06
Attending: INTERNAL MEDICINE
Payer: MEDICARE

## 2022-09-06 LAB
ANION GAP SERPL CALC-SCNC: 7 MMOL/L (ref 5–15)
BUN SERPL-MCNC: 5 MG/DL (ref 6–20)
BUN/CREAT SERPL: 11 (ref 12–20)
CALCIUM SERPL-MCNC: 8.3 MG/DL (ref 8.5–10.1)
CHLORIDE SERPL-SCNC: 88 MMOL/L (ref 97–108)
CO2 SERPL-SCNC: 27 MMOL/L (ref 21–32)
CREAT SERPL-MCNC: 0.44 MG/DL (ref 0.55–1.02)
ECHO AO ARCH DIAM: 2.4 CM
ECHO AO ASC DIAM: 3.5 CM
ECHO AO ASCENDING AORTA INDEX: 2.17 CM/M2
ECHO AV AREA PEAK VELOCITY: 2.2 CM2
ECHO AV AREA/BSA PEAK VELOCITY: 1.4 CM2/M2
ECHO AV PEAK GRADIENT: 6 MMHG
ECHO AV PEAK VELOCITY: 1.2 M/S
ECHO AV VELOCITY RATIO: 0.67
ECHO LA DIAMETER INDEX: 2.24 CM/M2
ECHO LA DIAMETER: 3.6 CM
ECHO LA VOL 2C: 35 ML (ref 22–52)
ECHO LA VOL 4C: 23 ML (ref 22–52)
ECHO LA VOL BP: 29 ML (ref 22–52)
ECHO LA VOL/BSA BIPLANE: 18 ML/M2 (ref 16–34)
ECHO LA VOLUME AREA LENGTH: 32 ML
ECHO LA VOLUME INDEX A2C: 22 ML/M2 (ref 16–34)
ECHO LA VOLUME INDEX A4C: 14 ML/M2 (ref 16–34)
ECHO LA VOLUME INDEX AREA LENGTH: 20 ML/M2 (ref 16–34)
ECHO LV E' LATERAL VELOCITY: 8 CM/S
ECHO LV E' SEPTAL VELOCITY: 6 CM/S
ECHO LV EDV A2C: 69 ML
ECHO LV EDV A4C: 114 ML
ECHO LV EDV BP: 93 ML (ref 56–104)
ECHO LV EDV INDEX A4C: 71 ML/M2
ECHO LV EDV INDEX BP: 58 ML/M2
ECHO LV EDV NDEX A2C: 43 ML/M2
ECHO LV EJECTION FRACTION A2C: 52 %
ECHO LV EJECTION FRACTION A4C: 45 %
ECHO LV EJECTION FRACTION BIPLANE: 49 % (ref 55–100)
ECHO LV ESV A2C: 33 ML
ECHO LV ESV A4C: 63 ML
ECHO LV ESV BP: 48 ML (ref 19–49)
ECHO LV ESV INDEX A2C: 20 ML/M2
ECHO LV ESV INDEX A4C: 39 ML/M2
ECHO LV ESV INDEX BP: 30 ML/M2
ECHO LV FRACTIONAL SHORTENING: 32 % (ref 28–44)
ECHO LV INTERNAL DIMENSION DIASTOLE INDEX: 3.29 CM/M2
ECHO LV INTERNAL DIMENSION DIASTOLIC: 5.3 CM (ref 3.9–5.3)
ECHO LV INTERNAL DIMENSION SYSTOLIC INDEX: 2.24 CM/M2
ECHO LV INTERNAL DIMENSION SYSTOLIC: 3.6 CM
ECHO LV IVSD: 0.8 CM (ref 0.6–0.9)
ECHO LV MASS 2D: 150.1 G (ref 67–162)
ECHO LV MASS INDEX 2D: 93.2 G/M2 (ref 43–95)
ECHO LV POSTERIOR WALL DIASTOLIC: 0.8 CM (ref 0.6–0.9)
ECHO LV RELATIVE WALL THICKNESS RATIO: 0.3
ECHO LVOT AREA: 3.1 CM2
ECHO LVOT DIAM: 2 CM
ECHO LVOT MEAN GRADIENT: 1 MMHG
ECHO LVOT PEAK GRADIENT: 3 MMHG
ECHO LVOT PEAK VELOCITY: 0.8 M/S
ECHO LVOT STROKE VOLUME INDEX: 39 ML/M2
ECHO LVOT SV: 62.8 ML
ECHO LVOT VTI: 20 CM
ECHO MV A VELOCITY: 0.99 M/S
ECHO MV E DECELERATION TIME (DT): 233.9 MS
ECHO MV E VELOCITY: 0.81 M/S
ECHO MV E/A RATIO: 0.82
ECHO MV E/E' LATERAL: 10.13
ECHO MV E/E' RATIO (AVERAGED): 11.81
ECHO MV E/E' SEPTAL: 13.5
ECHO MV REGURGITANT PEAK GRADIENT: 139 MMHG
ECHO MV REGURGITANT PEAK VELOCITY: 5.9 M/S
ECHO PULMONARY ARTERY END DIASTOLIC PRESSURE: 4 MMHG
ECHO PV MAX VELOCITY: 1 M/S
ECHO PV PEAK GRADIENT: 4 MMHG
ECHO PV REGURGITANT MAX VELOCITY: 1 M/S
ECHO RV FREE WALL PEAK S': 13 CM/S
ECHO RV INTERNAL DIMENSION: 2.8 CM
ECHO RV TAPSE: 2.5 CM (ref 1.7–?)
ECHO TV REGURGITANT MAX VELOCITY: 3.16 M/S
ECHO TV REGURGITANT PEAK GRADIENT: 40 MMHG
GLUCOSE BLD STRIP.AUTO-MCNC: 146 MG/DL (ref 65–117)
GLUCOSE SERPL-MCNC: 89 MG/DL (ref 65–100)
POTASSIUM SERPL-SCNC: 4.9 MMOL/L (ref 3.5–5.1)
SERVICE CMNT-IMP: ABNORMAL
SODIUM SERPL-SCNC: 122 MMOL/L (ref 136–145)

## 2022-09-06 PROCEDURE — 80048 BASIC METABOLIC PNL TOTAL CA: CPT

## 2022-09-06 PROCEDURE — 94761 N-INVAS EAR/PLS OXIMETRY MLT: CPT

## 2022-09-06 PROCEDURE — 77030038269 HC DRN EXT URIN PURWCK BARD -A

## 2022-09-06 PROCEDURE — 74011000250 HC RX REV CODE- 250: Performed by: INTERNAL MEDICINE

## 2022-09-06 PROCEDURE — 65270000029 HC RM PRIVATE

## 2022-09-06 PROCEDURE — 93306 TTE W/DOPPLER COMPLETE: CPT

## 2022-09-06 PROCEDURE — 74011250636 HC RX REV CODE- 250/636: Performed by: INTERNAL MEDICINE

## 2022-09-06 PROCEDURE — 94640 AIRWAY INHALATION TREATMENT: CPT

## 2022-09-06 PROCEDURE — 77010033678 HC OXYGEN DAILY

## 2022-09-06 PROCEDURE — 36415 COLL VENOUS BLD VENIPUNCTURE: CPT

## 2022-09-06 PROCEDURE — A9541 TC99M SULFUR COLLOID: HCPCS

## 2022-09-06 PROCEDURE — 70551 MRI BRAIN STEM W/O DYE: CPT

## 2022-09-06 PROCEDURE — 97112 NEUROMUSCULAR REEDUCATION: CPT

## 2022-09-06 PROCEDURE — 82962 GLUCOSE BLOOD TEST: CPT

## 2022-09-06 PROCEDURE — 74011250637 HC RX REV CODE- 250/637: Performed by: INTERNAL MEDICINE

## 2022-09-06 PROCEDURE — 97535 SELF CARE MNGMENT TRAINING: CPT

## 2022-09-06 PROCEDURE — 93306 TTE W/DOPPLER COMPLETE: CPT | Performed by: STUDENT IN AN ORGANIZED HEALTH CARE EDUCATION/TRAINING PROGRAM

## 2022-09-06 PROCEDURE — C9113 INJ PANTOPRAZOLE SODIUM, VIA: HCPCS | Performed by: INTERNAL MEDICINE

## 2022-09-06 PROCEDURE — 70450 CT HEAD/BRAIN W/O DYE: CPT

## 2022-09-06 PROCEDURE — 99223 1ST HOSP IP/OBS HIGH 75: CPT | Performed by: STUDENT IN AN ORGANIZED HEALTH CARE EDUCATION/TRAINING PROGRAM

## 2022-09-06 RX ORDER — GUAIFENESIN 100 MG/5ML
100 SOLUTION ORAL
Status: DISCONTINUED | OUTPATIENT
Start: 2022-09-06 | End: 2022-09-14 | Stop reason: HOSPADM

## 2022-09-06 RX ORDER — PROCHLORPERAZINE EDISYLATE 5 MG/ML
5 INJECTION INTRAMUSCULAR; INTRAVENOUS
Status: DISCONTINUED | OUTPATIENT
Start: 2022-09-06 | End: 2022-09-14 | Stop reason: HOSPADM

## 2022-09-06 RX ORDER — POTASSIUM CHLORIDE 750 MG/1
20 TABLET, FILM COATED, EXTENDED RELEASE ORAL 2 TIMES DAILY
Status: DISCONTINUED | OUTPATIENT
Start: 2022-09-06 | End: 2022-09-06

## 2022-09-06 RX ORDER — TECHNETIUM TC 99M SULFUR COLLOID 2 MG
0.3 KIT MISCELLANEOUS
Status: COMPLETED | OUTPATIENT
Start: 2022-09-06 | End: 2022-09-06

## 2022-09-06 RX ADMIN — ACETAMINOPHEN 650 MG: 325 TABLET, FILM COATED ORAL at 04:00

## 2022-09-06 RX ADMIN — SULFAMETHOXAZOLE AND TRIMETHOPRIM 320 MG: 80; 16 INJECTION, SOLUTION, CONCENTRATE INTRAVENOUS at 20:30

## 2022-09-06 RX ADMIN — BUDESONIDE 500 MCG: 0.5 SUSPENSION RESPIRATORY (INHALATION) at 20:16

## 2022-09-06 RX ADMIN — TOBRAMYCIN SULFATE 300 MG: 40 INJECTION, SOLUTION INTRAMUSCULAR; INTRAVENOUS at 20:22

## 2022-09-06 RX ADMIN — IPRATROPIUM BROMIDE AND ALBUTEROL SULFATE 3 ML: .5; 3 SOLUTION RESPIRATORY (INHALATION) at 20:11

## 2022-09-06 RX ADMIN — PROCHLORPERAZINE EDISYLATE 5 MG: 5 INJECTION INTRAMUSCULAR; INTRAVENOUS at 04:00

## 2022-09-06 RX ADMIN — GUAIFENESIN 100 MG: 200 SOLUTION ORAL at 01:05

## 2022-09-06 RX ADMIN — RIVAROXABAN 20 MG: 10 TABLET, FILM COATED ORAL at 17:09

## 2022-09-06 RX ADMIN — SODIUM CHLORIDE, PRESERVATIVE FREE 5 ML: 5 INJECTION INTRAVENOUS at 21:15

## 2022-09-06 RX ADMIN — MONTELUKAST 10 MG: 10 TABLET, FILM COATED ORAL at 21:14

## 2022-09-06 RX ADMIN — ONDANSETRON HYDROCHLORIDE 4 MG: 2 SOLUTION INTRAMUSCULAR; INTRAVENOUS at 05:25

## 2022-09-06 RX ADMIN — SOTALOL HYDROCHLORIDE 80 MG: 80 TABLET ORAL at 11:05

## 2022-09-06 RX ADMIN — NYSTATIN 500000 UNITS: 100000 SUSPENSION ORAL at 21:14

## 2022-09-06 RX ADMIN — GUAIFENESIN 100 MG: 200 SOLUTION ORAL at 05:25

## 2022-09-06 RX ADMIN — NYSTATIN 500000 UNITS: 100000 SUSPENSION ORAL at 11:05

## 2022-09-06 RX ADMIN — SULFAMETHOXAZOLE AND TRIMETHOPRIM 320 MG: 80; 16 INJECTION, SOLUTION, CONCENTRATE INTRAVENOUS at 04:02

## 2022-09-06 RX ADMIN — SODIUM CHLORIDE 40 MG: 9 INJECTION, SOLUTION INTRAMUSCULAR; INTRAVENOUS; SUBCUTANEOUS at 11:06

## 2022-09-06 RX ADMIN — IPRATROPIUM BROMIDE AND ALBUTEROL SULFATE 3 ML: .5; 3 SOLUTION RESPIRATORY (INHALATION) at 01:05

## 2022-09-06 RX ADMIN — PROCHLORPERAZINE EDISYLATE 5 MG: 5 INJECTION INTRAMUSCULAR; INTRAVENOUS at 17:10

## 2022-09-06 RX ADMIN — SODIUM CHLORIDE 40 MG: 9 INJECTION, SOLUTION INTRAMUSCULAR; INTRAVENOUS; SUBCUTANEOUS at 20:58

## 2022-09-06 RX ADMIN — SODIUM CHLORIDE, PRESERVATIVE FREE 10 ML: 5 INJECTION INTRAVENOUS at 13:48

## 2022-09-06 RX ADMIN — TECHNETIUM TC 99M SULFUR COLLOID 0.3 MILLICURIE: KIT at 08:30

## 2022-09-06 RX ADMIN — SULFAMETHOXAZOLE AND TRIMETHOPRIM 320 MG: 80; 16 INJECTION, SOLUTION, CONCENTRATE INTRAVENOUS at 12:10

## 2022-09-06 RX ADMIN — NYSTATIN 500000 UNITS: 100000 SUSPENSION ORAL at 17:09

## 2022-09-06 RX ADMIN — IPRATROPIUM BROMIDE AND ALBUTEROL SULFATE 3 ML: .5; 3 SOLUTION RESPIRATORY (INHALATION) at 13:18

## 2022-09-06 RX ADMIN — SODIUM CHLORIDE, PRESERVATIVE FREE 10 ML: 5 INJECTION INTRAVENOUS at 04:01

## 2022-09-06 RX ADMIN — ONDANSETRON HYDROCHLORIDE 4 MG: 2 SOLUTION INTRAMUSCULAR; INTRAVENOUS at 22:55

## 2022-09-06 RX ADMIN — SOTALOL HYDROCHLORIDE 80 MG: 80 TABLET ORAL at 20:58

## 2022-09-06 RX ADMIN — ARFORMOTEROL TARTRATE 15 MCG: 15 SOLUTION RESPIRATORY (INHALATION) at 20:16

## 2022-09-06 NOTE — PROGRESS NOTES
Problem: Falls - Risk of  Goal: *Absence of Falls  Outcome: Progressing Towards Goal  Note: Fall Risk Interventions:  Mobility Interventions: Bed/chair exit alarm, Communicate number of staff needed for ambulation/transfer, Patient to call before getting OOB    Mentation Interventions: Bed/chair exit alarm, Door open when patient unattended    Medication Interventions: Evaluate medications/consider consulting pharmacy, Patient to call before getting OOB, Teach patient to arise slowly    Elimination Interventions: Call light in reach    History of Falls Interventions: Bed/chair exit alarm, Room close to nurse's station         Problem: Patient Education: Go to Patient Education Activity  Goal: Patient/Family Education  Outcome: Progressing Towards Goal     Problem: Patient Education: Go to Patient Education Activity  Goal: Patient/Family Education  Outcome: Progressing Towards Goal     Problem: Heart Failure: Day 2  Goal: Off Pathway (Use only if patient is Off Pathway)  Outcome: Progressing Towards Goal  Goal: Activity/Safety  Outcome: Progressing Towards Goal  Goal: Consults, if ordered  Outcome: Progressing Towards Goal  Goal: Diagnostic Test/Procedures  Outcome: Progressing Towards Goal  Goal: Nutrition/Diet  Outcome: Progressing Towards Goal  Goal: Discharge Planning  Outcome: Progressing Towards Goal  Goal: Medications  Outcome: Progressing Towards Goal  Goal: Respiratory  Outcome: Progressing Towards Goal  Goal: Treatments/Interventions/Procedures  Outcome: Progressing Towards Goal  Goal: Psychosocial  Outcome: Progressing Towards Goal  Goal: *Oxygen saturation within defined limits  Outcome: Progressing Towards Goal  Goal: *Hemodynamically stable  Outcome: Progressing Towards Goal  Goal: *Optimal pain control at patient's stated goal  Outcome: Progressing Towards Goal  Goal: *Anxiety reduced or absent  Outcome: Progressing Towards Goal  Goal: *Demonstrates progressive activity  Outcome: Progressing Towards Goal

## 2022-09-06 NOTE — PROGRESS NOTES
9/6/2022  Case Management Note    3:34 PM  Attempted 2nd time to complete assessment, patient off the floor for MRI following code stroke. Per chart, patient was admitted last month and discharged on 8/23 home with family. She lives with her spouse in a single story home w/ 4 steps in and is independent at baseline. Patient does drive and ambulates with a rollator in the community. She uses [facility] on [address]. Will continue to follow for full readmission assessment as able. PACHECO Viera    9:31 AM  Reviewed chart to complete assessment however patient is off the floor. Will follow up later.      PACHECO Viera

## 2022-09-06 NOTE — PROGRESS NOTES
Stroke Education provided to patient and the following topics were discussed    1. Patients personal risk factors for stroke are hypertension and atrial fibrillation    2. Warning signs of Stroke:        * Sudden numbness or weakness of the face, arm or leg, especially on one side of          The body            * Sudden confusion, trouble speaking or understanding        * Sudden trouble seeing in one or both eyes        * Sudden trouble walking, dizziness, loss of balance or coordination        * Sudden severe headache with no known cause      3. Importance of activation Emergency Medical Services ( 9-1-1 ) immediately if experience any warning signs of stroke. 4. Be sure and schedule a follow-up appointment with your primary care doctor or any specialists as instructed. 5. You must take medicine every day to treat your risk factors for stroke. Be sure to take your medicines exactly as your doctor tells you: no more, no less. Know what your medicines are for , what they do. Anti-thrombotics /anticoagulants can help prevent strokes. You are taking the following medicine(s)  Xarelto     6. Smoking and second-hand smoke greatly increase your risk of stroke, cardiovascular disease and death. 7. Information provided was [facility] Stroke Education Binder    8. Documentation of teaching completed in Patient Education Activity and on Care Plan with teaching response noted?   yes

## 2022-09-06 NOTE — PROGRESS NOTES
Problem: Self Care Deficits Care Plan (Adult)  Goal: *Acute Goals and Plan of Care (Insert Text)  Description: FUNCTIONAL STATUS PRIOR TO ADMISSION: Patient was independent and active without use of DME. She reports recent use of spouse's rollator to move things within home, and sit down as needed. HOME SUPPORT: The patient lived with spouse but did not require assist. Patient spouse reports. Occupational Therapy Goals  Initiated 9/5/2022  1. Patient will perform lower body dressing with modified independence within 7 day(s). 2.  Patient will perform upper body dressing with supervision/set-up within 7 day(s). 3.  Patient will perform toilet transfers with supervision/set-up within 7 day(s). 4.  Patient will perform all aspects of toileting with supervision/set-up within 7 day(s). 5.  Patient will participate in upper extremity therapeutic exercise/activities with supervision/set-up for 10 minutes within 7 day(s). 6.  Patient will utilize energy conservation techniques during functional activities with verbal cues within 7 day(s). Outcome: Progressing Towards Goal     OCCUPATIONAL THERAPY TREATMENT  Patient: Belle Dance (29 y.o. female)  Date: 9/6/2022  Diagnosis: Dyspnea [R06.00] Dyspnea      Precautions: Fall (droplet +)  Chart, occupational therapy assessment, plan of care, and goals were reviewed. ASSESSMENT  Patient continues with skilled OT services and is slowly progressing towards goals. Noted Code S called on pt this afternoon due to c/o L side tingling/impaired sensation; CT negative and MRI pending. Pt completes Fugl-Randle and demonstrates functional use of UEs with near symmetrical ROM, strength, and coordination. Sensation intact to light touch with vision occluded but she reports ongoing differing sensation/tingling in L UE/LE. She endorses feeling slightly weaker in L UE, however no focal weakness noted during MMT.  Pt with very mild dysmetria during coordination tasks but otherwise able to perform all fine motor aspects of ADLs without difficulty. She performs SPT to [facility] with overall CGA/min A and is observed to reach out for more stability due to reports of dizziness and nausea. She is returned to supine at end of session for comfort. Will continue to follow in acute setting. Current Level of Function Impacting Discharge (ADLs): overall set-up/supervision to CGA/min A    Other factors to consider for discharge: Code S this afternoon         PLAN :  Patient continues to benefit from skilled intervention to address the above impairments. Continue treatment per established plan of care to address goals. Recommend with staff: OOB to chair for all meals; mobility to bathroom for toileting    Recommend next OT session: standing ADLs in bathroom    Recommendation for discharge: (in order for the patient to meet his/her long term goals)  Occupational therapy at least 2 days/week in the home     This discharge recommendation:  Has been made in collaboration with the attending provider and/or case management    IF patient discharges home will need the following DME: TBD       SUBJECTIVE:   Patient stated I just still feel so nauseous, it's the worst part.     OBJECTIVE DATA SUMMARY:   Cognitive/Behavioral Status:  Neurologic State: Alert  Orientation Level: Oriented X4  Cognition: Appropriate decision making; Appropriate for age attention/concentration; Appropriate safety awareness; Follows commands  Perception: Appears intact  Perseveration: No perseveration noted  Safety/Judgement: Awareness of environment; Fall prevention    Functional Mobility and Transfers for ADLs:  Bed Mobility:  Supine to Sit: Supervision  Sit to Supine: Supervision  Scooting: Supervision    Transfers:  Sit to Stand: Contact guard assistance  Functional Transfers  Toilet Transfer : Contact guard assistance;Minimum assistance (for SPT to [facility])  Bed to Chair: Contact guard assistance;Minimum assistance    Balance:  Sitting: Intact  Standing: Impaired; Without support  Standing - Static: Fair  Standing - Dynamic : Fair    ADL Intervention:  Lower Body Dressing Assistance  Socks: Set-up; Supervision  Leg Crossed Method Used: Yes  Position Performed: Seated edge of bed  Cues: Don    Toileting  Bladder Hygiene: Stand-by assistance (seated)  Clothing Management: Minimum assistance (for stability)  Cues: Tactile cues provided;Verbal cues provided;Visual cues provided  Adaptive Equipment: Other (comment) (BS)    Cognitive Retraining  Safety/Judgement: Awareness of environment; Fall prevention    Functional Outcome Measure:   Fugl-Randle Assessment of Motor Recovery after Stroke:          Reflex Activity  Flexors/Biceps/Fingers: Can be elicited  Extensors/Triceps: Can be elicited  Reflex Subtotal: 4    Volitional Movement Within Synergies  Shoulder Retraction: Full  Shoulder Elevation: Full  Shoulder Abduction (90 degrees): Full  Shoulder External Rotation: Full  Elbow Flexion: Full  Forearm Supination: Full  Shoulder Adduction/Internal Rotation: Full  Elbow Extension: Full  Forearm Pronation: Full  Subtotal: 18    Volitional Movement Mixing Synergies  Hand to Lumbar Spine: Full  Shoulder Flexion (0-90 degrees): Full  Pronation-Supination: Full  Subtotal: 6    Volitional Movement With Little or No Synergy  Shoulder Abduction (0-90 degrees): Full  Shoulder Flexion ( degrees): Full  Pronation/Supination: Full  Subtotal : 6    Normal Reflex Activity  Biceps, Triceps, Finger Flexors:  Full  Subtotal : 2    Upper Extremity Total   Upper Extremity Total: 36    Wrist  Stability at 15 Degree Dorsiflexion: Full  Repeated Dorsiflexion/ Volar Flexion: Full  Stability at 15 Degree Dorsiflexion: Full  Repeated Dorsiflexion/ Volar Flexion: Full  Circumduction: Full  Wrist Total: 10    Hand  Mass Flexion: Full  Mass Extension: Full  Grasp A: Full  Grasp B: Full  Grasp C: Full  Grasp D: Full  Grasp E: Full  Hand Total: 14    Coordination/Speed  Tremor: None  Dysmetria: Slight  Time: 2-5s  Coordination/Speed Total : 4    Total A-D  Total A-D (Motor Function): 64/66       Pain:  Pt denied pain    Activity Tolerance:   Good    After treatment patient left in no apparent distress:   Supine in bed, Call bell within reach, and Side rails x 3    COMMUNICATION/COLLABORATION:   The patients plan of care was discussed with: Physical therapist and Registered nurse.      Olga Freeman, OT  Time Calculation: 23 mins

## 2022-09-06 NOTE — PROGRESS NOTES
Bedside and Verbal shift change report given to Lina MONTEZ RN(oncoming nurse) by Mila Corona (offgoing nurse). Report included the following information SBAR and Kardex.

## 2022-09-06 NOTE — PROGRESS NOTES
1320: Tech had assisted patient to bedside commode. RN responds to room and tech states the patient almost fell while turning. Patient returns to bed with assistance of RN and then states that her left side feels weak. Neuro assessment performed and patient feels strong bilaterally, but patient still insists it feels weak. She also reports tingling and decreased sensation to the left side of her body. Code stroke called. Vitals taken, blood sugar taken (146) and patient transported to CT.      1520:  Patient states her symptoms of tingling in the left arm and leg and decreased sensation are still present, but that the symptoms have decreased. 1620:  Patient states her symptoms have resolved. 1900:  Bedside and Verbal shift change report given to Augustina Durand (oncoming nurse) by Alexandrea Wayne (offgoing nurse). Report included the following information SBAR, Kardex, MAR, Accordion, and Recent Results.

## 2022-09-06 NOTE — PROGRESS NOTES
[address]  [phone]    Medical Progress Note      NAME: Jacoby Koehler   :  1940  MRM:  [id number]    Date/Time of service 2022  8:33 AM          Assessment and Plan:     Dyspnea / Nausea / Chest pain - Unclear etiology of these symptoms. She is not hypoxic at rest, her sats are equal to those when she discharged recently. She has some hypoxia with activity. DDx includes GERD/aspiration (which I think is the primary problem), vs chronic PNA, vs much less likely CHF, PE, asthma    Abnormal CT chest / Leukocytosis - CT reports \"Scattered reticulonodular changes throughout the lungs with areas of slightly more consolidative. Bronchial wall thickening with mucous plugging. Findings are likely infectious or inflammatory\". Pulmonary consulted. Broad differential including chronic simmering infections, vs aspiration. Abx per pulmonary. I note that at admission her WBC count was mild, without bands, and no other SIRS criteria, and WBC normalized in 1st 24hr without any ABx. Procalcitonin is low. She had just completed Abx for PNA. CHF (congestive heart failure), NYHA class I, chronic, systolic / Venous insufficiency - Unclear if any exacerbation, I do not think so. Stable ECHO. Consulted cariology. ER gave lasix, but with her normal oxygen sats, low NA and low K, wt loss, 2 days no PO intake, I did not continue that. Continue BB      Hyponatremia / Hypokalemia / Hypomagnesemia - POA, likely due to poor PO intake. Weight recorded in ER is actually several pounds lighter than at discharge 2 weeks ago. I will hydrate gently. For now add PO K and IV Mg     Anemia - Stable since last discharge, and normal serologies. Weight loss / GERD (gastroesophageal reflux disease) / Hiatal hernia / Candidiasis - She has very concerning unintentional wt loss, anorexia, nausea that persists. She weighed 170lb last year.   128lb now.  Consult GI, who is following her outpatient. PPI by IV. At home she takes bentyl and Zofran as needed. Emptying study ordered for today. She is on PO nystatin. Paroxysmal A-fib / Chronic anticoagulation - Continue sotalol and xarelto. Sotalol prevented the use of fluconazole to treat esophageal candidiasis. Asthma - Appears stable. For her home Trelegy substitute Brovana, Pulmicort and prn Duonebs     HX: breast cancer - Outpatient follow up. Has been on Boniva, increasing risk of PUD and esophagitis      Hypothyroid - TSH was normal a few months ago. Stop the homeopathic dose of synthroid. Subjective:     Chief Complaint:  dyspnea, cough, persist    ROS:  (bold if positive, if negative)    Cough      Sputum   Abd Pain  SOB/DOETolerating some PT  Tolerating minimal Diet        Objective:     Last 24hrs VS reviewed since prior progress note.  Most recent are:    Visit Vitals  BP (!) 170/85 (BP 1 Location: Left upper arm, BP Patient Position: Sitting)   Pulse 83   Temp 97.6 °F (36.4 °C)   Resp 18   Ht 5' 2\" (1.575 m)   Wt 60.6 kg (133 lb 9.6 oz)   SpO2 93%   BMI 24.44 kg/m²     SpO2 Readings from Last 6 Encounters:   09/06/22 93%   08/23/22 95%   08/10/22 94%   05/01/22 96%   06/14/21 99%   04/28/21 97%    O2 Flow Rate (L/min): 2 l/min     Intake/Output Summary (Last 24 hours) at 9/6/2022 9358  Last data filed at 9/6/2022 0415  Gross per 24 hour   Intake 1650 ml   Output --   Net 1650 ml          Physical Exam:    Gen:  Frail, in no acute distress  HEENT:  Pink conjunctivae, PERRL, hearing intact to voice, moist mucous membranes  Neck:  Supple, without masses, thyroid non-tender  Resp:  No accessory muscle use, clear breath sounds without wheezes rales or rhonchi  Card:  No murmurs, normal S1, S2 without thrills, bruits or peripheral edema  Abd:  Soft, non-tender, non-distended, normoactive bowel sounds are present, no mass  Lymph:  No cervical or inguinal adenopathy  Musc:  No cyanosis or clubbing  Skin:  No rashes or ulcers, skin turgor is good  Neuro:  Cranial nerves are grossly intact, general motor weakness, follows commands   Psych:  Good insight, oriented to person, place and time, alert    Telemetry reviewed:   normal sinus rhythm  __________________________________________________________________  Medications Reviewed: (see below)  Medications:     Current Facility-Administered Medications   Medication Dose Route Frequency    guaiFENesin (ROBITUSSIN) 100 mg/5 mL oral liquid 100 mg  100 mg Oral Q4H PRN    prochlorperazine (COMPAZINE) injection 5 mg  5 mg IntraVENous Q6H PRN    nystatin (MYCOSTATIN) 100,000 unit/mL oral suspension 500,000 Units  500,000 Units Oral QID    ketotifen (ZADITOR) 0.025 % (0.035 %) ophthalmic solution 1 Drop  1 Drop Both Eyes BID PRN    albuterol-ipratropium (DUO-NEB) 2.5 MG-0.5 MG/3 ML  3 mL Nebulization Q6H RT    trimethoprim-sulfamethoxazole (BACTRIM) 320 mg in dextrose 5% 500 mL  320 mg IntraVENous Q8H    tobramycin (NEBCIN) injection 300 mg  300 mg Nebulization BID RT    potassium chloride SR (KLOR-CON 10) tablet 20 mEq  20 mEq Oral TID    arformoteroL (BROVANA) neb solution 15 mcg  15 mcg Nebulization BID RT    budesonide (PULMICORT) 500 mcg/2 ml nebulizer suspension  500 mcg Nebulization BID RT    albuterol-ipratropium (DUO-NEB) 2.5 MG-0.5 MG/3 ML  3 mL Nebulization Q6H PRN    montelukast (SINGULAIR) tablet 10 mg  10 mg Oral QHS    sotaloL (BETAPACE) tablet 80 mg  80 mg Oral Q12H    sodium chloride (NS) flush 5-40 mL  5-40 mL IntraVENous Q8H    sodium chloride (NS) flush 5-40 mL  5-40 mL IntraVENous PRN    acetaminophen (TYLENOL) tablet 650 mg  650 mg Oral Q6H PRN    polyethylene glycol (MIRALAX) packet 17 g  17 g Oral DAILY PRN    ondansetron (ZOFRAN) injection 4 mg  4 mg IntraVENous Q6H PRN    pantoprazole (PROTONIX) 40 mg in 0.9% sodium chloride 10 mL injection  40 mg IntraVENous Q12H    rivaroxaban (XARELTO) tablet 20 mg  20 mg Oral DAILY WITH DINNER Lab Data Reviewed: (see below)  Lab Review:     Recent Labs     09/05/22  0518 09/04/22  1339   WBC 10.1 13.0*   HGB 9.5* 9.7*   HCT 28.8* 29.0*   * 445*       Recent Labs     09/06/22  0207 09/05/22 0518 09/04/22  1339   * 128* 129*   K 4.9 3.9 3.3*   CL 88* 89* 93*   CO2 27 31 30   GLU 89 96 134*   BUN 5* 6 7   CREA 0.44* 0.41* 0.49*   CA 8.3* 8.5 8.5   MG  --  2.0 1.4*   ALB  --  2.2* 2.4*   TBILI  --  0.6 0.5   ALT  --  9* 11*       No results found for: GLUCPOC  No results for input(s): PH, PCO2, PO2, HCO3, FIO2 in the last 72 hours. No results for input(s): INR, INREXT, INREXT in the last 72 hours.   All Micro Results       Procedure Component Value Units Date/Time    CULTURE, BLOOD, PAIRED [[id number]] Collected: 09/04/22 1339    Order Status: Completed Specimen: Blood Updated: 09/06/22 0620     Special Requests: NO SPECIAL REQUESTS        Culture result: NO GROWTH 2 DAYS       RESPIRATORY VIRUS PANEL W/COVID-19, PCR [[id number]] Collected: 09/05/22 1237    Order Status: Completed Specimen: Nasopharyngeal Updated: 09/05/22 2021     Adenovirus Not detected        Coronavirus 229E Not detected        Coronavirus HKU1 Not detected        Coronavirus CVNL63 Not detected        Coronavirus OC43 Not detected        SARS-CoV-2, PCR Not detected        Metapneumovirus Not detected        Rhinovirus and Enterovirus Not detected        Influenza A Not detected        Influenza A, subtype H1 Not detected        Influenza A, subtype H3 Not detected        INFLUENZA A H1N1 PCR Not detected        Influenza B Not detected        Parainfluenza 1 Not detected        Parainfluenza 2 Not detected        Parainfluenza 3 Not detected        Parainfluenza virus 4 Not detected        RSV by PCR Not detected        B. parapertussis, PCR Not detected        Bordetella pertussis - PCR Not detected        Chlamydophila pneumoniae DNA, QL, PCR Not detected        Mycoplasma pneumoniae DNA, QL, PCR Not detected CULTURE, RESPIRATORY/SPUTUM/BRONCH [address] [[id number]]     Order Status: Sent Specimen: Sputum     COVID-19 RAPID TEST [[id number]] Collected: 09/04/22 1339    Order Status: Completed Specimen: Nasopharyngeal Updated: 09/04/22 1414     Specimen source Nasopharyngeal        COVID-19 rapid test Not detected        Comment: Rapid Abbott ID Now       Rapid NAAT:  The specimen is NEGATIVE for SARS-CoV-2, the novel coronavirus associated with COVID-19. Negative results should be treated as presumptive and, if inconsistent with clinical signs and symptoms or necessary for patient management, should be tested with an alternative molecular assay. Negative results do not preclude SARS-CoV-2 infection and should not be used as the sole basis for patient management decisions. This test has been authorized by the FDA under an Emergency Use Authorization (EUA) for use by authorized laboratories. Fact sheet for Healthcare Providers: [employer]nz  Fact sheet for Patients: [employer]       Methodology: Isothermal Nucleic Acid Amplification                 Other pertinent lab: none    Total time spent with patient: 30 Minutes I personally reviewed chart, notes, data and current medications in the medical record. I have personally examined and treated the patient at bedside during this period. To assist coordination of care and communication with nursing and staff, this note may be preliminary early in the day, but finalized by end of the day.                  Care Plan discussed with: Patient, Family, Care Manager, Nursing Staff, Consultant/Specialist, and >50% of time spent in counseling and coordination of care    Discussed:  Care Plan and D/C Planning    Prophylaxis:  Lovenox and H2B/PPI    Disposition:  Home w/Family           ___________________________________________________    Attending Physician: Mariel Juarez MD

## 2022-09-06 NOTE — ROUTINE PROCESS
Strong congested/moist cough perfect serve on call hospitalist for cough med. Orders in.    0233 A lot of complaints,not feeling well nauseated and headaches Zofran iv not due yet every 6 hrs. Perfect MD for Compazine orders. 0413 Pt stated Zofran is not working. Compazine 5 mg iv given for nausea. Pt tolerates oral intake w/o vomiting.    0519 nothing is helping her everything she has a complaints. Bed and recliner not comfortable for her.

## 2022-09-06 NOTE — PROGRESS NOTES
Signs and symptoms: L sided tingling/weakness   Time of Code Stroke Activation:1315  Provider at bedside time:  No- primary RN spoke with Dr Bridget Galaviz before going to CT  VAN score: Negative  Last Known Well (Time): 1310  Blood Glucose Result/Time: 146 @ 1321   Blood Pressure: 164/85  Anticoagulants (List medications): Xarelto

## 2022-09-06 NOTE — PROGRESS NOTES
Progress Note  Date:2022       Room:[facility]  Patient Gerald Ambriz     Date of Birth:[birth year]     Age:82 y.o. Subjective    Subjective   Review of Systems   Unable to perform ROS: Other (Off floor for tests)   Objective         Vitals Last 24 Hours:  TEMPERATURE:  Temp  Av.7 °F (36.5 °C)  Min: 97.4 °F (36.3 °C)  Max: 97.9 °F (36.6 °C)  RESPIRATIONS RANGE: Resp  Av.4  Min: 16  Max: 18  PULSE OXIMETRY RANGE: SpO2  Av.9 %  Min: 93 %  Max: 98 %  PULSE RANGE: Pulse  Av.3  Min: 70  Max: 83  BLOOD PRESSURE RANGE: Systolic (81MYH), GQA:855 , Min:119 , JAF:658   ; Diastolic (65IJH), OYO:80, Min:65, Max:85    I/O (24Hr): Intake/Output Summary (Last 24 hours) at 2022 1112  Last data filed at 2022 0415  Gross per 24 hour   Intake 1650 ml   Output --   Net 1650 ml     Objective  Labs/Imaging/Diagnostics    Labs:  CBC:  Recent Labs     22  1339   WBC 10.1 13.0*   RBC 3.37* 3.39*   HGB 9.5* 9.7*   HCT 28.8* 29.0*   MCV 85.5 85.5   RDW 14.7* 14.8*   * 445*     CHEMISTRIES:  Recent Labs     22  0207 22  1339   * 128* 129*   K 4.9 3.9 3.3*   CL 88* 89* 93*   CO2 27 31 30   BUN 5* 6 7   CA 8.3* 8.5 8.5   MG  --  2.0 1.4*   PT/INR:No results for input(s): INR, INREXT in the last 72 hours. No lab exists for component: PROTIME  APTT:No results for input(s): APTT in the last 72 hours. LIVER PROFILE:  Recent Labs     22  1339   AST 12* 13*   ALT 9* 11*     Lab Results   Component Value Date/Time    ALT (SGPT) 9 (L) 2022 05:18 AM    AST (SGOT) 12 (L) 2022 05:18 AM    Alk. phosphatase 127 (H) 2022 05:18 AM    Bilirubin, total 0.6 2022 05:18 AM       Imaging Last 24 Hours:  CT CHEST WO CONT    Result Date: 2022  EXAM:  CT CHEST WO CONT INDICATION:  abnormal CXR COMPARISON: 2022.  . TECHNIQUE: Unenhanced multislice helical CT was performed from the thoracic inlet to the adrenal glands without intravenous contrast administration. Contiguous 5 mm axial images were reconstructed and lung and soft tissue windows were generated. Coronal and sagittal reformations were generated. CT dose reduction was achieved through use of a standardized protocol tailored for this examination and automatic exposure control for dose modulation. Ramakrishna Glow FINDINGS: CHEST: Chest wall/thoracic inlet: Within normal limits. Thyroid: Within normal limits. Mediastinum/ariella: Multiple small nodes. Heart/vessels: Aortic arch is atherosclerotic. Heart size is borderline enlarged. There are coronary artery calcifications. Lungs/Pleura: Patchy reticular opacities bilaterally with a basilar predominance. Some areas are somewhat more consolidated. There are areas of bronchial wall thickening with areas of mucous plugging as well. Scattered sub-5 mm nodules. No effusion. . ABDOMEN: Incidentally imaged portions of the abdomen appear unremarkable. . MSK: Bones are osteopenic. Chronic wedging of T9. .    1. Scattered reticulonodular changes throughout the lungs with areas of slightly more consolidative. Bronchial wall thickening with mucous plugging. Findings are likely infectious or inflammatory.     Assessment//Plan   Principal Problem:    Dyspnea (9/4/2022)    Active Problems:    Asthma ()      Paroxysmal A-fib (HCC) ()      GERD (gastroesophageal reflux disease) ()      Hiatal hernia ()      HX: breast cancer (2015)      Overview: Right       Hypothyroid ()      Venous insufficiency ()      Overview: bilat LE      Anemia (4/30/2022)      Chronic anticoagulation ()      Hyponatremia (4/30/2022)      Nausea (9/4/2022)      Chest pain (9/4/2022)      Hypokalemia (9/4/2022)      Leukocytosis (9/4/2022)      CHF (congestive heart failure), NYHA class I, chronic, systolic ([address] (9/9/8840)      Assessment:   (· Nausea  · Weight loss     I met her for EGD recently, after her having been evaluated by my colleagues at [facility] for same complaint. EGD 8/10/2022 showed candidal esophagitis but she cannot take fluconazole in concert with sotalol. Ongoing symptoms despite nystatin. UGI 1/2022 with small paraesophageal hernia, silent aspiration. 9/6/2022: Off floor for GES. ). Plan:    (- Continue current medications. - Follow for results of GES).      Electronically signed by Tae Samayoa NP on 9/6/2022 at 11:12 AM

## 2022-09-06 NOTE — PROGRESS NOTES
Cardiology Progress Note    Patient: Elois Gaucher MRN: [id number]     YOB: 1940  Age: 80 y.o. Sex: female      Admit Date: 9/4/2022       Assessment/Plan     Acute on chronic HF mildly reduced EF: pBNP 2712: hold diuretics given low na. Ween oxygen. PAF: on sotalol, Xarelto - cont   3. Recent PNA: admitted about 2 weeks ago, completed abx. Was on steroids. Xray revealing interstitial edema vs PNA, for chest CT this morning. CT scan was personally reviewed and also shows evidence of pneumonia. 4. Weight loss, decreased appetite, nausea: GI consulted, gastric emptying study today. 5 hyponatremia: hold diuresis. Fluid restriction . NP spent 10 minutes reviewing chart, labs, diagnostics and coordination of care . NP spent 10 minutes with pt/family in examination and care plan review. HPI Elois Gaucher is a 80 y.o.  female  with PMH significant for CHF, PAF on Xarelto, asthma, GERD, anemia, hypothyroid and breast CA. Pt presented to the ED with complaints of worsening dyspnea, cough, chest pressure and nausea. Symptoms are associated with feeling lightheaded. She was recently admitted and treated for pneumonia. She had been doing well with return of her appetite until Friday when her symptoms began again. She notes about a 50 lb weight loss over the last 6 months or so. She received IV diuresis in the ED yesterday and thinks her breathing is somewhat better but cont with persistent cough. Denies any fever, chills. The patient has been referred to cardiology for on going management of CHF. Review of Symptoms:  Constitutional: negative  ENT: negative   Respiratory:  per HPI  Gastrointestinal:  +nausea  Genitourinary: no dysuria, hematuria, frequency   Musculoskeletal:negative  Neurological: negative  Other systems reviewed and negative except as above.       Previous cardiac hx  04/30/22    ECHO ADULT COMPLETE 04/30/2022 4/30/2022    Interpretation Summary  Formatting of this result is different from the original.      Left Ventricle: Moderately reduced left ventricular systolic function with a visually estimated EF of 40 - 45%. Left ventricle size is normal. Normal wall thickness. Mild global hypokinesis present. Aortic Valve: Valve structure is normal. Mild sclerosis of the aortic valve cusp. Mitral Valve: Mild regurgitation. Signed by: Mert Lester MD on 2022  2:20 PM      Risk factors: age    Social History     Tobacco Use    Smoking status: Former     Types: Cigarettes     Start date: 2000     Quit date: 2000     Years since quittin.7    Smokeless tobacco: Never   Substance Use Topics    Alcohol use: Not Currently     No family history on file.     Current Facility-Administered Medications   Medication Dose Route Frequency    guaiFENesin (ROBITUSSIN) 100 mg/5 mL oral liquid 100 mg  100 mg Oral Q4H PRN    prochlorperazine (COMPAZINE) injection 5 mg  5 mg IntraVENous Q6H PRN    nystatin (MYCOSTATIN) 100,000 unit/mL oral suspension 500,000 Units  500,000 Units Oral QID    ketotifen (ZADITOR) 0.025 % (0.035 %) ophthalmic solution 1 Drop  1 Drop Both Eyes BID PRN    albuterol-ipratropium (DUO-NEB) 2.5 MG-0.5 MG/3 ML  3 mL Nebulization Q6H RT    trimethoprim-sulfamethoxazole (BACTRIM) 320 mg in dextrose 5% 500 mL  320 mg IntraVENous Q8H    tobramycin (NEBCIN) injection 300 mg  300 mg Nebulization BID RT    potassium chloride SR (KLOR-CON 10) tablet 20 mEq  20 mEq Oral TID    arformoteroL (BROVANA) neb solution 15 mcg  15 mcg Nebulization BID RT    budesonide (PULMICORT) 500 mcg/2 ml nebulizer suspension  500 mcg Nebulization BID RT    albuterol-ipratropium (DUO-NEB) 2.5 MG-0.5 MG/3 ML  3 mL Nebulization Q6H PRN    montelukast (SINGULAIR) tablet 10 mg  10 mg Oral QHS    sotaloL (BETAPACE) tablet 80 mg  80 mg Oral Q12H    sodium chloride (NS) flush 5-40 mL  5-40 mL IntraVENous Q8H    sodium chloride (NS) flush 5-40 mL  5-40 mL IntraVENous PRN    acetaminophen (TYLENOL) tablet 650 mg  650 mg Oral Q6H PRN    polyethylene glycol (MIRALAX) packet 17 g  17 g Oral DAILY PRN    ondansetron (ZOFRAN) injection 4 mg  4 mg IntraVENous Q6H PRN    pantoprazole (PROTONIX) 40 mg in 0.9% sodium chloride 10 mL injection  40 mg IntraVENous Q12H    rivaroxaban (XARELTO) tablet 20 mg  20 mg Oral DAILY WITH DINNER       Objective:     Vitals:    09/05/22 2323 09/06/22 0402 09/06/22 0700 09/06/22 0805   BP: 135/76 119/65  (!) 170/85   Pulse: 78 73 83 83   Resp: 18 18  18   Temp: 97.8 °F (36.6 °C) 97.7 °F (36.5 °C)  97.6 °F (36.4 °C)   SpO2: 96% 94%  93%   Weight:    60.6 kg (133 lb 9.6 oz)   Height:            Intake and Output:  Current Shift: No intake/output data recorded. Last three shifts: 09/04 1901 - 09/06 0700  In: 1850 [P.O.:850; I.V.:1000]  Out: -           Gen: Well-developed, well-nourished, in no acute distress  Neck: Supple,No JVD, No Carotid Bruit,   Resp: No accessory muscle use, +rales  Card: Regular Rate,Rythm,Normal S1, S2, No murmurs, rubs or gallop. Abd:  Soft, tender, non-distended, BS+,   MSK: No cyanosis  Skin: No rashes    Neuro: moving all four extremities , follows commands appropriately  Psych:  Good insight, oriented to person, place , alert, Nml Affect  LE: trace edema    EKG: normal sinus rhythm. TELEMETRY SR    Lab/Data Review: All lab results for the last 24 hours reviewed.      Signed By: Suresh Michael NP     September 6, 2022

## 2022-09-06 NOTE — PROGRESS NOTES
Code stroke was called this afternoon when the patient reported tingling and odd sensation in her L leg after getting up to go to the restroom. CT ehad was unremarkble. Teleneurology consulted. They examined patient, and recommended MRI brain, neuro consult and to continue Xarelto.

## 2022-09-06 NOTE — PROGRESS NOTES
Name: Franky Bhatt: [address]   : 1940 Admit Date: 2022   Phone: [phone]  Room: [facility]   PCP: Andrea Crews MD  MRN: [id number]   Date: 2022  Code: Full Code          Chart and notes reviewed. Data reviewed. I review the patient's current medications in the medical record at each encounter. I have evaluated and examined the patient. History of Present Illness:  Ms. Jorge Luis Rodas is an  yo woman with a history of asthma/COPD, pseudomonas pneumonia/colonization, bronchiectasis, former tobacco use, afib, breast cancer s/p chemo/radiation, seronegative RA and GERD who presented with dyspnea and cough. She is followed by Dr. Brion Mcburney by Rubi Alexander and was last seen . She is managed on Trelegy (this was stopped for a period of time due to concern for GI issues, but pulmonary symptoms worsened when on Advair/Spiriva). She is on jeff nebs for pseudomonas. She recently had an EGD that showed candidal esophagitis, but unable to take fluconazole due to sotalol. She was recently admitted and discharged  for nausea/vomiting and treated for PNA. She was doing well until Friday when she began to feel weak, more short of breath and had increased cough with some chest tightness. Her cough is wet, but has not been notably productive. Has loss additional weight since she was last admitted. Planned for Gastric emptying study and possible upper GI study.      Labs: WBC 10.1, Hgb 9.5, , d-dimer 0.44, cr 0.41, , proBNP 2712, sputum culture  with stenotrophamonas     Images reviewed:  CXR 2022: increased interstitial marking and mild patchy infiltrates (greatest on the R), these are a change from most recent CXR     CT chest 2022: patchy bilateral reticulonodular opacities, bronchial wall thickening, mucous plugging, scattered sub 5mm nodules, no effuisons      Past Medical History:   Diagnosis Date    Asthma     CHF (congestive heart failure), NYHA class I, chronic, systolic (HCC)     Chronic anticoagulation     GERD (gastroesophageal reflux disease)     Hiatal hernia     HX: breast cancer     Right     Hypothyroid     Paroxysmal A-fib (HCC)     Venous insufficiency     bilat LE    Weight loss        Past Surgical History:   Procedure Laterality Date    COLONOSCOPY N/A 2021    COLONOSCOPY AND EGD performed by Bernardino Hazel MD at [address] BREAST LUMPECTOMY Right 2015    HX CATARACT REMOVAL  2013    Bilateral     HX CHOLECYSTECTOMY      HX DILATION AND CURETTAGE      x2    HX ORTHOPAEDIC  2017    RIGHT foot  hammertoe       No family history on file.     Social History     Tobacco Use    Smoking status: Former     Types: Cigarettes     Start date: 2000     Quit date: 2000     Years since quittin.7    Smokeless tobacco: Never   Substance Use Topics    Alcohol use: Not Currently       Allergies   Allergen Reactions    Ace Inhibitors Cough    Iodine Rash    Penicillins Rash       Current Facility-Administered Medications   Medication Dose Route Frequency    guaiFENesin (ROBITUSSIN) 100 mg/5 mL oral liquid 100 mg  100 mg Oral Q4H PRN    prochlorperazine (COMPAZINE) injection 5 mg  5 mg IntraVENous Q6H PRN    potassium chloride SR (KLOR-CON 10) tablet 20 mEq  20 mEq Oral BID    nystatin (MYCOSTATIN) 100,000 unit/mL oral suspension 500,000 Units  500,000 Units Oral QID    ketotifen (ZADITOR) 0.025 % (0.035 %) ophthalmic solution 1 Drop  1 Drop Both Eyes BID PRN    albuterol-ipratropium (DUO-NEB) 2.5 MG-0.5 MG/3 ML  3 mL Nebulization Q6H RT    trimethoprim-sulfamethoxazole (BACTRIM) 320 mg in dextrose 5% 500 mL  320 mg IntraVENous Q8H    tobramycin (NEBCIN) injection 300 mg  300 mg Nebulization BID RT    arformoteroL (BROVANA) neb solution 15 mcg  15 mcg Nebulization BID RT    budesonide (PULMICORT) 500 mcg/2 ml nebulizer suspension  500 mcg Nebulization BID RT    albuterol-ipratropium (DUO-NEB) 2.5 MG-0.5 MG/3 ML  3 mL Nebulization Q6H PRN montelukast (SINGULAIR) tablet 10 mg  10 mg Oral QHS    sotaloL (BETAPACE) tablet 80 mg  80 mg Oral Q12H    sodium chloride (NS) flush 5-40 mL  5-40 mL IntraVENous Q8H    sodium chloride (NS) flush 5-40 mL  5-40 mL IntraVENous PRN    acetaminophen (TYLENOL) tablet 650 mg  650 mg Oral Q6H PRN    polyethylene glycol (MIRALAX) packet 17 g  17 g Oral DAILY PRN    ondansetron (ZOFRAN) injection 4 mg  4 mg IntraVENous Q6H PRN    pantoprazole (PROTONIX) 40 mg in 0.9% sodium chloride 10 mL injection  40 mg IntraVENous Q12H    rivaroxaban (XARELTO) tablet 20 mg  20 mg Oral DAILY WITH DINNER         REVIEW OF SYSTEMS   12 point ROS negative except as stated in the HPI. Physical Exam:   Visit Vitals  BP (!) 170/85 (BP 1 Location: Left upper arm, BP Patient Position: Sitting)   Pulse 83   Temp 97.6 °F (36.4 °C)   Resp 18   Ht 5' 2\" (1.575 m)   Wt 60.6 kg (133 lb 9.6 oz)   SpO2 93%   BMI 24.44 kg/m²       General:  Alert, cooperative, no distress, appears stated age. Head:  Normocephalic, without obvious abnormality, atraumatic. Eyes:  Conjunctivae/corneas clear. Nose: Nares normal. Septum midline. Mucosa normal.    Throat: Lips, mucosa, and tongue normal.    Neck: Supple, symmetrical, trachea midline, no adenopathy. Lungs:   Scattered rhonchi, wet cough   Chest wall:  No tenderness or deformity. Heart:  Regular rate and rhythm, S1, S2 normal, no murmur, click, rub or gallop. Abdomen:   Soft, non-tender. Bowel sounds normal.    Extremities: Extremities normal, atraumatic, no cyanosis or edema. Pulses: 2+ and symmetric all extremities.    Skin: Skin color, texture, turgor normal. No rashes or lesions       Neurologic: Grossly nonfocal       Lab Results   Component Value Date/Time    Sodium 122 (L) 09/06/2022 02:07 AM    Potassium 4.9 09/06/2022 02:07 AM    Chloride 88 (L) 09/06/2022 02:07 AM    CO2 27 09/06/2022 02:07 AM    BUN 5 (L) 09/06/2022 02:07 AM    Creatinine 0.44 (L) 09/06/2022 02:07 AM    Glucose 89 09/06/2022 02:07 AM    Calcium 8.3 (L) 09/06/2022 02:07 AM    Magnesium 2.0 09/05/2022 05:18 AM       Lab Results   Component Value Date/Time    WBC 10.1 09/05/2022 05:18 AM    HGB 9.5 (L) 09/05/2022 05:18 AM    PLATELET 273 (H) 16/69/2002 05:18 AM    MCV 85.5 09/05/2022 05:18 AM       Lab Results   Component Value Date/Time    Alk.  phosphatase 127 (H) 09/05/2022 05:18 AM    Protein, total 6.2 (L) 09/05/2022 05:18 AM    Albumin 2.2 (L) 09/05/2022 05:18 AM    Globulin 4.0 09/05/2022 05:18 AM       Lab Results   Component Value Date/Time    Iron 35 09/04/2022 05:26 PM    TIBC 193 (L) 09/04/2022 05:26 PM    Iron % saturation 18 (L) 09/04/2022 05:26 PM    Ferritin 196 09/04/2022 05:26 PM       Lab Results   Component Value Date/Time    TSH 2.20 09/04/2022 05:26 PM        No results found for: PH, PHI, PCO2, PCO2I, PO2, PO2I, HCO3, HCO3I, FIO2, FIO2I    Lab Results   Component Value Date/Time    Troponin-I, Qt. <0.05 01/13/2021 03:17 AM        Lab Results   Component Value Date/Time    Culture result: NO GROWTH 2 DAYS 09/04/2022 01:39 PM    Culture result: MODERATE Stenotrophomonas (Xantho.) maltophilia (A) 08/22/2022 12:30 PM    Culture result: MODERATE NORMAL RESPIRATORY CANDELARIO 08/22/2022 12:30 PM       No results found for: TOXA1, RPR, HBCM, HBSAG, HAAB, HCAB1, HAAT, G6PD, CRYAC, HIVGT, HIVR, HIV1, HIV12, HIVPC, HIVRPI    No results found for: VANCT, CPK    Lab Results   Component Value Date/Time    Color YELLOW/STRAW 04/30/2022 09:48 AM    Appearance CLEAR 04/30/2022 09:48 AM    pH (UA) 8.0 04/30/2022 09:48 AM    Protein Negative 04/30/2022 09:48 AM    Glucose Negative 04/30/2022 09:48 AM    Ketone Negative 04/30/2022 09:48 AM    Bilirubin Negative 04/30/2022 09:48 AM    Blood Negative 04/30/2022 09:48 AM    Urobilinogen 0.2 04/30/2022 09:48 AM    Nitrites Negative 04/30/2022 09:48 AM    Leukocyte Esterase SMALL (A) 04/30/2022 09:48 AM    WBC 0-4 04/30/2022 09:48 AM    RBC 0-5 04/30/2022 09:48 AM    Bacteria Negative 04/30/2022 09:48 AM       IMPRESSION  Acute respiratory failure with hypoxia  Abnormal chest imaging  COPD/asthma  Bronchiectasis  History of pseudomonas pneumonia/colonization  GERD  Candidal esophagitis  Nauseas/vomiting  Weight loss    PLAN  Goal sats 88% or higher  Recent sputum culture grew stenotrophomonas, not treated with bactrim as she was clinically improving at that time but have started this now given imaging findings and clinical change  Would obtain sputum culture if she expectorates sputum; RVP negative  Brovana/Pulmicort, dunoebs  No indication for steroids at this time  Started her week on jeff nebs on 9/3 and ordered to complete on Friday  GI evaluating, gastric emptying study ordered for today  TTE pending   Cardiology has evaluated      Emely Villa MD

## 2022-09-07 LAB
ANION GAP SERPL CALC-SCNC: 10 MMOL/L (ref 5–15)
ANION GAP SERPL CALC-SCNC: 10 MMOL/L (ref 5–15)
ANION GAP SERPL CALC-SCNC: 11 MMOL/L (ref 5–15)
ANION GAP SERPL CALC-SCNC: 9 MMOL/L (ref 5–15)
ANION GAP SERPL CALC-SCNC: 9 MMOL/L (ref 5–15)
APPEARANCE UR: CLEAR
ATRIAL RATE: 84 BPM
BACTERIA URNS QL MICRO: NEGATIVE /HPF
BASOPHILS # BLD: 0 K/UL (ref 0–0.1)
BASOPHILS NFR BLD: 0 % (ref 0–1)
BILIRUB UR QL: NEGATIVE
BUN SERPL-MCNC: 4 MG/DL (ref 6–20)
BUN/CREAT SERPL: 11 (ref 12–20)
BUN/CREAT SERPL: 11 (ref 12–20)
BUN/CREAT SERPL: 12 (ref 12–20)
BUN/CREAT SERPL: 13 (ref 12–20)
BUN/CREAT SERPL: 9 (ref 12–20)
CALCIUM SERPL-MCNC: 7.8 MG/DL (ref 8.5–10.1)
CALCIUM SERPL-MCNC: 7.8 MG/DL (ref 8.5–10.1)
CALCIUM SERPL-MCNC: 8 MG/DL (ref 8.5–10.1)
CALCIUM SERPL-MCNC: 8 MG/DL (ref 8.5–10.1)
CALCIUM SERPL-MCNC: 8.2 MG/DL (ref 8.5–10.1)
CALCULATED P AXIS, ECG09: 19 DEGREES
CALCULATED R AXIS, ECG10: -7 DEGREES
CALCULATED T AXIS, ECG11: 38 DEGREES
CHLORIDE SERPL-SCNC: 75 MMOL/L (ref 97–108)
CHLORIDE SERPL-SCNC: 77 MMOL/L (ref 97–108)
CHLORIDE SERPL-SCNC: 78 MMOL/L (ref 97–108)
CHLORIDE SERPL-SCNC: 80 MMOL/L (ref 97–108)
CHLORIDE SERPL-SCNC: 81 MMOL/L (ref 97–108)
CHLORIDE UR-SCNC: 150 MMOL/L
CO2 SERPL-SCNC: 21 MMOL/L (ref 21–32)
CO2 SERPL-SCNC: 22 MMOL/L (ref 21–32)
CO2 SERPL-SCNC: 22 MMOL/L (ref 21–32)
CO2 SERPL-SCNC: 23 MMOL/L (ref 21–32)
CO2 SERPL-SCNC: 23 MMOL/L (ref 21–32)
COLOR UR: ABNORMAL
COMMENT, HOLDF: NORMAL
CORTIS SERPL-MCNC: 38.6 UG/DL
CREAT SERPL-MCNC: 0.31 MG/DL (ref 0.55–1.02)
CREAT SERPL-MCNC: 0.33 MG/DL (ref 0.55–1.02)
CREAT SERPL-MCNC: 0.36 MG/DL (ref 0.55–1.02)
CREAT SERPL-MCNC: 0.37 MG/DL (ref 0.55–1.02)
CREAT SERPL-MCNC: 0.44 MG/DL (ref 0.55–1.02)
CREAT UR-MCNC: 33 MG/DL
DIAGNOSIS, 93000: NORMAL
DIFFERENTIAL METHOD BLD: ABNORMAL
EOSINOPHIL # BLD: 0 K/UL (ref 0–0.4)
EOSINOPHIL NFR BLD: 0 % (ref 0–7)
EPITH CASTS URNS QL MICRO: ABNORMAL /LPF
ERYTHROCYTE [DISTWIDTH] IN BLOOD BY AUTOMATED COUNT: 14 % (ref 11.5–14.5)
GLUCOSE SERPL-MCNC: 125 MG/DL (ref 65–100)
GLUCOSE SERPL-MCNC: 198 MG/DL (ref 65–100)
GLUCOSE SERPL-MCNC: 93 MG/DL (ref 65–100)
GLUCOSE SERPL-MCNC: 96 MG/DL (ref 65–100)
GLUCOSE SERPL-MCNC: 99 MG/DL (ref 65–100)
GLUCOSE UR STRIP.AUTO-MCNC: NEGATIVE MG/DL
HCT VFR BLD AUTO: 27.2 % (ref 35–47)
HGB BLD-MCNC: 9.5 G/DL (ref 11.5–16)
HGB UR QL STRIP: NEGATIVE
HYALINE CASTS URNS QL MICRO: ABNORMAL /LPF (ref 0–2)
IMM GRANULOCYTES # BLD AUTO: 0.1 K/UL (ref 0–0.04)
IMM GRANULOCYTES NFR BLD AUTO: 1 % (ref 0–0.5)
KETONES UR QL STRIP.AUTO: ABNORMAL MG/DL
LEUKOCYTE ESTERASE UR QL STRIP.AUTO: NEGATIVE
LYMPHOCYTES # BLD: 1.2 K/UL (ref 0.8–3.5)
LYMPHOCYTES NFR BLD: 8 % (ref 12–49)
MCH RBC QN AUTO: 28.4 PG (ref 26–34)
MCHC RBC AUTO-ENTMCNC: 34.9 G/DL (ref 30–36.5)
MCV RBC AUTO: 81.2 FL (ref 80–99)
MONOCYTES # BLD: 0.9 K/UL (ref 0–1)
MONOCYTES NFR BLD: 6 % (ref 5–13)
NEUTS SEG # BLD: 12.8 K/UL (ref 1.8–8)
NEUTS SEG NFR BLD: 85 % (ref 32–75)
NITRITE UR QL STRIP.AUTO: NEGATIVE
NRBC # BLD: 0 K/UL (ref 0–0.01)
NRBC BLD-RTO: 0 PER 100 WBC
OSMOLALITY SERPL: 229 MOSM/KG H2O
OSMOLALITY SERPL: 234 MOSM/KG H2O
OSMOLALITY UR: 497 MOSM/KG H2O
OSMOLALITY UR: 653 MOSM/KG H2O
P-R INTERVAL, ECG05: 152 MS
PH UR STRIP: 8 [PH] (ref 5–8)
PLATELET # BLD AUTO: 510 K/UL (ref 150–400)
PMV BLD AUTO: 9.1 FL (ref 8.9–12.9)
POTASSIUM SERPL-SCNC: 4.1 MMOL/L (ref 3.5–5.1)
POTASSIUM SERPL-SCNC: 4.1 MMOL/L (ref 3.5–5.1)
POTASSIUM SERPL-SCNC: 4.2 MMOL/L (ref 3.5–5.1)
POTASSIUM SERPL-SCNC: 4.2 MMOL/L (ref 3.5–5.1)
POTASSIUM SERPL-SCNC: 4.5 MMOL/L (ref 3.5–5.1)
POTASSIUM UR-SCNC: 48 MMOL/L
PROT UR STRIP-MCNC: NEGATIVE MG/DL
PROT UR-MCNC: 18 MG/DL (ref 0–11.9)
Q-T INTERVAL, ECG07: 364 MS
QRS DURATION, ECG06: 78 MS
QTC CALCULATION (BEZET), ECG08: 430 MS
RBC # BLD AUTO: 3.35 M/UL (ref 3.8–5.2)
RBC #/AREA URNS HPF: ABNORMAL /HPF (ref 0–5)
SAMPLES BEING HELD,HOLD: NORMAL
SODIUM SERPL-SCNC: 108 MMOL/L (ref 136–145)
SODIUM SERPL-SCNC: 109 MMOL/L (ref 136–145)
SODIUM SERPL-SCNC: 110 MMOL/L (ref 136–145)
SODIUM SERPL-SCNC: 111 MMOL/L (ref 136–145)
SODIUM SERPL-SCNC: 113 MMOL/L (ref 136–145)
SODIUM UR-SCNC: 136 MMOL/L
SODIUM UR-SCNC: 182 MMOL/L
SP GR UR REFRACTOMETRY: 1.02 (ref 1–1.03)
TRIGL SERPL-MCNC: 41 MG/DL (ref ?–150)
TSH SERPL DL<=0.05 MIU/L-ACNC: 1.12 UIU/ML (ref 0.36–3.74)
UR CULT HOLD, URHOLD: NORMAL
UROBILINOGEN UR QL STRIP.AUTO: 1 EU/DL (ref 0.2–1)
VENTRICULAR RATE, ECG03: 84 BPM
WBC # BLD AUTO: 15.1 K/UL (ref 3.6–11)
WBC URNS QL MICRO: ABNORMAL /HPF (ref 0–4)

## 2022-09-07 PROCEDURE — 85025 COMPLETE CBC W/AUTO DIFF WBC: CPT

## 2022-09-07 PROCEDURE — 83935 ASSAY OF URINE OSMOLALITY: CPT

## 2022-09-07 PROCEDURE — 84478 ASSAY OF TRIGLYCERIDES: CPT

## 2022-09-07 PROCEDURE — 84300 ASSAY OF URINE SODIUM: CPT

## 2022-09-07 PROCEDURE — 36415 COLL VENOUS BLD VENIPUNCTURE: CPT

## 2022-09-07 PROCEDURE — 74011250636 HC RX REV CODE- 250/636: Performed by: INTERNAL MEDICINE

## 2022-09-07 PROCEDURE — 74011250637 HC RX REV CODE- 250/637: Performed by: INTERNAL MEDICINE

## 2022-09-07 PROCEDURE — 80048 BASIC METABOLIC PNL TOTAL CA: CPT

## 2022-09-07 PROCEDURE — 95816 EEG AWAKE AND DROWSY: CPT | Performed by: PSYCHIATRY & NEUROLOGY

## 2022-09-07 PROCEDURE — 82436 ASSAY OF URINE CHLORIDE: CPT

## 2022-09-07 PROCEDURE — 83930 ASSAY OF BLOOD OSMOLALITY: CPT

## 2022-09-07 PROCEDURE — 94664 DEMO&/EVAL PT USE INHALER: CPT

## 2022-09-07 PROCEDURE — 82570 ASSAY OF URINE CREATININE: CPT

## 2022-09-07 PROCEDURE — 74011000250 HC RX REV CODE- 250: Performed by: INTERNAL MEDICINE

## 2022-09-07 PROCEDURE — 77010033678 HC OXYGEN DAILY

## 2022-09-07 PROCEDURE — 94761 N-INVAS EAR/PLS OXIMETRY MLT: CPT

## 2022-09-07 PROCEDURE — 84156 ASSAY OF PROTEIN URINE: CPT

## 2022-09-07 PROCEDURE — 65270000046 HC RM TELEMETRY

## 2022-09-07 PROCEDURE — 99223 1ST HOSP IP/OBS HIGH 75: CPT | Performed by: PSYCHIATRY & NEUROLOGY

## 2022-09-07 PROCEDURE — 81001 URINALYSIS AUTO W/SCOPE: CPT

## 2022-09-07 PROCEDURE — 80053 COMPREHEN METABOLIC PANEL: CPT

## 2022-09-07 PROCEDURE — 95816 EEG AWAKE AND DROWSY: CPT | Performed by: NURSE PRACTITIONER

## 2022-09-07 PROCEDURE — 82533 TOTAL CORTISOL: CPT

## 2022-09-07 PROCEDURE — 87086 URINE CULTURE/COLONY COUNT: CPT

## 2022-09-07 PROCEDURE — 84443 ASSAY THYROID STIM HORMONE: CPT

## 2022-09-07 PROCEDURE — 94640 AIRWAY INHALATION TREATMENT: CPT

## 2022-09-07 PROCEDURE — 84133 ASSAY OF URINE POTASSIUM: CPT

## 2022-09-07 PROCEDURE — 51798 US URINE CAPACITY MEASURE: CPT

## 2022-09-07 RX ORDER — 3% SODIUM CHLORIDE 3 G/100ML
1 INJECTION, SOLUTION INTRAVENOUS CONTINUOUS
Status: DISCONTINUED | OUTPATIENT
Start: 2022-09-07 | End: 2022-09-07 | Stop reason: DRUGHIGH

## 2022-09-07 RX ORDER — SODIUM CHLORIDE 0.9 % (FLUSH) 0.9 %
5-40 SYRINGE (ML) INJECTION AS NEEDED
Status: DISCONTINUED | OUTPATIENT
Start: 2022-09-07 | End: 2022-09-14 | Stop reason: HOSPADM

## 2022-09-07 RX ORDER — 3% SODIUM CHLORIDE 3 G/100ML
50 INJECTION, SOLUTION INTRAVENOUS CONTINUOUS
Status: DISPENSED | OUTPATIENT
Start: 2022-09-08 | End: 2022-09-08

## 2022-09-07 RX ORDER — 3% SODIUM CHLORIDE 3 G/100ML
55 INJECTION, SOLUTION INTRAVENOUS CONTINUOUS
Status: DISCONTINUED | OUTPATIENT
Start: 2022-09-07 | End: 2022-09-07

## 2022-09-07 RX ORDER — 3% SODIUM CHLORIDE 3 G/100ML
50 INJECTION, SOLUTION INTRAVENOUS CONTINUOUS
Status: DISPENSED | OUTPATIENT
Start: 2022-09-07 | End: 2022-09-07

## 2022-09-07 RX ADMIN — SODIUM CHLORIDE 50 ML/HR: 3 INJECTION, SOLUTION INTRAVENOUS at 07:53

## 2022-09-07 RX ADMIN — SODIUM CHLORIDE 50 ML/HR: 3 INJECTION, SOLUTION INTRAVENOUS at 14:31

## 2022-09-07 RX ADMIN — SODIUM CHLORIDE, PRESERVATIVE FREE 5 ML: 5 INJECTION INTRAVENOUS at 06:09

## 2022-09-07 RX ADMIN — IPRATROPIUM BROMIDE AND ALBUTEROL SULFATE 3 ML: .5; 3 SOLUTION RESPIRATORY (INHALATION) at 04:38

## 2022-09-07 RX ADMIN — IPRATROPIUM BROMIDE AND ALBUTEROL SULFATE 3 ML: .5; 3 SOLUTION RESPIRATORY (INHALATION) at 07:43

## 2022-09-07 RX ADMIN — IPRATROPIUM BROMIDE AND ALBUTEROL SULFATE 3 ML: .5; 3 SOLUTION RESPIRATORY (INHALATION) at 13:28

## 2022-09-07 RX ADMIN — IPRATROPIUM BROMIDE AND ALBUTEROL SULFATE 3 ML: .5; 3 SOLUTION RESPIRATORY (INHALATION) at 20:07

## 2022-09-07 RX ADMIN — TOBRAMYCIN SULFATE 300 MG: 40 INJECTION, SOLUTION INTRAMUSCULAR; INTRAVENOUS at 20:10

## 2022-09-07 RX ADMIN — SULFAMETHOXAZOLE AND TRIMETHOPRIM 320 MG: 80; 16 INJECTION, SOLUTION, CONCENTRATE INTRAVENOUS at 03:54

## 2022-09-07 RX ADMIN — SODIUM CHLORIDE, PRESERVATIVE FREE 10 ML: 5 INJECTION INTRAVENOUS at 21:44

## 2022-09-07 RX ADMIN — ARFORMOTEROL TARTRATE 15 MCG: 15 SOLUTION RESPIRATORY (INHALATION) at 20:12

## 2022-09-07 RX ADMIN — BUDESONIDE 500 MCG: 0.5 SUSPENSION RESPIRATORY (INHALATION) at 20:12

## 2022-09-07 NOTE — PROGRESS NOTES
0745:RRT RN rounded on pt for increased DI. Primary nurse requests assistance with monitoring pt while transferring to a stepdown bed. Pt. Resting. VS stable. Pt responds to verbal stimuli and is oriented to self. 3% normal saline initiated. RRT stayed in room with pt while primary nurse prepares for transfer and gives report to [facility].       0806: O2 increased to 6L NC for O2 sat of 90% on 4L    0817: Care managed by RRT until Pt transferred to [facility].

## 2022-09-07 NOTE — PROGRESS NOTES
Received order. Patient currently lethargic, which is why RN appropriately made her NPO. She has extensive PMH related to her eating issues:   Admitted 9-4-22 with SOB< Chest pain, nausea, cough when lays flat so she sleeps in recliner. Chest Ct 9-5-22:    IMPRESSION  1. Scattered reticulonodular changes throughout the lungs with areas of slightly  more consolidative. Bronchial wall thickening with mucous plugging. Findings are  likely infectious or inflammatory  UGI/BA SW: 7-20-22: IMPRESSION  1. Small sliding-type hiatal hernia. Otherwise unremarkable upper GI series and  esophagram.  9-6-: weakness, molly c/o on L,   9-7-:decreased LOC; now NPO. PMH:   -recent PNA  -GI HISTORY:   ---unintentional weight loss over 6 months- has GI on the case. ---h/o esophageal candidiasis, but unable to take fluconozole due to sotalol; she is on nystatin  -MBS 7-6-21:   Oral-pharyngeal swallow WNL  Noted cervical esophagus residue that eventually cleared. Other PMH:   Asthma,  CHF, GERd, HH, R breast CA with chemo and rad, hypothyroid, RA, +h/o tobacco.        SLP will eval when appropriate, but suspect GI issues are primary in her dysphagia, base on recent normal MBS and h/o esophageal candidiasis, sliding HH.

## 2022-09-07 NOTE — PROGRESS NOTES
Occupational Therapy Note  9/7/2022    OT treatment attempted [phone] however per nursing, pt not alert unable to participate with therapy at this time. Will continue to follow pt and attempt OT tx at a later time as appropriate.      Thank you,  Mat Read OTR/L

## 2022-09-07 NOTE — PROGRESS NOTES
Was informed by RN that patient's morning sodium was 111 compared to 122 yesterday (presented with a sodium of 129 on 09/04). Discussed with RN over the phone who reports the patient appears more sleepy and lethargic, however reported no obvious seizure activities. Reviewed EMR and hospital course including cardiology and pulmonary medicine evaluations. Noted that a code stroke was called earlier on today with symptoms of tingling, CT head and MRI brain negative for acute infarct; symptoms may have been related to severe hyponatremia. Noted that patient had received Lasix recently during admission. Further reviewed medication list with no obvious culprits for hyponatremia. No current continuous IV fluids noted. Nonetheless patient now with symptomatic hyponatremia. Discussed with RN to send a stat repeat BNP. Sending serum osmolality, urine osmolality, urine sodium, TSH and cortisol. Will certainly require serial Monitoring of Sodium Levels (especially if 3% saline is commenced). Patient High Risk to Remain on Medical Surgical Floor. I Will Transfer Patient to Stepdown Unit (RN informed me that rapid response will have to be called nonetheless). After review of repeat BMP, patient will likely require commencement of hypertonic saline. I have requested Nephrology consultation. Strict bedrest.  Seizure precautions. Neuro checks. Addendum at 6:30 AM: I was informed by pharmacist that 3% saline is normally reserved for nephrology use only. Reviewed repeat BMP with worsening hyponatremia, currently 108. I have called [facility] requesting a stat nephrology consult, or at least orders to commence 3% normal saline. I spoke with the  who is transferring the message. Waiting for callback for physician to physician consultation request.    Addendum at 7:04 AM: I did speak with nephrology Dr. Vin Alvarado.   He has recommended 3% saline 100 cc in 2 hours (50 ml/hr). He further tells me that Dr. Elzbieta Cornell will be rounding in the hospital.  He will see patient in consultation. I did speak with the pharmacist Sofia Macario: informed him I spoke with nephrology with above recommendations; pharmacist will order 3% saline under Dr. Sumeet Rob.   I also updated Dr. Marinell Goldmann will be at the time rounding hospitalist.

## 2022-09-07 NOTE — PROGRESS NOTES
Progress Note  Date:2022       Room:[facility]  Patient Gerald Ambriz     Date of Birth:[birth year]     Age:82 y.o. Subjective    Subjective   Review of Systems   Unable to perform ROS: Mental status change   Constitutional:  Negative for fever. Objective         Vitals Last 24 Hours:  TEMPERATURE:  Temp  Av.8 °F (36.6 °C)  Min: 97.5 °F (36.4 °C)  Max: 98.4 °F (36.9 °C)  RESPIRATIONS RANGE: Resp  Av.8  Min: 16  Max: 25  PULSE OXIMETRY RANGE: SpO2  Av.5 %  Min: 90 %  Max: 95 %  PULSE RANGE: Pulse  Av.5  Min: 70  Max: 86  BLOOD PRESSURE RANGE: Systolic (34MGT), PPI:418 , Min:126 , GBD:325   ; Diastolic (52GXZ), MZ, Min:62, Max:91    I/O (24Hr): Intake/Output Summary (Last 24 hours) at 2022 0919  Last data filed at 2022 0400  Gross per 24 hour   Intake 670 ml   Output 200 ml   Net 470 ml     Objective:  General Appearance:  Ill-appearing. Vital signs: (most recent): Blood pressure (!) 126/91, pulse 76, temperature 97.6 °F (36.4 °C), resp. rate 25, height 5' 2\" (1.575 m), weight 60.6 kg (133 lb 9.6 oz), SpO2 94 %. No fever. Output: Producing stool. Lungs:  Normal effort and normal respiratory rate. Breath sounds clear to auscultation. Heart: Normal rate. Regular rhythm. S1 normal and S2 normal.  No murmur. Abdomen: Abdomen is soft and non-distended. Bowel sounds are normal.   There is no abdominal tenderness. Extremities: There is no dependent edema. Pulses: Distal pulses are intact. Neurological: (Unresponsive other than groaning during part of abdominal exam). Skin:  Warm and dry.     Labs/Imaging/Diagnostics    Labs:  CBC:  Recent Labs     22  0154 22  0518 22  1339   WBC 15.1* 10.1 13.0*   RBC 3.35* 3.37* 3.39*   HGB 9.5* 9.5* 9.7*   HCT 27.2* 28.8* 29.0*   MCV 81.2 85.5 85.5   RDW 14.0 14.7* 14.8*   * 408* 445*     CHEMISTRIES:  Recent Labs     22  0457 22  0154 09/06/22  0207 09/05/22 0518 09/04/22  1339   * 111* 122* 128* 129*   K 4.2 4.3 4.9 3.9 3.3*   CL 75* 79* 88* 89* 93*   CO2 22 22 27 31 30   BUN 4* 4* 5* 6 7   CA 7.8* 8.0* 8.3* 8.5 8.5   MG  --   --   --  2.0 1.4*   PT/INR:No results for input(s): INR, INREXT, INREXT in the last 72 hours. No lab exists for component: PROTIME  APTT:No results for input(s): APTT in the last 72 hours. LIVER PROFILE:  Recent Labs     09/07/22  0154 09/05/22 0518 09/04/22  1339   AST 16 12* 13*   ALT 11* 9* 11*     Lab Results   Component Value Date/Time    ALT (SGPT) 11 (L) 09/07/2022 01:54 AM    AST (SGOT) 16 09/07/2022 01:54 AM    Alk. phosphatase 126 (H) 09/07/2022 01:54 AM    Bilirubin, total 0.5 09/07/2022 01:54 AM       Imaging Last 24 Hours:  MRI BRAIN WO CONT    Result Date: 9/6/2022  EXAM: MRI BRAIN WO CONT INDICATION: Tingling COMPARISON: CT head 9/6/2022. CONTRAST: None. TECHNIQUE:  Multiplanar multisequence acquisition without contrast of the brain. FINDINGS: The ventricles are normal in size and position. Confluent areas of periventricular and deep white matter T2/FLAIR hyperintensity, and patchy T2/FLAIR hyperintensity in the [place], consistent with moderate chronic microvascular ischemic disease. There is no acute infarct, hemorrhage, extra-axial fluid collection, or mass effect. There is no cerebellar tonsillar herniation. Expected arterial flow-voids are present. Complete opacification of the left maxillary sinus and left sphenoid sinus with T1 hyperintense inspissated secretions. Trace right mastoid effusion. The orbital contents are within normal limits with bilateral lens implants. No significant osseous or scalp lesions are identified. Facet arthropathy noted in the upper cervical spine. 1. No acute intracranial abnormality. 2. Moderate chronic microvascular ischemic disease. 3. Complete opacification of the left maxillary and sphenoid sinuses, likely representing chronic sinusitis.  Correlate clinically for possible acute sinusitis. CT CODE NEURO HEAD WO CONTRAST    Result Date: 9/6/2022  CLINICAL HISTORY: numbness/tinling/weakness to L side INDICATION: numbness/tinling/weakness to L side COMPARISON: 4/30/2022. CT dose reduction was achieved through use of a standardized protocol tailored for this examination and automatic exposure control for dose modulation. TECHNIQUE: Serial axial images with a collimation of 5 mm were obtained from the skull base through the vertex  FINDINGS: There is sulcal and ventricular prominence. Confluent periventricular and scattered foci of hypodensity in the cerebral white matter. There is no evidence of an acute infarction, hemorrhage, or mass-effect. There is no evidence of midline shift or hydrocephalus. Posterior fossa structures are unremarkable. No extra-axial collections are seen. Mastoid air cells are well pneumatized and clear. There is left maxillary sinus disease. No acute intracranial process. Imaging findings consistent with moderate chronic microvascular ischemic change. There is a mild degree of cerebral atrophy. ECHO ADULT COMPLETE    Result Date: 9/6/2022  Formatting of this result is different from the original.   Left Ventricle: Mildly reduced left ventricular systolic function with a visually estimated EF of 45 - 50%. Left ventricle size is normal. Normal wall thickness. Mild global hypokinesis present. Grade I diastolic dysfunction with normal LAP. Aortic Valve: Valve structure is normal. Mild sclerosis of the aortic valve cusp. Mitral Valve: Mild regurgitation. Tricuspid Valve: Mildly elevated RVSP. Mildly improved lv function compared to study 4/2022.     Assessment//Plan   Principal Problem:    Dyspnea (9/4/2022)    Active Problems:    Asthma ()      Paroxysmal A-fib (HCC) ()      GERD (gastroesophageal reflux disease) ()      Hiatal hernia ()      HX: breast cancer (2015)      Overview: Right       Hypothyroid ()      Venous insufficiency ()      Overview: bilat LE      Anemia (4/30/2022)      Chronic anticoagulation ()      Hyponatremia (4/30/2022)      Nausea (9/4/2022)      Chest pain (9/4/2022)      Hypokalemia (9/4/2022)      Leukocytosis (9/4/2022)      CHF (congestive heart failure), NYHA class I, chronic, systolic ([address] (1/8/8237)    Assessment:   (· Nausea  · Weight loss     I met her for EGD recently, after her having been evaluated by my colleagues at [facility] for same complaint. EGD 8/10/2022 showed candidal esophagitis but she cannot take fluconazole in concert with sotalol. Ongoing symptoms despite nystatin. UGI 1/2022 with small paraesophageal hernia, silent aspiration. 9/6/2022: Off floor for GES.     9/7/2022: Overnight events noted, sodium with significant drop to 108 and acute change in MS. Currently with 3% saline infusing. GES yesterday, results pending. Does not awaken during my visit today. Family is at bedside. Family is concerned about ongoing nausea without having found a reason why. ). Plan:    (- Follow for results of GES). Electronically signed by Alysia Shea NP on 9/7/2022 at 11:12 AM      While her July of this year GI series showed a small hiatal hernia without other finding gastric emptying scan implies an esophageal dysmotility. This could certainly result in difficulty eating over a prolonged time. In the absence of a structural defect. Unfortunately treatment is difficult as no medications are applicable. Changing the structure of her diet towards a full liquid diet assuming that she does not have a problem with aspiration of a diet of full liquid consistency would be recommended. No other evaluation is recommended at this point in time.     I have interviewed and examined patient with addendum to note above and formulation care plan to reflect my evaluation    Milo Sena M.D.

## 2022-09-07 NOTE — PROGRESS NOTES
Problem: Falls - Risk of  Goal: *Absence of Falls  Description: Document Dimitris Vides Fall Risk and appropriate interventions in the flowsheet.   Outcome: Progressing Towards Goal  Note: Fall Risk Interventions:  Mobility Interventions: Bed/chair exit alarm    Mentation Interventions: Bed/chair exit alarm, More frequent rounding, Room close to nurse's station, Update white board, Door open when patient unattended    Medication Interventions: Bed/chair exit alarm, Patient to call before getting OOB, Teach patient to arise slowly    Elimination Interventions: Bed/chair exit alarm, Call light in reach, Patient to call for help with toileting needs    History of Falls Interventions: Bed/chair exit alarm

## 2022-09-07 NOTE — PROGRESS NOTES
2703  Critial Value of NA at 111 relayed to MD. Monitored pt status and put on seizure prec. Responds to voice with stable VS but appears to be more sleepy. Received order for STAT labs and sent to labs as ordered. MD called stating patient needs to be transferred to stepdown. I informed MD that I might have to call RRT per protocol. MD stated no problem but will place transfer order now. [facility] ICU charge nurse, RRT team in regards to calling RRT for this patient. They both stated no need to call for RRT and may transfer if bed is available. [address] [facility] however, no bed is available for now at ICU and . She said she will confirm and call back for availabilty. 0530  Patient is easily aroused, VS were stable as per rechecking. Educated pt that she is on bed rest and is not allowed to get up and may use purewick. Still awaiting confirmation for bed availability. [facility] and was informed to pass it on the incoming AM nurse since the room is still dirty and will be having shift change btw. 0715  Bedside shift change report given to Abimael Toussaint (oncoming nurse) by Allison Terry (offgoing nurse). Report included the following information SBAR, Kardex, Intake/Output, MAR, Recent Results, and Med Rec Status.

## 2022-09-07 NOTE — PROGRESS NOTES
Name: JESS FRAUSTO L N Lucia Pretty   : 1940 Admit Date: 2022   Phone: [phone]  Room: [facility]/01   PCP: Jocelyn Potts MD  MRN: [id number]   Date: 2022  Code: Full Code          Chart and notes reviewed. Data reviewed. I review the patient's current medications in the medical record at each encounter. I have evaluated and examined the patient. History of Present Illness:  Ms. Priya Dial is an  yo woman with a history of asthma/COPD, pseudomonas pneumonia/colonization, bronchiectasis, former tobacco use, afib, breast cancer s/p chemo/radiation, seronegative RA and GERD who presented with dyspnea and cough. She is followed by Dr. Morris Rashid by Odilia Rodgers and was last seen . She is managed on Trelegy (this was stopped for a period of time due to concern for GI issues, but pulmonary symptoms worsened when on Advair/Spiriva). She is on jeff nebs for pseudomonas. She recently had an EGD that showed candidal esophagitis, but unable to take fluconazole due to sotalol. She was recently admitted and discharged  for nausea/vomiting and treated for PNA. She was doing well until Friday when she began to feel weak, more short of breath and had increased cough with some chest tightness. Her cough is wet, but has not been notably productive. Has loss additional weight since she was last admitted. Planned for Gastric emptying study and possible upper GI study.      Labs: WBC 10.1, Hgb 9.5, , d-dimer 0.44, cr 0.41, , proBNP 2712, sputum culture  with stenotrophamonas     Images reviewed:  CXR 2022: increased interstitial marking and mild patchy infiltrates (greatest on the R), these are a change from most recent CXR     CT chest 2022: patchy bilateral reticulonodular opacities, bronchial wall thickening, mucous plugging, scattered sub 5mm nodules, no effusions    Interval history  Afebrile  BP stable  Sats 94% on 6L  WBC 15.1  Hgb 9.5  Na 108 - down from 122 yesterday  TSH 1.12  Blood cx no growth x 3 days  RVP neg    Head CT no acute process    MRI brain with no acute intracranial abnormality. Moderate chronic microvascular ischemic disease. Complete opacification of the left maxillary and sphenoid sinuses, likely representing chronic sinusitis    ECHO: EF 03-83%; grade 1 diastolic dysfunction; mildly elevated RVSP    ROS: Unable to obtain due to AMS. Will open eyes in response to voice and does look around the room and track, but only mumbles and then falls back to sleep. Past Medical History:   Diagnosis Date    Asthma     CHF (congestive heart failure), NYHA class I, chronic, systolic (HCC)     Chronic anticoagulation     GERD (gastroesophageal reflux disease)     Hiatal hernia     HX: breast cancer     Right     Hypothyroid     Paroxysmal A-fib (HCC)     Venous insufficiency     bilat LE    Weight loss        Past Surgical History:   Procedure Laterality Date    COLONOSCOPY N/A 2021    COLONOSCOPY AND EGD performed by Italo Steven MD at [address] BREAST LUMPECTOMY Right 2015    HX CATARACT REMOVAL  2013    Bilateral     HX CHOLECYSTECTOMY      HX DILATION AND CURETTAGE      x2    HX ORTHOPAEDIC  2017    RIGHT foot  hammertoe       No family history on file.     Social History     Tobacco Use    Smoking status: Former     Types: Cigarettes     Start date: 2000     Quit date: 2000     Years since quittin.7    Smokeless tobacco: Never   Substance Use Topics    Alcohol use: Not Currently       Allergies   Allergen Reactions    Ace Inhibitors Cough    Iodine Rash    Penicillins Rash       Current Facility-Administered Medications   Medication Dose Route Frequency    3% sodium chloride (*HIGH ALERT*CONCENTRATED IV*) infusion  50 mL/hr IntraVENous CONTINUOUS    guaiFENesin (ROBITUSSIN) 100 mg/5 mL oral liquid 100 mg  100 mg Oral Q4H PRN    prochlorperazine (COMPAZINE) injection 5 mg  5 mg IntraVENous Q6H PRN    nystatin (MYCOSTATIN) 100,000 unit/mL oral suspension 500,000 Units  500,000 Units Oral QID    ketotifen (ZADITOR) 0.025 % (0.035 %) ophthalmic solution 1 Drop  1 Drop Both Eyes BID PRN    albuterol-ipratropium (DUO-NEB) 2.5 MG-0.5 MG/3 ML  3 mL Nebulization Q6H RT    tobramycin (NEBCIN) injection 300 mg  300 mg Nebulization BID RT    arformoteroL (BROVANA) neb solution 15 mcg  15 mcg Nebulization BID RT    budesonide (PULMICORT) 500 mcg/2 ml nebulizer suspension  500 mcg Nebulization BID RT    albuterol-ipratropium (DUO-NEB) 2.5 MG-0.5 MG/3 ML  3 mL Nebulization Q6H PRN    montelukast (SINGULAIR) tablet 10 mg  10 mg Oral QHS    sotaloL (BETAPACE) tablet 80 mg  80 mg Oral Q12H    sodium chloride (NS) flush 5-40 mL  5-40 mL IntraVENous Q8H    sodium chloride (NS) flush 5-40 mL  5-40 mL IntraVENous PRN    acetaminophen (TYLENOL) tablet 650 mg  650 mg Oral Q6H PRN    polyethylene glycol (MIRALAX) packet 17 g  17 g Oral DAILY PRN    ondansetron (ZOFRAN) injection 4 mg  4 mg IntraVENous Q6H PRN    pantoprazole (PROTONIX) 40 mg in 0.9% sodium chloride 10 mL injection  40 mg IntraVENous Q12H    rivaroxaban (XARELTO) tablet 20 mg  20 mg Oral DAILY WITH DINNER         REVIEW OF SYSTEMS   12 point ROS negative except as stated in the HPI. Physical Exam:   Visit Vitals  BP (!) 144/62 (BP 1 Location: Left upper arm, BP Patient Position: At rest)   Pulse 76   Temp 97.6 °F (36.4 °C)   Resp 25   Ht 5' 2\" (1.575 m)   Wt 60.6 kg (133 lb 9.6 oz)   SpO2 94%   BMI 24.44 kg/m²       General:  Alert, cooperative, no distress, appears stated age. Head:  Normocephalic, without obvious abnormality, atraumatic. Eyes:  Conjunctivae/corneas clear. Nose: Nares normal. Septum midline. Mucosa normal.    Throat: Lips, mucosa, and tongue normal.    Neck: Supple, symmetrical, trachea midline, no adenopathy. Lungs:   Scattered rhonchi, wet cough   Chest wall:  No tenderness or deformity.    Heart:  Regular rate and rhythm, S1, S2 normal, no murmur, click, rub or gallop. Abdomen:   Soft, non-tender. Bowel sounds normal.    Extremities: Extremities normal, atraumatic, no cyanosis or edema. Pulses: 2+ and symmetric all extremities. Skin: Skin color, texture, turgor normal. No rashes or lesions       Neurologic: Grossly nonfocal       Lab Results   Component Value Date/Time    Sodium 108 (LL) 09/07/2022 04:57 AM    Potassium 4.2 09/07/2022 04:57 AM    Chloride 75 (L) 09/07/2022 04:57 AM    CO2 22 09/07/2022 04:57 AM    BUN 4 (L) 09/07/2022 04:57 AM    Creatinine 0.44 (L) 09/07/2022 04:57 AM    Glucose 198 (H) 09/07/2022 04:57 AM    Calcium 7.8 (L) 09/07/2022 04:57 AM    Magnesium 2.0 09/05/2022 05:18 AM       Lab Results   Component Value Date/Time    WBC 15.1 (H) 09/07/2022 01:54 AM    HGB 9.5 (L) 09/07/2022 01:54 AM    PLATELET 978 (H) 85/80/3977 01:54 AM    MCV 81.2 09/07/2022 01:54 AM       Lab Results   Component Value Date/Time    Alk.  phosphatase 126 (H) 09/07/2022 01:54 AM    Protein, total 6.0 (L) 09/07/2022 01:54 AM    Albumin 2.0 (L) 09/07/2022 01:54 AM    Globulin 4.0 09/07/2022 01:54 AM       Lab Results   Component Value Date/Time    Iron 35 09/04/2022 05:26 PM    TIBC 193 (L) 09/04/2022 05:26 PM    Iron % saturation 18 (L) 09/04/2022 05:26 PM    Ferritin 196 09/04/2022 05:26 PM       Lab Results   Component Value Date/Time    TSH 1.12 09/07/2022 04:57 AM        No results found for: PH, PHI, PCO2, PCO2I, PO2, PO2I, HCO3, HCO3I, FIO2, FIO2I    Lab Results   Component Value Date/Time    Troponin-I, Qt. <0.05 01/13/2021 03:17 AM        Lab Results   Component Value Date/Time    Culture result: NO GROWTH 3 DAYS 09/04/2022 01:39 PM    Culture result: MODERATE Stenotrophomonas (Xantho.) maltophilia (A) 08/22/2022 12:30 PM    Culture result: MODERATE NORMAL RESPIRATORY CANDELARIO 08/22/2022 12:30 PM       No results found for: TOXA1, RPR, HBCM, HBSAG, HAAB, HCAB1, HAAT, G6PD, CRYAC, HIVGT, HIVR, HIV1, HIV12, HIVPC, HIVRPI    No results found for: VANCT, CPK    Lab Results   Component Value Date/Time    Color YELLOW/STRAW 04/30/2022 09:48 AM    Appearance CLEAR 04/30/2022 09:48 AM    pH (UA) 8.0 04/30/2022 09:48 AM    Protein Negative 04/30/2022 09:48 AM    Glucose Negative 04/30/2022 09:48 AM    Ketone Negative 04/30/2022 09:48 AM    Bilirubin Negative 04/30/2022 09:48 AM    Blood Negative 04/30/2022 09:48 AM    Urobilinogen 0.2 04/30/2022 09:48 AM    Nitrites Negative 04/30/2022 09:48 AM    Leukocyte Esterase SMALL (A) 04/30/2022 09:48 AM    WBC 0-4 04/30/2022 09:48 AM    RBC 0-5 04/30/2022 09:48 AM    Bacteria Negative 04/30/2022 09:48 AM       IMPRESSION  Acute respiratory failure with hypoxia  Abnormal chest imaging  Hyponatremia  COPD/asthma  Bronchiectasis  History of pseudomonas pneumonia/colonization  GERD  Candidal esophagitis  Nauseas/vomiting  Weight loss    PLAN  Goal sats 88% or higher  3% NaCl per primary team.  Nephrology has been consutled. Recent sputum culture grew stenotrophomonas, not treated with bactrim as she was clinically improving at that time but was restarted this admission given imaging findings and clinical change. Bactrim now stopped per primary team due to significant hyponatremia and concern that Bactrim may be the cause.   Would obtain sputum culture if she expectorates sputum; RVP negative  Brovana/Pulmicort, dunoebs  No indication for steroids at this time  Started her week on jeff nebs on 9/3 and ordered to complete on Friday  GI evaluating, gastric emptying study ordered and pending  Cardiology following      LEROY Bhakta

## 2022-09-07 NOTE — PROGRESS NOTES
Nephrology Night Cover  6p-6a    Page with repeat BMP  Sodium up to 113 s/p 3 % saline infusion   Urinary output 200/hr over last 4-hours per RN  Mentation much improved   Continue serial sodium  Please notify on call provider with repeat results     2320  Notified of repeat serum sodium results  Down to 111 from 113  Reordered another infusion of 3% saline--- 50 ml/hr times 4 hours, total volume- 200 ml  Urine output since 2030 120 ml total per primary RN  Continue serial sodium checks      Colton Marin NP  [facility]

## 2022-09-07 NOTE — PROGRESS NOTES
0276: TRANSFER - IN REPORT:    Verbal report received from 5th floor nurse (name) on Munira Little  being received from 5th floor(unit) for change in patient condition(Increased oxygen demand and AMS)      Report consisted of patients Situation, Background, Assessment and   Recommendations(SBAR). Information from the following report(s) SBAR, Kardex, and Accordion was reviewed with the receiving nurse. Opportunity for questions and clarification was provided. Assessment completed upon patients arrival to unit and care assumed. MD gave verbal orders to hold PO meds until patient is more alert. 1259: MD gave verbal orders to straight cath patient to obtain stat urine orders     1800: Bladder scan shows greater than 700mL in bladder. MD notified. MD gave verbal orders to straight cath patient. 1830: Drained 800mL from bladder. 1900: Paged on call nephrologist about patient's sodium level of 113.  Awaiting call back

## 2022-09-07 NOTE — PROGRESS NOTES
SLP attempted to see patient again. Her eyes were open, but she feel asleep when SLP attempted to examine her oral cavity. Not currently safe for PO due to AMS.   SLP will follow up in am.

## 2022-09-07 NOTE — CONSULTS
[address]     Name:  Rosemary Miller  MR#:  [id number]  :  1940  ACCOUNT #:  [de-identified]  DATE OF SERVICE:  2022      NEPHROLOGY CONSULTATION    REQUESTING PHYSICIAN:  Dr. Jenniffer Lozada. CHIEF COMPLAINT:  Hyponatremia. HISTORY OF PRESENT ILLNESS:  The patient is an 71-year-old white female, who is seen for worsening hyponatremia. She was hospitalized 3 days ago with dyspnea and has recently been treated for pneumonia. She has underlying COPD and asthma. She has been on various antibiotics and recently on Bactrim. She has a history of hyponatremia which has been treated in the past.  Her sodium on admission to the hospital was 129 and it appears she has a baseline sodium in the high 120s to low 130s. Yesterday, the sodium was 122, today the sodium was measured at 111 and rechecked at 108 this morning. She has altered mental status and confusion and is unable to provide any meaningful history. She has not been on IV fluids. She has not been receiving thiazide diuretics or SSRIs. Reportedly, her intake has been poor due to nausea and vomiting, but her daughter tells me that at home she does drink fluids throughout the day. PAST MEDICAL HISTORY:  1. Asthma/COPD. 2.  Hypothyroidism. 3.  Congestive heart failure. 4.  Paroxysmal atrial fibrillation. FAMILY HISTORY:  No relevant family history of hyponatremia. SOCIAL HISTORY:  She is a former smoker. REVIEW OF SYSTEMS:  Unable to obtain due to altered mental status. PHYSICAL EXAMINATION:  GENERAL:  Elderly white female, who is obtunded. VITAL SIGNS:  Temperature 97.6, pulse 76, blood pressure 126/91  EYES:  Anicteric. Pupils are reactive. ENMT:  Mucous membranes are dry. There is no epistaxis. NECK:  Nontender without JVD. RESPIRATORY:  Lungs are clear with limited effort. HEART:  Regular. There is no lower extremity edema. GI:  Abdomen is soft, nontender. Bowel sounds are active. There is no guarding or rebound. SKIN:  Warm with normal turgor. There is no rash. NEUROLOGICAL:  The patient withdraws to pain. She mumbles incoherently, seems to move all four extremities. She does not provide any verbal response. LABORATORY DATA:  Sodium 108, potassium 4.2, chloride 75, bicarb 22, BUN 4, creatinine 0.44. White count 15.1, hemoglobin 9.5, platelets 316. Urine sodium 182, urine osmolality 653. RADIOGRAPHIC STUDIES:  Chest x-ray from admission showed interstitial edema versus atypical or viral pneumonia. CT scan of the head from yesterday showed no acute intracranial process with a mild degree of cerebral atrophy. ASSESSMENT:  Hyponatremia, worsening. 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 There are probably multiple contributing factors including underlying lung disease, heart disease, nausea, and perhaps medications. One would suspect she would need some free water intake to cause such an acute drop in the past 24 hours, but the history is questionable. In any event, she needs acute therapy with hypertonic saline due to presence of moderate-to-severe symptoms. PLAN:  1. The patient is receiving intravenous hypertonic saline 100 mL over 2 hours. Repeat sodium will then be obtained and she will likely need additional administration of hypertonic saline with a target sodium of around 115 or so. Given the documented acute drop in sodium, it should be safe to correct up to 120 even with very low risk of osmotic injury. 2.  TSH is normal and will check cortisol. 3.  We will monitor frequent sodium and assist in her management during this hospitalization. 4.  Ultimately might consider urea if sodium continues to be an issue moving forward.       Florinda Cabrera MD      [id number]  D:  09/07/2022 10:10  T:  09/07/2022 14:06  JOB #:  [id number]

## 2022-09-07 NOTE — CONSULTS
[facility] and [address] at [facility] at [facility] [address], [address] [place] [id number] [address]  [phone] Office  [phone] Facsimile           Referring: Dr. Chao Cazares    Chief Complaint   Patient presents with    Shortness of Breath    Palpitations     We are asked to see this 25-year-old lady in neurologic consultation regarding an acute change in her neurologic status manifest as left-sided numbness and tingling. The patient was admitted to the hospital on 4 September with complaints of shortness of breath nausea and palpitations. She had just completed a hospitalization from August 19 through August 23. She was found to be hyponatremic at 129 and her sodium continued to dip and she is down to 208. Her daughter is at the bedside and provides history. Apparently yesterday patient reported feeling some numbness and tingling on her left side. She underwent MRI of the brain which demonstrated findings consistent with chronic sinusitis and just age-related changes. Her daughter notes that she was conversant and then yesterday became not so. Again sodium had a marked dip. She has been seen by nephrology and she was given hypertonic saline. The patient is unable to provide any history including review of systems.       Past Medical History:   Diagnosis Date    Asthma     CHF (congestive heart failure), NYHA class I, chronic, systolic (HCC)     Chronic anticoagulation     GERD (gastroesophageal reflux disease)     Hiatal hernia     HX: breast cancer 2015    Right     Hypothyroid     Paroxysmal A-fib (HCC)     Venous insufficiency     bilat LE    Weight loss        Past Surgical History:   Procedure Laterality Date    COLONOSCOPY N/A 6/14/2021    COLONOSCOPY AND EGD performed by Baylee Edgar MD at [address] LUMPECTOMY Right 08/24/2015    HX CATARACT REMOVAL  2013    Bilateral     HX CHOLECYSTECTOMY      HX DILATION AND CURETTAGE      x2    HX ORTHOPAEDIC  2017    RIGHT foot  hammertoe       Current Facility-Administered Medications   Medication Dose Route Frequency Provider Last Rate Last Admin    guaiFENesin (ROBITUSSIN) 100 mg/5 mL oral liquid 100 mg  100 mg Oral Q4H PRN James Martinez MD   100 mg at 09/06/22 0525    prochlorperazine (COMPAZINE) injection 5 mg  5 mg IntraVENous Q6H PRN James Martinez MD   5 mg at 09/06/22 1710    nystatin (MYCOSTATIN) 100,000 unit/mL oral suspension 500,000 Units  500,000 Units Oral QID Opal Pollack MD   500,000 Units at 09/06/22 2114    ketotifen (ZADITOR) 0.025 % (0.035 %) ophthalmic solution 1 Drop  1 Drop Both Eyes BID PRN Opal Pollack MD        albuterol-ipratropium (DUO-NEB) 2.5 MG-0.5 MG/3 ML  3 mL Nebulization Q6H RT Kelly Hannah MD   3 mL at 09/07/22 0743    tobramycin (NEBCIN) injection 300 mg  300 mg Nebulization BID RT Kelly Hannah MD   300 mg at 09/06/22 2022    arformoteroL (BROVANA) neb solution 15 mcg  15 mcg Nebulization BID RT Opal Pollack MD   15 mcg at 09/06/22 2016    budesonide (PULMICORT) 500 mcg/2 ml nebulizer suspension  500 mcg Nebulization BID RT Opal Pollack MD   500 mcg at 09/06/22 2016    albuterol-ipratropium (DUO-NEB) 2.5 MG-0.5 MG/3 ML  3 mL Nebulization Q6H PRN Opal Pollack MD   3 mL at 09/07/22 0438    montelukast (SINGULAIR) tablet 10 mg  10 mg Oral QHS Opal Pollack MD   10 mg at 09/06/22 2114    sotaloL (BETAPACE) tablet 80 mg  80 mg Oral Q12H Opal Pollack MD   80 mg at 09/06/22 2058    sodium chloride (NS) flush 5-40 mL  5-40 mL IntraVENous Q8H Opal Pollack MD   5 mL at 09/07/22 0609    sodium chloride (NS) flush 5-40 mL  5-40 mL IntraVENous PRN Opal Pollack MD   10 mL at 09/05/22 2321    acetaminophen (TYLENOL) tablet 650 mg  650 mg Oral Q6H PRN Opal Pollack MD   650 mg at 09/06/22 0400    polyethylene glycol (MIRALAX) packet 17 g  17 g Oral DAILY PRN Reanna Gonzales MD   17 g at 22 1205    ondansetron (ZOFRAN) injection 4 mg  4 mg IntraVENous Q6H PRN Reanna Gonzales MD   4 mg at 22 2255    rivaroxaban (XARELTO) tablet 20 mg  20 mg Oral DAILY WITH Thang Kennedy MD   20 mg at 22 1709        Allergies   Allergen Reactions    Ace Inhibitors Cough    Iodine Rash    Penicillins Rash       Social History     Tobacco Use    Smoking status: Former     Types: Cigarettes     Start date: 2000     Quit date: 2000     Years since quittin.7    Smokeless tobacco: Never   Vaping Use    Vaping Use: Never used   Substance Use Topics    Alcohol use: Not Currently    Drug use: Never       No family history on file. Review of Systems  Unable due to her condition      Examination  Visit Vitals  BP (!) 126/91   Pulse 76   Temp 97.6 °F (36.4 °C)   Resp 25   Ht 5' 2\" (1.575 m)   Wt 60.6 kg (133 lb 9.6 oz)   SpO2 94%   BMI 24.44 kg/m²     She is lying in bed comfortable appearing but she appears ill. She has no spontaneous output. To tactile stimulus and verbal stimulus she will attend to me for very brief time and then closes her eyes again. She would not protrude her tongue or show me fingers on command. .  She did say good morning but that is all I can get her to say. She would track. Her face is symmetrical.  She would not protrude her tongue. She would give resistance when I moved her upper extremities. In the lower extremities she would withdraw to tactile stimulus. The withdrawal and movement was symmetrical.  She had globally depressed but symmetrical reflexes and her toes were mute. Remainder untestable      Impression/Plan  49-year-old lady with profound hyponatremia and complaints of tingling and numbness with normal MRI would certainly exclude stroke which is a good thing. Her symptoms I think are likely secondary to her metabolic derangement.   Nephrology is working to correct her sodium. An EEG given her encephalopathy again likely due to profound hyponatremia but will exclude nonconvulsive status  We will follow-up    Jamie Crowe MD          This note was created using voice recognition software. Despite editing, there may be syntax errors.

## 2022-09-07 NOTE — CONSULTS
Consult dictated    Hyponatremia, acute on chronic. Underlying SIADH. Complicated by AMS. On 3% NaCl. Will recheck Na p infusion and give additional 3% for target Na of 115 or so. Fairly acute drop so risk of osmotic injury should be low.

## 2022-09-07 NOTE — PROGRESS NOTES
9/7/2022  4:27 PM  CM completed assessment w/ pt's son Mariluz Wiley in person. Charted demographics verified. Reason for Readmission:   Emergent for Dyspnea           RUR Score/Risk Level:     21% High Risk of Readmission/Red    PCP: First and Last name:  Anette Huitron MD   Name of Practice:    Are you a current patient: Yes/No: yes   Approximate date of last visit: last week    Can you participate in a virtual visit with your PCP: yes    Is a Care Conference indicated: NO      Did you attend your follow up appointment (s): If not, why not:pt saw PCP and Ortho, upcoming appt for GI         Resources/supports as identified by patient/family:   pt has supportive children are arranging for HHA for spouse        Top Challenges facing patient (as identified by patient/family and CM): Finances/Medication cost?   [employer], pt uses [address] and is normally covered for her prescriptions    Transportation     pt drive, family can assist   Support system or lack thereof? Pt has supportive children who can assist    Living arrangements? Pt lives w/ spouse in 1 story home, there are 4 entry steps, her spouse has dementia and she is the primary caregiver        Self-care/ADLs/Cognition? At baseline pt can perform ADL's independently, PT,OT   recommending HH at [place]        Current Advanced Directive/Advance Care Plan:  pt has AMD, son Mariluz Wiley is [facility]            Plan for utilizing home health:   PT, Ot recommending [facility] at [place]    DME: pt uses rollator and nebulizer        COVID Vax Hx; [facility] pt had 2 jabs in 2021, 2 boosters 2021 and 2022  Transition of Care Plan:    Based on readmission, the patient's previous Plan of Care   has been evaluated and/or modified.  The current Transition of Care Plan is:         RUR 21 % High Risk of Readmission/Red  LOS 2 Days   Hospital admission for medical management  Pulmonary following, pt on 4L O2 NC, wean as tolerated, CM to follow for home O2 needs  Cardiology following  Nephrology following   GI following   PT, OT, SLP following , current recs for [facility] at [place]   CM to follow through fort treatment/response  DC when stable to home w/ family assistance and [facility]  Outpatient follow up PCP, specialists   Transport TBD Family vs [facility]  CM will follow and assistw/ [place] needs    Care Management Interventions  PCP Verified by CM: Yes (Page Castellano MD, last visit 1 week)  Palliative Care Criteria Met (RRAT>21 & CHF Dx)?: No  Mode of Transport at Discharge: Self (Family vs  [facility])  Transition of Care Consult (CM Consult): Discharge Planning  Physical Therapy Consult: Yes  Occupational Therapy Consult: Yes  Speech Therapy Consult: Yes  Support Systems: Spouse/Significant Other, Child(cornell) (pt lives w/ spouse who has dementia, at baseline she is ambulatory w/ rollator, iADLs, drives)  Confirm Follow Up Transport: Family  Discharge Location  Patient Expects to be Discharged to[de-identified] Unable to determine at this time  Readmission Assessment  Number of days since last admission?: 8-30 days (pt DC 8/23 to home)  Who is being interviewed?: Caregiver (Son Kristina Piña)  What was the patient's/caregiver's perception as to why they think they needed to return back to the hospital?: Other (Comment) (pt became symptomatic)  Did you visit your Primary Care Physician after you left the hospital, before you returned this time?: Yes  Did you see a specialist, such as Cardiac, Pulmonary, Orthopedic Physician, etc. after you left the hospital?: Yes (saw Ortho last week)  Who advised the patient to return to the hospital?: Self-referral  Does the patient report anything that got in the way of taking their medications?: No  In our efforts to provide the best possible care to you and others like you, can you think of anything that we could have done to help you after you left the hospital the first time, so that you might not have needed to return so soon?: Other (Comment) (None)  TRE Huynh   Case Manager

## 2022-09-07 NOTE — PROGRESS NOTES
[facility]  [address] [facility], [facility] [address]  [phone]    Medical Progress Note      NAME: Miles Crow   :  1940  MRM:  [id number]    Date/Time of service 2022  7:00 AM          Assessment and Plan:     Hyponatremia / Hypokalemia / Hypomagnesemia - POA, I had thought likely due to poor PO intake. But his AM Na is suddenly, severely, inexplicably worse. Had dropped to 122 yesterday, 108 this AM. Stat Nephrology consult. Transfer to stepdown. 3% NA ordered. Monitor K and IV Mg.  I wonder if Bactrim was the immediate cause. Abnormal CT chest / Leukocytosis - CT reports \"Scattered reticulonodular changes throughout the lungs with areas of slightly more consolidative. Bronchial wall thickening with mucous plugging. Findings are likely infectious or inflammatory\". Pulmonary consulted. Broad differential including chronic simmering infections, vs aspiration. Bactrim ordered on  per pulmonary, I will stop it now due to low NA. I note that at admission her WBC count was mild, without bands, and no other SIRS criteria, and WBC normalized in 1st 24hr without any ABx. Procalcitonin is low. She had just completed Abx for PNA. Dyspnea / Nausea / Chest pain - Unclear etiology of these symptoms. She is not hypoxic at rest, her sats are equal to those when she discharged recently. She has some hypoxia with activity. DDx includes GERD/aspiration (which I think is the primary problem), vs chronic PNA, vs much less likely CHF, PE, asthma    CHF (congestive heart failure), NYHA class I, chronic, systolic / Venous insufficiency - Unclear if any exacerbation, I do not think so. Stable ECHO. Consulted cariology. ER gave lasix, but with her normal oxygen sats, low NA and low K, wt loss, 2 days no PO intake, I did not continue that. Continue BB      Anemia - Stable since last discharge, and normal serologies.      Weight loss / GERD (gastroesophageal reflux disease) / Hiatal hernia / Candidiasis - She has very concerning unintentional wt loss, anorexia, nausea that persists. She weighed 170lb last year. 128lb now. Consult GI, who is following her outpatient. PPI by IV. At home she takes bentyl and Zofran as needed. Emptying study ordered for today. She is on PO nystatin. Paroxysmal A-fib / Chronic anticoagulation - Continue sotalol and xarelto. Sotalol prevented the use of fluconazole to treat esophageal candidiasis. Asthma - Appears stable. For her home Trelegy substitute Brovana, Pulmicort and prn Duonebs     HX: breast cancer - Outpatient follow up. Has been on Boniva, increasing risk of PUD and esophagitis      Hypothyroid - TSH was normal a few months ago. Stop the homeopathic dose of synthroid. Tingling / Weakness - Noted by patient yesterday. Code stroke called. MRI normal.  Now I think that HypoNa is contributing. Subjective:     Chief Complaint:  mor somnolent    ROS:  (bold if positive, if negative)    Cough      Sputum   Abd Pain  SOB/DOETolerating some PT  Not Tolerating Diet        Objective:     Last 24hrs VS reviewed since prior progress note.  Most recent are:    Visit Vitals  BP (!) 155/72 (BP 1 Location: Left upper arm, BP Patient Position: At rest)   Pulse 71   Temp 98.4 °F (36.9 °C)   Resp 19   Ht 5' 2\" (1.575 m)   Wt 60.6 kg (133 lb 9.6 oz)   SpO2 93%   BMI 24.44 kg/m²     SpO2 Readings from Last 6 Encounters:   09/07/22 93%   08/23/22 95%   08/10/22 94%   05/01/22 96%   06/14/21 99%   04/28/21 97%    O2 Flow Rate (L/min): 3 l/min     Intake/Output Summary (Last 24 hours) at 9/7/2022 0700  Last data filed at 9/7/2022 0400  Gross per 24 hour   Intake 670 ml   Output 200 ml   Net 470 ml          Physical Exam:    Gen:  Frail, in no acute distress  HEENT:  Pink conjunctivae, PERRL, hearing intact to voice, moist mucous membranes  Neck:  Supple, without masses, thyroid non-tender  Resp:  No accessory muscle use, clear breath sounds without wheezes rales or rhonchi  Card:  No murmurs, normal S1, S2 without thrills, bruits or peripheral edema  Abd:  Soft, non-tender, non-distended, normoactive bowel sounds are present, no mass  Lymph:  No cervical or inguinal adenopathy  Musc:  No cyanosis or clubbing  Skin:  No rashes or ulcers, skin turgor is good  Neuro:  Cranial nerves are grossly intact, general motor weakness, follows commands vaguely  Psych:  Poor insight, oriented to person, groggy    Telemetry reviewed:   normal sinus rhythm  __________________________________________________________________  Medications Reviewed: (see below)  Medications:     Current Facility-Administered Medications   Medication Dose Route Frequency    3% sodium chloride (*HIGH ALERT*CONCENTRATED IV*) infusion  1 mL/hr IntraVENous CONTINUOUS    guaiFENesin (ROBITUSSIN) 100 mg/5 mL oral liquid 100 mg  100 mg Oral Q4H PRN    prochlorperazine (COMPAZINE) injection 5 mg  5 mg IntraVENous Q6H PRN    nystatin (MYCOSTATIN) 100,000 unit/mL oral suspension 500,000 Units  500,000 Units Oral QID    ketotifen (ZADITOR) 0.025 % (0.035 %) ophthalmic solution 1 Drop  1 Drop Both Eyes BID PRN    albuterol-ipratropium (DUO-NEB) 2.5 MG-0.5 MG/3 ML  3 mL Nebulization Q6H RT    trimethoprim-sulfamethoxazole (BACTRIM) 320 mg in dextrose 5% 500 mL  320 mg IntraVENous Q8H    tobramycin (NEBCIN) injection 300 mg  300 mg Nebulization BID RT    arformoteroL (BROVANA) neb solution 15 mcg  15 mcg Nebulization BID RT    budesonide (PULMICORT) 500 mcg/2 ml nebulizer suspension  500 mcg Nebulization BID RT    albuterol-ipratropium (DUO-NEB) 2.5 MG-0.5 MG/3 ML  3 mL Nebulization Q6H PRN    montelukast (SINGULAIR) tablet 10 mg  10 mg Oral QHS    sotaloL (BETAPACE) tablet 80 mg  80 mg Oral Q12H    sodium chloride (NS) flush 5-40 mL  5-40 mL IntraVENous Q8H    sodium chloride (NS) flush 5-40 mL  5-40 mL IntraVENous PRN    acetaminophen (TYLENOL) tablet 650 mg  650 mg Oral Q6H PRN    polyethylene glycol (MIRALAX) packet 17 g  17 g Oral DAILY PRN    ondansetron (ZOFRAN) injection 4 mg  4 mg IntraVENous Q6H PRN    pantoprazole (PROTONIX) 40 mg in 0.9% sodium chloride 10 mL injection  40 mg IntraVENous Q12H    rivaroxaban (XARELTO) tablet 20 mg  20 mg Oral DAILY WITH DINNER        Lab Data Reviewed: (see below)  Lab Review:     Recent Labs     09/07/22 0154 09/05/22 0518 09/04/22  1339   WBC 15.1* 10.1 13.0*   HGB 9.5* 9.5* 9.7*   HCT 27.2* 28.8* 29.0*   * 408* 445*       Recent Labs     09/07/22  0457 09/07/22 0154 09/06/22  0207 09/05/22 0518 09/04/22  1339   * 111* 122* 128* 129*   K 4.2 4.3 4.9 3.9 3.3*   CL 75* 79* 88* 89* 93*   CO2 22 22 27 31 30   * 106* 89 96 134*   BUN 4* 4* 5* 6 7   CREA 0.44* 0.40* 0.44* 0.41* 0.49*   CA 7.8* 8.0* 8.3* 8.5 8.5   MG  --   --   --  2.0 1.4*   ALB  --  2.0*  --  2.2* 2.4*   TBILI  --  0.5  --  0.6 0.5   ALT  --  11*  --  9* 11*       Lab Results   Component Value Date/Time    Glucose (POC) 146 (H) 09/06/2022 01:21 PM     No results for input(s): PH, PCO2, PO2, HCO3, FIO2 in the last 72 hours. No results for input(s): INR, INREXT, INREXT in the last 72 hours.   All Micro Results       Procedure Component Value Units Date/Time    CULTURE, BLOOD, PAIRED [[id number]] Collected: 09/04/22 1339    Order Status: Completed Specimen: Blood Updated: 09/06/22 0620     Special Requests: NO SPECIAL REQUESTS        Culture result: NO GROWTH 2 DAYS       RESPIRATORY VIRUS PANEL W/COVID-19, PCR [[id number]] Collected: 09/05/22 1237    Order Status: Completed Specimen: Nasopharyngeal Updated: 09/05/22 2021     Adenovirus Not detected        Coronavirus 229E Not detected        Coronavirus HKU1 Not detected        Coronavirus CVNL63 Not detected        Coronavirus OC43 Not detected        SARS-CoV-2, PCR Not detected        Metapneumovirus Not detected        Rhinovirus and Enterovirus Not detected        Influenza A Not detected        Influenza A, subtype H1 Not detected        Influenza A, subtype H3 Not detected        INFLUENZA A H1N1 PCR Not detected        Influenza B Not detected        Parainfluenza 1 Not detected        Parainfluenza 2 Not detected        Parainfluenza 3 Not detected        Parainfluenza virus 4 Not detected        RSV by PCR Not detected        B. parapertussis, PCR Not detected        Bordetella pertussis - PCR Not detected        Chlamydophila pneumoniae DNA, QL, PCR Not detected        Mycoplasma pneumoniae DNA, QL, PCR Not detected       CULTURE, RESPIRATORY/SPUTUM/BRONCH Jacquesujey Villalba [[id number]]     Order Status: Sent Specimen: Sputum     COVID-19 RAPID TEST [[id number]] Collected: 09/04/22 1339    Order Status: Completed Specimen: Nasopharyngeal Updated: 09/04/22 1414     Specimen source Nasopharyngeal        COVID-19 rapid test Not detected        Comment: Rapid Abbott ID Now       Rapid NAAT:  The specimen is NEGATIVE for SARS-CoV-2, the novel coronavirus associated with COVID-19. Negative results should be treated as presumptive and, if inconsistent with clinical signs and symptoms or necessary for patient management, should be tested with an alternative molecular assay. Negative results do not preclude SARS-CoV-2 infection and should not be used as the sole basis for patient management decisions. This test has been authorized by the FDA under an Emergency Use Authorization (EUA) for use by authorized laboratories. Fact sheet for Healthcare Providers: ConventionUpdate.co.nz  Fact sheet for Patients: ConventionUpdate.co.nz       Methodology: Isothermal Nucleic Acid Amplification                 Other pertinent lab: none    Total time spent with patient: 30 Minutes I personally reviewed chart, notes, data and current medications in the medical record. I have personally examined and treated the patient at bedside during this period.  To assist coordination of care and communication with nursing and staff, this note may be preliminary early in the day, but finalized by end of the day.                  Care Plan discussed with: Patient, Family, Care Manager, Nursing Staff, Consultant/Specialist, and >50% of time spent in counseling and coordination of care    Discussed:  Care Plan and D/C Planning    Prophylaxis:  Lovenox and H2B/PPI    Disposition:  Home w/Family           ___________________________________________________    Attending Physician: Jeovany Espinosa MD

## 2022-09-08 LAB
ALBUMIN SERPL-MCNC: 2 G/DL (ref 3.5–5)
ALBUMIN/GLOB SERPL: 0.5 {RATIO} (ref 1.1–2.2)
ALP SERPL-CCNC: 126 U/L (ref 45–117)
ALT SERPL-CCNC: 11 U/L (ref 12–78)
ANION GAP SERPL CALC-SCNC: 10 MMOL/L (ref 5–15)
ANION GAP SERPL CALC-SCNC: 10 MMOL/L (ref 5–15)
ANION GAP SERPL CALC-SCNC: 11 MMOL/L (ref 5–15)
ANION GAP SERPL CALC-SCNC: 8 MMOL/L (ref 5–15)
ANION GAP SERPL CALC-SCNC: 9 MMOL/L (ref 5–15)
ANION GAP SERPL CALC-SCNC: 9 MMOL/L (ref 5–15)
AST SERPL-CCNC: 16 U/L (ref 15–37)
BACTERIA SPEC CULT: NORMAL
BILIRUB SERPL-MCNC: 0.5 MG/DL (ref 0.2–1)
BUN SERPL-MCNC: 4 MG/DL (ref 6–20)
BUN SERPL-MCNC: 4 MG/DL (ref 6–20)
BUN SERPL-MCNC: 5 MG/DL (ref 6–20)
BUN/CREAT SERPL: 10 (ref 12–20)
BUN/CREAT SERPL: 12 (ref 12–20)
BUN/CREAT SERPL: 14 (ref 12–20)
BUN/CREAT SERPL: 17 (ref 12–20)
CALCIUM SERPL-MCNC: 8 MG/DL (ref 8.5–10.1)
CALCIUM SERPL-MCNC: 8.2 MG/DL (ref 8.5–10.1)
CALCIUM SERPL-MCNC: 8.3 MG/DL (ref 8.5–10.1)
CALCIUM SERPL-MCNC: 8.3 MG/DL (ref 8.5–10.1)
CALCIUM SERPL-MCNC: 8.5 MG/DL (ref 8.5–10.1)
CALCIUM SERPL-MCNC: 8.6 MG/DL (ref 8.5–10.1)
CHLORIDE SERPL-SCNC: 79 MMOL/L (ref 97–108)
CHLORIDE SERPL-SCNC: 82 MMOL/L (ref 97–108)
CHLORIDE SERPL-SCNC: 83 MMOL/L (ref 97–108)
CHLORIDE SERPL-SCNC: 84 MMOL/L (ref 97–108)
CO2 SERPL-SCNC: 20 MMOL/L (ref 21–32)
CO2 SERPL-SCNC: 22 MMOL/L (ref 21–32)
CO2 SERPL-SCNC: 24 MMOL/L (ref 21–32)
CO2 SERPL-SCNC: 25 MMOL/L (ref 21–32)
CREAT SERPL-MCNC: 0.3 MG/DL (ref 0.55–1.02)
CREAT SERPL-MCNC: 0.33 MG/DL (ref 0.55–1.02)
CREAT SERPL-MCNC: 0.37 MG/DL (ref 0.55–1.02)
CREAT SERPL-MCNC: 0.4 MG/DL (ref 0.55–1.02)
CREAT SERPL-MCNC: 0.41 MG/DL (ref 0.55–1.02)
CREAT SERPL-MCNC: 0.41 MG/DL (ref 0.55–1.02)
GLOBULIN SER CALC-MCNC: 4 G/DL (ref 2–4)
GLUCOSE SERPL-MCNC: 106 MG/DL (ref 65–100)
GLUCOSE SERPL-MCNC: 111 MG/DL (ref 65–100)
GLUCOSE SERPL-MCNC: 121 MG/DL (ref 65–100)
GLUCOSE SERPL-MCNC: 130 MG/DL (ref 65–100)
GLUCOSE SERPL-MCNC: 94 MG/DL (ref 65–100)
GLUCOSE SERPL-MCNC: 97 MG/DL (ref 65–100)
OSMOLALITY UR: 573 MOSM/KG H2O
POTASSIUM SERPL-SCNC: 4 MMOL/L (ref 3.5–5.1)
POTASSIUM SERPL-SCNC: 4.1 MMOL/L (ref 3.5–5.1)
POTASSIUM SERPL-SCNC: 4.1 MMOL/L (ref 3.5–5.1)
POTASSIUM SERPL-SCNC: 4.3 MMOL/L (ref 3.5–5.1)
POTASSIUM SERPL-SCNC: 4.3 MMOL/L (ref 3.5–5.1)
POTASSIUM SERPL-SCNC: 4.4 MMOL/L (ref 3.5–5.1)
PROT SERPL-MCNC: 6 G/DL (ref 6.4–8.2)
SERVICE CMNT-IMP: NORMAL
SODIUM SERPL-SCNC: 111 MMOL/L (ref 136–145)
SODIUM SERPL-SCNC: 113 MMOL/L (ref 136–145)
SODIUM SERPL-SCNC: 114 MMOL/L (ref 136–145)
SODIUM SERPL-SCNC: 115 MMOL/L (ref 136–145)
SODIUM SERPL-SCNC: 115 MMOL/L (ref 136–145)
SODIUM SERPL-SCNC: 116 MMOL/L (ref 136–145)
SODIUM UR-SCNC: 144 MMOL/L

## 2022-09-08 PROCEDURE — 36415 COLL VENOUS BLD VENIPUNCTURE: CPT

## 2022-09-08 PROCEDURE — 94640 AIRWAY INHALATION TREATMENT: CPT

## 2022-09-08 PROCEDURE — 92526 ORAL FUNCTION THERAPY: CPT

## 2022-09-08 PROCEDURE — 74011250637 HC RX REV CODE- 250/637: Performed by: INTERNAL MEDICINE

## 2022-09-08 PROCEDURE — 74011250636 HC RX REV CODE- 250/636: Performed by: INTERNAL MEDICINE

## 2022-09-08 PROCEDURE — 84300 ASSAY OF URINE SODIUM: CPT

## 2022-09-08 PROCEDURE — 77010033678 HC OXYGEN DAILY

## 2022-09-08 PROCEDURE — 74011000250 HC RX REV CODE- 250: Performed by: INTERNAL MEDICINE

## 2022-09-08 PROCEDURE — 65270000046 HC RM TELEMETRY

## 2022-09-08 PROCEDURE — 80048 BASIC METABOLIC PNL TOTAL CA: CPT

## 2022-09-08 PROCEDURE — 97530 THERAPEUTIC ACTIVITIES: CPT

## 2022-09-08 PROCEDURE — 74011250636 HC RX REV CODE- 250/636

## 2022-09-08 PROCEDURE — 97535 SELF CARE MNGMENT TRAINING: CPT | Performed by: OCCUPATIONAL THERAPIST

## 2022-09-08 PROCEDURE — 99232 SBSQ HOSP IP/OBS MODERATE 35: CPT | Performed by: PSYCHIATRY & NEUROLOGY

## 2022-09-08 PROCEDURE — 97110 THERAPEUTIC EXERCISES: CPT | Performed by: OCCUPATIONAL THERAPIST

## 2022-09-08 PROCEDURE — 97110 THERAPEUTIC EXERCISES: CPT

## 2022-09-08 PROCEDURE — 83935 ASSAY OF URINE OSMOLALITY: CPT

## 2022-09-08 RX ADMIN — MONTELUKAST 10 MG: 10 TABLET, FILM COATED ORAL at 21:17

## 2022-09-08 RX ADMIN — BUDESONIDE 500 MCG: 0.5 SUSPENSION RESPIRATORY (INHALATION) at 08:12

## 2022-09-08 RX ADMIN — SODIUM CHLORIDE, PRESERVATIVE FREE 10 ML: 5 INJECTION INTRAVENOUS at 15:29

## 2022-09-08 RX ADMIN — IPRATROPIUM BROMIDE AND ALBUTEROL SULFATE 3 ML: .5; 3 SOLUTION RESPIRATORY (INHALATION) at 08:08

## 2022-09-08 RX ADMIN — SOTALOL HYDROCHLORIDE 80 MG: 80 TABLET ORAL at 21:17

## 2022-09-08 RX ADMIN — Medication 15 MG: at 15:30

## 2022-09-08 RX ADMIN — NYSTATIN 500000 UNITS: 100000 SUSPENSION ORAL at 12:56

## 2022-09-08 RX ADMIN — ARFORMOTEROL TARTRATE 15 MCG: 15 SOLUTION RESPIRATORY (INHALATION) at 08:12

## 2022-09-08 RX ADMIN — NYSTATIN 500000 UNITS: 100000 SUSPENSION ORAL at 17:41

## 2022-09-08 RX ADMIN — SOTALOL HYDROCHLORIDE 80 MG: 80 TABLET ORAL at 08:14

## 2022-09-08 RX ADMIN — NYSTATIN 500000 UNITS: 100000 SUSPENSION ORAL at 09:00

## 2022-09-08 RX ADMIN — SODIUM CHLORIDE, PRESERVATIVE FREE 10 ML: 5 INJECTION INTRAVENOUS at 21:18

## 2022-09-08 RX ADMIN — ONDANSETRON HYDROCHLORIDE 4 MG: 2 SOLUTION INTRAMUSCULAR; INTRAVENOUS at 12:56

## 2022-09-08 RX ADMIN — TOBRAMYCIN SULFATE 300 MG: 40 INJECTION, SOLUTION INTRAMUSCULAR; INTRAVENOUS at 21:39

## 2022-09-08 RX ADMIN — SODIUM CHLORIDE, PRESERVATIVE FREE 10 ML: 5 INJECTION INTRAVENOUS at 06:02

## 2022-09-08 RX ADMIN — IPRATROPIUM BROMIDE AND ALBUTEROL SULFATE 3 ML: .5; 3 SOLUTION RESPIRATORY (INHALATION) at 20:59

## 2022-09-08 RX ADMIN — TOBRAMYCIN SULFATE 300 MG: 40 INJECTION, SOLUTION INTRAMUSCULAR; INTRAVENOUS at 08:17

## 2022-09-08 RX ADMIN — NYSTATIN 500000 UNITS: 100000 SUSPENSION ORAL at 21:18

## 2022-09-08 RX ADMIN — IPRATROPIUM BROMIDE AND ALBUTEROL SULFATE 3 ML: .5; 3 SOLUTION RESPIRATORY (INHALATION) at 02:04

## 2022-09-08 RX ADMIN — ACETAMINOPHEN 650 MG: 325 TABLET, FILM COATED ORAL at 17:44

## 2022-09-08 RX ADMIN — RIVAROXABAN 20 MG: 10 TABLET, FILM COATED ORAL at 17:41

## 2022-09-08 RX ADMIN — IPRATROPIUM BROMIDE AND ALBUTEROL SULFATE 3 ML: .5; 3 SOLUTION RESPIRATORY (INHALATION) at 13:44

## 2022-09-08 RX ADMIN — ARFORMOTEROL TARTRATE 15 MCG: 15 SOLUTION RESPIRATORY (INHALATION) at 21:04

## 2022-09-08 RX ADMIN — SODIUM CHLORIDE, PRESERVATIVE FREE 10 ML: 5 INJECTION INTRAVENOUS at 06:11

## 2022-09-08 RX ADMIN — 3% SODIUM CHLORIDE 50 ML/HR: 3 INJECTION, SOLUTION INTRAVENOUS at 01:04

## 2022-09-08 RX ADMIN — BUDESONIDE 500 MCG: 0.5 SUSPENSION RESPIRATORY (INHALATION) at 21:04

## 2022-09-08 RX ADMIN — ONDANSETRON HYDROCHLORIDE 4 MG: 2 SOLUTION INTRAMUSCULAR; INTRAVENOUS at 06:10

## 2022-09-08 NOTE — PROGRESS NOTES
Patient repeat urine NA and urine osmolality is very high. I will give Tolvaptan 15 mg pox1 and liberalize FR. Check BMP q4 hr.  Check urine osm and urine sodium in am

## 2022-09-08 NOTE — PROGRESS NOTES
SPEECH PATHOLOGY BEDSIDE SWALLOW EVALUATION/DISCHARGE  Patient: Dave Phillips (7 y.o. female)  Date: 9/8/2022  Primary Diagnosis: Dyspnea [R06.00]       Precautions: ASPIRATION  Fall (droplet +)    ASSESSMENT :  Based on the objective data described below, the patient presents with normal oral-pharyngeal swallow. Suspect esophageal issues are overwhelming her PO intake. Admitted 9-4-22 with SOB< Chest pain, nausea, cough when lays flat so she sleeps in recliner. Chest Ct 9-5-22:    IMPRESSION  1. Scattered reticulonodular changes throughout the lungs with areas of slightly  more consolidative. Bronchial wall thickening with mucous plugging. Findings are  likely infectious or inflammatory   . 9-6-: weakness, molly c/o on L,   9-7-:decreased LOC; now NPO. PMH:   -recent PNA  Skilled acute therapy provided by a speech-language pathologist is not indicated at this time. PLAN :  Recommendations:  Ok to start regular diet, to allow patient choice. Highly advised patient take her nystatin. (She refused this am)Son agreed to support. Asked RN to try again. Discharge Recommendations: None     SUBJECTIVE:   Patient was not interested in PO much this am, but did take a cup of ap sauce and drank some water. 1 bite of kenyon cracker. .    OBJECTIVE:     Past Medical History:   Diagnosis Date    Asthma     CHF (congestive heart failure), NYHA class I, chronic, systolic (HCC)     Chronic anticoagulation     GERD (gastroesophageal reflux disease)     Hiatal hernia     HX: breast cancer 2015    Right     Hypothyroid     Paroxysmal A-fib (HCC)     Venous insufficiency     bilat LE    Weight loss      Past Surgical History:   Procedure Laterality Date    COLONOSCOPY N/A 6/14/2021    COLONOSCOPY AND EGD performed by Kaylee Cordova MD at [address] BREAST LUMPECTOMY Right 08/24/2015    HX CATARACT REMOVAL  2013    Bilateral     HX CHOLECYSTECTOMY      HX DILATION AND CURETTAGE      x2    HX ORTHOPAEDIC  2017 RIGHT foot  hammertoe     Prior Level of Function/Home Situation:   Home Situation  Home Environment: Private residence ([facility])  # Steps to Enter: 4  One/Two Story Residence: One story  Living Alone: No  Support Systems: Spouse/Significant Other, Child(cornell) (pt lives w/ spouse who has dementia, at baseline she is ambulatory w/ rollator, iADLs, drives)  Patient Expects to be Discharged to[de-identified] Unable to determine at this time  Current DME Used/Available at Home: [address], rollator  Tub or Shower Type: Shower  Diet prior to admission: regular, thins  Current Diet:  NPO until ok for meds this am.   Cognitive and Communication Status:  Neurologic State: Alert  Orientation Level: Oriented to person, Oriented to place  Cognition: Impaired decision making, Follows commands  Perception: Appears intact  Perseveration: No perseveration noted  Safety/Judgement: Decreased insight into deficits  Oral Assessment:  Oral Assessment  Labial: No impairment  Dentition: Natural;Limited  Lingual: No impairment  Mandible: No impairment  P.O. Trials:  Patient Position: upright in bed  Vocal quality prior to P.O.: No impairment  Consistency Presented: Solid; Thin liquid;Puree  How Presented: Self-fed/presented;Cup/gulp; Spoon;Straw   ORAL PHASE:  Bolus Acceptance: Impaired (not interested in PO much)  Bolus Formation/Control: No impairment     Propulsion: No impairment  Oral Residue: None  PHARYNGEAL PHASE:   Initiation of Swallow: No impairment  Laryngeal Elevation: Functional  Aspiration Signs/Symptoms: None  Pharyngeal Phase Characteristics:  (patient has intermittent cough that did not seem overtly related to oral-pharyngeal dysphagia.)     -MBS 7-6-21:   Oral-pharyngeal swallow WNL  Noted cervical esophagus residue that eventually cleared. -GI HISTORY:   ---unintentional weight loss over 6 months- has GI on the case.     ---h/o esophageal candidiasis, but unable to take fluconozole due to sotalol; she is on nystatin        UGI/BA SW: 7-20-22: IMPRESSION  1. Small sliding-type hiatal hernia. Otherwise unremarkable upper GI series and  esophagram.          NOMS:   The NOMS functional outcome measure was used to quantify this patient's level of swallowing impairment. Based on the NOMS, the patient was determined to be at level 7 for swallow function     NOMS Swallowing Levels:  Level 1 (CN): NPO  Level 2 (CM): NPO but takes consistency in therapy  Level 3 (CL): Takes less than 50% of nutrition p.o. and continues with nonoral feedings; and/or safe with mod cues; and/or max diet restriction  Level 4 (CK): Safe swallow but needs mod cues; and/or mod diet restriction; and/or still requires some nonoral feeding/supplements  Level 5 (CJ): Safe swallow with min diet restriction; and/or needs min cues  Level 6 (CI): Independent with p.o.; rare cues; usually self cues; may need to avoid some foods or needs extra time  Level 7 ([address] [place] [address]): Independent for all p.o.  BRYAN. (2003). National Outcomes Measurement System (NOMS): Adult Speech-Language Pathology User's Guide. Pain:  Pain Scale 1: Numeric (0 - 10)  Pain Intensity 1: 0     After treatment:   Patient left in no apparent distress in bed and Nursing notified family present    COMMUNICATION/EDUCATION:   Patient was educated regarding her deficit(s) of dysphagia as this relates to her diagnosis of dyspnea, AMs. She demonstrated Fair understanding as evidenced by discussion. .    The patient's plan of care including recommendations, planned interventions, and recommended diet changes were discussed with: Registered nurse.      Thank you for this referral.  Jerri Gordillo, SLP  Time Calculation: 15 mins

## 2022-09-08 NOTE — PROGRESS NOTES
1541:  Pt's son, Abhijeet Gray, stated he felt that if his mother's Na level had been checked more frequently, she may have avoided a readmission. Transition of Care Plan: RUR-20%  PT/OT/SLP following--PT/OT recommending home health vs rehab  Spoke with pt's son, Abhijeet Gray; left a list of hh agencies in pt's room  3.   GI, neurology, nephrology, plumonolgy following (on 5L O2)  4. Diet advanced to regular  5. Family to transport  home at d/c  6. Pt is being medically managed  7. CM following for any needs prior to d/c  SILVIA Tucker

## 2022-09-08 NOTE — PROGRESS NOTES
0730 Bedside and Verbal shift change report given to April RN (oncoming nurse) by Ernst Perkins RN (offgoing nurse). Report included the following information SBAR and Kardex. This patient was assisted with Intentional Toileting every 2 hours during this shift as appropriate. Documentation of ambulation and output reflected on Flowsheet as appropriate. Purposeful hourly rounding was completed using AIDET and 5Ps. Outcomes of PHR documented as they occurred. Bed alarm in use as appropriate. Dual Suction and ambubag in place. 0740 HR into 130s, Alison Jason RRT RN notified Margoth Pemberton, ordered to check swallow and give am medicatons. 0818  currently, notified Margoth Pemberton , passed swallow and requested diet for patient. 1500 Updated son Simone Lara on condition. 1620 , notified Dr Olivia Ramsey.    0115 Updated daughter Deandre Adrian on condition.

## 2022-09-08 NOTE — PROGRESS NOTES
RRT RN rounded on pt for MEWS of 3 r/t to pulse of 130. MD notified. Orders placed by MD and instructed to notify Cardiology. Cardiology notified and will assess pt. Pulse currently 125.         1600: tachycardia resolved.

## 2022-09-08 NOTE — PROGRESS NOTES
[facility]  [address] [facility], [facility] 19  [phone]    Medical Progress Note      NAME: Belle Dance   :  1940  MRM:  [id number]    Date/Time of service 2022  9:32 AM          Assessment and Plan:     Hyponatremia / Hypokalemia / Hypomagnesemia - POA, at first thought likely due to poor PO intake. But on  Na was suddenly, severely, inexplicably worse at 230. Had dropped to 122 . Today back to 114. Nephrology consult. Continue in stepdown. 3% NA boluses being given. Monitor K and IV Mg.  I wonder if Bactrim was the immediate cause. 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 Bronchial wall thickening with mucous plugging. Findings are likely infectious or inflammatory\". Pulmonary consulted. Broad differential including chronic simmering infections, vs aspiration. Bactrim ordered on  per pulmonary, stopped  due to low NA. I note that at admission her WBC count was mild, without bands, and no other SIRS criteria, and WBC normalized in 1st 24hr without any ABx. Procalcitonin is low. She had just completed Abx for PNA. Paroxysmal A-fib with RVR / Chronic anticoagulation - RVR developed this AM, after holding sotalol yesterday (for somnolence), so resume that. Continue xarelto. Sotalol prevents the use of fluconazole to treat esophageal candidiasis. Dyspnea / Nausea / Chest pain - Unclear etiology of these symptoms. She is now more hypoxic at rest than on admit. DDx includes GERD/aspiration (which I think is the primary problem), vs chronic PNA, vs much less likely CHF, PE, asthma    CHF (congestive heart failure), NYHA class I, chronic, systolic / Venous insufficiency - Unclear if any exacerbation, I do not think so. Stable ECHO. Consulted cariology.   ER gave lasix, but with her normal oxygen sats, low NA and low K, wt loss, 2 days no PO intake, I did not continue that. Continue BB      Anemia - Stable since last discharge, and normal serologies. Weight loss / GERD (gastroesophageal reflux disease) / Hiatal hernia / Candidiasis - She has very concerning unintentional wt loss, anorexia, nausea that persists. She weighed 170lb last year. 128lb now. Consult GI, who is following her outpatient. PPI by IV. At home she takes bentyl and Zofran as needed. Emptying study 9/6 shows delayed esophageal emptying but not delayed gastric emptying. She is on PO nystatin. Asthma - For her home Trelegy substitute Brovana, Pulmicort and prn Duonebs. Pulmonary is following     Hx breast cancer - Outpatient follow up. Has been on Boniva, increasing risk of PUD and esophagitis      Hypothyroid - TSH was normal a few months ago. I stopped the homeopathic dose of synthroid. Tingling / Weakness - Noted by patient 9/6. Code stroke called. MRI normal.  Now I think that HypoNa was contributing. Subjective:     Chief Complaint:  RVR this AM, more alert and feels better    ROS:  (bold if positive, if negative)    Cough      Sputum   Abd Pain  SOB/DOETolerating some PT  Tolerating some Diet        Objective:     Last 24hrs VS reviewed since prior progress note.  Most recent are:    Visit Vitals  BP (!) 147/89 (BP 1 Location: Left upper arm, BP Patient Position: At rest)   Pulse (!) 105   Temp 97.6 °F (36.4 °C)   Resp 22   Ht 5' 2\" (1.575 m)   Wt 60.6 kg (133 lb 9.6 oz)   SpO2 91%   BMI 24.44 kg/m²     SpO2 Readings from Last 6 Encounters:   09/08/22 91%   08/23/22 95%   08/10/22 94%   05/01/22 96%   06/14/21 99%   04/28/21 97%    O2 Flow Rate (L/min): 4 l/min     Intake/Output Summary (Last 24 hours) at 9/8/2022 0750  Last data filed at 9/8/2022 0606  Gross per 24 hour   Intake 0 ml   Output 1715 ml   Net -1715 ml          Physical Exam:    Gen:  Frail, in no acute distress  HEENT:  Pink conjunctivae, PERRL, hearing intact to voice, moist mucous membranes  Neck:  Supple, without masses, thyroid non-tender  Resp:  No accessory muscle use, clear breath sounds without wheezes rales or rhonchi  Card:  No murmurs, normal S1, S2 without thrills, bruits or peripheral edema  Abd:  Soft, non-tender, non-distended, normoactive bowel sounds are present, no mass  Lymph:  No cervical or inguinal adenopathy  Musc:  No cyanosis or clubbing  Skin:  No rashes or ulcers, skin turgor is good  Neuro:  Cranial nerves are grossly intact, general motor weakness, follows commands vaguely  Psych:  Poor insight, oriented to person, groggy    Telemetry reviewed:   normal sinus rhythm  __________________________________________________________________  Medications Reviewed: (see below)  Medications:     Current Facility-Administered Medications   Medication Dose Route Frequency    sodium chloride (NS) flush 5-40 mL  5-40 mL IntraVENous PRN    guaiFENesin (ROBITUSSIN) 100 mg/5 mL oral liquid 100 mg  100 mg Oral Q4H PRN    prochlorperazine (COMPAZINE) injection 5 mg  5 mg IntraVENous Q6H PRN    nystatin (MYCOSTATIN) 100,000 unit/mL oral suspension 500,000 Units  500,000 Units Oral QID    ketotifen (ZADITOR) 0.025 % (0.035 %) ophthalmic solution 1 Drop  1 Drop Both Eyes BID PRN    albuterol-ipratropium (DUO-NEB) 2.5 MG-0.5 MG/3 ML  3 mL Nebulization Q6H RT    tobramycin (NEBCIN) injection 300 mg  300 mg Nebulization BID RT    arformoteroL (BROVANA) neb solution 15 mcg  15 mcg Nebulization BID RT    budesonide (PULMICORT) 500 mcg/2 ml nebulizer suspension  500 mcg Nebulization BID RT    albuterol-ipratropium (DUO-NEB) 2.5 MG-0.5 MG/3 ML  3 mL Nebulization Q6H PRN    montelukast (SINGULAIR) tablet 10 mg  10 mg Oral QHS    sotaloL (BETAPACE) tablet 80 mg  80 mg Oral Q12H    sodium chloride (NS) flush 5-40 mL  5-40 mL IntraVENous Q8H    sodium chloride (NS) flush 5-40 mL  5-40 mL IntraVENous PRN    acetaminophen (TYLENOL) tablet 650 mg  650 mg Oral Q6H PRN    polyethylene glycol (MIRALAX) packet 17 g  17 g Oral DAILY PRN    ondansetron (ZOFRAN) injection 4 mg  4 mg IntraVENous Q6H PRN    rivaroxaban (XARELTO) tablet 20 mg  20 mg Oral DAILY WITH DINNER        Lab Data Reviewed: (see below)  Lab Review:     Recent Labs     09/07/22  0154   WBC 15.1*   HGB 9.5*   HCT 27.2*   *       Recent Labs     09/08/22  0257 09/07/22  2215 09/07/22  1740 09/07/22  0457 09/07/22  0154   * 111* 113*   < > 111*   K 4.0 4.2 4.1   < > 4.3   CL 82* 81* 80*   < > 79*   CO2 22 21 23   < > 22   GLU 94 93 96   < > 106*   BUN 4* 4* 4*   < > 4*   CREA 0.33* 0.33* 0.36*   < > 0.40*   CA 8.2* 8.0* 8.0*   < > 8.0*   ALB  --   --   --   --  2.0*   TBILI  --   --   --   --  0.5   ALT  --   --   --   --  11*    < > = values in this interval not displayed. Lab Results   Component Value Date/Time    Glucose (POC) 146 (H) 09/06/2022 01:21 PM     No results for input(s): PH, PCO2, PO2, HCO3, FIO2 in the last 72 hours. No results for input(s): INR, INREXT, INREXT in the last 72 hours. All Micro Results       Procedure Component Value Units Date/Time    CULTURE, BLOOD, PAIRED [[id number]] Collected: 09/04/22 1339    Order Status: Completed Specimen: Blood Updated: 09/08/22 0552     Special Requests: NO SPECIAL REQUESTS        Culture result: NO GROWTH 4 DAYS       CULTURE, URINE [[id number]] Collected: 09/07/22 1014    Order Status: Sent Specimen: Urine from Clean catch Updated: 09/07/22 1448    URINE CULTURE HOLD SAMPLE [[id number]] Collected: 09/07/22 1014    Order Status: Completed Specimen: Serum Updated: 09/07/22 1038     Urine culture hold       Urine on hold in Microbiology dept for 2 days. If unpreserved urine is submitted, it cannot be used for addtional testing after 24 hours, recollection will be required.           RESPIRATORY VIRUS PANEL W/COVID-19, PCR [[id number]] Collected: 09/05/22 1237    Order Status: Completed Specimen: Nasopharyngeal Updated: 09/05/22 2021 Adenovirus Not detected        Coronavirus 229E Not detected        Coronavirus HKU1 Not detected        Coronavirus CVNL63 Not detected        Coronavirus OC43 Not detected        SARS-CoV-2, PCR Not detected        Metapneumovirus Not detected        Rhinovirus and Enterovirus Not detected        Influenza A Not detected        Influenza A, subtype H1 Not detected        Influenza A, subtype H3 Not detected        INFLUENZA A H1N1 PCR Not detected        Influenza B Not detected        Parainfluenza 1 Not detected        Parainfluenza 2 Not detected        Parainfluenza 3 Not detected        Parainfluenza virus 4 Not detected        RSV by PCR Not detected        B. parapertussis, PCR Not detected        Bordetella pertussis - PCR Not detected        Chlamydophila pneumoniae DNA, QL, PCR Not detected        Mycoplasma pneumoniae DNA, QL, PCR Not detected       CULTURE, RESPIRATORY/SPUTUM/BRONCH Annye Parody [[id number]]     Order Status: Sent Specimen: Sputum     COVID-19 RAPID TEST [[id number]] Collected: 09/04/22 1339    Order Status: Completed Specimen: Nasopharyngeal Updated: 09/04/22 1414     Specimen source Nasopharyngeal        COVID-19 rapid test Not detected        Comment: Rapid Abbott ID Now       Rapid NAAT:  The specimen is NEGATIVE for SARS-CoV-2, the novel coronavirus associated with COVID-19. Negative results should be treated as presumptive and, if inconsistent with clinical signs and symptoms or necessary for patient management, should be tested with an alternative molecular assay. Negative results do not preclude SARS-CoV-2 infection and should not be used as the sole basis for patient management decisions. This test has been authorized by the FDA under an Emergency Use Authorization (EUA) for use by authorized laboratories.    Fact sheet for Healthcare Providers: ConventionUpdate.co.nz  Fact sheet for Patients: ConventionUpdate.co.nz Methodology: Isothermal Nucleic Acid Amplification                 Other pertinent lab: none    Total time spent with patient: 30 Minutes I personally reviewed chart, notes, data and current medications in the medical record. I have personally examined and treated the patient at bedside during this period. To assist coordination of care and communication with nursing and staff, this note may be preliminary early in the day, but finalized by end of the day.                  Care Plan discussed with: Patient, Family, Care Manager, Nursing Staff, Consultant/Specialist, and >50% of time spent in counseling and coordination of care    Discussed:  Care Plan and D/C Planning    Prophylaxis:  Lovenox and H2B/PPI    Disposition:  Home w/Family           ___________________________________________________    Attending Physician: Levi Ventura MD

## 2022-09-08 NOTE — PROGRESS NOTES
Progress Note  Date:2022       Room:[facility]  Patient Jim Haskins     Date of Birth:[birth year]     Age:82 y.o. Subjective    Subjective:  Symptoms:  Improved. Diet:  Poor intake. She reports  nausea (Mild, chronic). No vomiting. Pain:  She reports no pain. Review of Systems   Constitutional:  Positive for fatigue and fever. Gastrointestinal:  Positive for nausea (Mild, chronic). Negative for vomiting. Objective         Vitals Last 24 Hours:  TEMPERATURE:  Temp  Av.3 °F (36.8 °C)  Min: 97.6 °F (36.4 °C)  Max: 99.4 °F (37.4 °C)  RESPIRATIONS RANGE: Resp  Av.2  Min: 21  Max: 24  PULSE OXIMETRY RANGE: SpO2  Av.8 %  Min: 90 %  Max: 94 %  PULSE RANGE: Pulse  Av.6  Min: 75  Max: 109  BLOOD PRESSURE RANGE: Systolic (22MDK), XOA:942 , Min:112 , SWO:539   ; Diastolic (65FXU), NJM:49, Min:62, Max:91    I/O (24Hr): Intake/Output Summary (Last 24 hours) at 2022 0939  Last data filed at 2022 0800  Gross per 24 hour   Intake 120 ml   Output 1715 ml   Net -1595 ml     Objective:  General Appearance:  Ill-appearing. Vital signs: (most recent): Blood pressure (!) 147/89, pulse (!) 105, temperature 97.6 °F (36.4 °C), resp. rate 22, height 5' 2\" (1.575 m), weight 60.6 kg (133 lb 9.6 oz), SpO2 93 %. Fever. Output: Producing stool. Lungs:  Normal effort and normal respiratory rate. Breath sounds clear to auscultation. (Coughing)  Heart: Normal rate. Regular rhythm. S1 normal and S2 normal.  No murmur. Abdomen: Abdomen is soft and non-distended. Bowel sounds are normal.   There is no abdominal tenderness. Extremities: There is no dependent edema. Pulses: Distal pulses are intact. Neurological: Patient is alert and oriented to person, place and time. Skin:  Warm and dry.     Labs/Imaging/Diagnostics    Labs:  CBC:  Recent Labs     22  0154   WBC 15.1*   RBC 3.35*   HGB 9.5*   HCT 27.2*   MCV 81.2   RDW 14.0   PLT 510*     CHEMISTRIES:  Recent Labs     09/08/22  0730 09/08/22  0257 09/07/22  2215   * 114* 111*   K 4.1 4.0 4.2   CL 84* 82* 81*   CO2 20* 22 21   BUN 5* 4* 4*   CA 8.3* 8.2* 8.0*   PT/INR:No results for input(s): INR, INREXT, INREXT in the last 72 hours. No lab exists for component: PROTIME  APTT:No results for input(s): APTT in the last 72 hours. LIVER PROFILE:  Recent Labs     09/07/22  0154   AST 16   ALT 11*     Lab Results   Component Value Date/Time    ALT (SGPT) 11 (L) 09/07/2022 01:54 AM    AST (SGOT) 16 09/07/2022 01:54 AM    Alk. phosphatase 126 (H) 09/07/2022 01:54 AM    Bilirubin, total 0.5 09/07/2022 01:54 AM       Imaging Last 24 Hours:  No results found. Assessment//Plan   Principal Problem:    Dyspnea (9/4/2022)    Active Problems:    Asthma ()      Paroxysmal A-fib (HCC) ()      GERD (gastroesophageal reflux disease) ()      Hiatal hernia ()      HX: breast cancer (2015)      Overview: Right       Hypothyroid ()      Venous insufficiency ()      Overview: bilat LE      Anemia (4/30/2022)      Chronic anticoagulation ()      Hyponatremia (4/30/2022)      Nausea (9/4/2022)      Chest pain (9/4/2022)      Hypokalemia (9/4/2022)      Leukocytosis (9/4/2022)      CHF (congestive heart failure), NYHA class I, chronic, [facility] ([facility] (7/6/8911)    Assessment:   (· Nausea  · Weight loss     I met her for EGD recently, after her having been evaluated by my colleagues at [facility] for same complaint. EGD 8/10/2022 showed candidal esophagitis but she cannot take fluconazole in concert with sotalol. Ongoing symptoms despite nystatin. UGI 1/2022 with small paraesophageal hernia, silent aspiration. 9/6/2022: Off floor for GES.     9/7/2022: Overnight events noted, sodium with significant drop to 108 and acute change in MS. Currently with 3% saline infusing. GES yesterday, results pending. Does not awaken during my visit today. Family is at bedside.  Family is concerned about ongoing nausea without having found a reason why.     9/8/2022: Awake and alert today. Ongoing low level chronic nausea. Ate applesauce this morning. Admits to low oral intake. GES day before yesterday without delay in gastric emptying but with delayed clearance from esophagus. SLP seeing. ). Plan:    (- No medications for findings on GES. Recommend full liquid diet but SLP feels as if she can manage regular diet. Consider dietary consult to help with between meal supplements to help with weight loss. No further GI evaluation planned. Please call if needed further. ). Electronically signed by Jadon Nicholas NP on 9/8/2022 at 11:12 AM      While her July of this year GI series showed a small hiatal hernia without other finding gastric emptying scan implies an esophageal dysmotility. This could certainly result in difficulty eating over a prolonged time. In the absence of a structural defect. Unfortunately treatment is difficult as no medications are applicable. Changing the structure of her diet towards a full liquid diet assuming that she does not have a problem with aspiration of a diet of full liquid consistency would be recommended. No other evaluation is recommended at this point in time.     I have interviewed and examined patient with addendum to note above and formulation care plan to reflect my evaluation    Zainab Alex M.D.

## 2022-09-08 NOTE — PROGRESS NOTES
Miles Crow  YOB: 1940      Assessment & Plan:     Hypo-osmolar hyponatremia d/t SIADH  COPD  PNA    -NA appropriately correct with 3% saline from 108>115  -U osm 497 and U sodium 136   -check BMP Q4HR  -MS is much better  -controlled the pain  -check urine sodium and osm today  -she will benefit from Tolvaptan depending on urine osmolality and urine sodium  -plan d/w patient and son             Subjective:   CC: F/U Hyponatremia   HPI: Patient seen   ROS:  Current Facility-Administered Medications   Medication Dose Route Frequency    sodium chloride (NS) flush 5-40 mL  5-40 mL IntraVENous PRN    guaiFENesin (ROBITUSSIN) 100 mg/5 mL oral liquid 100 mg  100 mg Oral Q4H PRN    prochlorperazine (COMPAZINE) injection 5 mg  5 mg IntraVENous Q6H PRN    nystatin (MYCOSTATIN) 100,000 unit/mL oral suspension 500,000 Units  500,000 Units Oral QID    ketotifen (ZADITOR) 0.025 % (0.035 %) ophthalmic solution 1 Drop  1 Drop Both Eyes BID PRN    albuterol-ipratropium (DUO-NEB) 2.5 MG-0.5 MG/3 ML  3 mL Nebulization Q6H RT    tobramycin (NEBCIN) injection 300 mg  300 mg Nebulization BID RT    arformoteroL (BROVANA) neb solution 15 mcg  15 mcg Nebulization BID RT    budesonide (PULMICORT) 500 mcg/2 ml nebulizer suspension  500 mcg Nebulization BID RT    albuterol-ipratropium (DUO-NEB) 2.5 MG-0.5 MG/3 ML  3 mL Nebulization Q6H PRN    montelukast (SINGULAIR) tablet 10 mg  10 mg Oral QHS    sotaloL (BETAPACE) tablet 80 mg  80 mg Oral Q12H    sodium chloride (NS) flush 5-40 mL  5-40 mL IntraVENous Q8H    sodium chloride (NS) flush 5-40 mL  5-40 mL IntraVENous PRN    acetaminophen (TYLENOL) tablet 650 mg  650 mg Oral Q6H PRN    polyethylene glycol (MIRALAX) packet 17 g  17 g Oral DAILY PRN    ondansetron (ZOFRAN) injection 4 mg  4 mg IntraVENous Q6H PRN    rivaroxaban (XARELTO) tablet 20 mg  20 mg Oral DAILY WITH DINNER          Objective:     Vitals:  Blood pressure (!) 147/89, pulse (!) 105, temperature 97.6 °F (36.4 °C), resp. rate 22, height 5' 2\" (1.575 m), weight 60.6 kg (133 lb 9.6 oz), SpO2 93 %. Temp (24hrs), Av.3 °F (36.8 °C), Min:97.6 °F (36.4 °C), Max:99.4 °F (37.4 °C)      Intake and Output:  701 - 1900  In: 120 [P.O.:120]  Out: -   1901 - 700  In: 281 [P.O.:150; I.V.:520]  Out:  [Urine:]    Physical Exam:                   Physical Examination:   GENERAL ASSESSMENT: NAD  HEENT:Nontraumatic   CHEST: rhonchi+  HEART: S1S2  ABDOMEN: Soft,NT,  :[place]uca:   EXTREMITY: no EDEMA  NEURO:Grossly non focal          ECG/rhythm:    Data Review      No results for input(s): TNIPOC in the last 72 hours. No lab exists for component: ITNL   No results for input(s): CPK, CKMB, TROIQ in the last 72 hours. Recent Labs     22  0730 22  0257 22  2215 22  0457 22  0154   * 114* 111*   < > 111*   K 4.1 4.0 4.2   < > 4.3   CL 84* 82* 81*   < > 79*   CO2 20* 22 21   < > 22   BUN 5* 4* 4*   < > 4*   CREA 0.30* 0.33* 0.33*   < > 0.40*   GLU 97 94 93   < > 106*   CA 8.3* 8.2* 8.0*   < > 8.0*   ALB  --   --   --   --  2.0*   WBC  --   --   --   --  15.1*   HGB  --   --   --   --  9.5*   HCT  --   --   --   --  27.2*   PLT  --   --   --   --  510*    < > = values in this interval not displayed. No results for input(s): INR, PTP, APTT, INREXT in the last 72 hours. Needs: urine analysis, urine sodium, protein and creatinine  Lab Results   Component Value Date/Time    Sodium,urine random 136 2022 10:14 AM    Creatinine, urine 33.00 2022 10:14 AM               Discussed with:  Family    : Addie Osler, MD  2022        [facility]:  [email or web address]  Lexi Alva office:  [facility] [address]  [address]  Phone: [phone]  Fax :     [phone]    [address] [place] [address] office:  [address] [facility]  [address] [facility], [address]  Phone - [phone]  Fax - [phone]

## 2022-09-08 NOTE — PROGRESS NOTES
Problem: Self Care Deficits Care Plan (Adult)  Goal: *Acute Goals and Plan of Care (Insert Text)  Description: FUNCTIONAL STATUS PRIOR TO ADMISSION: Patient was independent and active without use of DME. She reports recent use of spouse's rollator to move things within home, and sit down as needed. HOME SUPPORT: The patient lived with spouse but did not require assist. Patient spouse reports. Occupational Therapy Goals  Initiated 9/5/2022  1. Patient will perform lower body dressing with modified independence within 7 day(s). 2.  Patient will perform upper body dressing with supervision/set-up within 7 day(s). 3.  Patient will perform toilet transfers with supervision/set-up within 7 day(s). 4.  Patient will perform all aspects of toileting with supervision/set-up within 7 day(s). 5.  Patient will participate in upper extremity therapeutic exercise/activities with supervision/set-up for 10 minutes within 7 day(s). 6.  Patient will utilize energy conservation techniques during functional activities with verbal cues within 7 day(s). Outcome: Progressing Towards Goal     OCCUPATIONAL THERAPY TREATMENT  Patient: Sy Costa (51 y.o. female)  Date: 9/8/2022  Diagnosis: Dyspnea [R06.00] Dyspnea      Precautions: Fall (droplet +)  Chart, occupational therapy assessment, plan of care, and goals were reviewed. ASSESSMENT  Patient continues with skilled OT services and is progressing towards goals. Pt with Na 115 and cleared for EOB activity. Pt remains limited by decreased strength, endurance, mobility, balance and safety. Pt initially on 3L O2 with sats 89-91%. During session and post session at rest sats only 88-89% and increased to 4L with sats remaining at 88-89% while PLB occurring. Increased to 5L per RN and sats 90-92% with PLB. [facility] at discharge pending progress.     Current Level of Function Impacting Discharge (ADLs): min A at bedside    Other factors to consider for discharge: see above         PLAN :  Patient continues to benefit from skilled intervention to address the above impairments. Continue treatment per established plan of care to address goals. Recommend with staff: up to chair for meals as able and BSC for toileting    Recommend next OT session: endurance    Recommendation for discharge: (in order for the patient to meet his/her long term goals)  To be determined: HH vs rehab at discharge pending progress. This discharge recommendation:  Has been made in collaboration with the attending provider and/or case management    IF patient discharges home will need the following DME: TBD       SUBJECTIVE:   Patient stated I feel so tired.     OBJECTIVE DATA SUMMARY:   Cognitive/Behavioral Status:  Neurologic State: Alert; Appropriate for age  Orientation Level: Oriented to person;Oriented to place;Oriented to situation  Cognition: Follows commands; Appropriate for age attention/concentration  Perception: Appears intact  Perseveration: No perseveration noted  Safety/Judgement: Awareness of environment; Fall prevention; Insight into deficits    Functional Mobility and Transfers for ADLs:  Bed Mobility:  Rolling: Supervision  Supine to Sit: Minimum assistance; Additional time;Assist x1  Sit to Supine: Minimum assistance; Additional time;Assist x1  Scooting: Minimum assistance; Additional time;Assist x1    Transfers:  Sit to Stand: Minimum assistance; Additional time;Assist x1          Balance:  Sitting: Intact  Standing: Impaired; With support  Standing - Static: Fair  Standing - Dynamic : Fair;Constant support    ADL Intervention:  Patient instructed and indicated understanding energy conservation techniques to increase independence and safety during ADLs.      Type of Bath: Chlorhexidine (CHG)    Lower Body Dressing Assistance  Socks: Minimum assistance (A to thread over toes)  Leg Crossed Method Used: Yes  Position Performed: Seated edge of bed  Cues: Don;Doff;Physical assistance;Verbal cues provided;Visual cues provided    Cognitive Retraining  Safety/Judgement: Awareness of environment; Fall prevention; Insight into deficits    Therapeutic Exercises:   Educated pt on benefits of AROM exer to increase her strength and endurance for all functional tasks. Pt then performed 4 exer at 10-15 reps each with rest break between. Pt performed shoulder flexion, abd., horizontal abd. And chest presses    Pain:  0/10    Activity Tolerance:   Fair, desaturates with exertion and requires oxygen, requires frequent rest breaks, and observed SOB with activity    After treatment patient left in no apparent distress:   Supine in bed, Call bell within reach, and Bed / chair alarm activated    COMMUNICATION/COLLABORATION:   The patients plan of care was discussed with: Physical therapist and Registered nurse.      Ole Pina OT  Time Calculation: 31 mins

## 2022-09-08 NOTE — PROGRESS NOTES
1900  Bedside and Verbal shift change report given to Umesh Brown RN (oncoming nurse) by EZEQUIEL Kate (offgoing nurse). Report included the following information SBAR, Kardex, Intake/Output, MAR, Recent Results, and Cardiac Rhythm NSR .      1921: critical sodium of 116 reported. Paged on-call nephrologist.     0065: spoke with NP about critical sodium. NP stated to continue with q4h BMP checks and to notify her with next results. 0015: critical sodium of 119 reported. Notified on-call nephrologist NP. No new orders received. 0400: sodium resulted at 121. Notified on-call nephrologist NP. NP stated to continue with q4 BMPs. Critera met for insert Yes  Reason for insert: Urinary obstruction/retention  Date of insert: 9/7/22  Order is current: Yes  MD or RN driven: Provider  Removal Discussed Yes  Last CHG Bath: 9/8/22 @ [id number]   [place]uca Care Last Completed: Date/Time: 9/9/22 @ 0023  [place]uca Care After Each Bowel Movement: Yes  Education documented every 24 hours Yes  Care Plan (Risk for UTI, [place]) Updated Every 24 hours Yes  Bag below Bladder Yes        Bag off Floor Yes      Sheet Clip used Yes          Tubing free of dependant loops Yes          Seal Intact Yes            Bag less than 1/2 full Yes            This patient was assisted with Intentional Toileting every 2 hours during this shift as appropriate. Documentation of ambulation and output reflected on Flowsheet as appropriate. Purposeful hourly rounding was completed using AIDET and 5Ps. Outcomes of PHR documented as they occurred. Bed alarm in use as appropriate. Dual Suction and ambubag in place. 0700  Bedside and Verbal shift change report given to Rosmery Costa RN (oncoming nurse) by Umesh Brown RN (offgoing nurse). Report included the following information SBAR, Kardex, Intake/Output, MAR, Recent Results, and Cardiac Rhythm NSR .

## 2022-09-08 NOTE — PROGRESS NOTES
2000 Hour: Received patient with sodium 113 at 1800 hour. Alert Oriented x2.  2200 Hour: BMP sent, sodium 111. NP made aware. New orders received. 000 Hour:3% sodium per IV fluids started at 50 mls/hr. 0300 Hour: BMP sent to Lab. MEWS Score 3 d/t elevated heart rate. 104 bpm. Will continue to monitor heart rate.

## 2022-09-08 NOTE — PROCEDURES
[facility] 79  EEG    Name:  Ce Marin  MR#:  [id number]  :  1940  ACCOUNT #:  [de-identified]  DATE OF SERVICE:  2022      REQUESTING PROVIDER:  Rolla Apgar A. Johnita Castleman, NP    CLINICAL HISTORY:  An EEG is requested on this 80-year-old lady with encephalopathy to evaluate for epileptiform abnormalities. MEDICATIONS:  Said to include Betapace, Synthroid, Bentyl, Protonix and Voltaren. EEG REPORT:  This tracing is obtained while the patient is noted to be awake and confused. During this state, the background consists of diffuse high amplitude delta activity that is nonrhythmic. Hyperventilation is not performed. Intermittent photic stimulation little alters the tracing. Sleep architecture is not seen. INTERPRETATION:  This EEG is abnormal secondary to diffuse slowing and disorganization of the background rhythms indicative of a severe degree of bihemispheric dysfunction as is commonly seen with an encephalopathy which may have contributions from toxic, metabolic, diffuse structural, and/or pharmacologic effects and clinical correlation is recommended.       Shira Bonilla MD [id number]  D:  2022 19:08  T:  2022 3:33  JOB #:  [id number]

## 2022-09-08 NOTE — PROGRESS NOTES
Problem: Mobility Impaired (Adult and Pediatric)  Goal: *Acute Goals and Plan of Care (Insert Text)  Description: FUNCTIONAL STATUS PRIOR TO ADMISSION: Patient was independent and active without use of DME. She reports intermittent use of rollator in the home to assist with brining trays to her spouse or when she needs a seated rest break. HOME SUPPORT PRIOR TO ADMISSION: The patient lived with her spouse with history of dementia and provided meals and ADL assistance for him. Physical Therapy Goals  Initiated 9/5/2022  All goals with intention of being met with least restrictive supplemental oxygen and spo2 >90% or room air  1. Patient will move from supine to sit and sit to supine , scoot up and down, and roll side to side in bed with independence within 7 day(s). 2.  Patient will transfer from bed to chair and chair to bed with modified independence using the least restrictive device within 7 day(s). 3.  Patient will perform sit to stand with modified independence within 7 day(s). 4.  Patient will ambulate with modified independence for 200 feet with the least restrictive device within 7 day(s). 5.  Patient will ascend/descend 3 stairs with 1-2 handrail(s) with minimal assistance/contact guard assist within 7 day(s). Outcome: Progressing Towards Goal   PHYSICAL THERAPY TREATMENT  Patient: Paige King (89 y.o. female)  Date: 9/8/2022  Diagnosis: Dyspnea [R06.00] Dyspnea      Precautions: Fall (droplet +)  Chart, physical therapy assessment, plan of care and goals were reviewed. ASSESSMENT  Patient continues with skilled PT services and is progressing towards goals. Communicated with nurse cleared for therapy. Patient supine on  bed when received just worked with OT patient feels tired but agreed to to do exercise on both LE all planes while on bed. Performed some active range of motion exercise on both LE all planes.  Reviewed purse lip breathing,energy conservation and practice incentive spirometer. Reposition patient up on the bed. Nurse in the room with the patient agreed to monitor patient. Current Level of Function Impacting Discharge (mobility/balance): min assist with bed mobility    Other factors to consider for discharge: fall         PLAN :  Patient continues to benefit from skilled intervention to address the above impairments. Continue treatment per established plan of care. to address goals. Recommendation for discharge: (in order for the patient to meet his/her long term goals)  Therapy up to 5 days/week in SNF setting vs HHPT to be determine pending progress form therapy    This discharge recommendation:  Has been made in collaboration with the attending provider and/or case management    IF patient discharges home will need the following DME: patient owns DME required for discharge       SUBJECTIVE:   Patient stated [address].     OBJECTIVE DATA SUMMARY:   Critical Behavior:  Neurologic State: Alert, Appropriate for age  Orientation Level: Oriented to person, Oriented to place, Oriented to situation, Disoriented to time  Cognition: Follows commands, Memory loss  Safety/Judgement: Awareness of environment, Fall prevention, Insight into deficits  Functional Mobility Training:  Bed Mobility:  Rolling: Supervision  Supine to Sit: Minimum assistance; Additional time;Assist x1  Sit to Supine: Minimum assistance; Additional time;Assist x1  Scooting: Minimum assistance; Additional time;Assist x1        Transfers:  Sit to Stand: Minimum assistance; Additional time;Assist x1  Stand to Sit: Contact guard assistance                             Balance:  Sitting: Intact  Standing: Impaired; With support  Standing - Static: Fair  Standing - Dynamic : Fair;Constant support  Ambulation/Gait Training:                                             Therapeutic Exercises:   Educate and instructed patient to continue active range of motion exercise on both legs while up on chair or on bed multiple times. Recommend patient to be up on the chair at least 3 times a day every meal times as tolerated. Pain Ratin/10    Activity Tolerance:   Good    After treatment patient left in no apparent distress:   Supine in bed, Heels elevated for pressure relief, Call bell within reach, Bed / chair alarm activated, and Side rails x 3    COMMUNICATION/COLLABORATION:   The patients plan of care was discussed with: Occupational therapist and Registered nurse. Estevan Anders, PT,WCC.    Time Calculation: 23 mins

## 2022-09-08 NOTE — PROGRESS NOTES
Name: JESS FRAUSTO L N Lucia Pretty   : 1940 Admit Date: 2022   Phone: [phone]  [facility]   PCP: Sofi Foss MD  MRN: [id number]   Date: 2022  Code: Full Code          Chart and notes reviewed. Data reviewed. I review the patient's current medications in the medical record at each encounter. I have evaluated and examined the patient. History of Present Illness:  Ms. Malcolm Mendoza is an  yo woman with a history of asthma/COPD, pseudomonas pneumonia/colonization, bronchiectasis, former tobacco use, afib, breast cancer s/p chemo/radiation, seronegative RA and GERD who presented with dyspnea and cough. She [facility] and [facility] . She is managed on Trelegy (this was stopped for a period of time due to concern for GI issues, but pulmonary symptoms worsened when on Advair/Spiriva). She is on jeff nebs for pseudomonas. She recently had an EGD that showed candidal esophagitis, but unable to take fluconazole due to sotalol. She was recently admitted and discharged  for nausea/vomiting and treated for PNA. She was doing well until Friday when she began to feel weak, more short of breath and had increased cough with some chest tightness. Her cough is wet, but has not been notably productive. Has loss additional weight since she was last admitted. Planned for Gastric emptying study and possible upper GI study.      Labs: WBC 10.1, Hgb 9.5, , d-dimer 0.44, cr 0.41, , proBNP 2712, sputum culture  with stenotrophamonas     Images reviewed:  CXR 2022: increased interstitial marking and mild patchy infiltrates (greatest on the R), these are a change from most recent CXR     CT chest 2022: patchy bilateral reticulonodular opacities, bronchial wall thickening, mucous plugging, scattered sub 5mm nodules, no effusions    Interval history  Afebrile  HR 100s  BP stable  Sats 93% on 3L  WBC 15.1  Hgb 9.5  Na 115 - better  TSH 1.12  Blood cx no growth x 4 days  Urine cx in process  RVP neg    Head CT no acute process    MRI brain with no acute intracranial abnormality. Moderate chronic microvascular ischemic disease. Complete opacification of the left maxillary and sphenoid sinuses, likely representing chronic sinusitis    ECHO: EF 16-69%; grade 1 diastolic dysfunction; mildly elevated RVSP    ROS: Much better this morning. Alert and MS appears to be back to baseline. Denies SOB or CP. Reports cough, but not bringing much up. Has some back discomfort. Past Medical History:   Diagnosis Date    Asthma     CHF (congestive heart failure), NYHA class I, chronic, systolic (HCC)     Chronic anticoagulation     GERD (gastroesophageal reflux disease)     Hiatal hernia     HX: breast cancer     Right     Hypothyroid     Paroxysmal A-fib (HCC)     Venous insufficiency     bilat LE    Weight loss        Past Surgical History:   Procedure Laterality Date    COLONOSCOPY N/A 2021    COLONOSCOPY AND EGD performed by Sanjiv Alcantara MD at [address] BREAST LUMPECTOMY Right 2015    HX CATARACT REMOVAL  2013    Bilateral     HX CHOLECYSTECTOMY      HX DILATION AND CURETTAGE      x2    HX ORTHOPAEDIC  2017    RIGHT foot  hammertoe       No family history on file.     Social History     Tobacco Use    Smoking status: Former     Types: Cigarettes     Start date: 2000     Quit date: 2000     Years since quittin.7    Smokeless tobacco: Never   Substance Use Topics    Alcohol use: Not Currently       Allergies   Allergen Reactions    Ace Inhibitors Cough    Iodine Rash    Penicillins Rash       Current Facility-Administered Medications   Medication Dose Route Frequency    sodium chloride (NS) flush 5-40 mL  5-40 mL IntraVENous PRN    guaiFENesin (ROBITUSSIN) 100 mg/5 mL oral liquid 100 mg  100 mg Oral Q4H PRN    prochlorperazine (COMPAZINE) injection 5 mg  5 mg IntraVENous Q6H PRN    nystatin (MYCOSTATIN) 100,000 unit/mL oral suspension 500,000 Units  500,000 Units Oral QID    ketotifen (ZADITOR) 0.025 % (0.035 %) ophthalmic solution 1 Drop  1 Drop Both Eyes BID PRN    albuterol-ipratropium (DUO-NEB) 2.5 MG-0.5 MG/3 ML  3 mL Nebulization Q6H RT    tobramycin (NEBCIN) injection 300 mg  300 mg Nebulization BID RT    arformoteroL (BROVANA) neb solution 15 mcg  15 mcg Nebulization BID RT    budesonide (PULMICORT) 500 mcg/2 ml nebulizer suspension  500 mcg Nebulization BID RT    albuterol-ipratropium (DUO-NEB) 2.5 MG-0.5 MG/3 ML  3 mL Nebulization Q6H PRN    montelukast (SINGULAIR) tablet 10 mg  10 mg Oral QHS    sotaloL (BETAPACE) tablet 80 mg  80 mg Oral Q12H    sodium chloride (NS) flush 5-40 mL  5-40 mL IntraVENous Q8H    sodium chloride (NS) flush 5-40 mL  5-40 mL IntraVENous PRN    acetaminophen (TYLENOL) tablet 650 mg  650 mg Oral Q6H PRN    polyethylene glycol (MIRALAX) packet 17 g  17 g Oral DAILY PRN    ondansetron (ZOFRAN) injection 4 mg  4 mg IntraVENous Q6H PRN    rivaroxaban (XARELTO) tablet 20 mg  20 mg Oral DAILY WITH DINNER         REVIEW OF SYSTEMS   12 point ROS negative except as stated in the HPI. Physical Exam:   Visit Vitals  BP (!) 147/89 (BP 1 Location: Left upper arm, BP Patient Position: At rest)   Pulse (!) 105   Temp 97.6 °F (36.4 °C)   Resp 22   Ht 5' 2\" (1.575 m)   Wt 60.6 kg (133 lb 9.6 oz)   SpO2 93%   BMI 24.44 kg/m²       General:  Alert, cooperative, no distress, appears stated age. Head:  Normocephalic, without obvious abnormality, atraumatic. Eyes:  Conjunctivae/corneas clear. Nose: Nares normal. Septum midline. Mucosa normal.    Throat: Lips, mucosa, and tongue normal.    Neck: Supple, symmetrical, trachea midline, no adenopathy. Lungs:   Scattered rhonchi, resp nonlabored   Chest wall:  No tenderness or deformity. Heart:  Regular rate and rhythm, S1, S2 normal, no murmur, click, rub or gallop. Abdomen:   Soft, non-tender.  Bowel sounds normal.    Extremities: Extremities normal, atraumatic, no cyanosis or edema. Pulses: 2+ and symmetric all extremities. Skin: Skin color, texture, turgor normal. No rashes or lesions   Neurologic: Grossly nonfocal       Lab Results   Component Value Date/Time    Sodium 115 (LL) 09/08/2022 07:30 AM    Potassium 4.1 09/08/2022 07:30 AM    Chloride 84 (L) 09/08/2022 07:30 AM    CO2 20 (L) 09/08/2022 07:30 AM    BUN 5 (L) 09/08/2022 07:30 AM    Creatinine 0.30 (L) 09/08/2022 07:30 AM    Glucose 97 09/08/2022 07:30 AM    Calcium 8.3 (L) 09/08/2022 07:30 AM    Magnesium 2.0 09/05/2022 05:18 AM       Lab Results   Component Value Date/Time    WBC 15.1 (H) 09/07/2022 01:54 AM    HGB 9.5 (L) 09/07/2022 01:54 AM    PLATELET 804 (H) 71/77/2556 01:54 AM    MCV 81.2 09/07/2022 01:54 AM       Lab Results   Component Value Date/Time    Alk.  phosphatase 126 (H) 09/07/2022 01:54 AM    Protein, total 6.0 (L) 09/07/2022 01:54 AM    Albumin 2.0 (L) 09/07/2022 01:54 AM    Globulin 4.0 09/07/2022 01:54 AM       Lab Results   Component Value Date/Time    Iron 35 09/04/2022 05:26 PM    TIBC 193 (L) 09/04/2022 05:26 PM    Iron % saturation 18 (L) 09/04/2022 05:26 PM    Ferritin 196 09/04/2022 05:26 PM       Lab Results   Component Value Date/Time    TSH 1.12 09/07/2022 04:57 AM        No results found for: PH, PHI, PCO2, PCO2I, PO2, PO2I, HCO3, HCO3I, FIO2, FIO2I    Lab Results   Component Value Date/Time    Troponin-I, Qt. <0.05 01/13/2021 03:17 AM        Lab Results   Component Value Date/Time    Culture result: NO GROWTH 4 DAYS 09/04/2022 01:39 PM    Culture result: MODERATE Stenotrophomonas (Xantho.) maltophilia (A) 08/22/2022 12:30 PM    Culture result: MODERATE NORMAL RESPIRATORY CANDELARIO 08/22/2022 12:30 PM       No results found for: TOXA1, RPR, HBCM, HBSAG, HAAB, HCAB1, HAAT, G6PD, CRYAC, HIVGT, HIVR, HIV1, HIV12, HIVPC, HIVRPI    No results found for: VANCT, CPK    Lab Results   Component Value Date/Time    Color YELLOW/STRAW 09/07/2022 10:14 AM    Appearance CLEAR 09/07/2022 10:14 AM    pH (UA) 8.0 09/07/2022 10:14 AM    Protein Negative 09/07/2022 10:14 AM    Glucose Negative 09/07/2022 10:14 AM    Ketone TRACE (A) 09/07/2022 10:14 AM    Bilirubin Negative 09/07/2022 10:14 AM    Blood Negative 09/07/2022 10:14 AM    Urobilinogen 1.0 09/07/2022 10:14 AM    Nitrites Negative 09/07/2022 10:14 AM    Leukocyte Esterase Negative 09/07/2022 10:14 AM    WBC 0-4 09/07/2022 10:14 AM    RBC 0-5 09/07/2022 10:14 AM    Bacteria Negative 09/07/2022 10:14 AM       IMPRESSION  Acute respiratory failure with hypoxia  Abnormal chest imaging  Hyponatremia  COPD/asthma  Bronchiectasis  History of pseudomonas pneumonia/colonization  GERD  Candidal esophagitis  Nauseas/vomiting  Weight loss    PLAN  Goal sats 88% or higher  3% NaCl per nephrology   Recent sputum culture grew stenotrophomonas, not treated with bactrim as she was clinically improving at that time but was restarted this admission given imaging findings and clinical change. Bactrim now stopped per primary team due to significant hyponatremia and concern that Bactrim may be the cause.   Would obtain sputum culture if she expectorates sputum; RVP negative  Brovana/Pulmicort, dunoebs  No indication for steroids at this time  Started her week on jeff nebs on 9/3 and ordered to complete on Friday  GI evaluating, gastric emptying study ordered and pending  Cardiology following      LEROY Jones

## 2022-09-08 NOTE — PROGRESS NOTES
[facility] and [address] at [facility] at [address] [place] [address] [address]   [phone]              Chief Complaint   Patient presents with    Shortness of Breath    Palpitations     Current Facility-Administered Medications   Medication Dose Route Frequency Provider Last Rate Last Admin    sodium chloride (NS) flush 5-40 mL  5-40 mL IntraVENous PRN Davey Abreu MD   10 mL at 09/08/22 4476    guaiFENesin (ROBITUSSIN) 100 mg/5 mL oral liquid 100 mg  100 mg Oral Q4H PRN James Martinez MD   100 mg at 09/06/22 0525    prochlorperazine (COMPAZINE) injection 5 mg  5 mg IntraVENous Q6H PRN James Martinez MD   5 mg at 09/06/22 1710    nystatin (MYCOSTATIN) 100,000 unit/mL oral suspension 500,000 Units  500,000 Units Oral QID Suzette Guerrero MD   500,000 Units at 09/08/22 0900    ketotifen (ZADITOR) 0.025 % (0.035 %) ophthalmic solution 1 Drop  1 Drop Both Eyes BID PRN Suzette Guerrero MD        albuterol-ipratropium (DUO-NEB) 2.5 MG-0.5 MG/3 ML  3 mL Nebulization Q6H RT Felisha Perez MD   3 mL at 09/08/22 0808    tobramycin (NEBCIN) injection 300 mg  300 mg Nebulization BID RT Felisha Perez MD   300 mg at 09/08/22 0817    arformoteroL (BROVANA) neb solution 15 mcg  15 mcg Nebulization BID RT Suzette Guerrero MD   15 mcg at 09/08/22 0812    budesonide (PULMICORT) 500 mcg/2 ml nebulizer suspension  500 mcg Nebulization BID RT Suzette Guerrero MD   500 mcg at 09/08/22 5337    albuterol-ipratropium (DUO-NEB) 2.5 MG-0.5 MG/3 ML  3 mL Nebulization Q6H PRN Suzette Guerrero MD   3 mL at 09/07/22 0438    montelukast (SINGULAIR) tablet 10 mg  10 mg Oral QHS Suzette Guerrero MD   10 mg at 09/06/22 2114    sotaloL (BETAPACE) tablet 80 mg  80 mg Oral Q12H Suzette Guerrero MD   80 mg at 09/08/22 0814    sodium chloride (NS) flush 5-40 mL  5-40 mL IntraVENous Q8H Teto Silva MD   10 mL at 22 0602    sodium chloride (NS) flush 5-40 mL  5-40 mL IntraVENous PRN Teto Silva MD   10 mL at 22 2321    acetaminophen (TYLENOL) tablet 650 mg  650 mg Oral Q6H PRN Teto Silva MD   650 mg at 22 0400    polyethylene glycol (MIRALAX) packet 17 g  17 g Oral DAILY PRN Teto Silva MD   17 g at 22 1205    ondansetron (ZOFRAN) injection 4 mg  4 mg IntraVENous Q6H PRN Teto Silva MD   4 mg at 22    rivaroxaban (XARELTO) tablet 20 mg  20 mg Oral DAILY WITH Bobby Cordero MD   20 mg at 22 1709      Allergies   Allergen Reactions    Ace Inhibitors Cough    Iodine Rash    Penicillins Rash     Social History     Tobacco Use    Smoking status: Former     Types: Cigarettes     Start date: 2000     Quit date: 2000     Years since quittin.7    Smokeless tobacco: Never   Vaping Use    Vaping Use: Never used   Substance Use Topics    Alcohol use: Not Currently    Drug use: Never     72-year-old lady were following up on 4 acute encephalopathy with severe hyponatremia. She underwent EEG yesterday showing diffuse delta activity. Sodium this morning is up to 111. today this morning her son is at the bedside. He indicates she is markedly better. She also notes that she feels much better and she really does not recall much of yet    Examination  Visit Vitals  BP (!) 147/89 (BP 1 Location: Left upper arm, BP Patient Position: At rest)   Pulse (!) 105   Temp 97.6 °F (36.4 °C)   Resp 22   Ht 5' 2\" (1.575 m)   Wt 60.6 kg (133 lb 9.6 oz)   SpO2 93%   BMI 24.44 kg/m²   She is sitting up in bed quite well comfortable and well-appearing. She is engaging and interactive. She is oriented to [facility] 2022 recalling the current president is Sylvie Antonio the previous is Trump when I give her clues and prior to that Obama. She has no cranial nerve deficit.   Motor focality is not present and she has no ataxia      Impression/Plan  Acute encephalopathy secondary to profound hyponatremia  Continue to treat that underlying condition  From a neurologic standpoint she is intact  We will follow-up as needed    Richar Lima MD    [id number]    This note was created using voice recognition software. Despite editing, there may be syntax errors.

## 2022-09-08 NOTE — PROGRESS NOTES
Comprehensive Nutrition Assessment    Type and Reason for Visit: Positive nutrition screen    Nutrition Recommendations/Plan:   Continue current diet   Begin ONS daily - Ensure Compact provides 220 kcals, 32 g carb, 9 g protein each  Offer antiemetics prior to meals or consider scheduling  If medically appropriate, would Carafate prior to meals help improve PO? Malnutrition Assessment:  Malnutrition Status:  Severe malnutrition (09/08/22 1622)    Context:  Chronic illness     Findings of the 6 clinical characteristics of malnutrition:   Energy Intake:  75% or less est energy requirements for 1 month or longer  Weight Loss:  Greater than 7.5% over 3 months; Wt is down 5.6% in the last month, 8.6% down from May to August   Body Fat Loss:  Unable to assess,     Muscle Mass Loss:  Unable to assess,    Fluid Accumulation:  No significant fluid accumulation,     Strength:  Not performed     Nutrition Assessment:      58: 80year old female admitted for Dyspnea [R06.00] who  has a past medical history of Asthma, CHF (congestive heart failure), NYHA class I, chronic, systolic ([facility]), Chronic anticoagulation, GERD (gastroesophageal reflux disease), Hiatal hernia, breast cancer (2015), Hypothyroid, Paroxysmal A-fib ([address] Venous insufficiency, and Weight loss. RD visited with patient. She is not eating well. She is feeling nauseous and is ready for medication. Pt reports she is losing track of time. She had just a couple bites of pudding. MDs working to correct sodium slowly. GI notes, \"8/10/2022 showed candidal esophagitis but she cannot take fluconazole in concert with sotalol. Ongoing symptoms despite nystatin. \" SLP notes pt refused Nystatin yesterday. She has accepted 2 doses today thus far. Pt may benefit from ONS & scheduled antiemetics. Nutrition Related Findings:      Wound Type: None  Last Bowel Movement Date: 09/04/22  Abdominal Assessment: Intact  Appetite: NPO  Bowel Sounds:  Active     Edema:No data recorded    Nutr. Labs:  Lab Results   Component Value Date/Time    GFR est AA >60 09/08/2022 03:28 PM    GFR est non-AA >60 09/08/2022 03:28 PM    Creatinine 0.37 (L) 09/08/2022 03:28 PM    BUN 5 (L) 09/08/2022 03:28 PM    Sodium 113 (LL) 09/08/2022 03:28 PM    Potassium 4.4 09/08/2022 03:28 PM    Chloride 82 (L) 09/08/2022 03:28 PM    CO2 22 09/08/2022 03:28 PM     Lab Results   Component Value Date/Time    Glucose 111 (H) 09/08/2022 03:28 PM    Glucose (POC) 146 (H) 09/06/2022 01:21 PM     No results found for: HBA1C, MIM2KROG, PQV7AUPY, UWV6DELP    Nutr. Meds:  Nystatin, betapace, 3% NaCl @ 50 mL/hr, multiple nebulizers  PRN: zofran, miralax, compazine,     Current Nutrition Intake & Therapies:  Average Meal Intake: 51-75%  Average Supplement Intake: None ordered  ADULT DIET Regular    Documented Meal intake:  Patient Vitals for the past 168 hrs:   % Diet Eaten   09/08/22 1353 0%   09/08/22 0919 1 - 25%   09/08/22 0800 0%     Documentation of supplement intake:  No data found. Anthropometric Measures:  Height: 5' 2\" (157.5 cm)  Ideal Body Weight (IBW): 110 lbs (50 kg)  Admission Body Weight: 134 lb  Current Body Wt:  60.6 kg (133 lb 9.6 oz), 121.5 % IBW. Standing scale  Current BMI (kg/m2): 24.4  Usual Body Weight: 63.5 kg (140 lb)  % Weight Change (Calculated): -4.6  Weight Adjustment: No adjustment                 BMI Category: Normal weight (BMI 18.5-24. 9)    Last 3 Recorded Weights in this Encounter    09/05/22 0749 09/06/22 0805 09/06/22 1106   Weight: 58.4 kg (128 lb 12 oz) 60.6 kg (133 lb 9.6 oz) 60.6 kg (133 lb 9.6 oz)     Wt Readings from Last 10 Encounters:   09/06/22 60.6 kg (133 lb 9.6 oz)   08/23/22 64.1 kg (141 lb 5 oz)   08/10/22 63 kg (138 lb 14.2 oz)   05/01/22 68.5 kg (151 lb 0.2 oz)   06/14/21 76.8 kg (169 lb 5 oz)   04/28/21 76.8 kg (169 lb 6.4 oz)   01/13/21 77.1 kg (170 lb)   01/11/21 77.1 kg (170 lb)   01/06/21 77.1 kg (170 lb)   08/17/20 74.4 kg (164 lb)       Estimated Daily Nutrient Needs:  Energy Requirements Based On: Kcal/kg  Weight Used for Energy Requirements: Current  Energy (kcal/day): 1515 - 1700 kcal/d (25-28 kcal/kg)  Weight Used for Protein Requirements: Current  Protein (g/day): 50 - 67 g (0.8-1.1 g/kg)  Method Used for Fluid Requirements: 1 ml/kcal  Fluid (ml/day): 1515 - 1700 mL/d    Nutrition Diagnosis:   Inadequate energy intake, Severe malnutrition related to altered GI function, early satiety as evidenced by poor intake prior to admission, weight loss greater than or equal to 10% in 6 months, intake 0-25%, nausea    Nutrition Interventions:   Food and/or Nutrient Delivery: Start oral nutrition supplement  Nutrition Education/Counseling: Education initiated  Coordination of Nutrition Care: Continue to monitor while inpatient, Interdisciplinary rounds  Plan of Care discussed with: team during IDRs    Goals:     Goals: PO intake 50% or greater, by next RD assessment       Nutrition Monitoring and Evaluation:   Behavioral-Environmental Outcomes: None identified  Food/Nutrient Intake Outcomes: Food and nutrient intake, Supplement intake  Physical Signs/Symptoms Outcomes: GI status, Weight, Meal time behavior    Discharge Planning:    Continue oral nutrition supplement    Nahid Feliciano RD, MS  Contact via [employer] or office [phone]

## 2022-09-09 ENCOUNTER — APPOINTMENT (OUTPATIENT)
Dept: GENERAL RADIOLOGY | Age: 82
DRG: 391 | End: 2022-09-09
Attending: INTERNAL MEDICINE
Payer: MEDICARE

## 2022-09-09 LAB
ANION GAP SERPL CALC-SCNC: 10 MMOL/L (ref 5–15)
ANION GAP SERPL CALC-SCNC: 11 MMOL/L (ref 5–15)
ANION GAP SERPL CALC-SCNC: 7 MMOL/L (ref 5–15)
ANION GAP SERPL CALC-SCNC: 8 MMOL/L (ref 5–15)
BACTERIA SPEC CULT: NORMAL
BASOPHILS # BLD: 0 K/UL (ref 0–0.1)
BASOPHILS NFR BLD: 0 % (ref 0–1)
BNP SERPL-MCNC: 1651 PG/ML
BUN SERPL-MCNC: 13 MG/DL (ref 6–20)
BUN SERPL-MCNC: 15 MG/DL (ref 6–20)
BUN SERPL-MCNC: 5 MG/DL (ref 6–20)
BUN SERPL-MCNC: 5 MG/DL (ref 6–20)
BUN SERPL-MCNC: 6 MG/DL (ref 6–20)
BUN SERPL-MCNC: 7 MG/DL (ref 6–20)
BUN/CREAT SERPL: 10 (ref 12–20)
BUN/CREAT SERPL: 11 (ref 12–20)
BUN/CREAT SERPL: 13 (ref 12–20)
BUN/CREAT SERPL: 13 (ref 12–20)
BUN/CREAT SERPL: 16 (ref 12–20)
BUN/CREAT SERPL: 19 (ref 12–20)
CALCIUM SERPL-MCNC: 8.9 MG/DL (ref 8.5–10.1)
CALCIUM SERPL-MCNC: 9 MG/DL (ref 8.5–10.1)
CALCIUM SERPL-MCNC: 9 MG/DL (ref 8.5–10.1)
CALCIUM SERPL-MCNC: 9.2 MG/DL (ref 8.5–10.1)
CALCIUM SERPL-MCNC: 9.5 MG/DL (ref 8.5–10.1)
CALCIUM SERPL-MCNC: 9.5 MG/DL (ref 8.5–10.1)
CHLORIDE SERPL-SCNC: 87 MMOL/L (ref 97–108)
CHLORIDE SERPL-SCNC: 88 MMOL/L (ref 97–108)
CHLORIDE SERPL-SCNC: 89 MMOL/L (ref 97–108)
CHLORIDE SERPL-SCNC: 90 MMOL/L (ref 97–108)
CHLORIDE SERPL-SCNC: 91 MMOL/L (ref 97–108)
CHLORIDE SERPL-SCNC: 91 MMOL/L (ref 97–108)
CO2 SERPL-SCNC: 22 MMOL/L (ref 21–32)
CO2 SERPL-SCNC: 24 MMOL/L (ref 21–32)
CO2 SERPL-SCNC: 25 MMOL/L (ref 21–32)
CO2 SERPL-SCNC: 26 MMOL/L (ref 21–32)
CREAT SERPL-MCNC: 0.38 MG/DL (ref 0.55–1.02)
CREAT SERPL-MCNC: 0.38 MG/DL (ref 0.55–1.02)
CREAT SERPL-MCNC: 0.46 MG/DL (ref 0.55–1.02)
CREAT SERPL-MCNC: 0.7 MG/DL (ref 0.55–1.02)
CREAT SERPL-MCNC: 0.77 MG/DL (ref 0.55–1.02)
CREAT SERPL-MCNC: 1.02 MG/DL (ref 0.55–1.02)
DIFFERENTIAL METHOD BLD: ABNORMAL
EOSINOPHIL # BLD: 0 K/UL (ref 0–0.4)
EOSINOPHIL NFR BLD: 0 % (ref 0–7)
ERYTHROCYTE [DISTWIDTH] IN BLOOD BY AUTOMATED COUNT: 14.5 % (ref 11.5–14.5)
GLUCOSE SERPL-MCNC: 103 MG/DL (ref 65–100)
GLUCOSE SERPL-MCNC: 107 MG/DL (ref 65–100)
GLUCOSE SERPL-MCNC: 116 MG/DL (ref 65–100)
GLUCOSE SERPL-MCNC: 122 MG/DL (ref 65–100)
GLUCOSE SERPL-MCNC: 124 MG/DL (ref 65–100)
GLUCOSE SERPL-MCNC: 173 MG/DL (ref 65–100)
HCT VFR BLD AUTO: 29.7 % (ref 35–47)
HGB BLD-MCNC: 10.1 G/DL (ref 11.5–16)
IMM GRANULOCYTES # BLD AUTO: 0.2 K/UL (ref 0–0.04)
IMM GRANULOCYTES NFR BLD AUTO: 1 % (ref 0–0.5)
LYMPHOCYTES # BLD: 0.8 K/UL (ref 0.8–3.5)
LYMPHOCYTES NFR BLD: 4 % (ref 12–49)
MCH RBC QN AUTO: 28.5 PG (ref 26–34)
MCHC RBC AUTO-ENTMCNC: 34 G/DL (ref 30–36.5)
MCV RBC AUTO: 83.7 FL (ref 80–99)
MONOCYTES # BLD: 1.4 K/UL (ref 0–1)
MONOCYTES NFR BLD: 7 % (ref 5–13)
NEUTS SEG # BLD: 17.5 K/UL (ref 1.8–8)
NEUTS SEG NFR BLD: 88 % (ref 32–75)
NRBC # BLD: 0 K/UL (ref 0–0.01)
NRBC BLD-RTO: 0 PER 100 WBC
OSMOLALITY UR: 119 MOSM/KG H2O
PLATELET # BLD AUTO: 559 K/UL (ref 150–400)
PLATELET COMMENTS,PCOM: ABNORMAL
PMV BLD AUTO: 8.9 FL (ref 8.9–12.9)
POTASSIUM SERPL-SCNC: 3.9 MMOL/L (ref 3.5–5.1)
POTASSIUM SERPL-SCNC: 4.2 MMOL/L (ref 3.5–5.1)
POTASSIUM SERPL-SCNC: 4.2 MMOL/L (ref 3.5–5.1)
POTASSIUM SERPL-SCNC: 4.3 MMOL/L (ref 3.5–5.1)
POTASSIUM SERPL-SCNC: 4.4 MMOL/L (ref 3.5–5.1)
POTASSIUM SERPL-SCNC: 4.5 MMOL/L (ref 3.5–5.1)
PROCALCITONIN SERPL-MCNC: 0.19 NG/ML
RBC # BLD AUTO: 3.55 M/UL (ref 3.8–5.2)
RBC MORPH BLD: ABNORMAL
SERVICE CMNT-IMP: NORMAL
SODIUM SERPL-SCNC: 119 MMOL/L (ref 136–145)
SODIUM SERPL-SCNC: 121 MMOL/L (ref 136–145)
SODIUM SERPL-SCNC: 122 MMOL/L (ref 136–145)
SODIUM SERPL-SCNC: 124 MMOL/L (ref 136–145)
SODIUM SERPL-SCNC: 124 MMOL/L (ref 136–145)
SODIUM SERPL-SCNC: 125 MMOL/L (ref 136–145)
SODIUM UR-SCNC: 16 MMOL/L
WBC # BLD AUTO: 19.9 K/UL (ref 3.6–11)

## 2022-09-09 PROCEDURE — 84300 ASSAY OF URINE SODIUM: CPT

## 2022-09-09 PROCEDURE — 71045 X-RAY EXAM CHEST 1 VIEW: CPT

## 2022-09-09 PROCEDURE — 83880 ASSAY OF NATRIURETIC PEPTIDE: CPT

## 2022-09-09 PROCEDURE — 80048 BASIC METABOLIC PNL TOTAL CA: CPT

## 2022-09-09 PROCEDURE — 74011000250 HC RX REV CODE- 250: Performed by: INTERNAL MEDICINE

## 2022-09-09 PROCEDURE — 85025 COMPLETE CBC W/AUTO DIFF WBC: CPT

## 2022-09-09 PROCEDURE — 77010033678 HC OXYGEN DAILY

## 2022-09-09 PROCEDURE — 36415 COLL VENOUS BLD VENIPUNCTURE: CPT

## 2022-09-09 PROCEDURE — 84145 PROCALCITONIN (PCT): CPT

## 2022-09-09 PROCEDURE — 97530 THERAPEUTIC ACTIVITIES: CPT

## 2022-09-09 PROCEDURE — 74011250636 HC RX REV CODE- 250/636: Performed by: INTERNAL MEDICINE

## 2022-09-09 PROCEDURE — 94640 AIRWAY INHALATION TREATMENT: CPT

## 2022-09-09 PROCEDURE — 74011250637 HC RX REV CODE- 250/637: Performed by: INTERNAL MEDICINE

## 2022-09-09 PROCEDURE — 65270000046 HC RM TELEMETRY

## 2022-09-09 PROCEDURE — 97535 SELF CARE MNGMENT TRAINING: CPT | Performed by: OCCUPATIONAL THERAPIST

## 2022-09-09 PROCEDURE — 83935 ASSAY OF URINE OSMOLALITY: CPT

## 2022-09-09 RX ORDER — FUROSEMIDE 10 MG/ML
20 INJECTION INTRAMUSCULAR; INTRAVENOUS DAILY
Status: DISCONTINUED | OUTPATIENT
Start: 2022-09-09 | End: 2022-09-10

## 2022-09-09 RX ORDER — NITROGLYCERIN 0.4 MG/1
0.4 TABLET SUBLINGUAL
Status: DISCONTINUED | OUTPATIENT
Start: 2022-09-09 | End: 2022-09-09

## 2022-09-09 RX ADMIN — SOTALOL HYDROCHLORIDE 80 MG: 80 TABLET ORAL at 21:23

## 2022-09-09 RX ADMIN — ARFORMOTEROL TARTRATE 15 MCG: 15 SOLUTION RESPIRATORY (INHALATION) at 19:50

## 2022-09-09 RX ADMIN — RIVAROXABAN 20 MG: 10 TABLET, FILM COATED ORAL at 17:16

## 2022-09-09 RX ADMIN — NYSTATIN 500000 UNITS: 100000 SUSPENSION ORAL at 21:25

## 2022-09-09 RX ADMIN — SOTALOL HYDROCHLORIDE 80 MG: 80 TABLET ORAL at 09:08

## 2022-09-09 RX ADMIN — TOBRAMYCIN SULFATE 300 MG: 40 INJECTION, SOLUTION INTRAMUSCULAR; INTRAVENOUS at 08:03

## 2022-09-09 RX ADMIN — NYSTATIN 500000 UNITS: 100000 SUSPENSION ORAL at 09:08

## 2022-09-09 RX ADMIN — IPRATROPIUM BROMIDE AND ALBUTEROL SULFATE 3 ML: .5; 3 SOLUTION RESPIRATORY (INHALATION) at 07:55

## 2022-09-09 RX ADMIN — IPRATROPIUM BROMIDE AND ALBUTEROL SULFATE 3 ML: .5; 3 SOLUTION RESPIRATORY (INHALATION) at 19:46

## 2022-09-09 RX ADMIN — IPRATROPIUM BROMIDE AND ALBUTEROL SULFATE 3 ML: .5; 3 SOLUTION RESPIRATORY (INHALATION) at 02:01

## 2022-09-09 RX ADMIN — NYSTATIN 500000 UNITS: 100000 SUSPENSION ORAL at 12:50

## 2022-09-09 RX ADMIN — MONTELUKAST 10 MG: 10 TABLET, FILM COATED ORAL at 21:25

## 2022-09-09 RX ADMIN — NYSTATIN 500000 UNITS: 100000 SUSPENSION ORAL at 17:17

## 2022-09-09 RX ADMIN — SODIUM CHLORIDE, PRESERVATIVE FREE 10 ML: 5 INJECTION INTRAVENOUS at 14:20

## 2022-09-09 RX ADMIN — ACETAMINOPHEN 650 MG: 325 TABLET, FILM COATED ORAL at 23:31

## 2022-09-09 RX ADMIN — TOBRAMYCIN SULFATE 300 MG: 40 INJECTION, SOLUTION INTRAMUSCULAR; INTRAVENOUS at 20:26

## 2022-09-09 RX ADMIN — BUDESONIDE 500 MCG: 0.5 SUSPENSION RESPIRATORY (INHALATION) at 07:55

## 2022-09-09 RX ADMIN — BUDESONIDE 500 MCG: 0.5 SUSPENSION RESPIRATORY (INHALATION) at 19:50

## 2022-09-09 RX ADMIN — ARFORMOTEROL TARTRATE 15 MCG: 15 SOLUTION RESPIRATORY (INHALATION) at 07:55

## 2022-09-09 RX ADMIN — IPRATROPIUM BROMIDE AND ALBUTEROL SULFATE 3 ML: .5; 3 SOLUTION RESPIRATORY (INHALATION) at 14:40

## 2022-09-09 RX ADMIN — SODIUM CHLORIDE, PRESERVATIVE FREE 10 ML: 5 INJECTION INTRAVENOUS at 21:25

## 2022-09-09 RX ADMIN — ACETAMINOPHEN 650 MG: 325 TABLET, FILM COATED ORAL at 03:16

## 2022-09-09 RX ADMIN — CEFEPIME 2 G: 2 INJECTION, POWDER, FOR SOLUTION INTRAVENOUS at 17:19

## 2022-09-09 RX ADMIN — ACETAMINOPHEN 650 MG: 325 TABLET, FILM COATED ORAL at 09:13

## 2022-09-09 NOTE — PROGRESS NOTES
Transition of Care Plan: RUR-20%, LOS 5 days  PT/OT/SLP following--PT/OT recommending home health vs snf  A list of snf's was left in the patient's room  3.   GI, neurology, nephrology, plumonolgy following (on 3L O2)  4. Diet advanced to regular  5. Family to transport  home at d/c  6. Medical management continues  7. CM following for any needs prior to d/c  SILVIA Mello

## 2022-09-09 NOTE — PROGRESS NOTES
[facility]  [address] [place] [address], [facility] 19  [phone]    Medical Progress Note      NAME: Gab Campoverde   :  1940  MRM:  [id number]    Date/Time of service 2022  9:32 AM            Subjective:     Chief Complaint:  \"I'm okay\"     Pt seen and examined. No complaints. Discussed Na+ results. States baseline O2 requirement is 3L. ROS:  (bold if positive, if negative)    Cough      Sputum      SOB/HERRERA     Objective:     Last 24hrs VS reviewed since prior progress note.  Most recent are:    Visit Vitals  BP (!) 128/58 (BP Patient Position: Supine)   Pulse 78   Temp 97.1 °F (36.2 °C)   Resp 21   Ht 5' 2\" (1.575 m)   Wt 60 kg (132 lb 4.4 oz)   SpO2 97%   BMI 24.19 kg/m²     SpO2 Readings from Last 6 Encounters:   22 97%   22 95%   08/10/22 94%   22 96%   21 99%   21 97%    O2 Flow Rate (L/min): 3 l/min     Intake/Output Summary (Last 24 hours) at 2022 1503  Last data filed at 2022 0900  Gross per 24 hour   Intake 340 ml   Output 2250 ml   Net -1910 ml        Physical Exam:    Gen: NAD   HEENT:  Pink conjunctivae, PERRL, hearing intact to voice, moist mucous membranes  Neck:  Supple, without masses, thyroid non-tender  Resp: Diffuse crackles   Card:  No murmurs, normal S1, S2 without thrills, bruits    Abd:  Soft, non-tender, non-distended, normoactive bowel sounds are present, no mass  Lymph:  No cervical or inguinal adenopathy  Musc:  No cyanosis or clubbing  Skin:  No rashes or ulcers, skin turgor is good  Neuro:  Cranial nerves are grossly intact, general motor weakness, follows commands vaguely  Psych:  Good insight   __________________________________________________________________  Medications Reviewed: (see below)  Medications:     Current Facility-Administered Medications   Medication Dose Route Frequency    furosemide (LASIX) injection 20 mg  20 mg IntraVENous DAILY    sodium chloride (NS) flush 5-40 mL  5-40 mL IntraVENous PRN    guaiFENesin (ROBITUSSIN) 100 mg/5 mL oral liquid 100 mg  100 mg Oral Q4H PRN    prochlorperazine (COMPAZINE) injection 5 mg  5 mg IntraVENous Q6H PRN    nystatin (MYCOSTATIN) 100,000 unit/mL oral suspension 500,000 Units  500,000 Units Oral QID    ketotifen (ZADITOR) 0.025 % (0.035 %) ophthalmic solution 1 Drop  1 Drop Both Eyes BID PRN    albuterol-ipratropium (DUO-NEB) 2.5 MG-0.5 MG/3 ML  3 mL Nebulization Q6H RT    tobramycin (NEBCIN) injection 300 mg  300 mg Nebulization BID RT    arformoteroL (BROVANA) neb solution 15 mcg  15 mcg Nebulization BID RT    budesonide (PULMICORT) 500 mcg/2 ml nebulizer suspension  500 mcg Nebulization BID RT    albuterol-ipratropium (DUO-NEB) 2.5 MG-0.5 MG/3 ML  3 mL Nebulization Q6H PRN    montelukast (SINGULAIR) tablet 10 mg  10 mg Oral QHS    sotaloL (BETAPACE) tablet 80 mg  80 mg Oral Q12H    sodium chloride (NS) flush 5-40 mL  5-40 mL IntraVENous Q8H    sodium chloride (NS) flush 5-40 mL  5-40 mL IntraVENous PRN    acetaminophen (TYLENOL) tablet 650 mg  650 mg Oral Q6H PRN    polyethylene glycol (MIRALAX) packet 17 g  17 g Oral DAILY PRN    ondansetron (ZOFRAN) injection 4 mg  4 mg IntraVENous Q6H PRN    rivaroxaban (XARELTO) tablet 20 mg  20 mg Oral DAILY WITH DINNER        Lab Data Reviewed: (see below)  Lab Review:     Recent Labs     09/09/22  1028 09/07/22  0154   WBC 19.9* 15.1*   HGB 10.1* 9.5*   HCT 29.7* 27.2*   * 510*     Recent Labs     09/09/22  1028 09/09/22  0647 09/09/22  0246 09/07/22  0457 09/07/22  0154   * 122* 121*   < > 111*   K 3.9 4.2 4.2   < > 4.3   CL 91* 89* 88*   < > 79*   CO2 25 25 25   < > 22   * 116* 107*   < > 106*   BUN 7 6 5*   < > 4*   CREA 0.70 0.38* 0.38*   < > 0.40*   CA 9.2 9.0 9.0   < > 8.0*   ALB  --   --   --   --  2.0*   TBILI  --   --   --   --  0.5   ALT  --   --   --   --  11*    < > = values in this interval not displayed.      Lab Results   Component Value Date/Time    Glucose (POC) 146 (H) 09/06/2022 01:21 PM     No results for input(s): PH, PCO2, PO2, HCO3, FIO2 in the last 72 hours. No results for input(s): INR, INREXT, INREXT in the last 72 hours. All Micro Results       Procedure Component Value Units Date/Time    CULTURE, RESPIRATORY/SPUTUM/BRONCH Mitra Mi [[id number]]     Order Status: Sent Specimen: Sputum     CULTURE, BLOOD, PAIRED [[id number]] Collected: 09/04/22 1339    Order Status: Completed Specimen: Blood Updated: 09/09/22 0737     Special Requests: NO SPECIAL REQUESTS        Culture result: NO GROWTH 5 DAYS       CULTURE, URINE [[id number]] Collected: 09/07/22 1014    Order Status: Completed Specimen: Urine from Clean catch Updated: 09/08/22 1125     Special Requests: NO SPECIAL REQUESTS        Culture result: No growth (<1,000 CFU/ML)       CULTURE, RESPIRATORY/SPUTUM/BRONCH Ramos Kayser STAIN [[id number]]     Order Status: Sent Specimen: Sputum     URINE CULTURE HOLD SAMPLE [[id number]] Collected: 09/07/22 1014    Order Status: Completed Specimen: Serum Updated: 09/07/22 1038     Urine culture hold       Urine on hold in Microbiology dept for 2 days. If unpreserved urine is submitted, it cannot be used for addtional testing after 24 hours, recollection will be required.           CULTURE, RESPIRATORY/SPUTUM/BRONCH Mitra Shmuel [[id number]] Collected: 09/06/22 0207    Order Status: Canceled Specimen: Sputum     RESPIRATORY VIRUS PANEL W/COVID-19, PCR [[id number]] Collected: 09/05/22 1237    Order Status: Completed Specimen: Nasopharyngeal Updated: 09/05/22 2021     Adenovirus Not detected        Coronavirus 229E Not detected        Coronavirus HKU1 Not detected        Coronavirus CVNL63 Not detected        Coronavirus OC43 Not detected        SARS-CoV-2, PCR Not detected        Metapneumovirus Not detected        Rhinovirus and Enterovirus Not detected        Influenza A Not detected        Influenza A, subtype H1 Not detected        Influenza A, subtype H3 Not detected        INFLUENZA A H1N1 PCR Not detected        Influenza B Not detected        Parainfluenza 1 Not detected        Parainfluenza 2 Not detected        Parainfluenza 3 Not detected        Parainfluenza virus 4 Not detected        RSV by PCR Not detected        B. parapertussis, PCR Not detected        Bordetella pertussis - PCR Not detected        Chlamydophila pneumoniae DNA, QL, PCR Not detected        Mycoplasma pneumoniae DNA, QL, PCR Not detected       COVID-19 RAPID TEST [[id number]] Collected: 09/04/22 1339    Order Status: Completed Specimen: Nasopharyngeal Updated: 09/04/22 1414     Specimen source Nasopharyngeal        COVID-19 rapid test Not detected        Comment: Rapid Abbott ID Now       Rapid NAAT:  The specimen is NEGATIVE for SARS-CoV-2, the novel coronavirus associated with COVID-19. Negative results should be treated as presumptive and, if inconsistent with clinical signs and symptoms or necessary for patient management, should be tested with an alternative molecular assay. Negative results do not preclude SARS-CoV-2 infection and should not be used as the sole basis for patient management decisions. This test has been authorized by the FDA under an Emergency Use Authorization (EUA) for use by authorized laboratories. Fact sheet for Healthcare Providers: ConventionUpdate.co.nz  Fact sheet for Patients: ConventionUpdate.co.nz       Methodology: Isothermal Nucleic Acid Amplification                  Assessment and Plan:   Hyponatremia / Hypokalemia / Hypomagnesemia - POA, at first thought likely due to poor PO intake. But on 9/7 Na was suddenly, severely, inexplicably worse at 289. Had dropped to 122 9/6. S/p hypertonic saline.  S/p tolvaptan on 9/8  -monitor   -IV lasix x 1 today as proBNP and clinical exam suggestive of (+) volume status   -trend BMP's      Abnormal CT chest / Leukocytosis - CT reports \"Scattered reticulonodular changes throughout the lungs with areas of slightly more consolidative. Bronchial wall thickening with mucous plugging. Findings are likely infectious or inflammatory\"  -will ask pulm to re-eval   -check sputum cultures considering worsening WBC count   -repeat CXR appears improved and pt NOT febrile      Paroxysmal A-fib with RVR / Chronic anticoagulation   -on sotalol and Xarelto     Nausea - likely 2/2 recent COVID, esophageal candidiasis, ?meds   -check lipase   -consider ab CT   -on Nystatin   -?sotalol; will correlate symptoms with that med  -zofran PRN     CHF (congestive heart failure), NYHA class I, chronic, systolic / Venous insufficiency   -proBNP is elevated and crackles on exam; IV lasix now       Anemia - stable  -monitor      Weight loss / GERD (gastroesophageal reflux disease) / Hiatal hernia / Candidiasis - ?2/2 esophageal candidiasis   -consider CT ab/pelvis in the AM; did have a CT in June; will further delineate if weight loss was ongoing at that time. If so, lower yield      Asthma - not wheezing   -continue nebs in place of home inhalers      Hx breast cancer  -outpt follow-up       Hypothyroid   -TSH WNL   -levothyroxine stopped by prior MD; d/w pt will need repeat TFT's in the future to ensure stability     Tingling / Weakness - Noted by patient 9/6. Code stroke called. MRI WNL   -monitor     Leukocytosis - ?etiology. Procalcitonin low. CXR showing interval improvement. Has not been on steroids.  UA on 9/7 WNL   -monitor   -defer to pulm re: antibiotics  -check sputum cultures     Other pertinent lab: none    Total time spent with patient: 28 895 [facility] discussed with: Patient, Family, Care Manager, Nursing Staff, Consultant/Specialist, and >50% of time spent in counseling and coordination of care    Discussed:  Care Plan and D/C Planning    Prophylaxis:  Lovenox and H2B/PPI    Disposition:  Home w/Family           ___________________________________________________    Attending Physician: Rashid Burnett Halley Alaniz MD

## 2022-09-09 NOTE — PROGRESS NOTES
1900  Bedside and Verbal shift change report given to Keith Gaspar RN (oncoming nurse) by Adilene Rolon RN (offgoing nurse). Report included the following information SBAR, Kardex, Intake/Output, MAR, Recent Results, and Cardiac Rhythm NSR . Critera met for insert Yes  Reason for insert: Urinary obstruction/retention  Date of insert: 9/7/22  Order is current: Yes  MD or RN driven: Provider  Removal Discussed Yes  Last CHG Bath: 9/9/22 @ 0900   [place] Care Last Completed: Date/Time: 9/9/22 @ 3568  [place] Care After Each Bowel Movement: Yes  Education documented every 24 hours Yes  Care Plan (Risk for UTI, [place]) Updated Every 24 hours Yes  Bag below Bladder Yes        Bag off Floor Yes      Sheet Clip used Yes          Tubing free of dependant loops Yes          Seal Intact Yes            Bag less than 1/2 full Yes    This patient was assisted with Intentional Toileting every 2 hours during this shift as appropriate. Documentation of ambulation and output reflected on Flowsheet as appropriate. Purposeful hourly rounding was completed using AIDET and 5Ps. Outcomes of PHR documented as they occurred. Bed alarm in use as appropriate. Dual Suction and ambubag in place. 0700  Bedside and Verbal shift change report given to Adilene Rolon RN (oncoming nurse) by Keith Gaspar RN (offgoing nurse). Report included the following information SBAR, Kardex, Intake/Output, MAR, Recent Results, and Cardiac Rhythm NSR .

## 2022-09-09 NOTE — PROGRESS NOTES
Olivia Herrera 55  YOB: 1940          Assessment & Plan:     Hyponatremia due to SIADH  Resp failure  COPD  Bronchiectasis  N/V    Rec:  Deluca in place. Urine still looks dilute  Got a dose tolvaptan yesterday  Observe today. Rate of correction, fortunately has been good       Subjective:   CC: follow up hyponatremia  HPI: Got tolvaptan yesterday. Na up 115 -> 122 past 24 hours.  Deluca in place with dilute appearing urine   ROS: No confusion/n/v  Current Facility-Administered Medications   Medication Dose Route Frequency    sodium chloride (NS) flush 5-40 mL  5-40 mL IntraVENous PRN    guaiFENesin (ROBITUSSIN) 100 mg/5 mL oral liquid 100 mg  100 mg Oral Q4H PRN    prochlorperazine (COMPAZINE) injection 5 mg  5 mg IntraVENous Q6H PRN    nystatin (MYCOSTATIN) 100,000 unit/mL oral suspension 500,000 Units  500,000 Units Oral QID    ketotifen (ZADITOR) 0.025 % (0.035 %) ophthalmic solution 1 Drop  1 Drop Both Eyes BID PRN    albuterol-ipratropium (DUO-NEB) 2.5 MG-0.5 MG/3 ML  3 mL Nebulization Q6H RT    tobramycin (NEBCIN) injection 300 mg  300 mg Nebulization BID RT    arformoteroL (BROVANA) neb solution 15 mcg  15 mcg Nebulization BID RT    budesonide (PULMICORT) 500 mcg/2 ml nebulizer suspension  500 mcg Nebulization BID RT    albuterol-ipratropium (DUO-NEB) 2.5 MG-0.5 MG/3 ML  3 mL Nebulization Q6H PRN    montelukast (SINGULAIR) tablet 10 mg  10 mg Oral QHS    sotaloL (BETAPACE) tablet 80 mg  80 mg Oral Q12H    sodium chloride (NS) flush 5-40 mL  5-40 mL IntraVENous Q8H    sodium chloride (NS) flush 5-40 mL  5-40 mL IntraVENous PRN    acetaminophen (TYLENOL) tablet 650 mg  650 mg Oral Q6H PRN    polyethylene glycol (MIRALAX) packet 17 g  17 g Oral DAILY PRN    ondansetron (ZOFRAN) injection 4 mg  4 mg IntraVENous Q6H PRN    rivaroxaban (XARELTO) tablet 20 mg  20 mg Oral DAILY WITH DINNER          Objective:     Vitals:  Blood pressure (!) 117/59, pulse 74, temperature 97.1 °F (36.2 °C), resp. rate 21, height 5' 2\" (1.575 m), weight 60 kg (132 lb 4.4 oz), SpO2 98 %. Temp (24hrs), Av.7 °F (36.5 °C), Min:97.1 °F (36.2 °C), Max:98.3 °F (36.8 °C)      Intake and Output:  701 - 1900  In: -   Out: 400 [Urine:400]  1901 - 700  In: 700 [P.O.:700]  Out: 9720 [Urine:2245]    Physical Exam:               GENERAL ASSESSMENT: NAD  HEENT: Nontraumatic   CHEST: On oxygen, few rhonchi  HEART: S1S2  ABDOMEN: Soft,NT  EXTREMITY: no EDEMA  NEURO: Grossly non focal          ECG/rhythm:    Data Review      No results for input(s): TNIPOC in the last 72 hours. No lab exists for component: ITNL   No results for input(s): CPK, CKMB, TROIQ in the last 72 hours. Recent Labs     22  0647 22  0246 22  2230 22  0457 22  0154   * 121* 119*   < > 111*   K 4.2 4.2 4.3   < > 4.3   CL 89* 88* 87*   < > 79*   CO2 25 25 22   < > 22   BUN 6 5* 5*   < > 4*   CREA 0.38* 0.38* 0.46*   < > 0.40*   * 107* 103*   < > 106*   CA 9.0 9.0 8.9   < > 8.0*   ALB  --   --   --   --  2.0*   WBC  --   --   --   --  15.1*   HGB  --   --   --   --  9.5*   HCT  --   --   --   --  27.2*   PLT  --   --   --   --  510*    < > = values in this interval not displayed. No results for input(s): INR, PTP, APTT, INREXT in the last 72 hours. Needs: urine analysis, urine sodium, protein and creatinine  Lab Results   Component Value Date/Time    Sodium,urine random 16 2022 03:09 AM    Creatinine, urine 33.00 2022 10:14 AM           : Ilya Srinivasan MD  2022        [facility]:  [email or web address] [employer]  Ruby Elias office:  [address] [address]  [place], [address] [place] [address]  Phone: [phone]  Fax :     [phone]    Sarah office:  [address] [facility]  Adi Smith  Phone - [phone]  Fax - [phone]

## 2022-09-09 NOTE — PROGRESS NOTES
With increasing WBC's, will restart antibiotics. Would prefer levaquin as this would cover her prior pseudomonas and be an alternative for the stenotrophomonas (as there is concern bactrim led to drop in the sodium), but would interact with sotalol. She has had a rash with penicillin and will start cefepime as this will less likely have a cross reaction than zosyn. Discussed with Dr. Lori Cody and she will touch base with cardiology and see if there is a reasonable alternative to sotalol and if so, would transition to levaquin.

## 2022-09-09 NOTE — PROGRESS NOTES
Name: [employer]   : 1940 Admit Date: 2022   Phone: [phone]  Room: [facility]/01   PCP: Brigitte Calzada MD  MRN: [id number]   Date: 2022  Code: Full Code          Chart and notes reviewed. Data reviewed. I review the patient's current medications in the medical record at each encounter. I have evaluated and examined the patient. History of Present Illness:  Ms. Dawood Phillips is an  yo woman with a history of asthma/COPD, pseudomonas pneumonia/colonization, bronchiectasis, former tobacco use, afib, breast cancer s/p chemo/radiation, seronegative RA and GERD who presented with dyspnea and cough. She is followed by Dr. Claudell Height by [facility] and was last seen . She is managed on Trelegy (this was stopped for a period of time due to concern for GI issues, but pulmonary symptoms worsened when on Advair/Spiriva). She is on jeff nebs for pseudomonas. She recently had an EGD that showed candidal esophagitis, but unable to take fluconazole due to sotalol. She was recently admitted and discharged  for nausea/vomiting and treated for PNA. She was doing well until Friday when she began to feel weak, more short of breath and had increased cough with some chest tightness. Her cough is wet, but has not been notably productive. Has loss additional weight since she was last admitted. Planned for Gastric emptying study and possible upper GI study.      Labs: WBC 10.1, Hgb 9.5, , d-dimer 0.44, cr 0.41, , proBNP 2712, sputum culture  with stenotrophamonas     Images reviewed:  CXR 2022: increased interstitial marking and mild patchy infiltrates (greatest on the R), these are a change from most recent CXR     CT chest 2022: patchy bilateral reticulonodular opacities, bronchial wall thickening, mucous plugging, scattered sub 5mm nodules, no effusions    Interval history  Afebrile  HR 70s  BP stable to low  Sats 95% on 5L   WBC 15.1  Hgb 9.5  Na 122 - better  TSH 1.12  Blood cx no growth x 5 days  Urine cx no growth  RVP neg    Head CT no acute process    MRI brain with no acute intracranial abnormality. Moderate chronic microvascular ischemic disease. Complete opacification of the left maxillary and sphenoid sinuses, likely representing chronic sinusitis    ECHO: EF 23-17%; grade 1 diastolic dysfunction; mildly elevated RVSP    Gastric emptying study: No delay of gastric emptying. Delayed clearance of radiotracer from the esophagus. ROS:  Denies SOB or CP. Reports continued cough, but not bringing much up. Voices no other complaints. Past Medical History:   Diagnosis Date    Asthma     CHF (congestive heart failure), NYHA class I, chronic, systolic (HCC)     Chronic anticoagulation     GERD (gastroesophageal reflux disease)     Hiatal hernia     HX: breast cancer 2015    Right     Hypothyroid     Paroxysmal A-fib (HCC)     Venous insufficiency     bilat LE    Weight loss        Past Surgical History:   Procedure Laterality Date    COLONOSCOPY N/A 2021    COLONOSCOPY AND EGD performed by Flaco Contreras MD at [id number] [place] [address] Drive[address] BREAST LUMPECTOMY Right 2015    HX CATARACT REMOVAL  2013    Bilateral     HX CHOLECYSTECTOMY      HX DILATION AND CURETTAGE      x2    HX ORTHOPAEDIC  2017    RIGHT foot  hammertoe       No family history on file.     Social History     Tobacco Use    Smoking status: Former     Types: Cigarettes     Start date: 2000     Quit date: 2000     Years since quittin.7    Smokeless tobacco: Never   Substance Use Topics    Alcohol use: Not Currently       Allergies   Allergen Reactions    Ace Inhibitors Cough    Iodine Rash    Penicillins Rash       Current Facility-Administered Medications   Medication Dose Route Frequency    sodium chloride (NS) flush 5-40 mL  5-40 mL IntraVENous PRN    guaiFENesin (ROBITUSSIN) 100 mg/5 mL oral liquid 100 mg  100 mg Oral Q4H PRN    prochlorperazine (COMPAZINE) injection 5 mg  5 mg IntraVENous Q6H PRN    nystatin (MYCOSTATIN) 100,000 unit/mL oral suspension 500,000 Units  500,000 Units Oral QID    ketotifen (ZADITOR) 0.025 % (0.035 %) ophthalmic solution 1 Drop  1 Drop Both Eyes BID PRN    albuterol-ipratropium (DUO-NEB) 2.5 MG-0.5 MG/3 ML  3 mL Nebulization Q6H RT    tobramycin (NEBCIN) injection 300 mg  300 mg Nebulization BID RT    arformoteroL (BROVANA) neb solution 15 mcg  15 mcg Nebulization BID RT    budesonide (PULMICORT) 500 mcg/2 ml nebulizer suspension  500 mcg Nebulization BID RT    albuterol-ipratropium (DUO-NEB) 2.5 MG-0.5 MG/3 ML  3 mL Nebulization Q6H PRN    montelukast (SINGULAIR) tablet 10 mg  10 mg Oral QHS    sotaloL (BETAPACE) tablet 80 mg  80 mg Oral Q12H    sodium chloride (NS) flush 5-40 mL  5-40 mL IntraVENous Q8H    sodium chloride (NS) flush 5-40 mL  5-40 mL IntraVENous PRN    acetaminophen (TYLENOL) tablet 650 mg  650 mg Oral Q6H PRN    polyethylene glycol (MIRALAX) packet 17 g  17 g Oral DAILY PRN    ondansetron (ZOFRAN) injection 4 mg  4 mg IntraVENous Q6H PRN    rivaroxaban (XARELTO) tablet 20 mg  20 mg Oral DAILY WITH DINNER         REVIEW OF SYSTEMS   12 point ROS negative except as stated in the HPI. Physical Exam:   Visit Vitals  BP (!) 108/50 (BP 1 Location: Left upper arm, BP Patient Position: At rest)   Pulse 69   Temp 98 °F (36.7 °C)   Resp 20   Ht 5' 2\" (1.575 m)   Wt 60 kg (132 lb 4.4 oz)   SpO2 95%   BMI 24.19 kg/m²       General:  Alert, cooperative, no distress, appears stated age. Head:  Normocephalic, without obvious abnormality, atraumatic. Eyes:  Conjunctivae/corneas clear. Nose: Nares normal. Septum midline. Mucosa normal.    Throat: Lips, mucosa, and tongue normal.    Neck: Supple, symmetrical, trachea midline, no adenopathy. Lungs:   Coarse with bilateral rhonchi, resp nonlabored   Chest wall:  No tenderness or deformity. Heart:  Regular rate and rhythm, S1, S2 normal, no murmur, click, rub or gallop.    Abdomen:   Soft, non-tender. Bowel sounds normal.    Extremities: Extremities normal, atraumatic, no cyanosis or edema. Pulses: 2+ and symmetric all extremities. Skin: Skin color, texture, turgor normal. No rashes or lesions   Neurologic: Grossly nonfocal       Lab Results   Component Value Date/Time    Sodium 122 (L) 09/09/2022 06:47 AM    Potassium 4.2 09/09/2022 06:47 AM    Chloride 89 (L) 09/09/2022 06:47 AM    CO2 25 09/09/2022 06:47 AM    BUN 6 09/09/2022 06:47 AM    Creatinine 0.38 (L) 09/09/2022 06:47 AM    Glucose 116 (H) 09/09/2022 06:47 AM    Calcium 9.0 09/09/2022 06:47 AM    Magnesium 2.0 09/05/2022 05:18 AM       Lab Results   Component Value Date/Time    WBC 15.1 (H) 09/07/2022 01:54 AM    HGB 9.5 (L) 09/07/2022 01:54 AM    PLATELET 196 (H) 40/34/0967 01:54 AM    MCV 81.2 09/07/2022 01:54 AM       Lab Results   Component Value Date/Time    Alk.  phosphatase 126 (H) 09/07/2022 01:54 AM    Protein, total 6.0 (L) 09/07/2022 01:54 AM    Albumin 2.0 (L) 09/07/2022 01:54 AM    Globulin 4.0 09/07/2022 01:54 AM       Lab Results   Component Value Date/Time    Iron 35 09/04/2022 05:26 PM    TIBC 193 (L) 09/04/2022 05:26 PM    Iron % saturation 18 (L) 09/04/2022 05:26 PM    Ferritin 196 09/04/2022 05:26 PM       Lab Results   Component Value Date/Time    TSH 1.12 09/07/2022 04:57 AM        No results found for: PH, PHI, PCO2, PCO2I, PO2, PO2I, HCO3, HCO3I, FIO2, FIO2I    Lab Results   Component Value Date/Time    Troponin-I, Qt. <0.05 01/13/2021 03:17 AM        Lab Results   Component Value Date/Time    Culture result: No growth (<1,000 CFU/ML) 09/07/2022 10:14 AM    Culture result: NO GROWTH 5 DAYS 09/04/2022 01:39 PM    Culture result: MODERATE Stenotrophomonas (Xantho.) maltophilia (A) 08/22/2022 12:30 PM    Culture result: MODERATE NORMAL RESPIRATORY CANDELARIO 08/22/2022 12:30 PM       No results found for: TOXA1, RPR, HBCM, HBSAG, HAAB, HCAB1, HAAT, G6PD, CRYAC, HIVGT, HIVR, HIV1, HIV12, HIVPC, HIVRPI    No results found for: VANCT, CPK    Lab Results   Component Value Date/Time    Color YELLOW/STRAW 09/07/2022 10:14 AM    Appearance CLEAR 09/07/2022 10:14 AM    pH (UA) 8.0 09/07/2022 10:14 AM    Protein Negative 09/07/2022 10:14 AM    Glucose Negative 09/07/2022 10:14 AM    Ketone TRACE (A) 09/07/2022 10:14 AM    Bilirubin Negative 09/07/2022 10:14 AM    Blood Negative 09/07/2022 10:14 AM    Urobilinogen 1.0 09/07/2022 10:14 AM    Nitrites Negative 09/07/2022 10:14 AM    Leukocyte Esterase Negative 09/07/2022 10:14 AM    WBC 0-4 09/07/2022 10:14 AM    RBC 0-5 09/07/2022 10:14 AM    Bacteria Negative 09/07/2022 10:14 AM       IMPRESSION  Acute respiratory failure with hypoxia  Abnormal chest imaging  Hyponatremia  COPD/asthma  Bronchiectasis  History of pseudomonas pneumonia/colonization  GERD  Candidal esophagitis  Nauseas/vomiting  Weight loss    PLAN  Goal sats 88% or higher; not on home O2 at baseline  Recent sputum culture grew stenotrophomonas, not treated with bactrim as she was clinically improving at that time but was restarted this admission given imaging findings and clinical change. Bactrim now stopped per primary team due to significant hyponatremia and concern that Bactrim may be the cause.   Fu repeat CXR and CBC  Would obtain sputum culture if she expectorates sputum; RVP negative  Brovana/Pulmicort, dunoebs  No indication for steroids at this time  Started her week on jeff nebs on 9/3 and ordered to complete on Friday  Nephrology following  Cardiology following      LEROY Burris

## 2022-09-09 NOTE — PROGRESS NOTES
Problem: Mobility Impaired (Adult and Pediatric)  Goal: *Acute Goals and Plan of Care (Insert Text)  Description: FUNCTIONAL STATUS PRIOR TO ADMISSION: Patient was independent and active without use of DME. She reports intermittent use of rollator in the home to assist with brining trays to her spouse or when she needs a seated rest break. HOME SUPPORT PRIOR TO ADMISSION: The patient lived with her spouse with history of dementia and provided meals and ADL assistance for him. Physical Therapy Goals  Initiated 9/5/2022  All goals with intention of being met with least restrictive supplemental oxygen and spo2 >90% or room air  1. Patient will move from supine to sit and sit to supine , scoot up and down, and roll side to side in bed with independence within 7 day(s). 2.  Patient will transfer from bed to chair and chair to bed with modified independence using the least restrictive device within 7 day(s). 3.  Patient will perform sit to stand with modified independence within 7 day(s). 4.  Patient will ambulate with modified independence for 200 feet with the least restrictive device within 7 day(s). 5.  Patient will ascend/descend 3 stairs with 1-2 handrail(s) with minimal assistance/contact guard assist within 7 day(s). Outcome: Progressing Towards Goal     PHYSICAL THERAPY TREATMENT  Patient: Anders Castro (4 y.o. female)  Date: 9/9/2022  Diagnosis: Dyspnea [R06.00] Dyspnea      Precautions: Fall, Skin, Seizure  Chart, physical therapy assessment, plan of care and goals were reviewed. ASSESSMENT  Patient continues with skilled PT services and is progressing towards goals. Today, limited by hypotension. Patient had been seated in chair since earlier OT session with LEs dependent and BP was 81/52 upon arrival with patient feeling weak. BP did improve with LEs elevated and UE exercise. Assisted patient back to bed for rest with overall minimal assist with rolling walker.  Per discussion with OT, patient able to progress mobility earlier this date so hopefully with improvements in medical status she will be able to progress next treatment session. At this time discharge recommendations are pending progress - likely home with 24/7 assist and HHPT vs SNF if does not progress as expected. Per patient, her spouse who she lives with has dementia and also in need of HHPT so she may need assist from other family but family at bedside do appear supportive. Current Level of Function Impacting Discharge (mobility/balance): minimal assist with RW bed to chair    Other factors to consider for discharge: hypotension, she reports spouse had dementia/needs HHPT         PLAN :  Patient continues to benefit from skilled intervention to address the above impairments. Continue treatment per established plan of care. to address goals. Recommendation for discharge: (in order for the patient to meet his/her long term goals)  To be determined: HHPT with 24/7 supv/assist vs SNF    This discharge recommendation:  Has been made in collaboration with the attending provider and/or case management    IF patient discharges home will need the following DME: to be determined (TBD)     SUBJECTIVE:   Patient stated I am just feeling weak.  Does endorse feeling better in the bed    OBJECTIVE DATA SUMMARY:   Critical Behavior:  Neurologic State: Alert, Appropriate for age  Orientation Level: Oriented X4  Cognition: Appropriate decision making, Appropriate for age attention/concentration, Appropriate safety awareness, Follows commands  Safety/Judgement: Awareness of environment, Fall prevention, Insight into deficits  Functional Mobility Training:  Bed Mobility:  Supine to Sit: Supervision  Sit to Supine: Minimum assistance  Scooting: Stand-by assistance  Transfers:  Sit to Stand: Minimum assistance; Adaptive equipment  Stand to Sit: Minimum assistance; Adaptive equipment  Bed to Chair: Minimum assistance; Adaptive equipment  Balance:  Sitting: Intact  Standing: Impaired; With support  Standing - Static: Fair  Standing - Dynamic : Fair    Pain Rating:  Did not interfere    Activity Tolerance:   signs and symptoms of orthostatic hypotension     09/09/22 1330 09/09/22 1332 09/09/22 1343   Vital Signs   Pulse (Heart Rate) 85 85 83   BP (!) 81/52 (!) 99/52 (!) 86/50   MAP (Calculated) (!) 62 68 (!) 62   BP Patient Position Sitting  (upon arrival, LEs dependent in chair) Sitting  (LEs elevated and after UE exercise) Sitting   O2 Sat (%) 95 % 95 %  --    O2 Device Nasal cannula Nasal cannula  --    O2 Flow Rate (L/min) 3 l/min 3 l/min  --       09/09/22 1346   Vital Signs   Pulse (Heart Rate) 78   BP (!) 128/58   MAP (Calculated) 81   BP Patient Position Supine  (after transfer bed to bed)   O2 Sat (%)  --    O2 Device  --    O2 Flow Rate (L/min)  --      After treatment patient left in no apparent distress:   Supine in bed, Call bell within reach, Caregiver / family present, Side rails x 3, and patient aware of need to call for assist    COMMUNICATION/COLLABORATION:   The patients plan of care was discussed with: Occupational therapist, Registered nurse, and Rehabilitation technician.      Ravi Rosen, PT   Time Calculation: 37 mins

## 2022-09-09 NOTE — PROGRESS NOTES
Problem: Self Care Deficits Care Plan (Adult)  Goal: *Acute Goals and Plan of Care (Insert Text)  Description: FUNCTIONAL STATUS PRIOR TO ADMISSION: Patient was independent and active without use of DME. She reports recent use of spouse's rollator to move things within home, and sit down as needed. HOME SUPPORT: The patient lived with spouse but did not require assist. Patient spouse reports. Occupational Therapy Goals  Initiated 9/5/2022  1. Patient will perform lower body dressing with modified independence within 7 day(s). 2.  Patient will perform upper body dressing with supervision/set-up within 7 day(s). 3.  Patient will perform toilet transfers with supervision/set-up within 7 day(s). 4.  Patient will perform all aspects of toileting with supervision/set-up within 7 day(s). 5.  Patient will participate in upper extremity therapeutic exercise/activities with supervision/set-up for 10 minutes within 7 day(s). 6.  Patient will utilize energy conservation techniques during functional activities with verbal cues within 7 day(s). Outcome: Progressing Towards Goal     OCCUPATIONAL THERAPY TREATMENT  Patient: Jaqui Jackson (89 y.o. female)  Date: 9/9/2022  Diagnosis: Dyspnea [R06.00] Dyspnea      Precautions: Fall, Skin, Seizure  Chart, occupational therapy assessment, plan of care, and goals were reviewed. ASSESSMENT  Patient continues with skilled OT services and is progressing towards goals. Pt demonstrated improved activity tolerance, mobility- able to amb to and from toilet in bathroom, balance, ADL performance and was able to maintain sats 92% and greater on 3L O2 this session. Pt is at an overall CGA level with ADLs and functional mobility. She demonstrated good carryover of PLB this session and only requires occasion cues to perform. Recommend HH therapy and 24/7 assist for safety at discharge.     Current Level of Function Impacting Discharge (ADLs): CGA and set-up    Other factors to consider for discharge: see above         PLAN :  Patient continues to benefit from skilled intervention to address the above impairments. Continue treatment per established plan of care to address goals. Recommend with staff: up to chair for all meals and bathroom for toileting as able    Recommend next OT session: endurance, standing balance with ADLs    Recommendation for discharge: (in order for the patient to meet his/her long term goals)  Occupational therapy at least 2 days/week in the home AND ensure assist and/or supervision for safety     This discharge recommendation:  Has been made in collaboration with the attending provider and/or case management    IF patient discharges home will need the following DME: TBD       SUBJECTIVE:   Patient stated I feel better today.     OBJECTIVE DATA SUMMARY:   Cognitive/Behavioral Status:  Neurologic State: Alert; Appropriate for age  Orientation Level: Oriented X4  Cognition: Appropriate decision making; Appropriate for age attention/concentration; Appropriate safety awareness; Follows commands  Perception: Appears intact  Perseveration: No perseveration noted  Safety/Judgement: Awareness of environment; Fall prevention; Insight into deficits    Functional Mobility and Transfers for ADLs:  Bed Mobility:  Supine to Sit: Supervision  Scooting: Stand-by assistance    Transfers:  Sit to Stand: Contact guard assistance; Additional time;Assist x1  Functional Transfers  Bathroom Mobility: Contact guard assistance  Toilet Transfer : Contact guard assistance  Cues: Tactile cues provided;Verbal cues provided;Visual cues provided  Adaptive Equipment: Walker (comment);Grab bars  Bed to Chair: Contact guard assistance; Additional time    Balance:  Sitting: Intact  Standing: Impaired; With support  Standing - Static: Fair  Standing - Dynamic : Fair    ADL Intervention:  Grooming  Grooming Assistance: Set-up; Supervision  Position Performed: Seated in chair  Washing Face: Set-up  Washing Hands: Set-up  Brushing Teeth: Set-up  Brushing/Combing Hair: Set-up  Cues: Verbal cues provided;Visual cues provided    Type of Bath: Chlorhexidine (CHG); Full    Lower Body Dressing Assistance  Socks: Stand-by assistance;Set-up  Leg Crossed Method Used: Yes  Position Performed: Seated in chair;Bending forward method  Cues: Don;Doff;Verbal cues provided;Visual cues provided    Toileting  Toileting Assistance: Contact guard assistance  Bladder Hygiene: Set-up  Bowel Hygiene: Set-up; Supervision  Clothing Management: Contact guard assistance  Cues: Tactile cues provided;Verbal cues provided;Visual cues provided  Adaptive Equipment: Walker;Grab bars    Cognitive Retraining  Safety/Judgement: Awareness of environment; Fall prevention; Insight into deficits    Patient instructed and indicated understanding energy conservation techniques to increase independence and safety during ADLs. Pain:  0/10    Activity Tolerance:   Fair, desaturates with exertion and requires oxygen, requires frequent rest breaks, and observed SOB with activity    After treatment patient left in no apparent distress:   Sitting in chair, Call bell within reach, and Caregiver / family present    COMMUNICATION/COLLABORATION:   The patients plan of care was discussed with: Registered nurse.      Maylin Mitchell OT  Time Calculation: 41 mins

## 2022-09-09 NOTE — PROGRESS NOTES
Bedside shift change report given to [address] (oncoming nurse) by Natanael Castro RN (offgoing nurse). Report included the following information SBAR, Kardex, ED Summary, Intake/Output, MAR, and Recent Results. 1442 B/P 104/58 MD fischer made aware, ordered to hold lasix for now. Required [facility] Documentation:   Bedside Shift Report of Deluca Catheter to include  and the following was verified:  Critera met for insert Yes  Reason for insert: Urinary obstruction  Date of insert: 9/9/22  Order is current: Yes  MD or RN driven: Provider  Removal Discussed No   Last CHG Bath: 0900    [facility] Care Last Completed: Date/Time: 9/9/22 0900  [facility] Care After Each Bowel Movement: Yes  Education documented every 24 hours Yes  Care Plan (Risk for UTI, [facility]) Updated Every 24 hours Yes  Bag below Bladder Yes        Bag off Floor Yes      Sheet Clip used Yes          Tubing free of dependant loops Yes          Seal Intact Yes            Bag less than 1/2 full Yes            1930   Bedside shift change report given to Rajeev Dubose (oncoming nurse) by Sharita Wan RN (offgoing nurse). Report included the following information SBAR, Kardex, ED Summary, Intake/Output, MAR, and Recent Results.

## 2022-09-10 LAB
ANION GAP SERPL CALC-SCNC: 6 MMOL/L (ref 5–15)
ANION GAP SERPL CALC-SCNC: 7 MMOL/L (ref 5–15)
ANION GAP SERPL CALC-SCNC: 8 MMOL/L (ref 5–15)
BASOPHILS # BLD: 0 K/UL (ref 0–0.1)
BASOPHILS NFR BLD: 0 % (ref 0–1)
BNP SERPL-MCNC: 783 PG/ML
BUN SERPL-MCNC: 12 MG/DL (ref 6–20)
BUN SERPL-MCNC: 15 MG/DL (ref 6–20)
BUN SERPL-MCNC: 15 MG/DL (ref 6–20)
BUN/CREAT SERPL: 26 (ref 12–20)
BUN/CREAT SERPL: 27 (ref 12–20)
BUN/CREAT SERPL: 29 (ref 12–20)
CALCIUM SERPL-MCNC: 8.9 MG/DL (ref 8.5–10.1)
CALCIUM SERPL-MCNC: 9 MG/DL (ref 8.5–10.1)
CALCIUM SERPL-MCNC: 9.1 MG/DL (ref 8.5–10.1)
CHLORIDE SERPL-SCNC: 91 MMOL/L (ref 97–108)
CHLORIDE SERPL-SCNC: 91 MMOL/L (ref 97–108)
CHLORIDE SERPL-SCNC: 92 MMOL/L (ref 97–108)
CO2 SERPL-SCNC: 25 MMOL/L (ref 21–32)
CO2 SERPL-SCNC: 28 MMOL/L (ref 21–32)
CO2 SERPL-SCNC: 28 MMOL/L (ref 21–32)
CREAT SERPL-MCNC: 0.44 MG/DL (ref 0.55–1.02)
CREAT SERPL-MCNC: 0.51 MG/DL (ref 0.55–1.02)
CREAT SERPL-MCNC: 0.58 MG/DL (ref 0.55–1.02)
DIFFERENTIAL METHOD BLD: ABNORMAL
EOSINOPHIL # BLD: 0.2 K/UL (ref 0–0.4)
EOSINOPHIL NFR BLD: 1 % (ref 0–7)
ERYTHROCYTE [DISTWIDTH] IN BLOOD BY AUTOMATED COUNT: 14.6 % (ref 11.5–14.5)
GLUCOSE SERPL-MCNC: 100 MG/DL (ref 65–100)
GLUCOSE SERPL-MCNC: 104 MG/DL (ref 65–100)
GLUCOSE SERPL-MCNC: 121 MG/DL (ref 65–100)
HCT VFR BLD AUTO: 31.6 % (ref 35–47)
HGB BLD-MCNC: 10.4 G/DL (ref 11.5–16)
IMM GRANULOCYTES # BLD AUTO: 0 K/UL
IMM GRANULOCYTES NFR BLD AUTO: 0 %
LIPASE SERPL-CCNC: 134 U/L (ref 73–393)
LYMPHOCYTES # BLD: 1.5 K/UL (ref 0.8–3.5)
LYMPHOCYTES NFR BLD: 8 % (ref 12–49)
MAGNESIUM SERPL-MCNC: 1.9 MG/DL (ref 1.6–2.4)
MCH RBC QN AUTO: 28 PG (ref 26–34)
MCHC RBC AUTO-ENTMCNC: 32.9 G/DL (ref 30–36.5)
MCV RBC AUTO: 85.2 FL (ref 80–99)
MONOCYTES # BLD: 1.5 K/UL (ref 0–1)
MONOCYTES NFR BLD: 8 % (ref 5–13)
MYELOCYTES NFR BLD MANUAL: 1 %
NEUTS SEG # BLD: 15.5 K/UL (ref 1.8–8)
NEUTS SEG NFR BLD: 82 % (ref 32–75)
NRBC # BLD: 0 K/UL (ref 0–0.01)
NRBC BLD-RTO: 0 PER 100 WBC
PLATELET # BLD AUTO: 565 K/UL (ref 150–400)
PMV BLD AUTO: 8.8 FL (ref 8.9–12.9)
POTASSIUM SERPL-SCNC: 3.8 MMOL/L (ref 3.5–5.1)
POTASSIUM SERPL-SCNC: 4.2 MMOL/L (ref 3.5–5.1)
POTASSIUM SERPL-SCNC: 4.6 MMOL/L (ref 3.5–5.1)
RBC # BLD AUTO: 3.71 M/UL (ref 3.8–5.2)
RBC MORPH BLD: ABNORMAL
SODIUM SERPL-SCNC: 125 MMOL/L (ref 136–145)
SODIUM SERPL-SCNC: 126 MMOL/L (ref 136–145)
WBC # BLD AUTO: 18.9 K/UL (ref 3.6–11)

## 2022-09-10 PROCEDURE — 85025 COMPLETE CBC W/AUTO DIFF WBC: CPT

## 2022-09-10 PROCEDURE — 83690 ASSAY OF LIPASE: CPT

## 2022-09-10 PROCEDURE — 74011250637 HC RX REV CODE- 250/637: Performed by: INTERNAL MEDICINE

## 2022-09-10 PROCEDURE — 77010033678 HC OXYGEN DAILY

## 2022-09-10 PROCEDURE — 36415 COLL VENOUS BLD VENIPUNCTURE: CPT

## 2022-09-10 PROCEDURE — 80048 BASIC METABOLIC PNL TOTAL CA: CPT

## 2022-09-10 PROCEDURE — 83880 ASSAY OF NATRIURETIC PEPTIDE: CPT

## 2022-09-10 PROCEDURE — 74011250636 HC RX REV CODE- 250/636: Performed by: INTERNAL MEDICINE

## 2022-09-10 PROCEDURE — 74011000250 HC RX REV CODE- 250: Performed by: INTERNAL MEDICINE

## 2022-09-10 PROCEDURE — 84295 ASSAY OF SERUM SODIUM: CPT

## 2022-09-10 PROCEDURE — 65270000046 HC RM TELEMETRY

## 2022-09-10 PROCEDURE — 94640 AIRWAY INHALATION TREATMENT: CPT

## 2022-09-10 PROCEDURE — 74011000258 HC RX REV CODE- 258: Performed by: INTERNAL MEDICINE

## 2022-09-10 PROCEDURE — 83735 ASSAY OF MAGNESIUM: CPT

## 2022-09-10 RX ADMIN — NYSTATIN 500000 UNITS: 100000 SUSPENSION ORAL at 17:01

## 2022-09-10 RX ADMIN — FUROSEMIDE 20 MG: 10 INJECTION, SOLUTION INTRAMUSCULAR; INTRAVENOUS at 09:34

## 2022-09-10 RX ADMIN — ARFORMOTEROL TARTRATE 15 MCG: 15 SOLUTION RESPIRATORY (INHALATION) at 07:44

## 2022-09-10 RX ADMIN — ARFORMOTEROL TARTRATE 15 MCG: 15 SOLUTION RESPIRATORY (INHALATION) at 19:53

## 2022-09-10 RX ADMIN — BUDESONIDE 500 MCG: 0.5 SUSPENSION RESPIRATORY (INHALATION) at 07:44

## 2022-09-10 RX ADMIN — IPRATROPIUM BROMIDE AND ALBUTEROL SULFATE 3 ML: .5; 3 SOLUTION RESPIRATORY (INHALATION) at 15:14

## 2022-09-10 RX ADMIN — NYSTATIN 500000 UNITS: 100000 SUSPENSION ORAL at 13:35

## 2022-09-10 RX ADMIN — MONTELUKAST 10 MG: 10 TABLET, FILM COATED ORAL at 21:37

## 2022-09-10 RX ADMIN — Medication 15 MG: at 09:48

## 2022-09-10 RX ADMIN — IPRATROPIUM BROMIDE AND ALBUTEROL SULFATE 3 ML: .5; 3 SOLUTION RESPIRATORY (INHALATION) at 19:48

## 2022-09-10 RX ADMIN — CEFEPIME 2 G: 2 INJECTION, POWDER, FOR SOLUTION INTRAVENOUS at 17:01

## 2022-09-10 RX ADMIN — NYSTATIN 500000 UNITS: 100000 SUSPENSION ORAL at 08:02

## 2022-09-10 RX ADMIN — SODIUM CHLORIDE, PRESERVATIVE FREE 10 ML: 5 INJECTION INTRAVENOUS at 13:36

## 2022-09-10 RX ADMIN — CEFEPIME 2 G: 2 INJECTION, POWDER, FOR SOLUTION INTRAVENOUS at 05:34

## 2022-09-10 RX ADMIN — BUDESONIDE 500 MCG: 0.5 SUSPENSION RESPIRATORY (INHALATION) at 19:53

## 2022-09-10 RX ADMIN — IPRATROPIUM BROMIDE AND ALBUTEROL SULFATE 3 ML: .5; 3 SOLUTION RESPIRATORY (INHALATION) at 07:44

## 2022-09-10 RX ADMIN — IPRATROPIUM BROMIDE AND ALBUTEROL SULFATE 3 ML: .5; 3 SOLUTION RESPIRATORY (INHALATION) at 23:42

## 2022-09-10 RX ADMIN — IPRATROPIUM BROMIDE AND ALBUTEROL SULFATE 3 ML: .5; 3 SOLUTION RESPIRATORY (INHALATION) at 01:28

## 2022-09-10 RX ADMIN — ACETAMINOPHEN 650 MG: 325 TABLET, FILM COATED ORAL at 21:36

## 2022-09-10 RX ADMIN — NYSTATIN 500000 UNITS: 100000 SUSPENSION ORAL at 21:36

## 2022-09-10 RX ADMIN — RIVAROXABAN 20 MG: 10 TABLET, FILM COATED ORAL at 17:01

## 2022-09-10 RX ADMIN — SOTALOL HYDROCHLORIDE 80 MG: 80 TABLET ORAL at 08:02

## 2022-09-10 NOTE — PROGRESS NOTES
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 If possible, please document in progress notes and discharge summary if you are evaluating and /or treating any of the following: The medical record reflects the following:  Risk Factors: CHF, advacned age  Clinical Indicators: - Malnutrition Status:  Severe malnutrition (22 1622)  Context:  Chronic illness  Findings of the 6 clinical characteristics of malnutrition:  Energy Intake:  75% or less est energy requirements for 1 month or longer  Weight Loss:  Greater than 7.5% over 3 months; Wt is down 5.6% in the last month, 8.6% down from May to August  Treatment: RD assessment, Ensure Compact with breakfast and dinner, monitoring    ASPEN Criteria:  https://aspenjournals. onlinelibrary. solis. com/doi/full/10.1177/8188554421154898      Thank you,    Edmund Wilder RN  CDI  Options provided:  -- Protein calorie malnutrition mild  -- Protein calorie malnutrition moderate  -- Protein calorie malnutrition severe  -- Other - I will add my own diagnosis  -- Disagree - Not applicable / Not valid  -- Disagree - Clinically unable to determine / Unknown  -- Refer to Clinical Documentation Reviewer    PROVIDER RESPONSE TEXT:    This patient has severe protein calorie malnutrition.     Query created by: Saundra Camacho on 2022 3:24 PM      Electronically signed by:  Desma Cowden MD 9/10/2022 7:17 AM

## 2022-09-10 NOTE — PROGRESS NOTES
1900  Bedside and Verbal shift change report given to Miriam Aldridge RN (oncoming nurse) by Darryle Kaufman, RN (offgoing nurse). Report included the following information SBAR, Kardex, Intake/Output, MAR, Recent Results, and Cardiac Rhythm NSR . Critera met for insert Yes  Reason for insert: Urinary obstruction/retention  Date of insert: 9/7/22  Order is current: Yes  MD or RN driven: Provider  Removal Discussed Yes  Last CHG Bath: 9/10/22 @ 1024   [facility] Care Last Completed: Date/Time: 9/10/22 @ 1927  [facility] Care After Each Bowel Movement: Yes  Education documented every 24 hours Yes  Care Plan (Risk for UTI, [facility]) Updated Every 24 hours Yes  Bag below Bladder Yes        Bag off Floor Yes      Sheet Clip used Yes          Tubing free of dependant loops Yes          Seal Intact Yes            Bag less than 1/2 full Yes    This patient was assisted with Intentional Toileting every 2 hours during this shift as appropriate. Documentation of ambulation and output reflected on Flowsheet as appropriate. Purposeful hourly rounding was completed using AIDET and 5Ps. Outcomes of PHR documented as they occurred. Bed alarm in use as appropriate. Dual Suction and ambubag in place. 0700  Bedside and Verbal shift change report given to Darryle Kaufman, RN (oncoming nurse) by Miriam Aldridge RN (offgoing nurse). Report included the following information SBAR, Kardex, Intake/Output, MAR, Recent Results, and Cardiac Rhythm NSR .

## 2022-09-10 NOTE — PROGRESS NOTES
0700 Bedside shift change report given to [address] (oncoming nurse) by Natanael Castro (offgoing nurse). Report included the following information SBAR, Kardex, ED Summary, Intake/Output, MAR, and Recent Results. This patient was assisted with Intentional Toileting every 2 hours during this shift as appropriate. Documentation of ambulation and output reflected on Flowsheet as appropriate. Purposeful hourly rounding was completed using AIDET and 5Ps. Outcomes of PHR documented as they occurred. Bed alarm in use as appropriate. Dual Suction and ambubag in place. Required [facility]uca Documentation:   Bedside Shift Report of [facility]uca Catheter to include  and the following was verified:  Critera met for insert Yes  Reason for insert: Urinary obstruction  Date of insert: 9/7/22  Order is current: Yes  MD or RN driven: Provider  Removal Discussed No   Last CHG Bath: 9/10/22 1024   [facility] Care Last Completed: Date/Time: 9/10/22 1024  [facility] Care After Each Bowel Movement: Yes  Education documented every 24 hours Yes  Care Plan (Risk for UTI, [facility]) Updated Every 24 hours Yes  Bag below Bladder Yes        Bag off Floor Yes      Sheet Clip used Yes          Tubing free of dependant loops Yes          Seal Intact Yes            Bag less than 1/2 full Yes            1102 This RN spoke with Nephrologist deisi Luciano to d/c sodium lab and collect BMP q 12 hrs starting at 4pm.    1930   Bedside shift change report given to Rajeev Dubose (oncoming nurse) by Sharita Wan RN (offgoing nurse). Report included the following information SBAR, Kardex, ED Summary, Intake/Output, MAR, and Recent Results.

## 2022-09-10 NOTE — PROGRESS NOTES
[address]  [address] [place] [address], [facility] 19  [phone]    Medical Progress Note      NAME: Munira Little   :  1940  MRM:  [id number]    Date/Time of service 9/10/2022  9:32 AM            Subjective:     Chief Complaint:  \"I'm okay\"     Pt seen and examined. No complaints. Multiple questions answered. Currently on 2L O2     ROS:  (bold if positive, if negative)    Cough      Sputum      SOB/HERRERA     Objective:     Last 24hrs VS reviewed since prior progress note.  Most recent are:    Visit Vitals  BP (!) 110/52 (BP 1 Location: Left upper arm, BP Patient Position: At rest)   Pulse 71   Temp 98 °F (36.7 °C)   Resp 21   Ht 5' 2\" (1.575 m)   Wt 63 kg (138 lb 14.2 oz)   SpO2 97%   BMI 25.40 kg/m²     SpO2 Readings from Last 6 Encounters:   09/10/22 97%   22 95%   08/10/22 94%   22 96%   21 99%   21 97%    O2 Flow Rate (L/min): 2 l/min     Intake/Output Summary (Last 24 hours) at 9/10/2022 1325  Last data filed at 9/10/2022 1023  Gross per 24 hour   Intake 360 ml   Output 975 ml   Net -615 ml        Physical Exam:    Gen: NAD   HEENT:  Pink conjunctivae, PERRL, hearing intact to voice, moist mucous membranes  Neck:  Supple, without masses, thyroid non-tender  Resp: Diffuse crackles   Card:  No murmurs, normal S1, S2 without thrills, bruits    Abd:  Soft, non-tender, non-distended, normoactive bowel sounds are present, no mass  Lymph:  No cervical or inguinal adenopathy  Musc:  No cyanosis or clubbing  Skin:  No rashes or ulcers, skin turgor is good  Neuro:  Cranial nerves are grossly intact, general motor weakness, follows commands vaguely  Psych:  Good insight   __________________________________________________________________  Medications Reviewed: (see below)  Medications:     Current Facility-Administered Medications   Medication Dose Route Frequency    cefepime (MAXIPIME) 2 g in 0.9% sodium chloride (MBP/ADV) 100 mL MBP  2 g IntraVENous Q12H sodium chloride (NS) flush 5-40 mL  5-40 mL IntraVENous PRN    guaiFENesin (ROBITUSSIN) 100 mg/5 mL oral liquid 100 mg  100 mg Oral Q4H PRN    prochlorperazine (COMPAZINE) injection 5 mg  5 mg IntraVENous Q6H PRN    nystatin (MYCOSTATIN) 100,000 unit/mL oral suspension 500,000 Units  500,000 Units Oral QID    ketotifen (ZADITOR) 0.025 % (0.035 %) ophthalmic solution 1 Drop  1 Drop Both Eyes BID PRN    albuterol-ipratropium (DUO-NEB) 2.5 MG-0.5 MG/3 ML  3 mL Nebulization Q6H RT    arformoteroL (BROVANA) neb solution 15 mcg  15 mcg Nebulization BID RT    budesonide (PULMICORT) 500 mcg/2 ml nebulizer suspension  500 mcg Nebulization BID RT    albuterol-ipratropium (DUO-NEB) 2.5 MG-0.5 MG/3 ML  3 mL Nebulization Q6H PRN    montelukast (SINGULAIR) tablet 10 mg  10 mg Oral QHS    [Held by provider] sotaloL (BETAPACE) tablet 80 mg  80 mg Oral Q12H    sodium chloride (NS) flush 5-40 mL  5-40 mL IntraVENous Q8H    sodium chloride (NS) flush 5-40 mL  5-40 mL IntraVENous PRN    acetaminophen (TYLENOL) tablet 650 mg  650 mg Oral Q6H PRN    polyethylene glycol (MIRALAX) packet 17 g  17 g Oral DAILY PRN    ondansetron (ZOFRAN) injection 4 mg  4 mg IntraVENous Q6H PRN    rivaroxaban (XARELTO) tablet 20 mg  20 mg Oral DAILY WITH DINNER        Lab Data Reviewed: (see below)  Lab Review:     Recent Labs     09/10/22  0226 09/09/22  1028   WBC 18.9* 19.9*   HGB 10.4* 10.1*   HCT 31.6* 29.7*   * 559*     Recent Labs     09/10/22  1014 09/10/22  0815 09/10/22  0226 09/09/22 2025   * 125* 125* 124*   K  --  4.2 4.6 4.5   CL  --  91* 92* 91*   CO2  --  28 25 26   GLU  --  100 104* 122*   BUN  --  12 15 15   CREA  --  0.44* 0.51* 0.77   CA  --  9.1 8.9 9.5   MG  --   --  1.9  --      Lab Results   Component Value Date/Time    Glucose (POC) 146 (H) 09/06/2022 01:21 PM     No results for input(s): PH, PCO2, PO2, HCO3, FIO2 in the last 72 hours. No results for input(s): INR, INREXT, INREXT in the last 72 hours.   All Micro Results       Procedure Component Value Units Date/Time    CULTURE, RESPIRATORY/SPUTUM/BRONCH Vani Pellant [[id number]]     Order Status: Sent Specimen: Sputum     CULTURE, BLOOD, PAIRED [[id number]] Collected: 09/04/22 1339    Order Status: Completed Specimen: Blood Updated: 09/09/22 0737     Special Requests: NO SPECIAL REQUESTS        Culture result: NO GROWTH 5 DAYS       CULTURE, URINE [[id number]] Collected: 09/07/22 1014    Order Status: Completed Specimen: Urine from Clean catch Updated: 09/08/22 1125     Special Requests: NO SPECIAL REQUESTS        Culture result: No growth (<1,000 CFU/ML)       CULTURE, RESPIRATORY/SPUTUM/BRONCH Branden Horton STAIN [[id number]]     Order Status: Sent Specimen: Sputum     URINE CULTURE HOLD SAMPLE [[id number]] Collected: 09/07/22 1014    Order Status: Completed Specimen: Serum Updated: 09/07/22 1038     Urine culture hold       Urine on hold in Microbiology dept for 2 days. If unpreserved urine is submitted, it cannot be used for addtional testing after 24 hours, recollection will be required.           CULTURE, RESPIRATORY/SPUTUM/BRONCH Vani Pellant [[id number]] Collected: 09/06/22 0207    Order Status: Canceled Specimen: Sputum     RESPIRATORY VIRUS PANEL W/COVID-19, PCR [[id number]] Collected: 09/05/22 1237    Order Status: Completed Specimen: Nasopharyngeal Updated: 09/05/22 2021     Adenovirus Not detected        Coronavirus 229E Not detected        Coronavirus HKU1 Not detected        Coronavirus CVNL63 Not detected        Coronavirus OC43 Not detected        SARS-CoV-2, PCR Not detected        Metapneumovirus Not detected        Rhinovirus and Enterovirus Not detected        Influenza A Not detected        Influenza A, subtype H1 Not detected        Influenza A, subtype H3 Not detected        INFLUENZA A H1N1 PCR Not detected        Influenza B Not detected        Parainfluenza 1 Not detected        Parainfluenza 2 Not detected        Parainfluenza 3 Not detected Parainfluenza virus 4 Not detected        RSV by PCR Not detected        B. parapertussis, PCR Not detected        Bordetella pertussis - PCR Not detected        Chlamydophila pneumoniae DNA, QL, PCR Not detected        Mycoplasma pneumoniae DNA, QL, PCR Not detected       COVID-19 RAPID TEST [[id number]] Collected: 09/04/22 1339    Order Status: Completed Specimen: Nasopharyngeal Updated: 09/04/22 1414     Specimen source Nasopharyngeal        COVID-19 rapid test Not detected        Comment: Rapid Abbott ID Now       Rapid NAAT:  The specimen is NEGATIVE for SARS-CoV-2, the novel coronavirus associated with COVID-19. Negative results should be treated as presumptive and, if inconsistent with clinical signs and symptoms or necessary for patient management, should be tested with an alternative molecular assay. Negative results do not preclude SARS-CoV-2 infection and should not be used as the sole basis for patient management decisions. This test has been authorized by the FDA under an Emergency Use Authorization (EUA) for use by authorized laboratories. Fact sheet for Healthcare Providers: ConventionUpdate.co.nz  Fact sheet for Patients: ConventionUpdate.co.nz       Methodology: Isothermal Nucleic Acid Amplification                  Assessment and Plan:   Hyponatremia / Hypokalemia / Hypomagnesemia - POA, at first thought likely due to poor PO intake. But on 9/7 Na was suddenly, severely, inexplicably worse at 869. Had dropped to 122 9/6. S/p hypertonic saline. S/p tolvaptan on 9/8  -repeat tolvaptan today   -may need urea at d/c      Abnormal CT chest / Leukocytosis - CT reports \"Scattered reticulonodular changes throughout the lungs with areas of slightly more consolidative. Bronchial wall thickening with mucous plugging.  Findings are likely infectious or inflammatory\"  -appreciate pulm  -currently on cefepime; plan to change to levo at d/c  -fu sputum cultures Paroxysmal A-fib with RVR / Chronic anticoagulation   -on Xarelto   -hold sotalol; with multiple drug interactions and need for fluconazole for severe thrush, will hold for now     Nausea - likely 2/2 recent COVID, esophageal candidiasis, ?meds   -lipase WNL   -pt states nausea was present long before initiation of sotalol  -consider fluconazole now that holding sotalol; possibly start in the AM     CHF (congestive heart failure), NYHA class I, chronic, systolic / Venous insufficiency   -hold additional diuresis        Anemia - stable  -monitor      Weight loss / GERD (gastroesophageal reflux disease) / Hiatal hernia / Candidiasis - ?2/2 esophageal candidiasis   -GI had been evaluating; has had extensive testing to date      Asthma - not wheezing   -continue nebs in place of home inhalers      Hx breast cancer  -outpt follow-up       Hypothyroid   -TSH WNL   -levothyroxine stopped by prior MD; d/w pt will need repeat TFT's in the future to ensure stability     Tingling / Weakness - Noted by patient 9/6. Code stroke called. MRI WNL   -monitor     Leukocytosis - ?etiology. Procalcitonin low. CXR showing interval improvement. Has not been on steroids.  UA on 9/7 WNL   -on cefepime for now     Other pertinent lab: none    Total time spent with patient: 35 [address] discussed with: Patient, Family, Care Manager, Nursing Staff, Consultant/Specialist, and >50% of time spent in counseling and coordination of care    Discussed:  Care Plan and D/C Planning    Prophylaxis:  Lovenox and H2B/PPI    Disposition:  Home w/Family with home health PT/OT, additional support           ___________________________________________________    Attending Physician: Justina Meckel, MD

## 2022-09-10 NOTE — PROGRESS NOTES
Olivia Herrera 55  YOB: 1940          Assessment & Plan:     Hyponatremia due to SIADH  Resp failure  COPD  Bronchiectasis  N/V    Rec:  Deluca in place. Good uop. Na improved just modestly past 24 hours  Loop diuretics won't hurt from the standpoint of the sodium, but not sure I am certain of her volume status. No peripheral edema and xray findings may be due to infection/chronic lung dz    I will give another dose tolvaptan today as she seemed to respond a couple days ago. She may be someone who could benefit from Urea as outpt if she can tolerate it    OK to take off seizure precautions       Subjective:   CC: follow up hyponatremia  HPI: Na 125. Feels fine and anxious to go home.  Remains on oxygen   ROS: No sob/n/v  Current Facility-Administered Medications   Medication Dose Route Frequency    furosemide (LASIX) injection 20 mg  20 mg IntraVENous DAILY    cefepime (MAXIPIME) 2 g in 0.9% sodium chloride (MBP/ADV) 100 mL MBP  2 g IntraVENous Q12H    sodium chloride (NS) flush 5-40 mL  5-40 mL IntraVENous PRN    guaiFENesin (ROBITUSSIN) 100 mg/5 mL oral liquid 100 mg  100 mg Oral Q4H PRN    prochlorperazine (COMPAZINE) injection 5 mg  5 mg IntraVENous Q6H PRN    nystatin (MYCOSTATIN) 100,000 unit/mL oral suspension 500,000 Units  500,000 Units Oral QID    ketotifen (ZADITOR) 0.025 % (0.035 %) ophthalmic solution 1 Drop  1 Drop Both Eyes BID PRN    albuterol-ipratropium (DUO-NEB) 2.5 MG-0.5 MG/3 ML  3 mL Nebulization Q6H RT    arformoteroL (BROVANA) neb solution 15 mcg  15 mcg Nebulization BID RT    budesonide (PULMICORT) 500 mcg/2 ml nebulizer suspension  500 mcg Nebulization BID RT    albuterol-ipratropium (DUO-NEB) 2.5 MG-0.5 MG/3 ML  3 mL Nebulization Q6H PRN    montelukast (SINGULAIR) tablet 10 mg  10 mg Oral QHS    sotaloL (BETAPACE) tablet 80 mg  80 mg Oral Q12H    sodium chloride (NS) flush 5-40 mL  5-40 mL IntraVENous Q8H sodium chloride (NS) flush 5-40 mL  5-40 mL IntraVENous PRN    acetaminophen (TYLENOL) tablet 650 mg  650 mg Oral Q6H PRN    polyethylene glycol (MIRALAX) packet 17 g  17 g Oral DAILY PRN    ondansetron (ZOFRAN) injection 4 mg  4 mg IntraVENous Q6H PRN    rivaroxaban (XARELTO) tablet 20 mg  20 mg Oral DAILY WITH DINNER          Objective:     Vitals:  Blood pressure (!) 129/55, pulse 69, temperature 97.6 °F (36.4 °C), resp. rate 23, height 5' 2\" (1.575 m), weight 63 kg (138 lb 14.2 oz), SpO2 100 %. Temp (24hrs), Av.8 °F (36.6 °C), Min:97.6 °F (36.4 °C), Max:98 °F (36.7 °C)      Intake and Output:  No intake/output data recorded.  1901 - 09/10 0700  In: 340 [P.O.:340]  Out: 1800 [Urine:1800]    Physical Exam:               GENERAL ASSESSMENT: NAD  HEENT: Nontraumatic   CHEST: On oxygen, few rhonchi  HEART: S1S2  ABDOMEN: Soft,NT   +castle +urine  EXTREMITY: no EDEMA  NEURO: Grossly non focal          ECG/rhythm:    Data Review      No results for input(s): TNIPOC in the last 72 hours. No lab exists for component: ITNL   No results for input(s): CPK, CKMB, TROIQ in the last 72 hours. Recent Labs     09/10/22  0226 22  2025 22  1429 22  1028   * 124* 125* 124*   K 4.6 4.5 4.4 3.9   CL 92* 91* 90* 91*   CO2 25 26 24 25   BUN 15 15 13 7   CREA 0.51* 0.77 1.02 0.70   * 122* 124* 173*   MG 1.9  --   --   --    CA 8.9 9.5 9.5 9.2   WBC 18.9*  --   --  19.9*   HGB 10.4*  --   --  10.1*   HCT 31.6*  --   --  29.7*   *  --   --  559*        No results for input(s): INR, PTP, APTT, INREXT, INREXT in the last 72 hours. Needs: urine analysis, urine sodium, protein and creatinine  Lab Results   Component Value Date/Time    Sodium,urine random 16 2022 03:09 AM    Creatinine, urine 33.00 2022 10:14 AM           : Kecia Madrigla MD  9/10/2022        [facility]:  [email or web address]  Mayito Cage office:  [address] [facility] [facility], [address]  [place], [address] [place]  Phone: [phone]  Fax :     [phone]    [place] office:  [address] [facility]  [place], [facility] [address]  Phone - [phone]  Fax - [phone]

## 2022-09-11 LAB
ANION GAP SERPL CALC-SCNC: 7 MMOL/L (ref 5–15)
ANION GAP SERPL CALC-SCNC: 8 MMOL/L (ref 5–15)
BASOPHILS # BLD: 0 K/UL (ref 0–0.1)
BASOPHILS NFR BLD: 0 % (ref 0–1)
BUN SERPL-MCNC: 11 MG/DL (ref 6–20)
BUN SERPL-MCNC: 32 MG/DL (ref 6–20)
BUN/CREAT SERPL: 28 (ref 12–20)
BUN/CREAT SERPL: 55 (ref 12–20)
CALCIUM SERPL-MCNC: 8.8 MG/DL (ref 8.5–10.1)
CALCIUM SERPL-MCNC: 9.2 MG/DL (ref 8.5–10.1)
CHLORIDE SERPL-SCNC: 93 MMOL/L (ref 97–108)
CHLORIDE SERPL-SCNC: 93 MMOL/L (ref 97–108)
CO2 SERPL-SCNC: 27 MMOL/L (ref 21–32)
CO2 SERPL-SCNC: 28 MMOL/L (ref 21–32)
CREAT SERPL-MCNC: 0.4 MG/DL (ref 0.55–1.02)
CREAT SERPL-MCNC: 0.58 MG/DL (ref 0.55–1.02)
DIFFERENTIAL METHOD BLD: ABNORMAL
EOSINOPHIL # BLD: 0.7 K/UL (ref 0–0.4)
EOSINOPHIL NFR BLD: 5 % (ref 0–7)
ERYTHROCYTE [DISTWIDTH] IN BLOOD BY AUTOMATED COUNT: 14.6 % (ref 11.5–14.5)
GLUCOSE BLD STRIP.AUTO-MCNC: 152 MG/DL (ref 65–117)
GLUCOSE SERPL-MCNC: 107 MG/DL (ref 65–100)
GLUCOSE SERPL-MCNC: 135 MG/DL (ref 65–100)
HCT VFR BLD AUTO: 31.7 % (ref 35–47)
HGB BLD-MCNC: 10.2 G/DL (ref 11.5–16)
IMM GRANULOCYTES # BLD AUTO: 0 K/UL
IMM GRANULOCYTES NFR BLD AUTO: 0 %
LYMPHOCYTES # BLD: 1.6 K/UL (ref 0.8–3.5)
LYMPHOCYTES NFR BLD: 12 % (ref 12–49)
MAGNESIUM SERPL-MCNC: 1.6 MG/DL (ref 1.6–2.4)
MCH RBC QN AUTO: 28.7 PG (ref 26–34)
MCHC RBC AUTO-ENTMCNC: 32.2 G/DL (ref 30–36.5)
MCV RBC AUTO: 89.3 FL (ref 80–99)
METAMYELOCYTES NFR BLD MANUAL: 1 %
MONOCYTES # BLD: 1.8 K/UL (ref 0–1)
MONOCYTES NFR BLD: 13 % (ref 5–13)
MYELOCYTES NFR BLD MANUAL: 4 %
NEUTS BAND NFR BLD MANUAL: 1 % (ref 0–6)
NEUTS SEG # BLD: 8.8 K/UL (ref 1.8–8)
NEUTS SEG NFR BLD: 64 % (ref 32–75)
NRBC # BLD: 0 K/UL (ref 0–0.01)
NRBC BLD-RTO: 0 PER 100 WBC
PLATELET # BLD AUTO: 631 K/UL (ref 150–400)
PMV BLD AUTO: 8.8 FL (ref 8.9–12.9)
POTASSIUM SERPL-SCNC: 3.7 MMOL/L (ref 3.5–5.1)
POTASSIUM SERPL-SCNC: 4 MMOL/L (ref 3.5–5.1)
RBC # BLD AUTO: 3.55 M/UL (ref 3.8–5.2)
RBC MORPH BLD: ABNORMAL
SERVICE CMNT-IMP: ABNORMAL
SODIUM SERPL-SCNC: 127 MMOL/L (ref 136–145)
SODIUM SERPL-SCNC: 129 MMOL/L (ref 136–145)
WBC # BLD AUTO: 13.5 K/UL (ref 3.6–11)

## 2022-09-11 PROCEDURE — 80048 BASIC METABOLIC PNL TOTAL CA: CPT

## 2022-09-11 PROCEDURE — 94640 AIRWAY INHALATION TREATMENT: CPT

## 2022-09-11 PROCEDURE — 93005 ELECTROCARDIOGRAM TRACING: CPT

## 2022-09-11 PROCEDURE — 36415 COLL VENOUS BLD VENIPUNCTURE: CPT

## 2022-09-11 PROCEDURE — 74011250636 HC RX REV CODE- 250/636: Performed by: INTERNAL MEDICINE

## 2022-09-11 PROCEDURE — 74011250637 HC RX REV CODE- 250/637: Performed by: HOSPITALIST

## 2022-09-11 PROCEDURE — 85025 COMPLETE CBC W/AUTO DIFF WBC: CPT

## 2022-09-11 PROCEDURE — 74011250637 HC RX REV CODE- 250/637: Performed by: INTERNAL MEDICINE

## 2022-09-11 PROCEDURE — 74011000258 HC RX REV CODE- 258: Performed by: INTERNAL MEDICINE

## 2022-09-11 PROCEDURE — 74011000250 HC RX REV CODE- 250: Performed by: INTERNAL MEDICINE

## 2022-09-11 PROCEDURE — 87077 CULTURE AEROBIC IDENTIFY: CPT

## 2022-09-11 PROCEDURE — 77010033678 HC OXYGEN DAILY

## 2022-09-11 PROCEDURE — 82962 GLUCOSE BLOOD TEST: CPT

## 2022-09-11 PROCEDURE — 83735 ASSAY OF MAGNESIUM: CPT

## 2022-09-11 PROCEDURE — 65270000029 HC RM PRIVATE

## 2022-09-11 PROCEDURE — 94762 N-INVAS EAR/PLS OXIMTRY CONT: CPT

## 2022-09-11 PROCEDURE — 65270000046 HC RM TELEMETRY

## 2022-09-11 PROCEDURE — 87070 CULTURE OTHR SPECIMN AEROBIC: CPT

## 2022-09-11 RX ORDER — MAGNESIUM SULFATE HEPTAHYDRATE 40 MG/ML
2 INJECTION, SOLUTION INTRAVENOUS ONCE
Status: COMPLETED | OUTPATIENT
Start: 2022-09-11 | End: 2022-09-11

## 2022-09-11 RX ORDER — DIGOXIN 0.25 MG/ML
125 INJECTION INTRAMUSCULAR; INTRAVENOUS ONCE
Status: COMPLETED | OUTPATIENT
Start: 2022-09-11 | End: 2022-09-11

## 2022-09-11 RX ORDER — DICLOFENAC SODIUM 10 MG/G
2 GEL TOPICAL 4 TIMES DAILY
Status: DISCONTINUED | OUTPATIENT
Start: 2022-09-11 | End: 2022-09-14 | Stop reason: HOSPADM

## 2022-09-11 RX ORDER — DIGOXIN 0.25 MG/ML
125 INJECTION INTRAMUSCULAR; INTRAVENOUS
Status: COMPLETED | OUTPATIENT
Start: 2022-09-11 | End: 2022-09-11

## 2022-09-11 RX ORDER — LEVALBUTEROL INHALATION SOLUTION 1.25 MG/3ML
1.25 SOLUTION RESPIRATORY (INHALATION)
Status: DISCONTINUED | OUTPATIENT
Start: 2022-09-11 | End: 2022-09-14 | Stop reason: HOSPADM

## 2022-09-11 RX ORDER — LEVALBUTEROL INHALATION SOLUTION 1.25 MG/3ML
1.25 SOLUTION RESPIRATORY (INHALATION)
Status: DISCONTINUED | OUTPATIENT
Start: 2022-09-12 | End: 2022-09-12

## 2022-09-11 RX ORDER — DIGOXIN 0.25 MG/ML
125 INJECTION INTRAMUSCULAR; INTRAVENOUS ONCE
Status: DISCONTINUED | OUTPATIENT
Start: 2022-09-11 | End: 2022-09-11

## 2022-09-11 RX ORDER — GUAIFENESIN 600 MG/1
600 TABLET, EXTENDED RELEASE ORAL EVERY 12 HOURS
Status: DISCONTINUED | OUTPATIENT
Start: 2022-09-11 | End: 2022-09-14 | Stop reason: HOSPADM

## 2022-09-11 RX ORDER — IPRATROPIUM BROMIDE 0.5 MG/2.5ML
0.5 SOLUTION RESPIRATORY (INHALATION)
Status: DISCONTINUED | OUTPATIENT
Start: 2022-09-12 | End: 2022-09-12

## 2022-09-11 RX ADMIN — CEFEPIME 2 G: 2 INJECTION, POWDER, FOR SOLUTION INTRAVENOUS at 06:09

## 2022-09-11 RX ADMIN — DIGOXIN 125 MCG: 0.25 INJECTION INTRAMUSCULAR; INTRAVENOUS at 18:37

## 2022-09-11 RX ADMIN — RIVAROXABAN 20 MG: 10 TABLET, FILM COATED ORAL at 17:19

## 2022-09-11 RX ADMIN — DIGOXIN 125 MCG: 0.25 INJECTION INTRAMUSCULAR; INTRAVENOUS at 17:19

## 2022-09-11 RX ADMIN — DICLOFENAC SODIUM 2 G: 10 GEL TOPICAL at 22:08

## 2022-09-11 RX ADMIN — NYSTATIN 500000 UNITS: 100000 SUSPENSION ORAL at 17:19

## 2022-09-11 RX ADMIN — MAGNESIUM SULFATE HEPTAHYDRATE 2 G: 40 INJECTION, SOLUTION INTRAVENOUS at 12:04

## 2022-09-11 RX ADMIN — IPRATROPIUM BROMIDE AND ALBUTEROL SULFATE 3 ML: .5; 3 SOLUTION RESPIRATORY (INHALATION) at 14:02

## 2022-09-11 RX ADMIN — LEVALBUTEROL HYDROCHLORIDE 1.25 MG: 1.25 SOLUTION RESPIRATORY (INHALATION) at 20:57

## 2022-09-11 RX ADMIN — BUDESONIDE 500 MCG: 0.5 SUSPENSION RESPIRATORY (INHALATION) at 21:02

## 2022-09-11 RX ADMIN — IPRATROPIUM BROMIDE 0.5 MG: 0.5 SOLUTION RESPIRATORY (INHALATION) at 20:58

## 2022-09-11 RX ADMIN — NYSTATIN 500000 UNITS: 100000 SUSPENSION ORAL at 13:16

## 2022-09-11 RX ADMIN — ARFORMOTEROL TARTRATE 15 MCG: 15 SOLUTION RESPIRATORY (INHALATION) at 08:53

## 2022-09-11 RX ADMIN — CEFEPIME 2 G: 2 INJECTION, POWDER, FOR SOLUTION INTRAVENOUS at 17:39

## 2022-09-11 RX ADMIN — DEXTROSE MONOHYDRATE 0.5 MG/MIN: 50 INJECTION, SOLUTION INTRAVENOUS at 22:37

## 2022-09-11 RX ADMIN — DEXTROSE MONOHYDRATE 150 MG: 50 INJECTION, SOLUTION INTRAVENOUS at 15:14

## 2022-09-11 RX ADMIN — ARFORMOTEROL TARTRATE 15 MCG: 15 SOLUTION RESPIRATORY (INHALATION) at 21:02

## 2022-09-11 RX ADMIN — BUDESONIDE 500 MCG: 0.5 SUSPENSION RESPIRATORY (INHALATION) at 08:53

## 2022-09-11 RX ADMIN — Medication 1 PACKET: at 12:02

## 2022-09-11 RX ADMIN — IPRATROPIUM BROMIDE AND ALBUTEROL SULFATE 3 ML: .5; 3 SOLUTION RESPIRATORY (INHALATION) at 08:53

## 2022-09-11 RX ADMIN — MONTELUKAST 10 MG: 10 TABLET, FILM COATED ORAL at 22:02

## 2022-09-11 RX ADMIN — DIGOXIN 125 MCG: 0.25 INJECTION INTRAMUSCULAR; INTRAVENOUS at 22:02

## 2022-09-11 RX ADMIN — NYSTATIN 500000 UNITS: 100000 SUSPENSION ORAL at 22:02

## 2022-09-11 RX ADMIN — SODIUM CHLORIDE, PRESERVATIVE FREE 10 ML: 5 INJECTION INTRAVENOUS at 22:03

## 2022-09-11 RX ADMIN — DEXTROSE MONOHYDRATE 1 MG/MIN: 50 INJECTION, SOLUTION INTRAVENOUS at 15:47

## 2022-09-11 RX ADMIN — GUAIFENESIN 600 MG: 600 TABLET ORAL at 22:02

## 2022-09-11 RX ADMIN — SODIUM CHLORIDE, PRESERVATIVE FREE 10 ML: 5 INJECTION INTRAVENOUS at 13:16

## 2022-09-11 RX ADMIN — NYSTATIN 500000 UNITS: 100000 SUSPENSION ORAL at 08:46

## 2022-09-11 RX ADMIN — GUAIFENESIN 600 MG: 600 TABLET ORAL at 12:04

## 2022-09-11 NOTE — PROGRESS NOTES
0700 Bedside shift change report given to [address] (oncoming nurse) by Malgorzata Short RN (offgoing nurse). Report included the following information SBAR, Kardex, ED Summary, Intake/Output, MAR, and Recent Results. This patient was assisted with Intentional Toileting every 2 hours during this shift as appropriate. Documentation of ambulation and output reflected on Flowsheet as appropriate. Purposeful hourly rounding was completed using AIDET and 5Ps. Outcomes of PHR documented as they occurred. Bed alarm in use as appropriate. Dual Suction and ambubag in place. Required Deluca Documentation:   Bedside Shift Report of Deluca Catheter to include  and the following was verified:  Critera met for insert Yes  Reason for insert: Urinary obstruction  Date of insert: 9/7/22  Order is current: Yes  MD or RN driven: Provider  Removal Discussed Yes  Last CHG Bath: 9/10/22   Deluca Care Last Completed: Date/Time: 9/10/22  Deluca Care After Each Bowel Movement: Yes  Education documented every 24 hours Yes  Care Plan (Risk for UTI, Deluca) Updated Every 24 hours Yes  Bag below Bladder Yes        Bag off Floor Yes      Sheet Clip used Yes          Tubing free of dependant loops Yes          Seal Intact Yes            Bag less than 1/2 full Yes          1057 Pt assisted to bathroom by tech HR elevated up to 160 sinus tach. Pt assessed and asymptomatic . MD Carver on the floor and made aware and will see pt.     1100 New order given ekg 12 lead and magnesium sulfate. Pt remains asymptomatic. 1432 Pt downgraded to tele per MD Lulu Pizano. 1504 monitor tech alerted nurse that HR was up to 170 and sustaining. Rapid response called. RRT nurse at bedside. MD Carver aware . New order to give amiodarone bolus. Pt remains asymptomatic.    1706 MD Carver updated on pt status and HR ranging from 130-170 after amio bolus, MD carver will add digoxin 125 mcg IV once.  RRT nurse at bedside to place another IV 22g to Left arm.    1822 Pt HR increasing up to 124-170. MD carver made aware, awaiting further orders. [facility] order given by MD Carver to give 2 nd dose of digoxin 125mcg. 1930   Bedside shift change report given to  (oncoming nurse) by Bee Campbell RN (offgoing nurse). Report included the following information SBAR, Kardex, ED Summary, Intake/Output, MAR, and Recent Results.

## 2022-09-11 NOTE — PROGRESS NOTES
[address] 19  [phone]    Medical Progress Note      NAME: Miles Crow   :  1940  MRM:  [id number]    Date/Time of service 2022  9:32 AM            Subjective:     Chief Complaint:  \"I'm okay\"     Pt seen and examined earlier this AM. Noted to have elevated HR after going to the bathroom. At time seemed to be sinus rhythm with PVC's. RRT just called for a-fib with RVR. Amio bolus and gtt started. Sotatolol had been stopped due concerns for multiple drug interactions     ROS:  (bold if positive, if negative)    Cough      Sputum      SOB/HERRERA     Objective:     Last 24hrs VS reviewed since prior progress note. Most recent are:    Visit Vitals  /63 (BP 1 Location: Left upper arm, BP Patient Position: At rest)   Pulse (!) 115   Temp 98.4 °F (36.9 °C)   Resp 20   Ht 5' 2\" (1.575 m)   Wt 64.8 kg (142 lb 13.7 oz)   SpO2 97%   BMI 26.13 kg/m²     SpO2 Readings from Last 6 Encounters:   22 97%   22 95%   08/10/22 94%   22 96%   21 99%   21 97%    O2 Flow Rate (L/min): 2 l/min     Intake/Output Summary (Last 24 hours) at 2022 1515  Last data filed at 2022 1041  Gross per 24 hour   Intake 478 ml   Output 1450 ml   Net -972 ml        Physical Exam:    Gen: NAD   HEENT:  Pink conjunctivae, PERRL, hearing intact to voice, moist mucous membranes  Neck:  Supple, without masses, thyroid non-tender  Resp: Diffuse crackles   Card: Tachycardic. Irregular.   No murmurs, normal S1, S2 without thrills, bruits    Abd:  Soft, non-tender, non-distended, normoactive bowel sounds are present, no mass  Lymph:  No cervical or inguinal adenopathy  Musc:  No cyanosis or clubbing  Skin:  No rashes or ulcers, skin turgor is good  Neuro:  Cranial nerves are grossly intact, general motor weakness, follows commands vaguely  Psych:  Good insight __________________________________________________________________  Medications Reviewed: (see below)  Medications:     Current Facility-Administered Medications   Medication Dose Route Frequency    guaiFENesin ER (MUCINEX) tablet 600 mg  600 mg Oral Q12H    urea (URE-NA) 15 gram packet 1 Packet  1 Packet Oral DAILY    amiodarone (CORDARONE) 150 mg in dextrose 5% 100 mL bolus infusion  150 mg IntraVENous ONCE    amiodarone (CORDARONE) 375 mg in dextrose 5% 250 mL infusion  0.5-1 mg/min IntraVENous TITRATE    cefepime (MAXIPIME) 2 g in 0.9% sodium chloride (MBP/ADV) 100 mL MBP  2 g IntraVENous Q12H    sodium chloride (NS) flush 5-40 mL  5-40 mL IntraVENous PRN    guaiFENesin (ROBITUSSIN) 100 mg/5 mL oral liquid 100 mg  100 mg Oral Q4H PRN    prochlorperazine (COMPAZINE) injection 5 mg  5 mg IntraVENous Q6H PRN    nystatin (MYCOSTATIN) 100,000 unit/mL oral suspension 500,000 Units  500,000 Units Oral QID    ketotifen (ZADITOR) 0.025 % (0.035 %) ophthalmic solution 1 Drop  1 Drop Both Eyes BID PRN    albuterol-ipratropium (DUO-NEB) 2.5 MG-0.5 MG/3 ML  3 mL Nebulization Q6H RT    arformoteroL (BROVANA) neb solution 15 mcg  15 mcg Nebulization BID RT    budesonide (PULMICORT) 500 mcg/2 ml nebulizer suspension  500 mcg Nebulization BID RT    albuterol-ipratropium (DUO-NEB) 2.5 MG-0.5 MG/3 ML  3 mL Nebulization Q6H PRN    montelukast (SINGULAIR) tablet 10 mg  10 mg Oral QHS    [Held by provider] sotaloL (BETAPACE) tablet 80 mg  80 mg Oral Q12H    sodium chloride (NS) flush 5-40 mL  5-40 mL IntraVENous Q8H    sodium chloride (NS) flush 5-40 mL  5-40 mL IntraVENous PRN    acetaminophen (TYLENOL) tablet 650 mg  650 mg Oral Q6H PRN    polyethylene glycol (MIRALAX) packet 17 g  17 g Oral DAILY PRN    ondansetron (ZOFRAN) injection 4 mg  4 mg IntraVENous Q6H PRN    rivaroxaban (XARELTO) tablet 20 mg  20 mg Oral DAILY WITH DINNER        Lab Data Reviewed: (see below)  Lab Review:     Recent Labs     09/11/22  4436 09/10/22  0226 09/09/22  1028   WBC 13.5* 18.9* 19.9*   HGB 10.2* 10.4* 10.1*   HCT 31.7* 31.6* 29.7*   * 565* 559*     Recent Labs     09/11/22  0400 09/10/22  1559 09/10/22  1014 09/10/22  0815 09/10/22  0226   * 126* 125* 125* 125*   K 4.0 3.8  --  4.2 4.6   CL 93* 91*  --  91* 92*   CO2 27 28  --  28 25   * 121*  --  100 104*   BUN 11 15  --  12 15   CREA 0.40* 0.58  --  0.44* 0.51*   CA 8.8 9.0  --  9.1 8.9   MG 1.6  --   --   --  1.9     Lab Results   Component Value Date/Time    Glucose (POC) 152 (H) 09/11/2022 03:02 PM    Glucose (POC) 146 (H) 09/06/2022 01:21 PM     No results for input(s): PH, PCO2, PO2, HCO3, FIO2 in the last 72 hours. No results for input(s): INR, INREXT, INREXT in the last 72 hours. All Micro Results       Procedure Component Value Units Date/Time    CULTURE, RESPIRATORY/SPUTUM/BRONCH Donzetta Krabbe [[id number]] Collected: 09/11/22 1210    Order Status: Sent Specimen: Sputum Updated: 09/11/22 1237    CULTURE, RESPIRATORY/SPUTUM/BRONCH Weyman Prima STAIN [[id number]]     Order Status: Canceled Specimen: Sputum     CULTURE, BLOOD, PAIRED [[id number]] Collected: 09/04/22 1339    Order Status: Completed Specimen: Blood Updated: 09/09/22 0737     Special Requests: NO SPECIAL REQUESTS        Culture result: NO GROWTH 5 DAYS       CULTURE, URINE [[id number]] Collected: 09/07/22 1014    Order Status: Completed Specimen: Urine from Clean catch Updated: 09/08/22 1125     Special Requests: NO SPECIAL REQUESTS        Culture result: No growth (<1,000 CFU/ML)       CULTURE, RESPIRATORY/SPUTUM/BRONCH Weyman Prima STAIN [[id number]] Collected: 09/08/22 0845    Order Status: Canceled Specimen: Sputum     URINE CULTURE HOLD SAMPLE [[id number]] Collected: 09/07/22 1014    Order Status: Completed Specimen: Serum Updated: 09/07/22 1038     Urine culture hold       Urine on hold in Microbiology dept for 2 days.   If unpreserved urine is submitted, it cannot be used for addtional testing after 24 hours, recollection will be required. CULTURE, RESPIRATORY/SPUTUM/BRONCH Dorthea [facility] [[id number]] Collected: 09/06/22 0207    Order Status: Canceled Specimen: Sputum     RESPIRATORY VIRUS PANEL W/COVID-19, PCR [[id number]] Collected: 09/05/22 1237    Order Status: Completed Specimen: Nasopharyngeal Updated: 09/05/22 2021     Adenovirus Not detected        Coronavirus 229E Not detected        Coronavirus HKU1 Not detected        Coronavirus CVNL63 Not detected        Coronavirus OC43 Not detected        SARS-CoV-2, PCR Not detected        Metapneumovirus Not detected        Rhinovirus and Enterovirus Not detected        Influenza A Not detected        Influenza A, subtype H1 Not detected        Influenza A, subtype H3 Not detected        INFLUENZA A H1N1 PCR Not detected        Influenza B Not detected        Parainfluenza 1 Not detected        Parainfluenza 2 Not detected        Parainfluenza 3 Not detected        Parainfluenza virus 4 Not detected        RSV by PCR Not detected        B. parapertussis, PCR Not detected        Bordetella pertussis - PCR Not detected        Chlamydophila pneumoniae DNA, QL, PCR Not detected        Mycoplasma pneumoniae DNA, QL, PCR Not detected       COVID-19 RAPID TEST [[id number]] Collected: 09/04/22 1339    Order Status: Completed Specimen: Nasopharyngeal Updated: 09/04/22 1414     Specimen source Nasopharyngeal        COVID-19 rapid test Not detected        Comment: Rapid Abbott ID Now       Rapid NAAT:  The specimen is NEGATIVE for SARS-CoV-2, the novel coronavirus associated with COVID-19. Negative results should be treated as presumptive and, if inconsistent with clinical signs and symptoms or necessary for patient management, should be tested with an alternative molecular assay. Negative results do not preclude SARS-CoV-2 infection and should not be used as the sole basis for patient management decisions.        This test has been authorized by the FDA under an Emergency Use Authorization (EUA) for use by authorized laboratories. Fact sheet for Healthcare Providers: ConventionUpdate.co.nz  Fact sheet for Patients: ConventionUpdate.co.nz       Methodology: Isothermal Nucleic Acid Amplification                  Assessment and Plan:   Hyponatremia / Hypokalemia / Hypomagnesemia - POA, at first thought likely due to poor PO intake. But on 9/7 Na was suddenly, severely, inexplicably worse at 634. Had dropped to 122 9/6. S/p hypertonic saline. S/p tolvaptan on 9/8 and 9/10  -start urea     Abnormal CT chest / Leukocytosis - CT reports \"Scattered reticulonodular changes throughout the lungs with areas of slightly more consolidative. Bronchial wall thickening with mucous plugging. Findings are likely infectious or inflammatory\"  -appreciate pulm  -currently on cefepime  -fu sputum cultures; obtained today      Paroxysmal A-fib with RVR / Chronic anticoagulation   -on xarelto  -unable to tolerate beta blocker or dilt due to BP  -apparently amiodarone was not tolerated previously; will need to obtain outpt cardiology records.  With underlying lung issues this is unlikely an optimal choice   -holding sotalol; may need to resume   -for now as in RVR start amio gtt  -defer to cardiology; perhaps would benefit from dig, ?flecanide (still issues with QTc) or ?ablation     Nausea - likely 2/2 recent COVID, esophageal candidiasis, ?meds   -lipase WNL   -pt states nausea was present long before initiation of sotalol  -consider fluconazole if able to remain off sotalol     CHF (congestive heart failure), NYHA class I, chronic, systolic / Venous insufficiency   -hold additional diuresis        Anemia - stable  -monitor      Weight loss / GERD (gastroesophageal reflux disease) / Hiatal hernia / Candidiasis - ?2/2 esophageal candidiasis   -GI had been evaluating; has had extensive testing to date      Asthma - not wheezing   -continue nebs in place of home inhalers      Hx breast cancer  -outpt follow-up       Hypothyroid   -TSH WNL   -levothyroxine stopped by prior MD; d/w pt will need repeat TFT's in the future to ensure stability     Tingling / Weakness - Noted by patient 9/6. Code stroke called. MRI WNL   -monitor     Leukocytosis - ?etiology. Procalcitonin low. CXR showing interval improvement. Has not been on steroids.  UA on 9/7 WNL   -on cefepime for now     Other pertinent lab: none    Total time spent with patient: 35 [address] discussed with: Patient, Family, Care Manager, Nursing Staff, Consultant/Specialist, and >50% of time spent in counseling and coordination of care    Discussed:  Care Plan and D/C Planning    Prophylaxis:  Lovenox and H2B/PPI    Disposition:  Home w/Family with home health PT/OT, additional support           ___________________________________________________    Attending Physician: John Box MD

## 2022-09-11 NOTE — PROGRESS NOTES
Discussed with RN: pt's HR remains elevated in the 130-160 range despite amio bolus and gtt. D/w cardiology and agrees with amio started.  Also will add digoxin

## 2022-09-11 NOTE — PROGRESS NOTES
Responded to RRT for Elevated Heart Rate    Provider at bedside: no    Interventions ordered: Labs and EKG    Sepsis Suspected: no    Transfer to Higher Level of Care: no    Pt in afib rvr with hx of the same. Pt denies chest pain, SOB, or any other symptoms. MD notified. Amio drip ordered. Bolus started. IV access obtained. Forest Canseco RN    0497: RRT RN rounded on pt for continue tachycardia with a rate in the 130s. MD at bedside. Additional IV started.

## 2022-09-12 LAB
ANION GAP SERPL CALC-SCNC: 6 MMOL/L (ref 5–15)
ANION GAP SERPL CALC-SCNC: 7 MMOL/L (ref 5–15)
ANION GAP SERPL CALC-SCNC: 8 MMOL/L (ref 5–15)
ATRIAL RATE: 111 BPM
ATRIAL RATE: 99 BPM
BASOPHILS # BLD: 0.1 K/UL (ref 0–0.1)
BASOPHILS NFR BLD: 1 % (ref 0–1)
BUN SERPL-MCNC: 11 MG/DL (ref 6–20)
BUN SERPL-MCNC: 18 MG/DL (ref 6–20)
BUN SERPL-MCNC: 27 MG/DL (ref 6–20)
BUN/CREAT SERPL: 24 (ref 12–20)
BUN/CREAT SERPL: 32 (ref 12–20)
BUN/CREAT SERPL: 79 (ref 12–20)
CALCIUM SERPL-MCNC: 8.9 MG/DL (ref 8.5–10.1)
CALCIUM SERPL-MCNC: 9.3 MG/DL (ref 8.5–10.1)
CALCIUM SERPL-MCNC: 9.5 MG/DL (ref 8.5–10.1)
CALCULATED P AXIS, ECG09: 20 DEGREES
CALCULATED R AXIS, ECG10: -19 DEGREES
CALCULATED R AXIS, ECG10: -21 DEGREES
CALCULATED T AXIS, ECG11: 129 DEGREES
CALCULATED T AXIS, ECG11: 32 DEGREES
CHLORIDE SERPL-SCNC: 88 MMOL/L (ref 97–108)
CHLORIDE SERPL-SCNC: 89 MMOL/L (ref 97–108)
CHLORIDE SERPL-SCNC: 90 MMOL/L (ref 97–108)
CO2 SERPL-SCNC: 31 MMOL/L (ref 21–32)
CO2 SERPL-SCNC: 31 MMOL/L (ref 21–32)
CO2 SERPL-SCNC: 32 MMOL/L (ref 21–32)
COMMENT, HOLDF: NORMAL
CORTIS AM PEAK SERPL-MCNC: 15 UG/DL (ref 4.3–22.45)
CREAT SERPL-MCNC: 0.34 MG/DL (ref 0.55–1.02)
CREAT SERPL-MCNC: 0.45 MG/DL (ref 0.55–1.02)
CREAT SERPL-MCNC: 0.56 MG/DL (ref 0.55–1.02)
DIAGNOSIS, 93000: NORMAL
DIAGNOSIS, 93000: NORMAL
DIFFERENTIAL METHOD BLD: ABNORMAL
DIGOXIN SERPL-MCNC: 1.4 NG/ML (ref 0.9–2)
EOSINOPHIL # BLD: 0.4 K/UL (ref 0–0.4)
EOSINOPHIL NFR BLD: 3 % (ref 0–7)
ERYTHROCYTE [DISTWIDTH] IN BLOOD BY AUTOMATED COUNT: 14.5 % (ref 11.5–14.5)
GLUCOSE SERPL-MCNC: 134 MG/DL (ref 65–100)
GLUCOSE SERPL-MCNC: 144 MG/DL (ref 65–100)
GLUCOSE SERPL-MCNC: 161 MG/DL (ref 65–100)
HCT VFR BLD AUTO: 30 % (ref 35–47)
HGB BLD-MCNC: 9.8 G/DL (ref 11.5–16)
IMM GRANULOCYTES # BLD AUTO: 0.1 K/UL (ref 0–0.04)
IMM GRANULOCYTES NFR BLD AUTO: 1 % (ref 0–0.5)
LYMPHOCYTES # BLD: 1.6 K/UL (ref 0.8–3.5)
LYMPHOCYTES NFR BLD: 11 % (ref 12–49)
MAGNESIUM SERPL-MCNC: 1.6 MG/DL (ref 1.6–2.4)
MCH RBC QN AUTO: 28.7 PG (ref 26–34)
MCHC RBC AUTO-ENTMCNC: 32.7 G/DL (ref 30–36.5)
MCV RBC AUTO: 87.7 FL (ref 80–99)
MONOCYTES # BLD: 1.6 K/UL (ref 0–1)
MONOCYTES NFR BLD: 11 % (ref 5–13)
NEUTS SEG # BLD: 10.3 K/UL (ref 1.8–8)
NEUTS SEG NFR BLD: 73 % (ref 32–75)
NRBC # BLD: 0 K/UL (ref 0–0.01)
NRBC BLD-RTO: 0 PER 100 WBC
OSMOLALITY UR: 704 MOSM/KG H2O
P-R INTERVAL, ECG05: 148 MS
PLATELET # BLD AUTO: 720 K/UL (ref 150–400)
PLATELET COMMENTS,PCOM: ABNORMAL
PMV BLD AUTO: 9 FL (ref 8.9–12.9)
POTASSIUM SERPL-SCNC: 3.6 MMOL/L (ref 3.5–5.1)
POTASSIUM SERPL-SCNC: 3.9 MMOL/L (ref 3.5–5.1)
POTASSIUM SERPL-SCNC: 4.5 MMOL/L (ref 3.5–5.1)
Q-T INTERVAL, ECG07: 286 MS
Q-T INTERVAL, ECG07: 316 MS
QRS DURATION, ECG06: 78 MS
QRS DURATION, ECG06: 80 MS
QTC CALCULATION (BEZET), ECG08: 405 MS
QTC CALCULATION (BEZET), ECG08: 436 MS
RBC # BLD AUTO: 3.42 M/UL (ref 3.8–5.2)
RBC MORPH BLD: ABNORMAL
SAMPLES BEING HELD,HOLD: NORMAL
SODIUM SERPL-SCNC: 127 MMOL/L (ref 136–145)
SODIUM SERPL-SCNC: 127 MMOL/L (ref 136–145)
SODIUM SERPL-SCNC: 128 MMOL/L (ref 136–145)
VENTRICULAR RATE, ECG03: 140 BPM
VENTRICULAR RATE, ECG03: 99 BPM
WBC # BLD AUTO: 14.1 K/UL (ref 3.6–11)
WBC MORPH BLD: ABNORMAL

## 2022-09-12 PROCEDURE — APPSS30 APP SPLIT SHARED TIME 16-30 MINUTES: Performed by: NURSE PRACTITIONER

## 2022-09-12 PROCEDURE — 65270000046 HC RM TELEMETRY

## 2022-09-12 PROCEDURE — 97530 THERAPEUTIC ACTIVITIES: CPT

## 2022-09-12 PROCEDURE — 74011000250 HC RX REV CODE- 250: Performed by: INTERNAL MEDICINE

## 2022-09-12 PROCEDURE — 85025 COMPLETE CBC W/AUTO DIFF WBC: CPT

## 2022-09-12 PROCEDURE — 97535 SELF CARE MNGMENT TRAINING: CPT

## 2022-09-12 PROCEDURE — 36415 COLL VENOUS BLD VENIPUNCTURE: CPT

## 2022-09-12 PROCEDURE — 74011250636 HC RX REV CODE- 250/636: Performed by: INTERNAL MEDICINE

## 2022-09-12 PROCEDURE — 82533 TOTAL CORTISOL: CPT

## 2022-09-12 PROCEDURE — 77010033678 HC OXYGEN DAILY

## 2022-09-12 PROCEDURE — 97116 GAIT TRAINING THERAPY: CPT

## 2022-09-12 PROCEDURE — 74011000258 HC RX REV CODE- 258: Performed by: INTERNAL MEDICINE

## 2022-09-12 PROCEDURE — 94664 DEMO&/EVAL PT USE INHALER: CPT

## 2022-09-12 PROCEDURE — 83735 ASSAY OF MAGNESIUM: CPT

## 2022-09-12 PROCEDURE — 65270000029 HC RM PRIVATE

## 2022-09-12 PROCEDURE — 74011250637 HC RX REV CODE- 250/637: Performed by: INTERNAL MEDICINE

## 2022-09-12 PROCEDURE — 83935 ASSAY OF URINE OSMOLALITY: CPT

## 2022-09-12 PROCEDURE — 94640 AIRWAY INHALATION TREATMENT: CPT

## 2022-09-12 PROCEDURE — 80048 BASIC METABOLIC PNL TOTAL CA: CPT

## 2022-09-12 PROCEDURE — 99233 SBSQ HOSP IP/OBS HIGH 50: CPT | Performed by: INTERNAL MEDICINE

## 2022-09-12 PROCEDURE — 51798 US URINE CAPACITY MEASURE: CPT

## 2022-09-12 PROCEDURE — 80162 ASSAY OF DIGOXIN TOTAL: CPT

## 2022-09-12 PROCEDURE — 74011000250 HC RX REV CODE- 250: Performed by: HOSPITALIST

## 2022-09-12 RX ORDER — MAGNESIUM SULFATE HEPTAHYDRATE 40 MG/ML
2 INJECTION, SOLUTION INTRAVENOUS ONCE
Status: COMPLETED | OUTPATIENT
Start: 2022-09-12 | End: 2022-09-12

## 2022-09-12 RX ORDER — DIGOXIN 125 MCG
0.12 TABLET ORAL DAILY
Status: DISCONTINUED | OUTPATIENT
Start: 2022-09-12 | End: 2022-09-12

## 2022-09-12 RX ORDER — LEVALBUTEROL INHALATION SOLUTION 1.25 MG/3ML
1.25 SOLUTION RESPIRATORY (INHALATION)
Status: DISCONTINUED | OUTPATIENT
Start: 2022-09-12 | End: 2022-09-14 | Stop reason: HOSPADM

## 2022-09-12 RX ORDER — IPRATROPIUM BROMIDE 0.5 MG/2.5ML
0.5 SOLUTION RESPIRATORY (INHALATION)
Status: DISCONTINUED | OUTPATIENT
Start: 2022-09-12 | End: 2022-09-14 | Stop reason: HOSPADM

## 2022-09-12 RX ADMIN — MAGNESIUM SULFATE HEPTAHYDRATE 2 G: 40 INJECTION, SOLUTION INTRAVENOUS at 09:15

## 2022-09-12 RX ADMIN — CEFEPIME 2 G: 2 INJECTION, POWDER, FOR SOLUTION INTRAVENOUS at 18:02

## 2022-09-12 RX ADMIN — MONTELUKAST 10 MG: 10 TABLET, FILM COATED ORAL at 21:22

## 2022-09-12 RX ADMIN — SODIUM CHLORIDE, PRESERVATIVE FREE 10 ML: 5 INJECTION INTRAVENOUS at 21:23

## 2022-09-12 RX ADMIN — SOTALOL HYDROCHLORIDE 80 MG: 80 TABLET ORAL at 09:15

## 2022-09-12 RX ADMIN — SODIUM CHLORIDE, PRESERVATIVE FREE 10 ML: 5 INJECTION INTRAVENOUS at 05:28

## 2022-09-12 RX ADMIN — IPRATROPIUM BROMIDE 0.5 MG: 0.5 SOLUTION RESPIRATORY (INHALATION) at 13:07

## 2022-09-12 RX ADMIN — NYSTATIN 500000 UNITS: 100000 SUSPENSION ORAL at 14:26

## 2022-09-12 RX ADMIN — Medication 2 PACKET: at 09:19

## 2022-09-12 RX ADMIN — NYSTATIN 500000 UNITS: 100000 SUSPENSION ORAL at 21:22

## 2022-09-12 RX ADMIN — CEFEPIME 2 G: 2 INJECTION, POWDER, FOR SOLUTION INTRAVENOUS at 05:28

## 2022-09-12 RX ADMIN — DIGOXIN 0.12 MG: 125 TABLET ORAL at 09:15

## 2022-09-12 RX ADMIN — NYSTATIN 500000 UNITS: 100000 SUSPENSION ORAL at 09:15

## 2022-09-12 RX ADMIN — DICLOFENAC SODIUM 2 G: 10 GEL TOPICAL at 09:17

## 2022-09-12 RX ADMIN — GUAIFENESIN 600 MG: 600 TABLET ORAL at 09:15

## 2022-09-12 RX ADMIN — DICLOFENAC SODIUM 2 G: 10 GEL TOPICAL at 14:26

## 2022-09-12 RX ADMIN — RIVAROXABAN 20 MG: 10 TABLET, FILM COATED ORAL at 18:02

## 2022-09-12 RX ADMIN — NYSTATIN 500000 UNITS: 100000 SUSPENSION ORAL at 18:02

## 2022-09-12 RX ADMIN — ARFORMOTEROL TARTRATE 15 MCG: 15 SOLUTION RESPIRATORY (INHALATION) at 19:58

## 2022-09-12 RX ADMIN — DICLOFENAC SODIUM 2 G: 10 GEL TOPICAL at 21:23

## 2022-09-12 RX ADMIN — LEVALBUTEROL HYDROCHLORIDE 1.25 MG: 1.25 SOLUTION RESPIRATORY (INHALATION) at 19:53

## 2022-09-12 RX ADMIN — SODIUM CHLORIDE, PRESERVATIVE FREE 10 ML: 5 INJECTION INTRAVENOUS at 14:26

## 2022-09-12 RX ADMIN — GUAIFENESIN 600 MG: 600 TABLET ORAL at 21:22

## 2022-09-12 RX ADMIN — BUDESONIDE 500 MCG: 0.5 SUSPENSION RESPIRATORY (INHALATION) at 19:58

## 2022-09-12 RX ADMIN — BUDESONIDE 500 MCG: 0.5 SUSPENSION RESPIRATORY (INHALATION) at 07:15

## 2022-09-12 RX ADMIN — LEVALBUTEROL HYDROCHLORIDE 1.25 MG: 1.25 SOLUTION RESPIRATORY (INHALATION) at 13:07

## 2022-09-12 RX ADMIN — DICLOFENAC SODIUM 2 G: 10 GEL TOPICAL at 18:02

## 2022-09-12 RX ADMIN — SODIUM CHLORIDE 0.25 MG: 9 INJECTION INTRAMUSCULAR; INTRAVENOUS; SUBCUTANEOUS at 16:29

## 2022-09-12 RX ADMIN — ARFORMOTEROL TARTRATE 15 MCG: 15 SOLUTION RESPIRATORY (INHALATION) at 07:15

## 2022-09-12 RX ADMIN — SOTALOL HYDROCHLORIDE 80 MG: 80 TABLET ORAL at 21:22

## 2022-09-12 RX ADMIN — IPRATROPIUM BROMIDE 0.5 MG: 0.5 SOLUTION RESPIRATORY (INHALATION) at 19:53

## 2022-09-12 RX ADMIN — POLYETHYLENE GLYCOL 3350 17 G: 17 POWDER, FOR SOLUTION ORAL at 18:02

## 2022-09-12 NOTE — PROGRESS NOTES
Transition of Care Plan: RUR-18%, LOS 8 days  PT/OT/SLP following--PT/OT recommending  snf--will need auth; if pt goes home with hh, familly would like [facility]  A list of snf's was left in the patient's room  3.   GI, neurology, nephrology, plumonolgy following (on 2L O2)  4. A fib RVR- cardiology following  5. Family to transport at d/c  6. Medical management continues  7. CM following for any needs prior to d/c  SILVIA Toribio

## 2022-09-12 NOTE — PROGRESS NOTES
2200  Patient requested voltaren gel for her neck, she uses it at home. Notified Dr. Trace Bowers, new orders received.

## 2022-09-12 NOTE — PROGRESS NOTES
Ultrasound IV by Bisi Duron RN :  Procedure Note    Patient meets criteria for US IV insertion. Ultrasound IV education provided to patient. Opportunities for questions given. Ultrasound used for PIV placement:  20 gauge 6 cm Arrow Endurance Extended Dwell(may stay in place for 29 days)  L upper arm location. 1 X Attempt(s). Flushed with ease; vigorous blood return. Procedure tolerated well. Primary RN aware of IV placement and added to LDA.       Bisi Duron RN

## 2022-09-12 NOTE — PROGRESS NOTES
0700: Bedside shift change report given to [address] [place] [address] (oncoming nurse) by Alberta Park  (offgoing nurse). Report included the following information SBAR, Kardex, Procedure Summary, Intake/Output, MAR, Accordion, and Cardiac Rhythm A fib RVR . This patient was assisted with Intentional Toileting every 2 hours during this shift as appropriate. Documentation of ambulation and output reflected on Flowsheet as appropriate. Purposeful hourly rounding was completed using AIDET and 5Ps. Outcomes of PHR documented as they occurred. Bed alarm in use as appropriate. Dual Suction and ambubag in place.     Critera met for insert Yes  Reason for insert: Urinary obstruction  Date of insert: 9/7/22   Order is current: Yes  MD or RN driven: Provider  Removal Discussed Yes  Last CHG Bath: 9/ 10 1024  Deluca Care Last Completed: Date/Time: 9/11 2003  Deluca Care After Each Bowel Movement: Yes  Education documented every 24 hours Yes  Care Plan (Risk for UTI, Deluca) Updated Every 24 hours Yes  Bag below Bladder Yes        Bag off Floor Yes      Sheet Clip used Yes          Tubing free of dependant loops Yes          Seal Intact Yes            Bag less than 1/2 full Yes

## 2022-09-12 NOTE — PROGRESS NOTES
Problem: Falls - Risk of  Goal: *Absence of Falls  Description: Document Leafy Pablo Fall Risk and appropriate interventions in the flowsheet.   Outcome: Progressing Towards Goal  Note: Fall Risk Interventions:  Mobility Interventions: Bed/chair exit alarm    Mentation Interventions: Bed/chair exit alarm    Medication Interventions: Bed/chair exit alarm    Elimination Interventions: Bed/chair exit alarm, Call light in reach    History of Falls Interventions: Bed/chair exit alarm         Problem: Patient Education: Go to Patient Education Activity  Goal: Patient/Family Education  Outcome: Progressing Towards Goal     Problem: Patient Education: Go to Patient Education Activity  Goal: Patient/Family Education  Outcome: Progressing Towards Goal     Problem: Heart Failure: Day 1  Goal: Off Pathway (Use only if patient is Off Pathway)  Outcome: Progressing Towards Goal  Goal: Activity/Safety  Outcome: Progressing Towards Goal  Goal: Consults, if ordered  Outcome: Progressing Towards Goal  Goal: Diagnostic Test/Procedures  Outcome: Progressing Towards Goal  Goal: Nutrition/Diet  Outcome: Progressing Towards Goal  Goal: Discharge Planning  Outcome: Progressing Towards Goal  Goal: Medications  Outcome: Progressing Towards Goal  Goal: Respiratory  Outcome: Progressing Towards Goal  Goal: Treatments/Interventions/Procedures  Outcome: Progressing Towards Goal  Goal: Psychosocial  Outcome: Progressing Towards Goal  Goal: *Oxygen saturation within defined limits  Outcome: Progressing Towards Goal  Goal: *Hemodynamically stable  Outcome: Progressing Towards Goal  Goal: *Optimal pain control at patient's stated goal  Outcome: Progressing Towards Goal  Goal: *Anxiety reduced or absent  Outcome: Progressing Towards Goal

## 2022-09-12 NOTE — PROGRESS NOTES
CARDIOLOGY PROGRESS NOTE        [address] [facility] [facility], [place], [address] [facility]  Phone [phone]; Fax [phone]          2022 8:51 AM       Admit Date:           2022  Admit Diagnosis:  Dyspnea [R06.00]  :          1940   MRN:          [id number]        Assessment/Plan  Acute on chronic HF mildly reduced EF: stable, diuretics held d/t issues w/ hyponatremia  PAF: on sotalol, Xarelto PTA - developed RVR while off sotalol (held d/t med interactions), not responding to amio. Resume sotalol and d/c amio gtt. Cont PO dig for now until rate under better control, then likely d/c    3. Recent PNA: admitted about 2 weeks ago, completed abx. Was on steroids. Xray revealing interstitial edema vs PNA. CT scan was personally reviewed and also shows evidence of pneumonia. Pulm following   4. Weight loss, decreased appetite, nausea: esophageal candidiasis, would recommend avoiding fluconazole since back on sotalol  5. Hyponatremia: s/p Tolvaptan, holding diuresis. Fluid restriction . NP spent 8 minutes in chart review of notes, VS, diagnostics. NP spent 10 minutes in examination of pt and review of plan of care  with pt/family. Pt personally seen and examined. Chart reviewed. Agree with advanced NP's history, exam and  A/P with changes/additons. Blood pressure (!) 109/50, pulse 96, temperature 97.8 °F (36.6 °C), resp. rate 21, height 5' 2\" (1.575 m), weight 142 lb 13.7 oz (64.8 kg), SpO2 95 %. Alert/  Neck - no JVD  CVS - S1 S2 +; Irreg; 2/6 sys murmur  RS : Dec AE bilat  Abd: Soft/BS+  LE - no edema    A/P :    Acute on chronic HFrEF - Diuretics on hold - Hyponatremia  PAF - Sotalol restarted/On dig/xarelto  Recent PNA -  Hyponatremia-  ( stable)      Warren Pritchett MD, [facility]     We discussed the expected course, resolution and complications of the diagnosis(es) in detail.   Medication risks, benefits, costs, interactions, and alternatives were discussed as indicated. No intake/output data recorded. Last 3 Recorded Weights in this Encounter    09/09/22 0345 09/10/22 0347 09/11/22 0355   Weight: 60 kg (132 lb 4.4 oz) 63 kg (138 lb 14.2 oz) 64.8 kg (142 lb 13.7 oz)         09/10 1901 - 09/12 0700  In: 238 [P.O.:238]  Out: 2550 [Urine:2550]    SUBJECTIVE      80 y.o.  female  with PMH significant for CHF, PAF on Xarelto, asthma, GERD, anemia, hypothyroid and breast CA. Pt presented to the ED with complaints of worsening dyspnea, cough, chest pressure and nausea. Symptoms are associated with feeling lightheaded. She was recently admitted and treated for pneumonia. She had been doing well with return of her appetite until Friday when her symptoms began again. She notes about a 50 lb weight loss over the last 6 months or so. Paige King reports feeling little more SOB this morning.        Current Facility-Administered Medications   Medication Dose Route Frequency    urea (URE-NA) 15 gram packet 2 Packet  2 Packet Oral DAILY    digoxin (LANOXIN) tablet 0.125 mg  0.125 mg Oral DAILY    magnesium sulfate 2 g/50 ml IVPB (premix or compounded)  2 g IntraVENous ONCE    guaiFENesin ER (MUCINEX) tablet 600 mg  600 mg Oral Q12H    levalbuterol (XOPENEX) nebulizer soln 1.25 mg/3 mL  1.25 mg Nebulization Q6HWA    ipratropium (ATROVENT) 0.02 % nebulizer solution 0.5 mg  0.5 mg Nebulization Q6HWA    levalbuterol (XOPENEX) nebulizer soln 1.25 mg/3 mL  1.25 mg Nebulization Q2H PRN    diclofenac (VOLTAREN) 1 % topical gel 2 g  2 g Topical QID    cefepime (MAXIPIME) 2 g in 0.9% sodium chloride (MBP/ADV) 100 mL MBP  2 g IntraVENous Q12H    sodium chloride (NS) flush 5-40 mL  5-40 mL IntraVENous PRN    guaiFENesin (ROBITUSSIN) 100 mg/5 mL oral liquid 100 mg  100 mg Oral Q4H PRN    prochlorperazine (COMPAZINE) injection 5 mg  5 mg IntraVENous Q6H PRN    nystatin (MYCOSTATIN) 100,000 unit/mL oral suspension 500,000 Units  500,000 Units Oral QID ketotifen (ZADITOR) 0.025 % (0.035 %) ophthalmic solution 1 Drop  1 Drop Both Eyes BID PRN    arformoteroL (BROVANA) neb solution 15 mcg  15 mcg Nebulization BID RT    budesonide (PULMICORT) 500 mcg/2 ml nebulizer suspension  500 mcg Nebulization BID RT    montelukast (SINGULAIR) tablet 10 mg  10 mg Oral QHS    sotaloL (BETAPACE) tablet 80 mg  80 mg Oral Q12H    sodium chloride (NS) flush 5-40 mL  5-40 mL IntraVENous Q8H    sodium chloride (NS) flush 5-40 mL  5-40 mL IntraVENous PRN    acetaminophen (TYLENOL) tablet 650 mg  650 mg Oral Q6H PRN    polyethylene glycol (MIRALAX) packet 17 g  17 g Oral DAILY PRN    ondansetron (ZOFRAN) injection 4 mg  4 mg IntraVENous Q6H PRN    rivaroxaban (XARELTO) tablet 20 mg  20 mg Oral DAILY WITH DINNER      OBJECTIVE               Intake/Output Summary (Last 24 hours) at 9/12/2022 0851  Last data filed at 9/11/2022 2354  Gross per 24 hour   Intake 118 ml   Output 1100 ml   Net -982 ml       Review of Systems - History obtained from the patient AS PER  HPI        PHYSICAL EXAM        Visit Vitals  BP (!) 129/53 (BP 1 Location: Left upper arm, BP Patient Position: At rest)   Pulse (!) 136   Temp 97.8 °F (36.6 °C)   Resp 21   Ht 5' 2\" (1.575 m)   Wt 64.8 kg (142 lb 13.7 oz)   SpO2 95%   BMI 26.13 kg/m²       Gen: Well-developed, well-nourished, in no acute distress  alert and oriented x 3  HEENT:  Pink conjunctivae, Hearing grossly normal.No scleral icterus or conjunctival, moist mucous membranes  Neck: Supple,No JVD, No Carotid Bruit, Thyroid- non tender No cervical lymphadenopathy  Resp: No accessory muscle use, diminished breath sounds, No rales or rhonchi  Card: Irregular Rate,Rythm,Normal S1, S2, No murmurs, rubs or gallop. No thrills.    MSK: No cyanosis or clubbing, good capillary refill  Skin: No rashes or ulcers, no bruising  Neuro:  Moving all four extremities, no focal deficit, follows commands appropriately  Psych:  Good insight, oriented to person, place and time, alert, Nml Affect  LE: No edema       DATA REVIEW      No specialty comments available. Cardiac monitor: A-fib RVR    ECHO: 09/04/22    ECHO ADULT COMPLETE 09/06/2022 9/6/2022    Interpretation Summary  Formatting of this result is different from the original.      Left Ventricle: Mildly reduced left ventricular systolic function with a visually estimated EF of 45 - 50%. Left ventricle size is normal. Normal wall thickness. Mild global hypokinesis present. Grade I diastolic dysfunction with normal LAP. Aortic Valve: Valve structure is normal. Mild sclerosis of the aortic valve cusp. Mitral Valve: Mild regurgitation. Tricuspid Valve: Mildly elevated RVSP. Mildly improved lv function compared to study 4/2022. Signed by: Chantal Comment, DO on 9/6/2022 11:24 AM      Laboratory and Imaging have been reviewed by me and are notable for  No results for input(s): CPK, CKMB, TROIQ in the last 72 hours. Recent Labs     09/12/22  0000 09/11/22  1532 09/11/22  0400 09/10/22  0815 09/10/22  0226   * 129* 127*   < > 125*   K 3.9 3.7 4.0   < > 4.6   CO2 32 28 27   < > 25   BUN 18 32* 11   < > 15   CREA 0.56 0.58 0.40*   < > 0.51*   * 135* 107*   < > 104*   MG 1.6  --  1.6  --  1.9   WBC 14.1*  --  13.5*  --  18.9*   HGB 9.8*  --  10.2*  --  10.4*   HCT 30.0*  --  31.7*  --  31.6*   *  --  631*  --  565*    < > = values in this interval not displayed.

## 2022-09-12 NOTE — PROGRESS NOTES
Problem: Self Care Deficits Care Plan (Adult)  Goal: *Acute Goals and Plan of Care (Insert Text)  Description: FUNCTIONAL STATUS PRIOR TO ADMISSION: Patient was independent and active without use of DME. She reports recent use of spouse's rollator to move things within home, and sit down as needed. HOME SUPPORT: The patient lived with spouse but did not require assist. Patient spouse reports. Occupational Therapy Goals  Initiated 9/5/2022 Reviewed at weekly re-assessment 9/12/2022  1. Patient will perform lower body dressing with modified independence within 7 day(s). 2.  Patient will perform upper body dressing with supervision/set-up within 7 day(s). 3.  Patient will perform toilet transfers with supervision/set-up within 7 day(s). 4.  Patient will perform all aspects of toileting with supervision/set-up within 7 day(s). 5.  Patient will participate in upper extremity therapeutic exercise/activities with supervision/set-up for 10 minutes within 7 day(s). 6.  Patient will utilize energy conservation techniques during functional activities with verbal cues within 7 day(s). Outcome: Progressing Towards Goal   OCCUPATIONAL THERAPY TREATMENT/WEEKLY RE-ASSESSMENT  Patient: Cristy Clark (3 y.o. female)  Date: 9/12/2022  Diagnosis: Dyspnea [R06.00] Dyspnea      Precautions: Fall, Skin, Seizure  Chart, occupational therapy assessment, plan of care, and goals were reviewed. ASSESSMENT  Patient continues with skilled OT services and is progressing towards goals. Pt received supine in bed and agreeable to OT session today. Pt with O2 saturations in mid 90's on 1L O2 at rest when received. Overall SBA for LB dressing and CGA fro mobility with RW pt with tachycardia to 132 with initiation of activity sit<>stand but resolves to 120s within a few seconds in standing. Brief functional ambulation and sit<>stand trial yields O2 desaturation to high 70s on 1L, recovers to 95% on 3L within ~1 minute.  Updated RN. Pt continues to offer great attempts at participation but still demonstrates decreased activity tolerance which is her primary barrier at this time. Current Level of Function Impacting Discharge (ADLs): CGA for mobility and transfers, SBA LB dressing    Other factors to consider for discharge: poor activity tolerance, PLOF         PLAN :  Goals have been updated based on progression since last assessment. Patient continues to benefit from skilled intervention to address the above impairments. Continue to follow patient 5 times a week to address goals. Recommend with staff: OOB to recliner as tolerated. OOB to BSC/bathroom with assist x1 and RW for safety     Recommend next OT session: Progress standing ADLs    Recommendation for discharge: (in order for the patient to meet his/her long term goals)  No skilled occupational therapy/ follow up rehabilitation needs identified at this time. This discharge recommendation:  Has been made in collaboration with the attending provider and/or case management    IF patient discharges home will need the following DME: patient owns DME required for discharge       SUBJECTIVE:   Patient stated I thought you may be coming in here soon.     OBJECTIVE DATA SUMMARY:   Cognitive/Behavioral Status:  Neurologic State: Alert  Orientation Level: Oriented X4  Cognition: Appropriate decision making; Appropriate for age attention/concentration; Appropriate safety awareness; Follows commands  Perception: Appears intact  Perseveration: No perseveration noted  Safety/Judgement: Awareness of environment; Insight into deficits;Good awareness of safety precautions    Functional Mobility and Transfers for ADLs:  Bed Mobility:  Rolling: Supervision  Supine to Sit: Supervision  Sit to Supine: Contact guard assistance  Scooting: Stand-by assistance    Transfers:  Sit to Stand: Contact guard assistance     Bed to Chair: Contact guard assistance    Balance:  Sitting: Intact  Standing: Intact; With support  Standing - Static: Fair  Standing - Dynamic : Fair    ADL Intervention:     Lower Body Dressing Assistance  Socks: Stand-by assistance;Set-up  Leg Crossed Method Used: Yes  Position Performed: Seated in chair;Bending forward method         Cognitive Retraining  Safety/Judgement: Awareness of environment; Insight into deficits;Good awareness of safety precautions        Activity Tolerance:   Fair, desaturates with exertion and requires oxygen, requires rest breaks, and observed SOB with activity    After treatment patient left in no apparent distress:   Sitting in chair, Call bell within reach, and Bed / chair alarm activated    COMMUNICATION/COLLABORATION:   The patients plan of care was discussed with: Physical therapist and Registered nurse.      Sahra Castaneda OT  Time Calculation: 26 mins

## 2022-09-12 NOTE — PROGRESS NOTES
Name: Randa Cony: [place]   : 1940 Admit Date: 2022   Phone: [phone]  Room: [facility]/01   PCP: Torin Chin MD  MRN: [id number]   Date: 2022  Code: Full Code          Chart and notes reviewed. Data reviewed. I review the patient's current medications in the medical record at each encounter. I have evaluated and examined the patient. History of Present Illness:  Ms. Ervin Hearn is an  yo woman with a history of asthma/COPD, pseudomonas pneumonia/colonization, bronchiectasis, former tobacco use, afib, breast cancer s/p chemo/radiation, seronegative RA and GERD who presented with dyspnea and cough. [facility] and [facility] . She is managed on Trelegy (this was stopped for a period of time due to concern for GI issues, but pulmonary symptoms worsened when on Advair/Spiriva). She is on jeff nebs for pseudomonas. She recently had an EGD that showed candidal esophagitis, but unable to take fluconazole due to sotalol. She was recently admitted and discharged  for nausea/vomiting and treated for PNA. She was doing well until Friday when she began to feel weak, more short of breath and had increased cough with some chest tightness. Her cough is wet, but has not been notably productive. Has loss additional weight since she was last admitted. Planned for Gastric emptying study and possible upper GI study.      Labs: WBC 10.1, Hgb 9.5, , d-dimer 0.44, cr 0.41, , proBNP 2712, sputum culture  with stenotrophamonas     Images reviewed:  CXR 2022: increased interstitial marking and mild patchy infiltrates (greatest on the R), these are a change from most recent CXR     CT chest 2022: patchy bilateral reticulonodular opacities, bronchial wall thickening, mucous plugging, scattered sub 5mm nodules, no effusions    Interval history  Afebrile  HR 100s-120s  BP stable  Sats 100% on 2L; I weaned down to 1L and sats remained 100%  WBC 14.1  Hgb 9.8  Na 128  TSH 1.12  Blood cx no growth x 5 days  Urine cx no growth  RVP neg  Sputum cx in process    Head CT no acute process    MRI brain with no acute intracranial abnormality. Moderate chronic microvascular ischemic disease. Complete opacification of the left maxillary and sphenoid sinuses, likely representing chronic sinusitis    ECHO: EF 35-77%; grade 1 diastolic dysfunction; mildly elevated RVSP    Gastric emptying study: No delay of gastric emptying. Delayed clearance of radiotracer from the esophagus. CXR : Slightly improved widespread reticulonodular opacities    ROS:  Denies SOB or CP. Reports continued cough, bringing up yellow mucus. Voices no other complaints. Past Medical History:   Diagnosis Date    Asthma     CHF (congestive heart failure), NYHA class I, chronic, systolic (HCC)     Chronic anticoagulation     GERD (gastroesophageal reflux disease)     Hiatal hernia     HX: breast cancer 2015    Right     Hypothyroid     Paroxysmal A-fib (HCC)     Venous insufficiency     bilat LE    Weight loss        Past Surgical History:   Procedure Laterality Date    COLONOSCOPY N/A 2021    COLONOSCOPY AND EGD performed by Sharee Choudhary MD at [id number] [place] [address] Drive,[address] BREAST LUMPECTOMY Right 2015    HX CATARACT REMOVAL  2013    Bilateral     HX CHOLECYSTECTOMY      HX DILATION AND CURETTAGE      x2    HX ORTHOPAEDIC  2017    RIGHT foot  hammertoe       No family history on file.     Social History     Tobacco Use    Smoking status: Former     Types: Cigarettes     Start date: 2000     Quit date: 2000     Years since quittin.7    Smokeless tobacco: Never   Substance Use Topics    Alcohol use: Not Currently       Allergies   Allergen Reactions    Ace Inhibitors Cough    Iodine Rash    Penicillins Rash       Current Facility-Administered Medications   Medication Dose Route Frequency    urea (URE-NA) 15 gram packet 2 Packet  2 Packet Oral DAILY    digoxin (LANOXIN) tablet 0.125 mg  0.125 mg Oral DAILY    magnesium sulfate 2 g/50 ml IVPB (premix or compounded)  2 g IntraVENous ONCE    guaiFENesin ER (MUCINEX) tablet 600 mg  600 mg Oral Q12H    amiodarone (CORDARONE) 375 mg in dextrose 5% 250 mL infusion  0.5-1 mg/min IntraVENous TITRATE    levalbuterol (XOPENEX) nebulizer soln 1.25 mg/3 mL  1.25 mg Nebulization Q6HWA    ipratropium (ATROVENT) 0.02 % nebulizer solution 0.5 mg  0.5 mg Nebulization Q6HWA    levalbuterol (XOPENEX) nebulizer soln 1.25 mg/3 mL  1.25 mg Nebulization Q2H PRN    diclofenac (VOLTAREN) 1 % topical gel 2 g  2 g Topical QID    cefepime (MAXIPIME) 2 g in 0.9% sodium chloride (MBP/ADV) 100 mL MBP  2 g IntraVENous Q12H    sodium chloride (NS) flush 5-40 mL  5-40 mL IntraVENous PRN    guaiFENesin (ROBITUSSIN) 100 mg/5 mL oral liquid 100 mg  100 mg Oral Q4H PRN    prochlorperazine (COMPAZINE) injection 5 mg  5 mg IntraVENous Q6H PRN    nystatin (MYCOSTATIN) 100,000 unit/mL oral suspension 500,000 Units  500,000 Units Oral QID    ketotifen (ZADITOR) 0.025 % (0.035 %) ophthalmic solution 1 Drop  1 Drop Both Eyes BID PRN    arformoteroL (BROVANA) neb solution 15 mcg  15 mcg Nebulization BID RT    budesonide (PULMICORT) 500 mcg/2 ml nebulizer suspension  500 mcg Nebulization BID RT    montelukast (SINGULAIR) tablet 10 mg  10 mg Oral QHS    [Held by provider] sotaloL (BETAPACE) tablet 80 mg  80 mg Oral Q12H    sodium chloride (NS) flush 5-40 mL  5-40 mL IntraVENous Q8H    sodium chloride (NS) flush 5-40 mL  5-40 mL IntraVENous PRN    acetaminophen (TYLENOL) tablet 650 mg  650 mg Oral Q6H PRN    polyethylene glycol (MIRALAX) packet 17 g  17 g Oral DAILY PRN    ondansetron (ZOFRAN) injection 4 mg  4 mg IntraVENous Q6H PRN    rivaroxaban (XARELTO) tablet 20 mg  20 mg Oral DAILY WITH DINNER         REVIEW OF SYSTEMS   12 point ROS negative except as stated in the HPI.       Physical Exam:   Visit Vitals  /61 (BP 1 Location: Left upper arm, BP Patient Position: At rest)   Pulse (!) 128   Temp 98.2 °F (36.8 °C)   Resp 28   Ht 5' 2\" (1.575 m)   Wt 64.8 kg (142 lb 13.7 oz)   SpO2 97%   BMI 26.13 kg/m²       General:  Alert, cooperative, no distress, appears stated age. Head:  Normocephalic, without obvious abnormality, atraumatic. Eyes:  Conjunctivae/corneas clear. Nose: Nares normal. Septum midline. Mucosa normal.    Throat: Lips, mucosa, and tongue normal.    Neck: Supple, symmetrical, trachea midline, no adenopathy. Lungs:   Coarse with bilateral rhonchi, resp nonlabored   Chest wall:  No tenderness or deformity. Heart:  Irregular and mildly tachy, no murmur, click, rub or gallop. Abdomen:   Soft, non-tender. Bowel sounds normal.    Extremities: Extremities normal, atraumatic, no cyanosis or edema. Pulses: 2+ and symmetric all extremities. Skin: Skin color, texture, turgor normal. No rashes or lesions   Neurologic: Grossly nonfocal       Lab Results   Component Value Date/Time    Sodium 128 (L) 09/12/2022 12:00 AM    Potassium 3.9 09/12/2022 12:00 AM    Chloride 90 (L) 09/12/2022 12:00 AM    CO2 32 09/12/2022 12:00 AM    BUN 18 09/12/2022 12:00 AM    Creatinine 0.56 09/12/2022 12:00 AM    Glucose 134 (H) 09/12/2022 12:00 AM    Calcium 9.3 09/12/2022 12:00 AM    Magnesium 1.6 09/12/2022 12:00 AM       Lab Results   Component Value Date/Time    WBC 14.1 (H) 09/12/2022 12:00 AM    HGB 9.8 (L) 09/12/2022 12:00 AM    PLATELET 722 (H) 94/61/2035 12:00 AM    MCV 87.7 09/12/2022 12:00 AM       Lab Results   Component Value Date/Time    Alk.  phosphatase 126 (H) 09/07/2022 01:54 AM    Protein, total 6.0 (L) 09/07/2022 01:54 AM    Albumin 2.0 (L) 09/07/2022 01:54 AM    Globulin 4.0 09/07/2022 01:54 AM       Lab Results   Component Value Date/Time    Iron 35 09/04/2022 05:26 PM    TIBC 193 (L) 09/04/2022 05:26 PM    Iron % saturation 18 (L) 09/04/2022 05:26 PM    Ferritin 196 09/04/2022 05:26 PM       Lab Results   Component Value Date/Time    TSH 1.12 09/07/2022 04:57 AM        No results found for: PH, PHI, PCO2, PCO2I, PO2, PO2I, HCO3, HCO3I, FIO2, FIO2I    Lab Results   Component Value Date/Time    Troponin-I, Qt. <0.05 01/13/2021 03:17 AM        Lab Results   Component Value Date/Time    Culture result: PENDING 09/11/2022 12:10 PM    Culture result: No growth (<1,000 CFU/ML) 09/07/2022 10:14 AM    Culture result: NO GROWTH 5 DAYS 09/04/2022 01:39 PM       No results found for: TOXA1, RPR, HBCM, HBSAG, HAAB, HCAB1, HAAT, G6PD, CRYAC, HIVGT, HIVR, HIV1, HIV12, HIVPC, HIVRPI    No results found for: VANCT, CPK    Lab Results   Component Value Date/Time    Color YELLOW/STRAW 09/07/2022 10:14 AM    Appearance CLEAR 09/07/2022 10:14 AM    pH (UA) 8.0 09/07/2022 10:14 AM    Protein Negative 09/07/2022 10:14 AM    Glucose Negative 09/07/2022 10:14 AM    Ketone TRACE (A) 09/07/2022 10:14 AM    Bilirubin Negative 09/07/2022 10:14 AM    Blood Negative 09/07/2022 10:14 AM    Urobilinogen 1.0 09/07/2022 10:14 AM    Nitrites Negative 09/07/2022 10:14 AM    Leukocyte Esterase Negative 09/07/2022 10:14 AM    WBC 0-4 09/07/2022 10:14 AM    RBC 0-5 09/07/2022 10:14 AM    Bacteria Negative 09/07/2022 10:14 AM       IMPRESSION  Acute respiratory failure with hypoxia  Abnormal chest imaging  Hyponatremia  COPD/asthma  Bronchiectasis  Afib with RVR  History of pseudomonas pneumonia/colonization  GERD  Candidal esophagitis  Nauseas/vomiting  Weight loss    PLAN  Goal sats 88% or higher; not on home O2 at baseline. Currently on 1L with sats 100%  Recent sputum culture grew stenotrophomonas, not treated with bactrim as she was clinically improving at that time but was restarted this admission given imaging findings and clinical change. Bactrim now stopped per primary team due to significant hyponatremia and concern that Bactrim may be the cause. Currently on cefepime. Levaquin would be preferred, but not if she requires sotalol.   Fu sputum culture  Add on trey Woodward/Pulmicort, dunoebs  No indication for steroids at this time  Started her week on jeff nebs on 9/3 and completed on Friday  Nephrology following  Cardiology following - on dig and [address], [place]

## 2022-09-12 NOTE — PROGRESS NOTES
Problem: Mobility Impaired (Adult and Pediatric)  Goal: *Acute Goals and Plan of Care (Insert Text)  Description: FUNCTIONAL STATUS PRIOR TO ADMISSION: Patient was independent and active without use of DME. She reports intermittent use of rollator in the home to assist with brining trays to her spouse or when she needs a seated rest break. HOME SUPPORT PRIOR TO ADMISSION: The patient lived with her spouse with history of dementia and provided meals and ADL assistance for him. Physical Therapy Goals  Initiated 9/5/2022  All goals with intention of being met with least restrictive supplemental oxygen and spo2 >90% or room air  1. Patient will move from supine to sit and sit to supine , scoot up and down, and roll side to side in bed with independence within 7 day(s). 2.  Patient will transfer from bed to chair and chair to bed with modified independence using the least restrictive device within 7 day(s). 3.  Patient will perform sit to stand with modified independence within 7 day(s). 4.  Patient will ambulate with modified independence for 200 feet with the least restrictive device within 7 day(s). 5.  Patient will ascend/descend 3 stairs with 1-2 handrail(s) with minimal assistance/contact guard assist within 7 day(s). Outcome: Progressing Towards Goal   PHYSICAL THERAPY TREATMENT  Patient: Loco Gibbs (61 y.o. female)  Date: 9/12/2022  Diagnosis: Dyspnea [R06.00] Dyspnea      Precautions: Fall, Skin, Seizure  Chart, physical therapy assessment, plan of care and goals were reviewed. ASSESSMENT  Patient continues with skilled PT services and is progressing towards goals. Communicated with nurse cleared for therapy. Patient supine on bed when received, rolled on the edge of bed CGA, supine to sit CGA, sit to stand CGA, ambulate with rolling walker in the room and around the bed CGA no loss of balance decreased pace.  OOB to chair as tolerated performed some active range of motion exercise on both LE all planes. Activated chair alarm and notified nurse who agreed to monitor patient. Current Level of Function Impacting Discharge (mobility/balance): CGA with transfers and ambulation using a rolling walker    Other factors to consider for discharge: fall         PLAN :  Patient continues to benefit from skilled intervention to address the above impairments. Continue treatment per established plan of care. to address goals. Recommendation for discharge: (in order for the patient to meet his/her long term goals)  Therapy up to 5 days/week in SNF setting    This discharge recommendation:  Has been made in collaboration with the attending provider and/or case management    IF patient discharges home will need the following DME: patient owns DME required for discharge       SUBJECTIVE:   Patient stated [address] 27.     OBJECTIVE DATA SUMMARY:   Critical Behavior:  Neurologic State: Alert  Orientation Level: Oriented X4  Cognition: Appropriate decision making, Appropriate for age attention/concentration, Appropriate safety awareness, Follows commands  Safety/Judgement: Awareness of environment, Insight into deficits, Good awareness of safety precautions  Functional Mobility Training:  Bed Mobility:  Rolling: Supervision  Supine to Sit: Supervision  Sit to Supine: Contact guard assistance  Scooting: Stand-by assistance        Transfers:  Sit to Stand: Contact guard assistance  Stand to Sit: Contact guard assistance  Stand Pivot Transfers: Contact guard assistance     Bed to Chair: Contact guard assistance                    Balance:  Sitting: Intact  Standing: Intact; With support  Standing - Static: Fair  Standing - Dynamic : Fair  Ambulation/Gait Training:  Distance (ft): 25 Feet (ft)  Assistive Device: Walker, rolling;Gait belt  Ambulation - Level of Assistance: Contact guard assistance     Gait Description (WDL): Exceptions to WDL  Gait Abnormalities: Path deviations; Step to gait        Base of Support: Widened     Speed/Ivory: Pace decreased (<100 feet/min)  Step Length: Right shortened;Left shortened                         Therapeutic Exercises:   Educate and instructed patient to continue active range of motion exercise on both legs while up on chair or on bed multiple times. Recommend patient to be up on the chair at least 3 times a day every meal times as tolerated. Pain Ratin/10    Activity Tolerance:   Good    After treatment patient left in no apparent distress:   Sitting in chair, Heels elevated for pressure relief, Call bell within reach, and Bed / chair alarm activated    COMMUNICATION/COLLABORATION:   The patients plan of care was discussed with: Occupational therapist and Registered nurse. Saúl Nettles, PT,WCC.    Time Calculation: 24 mins

## 2022-09-12 NOTE — PROGRESS NOTES
RRT RN rounded on patient for a MEWS of 5. HR 110s-120s in Afib. Spoke with primary RN, current plan to stop amiodarone drip and restart sotalol and dig PO for rate control. Patient asymptomatic at this time. Denies SOB/CP. No further interventions warranted by RRT RN at this time. Encouraged primary RN to call with further concerns/changes.

## 2022-09-12 NOTE — PROGRESS NOTES
Olivia Herrera 55  YOB: 1940          Assessment & Plan:     Hyponatremia due to SIADH  Resp failure  COPD  Bronchiectasis  N/V  A fib    Rec:   low on Urea powder  Repeat urine Na and osmolality low  Plan to stop Urea powder and check U na and osm today, depending on the result will give further recommendation  BMP q8hr and FR 1L           Subjective:   CC: follow up hyponatremia  HPI: Na 127 and c/o SOB   ROS: No sob/n/v  Current Facility-Administered Medications   Medication Dose Route Frequency    digoxin (LANOXIN) tablet 0.125 mg  0.125 mg Oral DAILY    magnesium sulfate 2 g/50 ml IVPB (premix or compounded)  2 g IntraVENous ONCE    guaiFENesin ER (MUCINEX) tablet 600 mg  600 mg Oral Q12H    levalbuterol (XOPENEX) nebulizer soln 1.25 mg/3 mL  1.25 mg Nebulization Q6HWA    ipratropium (ATROVENT) 0.02 % nebulizer solution 0.5 mg  0.5 mg Nebulization Q6HWA    levalbuterol (XOPENEX) nebulizer soln 1.25 mg/3 mL  1.25 mg Nebulization Q2H PRN    diclofenac (VOLTAREN) 1 % topical gel 2 g  2 g Topical QID    cefepime (MAXIPIME) 2 g in 0.9% sodium chloride (MBP/ADV) 100 mL MBP  2 g IntraVENous Q12H    sodium chloride (NS) flush 5-40 mL  5-40 mL IntraVENous PRN    guaiFENesin (ROBITUSSIN) 100 mg/5 mL oral liquid 100 mg  100 mg Oral Q4H PRN    prochlorperazine (COMPAZINE) injection 5 mg  5 mg IntraVENous Q6H PRN    nystatin (MYCOSTATIN) 100,000 unit/mL oral suspension 500,000 Units  500,000 Units Oral QID    ketotifen (ZADITOR) 0.025 % (0.035 %) ophthalmic solution 1 Drop  1 Drop Both Eyes BID PRN    arformoteroL (BROVANA) neb solution 15 mcg  15 mcg Nebulization BID RT    budesonide (PULMICORT) 500 mcg/2 ml nebulizer suspension  500 mcg Nebulization BID RT    montelukast (SINGULAIR) tablet 10 mg  10 mg Oral QHS    sotaloL (BETAPACE) tablet 80 mg  80 mg Oral Q12H    sodium chloride (NS) flush 5-40 mL  5-40 mL IntraVENous Q8H    sodium chloride (NS) flush 5-40 mL  5-40 mL IntraVENous PRN    acetaminophen (TYLENOL) tablet 650 mg  650 mg Oral Q6H PRN    polyethylene glycol (MIRALAX) packet 17 g  17 g Oral DAILY PRN    ondansetron (ZOFRAN) injection 4 mg  4 mg IntraVENous Q6H PRN    rivaroxaban (XARELTO) tablet 20 mg  20 mg Oral DAILY WITH DINNER          Objective:     Vitals:  Blood pressure (!) 129/53, pulse (!) 136, temperature 97.8 °F (36.6 °C), resp. rate 21, height 5' 2\" (1.575 m), weight 64.8 kg (142 lb 13.7 oz), SpO2 95 %. Temp (24hrs), Av.1 °F (36.7 °C), Min:97.8 °F (36.6 °C), Max:98.4 °F (36.9 °C)      Intake and Output:  No intake/output data recorded. 09/10 1901 -  0700  In: 238 [P.O.:238]  Out: 2550 [Urine:2550]    Physical Exam:               GENERAL ASSESSMENT: NAD  HEENT: Nontraumatic   CHEST: On oxygen, few rhonchi  HEART: S1S2  ABDOMEN: Soft,NT   +castle +urine  EXTREMITY: no EDEMA  NEURO: Grossly non focal          ECG/rhythm:    Data Review      No results for input(s): TNIPOC in the last 72 hours. No lab exists for component: ITNL   No results for input(s): CPK, CKMB, TROIQ in the last 72 hours. Recent Labs     22  0918 22  0000 22  1532 22  0400 09/10/22  0815 09/10/22  0226   * 128* 129* 127*   < > 125*   K 3.6 3.9 3.7 4.0   < > 4.6   CL 89* 90* 93* 93*   < > 92*   CO2 31 32 28 27   < > 25   BUN 11 18 32* 11   < > 15   CREA 0.45* 0.56 0.58 0.40*   < > 0.51*   * 134* 135* 107*   < > 104*   MG  --  1.6  --  1.6  --  1.9   CA 9.5 9.3 9.2 8.8   < > 8.9   WBC  --  14.1*  --  13.5*  --  18.9*   HGB  --  9.8*  --  10.2*  --  10.4*   HCT  --  30.0*  --  31.7*  --  31.6*   PLT  --  720*  --  631*  --  565*    < > = values in this interval not displayed. No results for input(s): INR, PTP, APTT, INREXT, INREXT in the last 72 hours.   Needs: urine analysis, urine sodium, protein and creatinine  Lab Results   Component Value Date/Time    Sodium,urine random 16 2022 03:09 AM Creatinine, urine 33.00 09/07/2022 10:14 AM           : Julia Rodriguez MD  9/12/2022        [facility]:  [email or web address]  Prashant Bedoya office:  [address] [place] [address], [address] [place] [address],[address]  [place], [address] [place] [address]  Phone: [phone]  Fax :     [phone]    [place] office:  [address] [facility], [address] [place] [address]  Phone - [phone]  Fax - [phone]

## 2022-09-12 NOTE — PROGRESS NOTES
Comprehensive Nutrition Assessment    Type and Reason for Visit: Reassess    Nutrition Recommendations/Plan:   Continue regular diet order with preferences noted  FR per MD - 1000 mL  Provide Ensure Compact BID to aid in meeting kcal needs (440 kcal, 64 g carbs,18 g protein)     Malnutrition Assessment:  Malnutrition Status:  Severe malnutrition (09/12/22 1249)    Context:  Chronic illness     Findings of the 6 clinical characteristics of malnutrition:   Energy Intake:  75% or less est energy requirements for 1 month or longer  Weight Loss:  Greater than 7.5% over 3 months     Body Fat Loss:  Mild body fat loss, Buccal region, Orbital   Muscle Mass Loss:  Mild muscle mass loss, Calf (gastrocnemius), Temples (temporalis), Scapula (trapezius)  Fluid Accumulation:  No significant fluid accumulation,     Strength:  Not performed     Nutrition Assessment:     9/12: Follow up. Patient reports feeling much better today than last week - endorses increased appetite but not yet 'back to normal'. No pain with chewing/swallowing. Provided further meal preferences - states she can NOT have chocolate anything, \"italian spices\", or caffeine. RD updated diet order. Reports consuming 100% of both ONS daily. No ONS intake documented, and PO intake averaging ~50-75% of all meals per documentation. Weight trending up throughout admission with no edema recorded. All weights appear to be bedscale. If PO intake trend continues, patient likely meeting 100% kcal needs and ~96% protein needs. No BM noted x 4 days.     Last 3 Recorded Weights in this Encounter    09/09/22 0345 09/10/22 0347 09/11/22 0355   Weight: 60 kg (132 lb 4.4 oz) 63 kg (138 lb 14.2 oz) 64.8 kg (142 lb 13.7 oz)     Documented Meal intake:  Patient Vitals for the past 168 hrs:   % Diet Eaten   09/11/22 1041 1 - 25%   09/10/22 1547 51 - 75%   09/10/22 0756 51 - 75%   09/09/22 0755 51 - 75%   09/08/22 1744 0%   09/08/22 1353 0%   09/08/22 0919 1 - 25%   09/08/22 0800 0% Documentation of supplement intake:  Patient Vitals for the past 168 hrs:   Supplement intake %   09/10/22 1547 0%   09/10/22 0756 0%   09/09/22 0755 0%       Nutrition Related Findings:      Wound Type: None  Last Bowel Movement Date: 09/09/22 (per patient)  Abdominal Assessment: Intact, Soft  Appetite: Fair  Bowel Sounds: Active   Edema:RLE: No Edema (9/11/2022  8:44 AM)      Nutr. Labs:    Lab Results   Component Value Date/Time    GFR est AA >60 09/12/2022 09:18 AM    GFR est non-AA >60 09/12/2022 09:18 AM    Creatinine 0.45 (L) 09/12/2022 09:18 AM    BUN 11 09/12/2022 09:18 AM    Sodium 127 (L) 09/12/2022 09:18 AM    Potassium 3.6 09/12/2022 09:18 AM    Chloride 89 (L) 09/12/2022 09:18 AM    CO2 31 09/12/2022 09:18 AM    Digoxin level 1.4 09/12/2022 12:00 AM       Lab Results   Component Value Date/Time    Glucose 161 (H) 09/12/2022 09:18 AM    Glucose (POC) 152 (H) 09/11/2022 03:02 PM       No results found for: HBA1C, KDB0QLMK, VNO2QZUN, VPG7RCQO    Nutr. Meds:  Singulair, mycostatin, zofran PRN, miralax PRN, xarelto, sotalol       Current Nutrition Intake & Therapies:  Average Meal Intake: 51-75%  Average Supplement Intake: %  ADULT ORAL NUTRITION SUPPLEMENT Breakfast, Dinner; Standard 4 oz  DIET ONE TIME MESSAGE  ADULT DIET Regular; 1000 ml; No Caffeine, No Chocolate; Hot decaf tea instead of coffee; No orange juice/No tomato juice    Anthropometric Measures:  Height: 5' 2\" (157.5 cm)  Ideal Body Weight (IBW): 110 lbs (50 kg)  Admission Body Weight: 134 lb  Current Body Wt:  64.8 kg (142 lb 13.7 oz), 129.9 % IBW. Bed scale  Current BMI (kg/m2): 26.1  Usual Body Weight: 63.5 kg (140 lb)  % Weight Change (Calculated): 2  Weight Adjustment: No adjustment                 BMI Category: Overweight (BMI 25.0-29. 9)    Estimated Daily Nutrient Needs:  Energy Requirements Based On: Formula  Weight Used for Energy Requirements: Current  Energy (kcal/day): 1381 (MSJ x 1.3)  Weight Used for Protein Requirements: Current  Protein (g/day): 71 (1.1 g/kg)  Method Used for Fluid Requirements: 1 ml/kcal  Fluid (ml/day): 1000 (fluid restriction)    Nutrition Diagnosis:   Inadequate energy intake, Severe malnutrition related to altered GI function, early satiety as evidenced by poor intake prior to admission, weight loss greater than or equal to 10% in 6 months, intake 0-25%, nausea    Nutrition Interventions:   Food and/or Nutrient Delivery: Continue current diet, Continue oral nutrition supplement  Nutrition Education/Counseling: No recommendations at this time  Coordination of Nutrition Care: Continue to monitor while inpatient, Interdisciplinary rounds  Plan of Care discussed with: team during IDRs    Goals:  Previous Goal Met: Goal(s) achieved  Goals: Meet at least 75% of estimated needs, by next RD assessment       Nutrition Monitoring and Evaluation:   Behavioral-Environmental Outcomes: None identified  Food/Nutrient Intake Outcomes: Food and nutrient intake, Supplement intake  Physical Signs/Symptoms Outcomes: Biochemical data, Hemodynamic status, Nausea/vomiting, Weight    Discharge Planning:    Continue oral nutrition supplement    Meron Keith MS, RD  Contact: Ext: [phone], or via [employer]

## 2022-09-12 NOTE — PROGRESS NOTES
Occupational Therapy Note:  Chart reviewed and discussed pt with RN. Pt still in A-Fib with RVR and rate is not controlled. She is currently in the 120s-130s at rest.  RN requested to hold therapy until rate better controlled. Will follow up at later time as able.   Rachel Gamez, OTR/L, CBIS

## 2022-09-12 NOTE — PROGRESS NOTES
[address]  [address] [place] [address]  [phone]    Medical Progress Note      NAME: Cristy Clark   :  1940  MRM:  [id number]    Date/Time of service 2022  9:32 AM            Subjective:     Chief Complaint:  \"I'm okay\"     Pt seen and examined. No complaints. Despite amio gtt and dig HR remains elevated. Had discussed with Dr. Maurice Paniagua several times throughout  but did not advise resumption of sotalol    ROS:  (bold if positive, if negative)    Cough      Sputum      SOB/HERRERA     Objective:     Last 24hrs VS reviewed since prior progress note. Most recent are:    Visit Vitals  BP (!) 109/50 (BP 1 Location: Left upper arm, BP Patient Position: At rest)   Pulse 96   Temp 97.8 °F (36.6 °C)   Resp 21   Ht 5' 2\" (1.575 m)   Wt 64.8 kg (142 lb 13.7 oz)   SpO2 96%   BMI 26.13 kg/m²     SpO2 Readings from Last 6 Encounters:   22 96%   22 95%   08/10/22 94%   22 96%   21 99%   21 97%    O2 Flow Rate (L/min): 2 l/min     Intake/Output Summary (Last 24 hours) at 2022 1320  Last data filed at 2022 2354  Gross per 24 hour   Intake --   Output 1100 ml   Net -1100 ml        Physical Exam:    Gen: NAD   HEENT:  Pink conjunctivae, PERRL, hearing intact to voice, moist mucous membranes  Neck:  Supple, without masses, thyroid non-tender  Resp: Diffuse crackles   Card: Tachycardic. Irregular.   No murmurs, normal S1, S2 without thrills, bruits    Abd:  Soft, non-tender, non-distended, normoactive bowel sounds are present, no mass  Lymph:  No cervical or inguinal adenopathy  Musc:  No cyanosis or clubbing  Skin:  No rashes or ulcers, skin turgor is good  Neuro:  Cranial nerves are grossly intact, general motor weakness, follows commands vaguely  Psych:  Good insight   __________________________________________________________________  Medications Reviewed: (see below)  Medications:     Current Facility-Administered Medications Medication Dose Route Frequency    ipratropium (ATROVENT) 0.02 % nebulizer solution 0.5 mg  0.5 mg Nebulization Q6HWA RT    levalbuterol (XOPENEX) nebulizer soln 1.25 mg/3 mL  1.25 mg Nebulization Q6HWA RT    guaiFENesin ER (MUCINEX) tablet 600 mg  600 mg Oral Q12H    levalbuterol (XOPENEX) nebulizer soln 1.25 mg/3 mL  1.25 mg Nebulization Q2H PRN    diclofenac (VOLTAREN) 1 % topical gel 2 g  2 g Topical QID    cefepime (MAXIPIME) 2 g in 0.9% sodium chloride (MBP/ADV) 100 mL MBP  2 g IntraVENous Q12H    sodium chloride (NS) flush 5-40 mL  5-40 mL IntraVENous PRN    guaiFENesin (ROBITUSSIN) 100 mg/5 mL oral liquid 100 mg  100 mg Oral Q4H PRN    prochlorperazine (COMPAZINE) injection 5 mg  5 mg IntraVENous Q6H PRN    nystatin (MYCOSTATIN) 100,000 unit/mL oral suspension 500,000 Units  500,000 Units Oral QID    ketotifen (ZADITOR) 0.025 % (0.035 %) ophthalmic solution 1 Drop  1 Drop Both Eyes BID PRN    arformoteroL (BROVANA) neb solution 15 mcg  15 mcg Nebulization BID RT    budesonide (PULMICORT) 500 mcg/2 ml nebulizer suspension  500 mcg Nebulization BID RT    montelukast (SINGULAIR) tablet 10 mg  10 mg Oral QHS    sotaloL (BETAPACE) tablet 80 mg  80 mg Oral Q12H    sodium chloride (NS) flush 5-40 mL  5-40 mL IntraVENous Q8H    sodium chloride (NS) flush 5-40 mL  5-40 mL IntraVENous PRN    acetaminophen (TYLENOL) tablet 650 mg  650 mg Oral Q6H PRN    polyethylene glycol (MIRALAX) packet 17 g  17 g Oral DAILY PRN    ondansetron (ZOFRAN) injection 4 mg  4 mg IntraVENous Q6H PRN    rivaroxaban (XARELTO) tablet 20 mg  20 mg Oral DAILY WITH DINNER        Lab Data Reviewed: (see below)  Lab Review:     Recent Labs     09/12/22  0000 09/11/22  0400 09/10/22  0226   WBC 14.1* 13.5* 18.9*   HGB 9.8* 10.2* 10.4*   HCT 30.0* 31.7* 31.6*   * 631* 565*     Recent Labs     09/12/22  0918 09/12/22  0000 09/11/22  1532 09/11/22  0400 09/10/22  0815 09/10/22  0226   * 128* 129* 127*   < > 125*   K 3.6 3.9 3.7 4.0   < > 4.6   CL 89* 90* 93* 93*   < > 92*   CO2 31 32 28 27   < > 25   * 134* 135* 107*   < > 104*   BUN 11 18 32* 11   < > 15   CREA 0.45* 0.56 0.58 0.40*   < > 0.51*   CA 9.5 9.3 9.2 8.8   < > 8.9   MG  --  1.6  --  1.6  --  1.9    < > = values in this interval not displayed. Lab Results   Component Value Date/Time    Glucose (POC) 152 (H) 09/11/2022 03:02 PM    Glucose (POC) 146 (H) 09/06/2022 01:21 PM     No results for input(s): PH, PCO2, PO2, HCO3, FIO2 in the last 72 hours. No results for input(s): INR, INREXT, INREXT in the last 72 hours. All Micro Results       Procedure Component Value Units Date/Time    CULTURE, RESPIRATORY/SPUTUM/BRONCH Tatiana Ply [[id number]] Collected: 09/11/22 1210    Order Status: Completed Specimen: Sputum Updated: 09/11/22 2212     Special Requests: NO SPECIAL REQUESTS        GRAM STAIN OCCASIONAL WBCS SEEN               RARE EPITHELIAL CELLS SEEN            NO ORGANISMS SEEN        Culture result: PENDING    CULTURE, RESPIRATORY/SPUTUM/BRONCH Sydell Gloria STAIN [[id number]]     Order Status: Canceled Specimen: Sputum     CULTURE, BLOOD, PAIRED [[id number]] Collected: 09/04/22 1339    Order Status: Completed Specimen: Blood Updated: 09/09/22 0737     Special Requests: NO SPECIAL REQUESTS        Culture result: NO GROWTH 5 DAYS       CULTURE, URINE [[id number]] Collected: 09/07/22 1014    Order Status: Completed Specimen: Urine from Clean catch Updated: 09/08/22 1125     Special Requests: NO SPECIAL REQUESTS        Culture result: No growth (<1,000 CFU/ML)       CULTURE, RESPIRATORY/SPUTUM/BRONCH Sydell Gloria STAIN [[id number]] Collected: 09/08/22 0845    Order Status: Canceled Specimen: Sputum     URINE CULTURE HOLD SAMPLE [[id number]] Collected: 09/07/22 1014    Order Status: Completed Specimen: Serum Updated: 09/07/22 1038     Urine culture hold       Urine on hold in Microbiology dept for 2 days.   If unpreserved urine is submitted, it cannot be used for addtional testing after 24 hours, recollection will be required. CULTURE, RESPIRATORY/SPUTUM/BRONCH Avelina Blind [[id number]] Collected: 09/06/22 0207    Order Status: Canceled Specimen: Sputum     RESPIRATORY VIRUS PANEL W/COVID-19, PCR [[id number]] Collected: 09/05/22 1237    Order Status: Completed Specimen: Nasopharyngeal Updated: 09/05/22 2021     Adenovirus Not detected        Coronavirus 229E Not detected        Coronavirus HKU1 Not detected        Coronavirus CVNL63 Not detected        Coronavirus OC43 Not detected        SARS-CoV-2, PCR Not detected        Metapneumovirus Not detected        Rhinovirus and Enterovirus Not detected        Influenza A Not detected        Influenza A, subtype H1 Not detected        Influenza A, subtype H3 Not detected        INFLUENZA A H1N1 PCR Not detected        Influenza B Not detected        Parainfluenza 1 Not detected        Parainfluenza 2 Not detected        Parainfluenza 3 Not detected        Parainfluenza virus 4 Not detected        RSV by PCR Not detected        B. parapertussis, PCR Not detected        Bordetella pertussis - PCR Not detected        Chlamydophila pneumoniae DNA, QL, PCR Not detected        Mycoplasma pneumoniae DNA, QL, PCR Not detected       COVID-19 RAPID TEST [[id number]] Collected: 09/04/22 1339    Order Status: Completed Specimen: Nasopharyngeal Updated: 09/04/22 1414     Specimen source Nasopharyngeal        COVID-19 rapid test Not detected        Comment: Rapid Abbott ID Now       Rapid NAAT:  The specimen is NEGATIVE for SARS-CoV-2, the novel coronavirus associated with COVID-19. Negative results should be treated as presumptive and, if inconsistent with clinical signs and symptoms or necessary for patient management, should be tested with an alternative molecular assay. Negative results do not preclude SARS-CoV-2 infection and should not be used as the sole basis for patient management decisions.        This test has been authorized by the FDA under an Emergency Use Authorization (EUA) for use by authorized laboratories. Fact sheet for Healthcare Providers: ConventionUpdate.co.nz  Fact sheet for Patients: ConventionUpdate.co.nz       Methodology: Isothermal Nucleic Acid Amplification                  Assessment and Plan:   Hyponatremia / Hypokalemia / Hypomagnesemia - POA, at first thought likely due to poor PO intake. But on 9/7 Na was suddenly, severely, inexplicably worse at 318. Had dropped to 122 9/6. S/p hypertonic saline. S/p tolvaptan on 9/8 and 9/10  -despite Urea salt, Na+ downtrending slightly   -repeat serologies; ?additional tolvaptan   -serial BMP's   -AM cortisol 15; check beth stim      Abnormal CT chest / Leukocytosis - CT reports \"Scattered reticulonodular changes throughout the lungs with areas of slightly more consolidative. Bronchial wall thickening with mucous plugging. Findings are likely infectious or inflammatory\"  -appreciate pulm  -currently on cefepime  -fu sputum cultures; pending      Paroxysmal A-fib with RVR / Chronic anticoagulation   -on xarelto  -unable to tolerate beta blocker or dilt due to BP  -apparently amiodarone was not tolerated previously; will need to obtain outpt cardiology records.  With underlying lung issues this is unlikely an optimal choice   -per cardiology today; restart sotalol since failed amio and dig loading yesterday     Nausea - likely 2/2 recent COVID, esophageal candidiasis, ?meds   -lipase WNL   -pt states nausea was present long before initiation of sotalol  -consider fluconazole if able to remain off sotalol but at this time NOT possible     CHF (congestive heart failure), NYHA class I, chronic, systolic / Venous insufficiency   -hold additional diuresis        Anemia - stable  -monitor      Weight loss / GERD (gastroesophageal reflux disease) / Hiatal hernia / Candidiasis - ?2/2 esophageal candidiasis   -GI had been evaluating; has had extensive testing to date Asthma - not wheezing   -continue nebs in place of home inhalers      Hx breast cancer  -outpt follow-up       Hypothyroid   -TSH WNL   -levothyroxine stopped by prior MD; d/w pt will need repeat TFT's in the future to ensure stability     Tingling / Weakness - Noted by patient 9/6. Code stroke called. MRI WNL   -monitor     Leukocytosis - ?etiology. Procalcitonin low. CXR showing interval improvement. Has not been on steroids.  UA on 9/7 WNL   -on cefepime for now; plan to transition to levofloxacin if off sotalol but this has not been possible     Other pertinent lab: none    Total time spent with patient: 28 [address] discussed with: Patient, Family, Care Manager, Nursing Staff, Consultant/Specialist, and >50% of time spent in counseling and coordination of care    Discussed:  Care Plan and D/C Planning    Prophylaxis:  Lovenox and H2B/PPI    Disposition:  Home w/Family with home health PT/OT, additional support           ___________________________________________________    Attending Physician: Ximena Gagnon MD

## 2022-09-13 LAB
ANION GAP SERPL CALC-SCNC: 5 MMOL/L (ref 5–15)
ANION GAP SERPL CALC-SCNC: 7 MMOL/L (ref 5–15)
ATRIAL RATE: 77 BPM
BASOPHILS # BLD: 0.1 K/UL (ref 0–0.1)
BASOPHILS NFR BLD: 0 % (ref 0–1)
BUN SERPL-MCNC: 13 MG/DL (ref 6–20)
BUN SERPL-MCNC: 21 MG/DL (ref 6–20)
BUN/CREAT SERPL: 36 (ref 12–20)
BUN/CREAT SERPL: 81 (ref 12–20)
CALCIUM SERPL-MCNC: 8.4 MG/DL (ref 8.5–10.1)
CALCIUM SERPL-MCNC: 8.7 MG/DL (ref 8.5–10.1)
CALCULATED R AXIS, ECG10: -16 DEGREES
CALCULATED T AXIS, ECG11: 80 DEGREES
CHLORIDE SERPL-SCNC: 101 MMOL/L (ref 97–108)
CHLORIDE SERPL-SCNC: 90 MMOL/L (ref 97–108)
CO2 SERPL-SCNC: 29 MMOL/L (ref 21–32)
CO2 SERPL-SCNC: 31 MMOL/L (ref 21–32)
CORTIS 1H P CHAL SERPL-MCNC: 49 UG/DL
CORTIS 30M P CHAL SERPL-MCNC: 37.6 UG/DL
CORTIS BS SERPL-MCNC: 26.5 UG/DL
CREAT SERPL-MCNC: 0.26 MG/DL (ref 0.55–1.02)
CREAT SERPL-MCNC: 0.36 MG/DL (ref 0.55–1.02)
DIAGNOSIS, 93000: NORMAL
DIFFERENTIAL METHOD BLD: ABNORMAL
EOSINOPHIL # BLD: 0.1 K/UL (ref 0–0.4)
EOSINOPHIL NFR BLD: 0 % (ref 0–7)
ERYTHROCYTE [DISTWIDTH] IN BLOOD BY AUTOMATED COUNT: 14.2 % (ref 11.5–14.5)
GLUCOSE SERPL-MCNC: 124 MG/DL (ref 65–100)
GLUCOSE SERPL-MCNC: 127 MG/DL (ref 65–100)
HCT VFR BLD AUTO: 28.4 % (ref 35–47)
HGB BLD-MCNC: 9.3 G/DL (ref 11.5–16)
IMM GRANULOCYTES # BLD AUTO: 0.3 K/UL (ref 0–0.04)
IMM GRANULOCYTES NFR BLD AUTO: 2 % (ref 0–0.5)
LYMPHOCYTES # BLD: 1.4 K/UL (ref 0.8–3.5)
LYMPHOCYTES NFR BLD: 9 % (ref 12–49)
MCH RBC QN AUTO: 29.2 PG (ref 26–34)
MCHC RBC AUTO-ENTMCNC: 32.7 G/DL (ref 30–36.5)
MCV RBC AUTO: 89.3 FL (ref 80–99)
MONOCYTES # BLD: 0.8 K/UL (ref 0–1)
MONOCYTES NFR BLD: 6 % (ref 5–13)
NEUTS SEG # BLD: 12.4 K/UL (ref 1.8–8)
NEUTS SEG NFR BLD: 83 % (ref 32–75)
NRBC # BLD: 0 K/UL (ref 0–0.01)
NRBC BLD-RTO: 0 PER 100 WBC
PLATELET # BLD AUTO: 706 K/UL (ref 150–400)
PMV BLD AUTO: 8.9 FL (ref 8.9–12.9)
POTASSIUM SERPL-SCNC: 3.3 MMOL/L (ref 3.5–5.1)
POTASSIUM SERPL-SCNC: 4.2 MMOL/L (ref 3.5–5.1)
Q-T INTERVAL, ECG07: 348 MS
QRS DURATION, ECG06: 72 MS
QTC CALCULATION (BEZET), ECG08: 455 MS
RBC # BLD AUTO: 3.18 M/UL (ref 3.8–5.2)
SODIUM SERPL-SCNC: 128 MMOL/L (ref 136–145)
SODIUM SERPL-SCNC: 135 MMOL/L (ref 136–145)
SODIUM UR-SCNC: 8 MMOL/L
UR CULT HOLD, URHOLD: NORMAL
VENTRICULAR RATE, ECG03: 103 BPM
WBC # BLD AUTO: 14.9 K/UL (ref 3.6–11)

## 2022-09-13 PROCEDURE — 97530 THERAPEUTIC ACTIVITIES: CPT

## 2022-09-13 PROCEDURE — 2709999900 HC NON-CHARGEABLE SUPPLY

## 2022-09-13 PROCEDURE — 97116 GAIT TRAINING THERAPY: CPT

## 2022-09-13 PROCEDURE — 77010033678 HC OXYGEN DAILY

## 2022-09-13 PROCEDURE — 99232 SBSQ HOSP IP/OBS MODERATE 35: CPT | Performed by: INTERNAL MEDICINE

## 2022-09-13 PROCEDURE — 97535 SELF CARE MNGMENT TRAINING: CPT

## 2022-09-13 PROCEDURE — 80048 BASIC METABOLIC PNL TOTAL CA: CPT

## 2022-09-13 PROCEDURE — 74011250637 HC RX REV CODE- 250/637: Performed by: INTERNAL MEDICINE

## 2022-09-13 PROCEDURE — 84300 ASSAY OF URINE SODIUM: CPT

## 2022-09-13 PROCEDURE — 36415 COLL VENOUS BLD VENIPUNCTURE: CPT

## 2022-09-13 PROCEDURE — 94664 DEMO&/EVAL PT USE INHALER: CPT

## 2022-09-13 PROCEDURE — 94640 AIRWAY INHALATION TREATMENT: CPT

## 2022-09-13 PROCEDURE — 65270000046 HC RM TELEMETRY

## 2022-09-13 PROCEDURE — 93005 ELECTROCARDIOGRAM TRACING: CPT

## 2022-09-13 PROCEDURE — 85025 COMPLETE CBC W/AUTO DIFF WBC: CPT

## 2022-09-13 PROCEDURE — 74011000258 HC RX REV CODE- 258: Performed by: INTERNAL MEDICINE

## 2022-09-13 PROCEDURE — 74011000250 HC RX REV CODE- 250: Performed by: HOSPITALIST

## 2022-09-13 PROCEDURE — 74011000250 HC RX REV CODE- 250: Performed by: INTERNAL MEDICINE

## 2022-09-13 PROCEDURE — APPSS15 APP SPLIT SHARED TIME 0-15 MINUTES: Performed by: NURSE PRACTITIONER

## 2022-09-13 PROCEDURE — 74011250636 HC RX REV CODE- 250/636: Performed by: INTERNAL MEDICINE

## 2022-09-13 RX ADMIN — BUDESONIDE 500 MCG: 0.5 SUSPENSION RESPIRATORY (INHALATION) at 20:33

## 2022-09-13 RX ADMIN — GUAIFENESIN 600 MG: 600 TABLET ORAL at 21:38

## 2022-09-13 RX ADMIN — SOTALOL HYDROCHLORIDE 80 MG: 80 TABLET ORAL at 09:39

## 2022-09-13 RX ADMIN — MONTELUKAST 10 MG: 10 TABLET, FILM COATED ORAL at 21:38

## 2022-09-13 RX ADMIN — NYSTATIN 500000 UNITS: 100000 SUSPENSION ORAL at 21:38

## 2022-09-13 RX ADMIN — NYSTATIN 500000 UNITS: 100000 SUSPENSION ORAL at 17:11

## 2022-09-13 RX ADMIN — LEVALBUTEROL HYDROCHLORIDE 1.25 MG: 1.25 SOLUTION RESPIRATORY (INHALATION) at 13:07

## 2022-09-13 RX ADMIN — SODIUM CHLORIDE, PRESERVATIVE FREE 10 ML: 5 INJECTION INTRAVENOUS at 21:38

## 2022-09-13 RX ADMIN — Medication 15 MG: at 09:39

## 2022-09-13 RX ADMIN — DICLOFENAC SODIUM 2 G: 10 GEL TOPICAL at 13:29

## 2022-09-13 RX ADMIN — DICLOFENAC SODIUM 2 G: 10 GEL TOPICAL at 17:10

## 2022-09-13 RX ADMIN — SOTALOL HYDROCHLORIDE 80 MG: 80 TABLET ORAL at 21:38

## 2022-09-13 RX ADMIN — CEFEPIME 2 G: 2 INJECTION, POWDER, FOR SOLUTION INTRAVENOUS at 17:10

## 2022-09-13 RX ADMIN — IPRATROPIUM BROMIDE 0.5 MG: 0.5 SOLUTION RESPIRATORY (INHALATION) at 20:40

## 2022-09-13 RX ADMIN — NYSTATIN 500000 UNITS: 100000 SUSPENSION ORAL at 09:39

## 2022-09-13 RX ADMIN — SODIUM CHLORIDE, PRESERVATIVE FREE 10 ML: 5 INJECTION INTRAVENOUS at 05:55

## 2022-09-13 RX ADMIN — BUDESONIDE 500 MCG: 0.5 SUSPENSION RESPIRATORY (INHALATION) at 07:14

## 2022-09-13 RX ADMIN — IPRATROPIUM BROMIDE 0.5 MG: 0.5 SOLUTION RESPIRATORY (INHALATION) at 07:10

## 2022-09-13 RX ADMIN — ARFORMOTEROL TARTRATE 15 MCG: 15 SOLUTION RESPIRATORY (INHALATION) at 20:33

## 2022-09-13 RX ADMIN — LEVALBUTEROL HYDROCHLORIDE 1.25 MG: 1.25 SOLUTION RESPIRATORY (INHALATION) at 20:40

## 2022-09-13 RX ADMIN — CEFEPIME 2 G: 2 INJECTION, POWDER, FOR SOLUTION INTRAVENOUS at 05:55

## 2022-09-13 RX ADMIN — DICLOFENAC SODIUM 2 G: 10 GEL TOPICAL at 09:50

## 2022-09-13 RX ADMIN — LEVALBUTEROL HYDROCHLORIDE 1.25 MG: 1.25 SOLUTION RESPIRATORY (INHALATION) at 07:10

## 2022-09-13 RX ADMIN — ARFORMOTEROL TARTRATE 15 MCG: 15 SOLUTION RESPIRATORY (INHALATION) at 07:14

## 2022-09-13 RX ADMIN — GUAIFENESIN 600 MG: 600 TABLET ORAL at 09:39

## 2022-09-13 RX ADMIN — IPRATROPIUM BROMIDE 0.5 MG: 0.5 SOLUTION RESPIRATORY (INHALATION) at 13:07

## 2022-09-13 RX ADMIN — NYSTATIN 500000 UNITS: 100000 SUSPENSION ORAL at 13:27

## 2022-09-13 RX ADMIN — RIVAROXABAN 20 MG: 10 TABLET, FILM COATED ORAL at 17:09

## 2022-09-13 RX ADMIN — DICLOFENAC SODIUM 2 G: 10 GEL TOPICAL at 21:39

## 2022-09-13 RX ADMIN — SODIUM CHLORIDE, PRESERVATIVE FREE 10 ML: 5 INJECTION INTRAVENOUS at 13:30

## 2022-09-13 NOTE — PROGRESS NOTES
Name: Eden Hinson: [employer]   : 1940 Admit Date: 2022   Phone: [phone]  Room: [facility]/   PCP: Abdoulaye Khalil MD  MRN: [id number]   Date: 2022  Code: Full Code          Chart and notes reviewed. Data reviewed. I review the patient's current medications in the medical record at each encounter. I have evaluated and examined the patient. History of Present Illness:  Ms. Zaida Griffin is an  yo woman with a history of asthma/COPD, pseudomonas pneumonia/colonization, bronchiectasis, former tobacco use, afib, breast cancer s/p chemo/radiation, seronegative RA and GERD who presented with dyspnea and cough. She is followed by Dr. Juanjo Marcus by [facility] and was last seen . She is managed on Trelegy (this was stopped for a period of time due to concern for GI issues, but pulmonary symptoms worsened when on Advair/Spiriva). She is on jeff nebs for pseudomonas. She recently had an EGD that showed candidal esophagitis, but unable to take fluconazole due to sotalol. She was recently admitted and discharged  for nausea/vomiting and treated for PNA. She was doing well until Friday when she began to feel weak, more short of breath and had increased cough with some chest tightness. Her cough is wet, but has not been notably productive. Has loss additional weight since she was last admitted. Planned for Gastric emptying study and possible upper GI study.      Labs: WBC 10.1, Hgb 9.5, , d-dimer 0.44, cr 0.41, , proBNP 2712, sputum culture  with stenotrophamonas     Images reviewed:  CXR 2022: increased interstitial marking and mild patchy infiltrates (greatest on the R), these are a change from most recent CXR     CT chest 2022: patchy bilateral reticulonodular opacities, bronchial wall thickening, mucous plugging, scattered sub 5mm nodules, no effusions    Interval history  Afebrile  HR 70-80s  BP stable  Sats 98% on 0.5L  WBC 14.9  Hgb 9.3  Na 128  TSH 1.12  Blood cx no growth x 5 days  Urine cx no growth  RVP neg  Sputum cx w/ light normal respiratory ge    Head CT no acute process    MRI brain with no acute intracranial abnormality. Moderate chronic microvascular ischemic disease. Complete opacification of the left maxillary and sphenoid sinuses, likely representing chronic sinusitis    ECHO: EF 06-38%; grade 1 diastolic dysfunction; mildly elevated RVSP    Gastric emptying study: No delay of gastric emptying. Delayed clearance of radiotracer from the esophagus. CXR : Slightly improved widespread reticulonodular opacities    ROS:  Resting in recliner on assessment in no acute distress. Denies SOB or CP. Reports cough is improved, no longer productive. Voices no other complaints. Pt reports she has not received Acapella device, notified RT. Past Medical History:   Diagnosis Date    Asthma     CHF (congestive heart failure), NYHA class I, chronic, systolic (HCC)     Chronic anticoagulation     GERD (gastroesophageal reflux disease)     Hiatal hernia     HX: breast cancer     Right     Hypothyroid     Paroxysmal A-fib (HCC)     Venous insufficiency     bilat LE    Weight loss        Past Surgical History:   Procedure Laterality Date    COLONOSCOPY N/A 2021    COLONOSCOPY AND EGD performed by Husam Comer MD at [address] BREAST LUMPECTOMY Right 2015    HX CATARACT REMOVAL  2013    Bilateral     HX CHOLECYSTECTOMY      HX DILATION AND CURETTAGE      x2    HX ORTHOPAEDIC  2017    RIGHT foot  hammertoe       No family history on file.     Social History     Tobacco Use    Smoking status: Former     Types: Cigarettes     Start date: 2000     Quit date: 2000     Years since quittin.7    Smokeless tobacco: Never   Substance Use Topics    Alcohol use: Not Currently       Allergies   Allergen Reactions    Ace Inhibitors Cough    Iodine Rash    Penicillins Rash       Current Facility-Administered Medications   Medication Dose Route Frequency    tolvaptan (SAMSCA) tablet (0.5 X 30 MG) 15 mg  15 mg Oral ONCE    ipratropium (ATROVENT) 0.02 % nebulizer solution 0.5 mg  0.5 mg Nebulization Q6HWA RT    levalbuterol (XOPENEX) nebulizer soln 1.25 mg/3 mL  1.25 mg Nebulization Q6HWA RT    guaiFENesin ER (MUCINEX) tablet 600 mg  600 mg Oral Q12H    levalbuterol (XOPENEX) nebulizer soln 1.25 mg/3 mL  1.25 mg Nebulization Q2H PRN    diclofenac (VOLTAREN) 1 % topical gel 2 g  2 g Topical QID    cefepime (MAXIPIME) 2 g in 0.9% sodium chloride (MBP/ADV) 100 mL MBP  2 g IntraVENous Q12H    sodium chloride (NS) flush 5-40 mL  5-40 mL IntraVENous PRN    guaiFENesin (ROBITUSSIN) 100 mg/5 mL oral liquid 100 mg  100 mg Oral Q4H PRN    prochlorperazine (COMPAZINE) injection 5 mg  5 mg IntraVENous Q6H PRN    nystatin (MYCOSTATIN) 100,000 unit/mL oral suspension 500,000 Units  500,000 Units Oral QID    ketotifen (ZADITOR) 0.025 % (0.035 %) ophthalmic solution 1 Drop  1 Drop Both Eyes BID PRN    arformoteroL (BROVANA) neb solution 15 mcg  15 mcg Nebulization BID RT    budesonide (PULMICORT) 500 mcg/2 ml nebulizer suspension  500 mcg Nebulization BID RT    montelukast (SINGULAIR) tablet 10 mg  10 mg Oral QHS    sotaloL (BETAPACE) tablet 80 mg  80 mg Oral Q12H    sodium chloride (NS) flush 5-40 mL  5-40 mL IntraVENous Q8H    sodium chloride (NS) flush 5-40 mL  5-40 mL IntraVENous PRN    acetaminophen (TYLENOL) tablet 650 mg  650 mg Oral Q6H PRN    polyethylene glycol (MIRALAX) packet 17 g  17 g Oral DAILY PRN    ondansetron (ZOFRAN) injection 4 mg  4 mg IntraVENous Q6H PRN    rivaroxaban (XARELTO) tablet 20 mg  20 mg Oral DAILY WITH DINNER         REVIEW OF SYSTEMS   12 point ROS negative except as stated in the HPI.       Physical Exam:   Visit Vitals  /61 (BP 1 Location: Left leg, BP Patient Position: At rest)   Pulse (P) 94   Temp (!) (P) 96.5 °F (35.8 °C)   Resp (P) 19   Ht 5' 2\" (1.575 m)   Wt 57 kg (125 lb 10.6 oz)   SpO2 (P) 96%   BMI 22.98 kg/m² General:  Alert, cooperative, no distress, appears stated age. Head:  Normocephalic, without obvious abnormality, atraumatic. Eyes:  Conjunctivae/corneas clear. Nose: Nares normal. Septum midline. Mucosa normal.    Throat: Lips, mucosa, and tongue normal.    Neck: Supple, symmetrical, trachea midline, no adenopathy. Lungs:   Coarse with bilateral rhonchi, resp nonlabored   Chest wall:  No tenderness or deformity. Heart:  Irregular and mildly tachy, no murmur, click, rub or gallop. Abdomen:   Soft, non-tender. Bowel sounds normal.    Extremities: Extremities normal, atraumatic, no cyanosis or edema. Pulses: 2+ and symmetric all extremities. Skin: Skin color, texture, turgor normal. No rashes or lesions   Neurologic: Grossly nonfocal       Lab Results   Component Value Date/Time    Sodium 128 (L) 09/13/2022 04:00 AM    Potassium 4.2 09/13/2022 04:00 AM    Chloride 90 (L) 09/13/2022 04:00 AM    CO2 31 09/13/2022 04:00 AM    BUN 21 (H) 09/13/2022 04:00 AM    Creatinine 0.26 (L) 09/13/2022 04:00 AM    Glucose 124 (H) 09/13/2022 04:00 AM    Calcium 8.7 09/13/2022 04:00 AM    Magnesium 1.6 09/12/2022 12:00 AM       Lab Results   Component Value Date/Time    WBC 14.9 (H) 09/13/2022 04:00 AM    HGB 9.3 (L) 09/13/2022 04:00 AM    PLATELET 329 (H) 31/10/1720 04:00 AM    MCV 89.3 09/13/2022 04:00 AM       Lab Results   Component Value Date/Time    Alk.  phosphatase 126 (H) 09/07/2022 01:54 AM    Protein, total 6.0 (L) 09/07/2022 01:54 AM    Albumin 2.0 (L) 09/07/2022 01:54 AM    Globulin 4.0 09/07/2022 01:54 AM       Lab Results   Component Value Date/Time    Iron 35 09/04/2022 05:26 PM    TIBC 193 (L) 09/04/2022 05:26 PM    Iron % saturation 18 (L) 09/04/2022 05:26 PM    Ferritin 196 09/04/2022 05:26 PM       Lab Results   Component Value Date/Time    TSH 1.12 09/07/2022 04:57 AM        No results found for: PH, PHI, PCO2, PCO2I, PO2, PO2I, HCO3, HCO3I, FIO2, FIO2I    Lab Results   Component Value Date/Time    Troponin-I, Qt. <0.05 01/13/2021 03:17 AM        Lab Results   Component Value Date/Time    Culture result: LIGHT NORMAL RESPIRATORY CANDELARIO 09/11/2022 12:10 PM    Culture result: No growth (<1,000 CFU/ML) 09/07/2022 10:14 AM    Culture result: NO GROWTH 5 DAYS 09/04/2022 01:39 PM       No results found for: TOXA1, RPR, HBCM, HBSAG, HAAB, HCAB1, HAAT, G6PD, CRYAC, HIVGT, HIVR, HIV1, HIV12, HIVPC, HIVRPI    No results found for: VANCT, CPK    Lab Results   Component Value Date/Time    Color YELLOW/STRAW 09/07/2022 10:14 AM    Appearance CLEAR 09/07/2022 10:14 AM    pH (UA) 8.0 09/07/2022 10:14 AM    Protein Negative 09/07/2022 10:14 AM    Glucose Negative 09/07/2022 10:14 AM    Ketone TRACE (A) 09/07/2022 10:14 AM    Bilirubin Negative 09/07/2022 10:14 AM    Blood Negative 09/07/2022 10:14 AM    Urobilinogen 1.0 09/07/2022 10:14 AM    Nitrites Negative 09/07/2022 10:14 AM    Leukocyte Esterase Negative 09/07/2022 10:14 AM    WBC 0-4 09/07/2022 10:14 AM    RBC 0-5 09/07/2022 10:14 AM    Bacteria Negative 09/07/2022 10:14 AM       IMPRESSION  Acute respiratory failure with hypoxia  Abnormal chest imaging  Hyponatremia  COPD/asthma  Bronchiectasis  Afib with RVR  History of pseudomonas pneumonia/colonization  GERD  Candidal esophagitis  Nauseas/vomiting  Weight loss    PLAN  Goal sats 88% or higher; not on home O2 at baseline. Currently on 0.5L w/ sats @ 98%. Recent sputum culture grew stenotrophomonas, not treated with bactrim as she was clinically improving at that time but was restarted this admission given imaging findings and clinical change. Bactrim now stopped per primary team due to significant hyponatremia and concern that Bactrim may be the cause. Currently on cefepime. Levaquin would be preferred, but not if she requires sotalol.   Acapella  Brovana/Pulmicort, dunoebs  No indication for steroids at this time  Started her week on jeff nebs on 9/3 and completed on Friday  Nephrology following  Cardiology following - dig d/c; back on sotalol      [employer]

## 2022-09-13 NOTE — NURSE NAVIGATOR
Chart reviewed by Heart Failure Nurse Navigator. Heart Failure database completed. Patient admitted with SOB, chest pain, and nausea on  9/4. She is admitted with hyponatremia, chronic HF, and unclear etiology of nausea and dyspnea. Nephrology was consulted and noted underlying SIADH contributes to symptoms. Patient has PMH of asthma, COPD, pseudomonas pneumonia colonization, bronchiectasis, atrial fib, breast cancer, RA, and GERD. Hospitalization has been complicated by PAF responsive to Sotalol which complicated antibiotic regimen for sputum culture. Cardiology, Pulmonary, and Nephrology are following. EF:  45 to 50%     ACEi/ARB/ARNi: Not indicated    BB: Not indicated    Aldosterone Antagonist: Not indicated    Obstructive Sleep Apnea Screening:   Referred to Sleep Medicine:     CRT Not indicated     NYHA Functional Class **. Heart Failure Teach Back in Patient Education. Heart Failure Avoiding Triggers on Discharge Instructions. Cardiologist: Dr Andressa Velásquez discharge follow up phone call to be made within 48-72 hours of discharge.

## 2022-09-13 NOTE — PROGRESS NOTES
Problem: Self Care Deficits Care Plan (Adult)  Goal: *Acute Goals and Plan of Care (Insert Text)  Description: FUNCTIONAL STATUS PRIOR TO ADMISSION: Patient was independent and active without use of DME. She reports recent use of spouse's rollator to move things within home, and sit down as needed. HOME SUPPORT: The patient lived with spouse but did not require assist. Patient spouse reports. Occupational Therapy Goals  Initiated 9/5/2022 Reviewed at weekly re-assessment 9/12/2022  1. Patient will perform lower body dressing with modified independence within 7 day(s). 2.  Patient will perform upper body dressing with supervision/set-up within 7 day(s). 3.  Patient will perform toilet transfers with supervision/set-up within 7 day(s). 4.  Patient will perform all aspects of toileting with supervision/set-up within 7 day(s). 5.  Patient will participate in upper extremity therapeutic exercise/activities with supervision/set-up for 10 minutes within 7 day(s). 6.  Patient will utilize energy conservation techniques during functional activities with verbal cues within 7 day(s). Outcome: Progressing Towards Goal   OCCUPATIONAL THERAPY TREATMENT  Patient: Laurel Silva (67 y.o. female)  Date: 9/13/2022  Diagnosis: Dyspnea [R06.00] Dyspnea      Precautions: Fall, Skin, Seizure  Chart, occupational therapy assessment, plan of care, and goals were reviewed. ASSESSMENT  Patient continues with skilled OT services and is progressing towards goals. Ms. Brenden Reese was received in the chair agreeable and motivated for activity. Patient requesting to perform grooming tasks. She was able to transfer into bathroom for ADL retraining. Patient performed grooming tasks standing at the sink, all aspects of toileting, and LB dressing with fair tolerance. She with fair tolerance overall and setup to CGA needed for ADLs and ADL transfers.   Patient required cues to implement energy conservation techniques, pacing, and pursed lip breathing techniques. Patient requesting to return to bed at end of tx; She had been up since early AM and agreeable to get OOB frequently as tolerated. Patient would benefit from continued skilled OT to progress towards goals and improve overall independence. Current Level of Function Impacting Discharge (ADLs): Patient required supervision for functional mobility. Patient required setup to SBA for grooming tasks and SBA to CGA for all aspects of toileting. PLAN :  Patient continues to benefit from skilled intervention to address the above impairments. Continue treatment per established plan of care to address goals. Recommend with staff:   Recommend patient be OOB to chair as frequently as tolerated; Goal of 3x/day for all meals for 60 minutes at a time. For toileting needs, recommend transfers to/from bathroom with staff assist.    Encourage patient involvement in personal care as able. Encourage exercises frequently throughout the day. Recommend next OT session: Continue towards goals and improve overall independence. Recommendation for discharge: (in order for the patient to meet his/her long term goals)  Occupational therapy at least 2 days/week in the home     This discharge recommendation:  Has been made in collaboration with the attending provider and/or case management    IF patient discharges home will need the following DME: none       SUBJECTIVE:   Patient agreeable to OT tx and confirms understanding of all education provided.       OBJECTIVE DATA SUMMARY:   Cognitive/Behavioral Status:  Neurologic State: Alert  Orientation Level: Oriented X4  Cognition: Follows commands  Perception: Appears intact  Perseveration: No perseveration noted  Safety/Judgement: Awareness of environment    Functional Mobility and Transfers for ADLs:  Bed Mobility:  Rolling: Supervision  Supine to Sit: Supervision  Sit to Supine: Supervision  Scooting: Supervision    Transfers:  Sit to Stand: Supervision  Functional Transfers  Toilet Transfer : Supervision  Bed to Chair: Supervision    Balance:  Sitting: Intact  Standing: Impaired  Standing - Static: Fair  Standing - Dynamic : Fair    ADL Intervention:  Grooming  Grooming Assistance: Set-up; Stand-by assistance  Position Performed: Seated in chair;Standing  Washing Face: Set-up  Washing Hands: Set-up  Brushing Teeth: Stand-by assistance  Brushing/Combing Hair: Stand-by assistance  Cues: Verbal cues provided    Toileting  Toileting Assistance: Contact guard assistance  Bladder Hygiene: Stand-by assistance  Bowel Hygiene: Stand-by assistance  Clothing Management: Contact guard assistance  Cues: Verbal cues provided  Adaptive Equipment: Grab bars    Cognitive Retraining  Safety/Judgement: Awareness of environment    Activity Tolerance:   Good    After treatment patient left in no apparent distress:   Supine in bed, Call bell within reach, Bed / chair alarm activated, and Caregiver / family present    COMMUNICATION/COLLABORATION:   The patients plan of care was discussed with: Physical therapist, Registered nurse, and patient .      ESTHER Gomez/L  Time Calculation: 45 mins

## 2022-09-13 NOTE — PROGRESS NOTES
1650 Pt BP is low 98/58 and provider notified via Perfect serve. No new orders were received, will continue to monitor pt. Bedside and Verbal shift change report given to EZEQUIEL Zayas(oncoming nurse) by EZEQUIEL Bhagat(offgoing nurse). Report included the following information SBAR, Kardex, ED Summary, OR Summary, Procedure Summary, Intake/Output, MAR, Accordion, and Recent Results.

## 2022-09-13 NOTE — PROGRESS NOTES
Problem: Mobility Impaired (Adult and Pediatric)  Goal: *Acute Goals and Plan of Care (Insert Text)  Description: FUNCTIONAL STATUS PRIOR TO ADMISSION: Patient was independent and active without use of DME. She reports intermittent use of rollator in the home to assist with brining trays to her spouse or when she needs a seated rest break. HOME SUPPORT PRIOR TO ADMISSION: The patient lived with her spouse with history of dementia and provided meals and ADL assistance for him. Physical Therapy Goals  Initiated 9/5/2022  All goals with intention of being met with least restrictive supplemental oxygen and spo2 >90% or room air  1. Patient will move from supine to sit and sit to supine , scoot up and down, and roll side to side in bed with independence within 7 day(s). 2.  Patient will transfer from bed to chair and chair to bed with modified independence using the least restrictive device within 7 day(s). 3.  Patient will perform sit to stand with modified independence within 7 day(s). 4.  Patient will ambulate with modified independence for 200 feet with the least restrictive device within 7 day(s). 5.  Patient will ascend/descend 3 stairs with 1-2 handrail(s) with minimal assistance/contact guard assist within 7 day(s). Outcome: Progressing Towards Goal   PHYSICAL THERAPY TREATMENT  Patient: Anders Castro (77 y.o. female)  Date: 9/13/2022  Diagnosis: Dyspnea [R06.00] Dyspnea      Precautions: Fall, Skin, Seizure  Chart, physical therapy assessment, plan of care and goals were reviewed. ASSESSMENT  Patient continues with skilled PT services and is progressing towards goals. Communicated with nurse cleared for therapy and need Home oxygen  assessment. Patient already up on the recliner prior to therapy.  On 2 liter NC oxygen saturation 98% resting,  sit to stand supervision ambulate in the room around the bed multiple times with rollator walker, about to ambulate on the hallway patient need to use the commode, then getting ready to head out on the hallway again patient feels she need to sit on the commode. Patient took a long time sitting on the commode. Offered to go back to the chair when done patient declined just want to go back to bed was up on the chair since early this morning. Daughter present most of the therapy sessions. Assisted back to bed supine and place bed to chair position and performed more exercise. Educate and instructed patient to continue active range of motion exercise on both legs while up on chair or on bed multiple times. Recommend patient to be up on the chair at least 3 times a day every meal times as tolerated. Activated bed alarm and notified nurse who agreed to monitor patient. Documentation for home O2:     (2    ) LITERS OF O2    AT REST   O2 SATS  97% HR     ROOM AIR WITH ACTIVITY 02 SATS  98% HR     ROOM AIR WITH ACTIVITY O2 SATS  99% HR     ROOM AIR PATIENT LEFT COMFORTABLY  SITTING/SUPINE 02 SATS  99% HR        Current Level of Function Impacting Discharge (mobility/balance): supervision with bed mobility and transfer using a rollator walker    Other factors to consider for discharge: fall         PLAN :  Patient continues to benefit from skilled intervention to address the above impairments. Continue treatment per established plan of care. to address goals. Recommendation for discharge: (in order for the patient to meet his/her long term goals)  Physical therapy at least 2 days/week in the home     This discharge recommendation:  Has been made in collaboration with the attending provider and/or case management    IF patient discharges home will need the following DME: patient owns DME required for discharge       SUBJECTIVE:   Patient stated [address].     OBJECTIVE DATA SUMMARY:   Critical Behavior:  Neurologic State: Alert  Orientation Level: Appropriate for age  Cognition: Appropriate decision making  Safety/Judgement: Awareness of environment, Insight into deficits, Good awareness of safety precautions  Functional Mobility Training:  Bed Mobility:  Rolling: Supervision  Supine to Sit: Supervision  Sit to Supine: Supervision  Scooting: Supervision        Transfers:  Sit to Stand: Supervision  Stand to Sit: Supervision  Stand Pivot Transfers: Supervision     Bed to Chair: Supervision                    Balance:  Sitting: Intact  Standing: Intact; With support  Standing - Static: Fair  Standing - Dynamic : Fair  Ambulation/Gait Training:  Distance (ft): 80 Feet (ft)  Assistive Device: Walker, rolling;Gait belt  Ambulation - Level of Assistance: Supervision     Gait Description (WDL): Exceptions to WDL  Gait Abnormalities: Path deviations; Step to gait        Base of Support: Widened     Speed/Ivory: Pace decreased (<100 feet/min)  Step Length: Right shortened;Left shortened                      Therapeutic Exercises:   Educate and instructed patient to continue active range of motion exercise on both legs while up on chair or on bed multiple times. Recommend patient to be up on the chair at least 3 times a day every meal times as tolerated. Pain Ratin/10    Activity Tolerance:   Good    After treatment patient left in no apparent distress:   Supine in bed, Heels elevated for pressure relief, Call bell within reach, Bed / chair alarm activated, Caregiver / family present, and Side rails x 3    COMMUNICATION/COLLABORATION:   The patients plan of care was discussed with: Occupational therapist, Registered nurse, Physician, and Case management. Saúl Nettles PT,WCC.    Time Calculation: 35 mins

## 2022-09-13 NOTE — PROGRESS NOTES
Pt ordered for Home O2 evaluation. Pt is a high fall risk and being followed by Physical Therapy. Bianka Simon, PT to assess.

## 2022-09-13 NOTE — PROGRESS NOTES
[facility]  [address], [place]  [phone]    Medical Progress Note      NAME: Loco Gibbs   :  1940  MRM:  [id number]    Date/Time of service 2022  9:19 AM          Assessment and Plan:     Hyponatremia / Hypokalemia / Hypomagnesemia - POA, at first thought likely due to poor PO intake. But on  Na was suddenly, severely, inexplicably worse at 479. Had dropped to 122 . Today back to 128, her baseline  Nephrology consulted. Has not improved further despite fluid restriction or tolvaptan. Monitor K and IV Mg.  I wonder if Bactrim was the immediate cause of that sudden drop. 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 Bronchial wall thickening with mucous plugging. Findings are likely infectious or inflammatory\". Pulmonary consulted. Broad differential including chronic simmering infections, vs aspiration. Bactrim ordered on  per pulmonary, stopped  due to suddenly low Na. I note that at admission her WBC count was mild, without bands, and no other SIRS criteria, and WBC normalized in 1st 24hr without any ABx. Procalcitonin is low. She had just completed Abx for PNA. Pulmonary added cefepime. They cannot use bactrim or Levaquin as they want, due to the use of sotalol. Paroxysmal A-fib with RVR / Chronic anticoagulation - RVR developed , after holding sotalol  (for somnolence), so resume that. She did not tolerate attempts to stop sotalolol. Continue xarelto. Sotalol prevents the use of fluconazole to treat esophageal candidiasis, and prevents the use of levaquin. Dyspnea / Nausea / Chest pain - Unclear etiology of these symptoms. She is now less hypoxic.   DDx includes GERD/aspiration (which I think is the primary problem), vs chronic PNA, vs much less likely CHF, PE, asthma    CHF (congestive heart failure), NYHA class I, chronic, systolic / Venous insufficiency - Unclear if any exacerbation, I do not think so. Stable ECHO. Consulted cariology. ER gave lasix, but with her normal oxygen sats, low NA and low K, wt loss, 2 days no PO intake, I did not continue that. Continue BB      Anemia - Stable since last discharge, and normal serologies. Weight loss / GERD (gastroesophageal reflux disease) / Hiatal hernia / Candidiasis - She has very concerning unintentional wt loss, anorexia, nausea that persists. She weighed 170lb last year. 128lb now. Consult GI, who is following her outpatient. PPI by IV. At home she takes bentyl and Zofran as needed. Emptying study 9/6 shows delayed esophageal emptying but not delayed gastric emptying. She is on PO nystatin. Asthma - For her home Trelegy substitute Brovana, Pulmicort and prn atrovent and xopenex. Pulmonary is following. Added guaifenesin and singulair. Abril Duarte Hx breast cancer - Outpatient follow up. Has been on Boniva, increasing risk of PUD and esophagitis      Hypothyroid - TSH was normal a few months ago. I stopped the homeopathic dose of synthroid. Tingling / Weakness - Noted by patient 9/6. Code stroke called. MRI normal.  Now I think that HypoNa was contributing. Subjective:     Chief Complaint:  Stable this AM, more alert and feels better    ROS:  (bold if positive, if negative)    Cough      Sputum   Abd Pain  SOB/DOETolerating some PT  Tolerating some Diet        Objective:     Last 24hrs VS reviewed since prior progress note.  Most recent are:    Visit Vitals  /75 (BP 1 Location: Right leg, BP Patient Position: At rest;Sitting)   Pulse 94   Temp (!) 96.5 °F (35.8 °C)   Resp 19   Ht 5' 2\" (1.575 m)   Wt 57 kg (125 lb 10.6 oz)   SpO2 96%   BMI 22.98 kg/m²     SpO2 Readings from Last 6 Encounters:   09/13/22 96%   08/23/22 95%   08/10/22 94%   05/01/22 96%   06/14/21 99%   04/28/21 97%    O2 Flow Rate (L/min): 1 l/min     Intake/Output Summary (Last 24 hours) at 9/13/2022 0919  Last data filed at 9/13/2022 0420  Gross per 24 hour   Intake 720 ml   Output 2100 ml   Net -1380 ml          Physical Exam:    Gen:  Frail, in no acute distress  HEENT:  Pink conjunctivae, PERRL, hearing intact to voice, moist mucous membranes  Neck:  Supple, without masses, thyroid non-tender  Resp:  No accessory muscle use, clear breath sounds without wheezes rales or rhonchi  Card:  No murmurs, normal S1, S2 without thrills, bruits or peripheral edema  Abd:  Soft, non-tender, non-distended, normoactive bowel sounds are present, no mass  Lymph:  No cervical or inguinal adenopathy  Musc:  No cyanosis or clubbing  Skin:  No rashes or ulcers, skin turgor is good  Neuro:  Cranial nerves are grossly intact, general motor weakness, follows commands vaguely  Psych:  Poor insight, oriented to person, groggy    Telemetry reviewed:   normal sinus rhythm  __________________________________________________________________  Medications Reviewed: (see below)  Medications:     Current Facility-Administered Medications   Medication Dose Route Frequency    tolvaptan (SAMSCA) tablet (0.5 X 30 MG) 15 mg  15 mg Oral ONCE    ipratropium (ATROVENT) 0.02 % nebulizer solution 0.5 mg  0.5 mg Nebulization Q6HWA RT    levalbuterol (XOPENEX) nebulizer soln 1.25 mg/3 mL  1.25 mg Nebulization Q6HWA RT    guaiFENesin ER (MUCINEX) tablet 600 mg  600 mg Oral Q12H    levalbuterol (XOPENEX) nebulizer soln 1.25 mg/3 mL  1.25 mg Nebulization Q2H PRN    diclofenac (VOLTAREN) 1 % topical gel 2 g  2 g Topical QID    cefepime (MAXIPIME) 2 g in 0.9% sodium chloride (MBP/ADV) 100 mL MBP  2 g IntraVENous Q12H    sodium chloride (NS) flush 5-40 mL  5-40 mL IntraVENous PRN    guaiFENesin (ROBITUSSIN) 100 mg/5 mL oral liquid 100 mg  100 mg Oral Q4H PRN    prochlorperazine (COMPAZINE) injection 5 mg  5 mg IntraVENous Q6H PRN    nystatin (MYCOSTATIN) 100,000 unit/mL oral suspension 500,000 Units  500,000 Units Oral QID    ketotifen (ZADITOR) 0.025 % (0.035 %) ophthalmic solution 1 Drop  1 Drop Both Eyes BID PRN    arformoteroL (BROVANA) neb solution 15 mcg  15 mcg Nebulization BID RT    budesonide (PULMICORT) 500 mcg/2 ml nebulizer suspension  500 mcg Nebulization BID RT    montelukast (SINGULAIR) tablet 10 mg  10 mg Oral QHS    sotaloL (BETAPACE) tablet 80 mg  80 mg Oral Q12H    sodium chloride (NS) flush 5-40 mL  5-40 mL IntraVENous Q8H    sodium chloride (NS) flush 5-40 mL  5-40 mL IntraVENous PRN    acetaminophen (TYLENOL) tablet 650 mg  650 mg Oral Q6H PRN    polyethylene glycol (MIRALAX) packet 17 g  17 g Oral DAILY PRN    ondansetron (ZOFRAN) injection 4 mg  4 mg IntraVENous Q6H PRN    rivaroxaban (XARELTO) tablet 20 mg  20 mg Oral DAILY WITH DINNER        Lab Data Reviewed: (see below)  Lab Review:     Recent Labs     09/13/22  0400 09/12/22  0000 09/11/22  0400   WBC 14.9* 14.1* 13.5*   HGB 9.3* 9.8* 10.2*   HCT 28.4* 30.0* 31.7*   * 720* 631*       Recent Labs     09/13/22  0400 09/12/22  2148 09/12/22  0918 09/12/22  0000 09/11/22  1532 09/11/22  0400   * 127* 127* 128*   < > 127*   K 4.2 4.5 3.6 3.9   < > 4.0   CL 90* 88* 89* 90*   < > 93*   CO2 31 31 31 32   < > 27   * 144* 161* 134*   < > 107*   BUN 21* 27* 11 18   < > 11   CREA 0.26* 0.34* 0.45* 0.56   < > 0.40*   CA 8.7 8.9 9.5 9.3   < > 8.8   MG  --   --   --  1.6  --  1.6    < > = values in this interval not displayed. Lab Results   Component Value Date/Time    Glucose (POC) 152 (H) 09/11/2022 03:02 PM    Glucose (POC) 146 (H) 09/06/2022 01:21 PM     No results for input(s): PH, PCO2, PO2, HCO3, FIO2 in the last 72 hours. No results for input(s): INR, INREXT, INREXT in the last 72 hours.   All Micro Results       Procedure Component Value Units Date/Time    URINE CULTURE HOLD SAMPLE [[id number]] Collected: 09/13/22 0200    Order Status: Completed Specimen: Serum Updated: 09/13/22 0715     Urine culture hold       Urine on hold in Microbiology dept for 2 days. If unpreserved urine is submitted, it cannot be used for addtional testing after 24 hours, recollection will be required. CULTURE, RESPIRATORY/SPUTUM/BRONCH Clois Moros [[id number]] Collected: 09/11/22 1210    Order Status: Completed Specimen: Sputum Updated: 09/12/22 1649     Special Requests: NO SPECIAL REQUESTS        GRAM STAIN OCCASIONAL WBCS SEEN               RARE EPITHELIAL CELLS SEEN            NO ORGANISMS SEEN        Culture result:       LIGHT NORMAL RESPIRATORY CANDELARIO          CULTURE, RESPIRATORY/SPUTUM/BRONCH Carmen Ly STAIN [[id number]]     Order Status: Canceled Specimen: Sputum     CULTURE, BLOOD, PAIRED [[id number]] Collected: 09/04/22 1339    Order Status: Completed Specimen: Blood Updated: 09/09/22 0737     Special Requests: NO SPECIAL REQUESTS        Culture result: NO GROWTH 5 DAYS       CULTURE, URINE [[id number]] Collected: 09/07/22 1014    Order Status: Completed Specimen: Urine from Clean catch Updated: 09/08/22 1125     Special Requests: NO SPECIAL REQUESTS        Culture result: No growth (<1,000 CFU/ML)       CULTURE, RESPIRATORY/SPUTUM/BRONCH Carmen Ly STAIN [[id number]] Collected: 09/08/22 0845    Order Status: Canceled Specimen: Sputum     URINE CULTURE HOLD SAMPLE [[id number]] Collected: 09/07/22 1014    Order Status: Completed Specimen: Serum Updated: 09/07/22 1038     Urine culture hold       Urine on hold in Microbiology dept for 2 days. If unpreserved urine is submitted, it cannot be used for addtional testing after 24 hours, recollection will be required.           CULTURE, RESPIRATORY/SPUTUM/BRONCH Clois Moros [[id number]] Collected: 09/06/22 0207    Order Status: Canceled Specimen: Sputum     RESPIRATORY VIRUS PANEL W/COVID-19, PCR [[id number]] Collected: 09/05/22 1237    Order Status: Completed Specimen: Nasopharyngeal Updated: 09/05/22 2021     Adenovirus Not detected        Coronavirus 229E Not detected Coronavirus HKU1 Not detected        Coronavirus CVNL63 Not detected        Coronavirus OC43 Not detected        SARS-CoV-2, PCR Not detected        Metapneumovirus Not detected        Rhinovirus and Enterovirus Not detected        Influenza A Not detected        Influenza A, subtype H1 Not detected        Influenza A, subtype H3 Not detected        INFLUENZA A H1N1 PCR Not detected        Influenza B Not detected        Parainfluenza 1 Not detected        Parainfluenza 2 Not detected        Parainfluenza 3 Not detected        Parainfluenza virus 4 Not detected        RSV by PCR Not detected        B. parapertussis, PCR Not detected        Bordetella pertussis - PCR Not detected        Chlamydophila pneumoniae DNA, QL, PCR Not detected        Mycoplasma pneumoniae DNA, QL, PCR Not detected       COVID-19 RAPID TEST [[id number]] Collected: 09/04/22 1339    Order Status: Completed Specimen: Nasopharyngeal Updated: 09/04/22 1414     Specimen source Nasopharyngeal        COVID-19 rapid test Not detected        Comment: Rapid Abbott ID Now       Rapid NAAT:  The specimen is NEGATIVE for SARS-CoV-2, the novel coronavirus associated with COVID-19. Negative results should be treated as presumptive and, if inconsistent with clinical signs and symptoms or necessary for patient management, should be tested with an alternative molecular assay. Negative results do not preclude SARS-CoV-2 infection and should not be used as the sole basis for patient management decisions. This test has been authorized by the FDA under an Emergency Use Authorization (EUA) for use by authorized laboratories.    Fact sheet for Healthcare Providers: ConventionUpdate.co.nz  Fact sheet for Patients: ConventionUpdate.co.nz       Methodology: Isothermal Nucleic Acid Amplification                 Other pertinent lab: none    Total time spent with patient: 30 Minutes I personally reviewed chart, notes, data and current medications in the medical record. I have personally examined and treated the patient at bedside during this period. To assist coordination of care and communication with nursing and staff, this note may be preliminary early in the day, but finalized by end of the day.                  Care Plan discussed with: Patient, Family, Care Manager, Nursing Staff, Consultant/Specialist, and >50% of time spent in counseling and coordination of care    Discussed:  Care Plan and D/C Planning    Prophylaxis:  Lovenox and H2B/PPI    Disposition:  Home w/Family           ___________________________________________________    Attending Physician: Buffy Mckeon MD

## 2022-09-13 NOTE — ROUTINE PROCESS
1900  Bedside shift change report given to Adriana Freed (oncoming nurse) by Mirna Mccullough RN (offgoing nurse). Report included the following information SBAR, Kardex, ED Summary, Intake/Output, and Recent Results. 2250  Pure wick failed. Patient had a urine occurrence. Bladder scanned and got 265. Will continue to monitor. 0700  Bedside shift change report given to EZEQUIEL Bhagat (oncoming nurse) by Adriana Freed (offgoing nurse). Report included the following information SBAR, Kardex, ED Summary, Intake/Output, and Recent Results. This patient was assisted with Intentional Toileting every 2 hours during this shift as appropriate. Documentation of ambulation and output reflected on Flowsheet as appropriate. Purposeful hourly rounding was completed using AIDET and 5Ps. Outcomes of PHR documented as they occurred. Bed alarm in use as appropriate. Dual Suction and ambubag in place.

## 2022-09-13 NOTE — PROGRESS NOTES
CARDIOLOGY PROGRESS NOTE        [address], [place], [address] [facility]  Phone [phone]; Fax [phone]          2022 8:51 AM       Admit Date:           2022  Admit Diagnosis:  Dyspnea [R06.00]  :          1940   MRN:          [id number]        Assessment/Plan  Acute on chronic HF mildly reduced EF: stable, diuretics held d/t issues w/ hyponatremia    PAF: on sotalol, Xarelto PTA - developed RVR while off sotalol (held d/t med interactions), not responding to amio. Resumed sotalol. Few episodes of a-fib w/ slow vent response overnight - received amio bolus/gtt, dig load, monitor for now off these medications and cont sotalol. Check EKG to eval QTc today      3. Recent PNA: admitted about 2 weeks ago, completed abx. Was on steroids. Xray revealing interstitial edema vs PNA. CT scan was personally reviewed and also shows evidence of pneumonia. Pulm following     4. Weight loss, decreased appetite, nausea: esophageal candidiasis, would recommend avoiding fluconazole since back on sotalol    5. Hyponatremia: s/p Tolvaptan, holding diuresis. Fluid restriction. Renal assisting     NP spent 5 minutes in chart review of notes, VS, diagnostics. NP spent 10 minutes in examination of pt and review of plan of care  with pt/family. Pt personally seen and examined. Chart reviewed. Agree with advanced NP's history, exam and  A/P with changes/additons. Blood pressure 109/71, pulse 90, temperature 97.5 °F (36.4 °C), resp. rate 26, height 5' 2\" (1.575 m), weight 125 lb 10.6 oz (57 kg), SpO2 95 %. Alert/  Neck - no JVD  CVS - S1 S2 +; Irreg; 2/6 sys murmur  RS : Dec AE bilat  Abd: Soft/BS+  LE - no edema     A/P :     Acute on chronic HFrEF - Diuretics on hold - Hyponatremia  PAF - Sotalol restarted/xarelto; QTc 455 ms  Recent PNA -  Hyponatremia-  ( stable) - per nephrolgoy     Caitlin Ramos MD, [facility] [place]    We discussed the expected course, resolution and complications of the diagnosis(es) in detail. Medication risks, benefits, costs, interactions, and alternatives were discussed as indicated. No intake/output data recorded. Last 3 Recorded Weights in this Encounter    09/10/22 0347 09/11/22 0355 09/13/22 0419   Weight: 63 kg (138 lb 14.2 oz) 64.8 kg (142 lb 13.7 oz) 57 kg (125 lb 10.6 oz)         09/11 1901 - 09/13 0700  In: 18 [P.O.:860]  Out: 3200 [Urine:3200]    SUBJECTIVE      80 y.o.  female  with PMH significant for CHF, PAF on Xarelto, asthma, GERD, anemia, hypothyroid and breast CA. Pt presented to the ED with complaints of worsening dyspnea, cough, chest pressure and nausea. Symptoms are associated with feeling lightheaded. She was recently admitted and treated for pneumonia. She had been doing well with return of her appetite until Friday when her symptoms began again. She notes about a 50 lb weight loss over the last 6 months or so. Anders Castro reports feeling better today, up in chair.        Current Facility-Administered Medications   Medication Dose Route Frequency    tolvaptan (SAMSCA) tablet (0.5 X 30 MG) 15 mg  15 mg Oral ONCE    ipratropium (ATROVENT) 0.02 % nebulizer solution 0.5 mg  0.5 mg Nebulization Q6HWA RT    levalbuterol (XOPENEX) nebulizer soln 1.25 mg/3 mL  1.25 mg Nebulization Q6HWA RT    guaiFENesin ER (MUCINEX) tablet 600 mg  600 mg Oral Q12H    levalbuterol (XOPENEX) nebulizer soln 1.25 mg/3 mL  1.25 mg Nebulization Q2H PRN    diclofenac (VOLTAREN) 1 % topical gel 2 g  2 g Topical QID    cefepime (MAXIPIME) 2 g in 0.9% sodium chloride (MBP/ADV) 100 mL MBP  2 g IntraVENous Q12H    sodium chloride (NS) flush 5-40 mL  5-40 mL IntraVENous PRN    guaiFENesin (ROBITUSSIN) 100 mg/5 mL oral liquid 100 mg  100 mg Oral Q4H PRN    prochlorperazine (COMPAZINE) injection 5 mg  5 mg IntraVENous Q6H PRN    nystatin (MYCOSTATIN) 100,000 unit/mL oral suspension 500,000 Units  500,000 Units Oral QID    ketotifen (ZADITOR) 0.025 % (0.035 %) ophthalmic solution 1 Drop  1 Drop Both Eyes BID PRN    arformoteroL (BROVANA) neb solution 15 mcg  15 mcg Nebulization BID RT    budesonide (PULMICORT) 500 mcg/2 ml nebulizer suspension  500 mcg Nebulization BID RT    montelukast (SINGULAIR) tablet 10 mg  10 mg Oral QHS    sotaloL (BETAPACE) tablet 80 mg  80 mg Oral Q12H    sodium chloride (NS) flush 5-40 mL  5-40 mL IntraVENous Q8H    sodium chloride (NS) flush 5-40 mL  5-40 mL IntraVENous PRN    acetaminophen (TYLENOL) tablet 650 mg  650 mg Oral Q6H PRN    polyethylene glycol (MIRALAX) packet 17 g  17 g Oral DAILY PRN    ondansetron (ZOFRAN) injection 4 mg  4 mg IntraVENous Q6H PRN    rivaroxaban (XARELTO) tablet 20 mg  20 mg Oral DAILY WITH DINNER      OBJECTIVE               Intake/Output Summary (Last 24 hours) at 9/13/2022 0909  Last data filed at 9/13/2022 0420  Gross per 24 hour   Intake 860 ml   Output 2100 ml   Net -1240 ml       Review of Systems - History obtained from the patient AS PER  HPI        PHYSICAL EXAM        Visit Vitals  /75 (BP 1 Location: Right leg, BP Patient Position: At rest;Sitting)   Pulse 94   Temp (!) 96.5 °F (35.8 °C)   Resp 19   Ht 5' 2\" (1.575 m)   Wt 57 kg (125 lb 10.6 oz)   SpO2 96%   BMI 22.98 kg/m²       Gen: Well-developed, well-nourished, in no acute distress  alert and oriented x 3  HEENT:  Pink conjunctivae, Hearing grossly normal.No scleral icterus or conjunctival, moist mucous membranes  Neck: Supple,No JVD, No Carotid Bruit, Thyroid- non tender No cervical lymphadenopathy  Resp: No accessory muscle use, diminished breath sounds, No rales or rhonchi  Card: Irregular Rate,Rythm,Normal S1, S2, No murmurs, rubs or gallop. No thrills.    MSK: No cyanosis or clubbing, good capillary refill  Skin: No rashes or ulcers, no bruising  Neuro:  Moving all four extremities, no focal deficit, follows commands appropriately  Psych:  Good insight, oriented to person, place and time, alert, Nml Affect  LE: Trace edema       DATA REVIEW      No specialty comments available. Cardiac monitor: A-fib rate 80-90's     ECHO: 09/04/22    ECHO ADULT COMPLETE 09/06/2022 9/6/2022    Interpretation Summary  Formatting of this result is different from the original.      Left Ventricle: Mildly reduced left ventricular systolic function with a visually estimated EF of 45 - 50%. Left ventricle size is normal. Normal wall thickness. Mild global hypokinesis present. Grade I diastolic dysfunction with normal LAP. Aortic Valve: Valve structure is normal. Mild sclerosis of the aortic valve cusp. Mitral Valve: Mild regurgitation. Tricuspid Valve: Mildly elevated RVSP. Mildly improved lv function compared to study 4/2022. Signed by: Jones Benoit DO on 9/6/2022 11:24 AM      Laboratory and Imaging have been reviewed by me and are notable for  No results for input(s): CPK, CKMB, TROIQ in the last 72 hours. Recent Labs     09/13/22  0400 09/12/22  2148 09/12/22  0918 09/12/22  0000 09/11/22  1532 09/11/22  0400   * 127* 127* 128*   < > 127*   K 4.2 4.5 3.6 3.9   < > 4.0   CO2 31 31 31 32   < > 27   BUN 21* 27* 11 18   < > 11   CREA 0.26* 0.34* 0.45* 0.56   < > 0.40*   * 144* 161* 134*   < > 107*   MG  --   --   --  1.6  --  1.6   WBC 14.9*  --   --  14.1*  --  13.5*   HGB 9.3*  --   --  9.8*  --  10.2*   HCT 28.4*  --   --  30.0*  --  31.7*   *  --   --  720*  --  631*    < > = values in this interval not displayed.

## 2022-09-13 NOTE — PROGRESS NOTES
1552:  Pt accepted by [facility]. Transition of Care Plan: RUR-18%, LOS 9 days  PT/OT/SLP following--PT/OT  hh, familly would like [facility]; no supplemental O2 for home needed  A referral was sent in [facility] to [facility]  3.   GI, neurology, nephrology, plumonolgy following  4. A fib RVR- cardiology following  5. Family to transport at d/c  6. Medical management continues  7. CM following for any needs prior to d/c  SILVIA Martinez

## 2022-09-13 NOTE — PROGRESS NOTES
Olivia Herrera 55  YOB: 1940          Assessment & Plan:     Hyponatremia due to SIADH  Resp failure  COPD  Bronchiectasis  N/V  A fib  Acute on chronic CHF with reduced EF    Rec:     Repeat urine osm 720  Give tolvaptan 15 mg po x1  BMP q8hr and liberalize FR           Subjective:   CC: follow up hyponatremia  HPI: Na 128 and c/o SOB   ROS: No sob/n/v  Current Facility-Administered Medications   Medication Dose Route Frequency    tolvaptan (SAMSCA) tablet (0.5 X 30 MG) 15 mg  15 mg Oral ONCE    ipratropium (ATROVENT) 0.02 % nebulizer solution 0.5 mg  0.5 mg Nebulization Q6HWA RT    levalbuterol (XOPENEX) nebulizer soln 1.25 mg/3 mL  1.25 mg Nebulization Q6HWA RT    guaiFENesin ER (MUCINEX) tablet 600 mg  600 mg Oral Q12H    levalbuterol (XOPENEX) nebulizer soln 1.25 mg/3 mL  1.25 mg Nebulization Q2H PRN    diclofenac (VOLTAREN) 1 % topical gel 2 g  2 g Topical QID    cefepime (MAXIPIME) 2 g in 0.9% sodium chloride (MBP/ADV) 100 mL MBP  2 g IntraVENous Q12H    sodium chloride (NS) flush 5-40 mL  5-40 mL IntraVENous PRN    guaiFENesin (ROBITUSSIN) 100 mg/5 mL oral liquid 100 mg  100 mg Oral Q4H PRN    prochlorperazine (COMPAZINE) injection 5 mg  5 mg IntraVENous Q6H PRN    nystatin (MYCOSTATIN) 100,000 unit/mL oral suspension 500,000 Units  500,000 Units Oral QID    ketotifen (ZADITOR) 0.025 % (0.035 %) ophthalmic solution 1 Drop  1 Drop Both Eyes BID PRN    arformoteroL (BROVANA) neb solution 15 mcg  15 mcg Nebulization BID RT    budesonide (PULMICORT) 500 mcg/2 ml nebulizer suspension  500 mcg Nebulization BID RT    montelukast (SINGULAIR) tablet 10 mg  10 mg Oral QHS    sotaloL (BETAPACE) tablet 80 mg  80 mg Oral Q12H    sodium chloride (NS) flush 5-40 mL  5-40 mL IntraVENous Q8H    sodium chloride (NS) flush 5-40 mL  5-40 mL IntraVENous PRN    acetaminophen (TYLENOL) tablet 650 mg  650 mg Oral Q6H PRN    polyethylene glycol (MIRALAX) packet 17 g  17 g Oral DAILY PRN    ondansetron (ZOFRAN) injection 4 mg  4 mg IntraVENous Q6H PRN    rivaroxaban (XARELTO) tablet 20 mg  20 mg Oral DAILY WITH DINNER          Objective:     Vitals:  Blood pressure 105/75, pulse 94, temperature (!) 96.5 °F (35.8 °C), resp. rate 19, height 5' 2\" (1.575 m), weight 57 kg (125 lb 10.6 oz), SpO2 96 %. Temp (24hrs), Av.6 °F (36.4 °C), Min:96.5 °F (35.8 °C), Max:98.7 °F (37.1 °C)      Intake and Output:  No intake/output data recorded.  1901 -  0700  In: 18 [P.O.:860]  Out: 3200 [Urine:3200]    Physical Exam:               GENERAL ASSESSMENT: NAD  HEENT: Nontraumatic   CHEST: On oxygen, few rhonchi  HEART: S1S2  ABDOMEN: Soft,NT   +castle +urine  EXTREMITY: no EDEMA  NEURO: Grossly non focal          ECG/rhythm:    Data Review      No results for input(s): TNIPOC in the last 72 hours. No lab exists for component: ITNL   No results for input(s): CPK, CKMB, TROIQ in the last 72 hours. Recent Labs     22  0400 22  2148 22  0918 22  0000 22  1532 22  0400   * 127* 127* 128*   < > 127*   K 4.2 4.5 3.6 3.9   < > 4.0   CL 90* 88* 89* 90*   < > 93*   CO2 31 31 31 32   < > 27   BUN 21* 27* 11 18   < > 11   CREA 0.26* 0.34* 0.45* 0.56   < > 0.40*   * 144* 161* 134*   < > 107*   MG  --   --   --  1.6  --  1.6   CA 8.7 8.9 9.5 9.3   < > 8.8   WBC 14.9*  --   --  14.1*  --  13.5*   HGB 9.3*  --   --  9.8*  --  10.2*   HCT 28.4*  --   --  30.0*  --  31.7*   *  --   --  720*  --  631*    < > = values in this interval not displayed. No results for input(s): INR, PTP, APTT, INREXT, INREXT in the last 72 hours.   Needs: urine analysis, urine sodium, protein and creatinine  Lab Results   Component Value Date/Time    Sodium,urine random 16 2022 03:09 AM    Creatinine, urine 33.00 2022 10:14 AM           : Josette Palomares MD  2022        [facility]:  [email or web address]  Jenifer Grant office:  [address], [address] [place] [address],[address]  [place], [address] [place] [address]  Phone: [phone]  Fax :     [phone]    [address] [place] [address] office:  [address] [facility]  [address] [facility], [address]  Phone - [phone]  Fax - [phone]

## 2022-09-13 NOTE — ROUTINE PROCESS
1900  Bedside shift change report given to Hood (oncoming nurse) by Manuela Boo RN (offgoing nurse). Report included the following information SBAR, Kardex, ED Summary, Intake/Output, and Recent Results. 0700  Bedside shift change report given to EZEQUIEL Hernandez (oncoming nurse) by Hood (offgoing nurse). Report included the following information SBAR, Kardex, ED Summary, Intake/Output, and Recent Results. This patient was assisted with Intentional Toileting every 2 hours during this shift as appropriate. Documentation of ambulation and output reflected on Flowsheet as appropriate. Purposeful hourly rounding was completed using AIDET and 5Ps. Outcomes of PHR documented as they occurred. Bed alarm in use as appropriate. Dual Suction and ambubag in place.

## 2022-09-14 VITALS
OXYGEN SATURATION: 92 % | HEART RATE: 135 BPM | HEIGHT: 62 IN | TEMPERATURE: 97.6 F | SYSTOLIC BLOOD PRESSURE: 133 MMHG | BODY MASS INDEX: 23.12 KG/M2 | DIASTOLIC BLOOD PRESSURE: 92 MMHG | WEIGHT: 125.66 LBS | RESPIRATION RATE: 17 BRPM

## 2022-09-14 LAB
ANION GAP SERPL CALC-SCNC: 6 MMOL/L (ref 5–15)
BUN SERPL-MCNC: 14 MG/DL (ref 6–20)
BUN/CREAT SERPL: 38 (ref 12–20)
CALCIUM SERPL-MCNC: 9.2 MG/DL (ref 8.5–10.1)
CHLORIDE SERPL-SCNC: 96 MMOL/L (ref 97–108)
CO2 SERPL-SCNC: 30 MMOL/L (ref 21–32)
CREAT SERPL-MCNC: 0.37 MG/DL (ref 0.55–1.02)
GLUCOSE SERPL-MCNC: 92 MG/DL (ref 65–100)
POTASSIUM SERPL-SCNC: 3.7 MMOL/L (ref 3.5–5.1)
SODIUM SERPL-SCNC: 132 MMOL/L (ref 136–145)

## 2022-09-14 PROCEDURE — 74011000250 HC RX REV CODE- 250: Performed by: HOSPITALIST

## 2022-09-14 PROCEDURE — 80048 BASIC METABOLIC PNL TOTAL CA: CPT

## 2022-09-14 PROCEDURE — 94761 N-INVAS EAR/PLS OXIMETRY MLT: CPT

## 2022-09-14 PROCEDURE — 74011250636 HC RX REV CODE- 250/636: Performed by: INTERNAL MEDICINE

## 2022-09-14 PROCEDURE — 74011250637 HC RX REV CODE- 250/637: Performed by: INTERNAL MEDICINE

## 2022-09-14 PROCEDURE — 77010033678 HC OXYGEN DAILY

## 2022-09-14 PROCEDURE — 36415 COLL VENOUS BLD VENIPUNCTURE: CPT

## 2022-09-14 PROCEDURE — 97535 SELF CARE MNGMENT TRAINING: CPT

## 2022-09-14 PROCEDURE — 97530 THERAPEUTIC ACTIVITIES: CPT

## 2022-09-14 PROCEDURE — 74011000258 HC RX REV CODE- 258: Performed by: INTERNAL MEDICINE

## 2022-09-14 PROCEDURE — 94640 AIRWAY INHALATION TREATMENT: CPT

## 2022-09-14 PROCEDURE — 74011000250 HC RX REV CODE- 250: Performed by: INTERNAL MEDICINE

## 2022-09-14 PROCEDURE — 99231 SBSQ HOSP IP/OBS SF/LOW 25: CPT | Performed by: INTERNAL MEDICINE

## 2022-09-14 PROCEDURE — APPSS15 APP SPLIT SHARED TIME 0-15 MINUTES: Performed by: NURSE PRACTITIONER

## 2022-09-14 PROCEDURE — 97116 GAIT TRAINING THERAPY: CPT

## 2022-09-14 RX ORDER — CEFDINIR 300 MG/1
300 CAPSULE ORAL EVERY 12 HOURS
Status: DISCONTINUED | OUTPATIENT
Start: 2022-09-14 | End: 2022-09-14 | Stop reason: HOSPADM

## 2022-09-14 RX ORDER — GUAIFENESIN 600 MG/1
600 TABLET, EXTENDED RELEASE ORAL EVERY 12 HOURS
Qty: 20 TABLET | Refills: 0 | Status: SHIPPED | OUTPATIENT
Start: 2022-09-14 | End: 2022-09-28

## 2022-09-14 RX ORDER — ARFORMOTEROL TARTRATE 15 UG/2ML
15 SOLUTION RESPIRATORY (INHALATION) 2 TIMES DAILY
Qty: 120 ML | Refills: 0 | Status: SHIPPED | OUTPATIENT
Start: 2022-09-14 | End: 2022-10-14

## 2022-09-14 RX ORDER — FUROSEMIDE 20 MG/1
20 TABLET ORAL DAILY
Status: DISCONTINUED | OUTPATIENT
Start: 2022-09-15 | End: 2022-09-14 | Stop reason: HOSPADM

## 2022-09-14 RX ORDER — SODIUM CHLORIDE TAB 1 GM 1 G
1 TAB MISCELLANEOUS
Status: DISCONTINUED | OUTPATIENT
Start: 2022-09-14 | End: 2022-09-14

## 2022-09-14 RX ORDER — BUDESONIDE 0.5 MG/2ML
500 INHALANT ORAL 2 TIMES DAILY
Qty: 60 EACH | Refills: 0 | Status: SHIPPED | OUTPATIENT
Start: 2022-09-14 | End: 2022-10-14

## 2022-09-14 RX ORDER — CEFDINIR 300 MG/1
300 CAPSULE ORAL EVERY 12 HOURS
Qty: 8 CAPSULE | Refills: 0 | Status: SHIPPED | OUTPATIENT
Start: 2022-09-14 | End: 2022-09-28

## 2022-09-14 RX ORDER — SODIUM CHLORIDE TAB 1 GM 1 G
1 TAB MISCELLANEOUS
Qty: 90 TABLET | Refills: 0 | Status: SHIPPED | OUTPATIENT
Start: 2022-09-14 | End: 2022-10-14

## 2022-09-14 RX ORDER — SODIUM CHLORIDE TAB 1 GM 1 G
1 TAB MISCELLANEOUS
Status: DISCONTINUED | OUTPATIENT
Start: 2022-09-14 | End: 2022-09-14 | Stop reason: HOSPADM

## 2022-09-14 RX ORDER — NYSTATIN 100000 [USP'U]/ML
500000 SUSPENSION ORAL 4 TIMES DAILY
Qty: 400 ML | Refills: 0 | Status: SHIPPED | OUTPATIENT
Start: 2022-09-14 | End: 2022-10-04

## 2022-09-14 RX ORDER — LEVALBUTEROL INHALATION SOLUTION 1.25 MG/3ML
1.25 SOLUTION RESPIRATORY (INHALATION)
Qty: 100 EACH | Refills: 0 | Status: SHIPPED | OUTPATIENT
Start: 2022-09-14 | End: 2022-10-14

## 2022-09-14 RX ADMIN — IPRATROPIUM BROMIDE 0.5 MG: 0.5 SOLUTION RESPIRATORY (INHALATION) at 13:08

## 2022-09-14 RX ADMIN — IPRATROPIUM BROMIDE 0.5 MG: 0.5 SOLUTION RESPIRATORY (INHALATION) at 07:20

## 2022-09-14 RX ADMIN — GUAIFENESIN 600 MG: 600 TABLET ORAL at 10:10

## 2022-09-14 RX ADMIN — SOTALOL HYDROCHLORIDE 80 MG: 80 TABLET ORAL at 10:10

## 2022-09-14 RX ADMIN — ARFORMOTEROL TARTRATE 15 MCG: 15 SOLUTION RESPIRATORY (INHALATION) at 07:23

## 2022-09-14 RX ADMIN — BUDESONIDE 500 MCG: 0.5 SUSPENSION RESPIRATORY (INHALATION) at 07:24

## 2022-09-14 RX ADMIN — SODIUM CHLORIDE, PRESERVATIVE FREE 10 ML: 5 INJECTION INTRAVENOUS at 05:34

## 2022-09-14 RX ADMIN — LEVALBUTEROL HYDROCHLORIDE 1.25 MG: 1.25 SOLUTION RESPIRATORY (INHALATION) at 07:20

## 2022-09-14 RX ADMIN — CEFEPIME 2 G: 2 INJECTION, POWDER, FOR SOLUTION INTRAVENOUS at 05:33

## 2022-09-14 RX ADMIN — LEVALBUTEROL HYDROCHLORIDE 1.25 MG: 1.25 SOLUTION RESPIRATORY (INHALATION) at 13:08

## 2022-09-14 RX ADMIN — DICLOFENAC SODIUM 2 G: 10 GEL TOPICAL at 10:11

## 2022-09-14 RX ADMIN — NYSTATIN 500000 UNITS: 100000 SUSPENSION ORAL at 10:10

## 2022-09-14 NOTE — PROGRESS NOTES
Problem: Mobility Impaired (Adult and Pediatric)  Goal: *Acute Goals and Plan of Care (Insert Text)  Description: FUNCTIONAL STATUS PRIOR TO ADMISSION: Patient was independent and active without use of DME. She reports intermittent use of rollator in the home to assist with brining trays to her spouse or when she needs a seated rest break. HOME SUPPORT PRIOR TO ADMISSION: The patient lived with her spouse with history of dementia and provided meals and ADL assistance for him. Physical Therapy Goals  Initiated 9/5/2022; goals remain appropriate and continue 9/14/2022   All goals with intention of being met with least restrictive supplemental oxygen and spo2 >90% or room air  1. Patient will move from supine to sit and sit to supine , scoot up and down, and roll side to side in bed with independence within 7 day(s). 2.  Patient will transfer from bed to chair and chair to bed with modified independence using the least restrictive device within 7 day(s). 3.  Patient will perform sit to stand with modified independence within 7 day(s). 4.  Patient will ambulate with modified independence for 200 feet with the least restrictive device within 7 day(s). 5.  Patient will ascend/descend 3 stairs with 1-2 handrail(s) with minimal assistance/contact guard assist within 7 day(s). 9/14/2022 1031 by Janel Cason PT, DPT  Outcome: Progressing Towards Goal  9/14/2022 1029 by Janel Cason PT, DPT  Outcome: Progressing Towards Goal   PHYSICAL THERAPY TREATMENT: WEEKLY REASSESSMENT  Patient: Edwina Claros (57 y.o. female)  Date: 9/14/2022  Primary Diagnosis: Dyspnea [R06.00]       Precautions:   Fall, Skin, Seizure      ASSESSMENT  Patient continues with skilled PT services and is progressing slowly towards goals.  She is eager to improve function and return home but presents with anxiety, decreased activity tolerance, tachycardia, and impulsivity limiting safety and independence with functional mobility. Received on RA and HR ~104 BPM at rest. Noted plan for discharge and patient agreeable to stair assessment. Initially transferred to [facility] requiring supervision to SBA but cues for hand placement and safe device management. Gait training completed 2x35' using her rollator requiring supervision and assist for IV management. Noted moderate HERRERA but SpO2 remained stable on RA. Stair assessment completed after initial 28' where she completed quickly and c/o severe heat in the stairwell. Returned to the hallway for a seated rest break with noted HR elevation to ~125 BPM.  Returned to sitting EOB and HR remained elevated 135-158 BPM. Notified nursing and returned to supine with nursing present. Overall distance was less than last session but limited by HERRERA and high heart rate with exertion. Discussed strategies for energy conservation to safely enter her home with assist from her daughter. Recommend discharge home with support from her daughter and HHPT follow up. Patient's progression toward goals since last assessment: progressing towards goals but not met    Current Level of Function Impacting Discharge (mobility/balance): supervision to SBA for mobiltiy      Other factors to consider for discharge: typically caregiver for her  with dementia         PLAN :  Goals have been updated based on progression since last assessment. Patient continues to benefit from skilled intervention to address the above impairments. Recommendations and Planned Interventions: bed mobility training, transfer training, gait training, therapeutic exercises, and therapeutic activities      Frequency/Duration: Patient will be followed by physical therapy:  5 times a week to address goals.     Recommendation for discharge: (in order for the patient to meet his/her long term goals)  Physical therapy at least 2 days/week in the home AND ensure assist and/or supervision for safety with functional mobility    This discharge recommendation:  Has been made in collaboration with the attending provider and/or case management    IF patient discharges home will need the following DME: patient owns DME required for discharge         SUBJECTIVE:   Patient stated My heart rate is up because of my back pain. Verlee Peabody    OBJECTIVE DATA SUMMARY:   HISTORY:    Past Medical History:   Diagnosis Date    Asthma     CHF (congestive heart failure), NYHA class I, chronic, systolic (HCC)     Chronic anticoagulation     GERD (gastroesophageal reflux disease)     Hiatal hernia     HX: breast cancer 2015    Right     Hypothyroid     Paroxysmal A-fib (HCC)     Venous insufficiency     bilat LE    Weight loss      Past Surgical History:   Procedure Laterality Date    COLONOSCOPY N/A 6/14/2021    COLONOSCOPY AND EGD performed by Flavio Bradley MD at [address]    HX BREAST LUMPECTOMY Right 08/24/2015    HX CATARACT REMOVAL  2013    Bilateral     HX CHOLECYSTECTOMY      HX DILATION AND CURETTAGE      x2    HX ORTHOPAEDIC  2017    RIGHT foot  hammertoe       Personal factors and/or comorbidities impacting plan of care:     Home Situation  Home Environment: Private residence ([facility])  # Steps to Enter: 4  One/Two Story Residence: One story  Living Alone: No  Support Systems: Spouse/Significant Other, Child([place]) (pt lives w/ spouse who has dementia, at baseline she is ambulatory w/ rollator, iADLs, drives)  Patient Expects to be Discharged to[de-identified] Unable to determine at this time  Current DME Used/Available at Home: Jeananne Garre, rollator  Tub or Shower Type: Shower    EXAMINATION/PRESENTATION/DECISION MAKING:   Critical Behavior:  Neurologic State: Alert  Orientation Level: Oriented X4  Cognition: Follows commands  Safety/Judgement: Awareness of environment  Hearing: Auditory  Auditory Impairment: None  Skin:    Edema:   Range Of Motion:                          Strength:                          Tone & Sensation:                                  Coordination:     Vision: Functional Mobility:  Bed Mobility:  Rolling: Supervision  Supine to Sit: Supervision  Sit to Supine: Supervision  Scooting: Supervision  Transfers:  Sit to Stand: Supervision  Stand to Sit: Supervision                       Balance:   Sitting: Intact  Standing: Impaired  Standing - Static: Fair  Standing - Dynamic : Fair  Ambulation/Gait Training:  Distance (ft):  (2x35' with a seated rest)  Assistive Device: Gait belt;Walker, rolling  Ambulation - Level of Assistance: Stand-by assistance        Gait Abnormalities: Path deviations; Step to gait        Base of Support: Widened     Speed/Ivory: Pace decreased (<100 feet/min)           Interventions: Safety awareness training;Verbal cues            Stairs:  Number of Stairs Trained: 2  Stairs - Level of Assistance: Stand-by assistance   Rail Use: Both    Therapeutic Exercises:       Functional Measure:        Pain Ratin/10 low back seated EOb    Activity Tolerance:   Fair; HERRERA and -158 post activity    After treatment patient left in no apparent distress:   Supine in bed and Call bell within reach    COMMUNICATION/EDUCATION:   The patients plan of care was discussed with: Registered nurse. Fall prevention education was provided and the patient/caregiver indicated understanding., Patient/family have participated as able in goal setting and plan of care. , and Patient/family agree to work toward stated goals and plan of care.     Thank you for this referral.  Claritza Vidales, PT, DPT   Time Calculation: 39 mins

## 2022-09-14 NOTE — PROGRESS NOTES
Name: Cecilio Wiggins: [employer] [facility]   : 1940 Admit Date: 2022   Phone: [phone]  Room: [facility]/01   PCP: Keeley Mcamhan MD  MRN: [id number]   Date: 2022  Code: Full Code          Chart and notes reviewed. Data reviewed. I review the patient's current medications in the medical record at each encounter. I have evaluated and examined the patient. History of Present Illness:  Ms. Rich Ingram is an  yo woman with a history of asthma/COPD, pseudomonas pneumonia/colonization, bronchiectasis, former tobacco use, afib, breast cancer s/p chemo/radiation, seronegative RA and GERD who presented with dyspnea and cough. She is followed by Dr. Author Xiao by [facility] and was last seen . She is managed on Trelegy (this was stopped for a period of time due to concern for GI issues, but pulmonary symptoms worsened when on Advair/Spiriva). She is on jeff nebs for pseudomonas. She recently had an EGD that showed candidal esophagitis, but unable to take fluconazole due to sotalol. She was recently admitted and discharged  for nausea/vomiting and treated for PNA. She was doing well until Friday when she began to feel weak, more short of breath and had increased cough with some chest tightness. Her cough is wet, but has not been notably productive. Has loss additional weight since she was last admitted. Planned for Gastric emptying study and possible upper GI study.      Labs: WBC 10.1, Hgb 9.5, , d-dimer 0.44, cr 0.41, , proBNP 2712, sputum culture  with stenotrophamonas     Images reviewed:  CXR 2022: increased interstitial marking and mild patchy infiltrates (greatest on the R), these are a change from most recent CXR     CT chest 2022: patchy bilateral reticulonodular opacities, bronchial wall thickening, mucous plugging, scattered sub 5mm nodules, no effusions    Interval history  Afebrile  HR 80s-90s  BP soft but MAP stable  Sats 92% on RA; did not require O2 during PT eval  WBC 14.9  Hgb 9.3  Na 132 - better  TSH 1.12  Blood cx no growth x 5 days - final  Urine cx no growth  RVP neg  Sputum cx w/ light normal respiratory ge    Head CT no acute process    MRI brain with no acute intracranial abnormality. Moderate chronic microvascular ischemic disease. Complete opacification of the left maxillary and sphenoid sinuses, likely representing chronic sinusitis    ECHO: EF 90-56%; grade 1 diastolic dysfunction; mildly elevated RVSP    Gastric emptying study: No delay of gastric emptying. Delayed clearance of radiotracer from the esophagus. CXR : Slightly improved widespread reticulonodular opacities    ROS:  Has no complaints today and hoping to go home. Denies SOB or CP. Reports cough is improved. Notes benefit from acapella. Past Medical History:   Diagnosis Date    Asthma     CHF (congestive heart failure), NYHA class I, chronic, systolic (HCC)     Chronic anticoagulation     GERD (gastroesophageal reflux disease)     Hiatal hernia     HX: breast cancer     Right     Hypothyroid     Paroxysmal A-fib (HCC)     Venous insufficiency     bilat LE    Weight loss        Past Surgical History:   Procedure Laterality Date    COLONOSCOPY N/A 2021    COLONOSCOPY AND EGD performed by Marshall Sales MD at [address] BREAST LUMPECTOMY Right 2015    HX CATARACT REMOVAL  2013    Bilateral     HX CHOLECYSTECTOMY      HX DILATION AND CURETTAGE      x2    HX ORTHOPAEDIC  2017    RIGHT foot  hammertoe       No family history on file.     Social History     Tobacco Use    Smoking status: Former     Types: Cigarettes     Start date: 2000     Quit date: 2000     Years since quittin.8    Smokeless tobacco: Never   Substance Use Topics    Alcohol use: Not Currently       Allergies   Allergen Reactions    Ace Inhibitors Cough    Iodine Rash    Penicillins Rash       Current Facility-Administered Medications   Medication Dose Route Frequency ipratropium (ATROVENT) 0.02 % nebulizer solution 0.5 mg  0.5 mg Nebulization Q6HWA RT    levalbuterol (XOPENEX) nebulizer soln 1.25 mg/3 mL  1.25 mg Nebulization Q6HWA RT    guaiFENesin ER (MUCINEX) tablet 600 mg  600 mg Oral Q12H    levalbuterol (XOPENEX) nebulizer soln 1.25 mg/3 mL  1.25 mg Nebulization Q2H PRN    diclofenac (VOLTAREN) 1 % topical gel 2 g  2 g Topical QID    cefepime (MAXIPIME) 2 g in 0.9% sodium chloride (MBP/ADV) 100 mL MBP  2 g IntraVENous Q12H    sodium chloride (NS) flush 5-40 mL  5-40 mL IntraVENous PRN    guaiFENesin (ROBITUSSIN) 100 mg/5 mL oral liquid 100 mg  100 mg Oral Q4H PRN    prochlorperazine (COMPAZINE) injection 5 mg  5 mg IntraVENous Q6H PRN    nystatin (MYCOSTATIN) 100,000 unit/mL oral suspension 500,000 Units  500,000 Units Oral QID    ketotifen (ZADITOR) 0.025 % (0.035 %) ophthalmic solution 1 Drop  1 Drop Both Eyes BID PRN    arformoteroL (BROVANA) neb solution 15 mcg  15 mcg Nebulization BID RT    budesonide (PULMICORT) 500 mcg/2 ml nebulizer suspension  500 mcg Nebulization BID RT    montelukast (SINGULAIR) tablet 10 mg  10 mg Oral QHS    sotaloL (BETAPACE) tablet 80 mg  80 mg Oral Q12H    sodium chloride (NS) flush 5-40 mL  5-40 mL IntraVENous Q8H    sodium chloride (NS) flush 5-40 mL  5-40 mL IntraVENous PRN    acetaminophen (TYLENOL) tablet 650 mg  650 mg Oral Q6H PRN    polyethylene glycol (MIRALAX) packet 17 g  17 g Oral DAILY PRN    ondansetron (ZOFRAN) injection 4 mg  4 mg IntraVENous Q6H PRN    rivaroxaban (XARELTO) tablet 20 mg  20 mg Oral DAILY WITH DINNER         REVIEW OF SYSTEMS   12 point ROS negative except as stated in the HPI. Physical Exam:   Visit Vitals  BP (!) 99/58 (BP 1 Location: Left leg, BP Patient Position: At rest)   Pulse 91   Temp 97.2 °F (36.2 °C)   Resp 18   Ht 5' 2\" (1.575 m)   Wt 57 kg (125 lb 10.6 oz)   SpO2 92%   BMI 22.98 kg/m²       General:  Alert, cooperative, no distress, appears stated age.    Head:  Normocephalic, without obvious abnormality, atraumatic. Eyes:  Conjunctivae/corneas clear. Nose: Nares normal. Septum midline. Mucosa normal.    Throat: Lips, mucosa, and tongue normal.    Neck: Supple, symmetrical, trachea midline, no adenopathy. Lungs:   Coarse with bilateral rhonchi, resp nonlabored. Air movement improved today. Chest wall:  No tenderness or deformity. Heart:  Irregular and mildly tachy, no murmur, click, rub or gallop. Abdomen:   Soft, non-tender. Bowel sounds normal.    Extremities: Extremities normal, atraumatic, no cyanosis or edema. Pulses: 2+ and symmetric all extremities. Skin: Skin color, texture, turgor normal. No rashes or lesions   Neurologic: Grossly nonfocal       Lab Results   Component Value Date/Time    Sodium 132 (L) 09/14/2022 04:08 AM    Potassium 3.7 09/14/2022 04:08 AM    Chloride 96 (L) 09/14/2022 04:08 AM    CO2 30 09/14/2022 04:08 AM    BUN 14 09/14/2022 04:08 AM    Creatinine 0.37 (L) 09/14/2022 04:08 AM    Glucose 92 09/14/2022 04:08 AM    Calcium 9.2 09/14/2022 04:08 AM    Magnesium 1.6 09/12/2022 12:00 AM       Lab Results   Component Value Date/Time    WBC 14.9 (H) 09/13/2022 04:00 AM    HGB 9.3 (L) 09/13/2022 04:00 AM    PLATELET 818 (H) 60/68/3055 04:00 AM    MCV 89.3 09/13/2022 04:00 AM       Lab Results   Component Value Date/Time    Alk.  phosphatase 126 (H) 09/07/2022 01:54 AM    Protein, total 6.0 (L) 09/07/2022 01:54 AM    Albumin 2.0 (L) 09/07/2022 01:54 AM    Globulin 4.0 09/07/2022 01:54 AM       Lab Results   Component Value Date/Time    Iron 35 09/04/2022 05:26 PM    TIBC 193 (L) 09/04/2022 05:26 PM    Iron % saturation 18 (L) 09/04/2022 05:26 PM    Ferritin 196 09/04/2022 05:26 PM       Lab Results   Component Value Date/Time    TSH 1.12 09/07/2022 04:57 AM        No results found for: PH, PHI, PCO2, PCO2I, PO2, PO2I, HCO3, HCO3I, FIO2, FIO2I    Lab Results   Component Value Date/Time    Troponin-I, Qt. <0.05 01/13/2021 03:17 AM        Lab Results   Component Value Date/Time    Culture result: LIGHT PROBABLE STAPHYLOCOCCUS SPECIES (A) 09/11/2022 12:10 PM    Culture result: LIGHT GRAM NEGATIVE RODS (A) 09/11/2022 12:10 PM    Culture result: No growth (<1,000 CFU/ML) 09/07/2022 10:14 AM       No results found for: TOXA1, RPR, HBCM, HBSAG, HAAB, HCAB1, HAAT, G6PD, CRYAC, HIVGT, HIVR, HIV1, HIV12, HIVPC, HIVRPI    No results found for: VANCT, CPK    Lab Results   Component Value Date/Time    Color YELLOW/STRAW 09/07/2022 10:14 AM    Appearance CLEAR 09/07/2022 10:14 AM    pH (UA) 8.0 09/07/2022 10:14 AM    Protein Negative 09/07/2022 10:14 AM    Glucose Negative 09/07/2022 10:14 AM    Ketone TRACE (A) 09/07/2022 10:14 AM    Bilirubin Negative 09/07/2022 10:14 AM    Blood Negative 09/07/2022 10:14 AM    Urobilinogen 1.0 09/07/2022 10:14 AM    Nitrites Negative 09/07/2022 10:14 AM    Leukocyte Esterase Negative 09/07/2022 10:14 AM    WBC 0-4 09/07/2022 10:14 AM    RBC 0-5 09/07/2022 10:14 AM    Bacteria Negative 09/07/2022 10:14 AM       IMPRESSION  Acute respiratory failure with hypoxia  Abnormal chest imaging  Hyponatremia  COPD/asthma  Bronchiectasis  Afib with RVR  History of pseudomonas pneumonia/colonization  GERD  Candidal esophagitis  Nauseas/vomiting  Weight loss    PLAN  Goal sats 88% or higher; not on home O2 at baseline. Currently on RA. Did not require O2 during PT eval.  Recent sputum culture grew stenotrophomonas, not treated with bactrim as she was clinically improving at that time but was restarted this admission given imaging findings and clinical change. Bactrim now stopped per primary team due to significant hyponatremia and concern that Bactrim may be the cause. Currently on cefepime. Levaquin would be preferred, but not if she requires sotalol. Would switch to po cefdinir at discharge to complete 7 day total course. Acapella  Brovana/Pulmicort BID, dunoebs. prn  Continue this regimen at discharge.   Patient was on Trelegy PTA, but lucero snot want to continue this due to side effects/nausea with use. No indication for steroids at this time  Started her week on jeff nebs on 9/3 and completed on Friday  Nephrology following  Cardiology following - dig d/c; back on sotalol    Patient is stable from a pulmonary standpoint. We will sign off and arrange for outpatient pulmonary follow up in 1-2 weeks. Please call with questions.       Chago Baez, [address]

## 2022-09-14 NOTE — PROGRESS NOTES
[facility]  [address] [facility], [facility] [address]  [phone]    Medical Progress Note      NAME: Loco Gibbs   :  1940  MRM:  [id number]    Date/Time of service 2022  8:01 AM          Assessment and Plan:     Hyponatremia / Hypokalemia / Hypomagnesemia - POA, at first thought likely due to poor PO intake. But on  Na was suddenly, severely, inexplicably worse at 855. Had dropped to 122 . Today back to 132, near her baseline  Nephrology consulted. Has not improved further despite fluid restriction, but did improve with tolvaptan. Monitor K and IV Mg.  I wonder if Bactrim was the immediate cause of that sudden drop. 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 Bronchial wall thickening with mucous plugging. Findings are likely infectious or inflammatory\". Pulmonary consulted. Broad differential including chronic simmering infections, vs aspiration. Bactrim ordered on  per pulmonary, stopped  due to suddenly low Na. I note that at admission her WBC count was mild, without bands, and no other SIRS criteria, and WBC normalized in 1st 24hr without any ABx. Procalcitonin is low. She had just completed Abx for PNA. Pulmonary added cefepime. They cannot use bactrim or Levaquin as they want, due to the use of sotalol. Paroxysmal A-fib with RVR / Chronic anticoagulation - RVR developed  after holding sotalol  (for somnolence), so resumed that. She did not tolerate attempts to stop sotalolol. Continue xarelto. Sotalol prevents the use of fluconazole to treat esophageal candidiasis, and prevents the use of levaquin. Dyspnea / Nausea / Chest pain - Unclear etiology of these symptoms. She is now less hypoxic.   DDx includes GERD/aspiration (which I think is the primary problem), vs chronic PNA, vs much less likely CHF, PE, asthma    CHF (congestive heart failure), NYHA class I, chronic, systolic / Venous insufficiency - Unclear if any exacerbation, I do not think so. Stable ECHO. Consulted cariology. ER gave lasix, but with her normal oxygen sats, low NA and low K, wt loss, 2 days no PO intake, I did not continue that. Continue BB      Anemia - Stable since last discharge, and normal serologies. Weight loss / GERD (gastroesophageal reflux disease) / Hiatal hernia / Candidiasis - She has very concerning unintentional wt loss, anorexia, nausea that persists. She weighed 170lb last year. 128lb now. Consult GI, who is following her outpatient. PPI by IV. At home she takes bentyl and Zofran as needed. Emptying study 9/6 shows delayed esophageal emptying but not delayed gastric emptying. She is on PO nystatin. Asthma - For her home Trelegy substitute Brovana, Pulmicort and prn atrovent and xopenex. Pulmonary is following. Added guaifenesin and singulair. Marilin Robertson Hx breast cancer - Outpatient follow up. Has been on Boniva, increasing risk of PUD and esophagitis      Hypothyroid - TSH was normal a few months ago. I stopped the homeopathic dose of synthroid. Tingling / Weakness - Noted by patient 9/6. Code stroke called. MRI normal.  Now I think that HypoNa was contributing. Subjective:     Chief Complaint:  Stable this AM, more alert and feels better    ROS:  (bold if positive, if negative)    Cough      Sputum   Abd Pain  SOB/DOETolerating some PT  Tolerating some Diet        Objective:     Last 24hrs VS reviewed since prior progress note.  Most recent are:    Visit Vitals  BP (!) 99/58 (BP 1 Location: Left leg, BP Patient Position: At rest)   Pulse 91   Temp 97.2 °F (36.2 °C)   Resp 18   Ht 5' 2\" (1.575 m)   Wt 57 kg (125 lb 10.6 oz)   SpO2 92%   BMI 22.98 kg/m²     SpO2 Readings from Last 6 Encounters:   09/14/22 92%   08/23/22 95%   08/10/22 94%   05/01/22 96%   06/14/21 99%   04/28/21 97%    O2 Flow Rate (L/min): 0 l/min     Intake/Output Summary (Last 24 hours) at 9/14/2022 0801  Last data filed at 9/14/2022 0334  Gross per 24 hour   Intake 240 ml   Output 700 ml   Net -460 ml          Physical Exam:    Gen:  Frail, in no acute distress  HEENT:  Pink conjunctivae, PERRL, hearing intact to voice, moist mucous membranes  Neck:  Supple, without masses, thyroid non-tender  Resp:  No accessory muscle use, clear breath sounds without wheezes rales or rhonchi  Card:  No murmurs, normal S1, S2 without thrills, bruits or peripheral edema  Abd:  Soft, non-tender, non-distended, normoactive bowel sounds are present, no mass  Lymph:  No cervical or inguinal adenopathy  Musc:  No cyanosis or clubbing  Skin:  No rashes or ulcers, skin turgor is good  Neuro:  Cranial nerves are grossly intact, general motor weakness, follows commands vaguely  Psych:  Poor insight, oriented to person, groggy    Telemetry reviewed:   normal sinus rhythm  __________________________________________________________________  Medications Reviewed: (see below)  Medications:     Current Facility-Administered Medications   Medication Dose Route Frequency    ipratropium (ATROVENT) 0.02 % nebulizer solution 0.5 mg  0.5 mg Nebulization Q6HWA RT    levalbuterol (XOPENEX) nebulizer soln 1.25 mg/3 mL  1.25 mg Nebulization Q6HWA RT    guaiFENesin ER (MUCINEX) tablet 600 mg  600 mg Oral Q12H    levalbuterol (XOPENEX) nebulizer soln 1.25 mg/3 mL  1.25 mg Nebulization Q2H PRN    diclofenac (VOLTAREN) 1 % topical gel 2 g  2 g Topical QID    cefepime (MAXIPIME) 2 g in 0.9% sodium chloride (MBP/ADV) 100 mL MBP  2 g IntraVENous Q12H    sodium chloride (NS) flush 5-40 mL  5-40 mL IntraVENous PRN    guaiFENesin (ROBITUSSIN) 100 mg/5 mL oral liquid 100 mg  100 mg Oral Q4H PRN    prochlorperazine (COMPAZINE) injection 5 mg  5 mg IntraVENous Q6H PRN    nystatin (MYCOSTATIN) 100,000 unit/mL oral suspension 500,000 Units  500,000 Units Oral QID    ketotifen (ZADITOR) 0. 025 % (0.035 %) ophthalmic solution 1 Drop  1 Drop Both Eyes BID PRN    arformoteroL (BROVANA) neb solution 15 mcg  15 mcg Nebulization BID RT    budesonide (PULMICORT) 500 mcg/2 ml nebulizer suspension  500 mcg Nebulization BID RT    montelukast (SINGULAIR) tablet 10 mg  10 mg Oral QHS    sotaloL (BETAPACE) tablet 80 mg  80 mg Oral Q12H    sodium chloride (NS) flush 5-40 mL  5-40 mL IntraVENous Q8H    sodium chloride (NS) flush 5-40 mL  5-40 mL IntraVENous PRN    acetaminophen (TYLENOL) tablet 650 mg  650 mg Oral Q6H PRN    polyethylene glycol (MIRALAX) packet 17 g  17 g Oral DAILY PRN    ondansetron (ZOFRAN) injection 4 mg  4 mg IntraVENous Q6H PRN    rivaroxaban (XARELTO) tablet 20 mg  20 mg Oral DAILY WITH DINNER        Lab Data Reviewed: (see below)  Lab Review:     Recent Labs     09/13/22  0400 09/12/22  0000   WBC 14.9* 14.1*   HGB 9.3* 9.8*   HCT 28.4* 30.0*   * 720*       Recent Labs     09/14/22  0408 09/13/22  1944 09/13/22  0400 09/12/22  0918 09/12/22  0000   * 135* 128*   < > 128*   K 3.7 3.3* 4.2   < > 3.9   CL 96* 101 90*   < > 90*   CO2 30 29 31   < > 32   GLU 92 127* 124*   < > 134*   BUN 14 13 21*   < > 18   CREA 0.37* 0.36* 0.26*   < > 0.56   CA 9.2 8.4* 8.7   < > 9.3   MG  --   --   --   --  1.6    < > = values in this interval not displayed. Lab Results   Component Value Date/Time    Glucose (POC) 152 (H) 09/11/2022 03:02 PM    Glucose (POC) 146 (H) 09/06/2022 01:21 PM     No results for input(s): PH, PCO2, PO2, HCO3, FIO2 in the last 72 hours. No results for input(s): INR, INREXT, INREXT in the last 72 hours.   All Micro Results       Procedure Component Value Units Date/Time    CULTURE, RESPIRATORY/SPUTUM/BRONCH Lisa Khan STAIN [[id number]]  (Abnormal) Collected: 09/11/22 1210    Order Status: Completed Specimen: Sputum Updated: 09/13/22 0934     Special Requests: NO SPECIAL REQUESTS        GRAM STAIN OCCASIONAL WBCS SEEN               RARE EPITHELIAL CELLS SEEN NO ORGANISMS SEEN        Culture result:       LIGHT PROBABLE STAPHYLOCOCCUS SPECIES            LIGHT GRAM NEGATIVE RODS       URINE CULTURE HOLD SAMPLE [[id number]] Collected: 09/13/22 0200    Order Status: Completed Specimen: Serum Updated: 09/13/22 0715     Urine culture hold       Urine on hold in Microbiology dept for 2 days. If unpreserved urine is submitted, it cannot be used for addtional testing after 24 hours, recollection will be required. CULTURE, RESPIRATORY/SPUTUM/BRONCH Raford Payer STAIN [[id number]]     Order Status: Canceled Specimen: Sputum     CULTURE, BLOOD, PAIRED [[id number]] Collected: 09/04/22 1339    Order Status: Completed Specimen: Blood Updated: 09/09/22 0737     Special Requests: NO SPECIAL REQUESTS        Culture result: NO GROWTH 5 DAYS       CULTURE, URINE [[id number]] Collected: 09/07/22 1014    Order Status: Completed Specimen: Urine from Clean catch Updated: 09/08/22 1125     Special Requests: NO SPECIAL REQUESTS        Culture result: No growth (<1,000 CFU/ML)       CULTURE, RESPIRATORY/SPUTUM/BRONCH Raford Payer STAIN [[id number]] Collected: 09/08/22 0845    Order Status: Canceled Specimen: Sputum     URINE CULTURE HOLD SAMPLE [[id number]] Collected: 09/07/22 1014    Order Status: Completed Specimen: Serum Updated: 09/07/22 1038     Urine culture hold       Urine on hold in Microbiology dept for 2 days. If unpreserved urine is submitted, it cannot be used for addtional testing after 24 hours, recollection will be required.           CULTURE, RESPIRATORY/SPUTUM/BRONCH Evaristo Dilcia [[id number]] Collected: 09/06/22 0207    Order Status: Canceled Specimen: Sputum     RESPIRATORY VIRUS PANEL W/COVID-19, PCR [[id number]] Collected: 09/05/22 1237    Order Status: Completed Specimen: Nasopharyngeal Updated: 09/05/22 2021     Adenovirus Not detected        Coronavirus 229E Not detected        Coronavirus HKU1 Not detected        Coronavirus CVNL63 Not detected        Coronavirus OC43 Not detected SARS-CoV-2, PCR Not detected        Metapneumovirus Not detected        Rhinovirus and Enterovirus Not detected        Influenza A Not detected        Influenza A, subtype H1 Not detected        Influenza A, subtype H3 Not detected        INFLUENZA A H1N1 PCR Not detected        Influenza B Not detected        Parainfluenza 1 Not detected        Parainfluenza 2 Not detected        Parainfluenza 3 Not detected        Parainfluenza virus 4 Not detected        RSV by PCR Not detected        B. parapertussis, PCR Not detected        Bordetella pertussis - PCR Not detected        Chlamydophila pneumoniae DNA, QL, PCR Not detected        Mycoplasma pneumoniae DNA, QL, PCR Not detected       COVID-19 RAPID TEST [[id number]] Collected: 09/04/22 1339    Order Status: Completed Specimen: Nasopharyngeal Updated: 09/04/22 1414     Specimen source Nasopharyngeal        COVID-19 rapid test Not detected        Comment: Rapid Abbott ID Now       Rapid NAAT:  The specimen is NEGATIVE for SARS-CoV-2, the novel coronavirus associated with COVID-19. Negative results should be treated as presumptive and, if inconsistent with clinical signs and symptoms or necessary for patient management, should be tested with an alternative molecular assay. Negative results do not preclude SARS-CoV-2 infection and should not be used as the sole basis for patient management decisions. This test has been authorized by the FDA under an Emergency Use Authorization (EUA) for use by authorized laboratories. Fact sheet for Healthcare Providers: ConventionUpdate.co.nz  Fact sheet for Patients: ConventionUpdate.co.nz       Methodology: Isothermal Nucleic Acid Amplification                 Other pertinent lab: none    Total time spent with patient: 30 Minutes I personally reviewed chart, notes, data and current medications in the medical record.   I have personally examined and treated the patient at bedside during this period. To assist coordination of care and communication with nursing and staff, this note may be preliminary early in the day, but finalized by end of the day.                  Care Plan discussed with: Patient, Family, Care Manager, Nursing Staff, Consultant/Specialist, and >50% of time spent in counseling and coordination of care    Discussed:  Care Plan and D/C Planning    Prophylaxis:  Lovenox and H2B/PPI    Disposition:  Home w/Family           ___________________________________________________    Attending Physician: Marco Arias MD

## 2022-09-14 NOTE — PROGRESS NOTES
BUCK:   1) Home with HH ([facility])   2) Home with family and follow up appointments   3) Family will drive pt home at time of discharge and as needed  4) Risk of readmission- 18% MODERATE; pt is already a readmission     Hospital Day 9:   11:42 AM- CM received a phone call from pt's dtr Fredy Guzmán- states her brother is out of town- she will be here approx 45 minutes and will drive her Mom home- she wishes to be here for d/c paperwork. CM notified RN and notified CM Specialist for 2ND IM letter. 11:34 AM- Pt remains on [facility]- stable for d/c. [facility] accepted for [facility] services- RN, PT and OT. CM reached out to pt's son Roshan Chu regarding d/c- no answer, HIPPA compliant VM left- waiting on a return phone call.      Nettie March, MSW, [address]

## 2022-09-14 NOTE — DISCHARGE SUMMARY
Physician Discharge Summary     Patient ID:  Bryce Lee  [id number]  80 y.o.  1940    Admit date: 9/4/2022    Discharge date of service and time: 9/14/2022    Admission Diagnoses: Dyspnea [R06.00]    Discharge Diagnoses:    Principal Diagnosis     Hyponatremia                                             Hospital Course and other diagnoses  Hyponatremia / Hypokalemia / Hypomagnesemia - POA, at first thought likely due to poor PO intake. But on 9/7 Na was suddenly, severely, inexplicably worse at 705. Had dropped to 122 9/6. Today back to 132, near her baseline  Nephrology consulted. Has not improved further despite fluid restriction, but did improve with tolvaptan. Monitor K and IV Mg.  I wonder if Bactrim was the immediate cause of that sudden drop. 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 DC home on PO salt     Abnormal CT chest / Leukocytosis - CT reports \"Scattered reticulonodular changes throughout the lungs with areas of slightly more consolidative. Bronchial wall thickening with mucous plugging. Findings are likely infectious or inflammatory\". Pulmonary consulted. Broad differential including chronic simmering infections, vs aspiration. Bactrim ordered on 9/5 per pulmonary, stopped 9/7 due to suddenly low Na. I note that at admission her WBC count was mild, without bands, and no other SIRS criteria, and WBC normalized in 1st 24hr without any ABx. Procalcitonin is low. She had just completed Abx for PNA. Pulmonary added cefepime, and ask we DC home on cefdinir. They cannot use bactrim or Levaquin as they want, due to the use of sotalol. Paroxysmal A-fib with RVR / Chronic anticoagulation - RVR developed 9/8 after holding sotalol 9/7 (for somnolence), so resumed that. She did not tolerate attempts to stop sotalolol. Continue xarelto. Sotalol prevents the use of fluconazole to treat esophageal candidiasis, and prevents the use of levaquin.      Dyspnea / Nausea / Chest pain - Unclear etiology of these symptoms. She is now less hypoxic. DDx includes GERD/aspiration (which I think is the primary problem), vs chronic PNA, vs much less likely CHF, PE, asthma     CHF (congestive heart failure), NYHA class I, chronic, systolic / Venous insufficiency - Unclear if any exacerbation, I do not think so. Stable ECHO. Consulted cariology. ER gave lasix, but with her normal oxygen sats, low NA and low K, wt loss, 2 days no PO intake, I did not continue that. Continue BB      Anemia - Stable since last discharge, and normal serologies. Weight loss / GERD (gastroesophageal reflux disease) / Hiatal hernia / Candidiasis - She has very concerning unintentional wt loss, anorexia, nausea that persists. She weighed 170lb last year. 128lb now. Consult GI, who is following her outpatient. PPI by IV. At home she takes bentyl and Zofran as needed. Emptying study 9/6 shows delayed esophageal emptying but not delayed gastric emptying. She is on PO nystatin. Asthma - For her home Trelegy substitute Brovana, Pulmicort and prn atrovent and xopenex. Pulmonary is following. Added guaifenesin and singulair. Jake Redman Hx breast cancer - Outpatient follow up. Has been on Boniva, increasing risk of PUD and esophagitis      Hypothyroid - TSH was normal a few months ago. I stopped the homeopathic dose of synthroid. Tingling / Weakness - Noted by patient 9/6. Code stroke called. MRI normal.  Now I think that HypoNa was contributing. PCP: Jose Garcia MD    Consults: Cardiology, Pulmonary/Intensive care, and Nephrology    Significant Diagnostic Studies: See Hospital Course    Discharged home in improved condition.     Discharge Exam:  BP (!) 99/58 (BP 1 Location: Left leg, BP Patient Position: At rest)   Pulse 91   Temp 97.2 °F (36.2 °C)   Resp 18   Ht 5' 2\" (1.575 m)   Wt 57 kg (125 lb 10.6 oz)   SpO2 92%   BMI 22.98 kg/m²      Gen:  Frail, in no acute distress  HEENT:  Pink conjunctivae, PERRL, hearing intact to voice, moist mucous membranes  Neck:  Supple, without masses, thyroid non-tender  Resp:  No accessory muscle use, clear breath sounds without wheezes rales or rhonchi  Card:  No murmurs, normal S1, S2 without thrills, bruits or peripheral edema  Abd:  Soft, non-tender, non-distended, normoactive bowel sounds are present, no mass  Lymph:  No cervical or inguinal adenopathy  Musc:  No cyanosis or clubbing  Skin:  No rashes or ulcers, skin turgor is good  Neuro:  Cranial nerves are grossly intact, general motor weakness, follows commands vaguely  Psych:  Poor insight, oriented to person, place    Patient Instructions:   Current Discharge Medication List        START taking these medications    Details   arformoteroL (BROVANA) 15 mcg/2 mL nebu neb solution 2 mL by Nebulization route two (2) times a day for 30 days. Qty: 120 mL, Refills: 0      budesonide (PULMICORT) 0.5 mg/2 mL nbsp 2 mL by Nebulization route two (2) times a day for 30 days. Qty: 60 Each, Refills: 0      cefdinir (OMNICEF) 300 mg capsule Take 1 Capsule by mouth every twelve (12) hours for 4 days. Qty: 8 Capsule, Refills: 0      guaiFENesin ER (MUCINEX) 600 mg ER tablet Take 1 Tablet by mouth every twelve (12) hours for 10 days. Qty: 20 Tablet, Refills: 0      levalbuterol (XOPENEX) 1.25 mg/3 mL nebu 3 mL by Nebulization route every six (6) hours as needed for Wheezing or Shortness of Breath for up to 30 days. Qty: 100 Each, Refills: 0      sodium chloride 1 gram tablet Take 1 Tablet by mouth three (3) times daily (with meals) for 30 days. Qty: 90 Tablet, Refills: 0           CONTINUE these medications which have CHANGED    Details   nystatin (MYCOSTATIN) 100,000 unit/mL suspension Take 5 mL by mouth four (4) times daily for 20 days. swish and spit  Qty: 400 mL, Refills: 0           CONTINUE these medications which have NOT CHANGED    Details   sotaloL (BETAPACE) 80 mg tablet Take  by mouth two (2) times a day.       pantoprazole (PROTONIX) 40 mg tablet Take 40 mg by mouth daily. olopatadine (PATADAY) 0.2 % drop ophthalmic solution Administer 1 Drop to both eyes daily. Xarelto 20 mg tab tablet Take 20 mg by mouth daily (with dinner). metroNIDAZOLE (METROCREAM) 0.75 % topical cream metronidazole 0.75 % topical cream      montelukast (SINGULAIR) 10 mg tablet montelukast 10 mg tablet      multivitamin (ONE A DAY) tablet Take 1 Tab by mouth daily. calcium carbonate/vitamin D3 (CALCIUM + D PO) Take  by mouth. diclofenac (VOLTAREN) 1 % gel Apply  to affected area four (4) times daily. cholecalciferol (VITAMIN D3) 25 mcg (1,000 unit) cap Take  by mouth daily. albuterol 2.5 mg /3 mL (0.083 %) nebu 3 mL, albuterol-ipratropium 2.5 mg-0.5 mg/3 ml nebu 3 mL Take 1 Inhalation by inhalation every four (4) hours as needed. albuterol (PROVENTIL VENTOLIN) 2.5 mg /3 mL (0.083 %) nebu Take 3 mL by inhalation every four (4) hours as needed. azelastine (ASTEPRO) 205.5 mcg (0.15 %) two (2) times a day. tiotropium bromide (SPIRIVA RESPIMAT) 2.5 mcg/actuation inhaler Spiriva Respimat 2.5 mcg/actuation solution for inhalation      albuterol (PROVENTIL HFA, VENTOLIN HFA, PROAIR HFA) 90 mcg/actuation inhaler Take  by inhalation. acetaminophen (TYLENOL EXTRA STRENGTH PO) Take  by mouth.            STOP taking these medications       levothyroxine (SYNTHROID) 25 mcg tablet Comments:   Reason for Stopping:         doxycycline (MONODOX) 100 mg capsule Comments:   Reason for Stopping:         fluticasone (VERAMYST) 27.5 mcg/actuation nasal spray Comments:   Reason for Stopping:         fluticasone-umeclidinium-vilanterol (Trelegy Ellipta) 100-62.5-25 mcg inhaler Comments:   Reason for Stopping:         ondansetron (ZOFRAN ODT) 4 mg disintegrating tablet Comments:   Reason for Stopping:         dicyclomine (BENTYL) 20 mg tablet Comments:   Reason for Stopping:         ergocalciferol (ERGOCALCIFEROL) 50,000 unit capsule Comments:   Reason for Stopping:         ibandronate (BONIVA) 150 mg tablet Comments:   Reason for Stopping:         mometasone (NASONEX) 50 mcg/actuation nasal spray Comments:   Reason for Stopping:         calcitonin, salmon, (MIACALCIN) nasal Comments:   Reason for Stopping:         vitamin E acetate (VITAMIN E PO) Comments:   Reason for Stopping:         docosahexanoic acid/epa (FISH OIL PO) Comments:   Reason for Stopping:         vitamin B complex (VITAMINS B COMPLEX PO) Comments:   Reason for Stopping:         polysorbate 80/glycerin (REFRESH DRY EYE THERAPY OP) Comments:   Reason for Stopping:             Activity: Activity as tolerated and PT/OT per Home Health  Diet: Regular Diet and fluid restriciton  Wound Care: None needed    Follow-up with your PCP and Dr Layne Flores in a few days.   Follow-up tests/labs - none    Signed:  Paradise Navarrete MD  9/14/2022  9:13 AM

## 2022-09-14 NOTE — PROGRESS NOTES
0730 Bedside and Verbal shift change report given to RASHID RN  (oncoming nurse) by Leonidas Freeman RN  (offgoing nurse). Report included the following information SBAR, Kardex, Intake/Output, MAR, Accordion, Recent Results, Med Rec Status, and Cardiac Rhythm AFIB . This patient was assisted with Intentional Toileting every 2 hours during this shift as appropriate. Documentation of ambulation and output reflected on Flowsheet as appropriate. Purposeful hourly rounding was completed using AIDET and 5Ps. Outcomes of PHR documented as they occurred. Bed alarm in use as appropriate. Dual Suction and ambubag in place. 46 Notified MD that patient's HR became elevated in the 150s-160s while working with PT, although pt remained asymptomatic. Inquired about continuing with discharge. Per MD, OK to discharge patient. I have reviewed discharge instructions with the patient. The patient verbalized understanding.

## 2022-09-14 NOTE — PROGRESS NOTES
CARDIOLOGY PROGRESS NOTE        [address], [place], [address] [facility]  Phone [phone]; Fax [phone]          2022 8:51 AM       Admit Date:           2022  Admit Diagnosis:  Dyspnea [R06.00]  :          1940   MRN:          [id number]        Assessment/Plan  Acute on chronic HF mildly reduced EF: stable, diuretics held d/t issues w/ hyponatremia    PAF: on sotalol, Xarelto PTA - developed RVR while off sotalol (held d/t med interactions), not responding to amio. Resumed sotalol. No further bradycardia. Would cont sotalol at current dose    3. Recent PNA: admitted about 2 weeks ago, completed abx. Was on steroids. Xray revealing interstitial edema vs PNA. CT scan was personally reviewed and also shows evidence of pneumonia. Pulm following     4. Weight loss, decreased appetite, nausea: esophageal candidiasis, would recommend avoiding fluconazole since back on sotalol    5. Hyponatremia: s/p Tolvaptan, holding diuresis. Fluid restriction. Renal assisting    Pt may be discharged from cardiac standpoint whenever otherwise medically ready, pt to follow up with Dr. Frederick Wallace office        NP spent 5 minutes in chart review of notes, VS, diagnostics. NP spent 10 minutes in examination of pt and review of plan of care  with pt/family. Pt personally seen and examined. Chart reviewed. Agree with advanced NP's history, exam and  A/P with changes/additons. Blood pressure 115/63, pulse 97, temperature 97.6 °F (36.4 °C), resp. rate 17, height 5' 2\" (1.575 m), weight 125 lb 10.6 oz (57 kg), SpO2 93 %. Alert/  Neck - no JVD  CVS - S1 S2 +; Irreg; 2/6 sys murmur  RS : Dec AE bilat  Abd: Soft/BS+  LE - no edema     A/P :     Acute on chronic HFrEF - Diuretics on hold - Hyponatremia. PRN diuretics onDC - F/up withprimary cardiologist  PAF - Sotalol restarted/xarelto; Recent PNA -  Hyponatremia-- per nephrolgoy     Colleen Garces.  MD, [facility] [place]    We discussed the expected course, resolution and complications of the diagnosis(es) in detail. Medication risks, benefits, costs, interactions, and alternatives were discussed as indicated. No intake/output data recorded. Last 3 Recorded Weights in this Encounter    09/11/22 0355 09/13/22 0419 09/14/22 0732   Weight: 64.8 kg (142 lb 13.7 oz) 57 kg (125 lb 10.6 oz) 57 kg (125 lb 10.6 oz)         09/12 1901 - 09/14 0700  In: 480 [P.O.:480]  Out: 900 [Urine:900]    SUBJECTIVE      80 y.o.  female  with PMH significant for CHF, PAF on Xarelto, asthma, GERD, anemia, hypothyroid and breast CA. Pt presented to the ED with complaints of worsening dyspnea, cough, chest pressure and nausea. Symptoms are associated with feeling lightheaded. She was recently admitted and treated for pneumonia. She had been doing well with return of her appetite until Friday when her symptoms began again. She notes about a 50 lb weight loss over the last 6 months or so. Anders Castro reports feeling better.       Current Facility-Administered Medications   Medication Dose Route Frequency    ipratropium (ATROVENT) 0.02 % nebulizer solution 0.5 mg  0.5 mg Nebulization Q6HWA RT    levalbuterol (XOPENEX) nebulizer soln 1.25 mg/3 mL  1.25 mg Nebulization Q6HWA RT    guaiFENesin ER (MUCINEX) tablet 600 mg  600 mg Oral Q12H    levalbuterol (XOPENEX) nebulizer soln 1.25 mg/3 mL  1.25 mg Nebulization Q2H PRN    diclofenac (VOLTAREN) 1 % topical gel 2 g  2 g Topical QID    cefepime (MAXIPIME) 2 g in 0.9% sodium chloride (MBP/ADV) 100 mL MBP  2 g IntraVENous Q12H    sodium chloride (NS) flush 5-40 mL  5-40 mL IntraVENous PRN    guaiFENesin (ROBITUSSIN) 100 mg/5 mL oral liquid 100 mg  100 mg Oral Q4H PRN    prochlorperazine (COMPAZINE) injection 5 mg  5 mg IntraVENous Q6H PRN    nystatin (MYCOSTATIN) 100,000 unit/mL oral suspension 500,000 Units  500,000 Units Oral QID    ketotifen (ZADITOR) 0.025 % (0.035 %) ophthalmic solution 1 Drop  1 Drop Both Eyes BID PRN    arformoteroL (BROVANA) neb solution 15 mcg  15 mcg Nebulization BID RT    budesonide (PULMICORT) 500 mcg/2 ml nebulizer suspension  500 mcg Nebulization BID RT    montelukast (SINGULAIR) tablet 10 mg  10 mg Oral QHS    sotaloL (BETAPACE) tablet 80 mg  80 mg Oral Q12H    sodium chloride (NS) flush 5-40 mL  5-40 mL IntraVENous Q8H    sodium chloride (NS) flush 5-40 mL  5-40 mL IntraVENous PRN    acetaminophen (TYLENOL) tablet 650 mg  650 mg Oral Q6H PRN    polyethylene glycol (MIRALAX) packet 17 g  17 g Oral DAILY PRN    ondansetron (ZOFRAN) injection 4 mg  4 mg IntraVENous Q6H PRN    rivaroxaban (XARELTO) tablet 20 mg  20 mg Oral DAILY WITH DINNER      OBJECTIVE               Intake/Output Summary (Last 24 hours) at 9/14/2022 0833  Last data filed at 9/14/2022 0334  Gross per 24 hour   Intake 240 ml   Output 700 ml   Net -460 ml       Review of Systems - History obtained from the patient AS PER  HPI        PHYSICAL EXAM        Visit Vitals  /63 (BP 1 Location: Left leg, BP Patient Position: At rest)   Pulse 97   Temp 97.6 °F (36.4 °C)   Resp 17   Ht 5' 2\" (1.575 m)   Wt 57 kg (125 lb 10.6 oz)   SpO2 93%   BMI 22.98 kg/m²       Gen: Well-developed, well-nourished, in no acute distress  alert and oriented x 3  HEENT:  Pink conjunctivae, Hearing grossly normal.No scleral icterus or conjunctival, moist mucous membranes  Neck: Supple,No JVD, No Carotid Bruit, Thyroid- non tender No cervical lymphadenopathy  Resp: No accessory muscle use, diminished breath sounds, No rales or rhonchi  Card: Irregular Rate,Rythm,Normal S1, S2, No murmurs, rubs or gallop. No thrills.    MSK: No cyanosis or clubbing, good capillary refill  Skin: No rashes or ulcers, no bruising  Neuro:  Moving all four extremities, no focal deficit, follows commands appropriately  Psych:  Good insight, oriented to person, place and time, alert, Nml Affect  LE: Trace edema       DATA REVIEW      No specialty comments available. Cardiac monitor: A-fib rate 80-90's     ECHO: 09/04/22    ECHO ADULT COMPLETE 09/06/2022 9/6/2022    Interpretation Summary  Formatting of this result is different from the original.      Left Ventricle: Mildly reduced left ventricular systolic function with a visually estimated EF of 45 - 50%. Left ventricle size is normal. Normal wall thickness. Mild global hypokinesis present. Grade I diastolic dysfunction with normal LAP. Aortic Valve: Valve structure is normal. Mild sclerosis of the aortic valve cusp. Mitral Valve: Mild regurgitation. Tricuspid Valve: Mildly elevated RVSP. Mildly improved lv function compared to study 4/2022. Signed by: Sammy Narayan DO on 9/6/2022 11:24 AM      Laboratory and Imaging have been reviewed by me and are notable for  No results for input(s): CPK, CKMB, TROIQ in the last 72 hours. Recent Labs     09/14/22  0408 09/13/22  1944 09/13/22  0400 09/12/22  0918 09/12/22  0000   * 135* 128*   < > 128*   K 3.7 3.3* 4.2   < > 3.9   CO2 30 29 31   < > 32   BUN 14 13 21*   < > 18   CREA 0.37* 0.36* 0.26*   < > 0.56   GLU 92 127* 124*   < > 134*   MG  --   --   --   --  1.6   WBC  --   --  14.9*  --  14.1*   HGB  --   --  9.3*  --  9.8*   HCT  --   --  28.4*  --  30.0*   PLT  --   --  706*  --  720*    < > = values in this interval not displayed.

## 2022-09-14 NOTE — PROGRESS NOTES
09/14/22      Medicare pt has received, reviewed, and signed 2nd IM letter informing them of their right to appeal the discharge. Signed copied has been placed on pt bedside chart.

## 2022-09-14 NOTE — DISCHARGE INSTRUCTIONS
[facility]/ED Visit Follow-Up Instructions/Information    You may have an in [facility] follow [facility] visit [facility] or may wish to contact [facility] to set-up a visit:    What are we? [facility] is an in-home urgent care service staffed with emergency trained medical teams. We come to your home in a vehicle stocked with medical supplies and technology. An ER physician is always available if needed. When? As a part of your hospital follow-up, an appointment has been/ or can be set up for us to come see you. Usually, this will be 24-72 hours after you leave the hospital or as needed. [facility] is open 7am-9pm, 7 days a week, 365 days a year, including holidays. Why? We know that you cannot always get to your doctor after being in the hospital and that your doctor is not always available when you need them. Once your workup is complete, we'll call in your prescriptions, update your family doctor, and handle billing with your insurance so you can focus on feeling better, faster without leaving home. How much? We accept most major health insurance plans, including Medicaid, Medicare, and Medicare Advantage [address] [place] [address], [facility], and [employer]. We also accept: credit, debit, health savings account (HSA), health reimbursement account (HRA) and flexible spending account (FSA) payments. [facility]'s prices compare to conventional urgent care facilities, but we bring the care to you. How to reach us? Getting care is easy- use our mobile jose (UsingMiles), website ([email or web address]) or call us [phone]. Patient Discharge Instructions    Katlyn Jackson / [id number] : 1940    Admitted 2022 Discharged: 2022     Primary Diagnoses  @[email or web address]@    Take Home Medications     It is important that you take the medication exactly as they are prescribed.    Keep your medication in the bottles provided by the pharmacist and keep a list of the medication names, dosages, and times to be taken in your wallet. Do not take other medications without consulting your doctor. What to do at Home    Recommended diet: Regular Diet    Recommended activity: Activity as tolerated and PT/OT per Home Health    If you experience worse symptoms, please follow up with your PCP, pulmonologist, cardiologist, nephrologist.    Follow-up with your PCP in a few weeks    [unfilled]     Information obtained by :  I understand that if any problems occur once I am at home I am to contact my physician. I understand and acknowledge receipt of the instructions indicated above.                                                                                                                                            Physician's or R.N.'s Signature                                                                  Date/Time                                                                                                                                              Patient or Representative Signature                                                          Date/Time

## 2022-09-14 NOTE — PROGRESS NOTES
Problem: Self Care Deficits Care Plan (Adult)  Goal: *Acute Goals and Plan of Care (Insert Text)  Description: FUNCTIONAL STATUS PRIOR TO ADMISSION: Patient was independent and active without use of DME. She reports recent use of spouse's rollator to move things within home, and sit down as needed. HOME SUPPORT: The patient lived with spouse but did not require assist. Patient spouse reports. Occupational Therapy Goals  Initiated 9/5/2022 Reviewed at weekly re-assessment 9/12/2022  1. Patient will perform lower body dressing with modified independence within 7 day(s). 2.  Patient will perform upper body dressing with supervision/set-up within 7 day(s). 3.  Patient will perform toilet transfers with supervision/set-up within 7 day(s). 4.  Patient will perform all aspects of toileting with supervision/set-up within 7 day(s). 5.  Patient will participate in upper extremity therapeutic exercise/activities with supervision/set-up for 10 minutes within 7 day(s). 6.  Patient will utilize energy conservation techniques during functional activities with verbal cues within 7 day(s). Outcome: Progressing Towards Goal   OCCUPATIONAL THERAPY TREATMENT  Patient: Elois Gaucher (54 y.o. female)  Date: 9/14/2022  Diagnosis: Dyspnea [R06.00] Dyspnea      Precautions: Fall, Skin, Seizure  Chart, occupational therapy assessment, plan of care, and goals were reviewed. ASSESSMENT  Patient continues with skilled OT services and is progressing towards goals. Ms. Baldo Espinoza was received in the chair agreeable and motivated for activity. She was able to transfer into bathroom for ADL retraining. Patient performed grooming tasks standing at the sink, all aspects of toileting, and LB dressing with fair tolerance. Patient with good tolerance overall and supervision/setup needed for ADLs and ADL transfers. Patient required cues to implement energy conservation techniques, pacing, and pursed lip breathing techniques. Patient requesting to return to bed at end of tx; She had been up since early AM and agreeable to get OOB frequently as tolerated. Patient would benefit from continued skilled OT to progress towards goals and improve overall independence. Current Level of Function Impacting Discharge (ADLs): Patient required supervision/setup for ADLs and functional mobility. PLAN :  Patient continues to benefit from skilled intervention to address the above impairments. Continue treatment per established plan of care to address goals. Recommend with staff:   Recommend patient be OOB to chair as frequently as tolerated; Goal of 3x/day for all meals for 60 minutes at a time. For toileting needs, recommend transfers to/from bathroom. Encourage patient involvement in personal care as able. Encourage exercises frequently throughout the day. Recommend next OT session: Continue towards set OT goals.     Recommendation for discharge: (in order for the patient to meet his/her long term goals)  Occupational therapy at least 2 days/week in the home     This discharge recommendation:  Has been made in collaboration with the attending provider and/or case management    IF patient discharges home will need the following DME: none       SUBJECTIVE:   Patient agreeable to OT evaluation    OBJECTIVE DATA SUMMARY:   Cognitive/Behavioral Status:  Neurologic State: Alert  Orientation Level: Oriented X4  Cognition: Follows commands  Perception: Appears intact  Perseveration: No perseveration noted  Safety/Judgement: Awareness of environment    Functional Mobility and Transfers for ADLs:  Bed Mobility:  Rolling: Supervision  Supine to Sit: Supervision  Sit to Supine: Supervision  Scooting: Supervision    Transfers:  Sit to Stand: Supervision  Functional Transfers  Toilet Transfer : Supervision       Balance:  Sitting: Intact  Standing: Intact  Standing - Static: Fair  Standing - Dynamic : Fair    ADL Intervention:  Grooming  Grooming Assistance: Set-up; Supervision  Position Performed: Seated in chair;Standing  Washing Face: Set-up  Washing Hands: Modified independent  Brushing Teeth: Stand-by assistance  Brushing/Combing Hair: Stand-by assistance  Cues: Verbal cues provided    Lower Body Dressing Assistance  Dressing Assistance: Set-up  Socks: Set-up  Position Performed: Seated in chair  Cues: Verbal cues provided    Toileting  Toileting Assistance: Stand-by assistance  Bladder Hygiene: Stand-by assistance  Bowel Hygiene: Stand-by assistance  Clothing Management: Stand-by assistance  Cues: Verbal cues provided  Adaptive Equipment: Grab bars    Cognitive Retraining  Safety/Judgement: Awareness of environment    Activity Tolerance:   Good    After treatment patient left in no apparent distress:   Sitting in chair, Call bell within reach, and Bed / chair alarm activated    COMMUNICATION/COLLABORATION:   The patients plan of care was discussed with: Physical therapist, Registered nurse, and patient .      ESTHER Payne/L  Time Calculation: 24 mins

## 2022-09-14 NOTE — PROGRESS NOTES
Olivia Herrera 55  YOB: 1940          Assessment & Plan:     Hyponatremia due to SIADH  Resp failure  COPD  Bronchiectasis  N/V  A fib  Acute on chronic CHF with reduced EF    Rec:  NA 1132 s/p tolvaptan   Start sodium chloride 1 mg TID and lasix 20 mg daily with FR 1.2L  Stable to DC and F/U with us in the office           Subjective:   CC: follow up hyponatremia  HPI: Na 132 and c/o SOB   ROS: No sob/n/v  Current Facility-Administered Medications   Medication Dose Route Frequency    sodium chloride tablet 1 g  1 g Oral TID WITH MEALS    cefdinir (OMNICEF) capsule 300 mg  300 mg Oral Q12H    [START ON 9/15/2022] furosemide (LASIX) tablet 20 mg  20 mg Oral DAILY    ipratropium (ATROVENT) 0.02 % nebulizer solution 0.5 mg  0.5 mg Nebulization Q6HWA RT    levalbuterol (XOPENEX) nebulizer soln 1.25 mg/3 mL  1.25 mg Nebulization Q6HWA RT    guaiFENesin ER (MUCINEX) tablet 600 mg  600 mg Oral Q12H    levalbuterol (XOPENEX) nebulizer soln 1.25 mg/3 mL  1.25 mg Nebulization Q2H PRN    diclofenac (VOLTAREN) 1 % topical gel 2 g  2 g Topical QID    sodium chloride (NS) flush 5-40 mL  5-40 mL IntraVENous PRN    guaiFENesin (ROBITUSSIN) 100 mg/5 mL oral liquid 100 mg  100 mg Oral Q4H PRN    prochlorperazine (COMPAZINE) injection 5 mg  5 mg IntraVENous Q6H PRN    nystatin (MYCOSTATIN) 100,000 unit/mL oral suspension 500,000 Units  500,000 Units Oral QID    ketotifen (ZADITOR) 0.025 % (0.035 %) ophthalmic solution 1 Drop  1 Drop Both Eyes BID PRN    arformoteroL (BROVANA) neb solution 15 mcg  15 mcg Nebulization BID RT    budesonide (PULMICORT) 500 mcg/2 ml nebulizer suspension  500 mcg Nebulization BID RT    montelukast (SINGULAIR) tablet 10 mg  10 mg Oral QHS    sotaloL (BETAPACE) tablet 80 mg  80 mg Oral Q12H    sodium chloride (NS) flush 5-40 mL  5-40 mL IntraVENous Q8H    sodium chloride (NS) flush 5-40 mL  5-40 mL IntraVENous PRN acetaminophen (TYLENOL) tablet 650 mg  650 mg Oral Q6H PRN    polyethylene glycol (MIRALAX) packet 17 g  17 g Oral DAILY PRN    ondansetron (ZOFRAN) injection 4 mg  4 mg IntraVENous Q6H PRN    rivaroxaban (XARELTO) tablet 20 mg  20 mg Oral DAILY WITH DINNER          Objective:     Vitals:  Blood pressure (!) 133/92, pulse (!) 135, temperature 97.6 °F (36.4 °C), resp. rate 17, height 5' 2\" (1.575 m), weight 57 kg (125 lb 10.6 oz), SpO2 93 %. Temp (24hrs), Av.5 °F (36.4 °C), Min:97.2 °F (36.2 °C), Max:97.7 °F (36.5 °C)      Intake and Output:  No intake/output data recorded.  1901 -  0700  In: 480 [P.O.:480]  Out: 900 [Urine:900]    Physical Exam:               GENERAL ASSESSMENT: NAD  HEENT: Nontraumatic   CHEST: On oxygen, few rhonchi  HEART: S1S2  ABDOMEN: Soft,NT   +castle +urine  EXTREMITY: no EDEMA  NEURO: Grossly non focal          ECG/rhythm:    Data Review      No results for input(s): TNIPOC in the last 72 hours. No lab exists for component: ITNL   No results for input(s): CPK, CKMB, TROIQ in the last 72 hours. Recent Labs     22  0408 22  1944 22  0400 22  0918 22  0000   * 135* 128*   < > 128*   K 3.7 3.3* 4.2   < > 3.9   CL 96* 101 90*   < > 90*   CO2 30 29 31   < > 32   BUN 14 13 21*   < > 18   CREA 0.37* 0.36* 0.26*   < > 0.56   GLU 92 127* 124*   < > 134*   MG  --   --   --   --  1.6   CA 9.2 8.4* 8.7   < > 9.3   WBC  --   --  14.9*  --  14.1*   HGB  --   --  9.3*  --  9.8*   HCT  --   --  28.4*  --  30.0*   PLT  --   --  706*  --  720*    < > = values in this interval not displayed. No results for input(s): INR, PTP, APTT, INREXT, INREXT in the last 72 hours.   Needs: urine analysis, urine sodium, protein and creatinine  Lab Results   Component Value Date/Time    Sodium,urine random 8 2022 02:00 AM    Creatinine, urine 33.00 2022 10:14 AM           : Lashae Cantu MD  2022        [facility]:  [email or web address]  Carla Mendieta office:  [address] [place] [address], [address] [place] [address],[address]  [place], [address] [facility]  Phone: [phone]  Fax :     [phone]    [place] office:  [address] [facility], [address]  Phone - [phone]  Fax - [phone]

## 2022-09-14 NOTE — PROGRESS NOTES
4416 Accessed chart to assist Primary RN, called to bedside by PT. Pt HR in the 150s-160s. Notified Cardiology NP Cinthya Benz, orders received to give Sotalol now. No need to obtain EKG as Pt \"does this\". Will give Sotalol and continue to monitor, updated Primary RN Kayleen Feldman.

## 2022-09-16 ENCOUNTER — TELEPHONE (OUTPATIENT)
Dept: CASE MANAGEMENT | Age: 82
End: 2022-09-16

## 2022-09-19 ENCOUNTER — APPOINTMENT (OUTPATIENT)
Dept: GENERAL RADIOLOGY | Age: 82
DRG: 308 | End: 2022-09-19
Attending: STUDENT IN AN ORGANIZED HEALTH CARE EDUCATION/TRAINING PROGRAM
Payer: MEDICARE

## 2022-09-19 ENCOUNTER — HOSPITAL ENCOUNTER (INPATIENT)
Age: 82
LOS: 9 days | Discharge: NURSING, REHAB OR LONG-TERM CARE | DRG: 308 | End: 2022-09-28
Attending: STUDENT IN AN ORGANIZED HEALTH CARE EDUCATION/TRAINING PROGRAM | Admitting: INTERNAL MEDICINE
Payer: MEDICARE

## 2022-09-19 DIAGNOSIS — J18.9 PNEUMONIA DUE TO INFECTIOUS ORGANISM, UNSPECIFIED LATERALITY, UNSPECIFIED PART OF LUNG: Primary | ICD-10-CM

## 2022-09-19 DIAGNOSIS — I50.22 CHF (CONGESTIVE HEART FAILURE), NYHA CLASS I, CHRONIC, SYSTOLIC (HCC): ICD-10-CM

## 2022-09-19 DIAGNOSIS — R79.89 ELEVATED BRAIN NATRIURETIC PEPTIDE (BNP) LEVEL: ICD-10-CM

## 2022-09-19 LAB
ALBUMIN SERPL-MCNC: 2 G/DL (ref 3.5–5)
ALBUMIN/GLOB SERPL: 0.5 {RATIO} (ref 1.1–2.2)
ALP SERPL-CCNC: 102 U/L (ref 45–117)
ALT SERPL-CCNC: 17 U/L (ref 12–78)
ANION GAP SERPL CALC-SCNC: 6 MMOL/L (ref 5–15)
APPEARANCE UR: CLEAR
AST SERPL-CCNC: 18 U/L (ref 15–37)
B PERT DNA SPEC QL NAA+PROBE: NOT DETECTED
BACTERIA URNS QL MICRO: ABNORMAL /HPF
BASOPHILS # BLD: 0.1 K/UL (ref 0–0.1)
BASOPHILS NFR BLD: 1 % (ref 0–1)
BILIRUB SERPL-MCNC: 0.2 MG/DL (ref 0.2–1)
BILIRUB UR QL: NEGATIVE
BNP SERPL-MCNC: 5179 PG/ML
BORDETELLA PARAPERTUSSIS PCR, BORPAR: NOT DETECTED
BUN SERPL-MCNC: 15 MG/DL (ref 6–20)
BUN/CREAT SERPL: 43 (ref 12–20)
C PNEUM DNA SPEC QL NAA+PROBE: NOT DETECTED
CALCIUM SERPL-MCNC: 8.8 MG/DL (ref 8.5–10.1)
CAOX CRY URNS QL MICRO: ABNORMAL
CHLORIDE SERPL-SCNC: 101 MMOL/L (ref 97–108)
CO2 SERPL-SCNC: 28 MMOL/L (ref 21–32)
COLOR UR: ABNORMAL
COMMENT, HOLDF: NORMAL
COVID-19 RAPID TEST, COVR: NOT DETECTED
CREAT SERPL-MCNC: 0.35 MG/DL (ref 0.55–1.02)
DIFFERENTIAL METHOD BLD: ABNORMAL
EOSINOPHIL # BLD: 0.1 K/UL (ref 0–0.4)
EOSINOPHIL NFR BLD: 1 % (ref 0–7)
EPITH CASTS URNS QL MICRO: ABNORMAL /LPF
ERYTHROCYTE [DISTWIDTH] IN BLOOD BY AUTOMATED COUNT: 15.2 % (ref 11.5–14.5)
FLUAV AG NPH QL IA: NEGATIVE
FLUAV SUBTYP SPEC NAA+PROBE: NOT DETECTED
FLUBV AG NOSE QL IA: NEGATIVE
FLUBV RNA SPEC QL NAA+PROBE: NOT DETECTED
GLOBULIN SER CALC-MCNC: 3.8 G/DL (ref 2–4)
GLUCOSE BLD STRIP.AUTO-MCNC: 106 MG/DL (ref 65–117)
GLUCOSE SERPL-MCNC: 106 MG/DL (ref 65–100)
GLUCOSE UR STRIP.AUTO-MCNC: NEGATIVE MG/DL
HADV DNA SPEC QL NAA+PROBE: NOT DETECTED
HCOV 229E RNA SPEC QL NAA+PROBE: NOT DETECTED
HCOV HKU1 RNA SPEC QL NAA+PROBE: NOT DETECTED
HCOV NL63 RNA SPEC QL NAA+PROBE: NOT DETECTED
HCOV OC43 RNA SPEC QL NAA+PROBE: NOT DETECTED
HCT VFR BLD AUTO: 27.3 % (ref 35–47)
HGB BLD-MCNC: 8.7 G/DL (ref 11.5–16)
HGB UR QL STRIP: NEGATIVE
HMPV RNA SPEC QL NAA+PROBE: NOT DETECTED
HPIV1 RNA SPEC QL NAA+PROBE: NOT DETECTED
HPIV2 RNA SPEC QL NAA+PROBE: NOT DETECTED
HPIV3 RNA SPEC QL NAA+PROBE: NOT DETECTED
HPIV4 RNA SPEC QL NAA+PROBE: NOT DETECTED
IMM GRANULOCYTES # BLD AUTO: 0.2 K/UL (ref 0–0.04)
IMM GRANULOCYTES NFR BLD AUTO: 1 % (ref 0–0.5)
KETONES UR QL STRIP.AUTO: NEGATIVE MG/DL
LACTATE BLD-SCNC: 1.64 MMOL/L (ref 0.4–2)
LEUKOCYTE ESTERASE UR QL STRIP.AUTO: ABNORMAL
LYMPHOCYTES # BLD: 1.5 K/UL (ref 0.8–3.5)
LYMPHOCYTES NFR BLD: 12 % (ref 12–49)
M PNEUMO DNA SPEC QL NAA+PROBE: NOT DETECTED
MCH RBC QN AUTO: 28.6 PG (ref 26–34)
MCHC RBC AUTO-ENTMCNC: 31.9 G/DL (ref 30–36.5)
MCV RBC AUTO: 89.8 FL (ref 80–99)
MONOCYTES # BLD: 1 K/UL (ref 0–1)
MONOCYTES NFR BLD: 8 % (ref 5–13)
NEUTS SEG # BLD: 9.9 K/UL (ref 1.8–8)
NEUTS SEG NFR BLD: 77 % (ref 32–75)
NITRITE UR QL STRIP.AUTO: NEGATIVE
NRBC # BLD: 0 K/UL (ref 0–0.01)
NRBC BLD-RTO: 0 PER 100 WBC
PH UR STRIP: 6 [PH] (ref 5–8)
PLATELET # BLD AUTO: 597 K/UL (ref 150–400)
PMV BLD AUTO: 9 FL (ref 8.9–12.9)
POTASSIUM SERPL-SCNC: 3.2 MMOL/L (ref 3.5–5.1)
PROCALCITONIN SERPL-MCNC: 0.07 NG/ML
PROT SERPL-MCNC: 5.8 G/DL (ref 6.4–8.2)
PROT UR STRIP-MCNC: ABNORMAL MG/DL
RBC # BLD AUTO: 3.04 M/UL (ref 3.8–5.2)
RBC #/AREA URNS HPF: ABNORMAL /HPF (ref 0–5)
RSV RNA SPEC QL NAA+PROBE: NOT DETECTED
RV+EV RNA SPEC QL NAA+PROBE: NOT DETECTED
SAMPLES BEING HELD,HOLD: NORMAL
SARS-COV-2 PCR, COVPCR: NOT DETECTED
SERVICE CMNT-IMP: NORMAL
SODIUM SERPL-SCNC: 135 MMOL/L (ref 136–145)
SOURCE, COVRS: NORMAL
SP GR UR REFRACTOMETRY: 1.03 (ref 1–1.03)
TROPONIN-HIGH SENSITIVITY: 12 NG/L (ref 0–51)
UROBILINOGEN UR QL STRIP.AUTO: 0.2 EU/DL (ref 0.2–1)
WBC # BLD AUTO: 12.7 K/UL (ref 3.6–11)
WBC URNS QL MICRO: ABNORMAL /HPF (ref 0–4)
YEAST URNS QL MICRO: PRESENT

## 2022-09-19 PROCEDURE — G0378 HOSPITAL OBSERVATION PER HR: HCPCS

## 2022-09-19 PROCEDURE — 94640 AIRWAY INHALATION TREATMENT: CPT

## 2022-09-19 PROCEDURE — 83605 ASSAY OF LACTIC ACID: CPT

## 2022-09-19 PROCEDURE — 87804 INFLUENZA ASSAY W/OPTIC: CPT

## 2022-09-19 PROCEDURE — 99285 EMERGENCY DEPT VISIT HI MDM: CPT

## 2022-09-19 PROCEDURE — APPSS30 APP SPLIT SHARED TIME 16-30 MINUTES: Performed by: NURSE PRACTITIONER

## 2022-09-19 PROCEDURE — 65270000029 HC RM PRIVATE

## 2022-09-19 PROCEDURE — 83880 ASSAY OF NATRIURETIC PEPTIDE: CPT

## 2022-09-19 PROCEDURE — 96374 THER/PROPH/DIAG INJ IV PUSH: CPT

## 2022-09-19 PROCEDURE — 36415 COLL VENOUS BLD VENIPUNCTURE: CPT

## 2022-09-19 PROCEDURE — 74011000250 HC RX REV CODE- 250: Performed by: INTERNAL MEDICINE

## 2022-09-19 PROCEDURE — 0202U NFCT DS 22 TRGT SARS-COV-2: CPT

## 2022-09-19 PROCEDURE — 77010033678 HC OXYGEN DAILY

## 2022-09-19 PROCEDURE — 74011250637 HC RX REV CODE- 250/637: Performed by: INTERNAL MEDICINE

## 2022-09-19 PROCEDURE — 80053 COMPREHEN METABOLIC PANEL: CPT

## 2022-09-19 PROCEDURE — 84484 ASSAY OF TROPONIN QUANT: CPT

## 2022-09-19 PROCEDURE — 82962 GLUCOSE BLOOD TEST: CPT

## 2022-09-19 PROCEDURE — 94761 N-INVAS EAR/PLS OXIMETRY MLT: CPT

## 2022-09-19 PROCEDURE — 71045 X-RAY EXAM CHEST 1 VIEW: CPT

## 2022-09-19 PROCEDURE — 85025 COMPLETE CBC W/AUTO DIFF WBC: CPT

## 2022-09-19 PROCEDURE — 94664 DEMO&/EVAL PT USE INHALER: CPT

## 2022-09-19 PROCEDURE — 99223 1ST HOSP IP/OBS HIGH 75: CPT | Performed by: INTERNAL MEDICINE

## 2022-09-19 PROCEDURE — 87040 BLOOD CULTURE FOR BACTERIA: CPT

## 2022-09-19 PROCEDURE — 93005 ELECTROCARDIOGRAM TRACING: CPT

## 2022-09-19 PROCEDURE — 74011250636 HC RX REV CODE- 250/636: Performed by: INTERNAL MEDICINE

## 2022-09-19 PROCEDURE — 77030013140 HC MSK NEB VYRM -A

## 2022-09-19 PROCEDURE — 81001 URINALYSIS AUTO W/SCOPE: CPT

## 2022-09-19 PROCEDURE — 87635 SARS-COV-2 COVID-19 AMP PRB: CPT

## 2022-09-19 PROCEDURE — 84145 PROCALCITONIN (PCT): CPT

## 2022-09-19 PROCEDURE — 77030029684 HC NEB SM VOL KT MONA -A

## 2022-09-19 RX ORDER — SODIUM CHLORIDE 1 G/1
1 TABLET ORAL
Refills: 0 | Status: DISCONTINUED | OUTPATIENT
Start: 2022-09-19 | End: 2022-09-20

## 2022-09-19 RX ORDER — ACETAMINOPHEN 650 MG/1
650 SUPPOSITORY RECTAL
Status: DISCONTINUED | OUTPATIENT
Start: 2022-09-19 | End: 2022-09-28 | Stop reason: HOSPADM

## 2022-09-19 RX ORDER — BUMETANIDE 0.25 MG/ML
1 INJECTION INTRAMUSCULAR; INTRAVENOUS 2 TIMES DAILY
Status: DISCONTINUED | OUTPATIENT
Start: 2022-09-19 | End: 2022-09-21

## 2022-09-19 RX ORDER — MONTELUKAST SODIUM 10 MG/1
10 TABLET ORAL
Status: DISCONTINUED | OUTPATIENT
Start: 2022-09-19 | End: 2022-09-28 | Stop reason: HOSPADM

## 2022-09-19 RX ORDER — ONDANSETRON 4 MG/1
4 TABLET, ORALLY DISINTEGRATING ORAL
Status: DISCONTINUED | OUTPATIENT
Start: 2022-09-19 | End: 2022-09-28 | Stop reason: HOSPADM

## 2022-09-19 RX ORDER — KETOTIFEN FUMARATE 0.35 MG/ML
1 SOLUTION/ DROPS OPHTHALMIC 2 TIMES DAILY
Status: DISCONTINUED | OUTPATIENT
Start: 2022-09-19 | End: 2022-09-28 | Stop reason: HOSPADM

## 2022-09-19 RX ORDER — BUDESONIDE 0.5 MG/2ML
500 INHALANT ORAL
Status: DISCONTINUED | OUTPATIENT
Start: 2022-09-19 | End: 2022-09-28 | Stop reason: HOSPADM

## 2022-09-19 RX ORDER — TOBRAMYCIN 40 MG/ML
300 INJECTION INTRAMUSCULAR; INTRAVENOUS
Status: DISCONTINUED | OUTPATIENT
Start: 2022-09-19 | End: 2022-09-19

## 2022-09-19 RX ORDER — ARFORMOTEROL TARTRATE 15 UG/2ML
15 SOLUTION RESPIRATORY (INHALATION) 2 TIMES DAILY
Status: DISCONTINUED | OUTPATIENT
Start: 2022-09-19 | End: 2022-09-19

## 2022-09-19 RX ORDER — ACETAMINOPHEN 325 MG/1
650 TABLET ORAL
Status: DISCONTINUED | OUTPATIENT
Start: 2022-09-19 | End: 2022-09-28 | Stop reason: HOSPADM

## 2022-09-19 RX ORDER — BUDESONIDE 0.5 MG/2ML
500 INHALANT ORAL 2 TIMES DAILY
Status: DISCONTINUED | OUTPATIENT
Start: 2022-09-19 | End: 2022-09-19

## 2022-09-19 RX ORDER — POTASSIUM CHLORIDE 750 MG/1
20 TABLET, FILM COATED, EXTENDED RELEASE ORAL 3 TIMES DAILY
Status: DISCONTINUED | OUTPATIENT
Start: 2022-09-19 | End: 2022-09-22

## 2022-09-19 RX ORDER — SODIUM CHLORIDE 0.9 % (FLUSH) 0.9 %
5-40 SYRINGE (ML) INJECTION EVERY 8 HOURS
Status: DISCONTINUED | OUTPATIENT
Start: 2022-09-19 | End: 2022-09-28 | Stop reason: HOSPADM

## 2022-09-19 RX ORDER — ONDANSETRON 2 MG/ML
4 INJECTION INTRAMUSCULAR; INTRAVENOUS
Status: DISCONTINUED | OUTPATIENT
Start: 2022-09-19 | End: 2022-09-28 | Stop reason: HOSPADM

## 2022-09-19 RX ORDER — POTASSIUM CHLORIDE 750 MG/1
10 TABLET, FILM COATED, EXTENDED RELEASE ORAL 3 TIMES DAILY
Status: DISCONTINUED | OUTPATIENT
Start: 2022-09-19 | End: 2022-09-19

## 2022-09-19 RX ORDER — TOBRAMYCIN 40 MG/ML
300 INJECTION INTRAMUSCULAR; INTRAVENOUS
Status: COMPLETED | OUTPATIENT
Start: 2022-09-19 | End: 2022-09-23

## 2022-09-19 RX ORDER — GUAIFENESIN 600 MG/1
600 TABLET, EXTENDED RELEASE ORAL EVERY 12 HOURS
Status: DISCONTINUED | OUTPATIENT
Start: 2022-09-19 | End: 2022-09-28 | Stop reason: HOSPADM

## 2022-09-19 RX ORDER — DICLOFENAC SODIUM 10 MG/G
2 GEL TOPICAL
Status: DISCONTINUED | OUTPATIENT
Start: 2022-09-19 | End: 2022-09-28 | Stop reason: HOSPADM

## 2022-09-19 RX ORDER — POLYETHYLENE GLYCOL 3350 17 G/17G
17 POWDER, FOR SOLUTION ORAL DAILY PRN
Status: DISCONTINUED | OUTPATIENT
Start: 2022-09-19 | End: 2022-09-28 | Stop reason: HOSPADM

## 2022-09-19 RX ORDER — ARFORMOTEROL TARTRATE 15 UG/2ML
15 SOLUTION RESPIRATORY (INHALATION)
Status: DISCONTINUED | OUTPATIENT
Start: 2022-09-19 | End: 2022-09-28 | Stop reason: HOSPADM

## 2022-09-19 RX ORDER — SOTALOL HYDROCHLORIDE 80 MG/1
80 TABLET ORAL ONCE
Status: COMPLETED | OUTPATIENT
Start: 2022-09-19 | End: 2022-09-19

## 2022-09-19 RX ORDER — SODIUM CHLORIDE 0.9 % (FLUSH) 0.9 %
5-10 SYRINGE (ML) INJECTION AS NEEDED
Status: DISCONTINUED | OUTPATIENT
Start: 2022-09-19 | End: 2022-09-28 | Stop reason: HOSPADM

## 2022-09-19 RX ORDER — SODIUM CHLORIDE 0.9 % (FLUSH) 0.9 %
5-40 SYRINGE (ML) INJECTION AS NEEDED
Status: DISCONTINUED | OUTPATIENT
Start: 2022-09-19 | End: 2022-09-28 | Stop reason: HOSPADM

## 2022-09-19 RX ORDER — PANTOPRAZOLE SODIUM 40 MG/1
40 TABLET, DELAYED RELEASE ORAL DAILY
Status: DISCONTINUED | OUTPATIENT
Start: 2022-09-19 | End: 2022-09-28 | Stop reason: HOSPADM

## 2022-09-19 RX ORDER — SOTALOL HYDROCHLORIDE 80 MG/1
80 TABLET ORAL EVERY 12 HOURS
Status: DISCONTINUED | OUTPATIENT
Start: 2022-09-19 | End: 2022-09-28 | Stop reason: HOSPADM

## 2022-09-19 RX ORDER — NYSTATIN 100000 [USP'U]/ML
500000 SUSPENSION ORAL 4 TIMES DAILY
Status: DISCONTINUED | OUTPATIENT
Start: 2022-09-19 | End: 2022-09-28 | Stop reason: HOSPADM

## 2022-09-19 RX ADMIN — BUDESONIDE 500 MCG: 0.5 SUSPENSION RESPIRATORY (INHALATION) at 08:52

## 2022-09-19 RX ADMIN — ARFORMOTEROL TARTRATE 15 MCG: 15 SOLUTION RESPIRATORY (INHALATION) at 21:20

## 2022-09-19 RX ADMIN — SOTALOL HYDROCHLORIDE 80 MG: 80 TABLET ORAL at 18:23

## 2022-09-19 RX ADMIN — SOTALOL HYDROCHLORIDE 80 MG: 80 TABLET ORAL at 09:45

## 2022-09-19 RX ADMIN — NYSTATIN 500000 UNITS: 100000 SUSPENSION ORAL at 09:47

## 2022-09-19 RX ADMIN — KETOTIFEN FUMARATE 1 DROP: 0.35 SOLUTION/ DROPS OPHTHALMIC at 09:47

## 2022-09-19 RX ADMIN — GUAIFENESIN 600 MG: 600 TABLET ORAL at 09:46

## 2022-09-19 RX ADMIN — ACETAMINOPHEN 650 MG: 325 TABLET ORAL at 21:33

## 2022-09-19 RX ADMIN — MONTELUKAST 10 MG: 10 TABLET, FILM COATED ORAL at 21:33

## 2022-09-19 RX ADMIN — ARFORMOTEROL TARTRATE 15 MCG: 15 SOLUTION RESPIRATORY (INHALATION) at 08:52

## 2022-09-19 RX ADMIN — Medication 10 ML: at 09:09

## 2022-09-19 RX ADMIN — RIVAROXABAN 20 MG: 10 TABLET, FILM COATED ORAL at 16:22

## 2022-09-19 RX ADMIN — PANTOPRAZOLE SODIUM 40 MG: 40 TABLET, DELAYED RELEASE ORAL at 09:45

## 2022-09-19 RX ADMIN — POTASSIUM CHLORIDE 20 MEQ: 750 TABLET, FILM COATED, EXTENDED RELEASE ORAL at 16:23

## 2022-09-19 RX ADMIN — NYSTATIN 500000 UNITS: 100000 SUSPENSION ORAL at 14:16

## 2022-09-19 RX ADMIN — NYSTATIN 500000 UNITS: 100000 SUSPENSION ORAL at 21:33

## 2022-09-19 RX ADMIN — POTASSIUM CHLORIDE 20 MEQ: 750 TABLET, FILM COATED, EXTENDED RELEASE ORAL at 21:33

## 2022-09-19 RX ADMIN — NYSTATIN 500000 UNITS: 100000 SUSPENSION ORAL at 18:23

## 2022-09-19 RX ADMIN — BUDESONIDE 500 MCG: 0.5 SUSPENSION RESPIRATORY (INHALATION) at 21:20

## 2022-09-19 RX ADMIN — TOBRAMYCIN SULFATE 300 MG: 40 INJECTION, SOLUTION INTRAMUSCULAR; INTRAVENOUS at 10:43

## 2022-09-19 RX ADMIN — TOBRAMYCIN SULFATE 300 MG: 40 INJECTION, SOLUTION INTRAMUSCULAR; INTRAVENOUS at 21:42

## 2022-09-19 RX ADMIN — Medication 10 ML: at 14:17

## 2022-09-19 RX ADMIN — GUAIFENESIN 600 MG: 600 TABLET ORAL at 21:33

## 2022-09-19 RX ADMIN — POTASSIUM CHLORIDE 10 MEQ: 750 TABLET, FILM COATED, EXTENDED RELEASE ORAL at 09:45

## 2022-09-19 RX ADMIN — ALBUTEROL SULFATE: 2.5 SOLUTION RESPIRATORY (INHALATION) at 21:14

## 2022-09-19 RX ADMIN — BUMETANIDE 1 MG: 0.25 INJECTION, SOLUTION INTRAMUSCULAR; INTRAVENOUS at 16:22

## 2022-09-19 RX ADMIN — Medication 10 ML: at 21:34

## 2022-09-19 NOTE — ED NOTES
Bedside and Verbal shift change report given to [facility] (oncoming nurse) by Ruma Ma (offgoing nurse). Report included the following information SBAR, Kardex, ED Summary, Recent Results, Med Rec Status, and Cardiac Rhythm afib .

## 2022-09-19 NOTE — PROGRESS NOTES
Cardiology Initial Care Encounter    Patient: Virgil Singh MRN: [id number]     YOB: 1940  Age: 80 y.o. Sex: female      Admit Date: 9/19/2022       Assessment/Plan     1 A/C HF mr EF: hold nacl tabs. pBNP has risen from 783> > 5000 since d/c. Hold on diuresis for now   2 PAF: cont sotalol and xarelto. Last admission she developed RVR while off sotalol (held d/t med interactions),   3 PNA: per medicine team   4 dry mouth/esophageal candidiasis: add artiificial saliva.   5 Hx hyponatremia: cont fld restriction, check am labs. NP spent __13__ minutes reviewing chart, labs, diagnostics and coordination of care . NP spent _12___ minutes with pt/ and son in examination and care plan review. HPI     Virgil Singh is a 80 y.o. female   with a PMH of PAF on sotalol, HFmrEF< recent PNA, hyponatremia who was recently discharged from [facility] for PNA. She had hyponatremia and received tolvaptan last admission . Per chart review she started taking NaCl tablets and has gained a few pounds. ED finds no hypoxia, a fib CVR. The patient denies chest pain, dependent edema, diaphoresis, HERRERA, orthopnea, palpitations, PND, shortness of breath, claudication or syncope. No bleeding or ICD shock       The patient has been referred to cardiology for on going management of recurrent dyspnea. Review of Symptoms:  Constitutional: negative for fevers  ENT: negative   Respiratory: positive for cough, wheezing, or dyspnea on exertion  Gastrointestinal: negative for vomiting  Genitourinary: dysuria, hematuria, frequency   Musculoskeletal:negative for back pain  Neurological: negative for headaches  Other systems reviewed and negative except as above. 09/04/22    ECHO ADULT COMPLETE 09/06/2022 9/6/2022    Interpretation Summary  Formatting of this result is different from the original.      Left Ventricle: Mildly reduced left ventricular systolic function with a visually estimated EF of 45 - 50%. Left ventricle size is normal. Normal wall thickness. Mild global hypokinesis present. Grade I diastolic dysfunction with normal LAP. Aortic Valve: Valve structure is normal. Mild sclerosis of the aortic valve cusp. Mitral Valve: Mild regurgitation. Tricuspid Valve: Mildly elevated RVSP. Mildly improved lv function compared to study 2022. Signed by: Thong Davis DO on 2022 11:24 AM      Risk factors:     Social History     Tobacco Use    Smoking status: Former     Types: Cigarettes     Start date: 2000     Quit date: 2000     Years since quittin.8    Smokeless tobacco: Never   Substance Use Topics    Alcohol use: Not Currently     History reviewed. No pertinent family history.     Current Facility-Administered Medications   Medication Dose Route Frequency    sodium chloride (NS) flush 5-10 mL  5-10 mL IntraVENous PRN    potassium chloride SR (KLOR-CON 10) tablet 10 mEq  10 mEq Oral TID    albuterol 5mg / ipratropium 0.5mg neb solution  1 Dose Nebulization Q4H PRN    guaiFENesin ER (MUCINEX) tablet 600 mg  600 mg Oral Q12H    montelukast (SINGULAIR) tablet 10 mg  10 mg Oral QHS    nystatin (MYCOSTATIN) 100,000 unit/mL oral suspension 500,000 Units  500,000 Units Oral QID    ketotifen (ZADITOR) 0.025 % (0.035 %) ophthalmic solution 1 Drop  1 Drop Both Eyes BID    pantoprazole (PROTONIX) tablet 40 mg  40 mg Oral DAILY    sodium chloride soluble tablet 1,000 mg  1 g Oral TID WITH MEALS    sotaloL (BETAPACE) tablet 80 mg  80 mg Oral Q12H    rivaroxaban (XARELTO) tablet 20 mg  20 mg Oral DAILY WITH DINNER    sodium chloride (NS) flush 5-40 mL  5-40 mL IntraVENous Q8H    sodium chloride (NS) flush 5-40 mL  5-40 mL IntraVENous PRN    acetaminophen (TYLENOL) tablet 650 mg  650 mg Oral Q6H PRN    Or    acetaminophen (TYLENOL) suppository 650 mg  650 mg Rectal Q6H PRN    polyethylene glycol (MIRALAX) packet 17 g  17 g Oral DAILY PRN    ondansetron (ZOFRAN ODT) tablet 4 mg  4 mg Oral Q8H PRN Or    ondansetron (ZOFRAN) injection 4 mg  4 mg IntraVENous Q6H PRN    sotaloL (BETAPACE) tablet 80 mg  80 mg Oral ONCE    arformoteroL (BROVANA) neb solution 15 mcg  15 mcg Nebulization BID RT    budesonide (PULMICORT) 500 mcg/2 ml nebulizer suspension  500 mcg Nebulization BID RT     Current Outpatient Medications   Medication Sig    nystatin (MYCOSTATIN) 100,000 unit/mL suspension Take 5 mL by mouth four (4) times daily for 20 days. swish and spit    arformoteroL (BROVANA) 15 mcg/2 mL nebu neb solution 2 mL by Nebulization route two (2) times a day for 30 days. budesonide (PULMICORT) 0.5 mg/2 mL nbsp 2 mL by Nebulization route two (2) times a day for 30 days. guaiFENesin ER (MUCINEX) 600 mg ER tablet Take 1 Tablet by mouth every twelve (12) hours for 10 days. levalbuterol (XOPENEX) 1.25 mg/3 mL nebu 3 mL by Nebulization route every six (6) hours as needed for Wheezing or Shortness of Breath for up to 30 days. sodium chloride 1 gram tablet Take 1 Tablet by mouth three (3) times daily (with meals) for 30 days. sotaloL (BETAPACE) 80 mg tablet Take  by mouth two (2) times a day. albuterol 2.5 mg /3 mL (0.083 %) nebu 3 mL, albuterol-ipratropium 2.5 mg-0.5 mg/3 ml nebu 3 mL Take 1 Inhalation by inhalation every four (4) hours as needed. albuterol (PROVENTIL VENTOLIN) 2.5 mg /3 mL (0.083 %) nebu Take 3 mL by inhalation every four (4) hours as needed. pantoprazole (PROTONIX) 40 mg tablet Take 40 mg by mouth daily. Xarelto 20 mg tab tablet Take 20 mg by mouth daily (with dinner). metroNIDAZOLE (METROCREAM) 0.75 % topical cream metronidazole 0.75 % topical cream    montelukast (SINGULAIR) 10 mg tablet montelukast 10 mg tablet    albuterol (PROVENTIL HFA, VENTOLIN HFA, PROAIR HFA) 90 mcg/actuation inhaler Take  by inhalation. diclofenac (VOLTAREN) 1 % gel Apply  to affected area four (4) times daily. acetaminophen (TYLENOL EXTRA STRENGTH PO) Take  by mouth.     cholecalciferol (VITAMIN D3) 25 mcg (1,000 unit) cap Take  by mouth daily. (Patient not taking: Reported on 9/19/2022)    azelastine (ASTEPRO) 205.5 mcg (0.15 %) two (2) times a day. (Patient not taking: Reported on 9/19/2022)    olopatadine (PATADAY) 0.2 % drop ophthalmic solution Administer 1 Drop to both eyes daily. tiotropium bromide (SPIRIVA RESPIMAT) 2.5 mcg/actuation inhaler Spiriva Respimat 2.5 mcg/actuation solution for inhalation    multivitamin (ONE A DAY) tablet Take 1 Tab by mouth daily. (Patient not taking: Reported on 9/19/2022)    calcium carbonate/vitamin D3 (CALCIUM + D PO) Take  by mouth. (Patient not taking: Reported on 9/19/2022)       Objective:     Vitals:    09/19/22 0730 09/19/22 0756 09/19/22 0852 09/19/22 0915   BP: 138/83   130/78   Pulse: 91 88  91   Resp: 13 15  18   Temp:    98 °F (36.7 °C)   SpO2: 98% 99% 95% 94%   Weight:       Height:            Intake and Output:  Current Shift: No intake/output data recorded. Last three shifts: No intake/output data recorded. Gen: Well-developed, well-nourished, in no acute distress  Neck: Supple,No JVD, No Carotid Bruit,   Resp: No accessory muscle use,  Expiratory wheezing. Card: irregular Rhythm, Normal S1, S2, No murmurs, rubs or gallop.   Abd:  Soft, non-tender, non-distended,BS+,   MSK: No cyanosis  Skin: No rashes    Neuro: moving all four extremities , follows commands appropriately  Psych:  Fair insight, oriented to person, place , alert, Nml Affect  LE: No edema    EKG:   EKG Results       Procedure 720 Value Units Date/Time    EKG, 12 LEAD, INITIAL [[id number]] Collected: 09/19/22 0517    Order Status: Completed Updated: 09/19/22 0519     Ventricular Rate 81 BPM      QRS Duration 82 ms      Q-T Interval 366 ms      QTC Calculation (Bezet) 425 ms      Calculated R Axis -6 degrees      Calculated T Axis 41 degrees      Diagnosis --     Atrial fibrillation  Inferior infarct , age undetermined  Cannot rule out Anterior infarct (cited on or before 13-SEP-2022)  Abnormal ECG  When compared with ECG of 13-SEP-2022 09:04,  Nonspecific T wave abnormality no longer evident in Lateral leads                TELEMETRY: a fib     Lab/Data Review:  BMP:   Lab Results   Component Value Date/Time     (L) 09/19/2022 05:36 AM    K 3.2 (L) 09/19/2022 05:36 AM     09/19/2022 05:36 AM    CO2 28 09/19/2022 05:36 AM    AGAP 6 09/19/2022 05:36 AM     (H) 09/19/2022 05:36 AM    BUN 15 09/19/2022 05:36 AM    CREA 0.35 (L) 09/19/2022 05:36 AM    GFRAA >60 09/19/2022 05:36 AM    GFRNA >60 09/19/2022 05:36 AM     CBC:   Lab Results   Component Value Date/Time    WBC 12.7 (H) 09/19/2022 05:36 AM    HGB 8.7 (L) 09/19/2022 05:36 AM    HCT 27.3 (L) 09/19/2022 05:36 AM     (H) 09/19/2022 05:36 AM       Latest Reference Range & Units 9/19/22 05:36   Troponin-High Sensitivity 0 - 51 ng/L 12   NT pro-BNP <450 PG/ML 5,179 (H)   (H): Data is abnormally high  Signed By: Manasa Nichols NP     September 19, 2022

## 2022-09-19 NOTE — CONSULTS
Name: Pawan Thompson: [address]   : 1940 Admit Date: 2022   Phone: [phone]  Room: [place] [address]   PCP: Shasha Mills MD  MRN: [id number]   Date: 2022  Code: Full Code          Chart and notes reviewed. Data reviewed. I review the patient's current medications in the medical record at each encounter. I have evaluated and examined the patient. HPI:    9:07 AM       History was obtained from patient. I was asked by Bryanna Vega MD to see Clayton Camacho in consultation for a chief complaint of shortness of breath. History of Present Illness:  Ms. Nilson Rhodes is an  yo woman with a history of asthma/COPD, pseudomonas pneumonia/colonization, bronchiectasis, former tobacco use, afib, breast cancer s/p chemo/radiation, seronegative RA and GERD who presented with dyspnea. She was recently discharged on  (admitted -) when she was treated for acute respiratory failure, PNA and hyponatremia. Did not require O2 at discharge. She had been admitted from - with PNA. During her most recent hospitalization she was treated with bactrim for stenothrophomonas grown at her prior hospitalization, but unfortunately this had to be stopped due to significant hyponatremia. She was treated with cefepime and discharged home on  cefdinir as she could not take levaquin while on sotalol. Attempts were made to stop the sotalol, but had had afib with RVR and was ultimately placed back on this. At discharge her Trelegy was stopped due to side effects/nausea and she was discharged on brovana/pulmicort. She comes back in with shortness of breath with exertion for the past several days. She also describes trouble swallowing, eating solids. She feels like she needs some sort of rehab as she is too weak at home. She was reportedly in RVR in route, but has not been so here.      Labs:  WBC 12.7, cr 0.35, proBNP 5179    Images reviewed:  CXR 2022: patchy bibasilar infiltrates not significantly changed from prior       Past Medical History:   Diagnosis Date    Asthma     CHF (congestive heart failure), NYHA class I, chronic, systolic (HCC)     Chronic anticoagulation     GERD (gastroesophageal reflux disease)     Hiatal hernia     HX: breast cancer     Right     Hypothyroid     Paroxysmal A-fib (HCC)     Venous insufficiency     bilat LE    Weight loss        Past Surgical History:   Procedure Laterality Date    COLONOSCOPY N/A 2021    COLONOSCOPY AND EGD performed by Jayda Dumont MD at [address] BREAST LUMPECTOMY Right 2015    HX CATARACT REMOVAL  2013    Bilateral     HX CHOLECYSTECTOMY      HX DILATION AND CURETTAGE      x2    HX ORTHOPAEDIC  2017    RIGHT foot  hammertoe       No family history on file.     Social History     Tobacco Use    Smoking status: Former     Types: Cigarettes     Start date: 2000     Quit date: 2000     Years since quittin.8    Smokeless tobacco: Never   Substance Use Topics    Alcohol use: Not Currently       Allergies   Allergen Reactions    Ace Inhibitors Cough    Iodine Rash    Penicillins Rash       Current Facility-Administered Medications   Medication Dose Route Frequency    sodium chloride (NS) flush 5-10 mL  5-10 mL IntraVENous PRN    potassium chloride SR (KLOR-CON 10) tablet 10 mEq  10 mEq Oral TID    albuterol 5mg / ipratropium 0.5mg neb solution  1 Dose Nebulization Q4H PRN    arformoteroL (BROVANA) neb solution 15 mcg  15 mcg Nebulization BID    budesonide (PULMICORT) 500 mcg/2 ml nebulizer suspension  500 mcg Nebulization BID    guaiFENesin ER (MUCINEX) tablet 600 mg  600 mg Oral Q12H    montelukast (SINGULAIR) tablet 10 mg  10 mg Oral QHS    nystatin (MYCOSTATIN) 100,000 unit/mL oral suspension 500,000 Units  500,000 Units Oral QID    ketotifen (ZADITOR) 0.025 % (0.035 %) ophthalmic solution 1 Drop  1 Drop Both Eyes BID    pantoprazole (PROTONIX) tablet 40 mg  40 mg Oral DAILY    sodium chloride soluble tablet 1,000 mg  1 g Oral TID WITH MEALS    sotaloL (BETAPACE) tablet 80 mg  80 mg Oral Q12H    rivaroxaban (XARELTO) tablet 20 mg  20 mg Oral DAILY WITH DINNER    sodium chloride (NS) flush 5-40 mL  5-40 mL IntraVENous Q8H    sodium chloride (NS) flush 5-40 mL  5-40 mL IntraVENous PRN    acetaminophen (TYLENOL) tablet 650 mg  650 mg Oral Q6H PRN    Or    acetaminophen (TYLENOL) suppository 650 mg  650 mg Rectal Q6H PRN    polyethylene glycol (MIRALAX) packet 17 g  17 g Oral DAILY PRN    ondansetron (ZOFRAN ODT) tablet 4 mg  4 mg Oral Q8H PRN    Or    ondansetron (ZOFRAN) injection 4 mg  4 mg IntraVENous Q6H PRN    sotaloL (BETAPACE) tablet 80 mg  80 mg Oral ONCE     Current Outpatient Medications   Medication Sig    nystatin (MYCOSTATIN) 100,000 unit/mL suspension Take 5 mL by mouth four (4) times daily for 20 days. swish and spit    arformoteroL (BROVANA) 15 mcg/2 mL nebu neb solution 2 mL by Nebulization route two (2) times a day for 30 days. budesonide (PULMICORT) 0.5 mg/2 mL nbsp 2 mL by Nebulization route two (2) times a day for 30 days. guaiFENesin ER (MUCINEX) 600 mg ER tablet Take 1 Tablet by mouth every twelve (12) hours for 10 days. levalbuterol (XOPENEX) 1.25 mg/3 mL nebu 3 mL by Nebulization route every six (6) hours as needed for Wheezing or Shortness of Breath for up to 30 days. sodium chloride 1 gram tablet Take 1 Tablet by mouth three (3) times daily (with meals) for 30 days. sotaloL (BETAPACE) 80 mg tablet Take  by mouth two (2) times a day. cholecalciferol (VITAMIN D3) 25 mcg (1,000 unit) cap Take  by mouth daily. albuterol 2.5 mg /3 mL (0.083 %) nebu 3 mL, albuterol-ipratropium 2.5 mg-0.5 mg/3 ml nebu 3 mL Take 1 Inhalation by inhalation every four (4) hours as needed. albuterol (PROVENTIL VENTOLIN) 2.5 mg /3 mL (0.083 %) nebu Take 3 mL by inhalation every four (4) hours as needed. pantoprazole (PROTONIX) 40 mg tablet Take 40 mg by mouth daily.     azelastine (ASTEPRO) 205.5 mcg (0.15 %) two (2) times a day. olopatadine (PATADAY) 0.2 % drop ophthalmic solution Administer 1 Drop to both eyes daily. Xarelto 20 mg tab tablet Take 20 mg by mouth daily (with dinner). metroNIDAZOLE (METROCREAM) 0.75 % topical cream metronidazole 0.75 % topical cream    montelukast (SINGULAIR) 10 mg tablet montelukast 10 mg tablet    tiotropium bromide (SPIRIVA RESPIMAT) 2.5 mcg/actuation inhaler Spiriva Respimat 2.5 mcg/actuation solution for inhalation    multivitamin (ONE A DAY) tablet Take 1 Tab by mouth daily. calcium carbonate/vitamin D3 (CALCIUM + D PO) Take  by mouth. albuterol (PROVENTIL HFA, VENTOLIN HFA, PROAIR HFA) 90 mcg/actuation inhaler Take  by inhalation. diclofenac (VOLTAREN) 1 % gel Apply  to affected area four (4) times daily. acetaminophen (TYLENOL EXTRA STRENGTH PO) Take  by mouth. REVIEW OF SYSTEMS   12 point ROS negative except as stated in the HPI. Physical Exam:   Visit Vitals  /83   Pulse 88   Temp 97.6 °F (36.4 °C)   Resp 15   Ht 5' 4\" (1.626 m)   Wt 60.8 kg (134 lb)   SpO2 95%   BMI 23.00 kg/m²       General:  Alert, cooperative, no distress, appears stated age. Frail. Head:  Normocephalic, without obvious abnormality, atraumatic. Eyes:  Conjunctivae/corneas clear. Nose: Nares normal. Septum midline. Mucosa normal.    Throat: Lips, mucosa, and tongue normal.    Neck: Supple, symmetrical, trachea midline, no adenopathy. Lungs:   Clear to auscultation bilaterally. Chest wall:  No tenderness or deformity. Heart:  Regular rate and rhythm, S1, S2 normal, no murmur, click, rub or gallop. Abdomen:   Soft, non-tender. Bowel sounds normal.    Extremities: Extremities normal, atraumatic, no cyanosis or edema. Pulses: 2+ and symmetric all extremities.    Skin: Skin color, texture, turgor normal. No rashes or lesions       Neurologic: Grossly nonfocal       Lab Results   Component Value Date/Time    Sodium 135 (L) 09/19/2022 05:36 AM    Potassium 3.2 (L) 09/19/2022 05:36 AM    Chloride 101 09/19/2022 05:36 AM    CO2 28 09/19/2022 05:36 AM    BUN 15 09/19/2022 05:36 AM    Creatinine 0.35 (L) 09/19/2022 05:36 AM    Glucose 106 (H) 09/19/2022 05:36 AM    Calcium 8.8 09/19/2022 05:36 AM    Magnesium 1.6 09/12/2022 12:00 AM       Lab Results   Component Value Date/Time    WBC 12.7 (H) 09/19/2022 05:36 AM    HGB 8.7 (L) 09/19/2022 05:36 AM    PLATELET 272 (H) 39/75/8263 05:36 AM    MCV 89.8 09/19/2022 05:36 AM       Lab Results   Component Value Date/Time    Alk. phosphatase 102 09/19/2022 05:36 AM    Protein, total 5.8 (L) 09/19/2022 05:36 AM    Albumin 2.0 (L) 09/19/2022 05:36 AM    Globulin 3.8 09/19/2022 05:36 AM       Lab Results   Component Value Date/Time    Iron 35 09/04/2022 05:26 PM    TIBC 193 (L) 09/04/2022 05:26 PM    Iron % saturation 18 (L) 09/04/2022 05:26 PM    Ferritin 196 09/04/2022 05:26 PM       Lab Results   Component Value Date/Time    TSH 1.12 09/07/2022 04:57 AM        No results found for: PH, PHI, PCO2, PCO2I, PO2, PO2I, HCO3, HCO3I, FIO2, FIO2I    Lab Results   Component Value Date/Time    Troponin-I, Qt. <0.05 01/13/2021 03:17 AM        Lab Results   Component Value Date/Time    Culture result: (A) 09/11/2022 12:10 PM     LIGHT Stenotrophomonas (Xantho.) maltophilia CLSI GUIDELINES DO NOT RECOMMEND [address]. TRIMETHOPRIM/SULFAMETHOXAZOLE IS THE DRUG OF CHOICE.     Culture result: LIGHT NORMAL RESPIRATORY CANDELARIO 09/11/2022 12:10 PM    Culture result: No growth (<1,000 CFU/ML) 09/07/2022 10:14 AM       No results found for: TOXA1, RPR, HBCM, HBSAG, HAAB, HCAB1, HAAT, G6PD, CRYAC, HIVGT, HIVR, HIV1, HIV12, HIVPC, HIVRPI    No results found for: VANCT, CPK    Lab Results   Component Value Date/Time    Color YELLOW/STRAW 09/07/2022 10:14 AM    Appearance CLEAR 09/07/2022 10:14 AM    pH (UA) 8.0 09/07/2022 10:14 AM    Protein Negative 09/07/2022 10:14 AM    Glucose Negative 09/07/2022 10:14 AM    Ketone TRACE (A) 09/07/2022 10:14 AM    Bilirubin Negative 09/07/2022 10:14 AM    Blood Negative 09/07/2022 10:14 AM    Urobilinogen 1.0 09/07/2022 10:14 AM    Nitrites Negative 09/07/2022 10:14 AM    Leukocyte Esterase Negative 09/07/2022 10:14 AM    WBC 0-4 09/07/2022 10:14 AM    RBC 0-5 09/07/2022 10:14 AM    Bacteria Negative 09/07/2022 10:14 AM       IMPRESSION  Acute respiratory failure with hypoxia  Asthma, not in exacerbation  Bronchiectasis  History of pseudomonas PNA, on jeff nebs at baseline  Afib    PLAN  Goal sats 88% or higher  Will need exercise oximetry closer to discharge  Trace/Pulmicort, PRN duonebs  Agree that she does not need additional antibiotics at this time; would not expect complete resolution of infiltrates on CXR in this interval and will need repeat imaging as an outpatient; low threshold to add abx if she decompensates  This is a week where she should be taking jeff nebs and will order  Cardiology consulted  PT/OT consulted      Thank you for allowing us to participate in the care of this patient. We will be happy to follow along in his/her progress with you.     Orlando Eckert MD

## 2022-09-19 NOTE — PROGRESS NOTES
1:53 PM  CM noted referral for SNF, spoke to patient and patients son to discuss. He will review with his family and provide three choices. Awaiting PT/OT evals. Patient has an insurance that requires pre-authorization and will need auth once a facility is located. Sol Wahl      9:50 AM  CM left a VM with patients son to discuss disposition. Sol Wahl    9:22 AM  CM provided a SNF list to the patient to review. Sol Wahl      9/19/22  8:47 AM    Care Management Initial Evaluation:    Reason for Readmission:     1. Pneumonia due to infectious organism, unspecified laterality, unspecified part of lung    2. Elevated brain natriuretic peptide (BNP) level                RUR Score/Risk Level:     N/A- Observation  Medicare Outpatient Observation Notice (MOON)/ [place] Outpatient Observation Notice (Ela Mantilla) provided to patient/representative with verbal explanation of the notice. Time allotted for questions regarding the notice. Patient /representative provided a completed copy of the MOON/EL notice. Copy placed on bedside chart. PCP: First and Last name:  Michele Ariza   Name of Practice:    Are you a current patient: Yes/No: Yes   Approximate date of last visit: August, 2022   Can you participate in a virtual visit with your PCP:     Is a Care Conference indicated:   Not at this time    Did you attend your follow up appointment (s): If not, why not: Follow up PCP appointment is scheduled for 9/20/22       Resources/supports as identified by patient/family:   Son and DIL       Top Challenges facing patient (as identified by patient/family and CM): Finances/Medication cost?   No challenges- has coverage- Uses [facility] [address]    Transportation     Patient normally drives or family   Support system or lack thereof? Son and DIL  Living arrangements? One story home with 4 entry steps      Self-care/ADLs/Cognition?      Had been able to complete all ADL's prior to hospital stays- her son and ANGELA have been helping her recently  DME:  Rollator, RW, BSC, Shower Chair, Nebulizer       Current Advanced Directive/Advance Care Plan:    Full code- has ACP documents       Plan for utilizing home health:   Patient is open with [facility] for PT and OT             Transition of Care Plan:    Based on readmission, the patient's previous Plan of Care   has been evaluated and/or modified. The current Transition of Care Plan is:      Patient lives with her spouse and prior to hospital stays, she had been the main caregiver for him as he has dementia. Her son and ANGELA have been assisting since discharge from the hospital and helping to care for both her and her spouse. She is feeling like she needs rehab before going home as she has been weak and not feeling well. At the last admission a list of SNF's was provided but patient ultimately went home with home health. Patient asked CM to discuss with patients son, Alva Banks. CM will call her son to ask which facilities they prefer. 1). Patient admitted for emergent care  2). PT/OT evals pending- follow recommendations  3). Outpatient follow up  4). CM following for dc needs    [facility] Management Interventions  PCP Verified by CM: Yes  Mode of Transport at Discharge:  Other (see comment) (Family will transport at [employer])  Transition of Care Consult (CM Consult): Discharge Planning  Discharge Durable Medical Equipment: No  Physical Therapy Consult: Yes  Occupational Therapy Consult: Yes  Speech Therapy Consult: No  Support Systems: Spouse/Significant Other, Child(corn[place])  Confirm Follow Up Transport: Family  Discharge Location  Patient Expects to be Discharged to[de-identified] Unable to determine at this time         Readmission Assessment  Number of days since last admission?: 1-7 days  Previous disposition: Home with Home Health  Who is being interviewed?: Patient  What was the patient's/caregiver's perception as to why they think they needed to return back to the hospital?: Did not realize care needs would be so extensive (Also patient continues to not feel well)  Did you visit your Primary Care Physician after you left the hospital, before you returned this time?: No  Why weren't you able to visit your PCP?: Other (Comment) (Patients appointment is for 9/20/22)  Did you see a specialist, such as Cardiac, Pulmonary, Orthopedic Physician, etc. after you left the hospital?: No  Who advised the patient to return to the hospital?: Self-referral  Does the patient report anything that got in the way of taking their medications?: No  In our efforts to provide the best possible care to you and others like you, can you think of anything that we could have done to help you after you left the hospital the first time, so that you might not have needed to return so soon?: Other (Comment)

## 2022-09-19 NOTE — ED NOTES
TRANSFER - OUT REPORT:    Verbal report given to Giancarlo Haskins RN (name) on Kevin Darden  being transferred to 5th floor (unit) for routine progression of care       Report consisted of patients Situation, Background, Assessment and   Recommendations(SBAR). Information from the following report(s) SBAR, ED Summary, MAR, Recent Results and Cardiac Rhythm Afib was reviewed with the receiving nurse. Lines:   Peripheral IV 09/19/22 Left Antecubital (Active)        Opportunity for questions and clarification was provided.       Patient transported with:   [employer]

## 2022-09-19 NOTE — ED PROVIDER NOTES
This is a 80-year-old female with a history of recent hospitalization for pneumonia and hyponatremia reports ED for evaluation of worsening dyspnea. Patient additionally has a past medical history of hyponatremia secondary to SIADH, COPD, A. fib on Xarelto, acute on chronic congestive heart failure. Patient is accompanied by her son who reports that she was recently discharged and had finished a course of cefdinir a day or so ago. She reports having some worsening shortness of breath and crackles and her breathing. They called EMS earlier this evening who came to evaluate her and found her to be in A. fib RVR according to the patient's son. Patient did not go to the hospital that time. And then this morning became acutely more short of breath. She denies any chest pain, abdominal pain, nausea vomiting or diarrhea. Patient reports that she feels very dry and has had some difficulty with eating because of her mucous membranes are dry. Shortness of Breath  Associated symptoms include a fever, cough and chest pain. Pertinent negatives include no abdominal pain. Cough  Associated symptoms include chest pain and shortness of breath. Past Medical History:   Diagnosis Date    Asthma     CHF (congestive heart failure), NYHA class I, chronic, systolic (HCC)     Chronic anticoagulation     GERD (gastroesophageal reflux disease)     Hiatal hernia     HX: breast cancer 2015    Right     Hypothyroid     Paroxysmal A-fib (HCC)     Venous insufficiency     bilat LE    Weight loss        Past Surgical History:   Procedure Laterality Date    COLONOSCOPY N/A 6/14/2021    COLONOSCOPY AND EGD performed by Jayda Dumont MD at [address] [place] [address],[address] BREAST LUMPECTOMY Right 08/24/2015    HX CATARACT REMOVAL  2013    Bilateral     HX CHOLECYSTECTOMY      HX DILATION AND CURETTAGE      x2    HX ORTHOPAEDIC  2017    RIGHT foot  hammertoe         No family history on file.     Social History     Socioeconomic History    Marital status:      Spouse name: Not on file    Number of children: Not on file    Years of education: Not on file    Highest education level: Not on file   Occupational History    Not on file   Tobacco Use    Smoking status: Former     Types: Cigarettes     Start date: 2000     Quit date: 2000     Years since quittin.8    Smokeless tobacco: Never   Vaping Use    Vaping Use: Never used   Substance and Sexual Activity    Alcohol use: Not Currently    Drug use: Never    Sexual activity: Not on file   Other Topics Concern    Not on file   Social History Narrative    Not on file     Social Determinants of Health     Financial Resource Strain: Not on file   Food Insecurity: Not on file   Transportation Needs: Not on file   Physical Activity: Not on file   Stress: Not on file   Social Connections: Not on file   Intimate Partner Violence: Not on file   Housing Stability: Not on file         ALLERGIES: Ace inhibitors, Iodine, and Penicillins    Review of Systems   Constitutional:  Positive for fever. Respiratory:  Positive for cough and shortness of breath. Cardiovascular:  Positive for chest pain. Gastrointestinal:  Negative for abdominal pain. Genitourinary:  Negative for dysuria. All other systems reviewed and are negative. Vitals:    22 0502 22 0512   BP: 129/74    Pulse: 91 89   Resp: 27    Temp: 97.6 °F (36.4 °C)    SpO2: 92%    Weight: 60.8 kg (134 lb)    Height: 5' 4\" (1.626 m)             Physical Exam  Vitals and nursing note reviewed. Constitutional:       General: She is not in acute distress. Appearance: She is normal weight. She is not toxic-appearing. HENT:      Head: Normocephalic and atraumatic. Right Ear: External ear normal.      Left Ear: External ear normal.      Nose: Nose normal.      Mouth/Throat:      Mouth: Mucous membranes are moist.   Eyes:      Conjunctiva/sclera: Conjunctivae normal.   Cardiovascular:      Rate and Rhythm: Normal rate.  Rhythm irregular. Pulses: Normal pulses. Heart sounds: Normal heart sounds. Pulmonary:      Effort: Pulmonary effort is normal. No respiratory distress. Breath sounds: Rales present. Abdominal:      General: Abdomen is flat. Bowel sounds are normal.   Musculoskeletal:         General: Normal range of motion. Cervical back: Normal range of motion and neck supple. Skin:     General: Skin is warm. Capillary Refill: Capillary refill takes less than 2 seconds. Neurological:      General: No focal deficit present. Mental Status: She is alert. Psychiatric:         Mood and Affect: Mood normal.        MDM  Number of Diagnoses or Management Options  Elevated brain natriuretic peptide (BNP) level  Pneumonia due to infectious organism, unspecified laterality, unspecified part of lung  Diagnosis management comments: Differential diagnosis includes but not limited to exacerbation of heart failure, failure of outpatient treatment of pneumonia. Laboratory work-up includes CBC, CMP, troponin, ECG, chest x-ray, urinalysis. Patient x-ray shows signs of pneumonia. I have started patient on empiric antibiotics with cefepime and vancomycin. Patient is currently on cefdinir which she is tolerating well. Patient was given vancomycin at last hospital visit. Patient allergies rash. Patient will be signed out to Dr. Zoya Johnson at shift change. Procedures      Perfect Serve Consult for Admission  7:40 AM    ED Room Number: MN77/98  Patient Name and age: Rosa Palacios 80 y.o.  female  Working Diagnosis:   1. Pneumonia due to infectious organism, unspecified laterality, unspecified part of lung    2.  Elevated brain natriuretic peptide (BNP) level        COVID-19 Suspicion:  no  Sepsis present:  no  Reassessment needed: no  Code Status:  Full Code  Readmission: no  Isolation Requirements:  no  Recommended Level of Care:  med/surg  Department:[facility] ED - [phone]  Other:  79 yo with pna, failure outpatient ABX, elevated BNP

## 2022-09-19 NOTE — H&P
[address]  [phone]    Hospitalist Admission Note      NAME:  Kevin Darden   :   3/0/2085   MRN:  [id number]     PCP:  Lance Hagan MD     Date/Time of service:  2022 7:51 AM          Subjective:     CHIEF COMPLAINT: dyspnea     HISTORY OF PRESENT ILLNESS:     Ms. Davie Howard is a 80 y.o.  female who presented to the Emergency Department complaining of dyspnea. Recurrent for weeks and months. Several recent admissions without clear findings. Currently in out ER she is not hypoxic, Afib is controlled and she is not septic. She had started taking NaCl tablets and has gained a few pounds. We will admit her for observation. Past Medical History:   Diagnosis Date    Asthma     CHF (congestive heart failure), NYHA class I, chronic, systolic (HCC)     Chronic anticoagulation     GERD (gastroesophageal reflux disease)     Hiatal hernia     HX: breast cancer     Right     Hypothyroid     Paroxysmal A-fib (HCC)     Venous insufficiency     bilat LE    Weight loss         Past Surgical History:   Procedure Laterality Date    COLONOSCOPY N/A 2021    COLONOSCOPY AND EGD performed by Avila Mak MD at [facility]    HX BREAST LUMPECTOMY Right 2015    HX CATARACT REMOVAL  2013    Bilateral     HX CHOLECYSTECTOMY      HX DILATION AND CURETTAGE      x2    HX ORTHOPAEDIC  2017    RIGHT foot  hammertoe       Social History     Tobacco Use    Smoking status: Former     Types: Cigarettes     Start date: 2000     Quit date: 2000     Years since quittin.8    Smokeless tobacco: Never   Substance Use Topics    Alcohol use: Not Currently        No family history on file.    Family hx cannot be fully assessed, since the patient cannot provide information    Allergies   Allergen Reactions    Ace Inhibitors Cough    Iodine Rash    Penicillins Rash        Prior to Admission medications    Medication Sig Start Date End Date Taking? Authorizing Provider   nystatin (MYCOSTATIN) 100,000 unit/mL suspension Take 5 mL by mouth four (4) times daily for 20 days. swish and spit 9/14/22 10/4/22  Teresa Pinedo MD   arformoteroL (BROVANA) 15 mcg/2 mL nebu neb solution 2 mL by Nebulization route two (2) times a day for 30 days. 9/14/22 10/14/22  Teresa Pinedo MD   budesonide (PULMICORT) 0.5 mg/2 mL nbsp 2 mL by Nebulization route two (2) times a day for 30 days. 9/14/22 10/14/22  Teresa Pinedo MD   cefdinir (OMNICEF) 300 mg capsule Take 1 Capsule by mouth every twelve (12) hours for 4 days. 9/14/22 9/18/22  Teresa Pinedo MD   guaiFENesin ER (MUCINEX) 600 mg ER tablet Take 1 Tablet by mouth every twelve (12) hours for 10 days. 9/14/22 9/24/22  Teresa Pinedo MD   levalbuterol (XOPENEX) 1.25 mg/3 mL nebu 3 mL by Nebulization route every six (6) hours as needed for Wheezing or Shortness of Breath for up to 30 days. 9/14/22 10/14/22  Teresa Pinedo MD   sodium chloride 1 gram tablet Take 1 Tablet by mouth three (3) times daily (with meals) for 30 days. 9/14/22 10/14/22  Teresa Pinedo MD   sotaloL (BETAPACE) 80 mg tablet Take  by mouth two (2) times a day. Provider, Historical   cholecalciferol (VITAMIN D3) 25 mcg (1,000 unit) cap Take  by mouth daily. Provider, Historical   albuterol 2.5 mg /3 mL (0.083 %) nebu 3 mL, albuterol-ipratropium 2.5 mg-0.5 mg/3 ml nebu 3 mL Take 1 Inhalation by inhalation every four (4) hours as needed. 10/1/21   Audi, MD Abby   albuterol (PROVENTIL VENTOLIN) 2.5 mg /3 mL (0.083 %) nebu Take 3 mL by inhalation every four (4) hours as needed. 3/29/22   Other, MD Abby   pantoprazole (PROTONIX) 40 mg tablet Take 40 mg by mouth daily. Provider, Historical   azelastine (ASTEPRO) 205.5 mcg (0.15 %) two (2) times a day. Provider, Historical   olopatadine (PATADAY) 0.2 % drop ophthalmic solution Administer 1 Drop to both eyes daily.     Provider, Historical   Xarelto 20 mg tab tablet Take 20 mg by mouth daily (with dinner). 7/31/20   Provider, Historical   metroNIDAZOLE (METROCREAM) 0.75 % topical cream metronidazole 0.75 % topical cream    Provider, Historical   montelukast (SINGULAIR) 10 mg tablet montelukast 10 mg tablet    Provider, Historical   tiotropium bromide (SPIRIVA RESPIMAT) 2.5 mcg/actuation inhaler Spiriva Respimat 2.5 mcg/actuation solution for inhalation    Provider, Historical   multivitamin (ONE A DAY) tablet Take 1 Tab by mouth daily. Provider, Historical   calcium carbonate/vitamin D3 (CALCIUM + D PO) Take  by mouth. Provider, Historical   albuterol (PROVENTIL HFA, VENTOLIN HFA, PROAIR HFA) 90 mcg/actuation inhaler Take  by inhalation. Provider, Historical   diclofenac (VOLTAREN) 1 % gel Apply  to affected area four (4) times daily. Provider, Historical   acetaminophen (TYLENOL EXTRA STRENGTH PO) Take  by mouth.     Provider, Historical       Review of Systems:  (bold if positive, if negative)    Gen:  fatigue  Eyes:  ENT:  CVS:  Pulm:  Cough, dyspneaGI:  :  MS:  Skin:  Psych:  Endo:  Hem:  Renal:  Neuro:        Objective:      VITALS:    Vital signs reviewed; most recent are:    Visit Vitals  /83   Pulse 91   Temp 97.6 °F (36.4 °C)   Resp 13   Ht 5' 4\" (1.626 m)   Wt 60.8 kg (134 lb)   SpO2 98%   BMI 23.00 kg/m²     SpO2 Readings from Last 6 Encounters:   09/19/22 98%   09/14/22 92%   08/23/22 95%   08/10/22 94%   05/01/22 96%   06/14/21 99%    O2 Flow Rate (L/min): 2 l/min   No intake or output data in the 24 hours ending 09/19/22 7651     Exam:     Physical Exam:    Gen:  Thin, frail, in no acute distress  HEENT:  Pink conjunctivae, PERRL, hearing intact to voice, moist mucous membranes  Neck:  Supple, without masses, thyroid non-tender  Resp:  No accessory muscle use, coarse breath sounds  Card:  No murmurs, normal S1, S2 without thrills, bruits or peripheral edema  Abd:  Soft, non-tender, non-distended, normoactive bowel sounds are present, no mass  Lymph:  No cervical or inguinal adenopathy  Musc:  No cyanosis or clubbing  Skin:  No rashes or ulcers, skin turgor is good  Neuro:  Cranial nerves are grossly intact, no focal motor weakness, follows commands   Psych: Moderate insight, oriented to person, place and time, alert     Labs:    Recent Labs     09/19/22 0536   WBC 12.7*   HGB 8.7*   HCT 27.3*   *     Recent Labs     09/19/22 0536   *   K 3.2*      CO2 28   *   BUN 15   CREA 0.35*   CA 8.8   ALB 2.0*   TBILI 0.2   ALT 17     Lab Results   Component Value Date/Time    Glucose (POC) 106 09/19/2022 05:49 AM    Glucose (POC) 152 (H) 09/11/2022 03:02 PM     No results for input(s): PH, PCO2, PO2, HCO3, FIO2 in the last 72 hours. No results for input(s): INR, INREXT in the last 72 hours. All Micro Results       Procedure Component Value Units Date/Time    CULTURE, URINE [[id number]]     Order Status: Sent Specimen: Urine from Clean catch     RESPIRATORY VIRUS PANEL W/COVID-19, PCR [[id number]]     Order Status: Sent Specimen: NASOPHARYNGEAL SWAB     COVID-19 RAPID TEST [[id number]] Collected: 09/19/22 0604    Order Status: Completed Specimen: Nasopharyngeal Updated: 09/19/22 0631     Specimen source Nasopharyngeal        COVID-19 rapid test Not detected        Comment: Rapid Abbott ID Now       Rapid NAAT:  The specimen is NEGATIVE for SARS-CoV-2, the novel coronavirus associated with COVID-19. Negative results should be treated as presumptive and, if inconsistent with clinical signs and symptoms or necessary for patient management, should be tested with an alternative molecular assay. Negative results do not preclude SARS-CoV-2 infection and should not be used as the sole basis for patient management decisions. This test has been authorized by the FDA under an Emergency Use Authorization (EUA) for use by authorized laboratories.    Fact sheet for Healthcare Providers: ConventionUpdate.co.nz  Fact sheet for Patients: ConventionUpdate.co.nz       Methodology: Isothermal Nucleic Acid Amplification         CULTURE, BLOOD [[id number]] Collected: 09/19/22 0536    Order Status: Sent Specimen: Blood Updated: 09/19/22 0602    CULTURE, BLOOD [[id number]] Collected: 09/19/22 0536    Order Status: Sent Specimen: Blood Updated: 09/19/22 0601    COVID-19 WITH INFLUENZA A/B [[id number]]     Order Status: Canceled Specimen: Nasopharyngeal             I have reviewed previous records       Assessment and Plan:      Dyspnea / Abnormal xray - POA, recurrent and persistent. Unclear etiology. No sepsis criteria, unchanged xray and recent multiple rounds of Abx all argue against acute bacterial PNA. Normal rate argues against RVR. Recent stable ECHO argues against CHF. Consult pulmonary and cardiology. DDx includes Anxiety, vs GERD/aspiration, vs chronic bronchiectasis/PNA, vs less likely CHF, PE, asthma    Paroxysmal A-fib / Chronic anticoagulation - RVR suggested by EMT, but we did not see that in the ER. On last visit she did not tolerate attempts to stop sotalolol. Continue xarelto. Sotalol prevents the use of fluconazole to treat esophageal candidiasis, and prevents the use of levaquin. CHF (congestive heart failure), NYHA class I, chronic, systolic / Venous insufficiency - I note that she started taking NaCl tablets a week ago and has gained a few pounds, but xray is unchanged and she is not hypoxic, so unclear if any exacerbation, I do not think so. Stable recent ECHO. Consult cardiology. Continue BB      Asthma - For her home Trelegy substitute Brovana, Pulmicort and prn atrovent and xopenex. Pulmonary consulted. Added guaifenesin and singulair. Ld Banks Hyponatremia / Hypokalemia / Hypomagnesemia - POA, stable. On last visit Nephrology consulted. Improved with tolvaptan.   Likely she has underlying SIADH     Anemia - Stable since last discharge, and normal serologies. Weight loss / GERD (gastroesophageal reflux disease) / Hiatal hernia / Candidiasis - She has very concerning unintentional wt loss, anorexia, nausea that persists. She weighed 170lb last year. 128 lb last week. 134lb now. On last visit I consulted GI, who is following her outpatient. PPI. At home she takes bentyl and Zofran as needed. Emptying study 9/6 shows delayed esophageal emptying but not delayed gastric emptying. She is on PO nystatin. Hx breast cancer - Outpatient follow up. Has been on Boniva, increasing risk of PUD and esophagitis      Hypothyroid - TSH was normal a few months ago. I stopped the homeopathic dose of synthroid. Telemetry reviewed:   AFIB    Risk of deterioration: high      Total time spent with patient: 48 Minutes I personally reviewed chart, notes, data and current medications in the medical record. I have personally examined and treated the patient at bedside during this period. To assist coordination of care and communication with nursing and staff, this note may be preliminary early in the day, but finalized by end of the day.                  Care Plan discussed with: Patient, Family, Care Manager, Nursing Staff, Consultant/Specialist, and >50% of time spent in counseling and coordination of care    Discussed:  Care Plan and D/C Planning       ___________________________________________________    Attending Physician: Milan Merchant MD

## 2022-09-20 LAB
ALBUMIN SERPL-MCNC: 2 G/DL (ref 3.5–5)
ALBUMIN/GLOB SERPL: 0.5 {RATIO} (ref 1.1–2.2)
ALP SERPL-CCNC: 106 U/L (ref 45–117)
ALT SERPL-CCNC: 14 U/L (ref 12–78)
ANION GAP SERPL CALC-SCNC: 6 MMOL/L (ref 5–15)
AST SERPL-CCNC: 10 U/L (ref 15–37)
BILIRUB SERPL-MCNC: 0.2 MG/DL (ref 0.2–1)
BUN SERPL-MCNC: 13 MG/DL (ref 6–20)
BUN/CREAT SERPL: 35 (ref 12–20)
CALCIUM SERPL-MCNC: 8.4 MG/DL (ref 8.5–10.1)
CHLORIDE SERPL-SCNC: 98 MMOL/L (ref 97–108)
CO2 SERPL-SCNC: 32 MMOL/L (ref 21–32)
CREAT SERPL-MCNC: 0.37 MG/DL (ref 0.55–1.02)
ERYTHROCYTE [DISTWIDTH] IN BLOOD BY AUTOMATED COUNT: 15.1 % (ref 11.5–14.5)
GLOBULIN SER CALC-MCNC: 4 G/DL (ref 2–4)
GLUCOSE SERPL-MCNC: 100 MG/DL (ref 65–100)
HCT VFR BLD AUTO: 27.2 % (ref 35–47)
HGB BLD-MCNC: 8.8 G/DL (ref 11.5–16)
MAGNESIUM SERPL-MCNC: 1.2 MG/DL (ref 1.6–2.4)
MCH RBC QN AUTO: 28.6 PG (ref 26–34)
MCHC RBC AUTO-ENTMCNC: 32.4 G/DL (ref 30–36.5)
MCV RBC AUTO: 88.3 FL (ref 80–99)
NRBC # BLD: 0 K/UL (ref 0–0.01)
NRBC BLD-RTO: 0 PER 100 WBC
PLATELET # BLD AUTO: 575 K/UL (ref 150–400)
PMV BLD AUTO: 8.9 FL (ref 8.9–12.9)
POTASSIUM SERPL-SCNC: 3.8 MMOL/L (ref 3.5–5.1)
PROT SERPL-MCNC: 6 G/DL (ref 6.4–8.2)
RBC # BLD AUTO: 3.08 M/UL (ref 3.8–5.2)
SODIUM SERPL-SCNC: 136 MMOL/L (ref 136–145)
WBC # BLD AUTO: 10.4 K/UL (ref 3.6–11)

## 2022-09-20 PROCEDURE — 97116 GAIT TRAINING THERAPY: CPT

## 2022-09-20 PROCEDURE — 74011000250 HC RX REV CODE- 250: Performed by: INTERNAL MEDICINE

## 2022-09-20 PROCEDURE — 74011250636 HC RX REV CODE- 250/636: Performed by: INTERNAL MEDICINE

## 2022-09-20 PROCEDURE — 97535 SELF CARE MNGMENT TRAINING: CPT

## 2022-09-20 PROCEDURE — 85027 COMPLETE CBC AUTOMATED: CPT

## 2022-09-20 PROCEDURE — 96376 TX/PRO/DX INJ SAME DRUG ADON: CPT

## 2022-09-20 PROCEDURE — 97162 PT EVAL MOD COMPLEX 30 MIN: CPT

## 2022-09-20 PROCEDURE — 97530 THERAPEUTIC ACTIVITIES: CPT

## 2022-09-20 PROCEDURE — 94640 AIRWAY INHALATION TREATMENT: CPT

## 2022-09-20 PROCEDURE — 65270000029 HC RM PRIVATE

## 2022-09-20 PROCEDURE — G0378 HOSPITAL OBSERVATION PER HR: HCPCS

## 2022-09-20 PROCEDURE — 80053 COMPREHEN METABOLIC PANEL: CPT

## 2022-09-20 PROCEDURE — 92610 EVALUATE SWALLOWING FUNCTION: CPT

## 2022-09-20 PROCEDURE — APPSS30 APP SPLIT SHARED TIME 16-30 MINUTES: Performed by: NURSE PRACTITIONER

## 2022-09-20 PROCEDURE — 83735 ASSAY OF MAGNESIUM: CPT

## 2022-09-20 PROCEDURE — 74011250637 HC RX REV CODE- 250/637: Performed by: INTERNAL MEDICINE

## 2022-09-20 PROCEDURE — 96375 TX/PRO/DX INJ NEW DRUG ADDON: CPT

## 2022-09-20 PROCEDURE — 99233 SBSQ HOSP IP/OBS HIGH 50: CPT | Performed by: INTERNAL MEDICINE

## 2022-09-20 PROCEDURE — 97165 OT EVAL LOW COMPLEX 30 MIN: CPT

## 2022-09-20 PROCEDURE — 36415 COLL VENOUS BLD VENIPUNCTURE: CPT

## 2022-09-20 RX ORDER — MAGNESIUM SULFATE HEPTAHYDRATE 40 MG/ML
2 INJECTION, SOLUTION INTRAVENOUS ONCE
Status: COMPLETED | OUTPATIENT
Start: 2022-09-20 | End: 2022-09-20

## 2022-09-20 RX ADMIN — GUAIFENESIN 600 MG: 600 TABLET ORAL at 08:50

## 2022-09-20 RX ADMIN — ACETAMINOPHEN 650 MG: 325 TABLET ORAL at 22:35

## 2022-09-20 RX ADMIN — PANTOPRAZOLE SODIUM 40 MG: 40 TABLET, DELAYED RELEASE ORAL at 08:50

## 2022-09-20 RX ADMIN — Medication 10 ML: at 07:00

## 2022-09-20 RX ADMIN — KETOTIFEN FUMARATE 1 DROP: 0.35 SOLUTION/ DROPS OPHTHALMIC at 18:00

## 2022-09-20 RX ADMIN — BUDESONIDE 500 MCG: 0.5 SUSPENSION RESPIRATORY (INHALATION) at 07:57

## 2022-09-20 RX ADMIN — GUAIFENESIN 600 MG: 600 TABLET ORAL at 21:41

## 2022-09-20 RX ADMIN — TOBRAMYCIN SULFATE 300 MG: 40 INJECTION, SOLUTION INTRAMUSCULAR; INTRAVENOUS at 19:36

## 2022-09-20 RX ADMIN — BUMETANIDE 1 MG: 0.25 INJECTION, SOLUTION INTRAMUSCULAR; INTRAVENOUS at 08:51

## 2022-09-20 RX ADMIN — SOTALOL HYDROCHLORIDE 80 MG: 80 TABLET ORAL at 06:59

## 2022-09-20 RX ADMIN — MAGNESIUM SULFATE HEPTAHYDRATE 2 G: 40 INJECTION, SOLUTION INTRAVENOUS at 10:24

## 2022-09-20 RX ADMIN — POTASSIUM CHLORIDE 20 MEQ: 750 TABLET, FILM COATED, EXTENDED RELEASE ORAL at 08:50

## 2022-09-20 RX ADMIN — TOBRAMYCIN SULFATE 300 MG: 40 INJECTION, SOLUTION INTRAMUSCULAR; INTRAVENOUS at 08:37

## 2022-09-20 RX ADMIN — MONTELUKAST 10 MG: 10 TABLET, FILM COATED ORAL at 21:41

## 2022-09-20 RX ADMIN — BUMETANIDE 1 MG: 0.25 INJECTION, SOLUTION INTRAMUSCULAR; INTRAVENOUS at 17:09

## 2022-09-20 RX ADMIN — Medication 10 ML: at 08:51

## 2022-09-20 RX ADMIN — POTASSIUM CHLORIDE 20 MEQ: 750 TABLET, FILM COATED, EXTENDED RELEASE ORAL at 16:05

## 2022-09-20 RX ADMIN — RIVAROXABAN 20 MG: 10 TABLET, FILM COATED ORAL at 16:05

## 2022-09-20 RX ADMIN — NYSTATIN 500000 UNITS: 100000 SUSPENSION ORAL at 21:41

## 2022-09-20 RX ADMIN — KETOTIFEN FUMARATE 1 DROP: 0.35 SOLUTION/ DROPS OPHTHALMIC at 08:55

## 2022-09-20 RX ADMIN — BUDESONIDE 500 MCG: 0.5 SUSPENSION RESPIRATORY (INHALATION) at 19:30

## 2022-09-20 RX ADMIN — Medication 10 ML: at 21:41

## 2022-09-20 RX ADMIN — Medication 10 ML: at 14:00

## 2022-09-20 RX ADMIN — POTASSIUM CHLORIDE 20 MEQ: 750 TABLET, FILM COATED, EXTENDED RELEASE ORAL at 21:41

## 2022-09-20 RX ADMIN — ARFORMOTEROL TARTRATE 15 MCG: 15 SOLUTION RESPIRATORY (INHALATION) at 07:57

## 2022-09-20 RX ADMIN — SOTALOL HYDROCHLORIDE 80 MG: 80 TABLET ORAL at 17:08

## 2022-09-20 RX ADMIN — ACETAMINOPHEN 650 MG: 325 TABLET ORAL at 16:05

## 2022-09-20 RX ADMIN — ARFORMOTEROL TARTRATE 15 MCG: 15 SOLUTION RESPIRATORY (INHALATION) at 19:30

## 2022-09-20 NOTE — PROGRESS NOTES
[facility] [place]  [address] [facility], [place], [id number] [place] [address]  [phone]      Medical Progress Note      NAME: Darreld Gosselin   :  1940  MRM:  [id number]    Date of service: 2022  9:41 AM       Assessment and Plan:   Dyspnea / Abnormal xray -  recurrent and persistent. not hypoxic. Unclear etiology. No sepsis criteria, unchanged xray and recent multiple rounds of Abx all argue against acute bacterial PNA. Recent stable ECHO Evaluated by pulmonary and cardiology. Monitor without ABx. On IV diuretics       2. Paroxysmal A-fib / Chronic anticoagulation - Continue xarelto and Sotalol      3. CHF (congestive heart failure), NYHA class I, chronic, systolic / elevated proBNP/ Venous insufficiency - Stable recent ECHO. Evaluated by cardiology. Continue diuretic and BB      4. Asthma, stable - On Brovana, Pulmicort and prn atrovent and xopenex. Pulmonary following. 5.  Hyponatremia / Hypokalemia / Hypomagnesemia - POA, stable. 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 Monitor      6. Anemia - Stable since last discharge, and normal serologies. 7.  Weight loss / GERD (gastroesophageal reflux disease) / Hiatal hernia / Candidiasis - She has very concerning unintentional wt loss, anorexia, nausea that persists. She weighed 170lb last year. 128 lb last week. 134lb now. On last visit GI was consulted and recommended outpatient FU. Cont PPI. Emptying study  shows delayed esophageal emptying but not delayed gastric emptying. She is on PO nystatin. 8.  Hx breast cancer - Outpatient follow up. Has been on Boniva      9. Hypothyroid - TSH was normal a few months ago. Subjective:     Chief Complaint[de-identified] Patient was seen and examined as a follow up for dyspnea. Chart was reviewed. c/o dyspnea     ROS:  (bold if positive, if negative)    Tolerating PT  Tolerating Diet        Objective:     Last 24hrs VS reviewed since prior progress note.  Most recent are:    Visit Vitals  /62 (BP 1 Location: Left upper arm, BP Patient Position: At rest)   Pulse 95   Temp 98.2 °F (36.8 °C)   Resp 18   Ht 5' 4\" (1.626 m)   Wt 60.8 kg (134 lb)   SpO2 100%   BMI 23.00 kg/m²     SpO2 Readings from Last 6 Encounters:   09/20/22 100%   09/14/22 92%   08/23/22 95%   08/10/22 94%   05/01/22 96%   06/14/21 99%    O2 Flow Rate (L/min): 2 l/min   No intake or output data in the 24 hours ending 09/20/22 0941     Physical Exam:    Gen:  Well-developed, well-nourished, in no acute distress  HEENT:  Pink conjunctivae, PERRL, hearing intact to voice, moist mucous membranes  Neck:  Supple, without masses, thyroid non-tender  Resp:  No accessory muscle use, rales BL  Card:  No murmurs, normal S1, S2 without thrills, bruits or peripheral edema  Abd:  Soft, non-tender, non-distended, normoactive bowel sounds are present, no palpable organomegaly and no detectable hernias  Lymph:  No cervical or inguinal adenopathy  Musc:  No cyanosis or clubbing  Skin:  No rashes or ulcers, skin turgor is good  Neuro:  Cranial nerves are grossly intact, no focal motor weakness, follows commands appropriately  Psych:  Good insight, oriented to person, place and time, alert  __________________________________________________________________  Medications Reviewed: (see below)  Medications:     Current Facility-Administered Medications   Medication Dose Route Frequency    magnesium sulfate 2 g/50 ml IVPB (premix or compounded)  2 g IntraVENous ONCE    sodium chloride (NS) flush 5-10 mL  5-10 mL IntraVENous PRN    albuterol 5mg / ipratropium 0.5mg neb solution  1 Dose Nebulization Q4H PRN    guaiFENesin ER (MUCINEX) tablet 600 mg  600 mg Oral Q12H    montelukast (SINGULAIR) tablet 10 mg  10 mg Oral QHS    nystatin (MYCOSTATIN) 100,000 unit/mL oral suspension 500,000 Units  500,000 Units Oral QID    ketotifen (ZADITOR) 0.025 % (0.035 %) ophthalmic solution 1 Drop  1 Drop Both Eyes BID    pantoprazole (PROTONIX) tablet 40 mg  40 mg Oral DAILY    sotaloL (BETAPACE) tablet 80 mg  80 mg Oral Q12H    rivaroxaban (XARELTO) tablet 20 mg  20 mg Oral DAILY WITH DINNER    sodium chloride (NS) flush 5-40 mL  5-40 mL IntraVENous Q8H    sodium chloride (NS) flush 5-40 mL  5-40 mL IntraVENous PRN    acetaminophen (TYLENOL) tablet 650 mg  650 mg Oral Q6H PRN    Or    acetaminophen (TYLENOL) suppository 650 mg  650 mg Rectal Q6H PRN    polyethylene glycol (MIRALAX) packet 17 g  17 g Oral DAILY PRN    ondansetron (ZOFRAN ODT) tablet 4 mg  4 mg Oral Q8H PRN    Or    ondansetron (ZOFRAN) injection 4 mg  4 mg IntraVENous Q6H PRN    arformoteroL (BROVANA) neb solution 15 mcg  15 mcg Nebulization BID RT    budesonide (PULMICORT) 500 mcg/2 ml nebulizer suspension  500 mcg Nebulization BID RT    artificial saliva (MOUTH KOTE) 1 Spray  1 Spray Oral PRN    tobramycin (NEBCIN) injection 300 mg  300 mg Inhalation BID RT    bumetanide (BUMEX) injection 1 mg  1 mg IntraVENous BID    potassium chloride SR (KLOR-CON 10) tablet 20 mEq  20 mEq Oral TID    diclofenac (VOLTAREN) 1 % topical gel 2 g  2 g Topical BID PRN        Lab Data Reviewed: (see below)  Lab Review:     Recent Labs     09/20/22  0204 09/19/22  0536   WBC 10.4 12.7*   HGB 8.8* 8.7*   HCT 27.2* 27.3*   * 597*     Recent Labs     09/20/22  0204 09/19/22  0536    135*   K 3.8 3.2*   CL 98 101   CO2 32 28    106*   BUN 13 15   CREA 0.37* 0.35*   CA 8.4* 8.8   MG 1.2*  --    ALB 2.0* 2.0*   TBILI 0.2 0.2   ALT 14 17     Lab Results   Component Value Date/Time    Glucose (POC) 106 09/19/2022 05:49 AM    Glucose (POC) 152 (H) 09/11/2022 03:02 PM    Glucose (POC) 146 (H) 09/06/2022 01:21 PM     No results for input(s): PH, PCO2, PO2, HCO3, FIO2 in the last 72 hours. No results for input(s): INR, INREXT in the last 72 hours.   All Micro Results       Procedure Component Value Units Date/Time    CULTURE, BLOOD [[id number]] Collected: 09/19/22 0536    Order Status: Sent Specimen: Blood Updated: 09/20/22 0731    CULTURE, BLOOD [[id number]] Collected: 09/19/22 0536    Order Status: Sent Specimen: Blood Updated: 09/20/22 0731    RESPIRATORY VIRUS PANEL W/COVID-19, PCR [[id number]] Collected: 09/19/22 0944    Order Status: Completed Specimen: Nasopharyngeal Updated: 09/19/22 1516     Adenovirus Not detected        Coronavirus 229E Not detected        Coronavirus HKU1 Not detected        Coronavirus CVNL63 Not detected        Coronavirus OC43 Not detected        SARS-CoV-2, PCR Not detected        Metapneumovirus Not detected        Rhinovirus and Enterovirus Not detected        Influenza A Not detected        Influenza B Not detected        Parainfluenza 1 Not detected        Parainfluenza 2 Not detected        Parainfluenza 3 Not detected        Parainfluenza virus 4 Not detected        RSV by PCR Not detected        B. parapertussis, PCR Not detected        Bordetella pertussis - PCR Not detected        Chlamydophila pneumoniae DNA, QL, PCR Not detected        Mycoplasma pneumoniae DNA, QL, PCR Not detected       CULTURE, URINE [[id number]]     Order Status: Sent Specimen: Urine from Clean catch     COVID-19 RAPID TEST [[id number]] Collected: 09/19/22 0604    Order Status: Completed Specimen: Nasopharyngeal Updated: 09/19/22 0631     Specimen source Nasopharyngeal        COVID-19 rapid test Not detected        Comment: Rapid Abbott ID Now       Rapid NAAT:  The specimen is NEGATIVE for SARS-CoV-2, the novel coronavirus associated with COVID-19. Negative results should be treated as presumptive and, if inconsistent with clinical signs and symptoms or necessary for patient management, should be tested with an alternative molecular assay. Negative results do not preclude SARS-CoV-2 infection and should not be used as the sole basis for patient management decisions. This test has been authorized by the FDA under an Emergency Use Authorization (EUA) for use by authorized laboratories.    Fact sheet for Healthcare Providers: ToshiaUpdate.co.nz  Fact sheet for Patients: ConventionUpdate.co.nz       Methodology: Isothermal Nucleic Acid Amplification         COVID-19 WITH INFLUENZA A/B [[id number]]     Order Status: Canceled Specimen: Nasopharyngeal             I have reviewed notes of prior 24hr. Other pertinent lab: Total time spent with patient: 35 minutes I personally reviewed chart, notes, data and current medications in the medical record. I have personally examined and treated the patient at bedside during this period.                  Care Plan discussed with: Patient, Nursing Staff, and >50% of time spent in counseling and coordination of care    Discussed:  Care Plan    Prophylaxis:   xarelto     Disposition:  SNF/LTC           ___________________________________________________    Attending Physician: Raina Mullins MD

## 2022-09-20 NOTE — PROGRESS NOTES
Problem: Falls - Risk of  Goal: *Absence of Falls  Description: Document Jerome Counts Fall Risk and appropriate interventions in the flowsheet.   Outcome: Progressing Towards Goal  Note: Fall Risk Interventions:            Medication Interventions: Patient to call before getting OOB, Bed/chair exit alarm, Teach patient to arise slowly                   Problem: Patient Education: Go to Patient Education Activity  Goal: Patient/Family Education  Outcome: Progressing Towards Goal

## 2022-09-20 NOTE — PROGRESS NOTES
Bedside and Verbal shift change report given to Geno RN/Elena RN (oncoming nurse) by Maykel Machado RN (offgoing nurse). Report included the following information SBAR, Kardex, Intake/Output, MAR, Recent Results, and Quality Measures.

## 2022-09-20 NOTE — PROGRESS NOTES
9/20/2022   CARE MANAGEMENT NOTE:  CM reviewed EMR and handoff received from previous  Haley Maynard). READMISSION:  Pt was admitted with dyspnea, Afib, CHF. Reportedly, pt resides with her  who has dementia. Son, Michael Parrish ([phone]) and DIL are the family contacts. RUR OBS    Transition Plan of Care:  Cardiology, Pulmonary are following for medical management  PT/OT/ST evals pending; await recs  If SNF is warranted, then [facility] will be needed  Rapid Covid will be needed on day of discharge  Outpatient follow up  Mode of transportation to be determined    CM will continue to follow pt for definitive discharge plan.   Filiberto

## 2022-09-20 NOTE — PROGRESS NOTES
Cardiology Progress Note     Patient: Binta Lemos MRN: [id number]     YOB: 1940  Age: 80 y.o. Sex: female      Admit Date: 9/19/2022       Assessment/Plan     1 A/C HF mr EF: cont IV bumex today, . Stop NACL tablets. Myrna pBNP in am.   2 PAF: cont sotalol and xarelto. Last admission she developed RVR while off sotalol (held d/t med interactions). HR 80-90's.   3 PNA: per medicine team   4 dry mouth/esophageal candidiasis: ST to see   5 Hx hyponatremia: cont fld restriction. NP spent __9__ minutes reviewing chart, labs, diagnostics and coordination of care . NP spent _12___ minutes with pt/ and son in examination and care plan review. HPI     Binta Lemos is a 80 y.o. female   with a PMH of PAF on sotalol, HFmrEF< recent PNA, hyponatremia who was recently discharged from [facility] for PNA. She had hyponatremia and received tolvaptan last admission . Per chart review she started taking NaCl tablets and has gained a few pounds. ED finds no hypoxia, a fib CVR. The patient denies chest pain, dependent edema, diaphoresis, HERRERA, orthopnea, palpitations, PND, shortness of breath, claudication or syncope. Review of Symptoms:  Constitutional: negative for fevers  ENT: negative   Respiratory: positive for cough, wheezing, or dyspnea on exertion  Gastrointestinal: negative for vomiting  Genitourinary: + frequency   Musculoskeletal:negative for back pain  Neurological: negative for headaches  Other systems reviewed and negative except as above. 09/04/22    ECHO ADULT COMPLETE 09/06/2022 9/6/2022    Interpretation Summary  Formatting of this result is different from the original.      Left Ventricle: Mildly reduced left ventricular systolic function with a visually estimated EF of 45 - 50%. Left ventricle size is normal. Normal wall thickness. Mild global hypokinesis present. Grade I diastolic dysfunction with normal LAP.     Aortic Valve: Valve structure is normal. Mild sclerosis of the aortic valve cusp. Mitral Valve: Mild regurgitation. Tricuspid Valve: Mildly elevated RVSP. Mildly improved lv function compared to study 2022. Signed by: Meera Martinez DO on 2022 11:24 AM        Social History     Tobacco Use    Smoking status: Former     Types: Cigarettes     Start date: 2000     Quit date: 2000     Years since quittin.8    Smokeless tobacco: Never   Substance Use Topics    Alcohol use: Not Currently     History reviewed. No pertinent family history.     Current Facility-Administered Medications   Medication Dose Route Frequency    sodium chloride (NS) flush 5-10 mL  5-10 mL IntraVENous PRN    albuterol 5mg / ipratropium 0.5mg neb solution  1 Dose Nebulization Q4H PRN    guaiFENesin ER (MUCINEX) tablet 600 mg  600 mg Oral Q12H    montelukast (SINGULAIR) tablet 10 mg  10 mg Oral QHS    nystatin (MYCOSTATIN) 100,000 unit/mL oral suspension 500,000 Units  500,000 Units Oral QID    ketotifen (ZADITOR) 0.025 % (0.035 %) ophthalmic solution 1 Drop  1 Drop Both Eyes BID    pantoprazole (PROTONIX) tablet 40 mg  40 mg Oral DAILY    [Held by provider] sodium chloride soluble tablet 1,000 mg  1 g Oral TID WITH MEALS    sotaloL (BETAPACE) tablet 80 mg  80 mg Oral Q12H    rivaroxaban (XARELTO) tablet 20 mg  20 mg Oral DAILY WITH DINNER    sodium chloride (NS) flush 5-40 mL  5-40 mL IntraVENous Q8H    sodium chloride (NS) flush 5-40 mL  5-40 mL IntraVENous PRN    acetaminophen (TYLENOL) tablet 650 mg  650 mg Oral Q6H PRN    Or    acetaminophen (TYLENOL) suppository 650 mg  650 mg Rectal Q6H PRN    polyethylene glycol (MIRALAX) packet 17 g  17 g Oral DAILY PRN    ondansetron (ZOFRAN ODT) tablet 4 mg  4 mg Oral Q8H PRN    Or    ondansetron (ZOFRAN) injection 4 mg  4 mg IntraVENous Q6H PRN    arformoteroL (BROVANA) neb solution 15 mcg  15 mcg Nebulization BID RT    budesonide (PULMICORT) 500 mcg/2 ml nebulizer suspension  500 mcg Nebulization BID RT    artificial saliva (MOUTH KOTE) 1 Spray  1 Spray Oral PRN    tobramycin (NEBCIN) injection 300 mg  300 mg Inhalation BID RT    bumetanide (BUMEX) injection 1 mg  1 mg IntraVENous BID    potassium chloride SR (KLOR-CON 10) tablet 20 mEq  20 mEq Oral TID    diclofenac (VOLTAREN) 1 % topical gel 2 g  2 g Topical BID PRN       Objective:     Vitals:    09/19/22 2114 09/19/22 2300 09/20/22 0059 09/20/22 0504   BP:   (!) 140/79 135/74   Pulse:  89 87 71   Resp:   16 16   Temp:   97.6 °F (36.4 °C) 97.5 °F (36.4 °C)   SpO2: 96%  99% 98%   Weight:       Height:            Intake and Output:  Current Shift: No intake/output data recorded. Last three shifts: No intake/output data recorded. Gen: Well-developed, well-nourished, in no acute distress  Neck: Supple,No JVD, No Carotid Bruit,   Resp: Tachypneic, No accessory muscle use,  diminished bases. Card: irregular Rhythm, Normal S1, S2, No murmurs, rubs or gallop.   Abd:  Soft, non-tender, non-distended,BS+,   MSK: No cyanosis  Skin: No rashes    Neuro: moving all four extremities , follows commands appropriately  Psych:  Fair insight, oriented to person, place , alert, Nml Affect  LE: No edema        TELEMETRY: a fib     Lab/Data Review:  BMP:   Lab Results   Component Value Date/Time     09/20/2022 02:04 AM    K 3.8 09/20/2022 02:04 AM    CL 98 09/20/2022 02:04 AM    CO2 32 09/20/2022 02:04 AM    AGAP 6 09/20/2022 02:04 AM     09/20/2022 02:04 AM    BUN 13 09/20/2022 02:04 AM    CREA 0.37 (L) 09/20/2022 02:04 AM    GFRAA >60 09/20/2022 02:04 AM    GFRNA >60 09/20/2022 02:04 AM     CBC:   Lab Results   Component Value Date/Time    WBC 10.4 09/20/2022 02:04 AM    HGB 8.8 (L) 09/20/2022 02:04 AM    HCT 27.2 (L) 09/20/2022 02:04 AM     (H) 09/20/2022 02:04 AM       Latest Reference Range & Units 9/19/22 05:36   Troponin-High Sensitivity 0 - 51 ng/L 12   NT pro-BNP <450 PG/ML 5,179 (H)   (H): Data is abnormally high  Signed By: Izabella Fay, NP September 20, 2022

## 2022-09-20 NOTE — PROGRESS NOTES
Full bath given. Teeth brushed, attends placed (best options for patient). Pt up to bsc w/ stand by assist.   Pt started to get anxious about getting back into bed and started rapidly breathing. Pt stated she has been getting anxious since last week. Nurse sat down beside the patient and educated on purse-lip breathing. Pt demonstrated and did a great job. Anxiety and respirations decreased. Small episode last about 60 seconds. Spo2 98 on room air.

## 2022-09-20 NOTE — ROUTINE PROCESS
SPEECH PATHOLOGY BEDSIDE SWALLOW EVALUATION/DISCHARGE  Patient: Oleg Hahn (79 y.o. female)  Date: 9/20/2022  Primary Diagnosis: Dyspnea [R06.00]       Precautions:        ASSESSMENT :  Based on the objective data described below, the patient presents with functional oral-pharyngeal swallow. She c/o dysphagia, but describes many difficulty items that she CAN eat, such as granola bars. Helped her order foods that she felt she could swallow better. MOisture appeared to be important. With her h/o GERD and sliding HH, suspect most of her c/o dysphagia are related to xerostomia and GERD. ADmitted 9-19-22 with SOB< cough. PMH: see below . Celso Kat Skilled acute therapy provided by a speech-language pathologist is not indicated at this time. PLAN :  Recommendations:  Diet as tolerated. She requested no chocolate, caffeine spicy foods, tomatoes or orange juice. Defer to RD. Discharge Recommendations: None     SUBJECTIVE:   Patient stated I feel so SOB. -appeared to be increased with anxeity.     OBJECTIVE:     Past Medical History:   Diagnosis Date    Asthma     CHF (congestive heart failure), NYHA class I, chronic, systolic (HCC)     Chronic anticoagulation     GERD (gastroesophageal reflux disease)     Hiatal hernia     HX: breast cancer 2015    Right     Hypothyroid     Paroxysmal A-fib (HCC)     Venous insufficiency     bilat LE    Weight loss      Past Surgical History:   Procedure Laterality Date    COLONOSCOPY N/A 6/14/2021    COLONOSCOPY AND EGD performed by Anil Dixon MD at [facility]    HX BREAST LUMPECTOMY Right 08/24/2015    HX CATARACT REMOVAL  2013    Bilateral     HX CHOLECYSTECTOMY      HX DILATION AND CURETTAGE      x2    HX ORTHOPAEDIC  2017    RIGHT foot  hammertoe     Prior Level of Function/Home Situation:   Home Situation  Home Environment: Private residence  One/Two Story Residence: One story  Living Alone: No  Support Systems: Child(cornell)  Patient Expects to be Discharged to[de-identified] [facility]  Current DME Used/Available at Home: None  Diet prior to admission: regular, thins  Current Diet:  regular, thins   Cognitive and Communication Status:  Neurologic State:  (anxious about swallowing, breathing)  Orientation Level: Oriented to person, Oriented to place  Cognition: Follows commands, Appropriate decision making        Safety/Judgement:  (hyperawareness of issue)  Oral Assessment:  Oral Assessment  Labial: No impairment  Oral Hygiene: WFL. no evidence of thrush-on nystatin  P.O. Trials:  Patient Position: upright in bed  Vocal quality prior to P.O.: No impairment  Consistency Presented: Solid; Thin liquid      ORAL PHASE:   No issues with kenyon cracker, water. Took pills fine per RN>   \"I chew it and chew it and I can't swallow it\"  SLP Recommended liquids first and then alt S&L    PHARYNGEAL PHASE:   WNL. NOMS:   The NOMS functional outcome measure was used to quantify this patient's level of swallowing impairment. Based on the NOMS, the patient was determined to be at level 7 for swallow function     NOMS Swallowing Levels:  Level 1 (CN): NPO  Level 2 (CM): NPO but takes consistency in therapy  Level 3 (CL): Takes less than 50% of nutrition p.o. and continues with nonoral feedings; and/or safe with mod cues; and/or max diet restriction  Level 4 (CK): Safe swallow but needs mod cues; and/or mod diet restriction; and/or still requires some nonoral feeding/supplements  Level 5 (CJ): Safe swallow with min diet restriction; and/or needs min cues  Level 6 (CI): Independent with p.o.; rare cues; usually self cues; may need to avoid some foods or needs extra time  Level 7 ([address] [place] [address]): Independent for all p.o.  BRYAN. (2003). National Outcomes Measurement System (NOMS): Adult Speech-Language Pathology User's Guide.        Pain:  Pain Scale 1: Numeric (0 - 10)  Pain Intensity 1: 0     After treatment:   Patient left in no apparent distress in bed and Nursing notified    COMMUNICATION/EDUCATION:   Patient was educated regarding her deficit(s) of c/o dysphagia as this relates to her diagnosis of cough. She demonstrated Fair understanding as evidenced by discussion. .    The patient's plan of care including recommendations, planned interventions, and recommended diet changes were discussed with: Registered nurse.      Thank you for this referral.  Roselyn Archer, SLP  Time Calculation: 10 mins

## 2022-09-20 NOTE — PROGRESS NOTES
Nutrition Note    Modified diet order per patient preferences after discussion with SLP. See SLP note for further details.      Electronically signed by Carolina Medeiros RD on 6/84/9100 at 1:35 PM    Contact: Ext: [phone], or via My [employer]

## 2022-09-20 NOTE — PROGRESS NOTES
Problem: Self Care Deficits Care Plan (Adult)  Goal: *Acute Goals and Plan of Care (Insert Text)  Description: FUNCTIONAL STATUS PRIOR TO ADMISSION: Patient was modified independent using a rollator for functional mobility secondary to shortness of breath. HOME SUPPORT: The patient lived with spouse who has memory loss. Patient does not have to physically assist spouse. Patient son and daughter in law live close, but work outside jobs. Occupational Therapy Goals  Initiated 9/20/2022  1. Patient will perform lower body dressing with supervision/set-up within 7 day(s). 2.  Patient will perform bathing with supervision/set-up within 7 day(s). 3.  Patient will perform all aspects of toileting with supervision/set-up within 7 day(s). 4.  Patient will participate in upper extremity therapeutic exercise/activities with supervision/set-up for 5 minutes within 7 day(s). 5.  Patient will utilize energy conservation techniques during functional activities with verbal cues within 7 day(s). Outcome: Progressing Towards Goal   OCCUPATIONAL THERAPY EVALUATION  Patient: Jake Salcedo (79 y.o. female)  Date: 9/20/2022  Primary Diagnosis: Dyspnea [R06.00]       Precautions:fall       ASSESSMENT  Based on the objective data described below, the patient presents with hospital admission secondary to dyspnea. Patient received supine in bed with HOB elevated. She is agreeable to activity but warns limitations secondary to SOB. Patient to EOB wit SBA, but requires min assist for transfers. She requires up to min assist for ADL tasks with specific SOB noted with LE tasks. Patient with good use of PLB but remains SOB with all activity. Patient baseline independent but using rollator over past weeks secondary to increased SOB with activity. Patient to benefit from continued OT services to maximize function. Recommend pulmonary rehab at discharge.     Current Level of Function Impacting Discharge (ADLs/self-care): min assist to SBA    Functional Outcome Measure: The patient scored 50/100 on the Barthel Index  outcome measure. Other factors to consider for discharge:      Patient will benefit from skilled therapy intervention to address the above noted impairments. PLAN :  Recommendations and Planned Interventions: self care training, functional mobility training, therapeutic exercise, balance training, therapeutic activities, endurance activities, patient education, home safety training, and family training/education    Frequency/Duration: Patient will be followed by occupational therapy 5 times a week to address goals. Recommendation for discharge: (in order for the patient to meet his/her long term goals)  Therapy 3 hours per day 5-7 days per week    This discharge recommendation:  Has been made in collaboration with the attending provider and/or case management    IF patient discharges home will need the following DME: none       SUBJECTIVE:   Patient stated Id like this here.     OBJECTIVE DATA SUMMARY:   HISTORY:   Past Medical History:   Diagnosis Date    Asthma     CHF (congestive heart failure), NYHA class I, chronic, systolic (HCC)     Chronic anticoagulation     GERD (gastroesophageal reflux disease)     Hiatal hernia     HX: breast cancer 2015    Right     Hypothyroid     Paroxysmal A-fib (HCC)     Venous insufficiency     bilat LE    Weight loss      Past Surgical History:   Procedure Laterality Date    COLONOSCOPY N/A 6/14/2021    COLONOSCOPY AND EGD performed by Stephanie Banda MD at [address] [place], [place] [address]    HX BREAST LUMPECTOMY Right 08/24/2015    HX CATARACT REMOVAL  2013    Bilateral     HX CHOLECYSTECTOMY      HX DILATION AND CURETTAGE      x2    HX ORTHOPAEDIC  2017    RIGHT foot  hammertoe       Expanded or extensive additional review of patient history:     Home Situation  Home Environment: Private residence  # Steps to Enter: 5  Rails to Enter: Yes  One/Two Story Residence: One story  Living Alone: No  Support Systems: Child(cornell), Spouse/Significant Other  Patient Expects to be Discharged to[de-identified] Rehab hospital/unit acute (pulmonary rehab)  Current DME Used/Available at Home: Dulcie Sicks, rollator, Grab bars, Raised toilet seat, Shower chair (adjustible bed)  Tub or Shower Type: Shower    Hand dominance: Right    EXAMINATION OF PERFORMANCE DEFICITS:  Cognitive/Behavioral Status:  Neurologic State: Alert  Orientation Level: Oriented X4  Cognition: Follows commands  Perception: Appears intact  Perseveration: No perseveration noted  Safety/Judgement: Awareness of environment    Skin: intact as seen    Edema: none noted     Hearing: Auditory  Auditory Impairment: None    Vision/Perceptual:                                     Range of Motion:  AROM: Within functional limits                         Strength:  Strength: Within functional limits                Coordination:  Coordination: Within functional limits  Fine Motor Skills-Upper: Left Intact; Right Intact    Gross Motor Skills-Upper: Left Intact; Right Intact    Tone & Sensation:    Tone: Normal  Sensation: Intact                      Balance:  Sitting: Intact  Standing: Impaired  Standing - Static: Good  Standing - Dynamic : Good;Constant support    Functional Mobility and Transfers for ADLs:  Bed Mobility:  Rolling: Modified independent  Supine to Sit: Stand-by assistance  Scooting: Stand-by assistance    Transfers:  Sit to Stand: Stand-by assistance (Simultaneous filing. User may not have seen previous data.)  Stand to Sit: Stand-by assistance (Simultaneous filing. User may not have seen previous data.)  Bed to Chair: Contact guard assistance  Toilet Transfer : Contact guard assistance  Assistive Device : Walker, rolling    ADL Assessment:  Feeding: Modified independent; Additional time    Oral Facial Hygiene/Grooming: Setup    Bathing: Minimum assistance    Type of Bath: Full    Upper Body Dressing: Contact guard assistance    Lower Body Dressing: Minimum assistance    Toileting: Contact guard assistance                  ADL Intervention and task modifications:                 Type of Bath: Full                        Cognitive Retraining  Safety/Judgement: Awareness of environment      Therapeutic Exercise:     Functional Measure:    Barthel Index:  Bathin  Bladder: 5  Bowels: 10  Groomin  Dressin  Feeding: 10  Mobility: 0  Stairs: 0  Toilet Use: 5  Transfer (Bed to Chair and Back): 10  Total: 50/100      The Barthel ADL Index: Guidelines  1. The index should be used as a record of what a patient does, not as a record of what a patient could do. 2. The main aim is to establish degree of independence from any help, physical or verbal, however minor and for whatever reason. 3. The need for supervision renders the patient not independent. 4. A patient's performance should be established using the best available evidence. Asking the patient, friends/relatives and nurses are the usual sources, but direct observation and common sense are also important. However direct testing is not needed. 5. Usually the patient's performance over the preceding 24-48 hours is important, but occasionally longer periods will be relevant. 6. Middle categories imply that the patient supplies over 50 per cent of the effort. 7. Use of aids to be independent is allowed. Score Interpretation (from [address] [facility] 83)    Independent   60-79 Minimally independent   40-59 Partially dependent   20-39 Very dependent   <20 Totally dependent     -Real Olivas., Barthel, D.W. (1965). Functional evaluation: the Barthel Index. [facility] ([address] [place] [address] (1997). The Barthel activities of daily living index: self-reporting versus actual performance in the old (> or = 75 years). Journal of [address] [place] [address] 45(7), [facility], J.JCAMDENF, Kobe Ames., Janene James. (1999).  Measuring the change in disability after inpatient rehabilitation; comparison of the responsiveness of the Barthel Index and Functional [place] Measure. Journal of Neurology, Neurosurgery, and Psychiatry, 66(4), 445-814. JEF Hooks, RUBEN Bermudez, & Ricardo Soliz M.A. (2004) Assessment of post-stroke quality of life in cost-effectiveness studies: The usefulness of the Barthel Index and the EuroQoL-5D. Quality of Life Research, 15, 399-09         Occupational Therapy Evaluation Charge Determination   History Examination Decision-Making   LOW Complexity : Brief history review  LOW Complexity : 1-3 performance deficits relating to physical, cognitive , or psychosocial skils that result in activity limitations and / or participation restrictions  MEDIUM Complexity : Patient may present with comorbidities that affect occupational performnce. Miniml to moderate modification of tasks or assistance (eg, physical or verbal ) with assesment(s) is necessary to enable patient to complete evaluation       Based on the above components, the patient evaluation is determined to be of the following complexity level: LOW   Pain Rating:  None reported     Activity Tolerance:   Fair, requires frequent rest breaks, and observed SOB with activity    After treatment patient left in no apparent distress:    Sitting in chair, Call bell within reach, and Bed / chair alarm activated    COMMUNICATION/EDUCATION:   The patients plan of care was discussed with: Physical therapist and Registered nurse. Home safety education was provided and the patient/caregiver indicated understanding., Patient/family have participated as able in goal setting and plan of care. , and Patient/family agree to work toward stated goals and plan of care. This patients plan of care is appropriate for delegation to [facility].     Thank you for this referral.  Daphney Durbin OTR/L  Time Calculation: 36 mins

## 2022-09-20 NOTE — PROGRESS NOTES
Pt stated that Cardiology was at the bedside. Speech at the bedside at this time completing swallow eval. Pt states that it takes her too long to swallow and its too hard to swallow. Pt can eat a granola bar, able to swallow pills, but unable to eat an omelette and potatoes. Pt has to take her time chewing and swallowing.

## 2022-09-20 NOTE — ROUTINE PROCESS
Bedside and Verbal shift change report given to [facility], [facility]  (oncoming nurse) by David Reina   (offgoing nurse).  Report included the following information SBAR, Procedure Summary, Intake/Output, MAR, Recent Results

## 2022-09-20 NOTE — PROGRESS NOTES
Name: Mark Robbins: [address]   : 1940 Admit Date: 2022   Phone: [phone]  Room: [facility]/   PCP: Salma Sheppard MD  MRN: [id number]   Date: 2022  Code: Full Code          Chart and notes reviewed. Data reviewed. I review the patient's current medications in the medical record at each encounter. I have evaluated and examined the patient. HPI:    9:07 AM       History was obtained from patient. I was asked by Gloria Ibrahim MD to see Carol Balbuena in consultation for a chief complaint of shortness of breath. History of Present Illness:  Ms. Alice Abreu is an  yo woman with a history of asthma/COPD, pseudomonas pneumonia/colonization, bronchiectasis, former tobacco use, afib, breast cancer s/p chemo/radiation, seronegative RA and GERD who presented with dyspnea. She was recently discharged on  (admitted -) when she was treated for acute respiratory failure, PNA and hyponatremia. Did not require O2 at discharge. She had been admitted from - with PNA. During her most recent hospitalization she was treated with bactrim for stenothrophomonas grown at her prior hospitalization, but unfortunately this had to be stopped due to significant hyponatremia. She was treated with cefepime and discharged home on  cefdinir as she could not take levaquin while on sotalol. Attempts were made to stop the sotalol, but had had afib with RVR and was ultimately placed back on this. At discharge her Trelegy was stopped due to side effects/nausea and she was discharged on brovana/pulmicort. She comes back in with shortness of breath with exertion for the past several days. She also describes trouble swallowing, eating solids. She feels like she needs some sort of rehab as she is too weak at home. She was reportedly in [facility] in route, but has not been so here.      Labs:  WBC 12.7, cr 0.35, proBNP 5179    Images reviewed:  CXR 2022: patchy bibasilar infiltrates not significantly changed from prior     Interval history:    Afebrile  BP stable  Sats 98% on RA  WBC 10.4, decreased    ROS:  Still feels SOB today. Having a hard time swallowing breakfast.  Not coughing up any sputum. Past Medical History:   Diagnosis Date    Asthma     CHF (congestive heart failure), NYHA class I, chronic, systolic (HCC)     Chronic anticoagulation     GERD (gastroesophageal reflux disease)     Hiatal hernia     HX: breast cancer     Right     Hypothyroid     Paroxysmal A-fib (HCC)     Venous insufficiency     bilat LE    Weight loss        Past Surgical History:   Procedure Laterality Date    COLONOSCOPY N/A 2021    COLONOSCOPY AND EGD performed by Anil Dixon MD at [address] BREAST LUMPECTOMY Right 2015    HX CATARACT REMOVAL  2013    Bilateral     HX CHOLECYSTECTOMY      HX DILATION AND CURETTAGE      x2    HX ORTHOPAEDIC  2017    RIGHT foot  hammertoe       History reviewed. No pertinent family history.     Social History     Tobacco Use    Smoking status: Former     Types: Cigarettes     Start date: 2000     Quit date: 2000     Years since quittin.8    Smokeless tobacco: Never   Substance Use Topics    Alcohol use: Not Currently       Allergies   Allergen Reactions    Ace Inhibitors Cough    Iodine Rash    Penicillins Rash       Current Facility-Administered Medications   Medication Dose Route Frequency    sodium chloride (NS) flush 5-10 mL  5-10 mL IntraVENous PRN    albuterol 5mg / ipratropium 0.5mg neb solution  1 Dose Nebulization Q4H PRN    guaiFENesin ER (MUCINEX) tablet 600 mg  600 mg Oral Q12H    montelukast (SINGULAIR) tablet 10 mg  10 mg Oral QHS    nystatin (MYCOSTATIN) 100,000 unit/mL oral suspension 500,000 Units  500,000 Units Oral QID    ketotifen (ZADITOR) 0.025 % (0.035 %) ophthalmic solution 1 Drop  1 Drop Both Eyes BID    pantoprazole (PROTONIX) tablet 40 mg  40 mg Oral DAILY    [Held by provider] sodium chloride soluble tablet 1,000 mg  1 g Oral TID WITH MEALS    sotaloL (BETAPACE) tablet 80 mg  80 mg Oral Q12H    rivaroxaban (XARELTO) tablet 20 mg  20 mg Oral DAILY WITH DINNER    sodium chloride (NS) flush 5-40 mL  5-40 mL IntraVENous Q8H    sodium chloride (NS) flush 5-40 mL  5-40 mL IntraVENous PRN    acetaminophen (TYLENOL) tablet 650 mg  650 mg Oral Q6H PRN    Or    acetaminophen (TYLENOL) suppository 650 mg  650 mg Rectal Q6H PRN    polyethylene glycol (MIRALAX) packet 17 g  17 g Oral DAILY PRN    ondansetron (ZOFRAN ODT) tablet 4 mg  4 mg Oral Q8H PRN    Or    ondansetron (ZOFRAN) injection 4 mg  4 mg IntraVENous Q6H PRN    arformoteroL (BROVANA) neb solution 15 mcg  15 mcg Nebulization BID RT    budesonide (PULMICORT) 500 mcg/2 ml nebulizer suspension  500 mcg Nebulization BID RT    artificial saliva (MOUTH KOTE) 1 Spray  1 Spray Oral PRN    tobramycin (NEBCIN) injection 300 mg  300 mg Inhalation BID RT    bumetanide (BUMEX) injection 1 mg  1 mg IntraVENous BID    potassium chloride SR (KLOR-CON 10) tablet 20 mEq  20 mEq Oral TID    diclofenac (VOLTAREN) 1 % topical gel 2 g  2 g Topical BID PRN         REVIEW OF SYSTEMS   12 point ROS negative except as stated in the HPI. Physical Exam:   Visit Vitals  /74 (BP 1 Location: Left upper arm, BP Patient Position: At rest)   Pulse 71   Temp 97.5 °F (36.4 °C)   Resp 16   Ht 5' 4\" (1.626 m)   Wt 60.8 kg (134 lb)   SpO2 98%   BMI 23.00 kg/m²       General:  Alert, cooperative, no distress, appears stated age. Frail. Head:  Normocephalic, without obvious abnormality, atraumatic. Eyes:  Conjunctivae/corneas clear. Nose: Nares normal. Septum midline. Mucosa normal.    Throat: Lips, mucosa, and tongue normal.    Neck: Supple, symmetrical, trachea midline, no adenopathy. Lungs:   Crackles in the bases with scant wheezing   Chest wall:  No tenderness or deformity. Heart:  Regular rate and rhythm, S1, S2 normal, no murmur, click, rub or gallop.    Abdomen: Soft, non-tender. Bowel sounds normal.    Extremities: Extremities normal, atraumatic, no cyanosis or edema. Pulses: 2+ and symmetric all extremities. Skin: Skin color, texture, turgor normal. No rashes or lesions       Neurologic: Grossly nonfocal       Lab Results   Component Value Date/Time    Sodium 136 09/20/2022 02:04 AM    Potassium 3.8 09/20/2022 02:04 AM    Chloride 98 09/20/2022 02:04 AM    CO2 32 09/20/2022 02:04 AM    BUN 13 09/20/2022 02:04 AM    Creatinine 0.37 (L) 09/20/2022 02:04 AM    Glucose 100 09/20/2022 02:04 AM    Calcium 8.4 (L) 09/20/2022 02:04 AM    Magnesium 1.2 (L) 09/20/2022 02:04 AM       Lab Results   Component Value Date/Time    WBC 10.4 09/20/2022 02:04 AM    HGB 8.8 (L) 09/20/2022 02:04 AM    PLATELET 460 (H) 97/91/9033 02:04 AM    MCV 88.3 09/20/2022 02:04 AM       Lab Results   Component Value Date/Time    Alk. phosphatase 106 09/20/2022 02:04 AM    Protein, total 6.0 (L) 09/20/2022 02:04 AM    Albumin 2.0 (L) 09/20/2022 02:04 AM    Globulin 4.0 09/20/2022 02:04 AM       Lab Results   Component Value Date/Time    Iron 35 09/04/2022 05:26 PM    TIBC 193 (L) 09/04/2022 05:26 PM    Iron % saturation 18 (L) 09/04/2022 05:26 PM    Ferritin 196 09/04/2022 05:26 PM       Lab Results   Component Value Date/Time    TSH 1.12 09/07/2022 04:57 AM        No results found for: PH, PHI, PCO2, PCO2I, PO2, PO2I, HCO3, HCO3I, FIO2, FIO2I    Lab Results   Component Value Date/Time    Troponin-I, Qt. <0.05 01/13/2021 03:17 AM        Lab Results   Component Value Date/Time    Culture result: (A) 09/11/2022 12:10 PM     LIGHT Stenotrophomonas (Xantho.) maltophilia CLSI GUIDELINES DO NOT RECOMMEND [address]. TRIMETHOPRIM/SULFAMETHOXAZOLE IS THE DRUG OF CHOICE.     Culture result: LIGHT NORMAL RESPIRATORY CANDELARIO 09/11/2022 12:10 PM    Culture result: No growth (<1,000 CFU/ML) 09/07/2022 10:14 AM       No results found for: TOXA1, RPR, HBCM, HBSAG, HAAB, HCAB1, HAAT, G6PD, CRYAC, HIVGT, HIVR, HIV1, HIV12, HIVPC, HIVRPI    No results found for: VANCT, CPK    Lab Results   Component Value Date/Time    Color YELLOW/STRAW 09/19/2022 01:01 PM    Appearance CLEAR 09/19/2022 01:01 PM    pH (UA) 6.0 09/19/2022 01:01 PM    Protein TRACE (A) 09/19/2022 01:01 PM    Glucose Negative 09/19/2022 01:01 PM    Ketone Negative 09/19/2022 01:01 PM    Bilirubin Negative 09/19/2022 01:01 PM    Blood Negative 09/19/2022 01:01 PM    Urobilinogen 0.2 09/19/2022 01:01 PM    Nitrites Negative 09/19/2022 01:01 PM    Leukocyte Esterase SMALL (A) 09/19/2022 01:01 PM    WBC 5-10 09/19/2022 01:01 PM    RBC 0-5 09/19/2022 01:01 PM    Bacteria 1+ (A) 09/19/2022 01:01 PM       IMPRESSION  Acute respiratory failure with hypoxia  Asthma, not in exacerbation  Bronchiectasis  History of pseudomonas PNA, on jeff nebs at baseline  Afib    PLAN  Goal sats 88% or higher  Will need exercise oximetry closer to discharge  Trace/Pulmicort, PRN duonebs  Agree that she does not need additional antibiotics at this time; would not expect complete resolution of infiltrates on CXR in this interval and will need repeat imaging as an outpatient; low threshold to add abx if she decompensates  Diuresis as per Cardiology, watch I&O  Continue Jeff nebs  PT/OT consulted        Shayna Funez NP

## 2022-09-20 NOTE — PROGRESS NOTES
Problem: Mobility Impaired (Adult and Pediatric)  Goal: *Acute Goals and Plan of Care (Insert Text)  Description: FUNCTIONAL STATUS PRIOR TO ADMISSION: Patient was modified independent using a rollator for functional mobility. HOME SUPPORT PRIOR TO ADMISSION: The patient lived with spouse with dementia. Pt  did not require assist.    Physical Therapy Goals  Initiated 9/20/2022  1. Patient will move from supine to sit and sit to supine  in bed with independence within 7 day(s). 2.  Patient will transfer from bed to chair and chair to bed with modified independence using the least restrictive device within 7 day(s). 3.  Patient will perform sit to stand with supervision/set-up within 7 day(s). 4.  Patient will ambulate with supervision/set-up for 50 feet with the least restrictive device within 7 day(s). 5.  Patient will ascend/descend 5 stairs with 2 handrail(s) with minimal assistance/contact guard assist within 7 day(s). Outcome: Progressing Towards Goal   PHYSICAL THERAPY EVALUATION  Patient: René Machado (62 y.o. female)  Date: 9/20/2022  Primary Diagnosis: Dyspnea [R06.00]       Precautions: 88% or > O2 sats per MD       ASSESSMENT  Based on the objective data described below, the patient presents with admission due to dyspnea with hx of CHF, Afib, COPD, and recent bout with PNA. 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 She received supine in bed on room air with good sats in the mid 90s. She is very particular and organized with her self care and specific with her needs requesting to use BSC at this time. Pt supine to sit with Mod I to SBA. Good sitting balance. CGA with sit to stand with RW and 3' gait to [facility]. Independent with self care. Noted SOB with this task with sats 90% and HR up to 122bpm noting to be somewhat taxing. Pt then ambulated another 3' to recliner with CGA. BLE elevated and left in NAD.   Pt sharing tasks were laborous and she feels overall weakness. She is requesting pulmonary rehab at this time of which is supported. CM informed. Current Level of Function Impacting Discharge (mobility/balance): CGA/good with RW    Functional Outcome Measure: The patient scored 50/100 on the barthel outcome measure     Other factors to consider for discharge: per above     Patient will benefit from skilled therapy intervention to address the above noted impairments. PLAN :  Recommendations and Planned Interventions: bed mobility training, transfer training, gait training, therapeutic exercises, neuromuscular re-education, edema management/control, patient and family training/education, and therapeutic activities      Frequency/Duration: Patient will be followed by physical therapy:  5 times a week to address goals. Recommendation for discharge: (in order for the patient to meet his/her long term goals)  Therapy up to 5 days/week in Pulmonary Rehab setting    This discharge recommendation:  Has been made in collaboration with the attending provider and/or case management    IF patient discharges home will need the following DME: has rollator         SUBJECTIVE:   Patient stated I want to go to rehab.     OBJECTIVE DATA SUMMARY:   HISTORY:    Past Medical History:   Diagnosis Date    Asthma     CHF (congestive heart failure), NYHA class I, chronic, systolic (HCC)     Chronic anticoagulation     GERD (gastroesophageal reflux disease)     Hiatal hernia     HX: breast cancer 2015    Right     Hypothyroid     Paroxysmal A-fib (HCC)     Venous insufficiency     bilat LE    Weight loss      Past Surgical History:   Procedure Laterality Date    COLONOSCOPY N/A 6/14/2021    COLONOSCOPY AND EGD performed by Vargas Cano MD at [id number] [place] [address],[address] BREAST LUMPECTOMY Right 08/24/2015    HX CATARACT REMOVAL  2013    Bilateral     HX CHOLECYSTECTOMY      HX DILATION AND CURETTAGE      x2    HX ORTHOPAEDIC  2017    RIGHT foot kina       Personal factors and/or comorbidities impacting plan of care: per above and below    Home Situation  Home Environment: Private residence  # Steps to Enter: 5  Rails to Enter: Yes  One/Two Story Residence: One story  Living Alone: No  Support Systems: Child(cornell), Spouse/Significant Other  Patient Expects to be Discharged to[de-identified] Rehab hospital/unit acute (pulmonary rehab)  Current DME Used/Available at Home: [address], rollator, Grab bars, Raised toilet seat, Shower chair (adjustible bed)  Tub or Shower Type: Shower    EXAMINATION/PRESENTATION/DECISION MAKING:   Critical Behavior:  Neurologic State: Alert  Orientation Level: Oriented X4  Cognition: Follows commands  Safety/Judgement: Awareness of environment  Hearing: Auditory  Auditory Impairment: None  Skin:  Iv  Edema:  WNL  Range Of Motion:  AROM: Within functional limits                       Strength:    Strength: Within functional limits                    Tone & Sensation:   Tone: Normal              Sensation: Intact               Coordination:  Coordination: Within functional limits  Vision:      Functional Mobility:  Bed Mobility:  Rolling: Modified independent  Supine to Sit: Stand-by assistance     Scooting: Stand-by assistance  Transfers:  Sit to Stand: Stand-by assistance (Simultaneous filing. User may not have seen previous data.)  Stand to Sit: Stand-by assistance (Simultaneous filing.  User may not have seen previous data.)  Stand Pivot Transfers: Contact guard assistance     Bed to Chair: Contact guard assistance              Balance:   Sitting: Intact  Standing: Impaired  Standing - Static: Good  Standing - Dynamic : Good;Constant support  Ambulation/Gait Training:  Distance (ft): 3 Feet (ft) (x2)  Assistive Device: Walker, rolling;Gait belt  Ambulation - Level of Assistance: Contact guard assistance                       Speed/Ivory: Slow  Step Length: Right shortened;Left shortened                       Therapeutic Exercises: Pursed lip breathing    Functional Measure:  Barthel Index:    Bathin  Bladder: 5  Bowels: 10  Groomin  Dressin  Feeding: 10  Mobility: 0  Stairs: 0  Toilet Use: 5  Transfer (Bed to Chair and Back): 10  Total: 50/100       The Barthel ADL Index: Guidelines  1. The index should be used as a record of what a patient does, not as a record of what a patient could do. 2. The main aim is to establish degree of independence from any help, physical or verbal, however minor and for whatever reason. 3. The need for supervision renders the patient not independent. 4. A patient's performance should be established using the best available evidence. Asking the patient, friends/relatives and nurses are the usual sources, but direct observation and common sense are also important. However direct testing is not needed. 5. Usually the patient's performance over the preceding 24-48 hours is important, but occasionally longer periods will be relevant. 6. Middle categories imply that the patient supplies over 50 per cent of the effort. 7. Use of aids to be independent is allowed. Score Interpretation (from [address] [place] [address])    Independent   60-79 Minimally independent   40-59 Partially dependent   20-39 Very dependent   <20 Totally dependent     -Real Olivas., Barthel, D.W. (1965). Functional evaluation: the Barthel Index. [facility] ([address] (1997). The Barthel activities of daily living index: self-reporting versus actual performance in the old (> or = 75 years). Journal of [address] [place] [address] 45(7), [facility], J.OPAL.M.F, Jack Gomez., Harsha Rogers. (1999). Measuring the change in disability after inpatient rehabilitation; comparison of the responsiveness of the Barthel Index and Functional [place] Measure. Journal of Neurology, Neurosurgery, and Psychiatry, 66(4), 936-342. -JEF Diaz, RUBEN Bermudez, Sujatha Gomez MANIL. (2004) Assessment of post-stroke quality of life in cost-effectiveness studies: The usefulness of the Barthel Index and the EuroQoL-5D. Quality of Life Research, 15, 319-87           Physical Therapy Evaluation Charge Determination   History Examination Presentation Decision-Making   MEDIUM  Complexity : 1-2 comorbidities / personal factors will impact the outcome/ POC  MEDIUM Complexity : 3 Standardized tests and measures addressing body structure, function, activity limitation and / or participation in recreation  MEDIUM Complexity : Evolving with changing characteristics  Other outcome measures barthel  MEDIUM      Based on the above components, the patient evaluation is determined to be of the following complexity level: MEDIUM    Pain Rating:  none    Activity Tolerance:   Fair    After treatment patient left in no apparent distress:   Sitting in chair, Heels elevated for pressure relief, Call bell within reach, and Bed / chair alarm activated    COMMUNICATION/EDUCATION:   The patients plan of care was discussed with: Occupational therapist, Registered nurse, and Case management. Fall prevention education was provided and the patient/caregiver indicated understanding., Patient/family have participated as able in goal setting and plan of care. , and Patient/family agree to work toward stated goals and plan of care.     Thank you for this referral.  Charo Larry, PT   Time Calculation: 41 mins

## 2022-09-21 LAB
ANION GAP SERPL CALC-SCNC: 6 MMOL/L (ref 5–15)
BASOPHILS # BLD: 0.1 K/UL (ref 0–0.1)
BASOPHILS NFR BLD: 1 % (ref 0–1)
BNP SERPL-MCNC: 3993 PG/ML
BUN SERPL-MCNC: 17 MG/DL (ref 6–20)
BUN/CREAT SERPL: 40 (ref 12–20)
CALCIUM SERPL-MCNC: 8.5 MG/DL (ref 8.5–10.1)
CALCULATED R AXIS, ECG10: -6 DEGREES
CALCULATED T AXIS, ECG11: 41 DEGREES
CHLORIDE SERPL-SCNC: 98 MMOL/L (ref 97–108)
CO2 SERPL-SCNC: 30 MMOL/L (ref 21–32)
CREAT SERPL-MCNC: 0.42 MG/DL (ref 0.55–1.02)
DIAGNOSIS, 93000: NORMAL
DIFFERENTIAL METHOD BLD: ABNORMAL
EOSINOPHIL # BLD: 0.1 K/UL (ref 0–0.4)
EOSINOPHIL NFR BLD: 1 % (ref 0–7)
ERYTHROCYTE [DISTWIDTH] IN BLOOD BY AUTOMATED COUNT: 15.1 % (ref 11.5–14.5)
GLUCOSE SERPL-MCNC: 103 MG/DL (ref 65–100)
HCT VFR BLD AUTO: 28.6 % (ref 35–47)
HGB BLD-MCNC: 9.1 G/DL (ref 11.5–16)
IMM GRANULOCYTES # BLD AUTO: 0.1 K/UL (ref 0–0.04)
IMM GRANULOCYTES NFR BLD AUTO: 1 % (ref 0–0.5)
LYMPHOCYTES # BLD: 1.6 K/UL (ref 0.8–3.5)
LYMPHOCYTES NFR BLD: 19 % (ref 12–49)
MAGNESIUM SERPL-MCNC: 1.5 MG/DL (ref 1.6–2.4)
MCH RBC QN AUTO: 29.3 PG (ref 26–34)
MCHC RBC AUTO-ENTMCNC: 31.8 G/DL (ref 30–36.5)
MCV RBC AUTO: 92 FL (ref 80–99)
MONOCYTES # BLD: 0.8 K/UL (ref 0–1)
MONOCYTES NFR BLD: 10 % (ref 5–13)
NEUTS SEG # BLD: 5.7 K/UL (ref 1.8–8)
NEUTS SEG NFR BLD: 68 % (ref 32–75)
NRBC # BLD: 0 K/UL (ref 0–0.01)
NRBC BLD-RTO: 0 PER 100 WBC
PHOSPHATE SERPL-MCNC: 3.4 MG/DL (ref 2.6–4.7)
PLATELET # BLD AUTO: 512 K/UL (ref 150–400)
PMV BLD AUTO: 9 FL (ref 8.9–12.9)
POTASSIUM SERPL-SCNC: 4.6 MMOL/L (ref 3.5–5.1)
Q-T INTERVAL, ECG07: 366 MS
QRS DURATION, ECG06: 82 MS
QTC CALCULATION (BEZET), ECG08: 425 MS
RBC # BLD AUTO: 3.11 M/UL (ref 3.8–5.2)
SODIUM SERPL-SCNC: 134 MMOL/L (ref 136–145)
VENTRICULAR RATE, ECG03: 81 BPM
WBC # BLD AUTO: 8.4 K/UL (ref 3.6–11)

## 2022-09-21 PROCEDURE — 83735 ASSAY OF MAGNESIUM: CPT

## 2022-09-21 PROCEDURE — 94640 AIRWAY INHALATION TREATMENT: CPT

## 2022-09-21 PROCEDURE — 74011250637 HC RX REV CODE- 250/637: Performed by: INTERNAL MEDICINE

## 2022-09-21 PROCEDURE — 80048 BASIC METABOLIC PNL TOTAL CA: CPT

## 2022-09-21 PROCEDURE — G0378 HOSPITAL OBSERVATION PER HR: HCPCS

## 2022-09-21 PROCEDURE — 74011000250 HC RX REV CODE- 250: Performed by: INTERNAL MEDICINE

## 2022-09-21 PROCEDURE — 94761 N-INVAS EAR/PLS OXIMETRY MLT: CPT

## 2022-09-21 PROCEDURE — 77010033678 HC OXYGEN DAILY

## 2022-09-21 PROCEDURE — 74011250636 HC RX REV CODE- 250/636: Performed by: INTERNAL MEDICINE

## 2022-09-21 PROCEDURE — 84100 ASSAY OF PHOSPHORUS: CPT

## 2022-09-21 PROCEDURE — 96376 TX/PRO/DX INJ SAME DRUG ADON: CPT

## 2022-09-21 PROCEDURE — 74011000250 HC RX REV CODE- 250: Performed by: NURSE PRACTITIONER

## 2022-09-21 PROCEDURE — 97530 THERAPEUTIC ACTIVITIES: CPT

## 2022-09-21 PROCEDURE — 36415 COLL VENOUS BLD VENIPUNCTURE: CPT

## 2022-09-21 PROCEDURE — APPSS30 APP SPLIT SHARED TIME 16-30 MINUTES: Performed by: NURSE PRACTITIONER

## 2022-09-21 PROCEDURE — 97116 GAIT TRAINING THERAPY: CPT

## 2022-09-21 PROCEDURE — 74011250636 HC RX REV CODE- 250/636: Performed by: NURSE PRACTITIONER

## 2022-09-21 PROCEDURE — 83880 ASSAY OF NATRIURETIC PEPTIDE: CPT

## 2022-09-21 PROCEDURE — 99232 SBSQ HOSP IP/OBS MODERATE 35: CPT | Performed by: INTERNAL MEDICINE

## 2022-09-21 PROCEDURE — 65270000029 HC RM PRIVATE

## 2022-09-21 PROCEDURE — 85025 COMPLETE CBC W/AUTO DIFF WBC: CPT

## 2022-09-21 RX ORDER — MAGNESIUM SULFATE HEPTAHYDRATE 40 MG/ML
2 INJECTION, SOLUTION INTRAVENOUS ONCE
Status: COMPLETED | OUTPATIENT
Start: 2022-09-21 | End: 2022-09-21

## 2022-09-21 RX ORDER — BUMETANIDE 0.25 MG/ML
1 INJECTION INTRAMUSCULAR; INTRAVENOUS DAILY
Status: DISCONTINUED | OUTPATIENT
Start: 2022-09-21 | End: 2022-09-22

## 2022-09-21 RX ADMIN — Medication 10 ML: at 22:24

## 2022-09-21 RX ADMIN — Medication 10 ML: at 06:13

## 2022-09-21 RX ADMIN — BUDESONIDE 500 MCG: 0.5 SUSPENSION RESPIRATORY (INHALATION) at 08:04

## 2022-09-21 RX ADMIN — NYSTATIN 500000 UNITS: 100000 SUSPENSION ORAL at 09:00

## 2022-09-21 RX ADMIN — MAGNESIUM SULFATE HEPTAHYDRATE 2 G: 40 INJECTION, SOLUTION INTRAVENOUS at 09:56

## 2022-09-21 RX ADMIN — GUAIFENESIN 600 MG: 600 TABLET ORAL at 22:24

## 2022-09-21 RX ADMIN — DICLOFENAC SODIUM 2 G: 10 GEL TOPICAL at 00:13

## 2022-09-21 RX ADMIN — PANTOPRAZOLE SODIUM 40 MG: 40 TABLET, DELAYED RELEASE ORAL at 09:56

## 2022-09-21 RX ADMIN — BUMETANIDE 1 MG: 0.25 INJECTION INTRAMUSCULAR; INTRAVENOUS at 09:56

## 2022-09-21 RX ADMIN — ACETAMINOPHEN 650 MG: 325 TABLET ORAL at 22:24

## 2022-09-21 RX ADMIN — MONTELUKAST 10 MG: 10 TABLET, FILM COATED ORAL at 22:24

## 2022-09-21 RX ADMIN — ALBUTEROL SULFATE 1 DOSE: 2.5 SOLUTION RESPIRATORY (INHALATION) at 00:33

## 2022-09-21 RX ADMIN — ARFORMOTEROL TARTRATE 15 MCG: 15 SOLUTION RESPIRATORY (INHALATION) at 08:04

## 2022-09-21 RX ADMIN — BUDESONIDE 500 MCG: 0.5 SUSPENSION RESPIRATORY (INHALATION) at 19:57

## 2022-09-21 RX ADMIN — TOBRAMYCIN SULFATE 300 MG: 40 INJECTION, SOLUTION INTRAMUSCULAR; INTRAVENOUS at 08:17

## 2022-09-21 RX ADMIN — POTASSIUM CHLORIDE 20 MEQ: 750 TABLET, FILM COATED, EXTENDED RELEASE ORAL at 22:24

## 2022-09-21 RX ADMIN — Medication 10 ML: at 17:44

## 2022-09-21 RX ADMIN — DICLOFENAC SODIUM 2 G: 10 GEL TOPICAL at 22:29

## 2022-09-21 RX ADMIN — POTASSIUM CHLORIDE 20 MEQ: 750 TABLET, FILM COATED, EXTENDED RELEASE ORAL at 09:56

## 2022-09-21 RX ADMIN — ARFORMOTEROL TARTRATE 15 MCG: 15 SOLUTION RESPIRATORY (INHALATION) at 19:57

## 2022-09-21 RX ADMIN — SOTALOL HYDROCHLORIDE 80 MG: 80 TABLET ORAL at 06:13

## 2022-09-21 RX ADMIN — RIVAROXABAN 20 MG: 10 TABLET, FILM COATED ORAL at 17:39

## 2022-09-21 RX ADMIN — KETOTIFEN FUMARATE 1 DROP: 0.35 SOLUTION/ DROPS OPHTHALMIC at 09:00

## 2022-09-21 RX ADMIN — SOTALOL HYDROCHLORIDE 80 MG: 80 TABLET ORAL at 17:39

## 2022-09-21 RX ADMIN — GUAIFENESIN 600 MG: 600 TABLET ORAL at 09:56

## 2022-09-21 RX ADMIN — POTASSIUM CHLORIDE 20 MEQ: 750 TABLET, FILM COATED, EXTENDED RELEASE ORAL at 17:39

## 2022-09-21 RX ADMIN — ACETAMINOPHEN 650 MG: 325 TABLET ORAL at 06:13

## 2022-09-21 NOTE — PROGRESS NOTES
Bedside and Verbal shift change report given to BLUE Harris (oncoming nurse) by Albino CERVANTES RN (offgoing nurse). Report included the following information SBAR, Kardex, Intake/Output, MAR, Accordion, and Recent Results.

## 2022-09-21 NOTE — PROGRESS NOTES
Vishal Clounga [facility] [address]  [address] [facility], [place], [id number] [place] [address]  [phone]      Medical Progress Note      NAME: Kamini Oakley   :  1940  MRM:  [id number]    Date of service: 2022  9:41 AM       Assessment and Plan:   Dyspnea / Abnormal xray -  recurrent and persistent. not hypoxic. Unclear etiology. No sepsis criteria, unchanged xray and recent multiple rounds of Abx all argue against acute bacterial PNA. Recent stable ECHO Evaluated by pulmonary and cardiology. Monitor without ABx. On po diuretics       2. Paroxysmal A-fib / Chronic anticoagulation - Continue xarelto and Sotalol      3. CHF (congestive heart failure), NYHA class I, chronic, systolic / elevated proBNP/ Venous insufficiency - Stable recent ECHO. Evaluated by cardiology. Continue diuretic and BB      4. Asthma, stable - On Brovana, Pulmicort and prn atrovent and xopenex. Pulmonary following. 5.  Hyponatremia / Hypokalemia / Hypomagnesemia - POA, stable. 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 Monitor      6. Anemia - Stable since last discharge, and normal serologies. 7.  Weight loss / GERD (gastroesophageal reflux disease) / Hiatal hernia / Candidiasis - She has very concerning unintentional wt loss, anorexia, nausea that persists. She weighed 170lb last year. 128 lb last week. 134lb now. On last visit GI was consulted and recommended outpatient FU. Cont PPI. Emptying study  shows delayed esophageal emptying but not delayed gastric emptying. She is on PO nystatin. 8.  Hx breast cancer - Outpatient follow up. Has been on Boniva      9. Hypothyroid - TSH was normal a few months ago. Subjective:     Chief Complaint[de-identified] Patient was seen and examined as a follow up for dyspnea. Chart was reviewed. dyspnea is getting better     ROS:  (bold if positive, if negative)    Tolerating PT  Tolerating Diet        Objective:     Last 24hrs VS reviewed since prior progress note.  Most recent are:    Visit Vitals  /80 (BP 1 Location: Left upper arm, BP Patient Position: At rest)   Pulse 83   Temp 97.5 °F (36.4 °C)   Resp 20   Ht 5' 4\" (1.626 m)   Wt 60.8 kg (134 lb)   SpO2 94%   BMI 23.00 kg/m²     SpO2 Readings from Last 6 Encounters:   09/21/22 94%   09/14/22 92%   08/23/22 95%   08/10/22 94%   05/01/22 96%   06/14/21 99%    O2 Flow Rate (L/min): 1 l/min     Intake/Output Summary (Last 24 hours) at 9/21/2022 0900  Last data filed at 9/20/2022 2033  Gross per 24 hour   Intake 460 ml   Output 0 ml   Net 460 ml          Physical Exam:    Gen:  Well-developed, well-nourished, in no acute distress  HEENT:  Pink conjunctivae, PERRL, hearing intact to voice, moist mucous membranes  Neck:  Supple, without masses, thyroid non-tender  Resp:  No accessory muscle use, rales BL  Card:  No murmurs, normal S1, S2 without thrills, bruits or peripheral edema  Abd:  Soft, non-tender, non-distended, normoactive bowel sounds are present, no palpable organomegaly and no detectable hernias  Lymph:  No cervical or inguinal adenopathy  Musc:  No cyanosis or clubbing  Skin:  No rashes or ulcers, skin turgor is good  Neuro:  Cranial nerves are grossly intact, no focal motor weakness, follows commands appropriately  Psych:  Good insight, oriented to person, place and time, alert  __________________________________________________________________  Medications Reviewed: (see below)  Medications:     Current Facility-Administered Medications   Medication Dose Route Frequency    magnesium sulfate 2 g/50 ml IVPB (premix or compounded)  2 g IntraVENous ONCE    bumetanide (BUMEX) injection 1 mg  1 mg IntraVENous DAILY    sodium chloride (NS) flush 5-10 mL  5-10 mL IntraVENous PRN    albuterol 5mg / ipratropium 0.5mg neb solution  1 Dose Nebulization Q4H PRN    guaiFENesin ER (MUCINEX) tablet 600 mg  600 mg Oral Q12H    montelukast (SINGULAIR) tablet 10 mg  10 mg Oral QHS    nystatin (MYCOSTATIN) 100,000 unit/mL oral suspension 500,000 Units  500,000 Units Oral QID    ketotifen (ZADITOR) 0.025 % (0.035 %) ophthalmic solution 1 Drop  1 Drop Both Eyes BID    pantoprazole (PROTONIX) tablet 40 mg  40 mg Oral DAILY    sotaloL (BETAPACE) tablet 80 mg  80 mg Oral Q12H    rivaroxaban (XARELTO) tablet 20 mg  20 mg Oral DAILY WITH DINNER    sodium chloride (NS) flush 5-40 mL  5-40 mL IntraVENous Q8H    sodium chloride (NS) flush 5-40 mL  5-40 mL IntraVENous PRN    acetaminophen (TYLENOL) tablet 650 mg  650 mg Oral Q6H PRN    Or    acetaminophen (TYLENOL) suppository 650 mg  650 mg Rectal Q6H PRN    polyethylene glycol (MIRALAX) packet 17 g  17 g Oral DAILY PRN    ondansetron (ZOFRAN ODT) tablet 4 mg  4 mg Oral Q8H PRN    Or    ondansetron (ZOFRAN) injection 4 mg  4 mg IntraVENous Q6H PRN    arformoteroL (BROVANA) neb solution 15 mcg  15 mcg Nebulization BID RT    budesonide (PULMICORT) 500 mcg/2 ml nebulizer suspension  500 mcg Nebulization BID RT    artificial saliva (MOUTH KOTE) 1 Spray  1 Spray Oral PRN    tobramycin (NEBCIN) injection 300 mg  300 mg Inhalation BID RT    potassium chloride SR (KLOR-CON 10) tablet 20 mEq  20 mEq Oral TID    diclofenac (VOLTAREN) 1 % topical gel 2 g  2 g Topical BID PRN        Lab Data Reviewed: (see below)  Lab Review:     Recent Labs     09/21/22  0157 09/20/22  0204 09/19/22  0536   WBC 8.4 10.4 12.7*   HGB 9.1* 8.8* 8.7*   HCT 28.6* 27.2* 27.3*   * 575* 597*       Recent Labs     09/21/22  0157 09/20/22  0204 09/19/22  0536   * 136 135*   K 4.6 3.8 3.2*   CL 98 98 101   CO2 30 32 28   * 100 106*   BUN 17 13 15   CREA 0.42* 0.37* 0.35*   CA 8.5 8.4* 8.8   MG 1.5* 1.2*  --    PHOS 3.4  --   --    ALB  --  2.0* 2.0*   TBILI  --  0.2 0.2   ALT  --  14 17       Lab Results   Component Value Date/Time    Glucose (POC) 106 09/19/2022 05:49 AM    Glucose (POC) 152 (H) 09/11/2022 03:02 PM    Glucose (POC) 146 (H) 09/06/2022 01:21 PM     No results for input(s): PH, PCO2, PO2, HCO3, FIO2 in the last 72 hours. No results for input(s): INR, INREXT, INREXT in the last 72 hours. All Micro Results       Procedure Component Value Units Date/Time    CULTURE, BLOOD [[id number]] Collected: 09/19/22 0536    Order Status: Sent Specimen: Blood Updated: 09/20/22 0731    CULTURE, BLOOD [[id number]] Collected: 09/19/22 0536    Order Status: Sent Specimen: Blood Updated: 09/20/22 0731    RESPIRATORY VIRUS PANEL W/COVID-19, PCR [[id number]] Collected: 09/19/22 0944    Order Status: Completed Specimen: Nasopharyngeal Updated: 09/19/22 1516     Adenovirus Not detected        Coronavirus 229E Not detected        Coronavirus HKU1 Not detected        Coronavirus CVNL63 Not detected        Coronavirus OC43 Not detected        SARS-CoV-2, PCR Not detected        Metapneumovirus Not detected        Rhinovirus and Enterovirus Not detected        Influenza A Not detected        Influenza B Not detected        Parainfluenza 1 Not detected        Parainfluenza 2 Not detected        Parainfluenza 3 Not detected        Parainfluenza virus 4 Not detected        RSV by PCR Not detected        B. parapertussis, PCR Not detected        Bordetella pertussis - PCR Not detected        Chlamydophila pneumoniae DNA, QL, PCR Not detected        Mycoplasma pneumoniae DNA, QL, PCR Not detected       CULTURE, URINE [[id number]]     Order Status: Sent Specimen: Urine from Clean catch     COVID-19 RAPID TEST [[id number]] Collected: 09/19/22 0604    Order Status: Completed Specimen: Nasopharyngeal Updated: 09/19/22 0631     Specimen source Nasopharyngeal        COVID-19 rapid test Not detected        Comment: Rapid Abbott ID Now       Rapid NAAT:  The specimen is NEGATIVE for SARS-CoV-2, the novel coronavirus associated with COVID-19. Negative results should be treated as presumptive and, if inconsistent with clinical signs and symptoms or necessary for patient management, should be tested with an alternative molecular assay.   Negative results do not preclude SARS-CoV-2 infection and should not be used as the sole basis for patient management decisions. This test has been authorized by the FDA under an Emergency Use Authorization (EUA) for use by authorized laboratories. Fact sheet for Healthcare Providers: ConventionUpdate.co.nz  Fact sheet for Patients: ConventionUpdate.co.nz       Methodology: Isothermal Nucleic Acid Amplification         COVID-19 WITH INFLUENZA A/B [[id number]]     Order Status: Canceled Specimen: Nasopharyngeal             I have reviewed notes of prior 24hr. Other pertinent lab: Total time spent with patient: 35 minutes I personally reviewed chart, notes, data and current medications in the medical record. I have personally examined and treated the patient at bedside during this period.                  Care Plan discussed with: Patient, Nursing Staff, and >50% of time spent in counseling and coordination of care    Discussed:  Care Plan    Prophylaxis:   xarelto     Disposition:  SNF/LTC           ___________________________________________________    Attending Physician: Philip Mena MD

## 2022-09-21 NOTE — PROGRESS NOTES
Problem: Mobility Impaired (Adult and Pediatric)  Goal: *Acute Goals and Plan of Care (Insert Text)  Description: FUNCTIONAL STATUS PRIOR TO ADMISSION: Patient was modified independent using a rollator for functional mobility. HOME SUPPORT PRIOR TO ADMISSION: The patient lived with spouse with dementia. Pt  did not require assist.    Physical Therapy Goals  Initiated 9/20/2022  1. Patient will move from supine to sit and sit to supine  in bed with independence within 7 day(s). 2.  Patient will transfer from bed to chair and chair to bed with modified independence using the least restrictive device within 7 day(s). 3.  Patient will perform sit to stand with supervision/set-up within 7 day(s). 4.  Patient will ambulate with supervision/set-up for 50 feet with the least restrictive device within 7 day(s). 5.  Patient will ascend/descend 5 stairs with 2 handrail(s) with minimal assistance/contact guard assist within 7 day(s). Outcome: Progressing Towards Goal   PHYSICAL THERAPY TREATMENT  Patient: Kevin aDrden (47 y.o. female)  Date: 9/21/2022  Diagnosis: Dyspnea [R06.00] <principal problem not specified>      Precautions:    Chart, physical therapy assessment, plan of care and goals were reviewed. ASSESSMENT  Patient continues with skilled PT services and is progressing towards goals. Pt received sitting up in recliner on 2LO2. O2 sats in mid 90's. Requesting to return to bed. Sit to stand with CGA to Mod I with RW.  Gait of 50' tolerated fairly well with one needed standing break with noted SOB with the 2LO2. O2 sats 92% with return to side of bed with rapid recovery. Pt sit to supine with SBA to Mod I. Continue to support Pulmonary Rehab upon discharge with pt hx of Afib/CHF/COPD/PNA. She is very motivated to participate.       Current Level of Function Impacting Discharge (mobility/balance): SBA to CGA/good with RW    Other factors to consider for discharge: per above         PLAN :  Patient continues to benefit from skilled intervention to address the above impairments. Continue treatment per established plan of care. to address goals. Recommendation for discharge: (in order for the patient to meet his/her long term goals)  Therapy up to 5 days/week in Pulmonary Rehab setting    This discharge recommendation:  Has been made in collaboration with the attending provider and/or case management    IF patient discharges home will need the following DME: has rollator       SUBJECTIVE:   Patient stated I am glad to go to rehab.     OBJECTIVE DATA SUMMARY:   Critical Behavior:  Neurologic State: Alert  Orientation Level: Oriented X4  Cognition: Follows commands  Safety/Judgement: Awareness of environment  Functional Mobility Training:  Bed Mobility:        Sit to Supine: Modified independent  Scooting: Modified independent        Transfers:  Sit to Stand: Stand-by assistance  Stand to Sit: Stand-by assistance                             Balance:  Sitting: Intact  Standing: Impaired  Standing - Static: Good  Standing - Dynamic : Good;Constant support  Ambulation/Gait Training:  Distance (ft): 50 Feet (ft)  Assistive Device: Gait belt;Walker, rolling (2LO2)  Ambulation - Level of Assistance: Contact guard assistance                       Speed/Ivory: Slow;Pace decreased (<100 feet/min)                   Activity Tolerance:   Good and Fair    After treatment patient left in no apparent distress:   Supine in bed, Call bell within reach, Bed / chair alarm activated, and Side rails x 3    COMMUNICATION/COLLABORATION:   The patients plan of care was discussed with: RN, Occupational therapist and Case management.      Nino Hubbard, PT   Time Calculation: 25 mins

## 2022-09-21 NOTE — PROGRESS NOTES
Problem: Self Care Deficits Care Plan (Adult)  Goal: *Acute Goals and Plan of Care (Insert Text)  Description: FUNCTIONAL STATUS PRIOR TO ADMISSION: Patient was modified independent using a rollator for functional mobility secondary to shortness of breath. HOME SUPPORT: The patient lived with spouse who has memory loss. Patient does not have to physically assist spouse. Patient son and daughter in law live close, but work outside jobs. Occupational Therapy Goals  Initiated 9/20/2022  1. Patient will perform lower body dressing with supervision/set-up within 7 day(s). 2.  Patient will perform bathing with supervision/set-up within 7 day(s). 3.  Patient will perform all aspects of toileting with supervision/set-up within 7 day(s). 4.  Patient will participate in upper extremity therapeutic exercise/activities with supervision/set-up for 5 minutes within 7 day(s). 5.  Patient will utilize energy conservation techniques during functional activities with verbal cues within 7 day(s). Outcome: Progressing Towards Goal   OCCUPATIONAL THERAPY TREATMENT  Patient: Tio Krishnamurthy (70 y.o. female)  Date: 9/21/2022  Diagnosis: Dyspnea [R06.00] <principal problem not specified>      Precautions:    Chart, occupational therapy assessment, plan of care, and goals were reviewed. ASSESSMENT  Patient continues with skilled OT services and is progressing towards goals. Patient received seated in chair, agreeable to activity. Patient continues with SOB with all activity. Sats stable at 92% on 2L with functional activity. She requires rest breaks throughout tasks. Recommend pulmonary rehab at discharge. Current Level of Function Impacting Discharge (ADLs): SBA/CGA    Other factors to consider for discharge:          PLAN :  Patient continues to benefit from skilled intervention to address the above impairments. Continue treatment per established plan of care to address goals.     Recommend with staff: commode transfers, ADLs     Recommend next OT session:     Recommendation for discharge: (in order for the patient to meet his/her long term goals)  Therapy 3 hours per day 5-7 days per week pulmonary rehab    This discharge recommendation:  Has been made in collaboration with the attending provider and/or case management    IF patient discharges home will need the following DME: TBD       SUBJECTIVE:   Patient stated I hope to get there soon.     OBJECTIVE DATA SUMMARY:   Cognitive/Behavioral Status:  Neurologic State: Alert  Orientation Level: Oriented X4  Cognition: Follows commands             Functional Mobility and Transfers for ADLs:  Bed Mobility:  Sit to Supine: Modified independent  Scooting: Modified independent    Transfers:  Sit to Stand: Stand-by assistance          Balance:  Sitting: Intact  Standing: Impaired  Standing - Static: Good  Standing - Dynamic : Good;Constant support    ADL Intervention:                 Type of Bath: Chlorhexidine (CHG)                             Therapeutic Exercises:       Pain:  None reported     Activity Tolerance:   requires rest breaks and observed SOB with activity    After treatment patient left in no apparent distress:   Supine in bed, Call bell within reach, and Bed / chair alarm activated    COMMUNICATION/COLLABORATION:   The patients plan of care was discussed with: Physical therapist and Registered nurse.      Thao Covarrubias OTR/L  Time Calculation: 24 mins

## 2022-09-21 NOTE — PROGRESS NOTES
Bedside and Verbal shift change report given to jed (oncoming nurse) by Mal Rivera (offgoing nurse). Report included the following information SBAR, Kardex, Procedure Summary, Intake/Output, and MAR.

## 2022-09-21 NOTE — PROGRESS NOTES
9/21/2022  2:49 PM  Pt approved for [facility] pulmonary rehab. AVS updated. Insurance pre-auth started today.   TRE Chaudhary       9:46 AM  Update via chart review, pt is continuing to require medical management for Dyspnea / Abnormal xray, Paroxysmal A-fib / Chronic anticoagulation,CHF (congestive heart failure), NYHA class I, chronic, systolic / elevated proBNP/ Venous insufficiency, Asthma-stable, Hyponatremia / Hypokalemia / Hypomagnesemia, anemia,  Weight loss / GERD (gastroesophageal reflux disease) / Hiatal hernia / Candidiasis, Hx Breast CA, hypothyroid  Transitions of Care Plan:  Pt is a Readmission to this hospital   RU 21 % High Risk of readmission/Red  LOS 1 Day  Medical management continues   Pulmonary following, pt on 1 L O2 NC, wean as tolerated   Cardiology signed off plan for outpatient follow up   PT, OT following, plan for pulmonary rehab at [address] [place] [address] when stable to pulmonary rehab, referral to [facility] pending, pt has [employer] and requires pre-auth, no COVID testing for IPR   Outpatient follow up at [place] from pulmonary rehab  Transport TBD pending progress  TRE Chaudhary

## 2022-09-21 NOTE — PROGRESS NOTES
Problem: Falls - Risk of  Goal: *Absence of Falls  Description: Document Ofe Cardenas Fall Risk and appropriate interventions in the flowsheet. Outcome: Progressing Towards Goal  Note: Fall Risk Interventions:            Medication Interventions: Bed/chair exit alarm, Patient to call before getting OOB, Teach patient to arise slowly, Utilize gait belt for transfers/ambulation                   Problem: Patient Education: Go to Patient Education Activity  Goal: Patient/Family Education  Outcome: Progressing Towards Goal     Problem: Patient Education: Go to Patient Education Activity  Goal: Patient/Family Education  Outcome: Progressing Towards Goal     Problem: Patient Education: Go to Patient Education Activity  Goal: Patient/Family Education  Outcome: Progressing Towards Goal     Problem: Pressure Injury - Risk of  Goal: *Prevention of pressure injury  Description: Document Armando Scale and appropriate interventions in the flowsheet. Outcome: Progressing Towards Goal  Note: Pressure Injury Interventions: Activity Interventions: Increase time out of bed, Pressure redistribution bed/mattress(bed type), PT/OT evaluation    Mobility Interventions: Assess need for specialty bed, HOB 30 degrees or less, Pressure redistribution bed/mattress (bed type), PT/OT evaluation, Turn and reposition approx.  every two hours(pillow and wedges)    Nutrition Interventions: Document food/fluid/supplement intake, Offer support with meals,snacks and hydration    Friction and Shear Interventions: Apply protective barrier, creams and emollients, Minimize layers                Problem: Patient Education: Go to Patient Education Activity  Goal: Patient/Family Education  Outcome: Progressing Towards Goal

## 2022-09-21 NOTE — PROGRESS NOTES
Name: [facility]: [address] [place] [address] Dr LOFTONB: 1940 Admit Date: 2022   Phone: [phone]  Room: 2/   PCP: Joanna Kelly MD  MRN: [id number]   Date: 2022  Code: Full Code          Chart and notes reviewed. Data reviewed. I review the patient's current medications in the medical record at each encounter. I have evaluated and examined the patient. HPI:    9:07 AM       History was obtained from patient. I was asked by Anali Roman MD to see Tomydominicmami Wan in consultation for a chief complaint of shortness of breath. History of Present Illness:  Ms. Haley Pete is an  yo woman with a history of asthma/COPD, pseudomonas pneumonia/colonization, bronchiectasis, former tobacco use, afib, breast cancer s/p chemo/radiation, seronegative RA and GERD who presented with dyspnea. She was recently discharged on  (admitted -) when she was treated for acute respiratory failure, PNA and hyponatremia. Did not require O2 at discharge. She had been admitted from - with PNA. During her most recent hospitalization she was treated with bactrim for stenothrophomonas grown at her prior hospitalization, but unfortunately this had to be stopped due to significant hyponatremia. She was treated with cefepime and discharged home on  cefdinir as she could not take levaquin while on sotalol. Attempts were made to stop the sotalol, but had had afib with RVR and was ultimately placed back on this. At discharge her Trelegy was stopped due to side effects/nausea and she was discharged on brovana/pulmicort. She comes back in with shortness of breath with exertion for the past several days. She also describes trouble swallowing, eating solids. She feels like she needs some sort of rehab as she is too weak at home. She was reportedly in [facility] in route, but has not been so here.      Labs:  WBC 12.7, cr 0.35, proBNP 5179    Images reviewed:  CXR 2022: patchy bibasilar infiltrates not significantly changed from prior     Interval history:    Afebrile  BP stable  Sats 94% on RA  Mag 1.5; repleted  Blood cultures no growth x 2 days  No documentation of I/O    ROS:  Feels somewhat better today. SOB improving. No sputum production. Did not sleep well. Past Medical History:   Diagnosis Date    Asthma     CHF (congestive heart failure), NYHA class I, chronic, systolic (HCC)     Chronic anticoagulation     GERD (gastroesophageal reflux disease)     Hiatal hernia     HX: breast cancer     Right     Hypothyroid     Paroxysmal A-fib (HCC)     Venous insufficiency     bilat LE    Weight loss        Past Surgical History:   Procedure Laterality Date    COLONOSCOPY N/A 2021    COLONOSCOPY AND EGD performed by Stephanie Banda MD at [address] [facility] [address] BREAST LUMPECTOMY Right 2015    HX CATARACT REMOVAL  2013    Bilateral     HX CHOLECYSTECTOMY      HX DILATION AND CURETTAGE      x2    HX ORTHOPAEDIC  2017    RIGHT foot  hammertoe       History reviewed. No pertinent family history.     Social History     Tobacco Use    Smoking status: Former     Types: Cigarettes     Start date: 2000     Quit date: 2000     Years since quittin.8    Smokeless tobacco: Never   Substance Use Topics    Alcohol use: Not Currently       Allergies   Allergen Reactions    Ace Inhibitors Cough    Iodine Rash    Penicillins Rash       Current Facility-Administered Medications   Medication Dose Route Frequency    bumetanide (BUMEX) injection 1 mg  1 mg IntraVENous DAILY    sodium chloride (NS) flush 5-10 mL  5-10 mL IntraVENous PRN    albuterol 5mg / ipratropium 0.5mg neb solution  1 Dose Nebulization Q4H PRN    guaiFENesin ER (MUCINEX) tablet 600 mg  600 mg Oral Q12H    montelukast (SINGULAIR) tablet 10 mg  10 mg Oral QHS    [Held by provider] nystatin (MYCOSTATIN) 100,000 unit/mL oral suspension 500,000 Units  500,000 Units Oral QID    ketotifen (ZADITOR) 0.025 % (0.035 %) ophthalmic solution 1 Drop  1 Drop Both Eyes BID    pantoprazole (PROTONIX) tablet 40 mg  40 mg Oral DAILY    sotaloL (BETAPACE) tablet 80 mg  80 mg Oral Q12H    rivaroxaban (XARELTO) tablet 20 mg  20 mg Oral DAILY WITH DINNER    sodium chloride (NS) flush 5-40 mL  5-40 mL IntraVENous Q8H    sodium chloride (NS) flush 5-40 mL  5-40 mL IntraVENous PRN    acetaminophen (TYLENOL) tablet 650 mg  650 mg Oral Q6H PRN    Or    acetaminophen (TYLENOL) suppository 650 mg  650 mg Rectal Q6H PRN    polyethylene glycol (MIRALAX) packet 17 g  17 g Oral DAILY PRN    ondansetron (ZOFRAN ODT) tablet 4 mg  4 mg Oral Q8H PRN    Or    ondansetron (ZOFRAN) injection 4 mg  4 mg IntraVENous Q6H PRN    arformoteroL (BROVANA) neb solution 15 mcg  15 mcg Nebulization BID RT    budesonide (PULMICORT) 500 mcg/2 ml nebulizer suspension  500 mcg Nebulization BID RT    artificial saliva (MOUTH KOTE) 1 Spray  1 Spray Oral PRN    tobramycin (NEBCIN) injection 300 mg  300 mg Inhalation BID RT    potassium chloride SR (KLOR-CON 10) tablet 20 mEq  20 mEq Oral TID    diclofenac (VOLTAREN) 1 % topical gel 2 g  2 g Topical BID PRN         REVIEW OF SYSTEMS   12 point ROS negative except as stated in the HPI. Physical Exam:   Visit Vitals  BP (!) 101/57 (BP 1 Location: Left upper arm, BP Patient Position: At rest)   Pulse 68   Temp 97.4 °F (36.3 °C)   Resp 18   Ht 5' 4\" (1.626 m)   Wt 60.8 kg (134 lb)   SpO2 94%   BMI 23.00 kg/m²       General:  Alert, cooperative, no distress, appears stated age. Frail. Head:  Normocephalic, without obvious abnormality, atraumatic. Eyes:  Conjunctivae/corneas clear. Nose: Nares normal. Septum midline. Mucosa normal.    Throat: Lips, mucosa, and tongue normal.    Neck: Supple, symmetrical, trachea midline, no adenopathy. Lungs:   Diminished; no crackles/wheezing heard today   Chest wall:  No tenderness or deformity. Heart:  Regular rate and rhythm, S1, S2 normal, no murmur, click, rub or gallop.    Abdomen:   Soft, non-tender. Bowel sounds normal.    Extremities: Extremities normal, atraumatic, no cyanosis or edema. Pulses: 2+ and symmetric all extremities. Skin: Skin color, texture, turgor normal. No rashes or lesions       Neurologic: Grossly nonfocal       Lab Results   Component Value Date/Time    Sodium 134 (L) 09/21/2022 01:57 AM    Potassium 4.6 09/21/2022 01:57 AM    Chloride 98 09/21/2022 01:57 AM    CO2 30 09/21/2022 01:57 AM    BUN 17 09/21/2022 01:57 AM    Creatinine 0.42 (L) 09/21/2022 01:57 AM    Glucose 103 (H) 09/21/2022 01:57 AM    Calcium 8.5 09/21/2022 01:57 AM    Magnesium 1.5 (L) 09/21/2022 01:57 AM    Phosphorus 3.4 09/21/2022 01:57 AM       Lab Results   Component Value Date/Time    WBC 8.4 09/21/2022 01:57 AM    HGB 9.1 (L) 09/21/2022 01:57 AM    PLATELET 367 (H) 02/41/3306 01:57 AM    MCV 92.0 09/21/2022 01:57 AM       Lab Results   Component Value Date/Time    Alk. phosphatase 106 09/20/2022 02:04 AM    Protein, total 6.0 (L) 09/20/2022 02:04 AM    Albumin 2.0 (L) 09/20/2022 02:04 AM    Globulin 4.0 09/20/2022 02:04 AM       Lab Results   Component Value Date/Time    Iron 35 09/04/2022 05:26 PM    TIBC 193 (L) 09/04/2022 05:26 PM    Iron % saturation 18 (L) 09/04/2022 05:26 PM    Ferritin 196 09/04/2022 05:26 PM       Lab Results   Component Value Date/Time    TSH 1.12 09/07/2022 04:57 AM        No results found for: PH, PHI, PCO2, PCO2I, PO2, PO2I, HCO3, HCO3I, FIO2, FIO2I    Lab Results   Component Value Date/Time    Troponin-I, Qt. <0.05 01/13/2021 03:17 AM        Lab Results   Component Value Date/Time    Culture result: NO GROWTH 2 DAYS 09/19/2022 05:36 AM    Culture result: NO GROWTH 2 DAYS 09/19/2022 05:36 AM    Culture result: (A) 09/11/2022 12:10 PM     LIGHT Stenotrophomonas (Xantho.) maltophilia CLSI GUIDELINES DO NOT RECOMMEND [address]. TRIMETHOPRIM/SULFAMETHOXAZOLE IS THE DRUG OF CHOICE.     Culture result: LIGHT NORMAL RESPIRATORY CANDELARIO 09/11/2022 12:10 PM       No results found for: TOXA1, RPR, HBCM, HBSAG, HAAB, HCAB1, HAAT, G6PD, CRYAC, HIVGT, HIVR, HIV1, HIV12, HIVPC, HIVRPI    No results found for: VANCT, CPK    Lab Results   Component Value Date/Time    Color YELLOW/STRAW 09/19/2022 01:01 PM    Appearance CLEAR 09/19/2022 01:01 PM    pH (UA) 6.0 09/19/2022 01:01 PM    Protein TRACE (A) 09/19/2022 01:01 PM    Glucose Negative 09/19/2022 01:01 PM    Ketone Negative 09/19/2022 01:01 PM    Bilirubin Negative 09/19/2022 01:01 PM    Blood Negative 09/19/2022 01:01 PM    Urobilinogen 0.2 09/19/2022 01:01 PM    Nitrites Negative 09/19/2022 01:01 PM    Leukocyte Esterase SMALL (A) 09/19/2022 01:01 PM    WBC 5-10 09/19/2022 01:01 PM    RBC 0-5 09/19/2022 01:01 PM    Bacteria 1+ (A) 09/19/2022 01:01 PM       IMPRESSION  Acute respiratory failure with hypoxia  Asthma, not in exacerbation  Bronchiectasis  History of pseudomonas PNA, on jeff nebs at baseline  Afib    PLAN  Goal sats 88% or higher  Will need exercise oximetry closer to discharge  Trace/Pulmicort, PRN duonebs  Agree that she does not need additional antibiotics at this time; would not expect complete resolution of infiltrates on CXR in this interval and will need repeat imaging as an outpatient; low threshold to add abx if she decompensates  Diuresis as per Cardiology, watch I&O  Continue Jeff nebs  PT/OT consulted        Neeru Mahoney NP

## 2022-09-22 LAB
ANION GAP SERPL CALC-SCNC: 3 MMOL/L (ref 5–15)
BUN SERPL-MCNC: 15 MG/DL (ref 6–20)
BUN/CREAT SERPL: 30 (ref 12–20)
CALCIUM SERPL-MCNC: 8.7 MG/DL (ref 8.5–10.1)
CHLORIDE SERPL-SCNC: 96 MMOL/L (ref 97–108)
CO2 SERPL-SCNC: 32 MMOL/L (ref 21–32)
COMMENT, HOLDF: NORMAL
CREAT SERPL-MCNC: 0.5 MG/DL (ref 0.55–1.02)
GLUCOSE SERPL-MCNC: 93 MG/DL (ref 65–100)
MAGNESIUM SERPL-MCNC: 1.7 MG/DL (ref 1.6–2.4)
POTASSIUM SERPL-SCNC: 5.3 MMOL/L (ref 3.5–5.1)
SAMPLES BEING HELD,HOLD: NORMAL
SODIUM SERPL-SCNC: 131 MMOL/L (ref 136–145)

## 2022-09-22 PROCEDURE — 74011000250 HC RX REV CODE- 250: Performed by: INTERNAL MEDICINE

## 2022-09-22 PROCEDURE — 94761 N-INVAS EAR/PLS OXIMETRY MLT: CPT

## 2022-09-22 PROCEDURE — 36415 COLL VENOUS BLD VENIPUNCTURE: CPT

## 2022-09-22 PROCEDURE — 94664 DEMO&/EVAL PT USE INHALER: CPT

## 2022-09-22 PROCEDURE — 74011250637 HC RX REV CODE- 250/637: Performed by: INTERNAL MEDICINE

## 2022-09-22 PROCEDURE — 83735 ASSAY OF MAGNESIUM: CPT

## 2022-09-22 PROCEDURE — 65270000029 HC RM PRIVATE

## 2022-09-22 PROCEDURE — 94640 AIRWAY INHALATION TREATMENT: CPT

## 2022-09-22 PROCEDURE — G0378 HOSPITAL OBSERVATION PER HR: HCPCS

## 2022-09-22 PROCEDURE — 80048 BASIC METABOLIC PNL TOTAL CA: CPT

## 2022-09-22 PROCEDURE — 74011250636 HC RX REV CODE- 250/636: Performed by: INTERNAL MEDICINE

## 2022-09-22 RX ORDER — LEVALBUTEROL INHALATION SOLUTION 1.25 MG/3ML
1.25 SOLUTION RESPIRATORY (INHALATION)
Status: DISCONTINUED | OUTPATIENT
Start: 2022-09-22 | End: 2022-09-28 | Stop reason: HOSPADM

## 2022-09-22 RX ORDER — BUMETANIDE 1 MG/1
1 TABLET ORAL DAILY
Status: DISCONTINUED | OUTPATIENT
Start: 2022-09-22 | End: 2022-09-25

## 2022-09-22 RX ADMIN — POTASSIUM CHLORIDE 20 MEQ: 750 TABLET, FILM COATED, EXTENDED RELEASE ORAL at 09:16

## 2022-09-22 RX ADMIN — DICLOFENAC SODIUM 2 G: 10 GEL TOPICAL at 16:57

## 2022-09-22 RX ADMIN — Medication 10 ML: at 05:49

## 2022-09-22 RX ADMIN — SOTALOL HYDROCHLORIDE 80 MG: 80 TABLET ORAL at 05:49

## 2022-09-22 RX ADMIN — SOTALOL HYDROCHLORIDE 80 MG: 80 TABLET ORAL at 17:05

## 2022-09-22 RX ADMIN — KETOTIFEN FUMARATE 1 DROP: 0.35 SOLUTION/ DROPS OPHTHALMIC at 18:00

## 2022-09-22 RX ADMIN — BUDESONIDE 500 MCG: 0.5 SUSPENSION RESPIRATORY (INHALATION) at 11:22

## 2022-09-22 RX ADMIN — ACETAMINOPHEN 650 MG: 325 TABLET ORAL at 17:05

## 2022-09-22 RX ADMIN — TOBRAMYCIN SULFATE 300 MG: 40 INJECTION, SOLUTION INTRAMUSCULAR; INTRAVENOUS at 11:24

## 2022-09-22 RX ADMIN — ARFORMOTEROL TARTRATE 15 MCG: 15 SOLUTION RESPIRATORY (INHALATION) at 11:22

## 2022-09-22 RX ADMIN — LEVALBUTEROL HYDROCHLORIDE 1.25 MG: 1.25 SOLUTION RESPIRATORY (INHALATION) at 20:22

## 2022-09-22 RX ADMIN — GUAIFENESIN 600 MG: 600 TABLET ORAL at 22:50

## 2022-09-22 RX ADMIN — LEVALBUTEROL HYDROCHLORIDE 1.25 MG: 1.25 SOLUTION RESPIRATORY (INHALATION) at 11:15

## 2022-09-22 RX ADMIN — BUDESONIDE 500 MCG: 0.5 SUSPENSION RESPIRATORY (INHALATION) at 20:31

## 2022-09-22 RX ADMIN — ALBUTEROL SULFATE 1 DOSE: 2.5 SOLUTION RESPIRATORY (INHALATION) at 02:47

## 2022-09-22 RX ADMIN — KETOTIFEN FUMARATE 1 DROP: 0.35 SOLUTION/ DROPS OPHTHALMIC at 09:00

## 2022-09-22 RX ADMIN — Medication 10 ML: at 17:06

## 2022-09-22 RX ADMIN — BUMETANIDE 1 MG: 1 TABLET ORAL at 12:12

## 2022-09-22 RX ADMIN — TOBRAMYCIN SULFATE 300 MG: 40 INJECTION, SOLUTION INTRAMUSCULAR; INTRAVENOUS at 03:09

## 2022-09-22 RX ADMIN — TOBRAMYCIN SULFATE 300 MG: 40 INJECTION, SOLUTION INTRAMUSCULAR; INTRAVENOUS at 21:20

## 2022-09-22 RX ADMIN — ARFORMOTEROL TARTRATE 15 MCG: 15 SOLUTION RESPIRATORY (INHALATION) at 20:30

## 2022-09-22 RX ADMIN — RIVAROXABAN 20 MG: 10 TABLET, FILM COATED ORAL at 17:05

## 2022-09-22 RX ADMIN — PANTOPRAZOLE SODIUM 40 MG: 40 TABLET, DELAYED RELEASE ORAL at 09:16

## 2022-09-22 RX ADMIN — Medication 10 ML: at 22:53

## 2022-09-22 RX ADMIN — ACETAMINOPHEN 650 MG: 325 TABLET ORAL at 22:50

## 2022-09-22 RX ADMIN — MONTELUKAST 10 MG: 10 TABLET, FILM COATED ORAL at 22:50

## 2022-09-22 RX ADMIN — GUAIFENESIN 600 MG: 600 TABLET ORAL at 09:16

## 2022-09-22 RX ADMIN — ACETAMINOPHEN 650 MG: 325 TABLET ORAL at 05:49

## 2022-09-22 NOTE — PROGRESS NOTES
Occupational therapy note:  Orders received, chart reviewed. Patient received in bed, eating lunch. She declines offers for OOB to chair to finish meal, secondary to discomfort in chair earlier today. Will follow up at later time. Thao Covarrubias MS OTR/L

## 2022-09-22 NOTE — NURSE NAVIGATOR
Chart reviewed by Heart Failure Nurse Navigator. Heart Failure database completed. Patient came to ED for shortness of breath with \"wet cough\". She is admitted with recurrent dyspnea, paroxysmal atrial fib, asthma, and chronic HFpEF. She had recent admission from 9/4/22 to 9/14/22 with hyponatremia, dyspnea of unclear etiology,  chronic HFpEF, and paroxysmal atrial fib. She has history of atrial fib, HFpEF, asthma, bronchiectasis, and pseudomonas pneumonia on Wayne nebs at baseline. Patient had declined rehab at first admission but is now in agreement for rehab to rebuild strength. 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 EF:  Recent Echo 9/6/22 with EF of 45 to 50% with normal size and wall thickness, grade 1 diastolic dysfunction    ACEi/ARB/ARNi: Not indicated    BB: Not indicated    Aldosterone Antagonist: Not indicated    Obstructive Sleep Apnea Screening:   Referred to Sleep Medicine:     CRT **. NYHA Functional Class **. Heart Failure Teach Back in Patient Education. Heart Failure Avoiding Triggers on Discharge Instructions. Cardiologist: Dr Carlos Camargo discharge follow up phone call to be made within 48-72 hours of discharge.

## 2022-09-22 NOTE — PROGRESS NOTES
Visit charted 9/22/2022  Spiritual Care Partner Volunteer visited patient at [address] in 5th Floor Med Surg on 9/21/20   Documented by:  Eh Castro., MS., [facility]  Page a  [phone]- Caitlyn Veloz ([phone])

## 2022-09-22 NOTE — PROGRESS NOTES
Attempted to work with the patient for Physical Therapy, chart reviewed and discussed with nurse patient received on bed, eating lunch. Patient declines offers for OOB to chair to finish meals, secondary to discomfort in chair earlier today. We will continue to follow up with the patient for therapy thank you.

## 2022-09-22 NOTE — PROGRESS NOTES
9/22/2022   CARE MANAGEMENT NOTE:  CM reviewed EMR. READMISSION:  Pt was admitted with dyspnea, Afib, CHF. Reportedly, pt resides with her  who has dementia. Son, Desire Cornejo ([phone]) and DIL are the family contacts. RUR 21%; LOS 3 days 3 hrs. Transition Plan of Care:  Cardiology, Pulmonary are following for medical management  [facility] (pulmonary rehab) accepted  [employer] Medicare auth initiated on 9/21  Outpatient follow up  Mode of transportation to be determined     CM will continue to follow pt for IPR  BENITO Cody

## 2022-09-22 NOTE — PROGRESS NOTES
Vishal Colunga [facility] [address]  [address] [facility], [place], [address] [place] [address]  [phone]      Medical Progress Note      NAME: Eugene Borden   :  1940  MRM:  [id number]    Date of service: 2022  9:41 AM       Assessment and Plan:   Dyspnea / Abnormal xray -  recurrent and persistent. not hypoxic. Unclear etiology. No sepsis criteria, unchanged xray and recent multiple rounds of Abx all argue against acute bacterial PNA. Recent stable ECHO Evaluated by pulmonary and cardiology. Monitor without ABx. On po diuretics       2. Paroxysmal A-fib / Chronic anticoagulation - Continue xarelto and Sotalol      3. CHF (congestive heart failure), NYHA class I, chronic, systolic / elevated proBNP/ Venous insufficiency - Stable recent ECHO. Evaluated by cardiology. Continue diuretic and BB      4. Asthma, stable - On Brovana, Pulmicort and prn atrovent and xopenex. Pulmonary following. 5.  Hyponatremia / Hypokalemia / Hypomagnesemia - POA, stable. 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 Monitor      6. Anemia - Stable since last discharge, and normal serologies. 7.  Weight loss / GERD (gastroesophageal reflux disease) / Hiatal hernia / Candidiasis - She has very concerning unintentional wt loss, anorexia, nausea that persists. She weighed 170lb last year. 128 lb last week. 134lb now. On last visit GI was consulted and recommended outpatient FU. Cont PPI. Emptying study  shows delayed esophageal emptying but not delayed gastric emptying. She is on PO nystatin. 8.  Hx breast cancer - Outpatient follow up. Has been on Boniva      9. Hypothyroid - TSH was normal a few months ago. 10.  Chest pain. Reproducible. Likely musculoskeletal.  Monitor          Subjective:     Chief Complaint[de-identified] Patient was seen and examined as a follow up for dyspnea. Chart was reviewed.  c/o dizziness and chest pain     ROS:  (bold if positive, if negative)    Tolerating PT  Tolerating Diet        Objective: Last 24hrs VS reviewed since prior progress note.  Most recent are:    Visit Vitals  BP 94/62 (BP 1 Location: Left upper arm, BP Patient Position: Reclining)   Pulse (!) 47   Temp 97.4 °F (36.3 °C)   Resp 18   Ht 5' 4\" (1.626 m)   Wt 60.8 kg (134 lb)   SpO2 94%   BMI 23.00 kg/m²     SpO2 Readings from Last 6 Encounters:   09/22/22 94%   09/14/22 92%   08/23/22 95%   08/10/22 94%   05/01/22 96%   06/14/21 99%    O2 Flow Rate (L/min): 2 l/min     Intake/Output Summary (Last 24 hours) at 9/22/2022 0847  Last data filed at 9/21/2022 1948  Gross per 24 hour   Intake --   Output 240 ml   Net -240 ml          Physical Exam:    Gen:  Well-developed, well-nourished, in no acute distress  HEENT:  Pink conjunctivae, PERRL, hearing intact to voice, moist mucous membranes  Neck:  Supple, without masses, thyroid non-tender  Resp:  No accessory muscle use, rales BL  Card:  No murmurs, normal S1, S2 without thrills, bruits or peripheral edema  Abd:  Soft, non-tender, non-distended, normoactive bowel sounds are present, no palpable organomegaly and no detectable hernias  Lymph:  No cervical or inguinal adenopathy  Musc:  No cyanosis or clubbing  Skin:  No rashes or ulcers, skin turgor is good  Neuro:  Cranial nerves are grossly intact, no focal motor weakness, follows commands appropriately  Psych:  Good insight, oriented to person, place and time, alert  __________________________________________________________________  Medications Reviewed: (see below)  Medications:     Current Facility-Administered Medications   Medication Dose Route Frequency    bumetanide (BUMEX) injection 1 mg  1 mg IntraVENous DAILY    sodium chloride (NS) flush 5-10 mL  5-10 mL IntraVENous PRN    albuterol 5mg / ipratropium 0.5mg neb solution  1 Dose Nebulization Q4H PRN    guaiFENesin ER (MUCINEX) tablet 600 mg  600 mg Oral Q12H    montelukast (SINGULAIR) tablet 10 mg  10 mg Oral QHS    [Held by provider] nystatin (MYCOSTATIN) 100,000 unit/mL oral suspension 500,000 Units  500,000 Units Oral QID    ketotifen (ZADITOR) 0.025 % (0.035 %) ophthalmic solution 1 Drop  1 Drop Both Eyes BID    pantoprazole (PROTONIX) tablet 40 mg  40 mg Oral DAILY    sotaloL (BETAPACE) tablet 80 mg  80 mg Oral Q12H    rivaroxaban (XARELTO) tablet 20 mg  20 mg Oral DAILY WITH DINNER    sodium chloride (NS) flush 5-40 mL  5-40 mL IntraVENous Q8H    sodium chloride (NS) flush 5-40 mL  5-40 mL IntraVENous PRN    acetaminophen (TYLENOL) tablet 650 mg  650 mg Oral Q6H PRN    Or    acetaminophen (TYLENOL) suppository 650 mg  650 mg Rectal Q6H PRN    polyethylene glycol (MIRALAX) packet 17 g  17 g Oral DAILY PRN    ondansetron (ZOFRAN ODT) tablet 4 mg  4 mg Oral Q8H PRN    Or    ondansetron (ZOFRAN) injection 4 mg  4 mg IntraVENous Q6H PRN    arformoteroL (BROVANA) neb solution 15 mcg  15 mcg Nebulization BID RT    budesonide (PULMICORT) 500 mcg/2 ml nebulizer suspension  500 mcg Nebulization BID RT    artificial saliva (MOUTH KOTE) 1 Spray  1 Spray Oral PRN    tobramycin (NEBCIN) injection 300 mg  300 mg Inhalation BID RT    potassium chloride SR (KLOR-CON 10) tablet 20 mEq  20 mEq Oral TID    diclofenac (VOLTAREN) 1 % topical gel 2 g  2 g Topical BID PRN        Lab Data Reviewed: (see below)  Lab Review:     Recent Labs     09/21/22  0157 09/20/22  0204   WBC 8.4 10.4   HGB 9.1* 8.8*   HCT 28.6* 27.2*   * 575*       Recent Labs     09/21/22  0157 09/20/22  0204   * 136   K 4.6 3.8   CL 98 98   CO2 30 32   * 100   BUN 17 13   CREA 0.42* 0.37*   CA 8.5 8.4*   MG 1.5* 1.2*   PHOS 3.4  --    ALB  --  2.0*   TBILI  --  0.2   ALT  --  14       Lab Results   Component Value Date/Time    Glucose (POC) 106 09/19/2022 05:49 AM    Glucose (POC) 152 (H) 09/11/2022 03:02 PM    Glucose (POC) 146 (H) 09/06/2022 01:21 PM     No results for input(s): PH, PCO2, PO2, HCO3, FIO2 in the last 72 hours. No results for input(s): INR, INREXT, INREXT in the last 72 hours.   All Micro Results Procedure Component Value Units Date/Time    CULTURE, BLOOD [[id number]] Collected: 09/19/22 0536    Order Status: Completed Specimen: Blood Updated: 09/22/22 0503     Special Requests: NO SPECIAL REQUESTS        Culture result: NO GROWTH 3 DAYS       CULTURE, BLOOD [[id number]] Collected: 09/19/22 0536    Order Status: Completed Specimen: Blood Updated: 09/22/22 0503     Special Requests: NO SPECIAL REQUESTS        Culture result: NO GROWTH 3 DAYS       RESPIRATORY VIRUS PANEL W/COVID-19, PCR [[id number]] Collected: 09/19/22 0944    Order Status: Completed Specimen: Nasopharyngeal Updated: 09/19/22 1516     Adenovirus Not detected        Coronavirus 229E Not detected        Coronavirus HKU1 Not detected        Coronavirus CVNL63 Not detected        Coronavirus OC43 Not detected        SARS-CoV-2, PCR Not detected        Metapneumovirus Not detected        Rhinovirus and Enterovirus Not detected        Influenza A Not detected        Influenza B Not detected        Parainfluenza 1 Not detected        Parainfluenza 2 Not detected        Parainfluenza 3 Not detected        Parainfluenza virus 4 Not detected        RSV by PCR Not detected        B. parapertussis, PCR Not detected        Bordetella pertussis - PCR Not detected        Chlamydophila pneumoniae DNA, QL, PCR Not detected        Mycoplasma pneumoniae DNA, QL, PCR Not detected       CULTURE, URINE [[id number]] Collected: 09/19/22 1301    Order Status: Canceled Specimen: Urine from Clean catch     COVID-19 RAPID TEST [[id number]] Collected: 09/19/22 0604    Order Status: Completed Specimen: Nasopharyngeal Updated: 09/19/22 0631     Specimen source Nasopharyngeal        COVID-19 rapid test Not detected        Comment: Rapid Abbott ID Now       Rapid NAAT:  The specimen is NEGATIVE for SARS-CoV-2, the novel coronavirus associated with COVID-19.        Negative results should be treated as presumptive and, if inconsistent with clinical signs and symptoms or necessary for patient management, should be tested with an alternative molecular assay. Negative results do not preclude SARS-CoV-2 infection and should not be used as the sole basis for patient management decisions. This test has been authorized by the FDA under an Emergency Use Authorization (EUA) for use by authorized laboratories. Fact sheet for Healthcare Providers: [employer].rudate.co.nz  Fact sheet for Patients: [employer]co.nz       Methodology: Isothermal Nucleic Acid Amplification         COVID-19 WITH INFLUENZA A/B [[id number]]     Order Status: Canceled Specimen: Nasopharyngeal             I have reviewed notes of prior 24hr. Other pertinent lab: Total time spent with patient: 35 minutes I personally reviewed chart, notes, data and current medications in the medical record. I have personally examined and treated the patient at bedside during this period.                  Care Plan discussed with: Patient, Nursing Staff, and >50% of time spent in counseling and coordination of care    Discussed:  Care Plan    Prophylaxis:   xarelto     Disposition:  SNF/LTC           ___________________________________________________    Attending Physician: Cnidi Wynn MD

## 2022-09-22 NOTE — PROGRESS NOTES
Bedside and Verbal shift change report given to jed (oncoming nurse) by Shreyas Pete (offgoing nurse). Report included the following information SBAR, Kardex, Procedure Summary, Intake/Output, and MAR.

## 2022-09-22 NOTE — PROGRESS NOTES
Spiritual Care Assessment/Progress Note  [address]      NAME: Artist Sammi      MRN: [id number]  AGE: 80 y.o.  SEX: female  Catholic Affiliation: Sabianism   Language: English     9/22/2022     Total Time (in minutes): 11     Spiritual Assessment begun in [facility]  MED SURG 2 through conversation with:         [x]Patient        [] Family    [] Friend(s)        Reason for Consult: Initial/Spiritual assessment, patient floor     Spiritual beliefs: (Please include comment if needed)     [x] Identifies with a kallie tradition:  Shinto        [] Supported by a kallie community:            [] Claims no spiritual orientation:           [] Seeking spiritual identity:                [] Adheres to an individual form of spirituality:           [] Not able to assess:                           Identified resources for coping:      [] Prayer                               [] Music                  [] Guided Imagery     [x] Family/friends                 [] Pet visits     [] Devotional reading                         [] Unknown     [] Other:                                               Interventions offered during this visit: (See comments for more details)    Patient Interventions: Affirmation of emotions/emotional suffering, Affirmation of kallie, Iconic (affirming the presence of God/Higher Power), Initial/Spiritual assessment, patient floor           Plan of Care:     [] Support spiritual and/or cultural needs    [] Support AMD and/or advance care planning process      [] Support grieving process   [] Coordinate Rites and/or Rituals    [] Coordination with community clergy   [] No spiritual needs identified at this time   [] Detailed Plan of Care below (See Comments)  [] Make referral to Music Therapy  [] Make referral to Pet Therapy     [] Make referral to Addiction services  [] Make referral to [facility]  [] Make referral to Spiritual Care Partner  [] No future visits requested        [x] Contact Spiritual Care for further referrals     Comments: Visited with a [facility] Medico Partner. Initial spiritual assessment in Med Surg. Miss Barb Montgomery shared some of her recent struggles. Her  is homebound with memory issues. Other family members are pitching in though she is hopeful she will be able to get to rehab and get back to caring for her  and her life. She has belief in God and has her own prayer life. Provided Spiritual presence as she shared. Contact Spiritual Care for any further referrals.    Gene Henry., MS., [facility]  Page a  [phone]- Maurice Castro ([phone])

## 2022-09-22 NOTE — PROGRESS NOTES
Bedside and Verbal shift change report given to BLUE Harris (oncoming nurse) by Jerome CERVANTES RN (offgoing nurse). Report included the following information SBAR, Kardex, Intake/Output, MAR, Accordion, and Recent Results.

## 2022-09-22 NOTE — PROGRESS NOTES
Problem: Falls - Risk of  Goal: *Absence of Falls  Description: Document Vernia Colder Fall Risk and appropriate interventions in the flowsheet. Outcome: Progressing Towards Goal  Note: Fall Risk Interventions:            Medication Interventions: Bed/chair exit alarm, Patient to call before getting OOB, Teach patient to arise slowly, Utilize gait belt for transfers/ambulation                   Problem: Patient Education: Go to Patient Education Activity  Goal: Patient/Family Education  Outcome: Progressing Towards Goal     Problem: Patient Education: Go to Patient Education Activity  Goal: Patient/Family Education  Outcome: Progressing Towards Goal     Problem: Patient Education: Go to Patient Education Activity  Goal: Patient/Family Education  Outcome: Progressing Towards Goal     Problem: Pressure Injury - Risk of  Goal: *Prevention of pressure injury  Description: Document Armando Scale and appropriate interventions in the flowsheet. Outcome: Progressing Towards Goal  Note: Pressure Injury Interventions:  Sensory Interventions: Assess changes in LOC, Assess need for specialty bed    Moisture Interventions: Absorbent underpads, Apply protective barrier, creams and emollients, Minimize layers    Activity Interventions: Assess need for specialty bed, Increase time out of bed, Pressure redistribution bed/mattress(bed type), PT/OT evaluation    Mobility Interventions: Assess need for specialty bed, Pressure redistribution bed/mattress (bed type), PT/OT evaluation, Turn and reposition approx.  every two hours(pillow and wedges)    Nutrition Interventions: Document food/fluid/supplement intake, Offer support with meals,snacks and hydration    Friction and Shear Interventions: Apply protective barrier, creams and emollients, Minimize layers                Problem: Patient Education: Go to Patient Education Activity  Goal: Patient/Family Education  Outcome: Progressing Towards Goal

## 2022-09-22 NOTE — PROGRESS NOTES
Name: Lavelle Finely: [address]   : 1940 Admit Date: 2022   Phone: [phone]  Room: [facility]/   PCP: Gabriel Barton MD  MRN: [id number]   Date: 2022  Code: Full Code          Chart and notes reviewed. Data reviewed. I review the patient's current medications in the medical record at each encounter. I have evaluated and examined the patient. HPI:    9:07 AM       History was obtained from patient. I was asked by Lizzy Barber MD to see Di Feliciano in consultation for a chief complaint of shortness of breath. History of Present Illness:  Ms. Zandra Nunez is an  yo woman with a history of asthma/COPD, pseudomonas pneumonia/colonization, bronchiectasis, former tobacco use, afib, breast cancer s/p chemo/radiation, seronegative RA and GERD who presented with dyspnea. She was recently discharged on  (admitted -) when she was treated for acute respiratory failure, PNA and hyponatremia. Did not require O2 at discharge. She had been admitted from - with PNA. During her most recent hospitalization she was treated with bactrim for stenothrophomonas grown at her prior hospitalization, but unfortunately this had to be stopped due to significant hyponatremia. She was treated with cefepime and discharged home on  cefdinir as she could not take levaquin while on sotalol. Attempts were made to stop the sotalol, but had had afib with RVR and was ultimately placed back on this. At discharge her Trelegy was stopped due to side effects/nausea and she was discharged on brovana/pulmicort. She comes back in with shortness of breath with exertion for the past several days. She also describes trouble swallowing, eating solids. She feels like she needs some sort of rehab as she is too weak at home. She was reportedly in [facility] in route, but has not been so here.      Labs:  WBC 12.7, cr 0.35, proBNP 5179    Images reviewed:  CXR 2022: patchy bibasilar infiltrates not significantly changed from prior     Interval history:    Afebrile  BP stable  Sats 94% on 2L  No new labs this morning  Blood cultures no growth x 3 days  No documentation of I/O    ROS:  Woke up feeling nauseated and dizzy. Past Medical History:   Diagnosis Date    Asthma     CHF (congestive heart failure), NYHA class I, chronic, systolic (HCC)     Chronic anticoagulation     GERD (gastroesophageal reflux disease)     Hiatal hernia     HX: breast cancer     Right     Hypothyroid     Paroxysmal A-fib (HCC)     Venous insufficiency     bilat LE    Weight loss        Past Surgical History:   Procedure Laterality Date    COLONOSCOPY N/A 2021    COLONOSCOPY AND EGD performed by Steph Brice MD at [address] BREAST LUMPECTOMY Right 2015    HX CATARACT REMOVAL  2013    Bilateral     HX CHOLECYSTECTOMY      HX DILATION AND CURETTAGE      x2    HX ORTHOPAEDIC  2017    RIGHT foot  hammertoe       History reviewed. No pertinent family history.     Social History     Tobacco Use    Smoking status: Former     Types: Cigarettes     Start date: 2000     Quit date: 2000     Years since quittin.8    Smokeless tobacco: Never   Substance Use Topics    Alcohol use: Not Currently       Allergies   Allergen Reactions    Ace Inhibitors Cough    Iodine Rash    Penicillins Rash       Current Facility-Administered Medications   Medication Dose Route Frequency    bumetanide (BUMEX) injection 1 mg  1 mg IntraVENous DAILY    sodium chloride (NS) flush 5-10 mL  5-10 mL IntraVENous PRN    albuterol 5mg / ipratropium 0.5mg neb solution  1 Dose Nebulization Q4H PRN    guaiFENesin ER (MUCINEX) tablet 600 mg  600 mg Oral Q12H    montelukast (SINGULAIR) tablet 10 mg  10 mg Oral QHS    [Held by provider] nystatin (MYCOSTATIN) 100,000 unit/mL oral suspension 500,000 Units  500,000 Units Oral QID    ketotifen (ZADITOR) 0.025 % (0.035 %) ophthalmic solution 1 Drop  1 Drop Both Eyes BID pantoprazole (PROTONIX) tablet 40 mg  40 mg Oral DAILY    sotaloL (BETAPACE) tablet 80 mg  80 mg Oral Q12H    rivaroxaban (XARELTO) tablet 20 mg  20 mg Oral DAILY WITH DINNER    sodium chloride (NS) flush 5-40 mL  5-40 mL IntraVENous Q8H    sodium chloride (NS) flush 5-40 mL  5-40 mL IntraVENous PRN    acetaminophen (TYLENOL) tablet 650 mg  650 mg Oral Q6H PRN    Or    acetaminophen (TYLENOL) suppository 650 mg  650 mg Rectal Q6H PRN    polyethylene glycol (MIRALAX) packet 17 g  17 g Oral DAILY PRN    ondansetron (ZOFRAN ODT) tablet 4 mg  4 mg Oral Q8H PRN    Or    ondansetron (ZOFRAN) injection 4 mg  4 mg IntraVENous Q6H PRN    arformoteroL (BROVANA) neb solution 15 mcg  15 mcg Nebulization BID RT    budesonide (PULMICORT) 500 mcg/2 ml nebulizer suspension  500 mcg Nebulization BID RT    artificial saliva (MOUTH KOTE) 1 Spray  1 Spray Oral PRN    tobramycin (NEBCIN) injection 300 mg  300 mg Inhalation BID RT    potassium chloride SR (KLOR-CON 10) tablet 20 mEq  20 mEq Oral TID    diclofenac (VOLTAREN) 1 % topical gel 2 g  2 g Topical BID PRN         REVIEW OF SYSTEMS   12 point ROS negative except as stated in the HPI. Physical Exam:   Visit Vitals  BP 94/62 (BP 1 Location: Left upper arm, BP Patient Position: Reclining)   Pulse (!) 47   Temp 97.4 °F (36.3 °C)   Resp 18   Ht 5' 4\" (1.626 m)   Wt 60.8 kg (134 lb)   SpO2 94%   BMI 23.00 kg/m²       General:  Alert, cooperative, no distress, appears stated age. Frail. Head:  Normocephalic, without obvious abnormality, atraumatic. Eyes:  Conjunctivae/corneas clear. Nose: Nares normal. Septum midline. Mucosa normal.    Throat: Lips, mucosa, and tongue normal.    Neck: Supple, symmetrical, trachea midline, no adenopathy. Lungs:   Diminished; no crackles/wheezing heard today   Chest wall:  No tenderness or deformity. Heart:  Regular rate and rhythm, S1, S2 normal, no murmur, click, rub or gallop. Abdomen:   Soft, non-tender.  Bowel sounds normal. Extremities: Extremities normal, atraumatic, no cyanosis or edema. Pulses: 2+ and symmetric all extremities. Skin: Skin color, texture, turgor normal. No rashes or lesions       Neurologic: Grossly nonfocal       Lab Results   Component Value Date/Time    Sodium 134 (L) 09/21/2022 01:57 AM    Potassium 4.6 09/21/2022 01:57 AM    Chloride 98 09/21/2022 01:57 AM    CO2 30 09/21/2022 01:57 AM    BUN 17 09/21/2022 01:57 AM    Creatinine 0.42 (L) 09/21/2022 01:57 AM    Glucose 103 (H) 09/21/2022 01:57 AM    Calcium 8.5 09/21/2022 01:57 AM    Magnesium 1.5 (L) 09/21/2022 01:57 AM    Phosphorus 3.4 09/21/2022 01:57 AM       Lab Results   Component Value Date/Time    WBC 8.4 09/21/2022 01:57 AM    HGB 9.1 (L) 09/21/2022 01:57 AM    PLATELET 878 (H) 58/43/0171 01:57 AM    MCV 92.0 09/21/2022 01:57 AM       Lab Results   Component Value Date/Time    Alk. phosphatase 106 09/20/2022 02:04 AM    Protein, total 6.0 (L) 09/20/2022 02:04 AM    Albumin 2.0 (L) 09/20/2022 02:04 AM    Globulin 4.0 09/20/2022 02:04 AM       Lab Results   Component Value Date/Time    Iron 35 09/04/2022 05:26 PM    TIBC 193 (L) 09/04/2022 05:26 PM    Iron % saturation 18 (L) 09/04/2022 05:26 PM    Ferritin 196 09/04/2022 05:26 PM       Lab Results   Component Value Date/Time    TSH 1.12 09/07/2022 04:57 AM        No results found for: PH, PHI, PCO2, PCO2I, PO2, PO2I, HCO3, HCO3I, FIO2, FIO2I    Lab Results   Component Value Date/Time    Troponin-I, Qt. <0.05 01/13/2021 03:17 AM        Lab Results   Component Value Date/Time    Culture result: NO GROWTH 3 DAYS 09/19/2022 05:36 AM    Culture result: NO GROWTH 3 DAYS 09/19/2022 05:36 AM    Culture result: (A) 09/11/2022 12:10 PM     LIGHT Stenotrophomonas (Xantho.) maltophilia CLSI GUIDELINES DO NOT RECOMMEND [address] TRIMETHOPRIM/SULFAMETHOXAZOLE IS THE DRUG OF CHOICE.     Culture result: LIGHT NORMAL RESPIRATORY CANDELARIO 09/11/2022 12:10 PM       No results found for: TOXA1, RPR, HBCM, HBSAG, HAAB, HCAB1, HAAT, G6PD, CRYAC, HIVGT, HIVR, HIV1, HIV12, HIVPC, HIVRPI    No results found for: VANCT, CPK    Lab Results   Component Value Date/Time    Color YELLOW/STRAW 09/19/2022 01:01 PM    Appearance CLEAR 09/19/2022 01:01 PM    pH (UA) 6.0 09/19/2022 01:01 PM    Protein TRACE (A) 09/19/2022 01:01 PM    Glucose Negative 09/19/2022 01:01 PM    Ketone Negative 09/19/2022 01:01 PM    Bilirubin Negative 09/19/2022 01:01 PM    Blood Negative 09/19/2022 01:01 PM    Urobilinogen 0.2 09/19/2022 01:01 PM    Nitrites Negative 09/19/2022 01:01 PM    Leukocyte Esterase SMALL (A) 09/19/2022 01:01 PM    WBC 5-10 09/19/2022 01:01 PM    RBC 0-5 09/19/2022 01:01 PM    Bacteria 1+ (A) 09/19/2022 01:01 PM       IMPRESSION  Acute respiratory failure with hypoxia  Asthma, not in exacerbation  Bronchiectasis  History of pseudomonas PNA, on jeff nebs at baseline  Afib    PLAN  Goal sats 88% or higher  Will need exercise oximetry closer to discharge  Trace/Pulmicort, PRN duonebs  Check labs today  Agree that she does not need additional antibiotics at this time; would not expect complete resolution of infiltrates on CXR in this interval and will need repeat imaging as an outpatient; low threshold to add abx if she decompensates  Diuresis as per Cardiology, watch I&O  Continue Jeff nebs  PT/OT consulted  [facility]        Reena Cruz NP

## 2022-09-23 PROCEDURE — 65270000029 HC RM PRIVATE

## 2022-09-23 PROCEDURE — 74011250636 HC RX REV CODE- 250/636: Performed by: INTERNAL MEDICINE

## 2022-09-23 PROCEDURE — 97116 GAIT TRAINING THERAPY: CPT

## 2022-09-23 PROCEDURE — 97110 THERAPEUTIC EXERCISES: CPT

## 2022-09-23 PROCEDURE — G0378 HOSPITAL OBSERVATION PER HR: HCPCS

## 2022-09-23 PROCEDURE — 74011250637 HC RX REV CODE- 250/637: Performed by: INTERNAL MEDICINE

## 2022-09-23 PROCEDURE — 94761 N-INVAS EAR/PLS OXIMETRY MLT: CPT

## 2022-09-23 PROCEDURE — 77010033711 HC HIGH FLOW OXYGEN

## 2022-09-23 PROCEDURE — 77010033678 HC OXYGEN DAILY

## 2022-09-23 PROCEDURE — 74011000250 HC RX REV CODE- 250: Performed by: INTERNAL MEDICINE

## 2022-09-23 PROCEDURE — 97535 SELF CARE MNGMENT TRAINING: CPT

## 2022-09-23 PROCEDURE — 94640 AIRWAY INHALATION TREATMENT: CPT

## 2022-09-23 PROCEDURE — 97530 THERAPEUTIC ACTIVITIES: CPT

## 2022-09-23 RX ADMIN — Medication 10 ML: at 06:10

## 2022-09-23 RX ADMIN — ACETAMINOPHEN 650 MG: 325 TABLET ORAL at 17:28

## 2022-09-23 RX ADMIN — LEVALBUTEROL HYDROCHLORIDE 1.25 MG: 1.25 SOLUTION RESPIRATORY (INHALATION) at 07:00

## 2022-09-23 RX ADMIN — BUMETANIDE 1 MG: 1 TABLET ORAL at 08:51

## 2022-09-23 RX ADMIN — ACETAMINOPHEN 650 MG: 325 TABLET ORAL at 22:03

## 2022-09-23 RX ADMIN — ACETAMINOPHEN 650 MG: 325 TABLET ORAL at 06:08

## 2022-09-23 RX ADMIN — KETOTIFEN FUMARATE 1 DROP: 0.35 SOLUTION/ DROPS OPHTHALMIC at 08:57

## 2022-09-23 RX ADMIN — RIVAROXABAN 20 MG: 10 TABLET, FILM COATED ORAL at 17:19

## 2022-09-23 RX ADMIN — GUAIFENESIN 600 MG: 600 TABLET ORAL at 22:03

## 2022-09-23 RX ADMIN — SOTALOL HYDROCHLORIDE 80 MG: 80 TABLET ORAL at 06:08

## 2022-09-23 RX ADMIN — DICLOFENAC SODIUM 2 G: 10 GEL TOPICAL at 17:21

## 2022-09-23 RX ADMIN — GUAIFENESIN 600 MG: 600 TABLET ORAL at 08:51

## 2022-09-23 RX ADMIN — LEVALBUTEROL HYDROCHLORIDE 1.25 MG: 1.25 SOLUTION RESPIRATORY (INHALATION) at 13:14

## 2022-09-23 RX ADMIN — Medication 10 ML: at 13:00

## 2022-09-23 RX ADMIN — BUDESONIDE 500 MCG: 0.5 SUSPENSION RESPIRATORY (INHALATION) at 07:07

## 2022-09-23 RX ADMIN — MONTELUKAST 10 MG: 10 TABLET, FILM COATED ORAL at 22:03

## 2022-09-23 RX ADMIN — LEVALBUTEROL HYDROCHLORIDE 1.25 MG: 1.25 SOLUTION RESPIRATORY (INHALATION) at 19:53

## 2022-09-23 RX ADMIN — SOTALOL HYDROCHLORIDE 80 MG: 80 TABLET ORAL at 17:19

## 2022-09-23 RX ADMIN — TOBRAMYCIN SULFATE 300 MG: 40 INJECTION, SOLUTION INTRAMUSCULAR; INTRAVENOUS at 20:44

## 2022-09-23 RX ADMIN — PANTOPRAZOLE SODIUM 40 MG: 40 TABLET, DELAYED RELEASE ORAL at 08:51

## 2022-09-23 RX ADMIN — BUDESONIDE 500 MCG: 0.5 SUSPENSION RESPIRATORY (INHALATION) at 20:03

## 2022-09-23 RX ADMIN — TOBRAMYCIN SULFATE 300 MG: 40 INJECTION, SOLUTION INTRAMUSCULAR; INTRAVENOUS at 07:23

## 2022-09-23 RX ADMIN — LEVALBUTEROL HYDROCHLORIDE 1.25 MG: 1.25 SOLUTION RESPIRATORY (INHALATION) at 01:08

## 2022-09-23 RX ADMIN — ARFORMOTEROL TARTRATE 15 MCG: 15 SOLUTION RESPIRATORY (INHALATION) at 07:07

## 2022-09-23 RX ADMIN — ARFORMOTEROL TARTRATE 15 MCG: 15 SOLUTION RESPIRATORY (INHALATION) at 20:03

## 2022-09-23 RX ADMIN — GUAIFENESIN 600 MG: 600 TABLET ORAL at 21:54

## 2022-09-23 NOTE — PROGRESS NOTES
Problem: Falls - Risk of  Goal: *Absence of Falls  Description: Document Haviland Tala Fall Risk and appropriate interventions in the flowsheet. Outcome: Progressing Towards Goal  Note: Fall Risk Interventions:            Medication Interventions: Bed/chair exit alarm                   Problem: Patient Education: Go to Patient Education Activity  Goal: Patient/Family Education  Outcome: Progressing Towards Goal     Problem: Patient Education: Go to Patient Education Activity  Goal: Patient/Family Education  Outcome: Progressing Towards Goal     Problem: Patient Education: Go to Patient Education Activity  Goal: Patient/Family Education  Outcome: Progressing Towards Goal     Problem: Pressure Injury - Risk of  Goal: *Prevention of pressure injury  Description: Document Armando Scale and appropriate interventions in the flowsheet.   Outcome: Progressing Towards Goal  Note: Pressure Injury Interventions:  Sensory Interventions: Assess changes in LOC    Moisture Interventions: Absorbent underpads, Minimize layers    Activity Interventions: Increase time out of bed    Mobility Interventions: PT/OT evaluation, Assess need for specialty bed    Nutrition Interventions: Document food/fluid/supplement intake    Friction and Shear Interventions: Minimize layers, Apply protective barrier, creams and emollients                Problem: Patient Education: Go to Patient Education Activity  Goal: Patient/Family Education  Outcome: Progressing Towards Goal

## 2022-09-23 NOTE — PROGRESS NOTES
9/23/2022   CARE MANAGEMENT NOTE:  CM reviewed EMR. READMISSION:  Pt was admitted with dyspnea, Afib, CHF. Reportedly, pt resides with her  who has dementia. Son, Yann Walker ([phone]) and DIL are the family contacts. RUR 21%; LOS 4 days     Transition Plan of Care:  Cardiology, Pulmonary are following for medical management  [facility] (pulmonary rehab) accepted but  [facility] Medicare denied. SNF referrals will be needed as Peer to Peer for reversal of decision is unlikely. Outpatient follow up  Mode of transportation to be determined     CM will continue to follow for SNF (insurance denied IPR)  BENITO Davis

## 2022-09-23 NOTE — PROGRESS NOTES
Comprehensive Nutrition Assessment    Type and Reason for Visit: Initial    Nutrition Recommendations/Plan:   Continue regular, No caffeine diet order  Provide Ensure Compact BID to aid in meeting kcal needs (440 kcal, 64 g carbs,18 g protein) - vanilla only      Malnutrition Assessment:  Malnutrition Status: Moderate malnutrition (09/23/22 1452)    Context:  Acute illness     Findings of the 6 clinical characteristics of malnutrition:   Energy Intake:  Mild decrease in energy intake (specify) (x 5 days)  Weight Loss:  Unable to assess (question accuracy of 9/14/22 wt)     Body Fat Loss:  Mild body fat loss, Orbital   Muscle Mass Loss:  Mild muscle mass loss, Clavicles (pectoralis & deltoids), Scapula (trapezius)  Fluid Accumulation:  No significant fluid accumulation,     Strength:  Not performed     Nutrition Assessment:     Pt is an 80year old female admitted with Dyspnea [R06.00]. She  has a past medical history of Asthma, CHF (congestive heart failure), NYHA class I, chronic, systolic (HCC), Chronic anticoagulation, GERD (gastroesophageal reflux disease), Hiatal hernia, breast cancer (2015), Hypothyroid, Paroxysmal A-fib ([address] Venous insufficiency, and Weight loss. RD screen for LOS. Patient's weight up ~10# x 6 days, question accuracy. Patient states #, currently 6# lighter than UBW. Appears to have lost this in the last month, 4.2%, not clinically significant for timeframe. PO intake averaging 50% of all meals. Patient with very specific meal preferences. Was previously admitted for hyponatremia, and accepted ONS during admission. Voiced acceptance during this admission. Denies current N/V/D. NKFA. Denies chewing problems but states swallowing is difficult for her without sauces. Has already been seen by SLP, who cleared patient for regular diet.      Wt Readings from Last 10 Encounters:   09/20/22 60.8 kg (134 lb)   09/14/22 57 kg (125 lb 10.6 oz)   08/23/22 64.1 kg (141 lb 5 oz)   08/10/22 63 kg (138 lb 14.2 oz)   05/01/22 68.5 kg (151 lb 0.2 oz)   06/14/21 76.8 kg (169 lb 5 oz)   04/28/21 76.8 kg (169 lb 6.4 oz)   01/13/21 77.1 kg (170 lb)   01/11/21 77.1 kg (170 lb)   01/06/21 77.1 kg (170 lb)     Patient Vitals for the past 168 hrs:   % Diet Eaten   09/23/22 0937 26 - 50%   09/21/22 0837 26 - 50%   09/20/22 1659 26 - 50%       Last 3 Recorded Weights in this Encounter    09/19/22 0502 09/20/22 0959   Weight: 60.8 kg (134 lb) 60.8 kg (134 lb)       Nutrition Related Findings:      Wound Type: None  Last Bowel Movement Date: 09/22/22  Stool Appearance: Soft  Edema:No data recorded    Nutr. Labs:    Lab Results   Component Value Date/Time    GFR est AA >60 09/22/2022 10:26 AM    GFR est non-AA >60 09/22/2022 10:26 AM    Creatinine 0.50 (L) 09/22/2022 10:26 AM    BUN 15 09/22/2022 10:26 AM    Sodium 131 (L) 09/22/2022 10:26 AM    Potassium 5.3 (H) 09/22/2022 10:26 AM    Chloride 96 (L) 09/22/2022 10:26 AM    CO2 32 09/22/2022 10:26 AM    Digoxin level 1.4 09/12/2022 12:00 AM       Lab Results   Component Value Date/Time    Glucose 93 09/22/2022 10:26 AM    Glucose (POC) 106 09/19/2022 05:49 AM       No results found for: HBA1C, GNN8NKHR, UGL8VCDI, JLD2WNUQ    Nutr. Meds:  Bumex, Singulair, Protonix, miralax PRN, xarelto      Current Nutrition Intake & Therapies:  Average Meal Intake: 26-50%  Average Supplement Intake: None ordered  ADULT DIET Regular; No Caffeine; No chocolate, no tomatoes, no orange juice  DIET ONE TIME MESSAGE    Anthropometric Measures:  Height: 5' 4\" (162.6 cm)  Ideal Body Weight (IBW): 120 lbs (55 kg)     Current Body Wt:  60.8 kg (134 lb 0.6 oz), 111.7 % IBW.  Bed scale  Current BMI (kg/m2): 23  Usual Body Weight: 63.5 kg (140 lb)  % Weight Change (Calculated): -4.3  Weight Adjustment: No adjustment                 BMI Category: Normal weight (BMI 22.0-24.9) age over 72    Estimated Daily Nutrient Needs:  Energy Requirements Based On: Formula  Weight Used for Energy Requirements: Usual  Energy (kcal/day): 1405 (MSj x 1.3)  Weight Used for Protein Requirements: Usual  Protein (g/day): 64-76 (1.0-1.2 g/kg)  Method Used for Fluid Requirements: 1 ml/kcal  Fluid (ml/day): 1405    Nutrition Diagnosis:   Moderate malnutrition related to early satiety, altered GI function, inadequate protein-energy intake as evidenced by poor intake prior to admission, intake 26-50%, Criteria as identified in malnutrition assessment    Nutrition Interventions:   Food and/or Nutrient Delivery: Continue current diet, Start oral nutrition supplement  Nutrition Education/Counseling: No recommendations at this time  Coordination of Nutrition Care: Continue to monitor while inpatient, Interdisciplinary rounds  Plan of Care discussed with: IDR team    Goals:     Goals: Meet at least 75% of estimated needs, within 7 days       Nutrition Monitoring and Evaluation:   Behavioral-Environmental Outcomes: None identified  Food/Nutrient Intake Outcomes: Food and nutrient intake, Supplement intake  Physical Signs/Symptoms Outcomes: Biochemical data, Hemodynamic status, GI status, Weight    Discharge Planning:    Continue oral nutrition supplement    Hawa Escalatne MS, RD  Contact: Ext: [phone], or via [employer]

## 2022-09-23 NOTE — NURSE NAVIGATOR
Met with patient at bedside. Introduced self and role of HF NN. Assessed her understanding of HF and hospitalization. Patient states she has been on diuretics in the past at a low dose. She has both 20 mg and 40 mg tablets at home. She has had atrial fib with a watchman in the past.  She sees both Dr Cristine Macario and Dr Jeremy Allen but recently has followed up with the Cardiology NP's. Reviewed diastolic HF disease process, HF symptoms, rationale for daily weight, and early recognition of symptoms and notification of provider for diuretic adjustment. Patient knows her medications by name and manages them. Discussed relationship of sodium to fluid congestion. Patient states she has mostly a poor appetite and has had weight loss over the past year. Dietician has seen today and added ONS for patient as well as updated food preferences. Discussed general guidelines for fluid intake since patient is not currently on fluid restriction. She generally thinks she does not drink enough and estimates drinking around 1000 ml daily. Discussed current discharge plan of [facility] prior to going home. Patient states she lives with her  who has dementia but is fairly independent. She has supportive son and DIL who are taking care of him while she is hospitalized. Patient did not see her Cardiology or PCP provider after last discharge. She had appointment for Monday with Dr Jeremy Allen which is now cancelled. Patient was given the Living with HF Education book, HF magnet, and HF calendar.

## 2022-09-23 NOTE — PROGRESS NOTES
Name: Janice Tho: [address]   : 1940 Admit Date: 2022   Phone: [phone]  Room: [facility]/   PCP: Orquidea Perez MD  MRN: [id number]   Date: 2022  Code: Full Code          Chart and notes reviewed. Data reviewed. I review the patient's current medications in the medical record at each encounter. I have evaluated and examined the patient. HPI:    9:07 AM       History was obtained from patient. I was asked by Maira Gagnon MD to see Brit Galarza in consultation for a chief complaint of shortness of breath. History of Present Illness:  Ms. Michael Lui is an  yo woman with a history of asthma/COPD, pseudomonas pneumonia/colonization, bronchiectasis, former tobacco use, afib, breast cancer s/p chemo/radiation, seronegative RA and GERD who presented with dyspnea. She was recently discharged on  (admitted -) when she was treated for acute respiratory failure, PNA and hyponatremia. Did not require O2 at discharge. She had been admitted from - with PNA. During her most recent hospitalization she was treated with bactrim for stenothrophomonas grown at her prior hospitalization, but unfortunately this had to be stopped due to significant hyponatremia. She was treated with cefepime and discharged home on  cefdinir as she could not take levaquin while on sotalol. Attempts were made to stop the sotalol, but had had afib with RVR and was ultimately placed back on this. At discharge her Trelegy was stopped due to side effects/nausea and she was discharged on brovana/pulmicort. She comes back in with shortness of breath with exertion for the past several days. She also describes trouble swallowing, eating solids. She feels like she needs some sort of rehab as she is too weak at home. She was reportedly in [facility] in route, but has not been so here.      Labs:  WBC 12.7, cr 0.35, proBNP 5179    Images reviewed:  CXR 2022: patchy bibasilar infiltrates not significantly changed from prior     Interval history:    Afebrile  BP stable  Sats 93% on 1L   K 5.3   Na 131  Blood cultures no growth x 4 days    ROS:  Just got to the chair and used oxygen. Slept well overnight. Not nauseated this morning. Past Medical History:   Diagnosis Date    Asthma     CHF (congestive heart failure), NYHA class I, chronic, systolic (HCC)     Chronic anticoagulation     GERD (gastroesophageal reflux disease)     Hiatal hernia     HX: breast cancer     Right     Hypothyroid     Paroxysmal A-fib (HCC)     Venous insufficiency     bilat LE    Weight loss        Past Surgical History:   Procedure Laterality Date    COLONOSCOPY N/A 2021    COLONOSCOPY AND EGD performed by Grace Villalba MD at [address] BREAST LUMPECTOMY Right 2015    HX CATARACT REMOVAL  2013    Bilateral     HX CHOLECYSTECTOMY      HX DILATION AND CURETTAGE      x2    HX ORTHOPAEDIC  2017    RIGHT foot  hammertoe       History reviewed. No pertinent family history.     Social History     Tobacco Use    Smoking status: Former     Types: Cigarettes     Start date: 2000     Quit date: 2000     Years since quittin.8    Smokeless tobacco: Never   Substance Use Topics    Alcohol use: Not Currently       Allergies   Allergen Reactions    Ace Inhibitors Cough    Iodine Rash    Penicillins Rash       Current Facility-Administered Medications   Medication Dose Route Frequency    bumetanide (BUMEX) tablet 1 mg  1 mg Oral DAILY    artificial saliva (MOUTH KOTE) 1 Spray  1 Spray Oral PRN    levalbuterol (XOPENEX) nebulizer soln 1.25 mg/3 mL  1.25 mg Nebulization Q6H RT    sodium chloride (NS) flush 5-10 mL  5-10 mL IntraVENous PRN    albuterol 5mg / ipratropium 0.5mg neb solution  1 Dose Nebulization Q4H PRN    guaiFENesin ER (MUCINEX) tablet 600 mg  600 mg Oral Q12H    montelukast (SINGULAIR) tablet 10 mg  10 mg Oral QHS    [Held by provider] nystatin (MYCOSTATIN) 100,000 unit/mL oral suspension 500,000 Units  500,000 Units Oral QID    ketotifen (ZADITOR) 0.025 % (0.035 %) ophthalmic solution 1 Drop  1 Drop Both Eyes BID    pantoprazole (PROTONIX) tablet 40 mg  40 mg Oral DAILY    sotaloL (BETAPACE) tablet 80 mg  80 mg Oral Q12H    rivaroxaban (XARELTO) tablet 20 mg  20 mg Oral DAILY WITH DINNER    sodium chloride (NS) flush 5-40 mL  5-40 mL IntraVENous Q8H    sodium chloride (NS) flush 5-40 mL  5-40 mL IntraVENous PRN    acetaminophen (TYLENOL) tablet 650 mg  650 mg Oral Q6H PRN    Or    acetaminophen (TYLENOL) suppository 650 mg  650 mg Rectal Q6H PRN    polyethylene glycol (MIRALAX) packet 17 g  17 g Oral DAILY PRN    ondansetron (ZOFRAN ODT) tablet 4 mg  4 mg Oral Q8H PRN    Or    ondansetron (ZOFRAN) injection 4 mg  4 mg IntraVENous Q6H PRN    arformoteroL (BROVANA) neb solution 15 mcg  15 mcg Nebulization BID RT    budesonide (PULMICORT) 500 mcg/2 ml nebulizer suspension  500 mcg Nebulization BID RT    artificial saliva (MOUTH KOTE) 1 Spray  1 Spray Oral PRN    tobramycin (NEBCIN) injection 300 mg  300 mg Inhalation BID RT    diclofenac (VOLTAREN) 1 % topical gel 2 g  2 g Topical BID PRN         REVIEW OF SYSTEMS   12 point ROS negative except as stated in the HPI. Physical Exam:   Visit Vitals  /77 (BP 1 Location: Left upper arm, BP Patient Position: At rest;Lying;Semi fowlers)   Pulse 86   Temp 97.5 °F (36.4 °C)   Resp 18   Ht 5' 4\" (1.626 m)   Wt 60.8 kg (134 lb)   SpO2 93%   BMI 23.00 kg/m²       General:  Alert, cooperative, no distress, appears stated age. Frail. Head:  Normocephalic, without obvious abnormality, atraumatic. Eyes:  Conjunctivae/corneas clear. Nose: Nares normal. Septum midline. Mucosa normal.    Throat: Lips, mucosa, and tongue normal.    Neck: Supple, symmetrical, trachea midline, no adenopathy. Lungs:   Diminished; few basilar crackles   Chest wall:  No tenderness or deformity.    Heart:  Regular rate and rhythm, S1, S2 normal, no murmur, click, rub or gallop. Abdomen:   Soft, non-tender. Bowel sounds normal.    Extremities: Extremities normal, atraumatic, no cyanosis or edema. Pulses: 2+ and symmetric all extremities. Skin: Skin color, texture, turgor normal. No rashes or lesions       Neurologic: Grossly nonfocal       Lab Results   Component Value Date/Time    Sodium 131 (L) 09/22/2022 10:26 AM    Potassium 5.3 (H) 09/22/2022 10:26 AM    Chloride 96 (L) 09/22/2022 10:26 AM    CO2 32 09/22/2022 10:26 AM    BUN 15 09/22/2022 10:26 AM    Creatinine 0.50 (L) 09/22/2022 10:26 AM    Glucose 93 09/22/2022 10:26 AM    Calcium 8.7 09/22/2022 10:26 AM    Magnesium 1.7 09/22/2022 10:26 AM    Phosphorus 3.4 09/21/2022 01:57 AM       Lab Results   Component Value Date/Time    WBC 8.4 09/21/2022 01:57 AM    HGB 9.1 (L) 09/21/2022 01:57 AM    PLATELET 040 (H) 94/89/9700 01:57 AM    MCV 92.0 09/21/2022 01:57 AM       Lab Results   Component Value Date/Time    Alk. phosphatase 106 09/20/2022 02:04 AM    Protein, total 6.0 (L) 09/20/2022 02:04 AM    Albumin 2.0 (L) 09/20/2022 02:04 AM    Globulin 4.0 09/20/2022 02:04 AM       Lab Results   Component Value Date/Time    Iron 35 09/04/2022 05:26 PM    TIBC 193 (L) 09/04/2022 05:26 PM    Iron % saturation 18 (L) 09/04/2022 05:26 PM    Ferritin 196 09/04/2022 05:26 PM       Lab Results   Component Value Date/Time    TSH 1.12 09/07/2022 04:57 AM        No results found for: PH, PHI, PCO2, PCO2I, PO2, PO2I, HCO3, HCO3I, FIO2, FIO2I    Lab Results   Component Value Date/Time    Troponin-I, Qt. <0.05 01/13/2021 03:17 AM        Lab Results   Component Value Date/Time    Culture result: NO GROWTH 4 DAYS 09/19/2022 05:36 AM    Culture result: NO GROWTH 4 DAYS 09/19/2022 05:36 AM    Culture result: (A) 09/11/2022 12:10 PM     LIGHT Stenotrophomonas (Xantho.) maltophilia CLSI GUIDELINES DO NOT RECOMMEND [address]. TRIMETHOPRIM/SULFAMETHOXAZOLE IS THE DRUG OF CHOICE.     Culture result: LIGHT NORMAL RESPIRATORY CANDELARIO 09/11/2022 12:10 PM       No results found for: TOXA1, RPR, HBCM, HBSAG, HAAB, HCAB1, HAAT, G6PD, CRYAC, HIVGT, HIVR, HIV1, HIV12, HIVPC, HIVRPI    No results found for: VANCT, CPK    Lab Results   Component Value Date/Time    Color YELLOW/STRAW 09/19/2022 01:01 PM    Appearance CLEAR 09/19/2022 01:01 PM    pH (UA) 6.0 09/19/2022 01:01 PM    Protein TRACE (A) 09/19/2022 01:01 PM    Glucose Negative 09/19/2022 01:01 PM    Ketone Negative 09/19/2022 01:01 PM    Bilirubin Negative 09/19/2022 01:01 PM    Blood Negative 09/19/2022 01:01 PM    Urobilinogen 0.2 09/19/2022 01:01 PM    Nitrites Negative 09/19/2022 01:01 PM    Leukocyte Esterase SMALL (A) 09/19/2022 01:01 PM    WBC 5-10 09/19/2022 01:01 PM    RBC 0-5 09/19/2022 01:01 PM    Bacteria 1+ (A) 09/19/2022 01:01 PM       IMPRESSION  Acute respiratory failure with hypoxia  Asthma, not in exacerbation  Bronchiectasis  History of pseudomonas PNA, on wayne nebs at baseline  Afib    PLAN  Goal sats 88% or higher  Will need exercise oximetry closer to discharge  Brovana/Pulmicort, PRN duonebs  Check labs again today  Agree that she does not need additional antibiotics at this time; would not expect complete resolution of infiltrates on CXR in this interval and will need repeat imaging as an outpatient; low threshold to add abx if she decompensates  Diuresis as per Cardiology, watch I&O  Continue Wayne nebs  PT/OT consulted  Dispo SNF    Will see PRN over the weekend and check back with Ms. Milind Macario on Monday if she is still here.         Colton Catherine NP

## 2022-09-23 NOTE — PROGRESS NOTES
Physician Progress Note      PATIENT:               Edita Courtney  CSN #:                  [id number]  :                       1940  ADMIT DATE:       2022 4:55 AM  DISCH DATE:  RESPONDING  PROVIDER #:        Nasra Powers MD          QUERY TEXT:    Good afternoon. Pt admitted with Dyspnea and Paroxysmal A-fib  and has moderate malnutrition documented in RD note from . Please further specify type of malnutrition with documentation in the medical record. The medical record reflects the following:    Risk Factors: CHF, asthma, Paroxysmal A-fib, GERD, hiatal hernia, anemia, weight loss, Candidiasis, hx of breast cancer, hypothyroid    Clinical Indicators: from  RD note-\"Moderate malnutrition related to early satiety, altered GI function, inadequate protein-energy intake as evidenced by poor intake prior to admission, intake 26-50%, Criteria as identified in malnutrition assessment\" and \"Malnutrition Status: Moderate malnutrition Context:  Acute illness Findings of the 6 clinical characteristics of malnutrition:  Energy Intake:  Mild decrease in energy intake (specify) (x 5 days) Weight Loss:  Unable to assess (question accuracy of 22 wt)  Body Fat Loss:  Mild body fat loss, Orbital Muscle Mass Loss:  Mild muscle mass loss, Clavicles (pectoralis & deltoids), Scapula (trapezius) Fluid Accumulation:  No significant fluid accumulation; Albumin 2.0; Potassium 3.2, 3.8, 4.6, 5.3;  Sodium 135, 136, 134, 131  ? Treatment: RD consult, labs, oral nutritional supplements; I7Os and vital signs per unit protocol    Thank you,  Hilary Crespo RN, [facility]  [phone]  __________________________________________________________________________________________________  ASPEN Criteria:  https://aspenjournals. onlinelibrary. solis. com/doi/full/10.1177/0481018970598089  Options provided:  -- Mild Malnutrition  -- Moderate Malnutrition  -- Severe Malnutrition  -- Other - I will add my own diagnosis  -- Disagree - Not applicable / Not valid  -- Disagree - Clinically unable to determine / Unknown  -- Refer to Clinical Documentation Reviewer    PROVIDER RESPONSE TEXT:    This patient has moderate malnutrition.     Query created by: Padmini Diamond on 9/23/2022 4:18 PM      Electronically signed by:  Ligia Chau MD 9/23/2022 4:20 PM

## 2022-09-23 NOTE — PROGRESS NOTES
Problem: Self Care Deficits Care Plan (Adult)  Goal: *Acute Goals and Plan of Care (Insert Text)  Description: FUNCTIONAL STATUS PRIOR TO ADMISSION: Patient was modified independent using a rollator for functional mobility secondary to shortness of breath. HOME SUPPORT: The patient lived with spouse who has memory loss. Patient does not have to physically assist spouse. Patient son and daughter in law live close, but work outside jobs. Occupational Therapy Goals  Initiated 9/20/2022  1. Patient will perform lower body dressing with supervision/set-up within 7 day(s). 2.  Patient will perform bathing with supervision/set-up within 7 day(s). 3.  Patient will perform all aspects of toileting with supervision/set-up within 7 day(s). 4.  Patient will participate in upper extremity therapeutic exercise/activities with supervision/set-up for 5 minutes within 7 day(s). 5.  Patient will utilize energy conservation techniques during functional activities with verbal cues within 7 day(s). Outcome: Progressing Towards Goal   OCCUPATIONAL THERAPY TREATMENT  Patient: Dimitris Hollis (11 y.o. female)  Date: 9/23/2022  Diagnosis: Dyspnea [R06.00] <principal problem not specified>      Precautions:  fall  Chart, occupational therapy assessment, plan of care, and goals were reviewed. ASSESSMENT  Patient continues with skilled OT services and is progressing towards goals. Ms. Esther Sandoval was received in bed agreeable to activity. She was able to come to sitting EOB and don socks with supervision. Patient able to transfer to the bathroom for ADL retraining. She engaged in toileting and standing grooming tasks with good tolerance. Education provided regarding energy conservation, pacing, and pursed lip breathing techniques. Patient was received and remained on 2L O2 with SpO2 remaining >90%. Patient would benefit from continued skilled OT to progress towards goals and improve overall independence.       Current Level of Function Impacting Discharge (ADLs): Patient required CGA for functional mobility. Patient required supervision for standing grooming and CGA for toileting. PLAN :  Patient continues to benefit from skilled intervention to address the above impairments. Continue treatment per established plan of care to address goals. Recommend with staff:   Recommend patient be OOB to chair as frequently as tolerated; Goal of 3x/day for all meals for 60 minutes at a time. For toileting needs, recommend transfers to/from bathroom with staff assist.  Encourage patient involvement in personal care as able. Recommend next OT session: Continue towards set OT goals. Recommendation for discharge: (in order for the patient to meet his/her long term goals)  Therapy 5 days/week in pulmonary based rehab setting    This discharge recommendation:  Has been made in collaboration with the attending provider and/or case management    IF patient discharges home will need the following DME: none       SUBJECTIVE:   Patient agreeable to OT tx. OBJECTIVE DATA SUMMARY:   Cognitive/Behavioral Status:  Neurologic State: Alert  Orientation Level: Oriented X4  Cognition: Follows commands  Perception: Appears intact  Perseveration: No perseveration noted  Safety/Judgement: Awareness of environment    Functional Mobility and Transfers for ADLs:  Bed Mobility:  Scooting: Contact guard assistance    Transfers:  Sit to Stand: Contact guard assistance  Functional Transfers  Toilet Transfer : Contact guard assistance  Bed to Chair: Contact guard assistance    Balance:  Sitting: Intact; Without support  Standing: Impaired; Without support  Standing - Static: Good  Standing - Dynamic : Good;Constant support    ADL Intervention:  Lower Body Dressing Assistance  Dressing Assistance: Supervision  Socks: Supervision  Leg Crossed Method Used: Yes  Position Performed: Seated edge of bed  Cues: Verbal cues provided    Toileting  Toileting Assistance: Contact guard assistance  Bladder Hygiene: Contact guard assistance  Bowel Hygiene: Contact guard assistance  Clothing Management: Contact guard assistance  Cues: Verbal cues provided    Cognitive Retraining  Safety/Judgement: Awareness of environment    Activity Tolerance:   Good    After treatment patient left in no apparent distress:   Sitting in chair, Call bell within reach, and Bed / chair alarm activated    COMMUNICATION/COLLABORATION:   The patients plan of care was discussed with: Registered nurse and patient .      Jennifer Dean OTR/L  Time Calculation: 25 mins

## 2022-09-23 NOTE — PROGRESS NOTES
Vishal Colunga [facility]  [address] [facility], [place], [address] [place] [address]  [phone]      Medical Progress Note      NAME: Grecia Garcia   :  1940  MRM:  [id number]    Date of service: 2022  9:41 AM       Assessment and Plan:   Dyspnea / Abnormal xray -  Hx of Bronchiectasis, pseudomonas PNA, on jeff nebs at baseline. recurrent and persistent. not hypoxic. No sepsis criteria, unchanged xray and recent multiple rounds of Abx all argue against acute bacterial PNA. Recent stable ECHO. Evaluated by pulmonary and cardiology. Monitor without ABx. Cont po diuretics       2. Paroxysmal A-fib / Chronic anticoagulation - Continue xarelto and Sotalol      3. CHF (congestive heart failure), NYHA class I, chronic, systolic / elevated proBNP/ Venous insufficiency - Stable recent ECHO. Evaluated by cardiology. Continue diuretic and BB      4. Asthma, stable - On Brovana, Pulmicort and prn atrovent and xopenex. Pulmonary following. 5.  Hyponatremia / Hypokalemia / Hypomagnesemia - POA, stable. 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 Monitor      6. Anemia - Stable since last discharge, and normal serologies. 7.  Weight loss / GERD (gastroesophageal reflux disease) / Hiatal hernia / Candidiasis - She has very concerning unintentional wt loss, anorexia, nausea that persists. She weighed 170lb last year. 128 lb last week. 134lb now. On last visit GI was consulted and recommended outpatient FU. Cont PPI. Emptying study  shows delayed esophageal emptying but not delayed gastric emptying. She is on PO nystatin. 8.  Hx breast cancer - Outpatient follow up. Has been on Boniva      9. Hypothyroid - TSH was normal a few months ago. 10.  Chest pain. Reproducible. Likely musculoskeletal.  Monitor          Subjective:     Chief Complaint[de-identified] Patient was seen and examined as a follow up for dyspnea. Chart was reviewed.  c/o dry mouth     ROS:  (bold if positive, if negative)    Tolerating PT  Tolerating Diet        Objective:     Last 24hrs VS reviewed since prior progress note.  Most recent are:    Visit Vitals  /77 (BP 1 Location: Left upper arm, BP Patient Position: At rest;Lying;Semi fowlers)   Pulse 86   Temp 97.5 °F (36.4 °C)   Resp 18   Ht 5' 4\" (1.626 m)   Wt 60.8 kg (134 lb)   SpO2 93%   BMI 23.00 kg/m²     SpO2 Readings from Last 6 Encounters:   09/23/22 93%   09/14/22 92%   08/23/22 95%   08/10/22 94%   05/01/22 96%   06/14/21 99%    O2 Flow Rate (L/min): 1 l/min   No intake or output data in the 24 hours ending 09/23/22 0710       Physical Exam:    Gen:  Well-developed, well-nourished, in no acute distress  HEENT:  Pink conjunctivae, PERRL, hearing intact to voice, moist mucous membranes  Neck:  Supple, without masses, thyroid non-tender  Resp:  No accessory muscle use, rales BL  Card:  No murmurs, normal S1, S2 without thrills, bruits or peripheral edema  Abd:  Soft, non-tender, non-distended, normoactive bowel sounds are present, no palpable organomegaly and no detectable hernias  Lymph:  No cervical or inguinal adenopathy  Musc:  No cyanosis or clubbing  Skin:  No rashes or ulcers, skin turgor is good  Neuro:  Cranial nerves are grossly intact, no focal motor weakness, follows commands appropriately  Psych:  Good insight, oriented to person, place and time, alert  __________________________________________________________________  Medications Reviewed: (see below)  Medications:     Current Facility-Administered Medications   Medication Dose Route Frequency    bumetanide (BUMEX) tablet 1 mg  1 mg Oral DAILY    artificial saliva (MOUTH KOTE) 1 Spray  1 Spray Oral PRN    levalbuterol (XOPENEX) nebulizer soln 1.25 mg/3 mL  1.25 mg Nebulization Q6H RT    sodium chloride (NS) flush 5-10 mL  5-10 mL IntraVENous PRN    albuterol 5mg / ipratropium 0.5mg neb solution  1 Dose Nebulization Q4H PRN    guaiFENesin ER (MUCINEX) tablet 600 mg  600 mg Oral Q12H    montelukast (SINGULAIR) tablet 10 mg  10 mg Oral QHS    [Held by provider] nystatin (MYCOSTATIN) 100,000 unit/mL oral suspension 500,000 Units  500,000 Units Oral QID    ketotifen (ZADITOR) 0.025 % (0.035 %) ophthalmic solution 1 Drop  1 Drop Both Eyes BID    pantoprazole (PROTONIX) tablet 40 mg  40 mg Oral DAILY    sotaloL (BETAPACE) tablet 80 mg  80 mg Oral Q12H    rivaroxaban (XARELTO) tablet 20 mg  20 mg Oral DAILY WITH DINNER    sodium chloride (NS) flush 5-40 mL  5-40 mL IntraVENous Q8H    sodium chloride (NS) flush 5-40 mL  5-40 mL IntraVENous PRN    acetaminophen (TYLENOL) tablet 650 mg  650 mg Oral Q6H PRN    Or    acetaminophen (TYLENOL) suppository 650 mg  650 mg Rectal Q6H PRN    polyethylene glycol (MIRALAX) packet 17 g  17 g Oral DAILY PRN    ondansetron (ZOFRAN ODT) tablet 4 mg  4 mg Oral Q8H PRN    Or    ondansetron (ZOFRAN) injection 4 mg  4 mg IntraVENous Q6H PRN    arformoteroL (BROVANA) neb solution 15 mcg  15 mcg Nebulization BID RT    budesonide (PULMICORT) 500 mcg/2 ml nebulizer suspension  500 mcg Nebulization BID RT    artificial saliva (MOUTH KOTE) 1 Spray  1 Spray Oral PRN    tobramycin (NEBCIN) injection 300 mg  300 mg Inhalation BID RT    diclofenac (VOLTAREN) 1 % topical gel 2 g  2 g Topical BID PRN        Lab Data Reviewed: (see below)  Lab Review:     Recent Labs     09/21/22  0157   WBC 8.4   HGB 9.1*   HCT 28.6*   *       Recent Labs     09/22/22  1026 09/21/22  0157   * 134*   K 5.3* 4.6   CL 96* 98   CO2 32 30   GLU 93 103*   BUN 15 17   CREA 0.50* 0.42*   CA 8.7 8.5   MG 1.7 1.5*   PHOS  --  3.4       Lab Results   Component Value Date/Time    Glucose (POC) 106 09/19/2022 05:49 AM    Glucose (POC) 152 (H) 09/11/2022 03:02 PM    Glucose (POC) 146 (H) 09/06/2022 01:21 PM     No results for input(s): PH, PCO2, PO2, HCO3, FIO2 in the last 72 hours. No results for input(s): INR, INREXT, INREXT in the last 72 hours.   All Micro Results       Procedure Component Value Units Date/Time    CULTURE, BLOOD [[id number]] Collected: 09/19/22 0536    Order Status: Completed Specimen: Blood Updated: 09/22/22 0503     Special Requests: NO SPECIAL REQUESTS        Culture result: NO GROWTH 3 DAYS       CULTURE, BLOOD [[id number]] Collected: 09/19/22 0536    Order Status: Completed Specimen: Blood Updated: 09/22/22 0503     Special Requests: NO SPECIAL REQUESTS        Culture result: NO GROWTH 3 DAYS       RESPIRATORY VIRUS PANEL W/COVID-19, PCR [[id number]] Collected: 09/19/22 0944    Order Status: Completed Specimen: Nasopharyngeal Updated: 09/19/22 1516     Adenovirus Not detected        Coronavirus 229E Not detected        Coronavirus HKU1 Not detected        Coronavirus CVNL63 Not detected        Coronavirus OC43 Not detected        SARS-CoV-2, PCR Not detected        Metapneumovirus Not detected        Rhinovirus and Enterovirus Not detected        Influenza A Not detected        Influenza B Not detected        Parainfluenza 1 Not detected        Parainfluenza 2 Not detected        Parainfluenza 3 Not detected        Parainfluenza virus 4 Not detected        RSV by PCR Not detected        B. parapertussis, PCR Not detected        Bordetella pertussis - PCR Not detected        Chlamydophila pneumoniae DNA, QL, PCR Not detected        Mycoplasma pneumoniae DNA, QL, PCR Not detected       CULTURE, URINE [[id number]] Collected: 09/19/22 1301    Order Status: Canceled Specimen: Urine from Clean catch     COVID-19 RAPID TEST [[id number]] Collected: 09/19/22 0604    Order Status: Completed Specimen: Nasopharyngeal Updated: 09/19/22 0631     Specimen source Nasopharyngeal        COVID-19 rapid test Not detected        Comment: Rapid Abbott ID Now       Rapid NAAT:  The specimen is NEGATIVE for SARS-CoV-2, the novel coronavirus associated with COVID-19.        Negative results should be treated as presumptive and, if inconsistent with clinical signs and symptoms or necessary for patient management, should be tested with an alternative molecular assay. Negative results do not preclude SARS-CoV-2 infection and should not be used as the sole basis for patient management decisions. This test has been authorized by the FDA under an Emergency Use Authorization (EUA) for use by authorized laboratories. Fact sheet for Healthcare Providers: ConventionUpdate.co.nz  Fact sheet for Patients: ConventionUpdate.co.nz       Methodology: Isothermal Nucleic Acid Amplification         COVID-19 WITH INFLUENZA A/B [[id number]]     Order Status: Canceled Specimen: Nasopharyngeal             I have reviewed notes of prior 24hr. Other pertinent lab: Total time spent with patient: 35 minutes I personally reviewed chart, notes, data and current medications in the medical record. I have personally examined and treated the patient at bedside during this period.                  Care Plan discussed with: Patient, Nursing Staff, and >50% of time spent in counseling and coordination of care    Discussed:  Care Plan    Prophylaxis:   xarelto     Disposition:  SNF/LTC           ___________________________________________________    Attending Physician: Lewis Valverde MD

## 2022-09-23 NOTE — PROGRESS NOTES
Bedside and Verbal shift change report given to [facility] (oncoming nurse) by Joon CERVANTES RN (offgoing nurse). Report included the following information SBAR, Kardex, Intake/Output, MAR, Accordion, and Recent Results.

## 2022-09-23 NOTE — PROGRESS NOTES
Problem: Mobility Impaired (Adult and Pediatric)  Goal: *Acute Goals and Plan of Care (Insert Text)  Description: FUNCTIONAL STATUS PRIOR TO ADMISSION: Patient was modified independent using a rollator for functional mobility. HOME SUPPORT PRIOR TO ADMISSION: The patient lived with spouse with dementia. Pt  did not require assist.    Physical Therapy Goals  Initiated 9/20/2022  1. Patient will move from supine to sit and sit to supine  in bed with independence within 7 day(s). 2.  Patient will transfer from bed to chair and chair to bed with modified independence using the least restrictive device within 7 day(s). 3.  Patient will perform sit to stand with supervision/set-up within 7 day(s). 4.  Patient will ambulate with supervision/set-up for 50 feet with the least restrictive device within 7 day(s). 5.  Patient will ascend/descend 5 stairs with 2 handrail(s) with minimal assistance/contact guard assist within 7 day(s). Outcome: Progressing Towards Goal   PHYSICAL THERAPY TREATMENT  Patient: Gladis Cooper (8 y.o. female)  Date: 9/23/2022  Diagnosis: Dyspnea [R06.00] <principal problem not specified>      Precautions:    Chart, physical therapy assessment, plan of care and goals were reviewed. ASSESSMENT  Patient received sitting up on the recliner, agreeable for therapy. Pt continues with skilled PT services and is progressing towards goals. Pt participated in there ex's for b/l Le strengthening in sitting position. Requires SBA for STS and toilet transfers, IND with perineal care. Pt  able to ambulate - 79' with RW, CGA, SOB post ambulation, SPO2 -93%, encouraged to perform pursed lip Dbex's, felt better. Pt demonstrates steady gait with no instances of LOB.      Current Level of Function Impacting Discharge (mobility/balance): Pt requires SBA/CGA for transfers/mobility    Other factors to consider for discharge: time since onset, decline from functional baseline         PLAN :  Patient continues to benefit from skilled intervention to address the above impairments. Continue treatment per established plan of care. to address goals. Recommendation for discharge: (in order for the patient to meet his/her long term goals)  Therapy 3 hours per day 5-7 days per week (pulmonary rehab)    This discharge recommendation:  Has been made in collaboration with the attending provider and/or case management    IF patient discharges home will need the following DME: patient owns DME required for discharge       SUBJECTIVE:   Patient stated I am feeling better    OBJECTIVE DATA SUMMARY:   Critical Behavior:  Neurologic State: Alert  Orientation Level: Oriented X4  Cognition: Follows commands  Safety/Judgement: Awareness of environment  Functional Mobility Training:    Transfers:  Sit to Stand: Stand-by assistance  Stand to Sit: Stand-by assistance    Balance:  Sitting: Intact; Without support  Standing: Impaired; Without support  Standing - Static: Good  Standing - Dynamic : Good;Constant support  Ambulation/Gait Training:  Distance (ft): 70 Feet (ft)  Assistive Device: Walker, rolling;Gait belt  Ambulation - Level of Assistance: Contact guard assistance    Speed/Ivory: Slow;Pace decreased (<100 feet/min)  Step Length: Right shortened;Left shortened    Therapeutic Exercises:   AP, Laq, seated marches - 12 reps  Pain Ratin/10    Activity Tolerance:   Fair, requires rest breaks, and observed SOB with activity    After treatment patient left in no apparent distress:   Sitting in chair, Call bell within reach, and Caregiver / family present    COMMUNICATION/COLLABORATION:   The patients plan of care was discussed with: Registered nurseAna Machado   Time Calculation: 31 mins

## 2022-09-24 LAB
ANION GAP SERPL CALC-SCNC: 3 MMOL/L (ref 5–15)
BASOPHILS # BLD: 0.1 K/UL (ref 0–0.1)
BASOPHILS NFR BLD: 1 % (ref 0–1)
BUN SERPL-MCNC: 12 MG/DL (ref 6–20)
BUN/CREAT SERPL: 28 (ref 12–20)
CALCIUM SERPL-MCNC: 9 MG/DL (ref 8.5–10.1)
CHLORIDE SERPL-SCNC: 87 MMOL/L (ref 97–108)
CO2 SERPL-SCNC: 35 MMOL/L (ref 21–32)
CREAT SERPL-MCNC: 0.43 MG/DL (ref 0.55–1.02)
DIFFERENTIAL METHOD BLD: ABNORMAL
EOSINOPHIL # BLD: 0.1 K/UL (ref 0–0.4)
EOSINOPHIL NFR BLD: 2 % (ref 0–7)
ERYTHROCYTE [DISTWIDTH] IN BLOOD BY AUTOMATED COUNT: 14.9 % (ref 11.5–14.5)
GLUCOSE BLD STRIP.AUTO-MCNC: 120 MG/DL (ref 65–117)
GLUCOSE SERPL-MCNC: 108 MG/DL (ref 65–100)
HCT VFR BLD AUTO: 31.3 % (ref 35–47)
HGB BLD-MCNC: 10.1 G/DL (ref 11.5–16)
IMM GRANULOCYTES # BLD AUTO: 0.1 K/UL (ref 0–0.04)
IMM GRANULOCYTES NFR BLD AUTO: 1 % (ref 0–0.5)
LYMPHOCYTES # BLD: 1.1 K/UL (ref 0.8–3.5)
LYMPHOCYTES NFR BLD: 14 % (ref 12–49)
MCH RBC QN AUTO: 28.5 PG (ref 26–34)
MCHC RBC AUTO-ENTMCNC: 32.3 G/DL (ref 30–36.5)
MCV RBC AUTO: 88.4 FL (ref 80–99)
MONOCYTES # BLD: 0.8 K/UL (ref 0–1)
MONOCYTES NFR BLD: 10 % (ref 5–13)
NEUTS SEG # BLD: 6 K/UL (ref 1.8–8)
NEUTS SEG NFR BLD: 72 % (ref 32–75)
NRBC # BLD: 0 K/UL (ref 0–0.01)
NRBC BLD-RTO: 0 PER 100 WBC
PLATELET # BLD AUTO: 467 K/UL (ref 150–400)
PMV BLD AUTO: 8.9 FL (ref 8.9–12.9)
POTASSIUM SERPL-SCNC: 4.2 MMOL/L (ref 3.5–5.1)
RBC # BLD AUTO: 3.54 M/UL (ref 3.8–5.2)
SERVICE CMNT-IMP: ABNORMAL
SODIUM SERPL-SCNC: 125 MMOL/L (ref 136–145)
WBC # BLD AUTO: 8.1 K/UL (ref 3.6–11)

## 2022-09-24 PROCEDURE — 94761 N-INVAS EAR/PLS OXIMETRY MLT: CPT

## 2022-09-24 PROCEDURE — 65270000029 HC RM PRIVATE

## 2022-09-24 PROCEDURE — 36415 COLL VENOUS BLD VENIPUNCTURE: CPT

## 2022-09-24 PROCEDURE — 74011250637 HC RX REV CODE- 250/637: Performed by: INTERNAL MEDICINE

## 2022-09-24 PROCEDURE — G0378 HOSPITAL OBSERVATION PER HR: HCPCS

## 2022-09-24 PROCEDURE — 80048 BASIC METABOLIC PNL TOTAL CA: CPT

## 2022-09-24 PROCEDURE — 74011000250 HC RX REV CODE- 250: Performed by: INTERNAL MEDICINE

## 2022-09-24 PROCEDURE — 77010033678 HC OXYGEN DAILY

## 2022-09-24 PROCEDURE — 94640 AIRWAY INHALATION TREATMENT: CPT

## 2022-09-24 PROCEDURE — 85025 COMPLETE CBC W/AUTO DIFF WBC: CPT

## 2022-09-24 PROCEDURE — 82962 GLUCOSE BLOOD TEST: CPT

## 2022-09-24 RX ORDER — ZOLPIDEM TARTRATE 5 MG/1
2.5 TABLET ORAL
Status: DISCONTINUED | OUTPATIENT
Start: 2022-09-24 | End: 2022-09-28 | Stop reason: HOSPADM

## 2022-09-24 RX ORDER — CALCIUM CARBONATE 200(500)MG
200 TABLET,CHEWABLE ORAL
Status: DISCONTINUED | OUTPATIENT
Start: 2022-09-24 | End: 2022-09-28 | Stop reason: HOSPADM

## 2022-09-24 RX ADMIN — GUAIFENESIN 600 MG: 600 TABLET ORAL at 23:01

## 2022-09-24 RX ADMIN — ARFORMOTEROL TARTRATE 15 MCG: 15 SOLUTION RESPIRATORY (INHALATION) at 19:58

## 2022-09-24 RX ADMIN — BUDESONIDE 500 MCG: 0.5 SUSPENSION RESPIRATORY (INHALATION) at 19:58

## 2022-09-24 RX ADMIN — BUDESONIDE 500 MCG: 0.5 SUSPENSION RESPIRATORY (INHALATION) at 08:01

## 2022-09-24 RX ADMIN — SOTALOL HYDROCHLORIDE 80 MG: 80 TABLET ORAL at 17:22

## 2022-09-24 RX ADMIN — KETOTIFEN FUMARATE 1 DROP: 0.35 SOLUTION/ DROPS OPHTHALMIC at 13:15

## 2022-09-24 RX ADMIN — LEVALBUTEROL HYDROCHLORIDE 1.25 MG: 1.25 SOLUTION RESPIRATORY (INHALATION) at 13:05

## 2022-09-24 RX ADMIN — Medication 200 MG: at 13:13

## 2022-09-24 RX ADMIN — SOTALOL HYDROCHLORIDE 80 MG: 80 TABLET ORAL at 06:15

## 2022-09-24 RX ADMIN — ONDANSETRON 4 MG: 4 TABLET, ORALLY DISINTEGRATING ORAL at 23:01

## 2022-09-24 RX ADMIN — MONTELUKAST 10 MG: 10 TABLET, FILM COATED ORAL at 23:01

## 2022-09-24 RX ADMIN — ACETAMINOPHEN 650 MG: 325 TABLET ORAL at 13:13

## 2022-09-24 RX ADMIN — LEVALBUTEROL HYDROCHLORIDE 1.25 MG: 1.25 SOLUTION RESPIRATORY (INHALATION) at 19:58

## 2022-09-24 RX ADMIN — Medication 10 ML: at 06:15

## 2022-09-24 RX ADMIN — ARFORMOTEROL TARTRATE 15 MCG: 15 SOLUTION RESPIRATORY (INHALATION) at 08:01

## 2022-09-24 RX ADMIN — PANTOPRAZOLE SODIUM 40 MG: 40 TABLET, DELAYED RELEASE ORAL at 09:53

## 2022-09-24 RX ADMIN — LEVALBUTEROL HYDROCHLORIDE 1.25 MG: 1.25 SOLUTION RESPIRATORY (INHALATION) at 01:09

## 2022-09-24 RX ADMIN — Medication 10 ML: at 23:02

## 2022-09-24 RX ADMIN — ACETAMINOPHEN 650 MG: 325 TABLET ORAL at 23:01

## 2022-09-24 RX ADMIN — BUMETANIDE 1 MG: 1 TABLET ORAL at 09:53

## 2022-09-24 RX ADMIN — NYSTATIN 500000 UNITS: 100000 SUSPENSION ORAL at 17:22

## 2022-09-24 RX ADMIN — Medication 10 ML: at 13:20

## 2022-09-24 RX ADMIN — LEVALBUTEROL HYDROCHLORIDE 1.25 MG: 1.25 SOLUTION RESPIRATORY (INHALATION) at 07:55

## 2022-09-24 RX ADMIN — ONDANSETRON 4 MG: 4 TABLET, ORALLY DISINTEGRATING ORAL at 10:01

## 2022-09-24 RX ADMIN — Medication 200 MG: at 17:23

## 2022-09-24 RX ADMIN — DICLOFENAC SODIUM 2 G: 10 GEL TOPICAL at 23:02

## 2022-09-24 RX ADMIN — NYSTATIN 500000 UNITS: 100000 SUSPENSION ORAL at 13:12

## 2022-09-24 RX ADMIN — RIVAROXABAN 20 MG: 10 TABLET, FILM COATED ORAL at 17:22

## 2022-09-24 RX ADMIN — NYSTATIN 500000 UNITS: 100000 SUSPENSION ORAL at 23:01

## 2022-09-24 RX ADMIN — GUAIFENESIN 600 MG: 600 TABLET ORAL at 09:53

## 2022-09-24 NOTE — PROGRESS NOTES
Physician Progress Note      PATIENT:               Georgia Martinez  CSN #:                  [id number]  :                       1940  ADMIT DATE:       2022 1:25 PM  [address] DATE:        2022 2:27 PM  RESPONDING  PROVIDER #:        Rin Zelaya MD          QUERY TEXT:    Pt admitted with dyspnea with H&P noting unclear etiology of sxs. Documentation reflects acute vs chronic CHF and PNA. If possible, please document in progress notes and discharge summary the relationship, if any, between SOB, CHF (acute vs chronic), and PNA. The medical record reflects the following:  Risk Factors: asthma, CHF, recent pna  Clinical Indicators: to ED for SOB and palpitations  - noted h/o CHF  - WBC 13.0  - pro-BNP: 2,712  - XR- pulmonary edema vs pna  -  sputum cx:  LIGHT Stenotrophomonas (Xantho) maltophilia  - H&P notes unclear etiology of sxs, CXR with mild general edema and pBNP slightly elevated but has been in the past as well  - Cardiology consult:  Increasing dyspnea. Acute on chronic HF, She was diuresed with IV diuretics yesterday and with that her lower remedy edema has improved. Shortness of breath and cough persists and I suspect are mostly driven by nonresolving pneumonia. CT chest was personally reviewed and appears to be more consistent with pneumonia rather than pulmonary edema.  -  PN:  Pulmonary consulted.  Broad differential including chronic simmering infections, vs aspiration, She had just completed Abx for PNA  -  Card notes EF improved on echo, pt currently not in exacerbation, and hold furhter diuretics d/t hyponatremia  -  Card continues with documentation of acute on chronic HF and diuretics held d/t hyponatremia  - DCS notes CHF as chronic and unclear if any exacerbation but not thought to be so  Treatment: IV Lasix in ER, CXR x2, CT chest, Lasix IV daily,  cefepime IV started and continued to , Mucinex      Thank you,    Chace Rincon RN  CDI  Options provided:  -- SOB due to Acute/Chronic systolic CHF  -- SOB due to PNA unspecified, and Acute CHF ruled out  -- SOB due to Gram Negative PNA (Stenotrophomonas(Xantho)maltophilia PNA) and Acute CHF ruled out  -- SOB due to both PNA unspecified and  Acute/Chronic systolic CHF  -- SOB due to both Gram Negative PNA (Stenotrophomonas(Xantho)maltophilia PNA) and Acute/Chronic systolic CHF  -- Other - I will add my own diagnosis  -- Disagree - Not applicable / Not valid  -- Disagree - Clinically unable to determine / Unknown  -- Refer to Clinical Documentation Reviewer    PROVIDER RESPONSE TEXT:    Provider is clinically unable to determine a response to this query.     Query created by: Pat Mckinnon on 9/23/2022 10:42 AM      Electronically signed by:  Jennifer Arteaga MD 9/23/2022 10:45 PM

## 2022-09-24 NOTE — PROGRESS NOTES
Hospital Day 5:   3:58 PM- CM spoke with pt's son Miguel Angel Dickinson- provided updates and answered questions/concerns regarding dispo. Erich agreeable for additional referrals to be sent to [facility] (1st choice) and [facility]  (2ND choice) as alternative; understand limited FOC due to insurance coverage. 12:45 PM- CM spoke with Danby with [facility]- she confirmed pt was denied and provided instructions for Peer to Peer. Attending to call for Peer to Peer on Monday 9/26 by 12:00 PM.     Phone number: [phone]    Reference: [id number]     CM will update and attending and pt's family. 12:19 PM- Pt remains on Med. Surg- CM notified by attending that pt's family reached out to insurance and are eligible for a peer to peer. Pt's family wishes to proceed with this- CM reached out to Danby with [facility]- she states she does not see a denial- she is reaching out to the insurance to see if they can do a peer to peer today.     Ruthie Rolon, MSALEX, [address]

## 2022-09-24 NOTE — PROGRESS NOTES
Problem: Falls - Risk of  Goal: *Absence of Falls  Description: Document Armando Fisher Fall Risk and appropriate interventions in the flowsheet. Outcome: Progressing Towards Goal  Note: Fall Risk Interventions:            Medication Interventions: Patient to call before getting OOB, Teach patient to arise slowly                   Problem: Patient Education: Go to Patient Education Activity  Goal: Patient/Family Education  Outcome: Progressing Towards Goal     Problem: Patient Education: Go to Patient Education Activity  Goal: Patient/Family Education  Outcome: Progressing Towards Goal     Problem: Patient Education: Go to Patient Education Activity  Goal: Patient/Family Education  Outcome: Progressing Towards Goal     Problem: Pressure Injury - Risk of  Goal: *Prevention of pressure injury  Description: Document Armando Scale and appropriate interventions in the flowsheet.   Outcome: Progressing Towards Goal  Note: Pressure Injury Interventions:  Sensory Interventions: Assess changes in LOC, Keep linens dry and wrinkle-free, Minimize linen layers    Moisture Interventions: Absorbent underpads, Minimize layers    Activity Interventions: Increase time out of bed    Mobility Interventions: HOB 30 degrees or less    Nutrition Interventions: Document food/fluid/supplement intake, Offer support with meals,snacks and hydration    Friction and Shear Interventions: Minimize layers, Apply protective barrier, creams and emollients                Problem: Patient Education: Go to Patient Education Activity  Goal: Patient/Family Education  Outcome: Progressing Towards Goal     Problem: Nutrition Deficit  Goal: *Optimize nutritional status  Outcome: Progressing Towards Goal

## 2022-09-24 NOTE — PROGRESS NOTES
Vishal Colunga [facility] 79  [address] [facility], [place], [address] [place] [address]  [phone]      Medical Progress Note      NAME: Max Durán   :  1940  MRM:  [id number]    Date of service: 2022  9:41 AM       Assessment and Plan:   Dyspnea / Abnormal xray -  Hx of Bronchiectasis, pseudomonas PNA, on jeff nebs at baseline. recurrent and persistent. not hypoxic. No sepsis criteria, unchanged xray and recent multiple rounds of Abx all argue against acute bacterial PNA. Recent stable ECHO. Evaluated by pulmonary and cardiology. Monitor without ABx. Cont po diuretics       2. Paroxysmal A-fib / Chronic anticoagulation - Continue xarelto and Sotalol      3. CHF (congestive heart failure), NYHA class I, chronic, systolic / elevated proBNP/ Venous insufficiency - Stable recent ECHO. Evaluated by cardiology. Continue diuretic and BB      4. Asthma, stable - On Brovana, Pulmicort and prn atrovent and xopenex. Pulmonary following. 5.  Hyponatremia. Likely due to diuretic. Monitor       6. Anemia - Stable since last discharge, and normal serologies. 7.  Weight loss / GERD (gastroesophageal reflux disease) / Hiatal hernia / Candidiasis - She has very concerning unintentional wt loss, anorexia, nausea that persists. She weighed 170lb last year. 128 lb last week. 134lb now. On last visit GI was consulted and recommended outpatient FU. Cont PPI. Emptying study  shows delayed esophageal emptying but not delayed gastric emptying. She is on PO nystatin. 8.  Hx breast cancer - Outpatient follow up. Has been on Boniva      9. Hypothyroid - TSH was normal a few months ago. 10.  Chest pain. Reproducible. Likely musculoskeletal.  Monitor     11.   Thrush/ GERD/ hiatal hernia: continue nystatin, PPI         Authorization for IPR denied by her insurance and family appealed and requesting P2P on Monday          Subjective:     Chief Complaint[de-identified] Patient was seen and examined as a follow up for dyspnea. Chart was reviewed. c/o thrush     ROS:  (bold if positive, if negative)    Tolerating PT  Tolerating Diet        Objective:     Last 24hrs VS reviewed since prior progress note.  Most recent are:    Visit Vitals  /69 (BP 1 Location: Left upper arm, BP Patient Position: Reclining)   Pulse 79   Temp 97.5 °F (36.4 °C)   Resp 16   Ht 5' 4\" (1.626 m)   Wt 56.6 kg (124 lb 12.5 oz)   SpO2 93%   BMI 21.42 kg/m²     SpO2 Readings from Last 6 Encounters:   09/24/22 93%   09/14/22 92%   08/23/22 95%   08/10/22 94%   05/01/22 96%   06/14/21 99%    O2 Flow Rate (L/min): 1 l/min   No intake or output data in the 24 hours ending 09/24/22 1139       Physical Exam:    Gen:  Well-developed, well-nourished, in no acute distress  HEENT:  Pink conjunctivae, PERRL, hearing intact to voice, moist mucous membranes  Neck:  Supple, without masses, thyroid non-tender  Resp:  No accessory muscle use, rales BL  Card:  No murmurs, normal S1, S2 without thrills, bruits or peripheral edema  Abd:  Soft, non-tender, non-distended, normoactive bowel sounds are present, no palpable organomegaly and no detectable hernias  Lymph:  No cervical or inguinal adenopathy  Musc:  No cyanosis or clubbing  Skin:  No rashes or ulcers, skin turgor is good  Neuro:  Cranial nerves are grossly intact, no focal motor weakness, follows commands appropriately  Psych:  Good insight, oriented to person, place and time, alert  __________________________________________________________________  Medications Reviewed: (see below)  Medications:     Current Facility-Administered Medications   Medication Dose Route Frequency    bumetanide (BUMEX) tablet 1 mg  1 mg Oral DAILY    artificial saliva (MOUTH KOTE) 1 Spray  1 Spray Oral PRN    levalbuterol (XOPENEX) nebulizer soln 1.25 mg/3 mL  1.25 mg Nebulization Q6H RT    sodium chloride (NS) flush 5-10 mL  5-10 mL IntraVENous PRN    albuterol 5mg / ipratropium 0.5mg neb solution  1 Dose Nebulization Q4H PRN guaiFENesin ER (MUCINEX) tablet 600 mg  600 mg Oral Q12H    montelukast (SINGULAIR) tablet 10 mg  10 mg Oral QHS    [Held by provider] nystatin (MYCOSTATIN) 100,000 unit/mL oral suspension 500,000 Units  500,000 Units Oral QID    ketotifen (ZADITOR) 0.025 % (0.035 %) ophthalmic solution 1 Drop  1 Drop Both Eyes BID    pantoprazole (PROTONIX) tablet 40 mg  40 mg Oral DAILY    sotaloL (BETAPACE) tablet 80 mg  80 mg Oral Q12H    rivaroxaban (XARELTO) tablet 20 mg  20 mg Oral DAILY WITH DINNER    sodium chloride (NS) flush 5-40 mL  5-40 mL IntraVENous Q8H    sodium chloride (NS) flush 5-40 mL  5-40 mL IntraVENous PRN    acetaminophen (TYLENOL) tablet 650 mg  650 mg Oral Q6H PRN    Or    acetaminophen (TYLENOL) suppository 650 mg  650 mg Rectal Q6H PRN    polyethylene glycol (MIRALAX) packet 17 g  17 g Oral DAILY PRN    ondansetron (ZOFRAN ODT) tablet 4 mg  4 mg Oral Q8H PRN    Or    ondansetron (ZOFRAN) injection 4 mg  4 mg IntraVENous Q6H PRN    arformoteroL (BROVANA) neb solution 15 mcg  15 mcg Nebulization BID RT    budesonide (PULMICORT) 500 mcg/2 ml nebulizer suspension  500 mcg Nebulization BID RT    artificial saliva (MOUTH KOTE) 1 Spray  1 Spray Oral PRN    diclofenac (VOLTAREN) 1 % topical gel 2 g  2 g Topical BID PRN        Lab Data Reviewed: (see below)  Lab Review:     Recent Labs     09/24/22 0154   WBC 8.1   HGB 10.1*   HCT 31.3*   *       Recent Labs     09/24/22 0154 09/22/22  1026   * 131*   K 4.2 5.3*   CL 87* 96*   CO2 35* 32   * 93   BUN 12 15   CREA 0.43* 0.50*   CA 9.0 8.7   MG  --  1.7       Lab Results   Component Value Date/Time    Glucose (POC) 120 (H) 09/24/2022 07:23 AM    Glucose (POC) 106 09/19/2022 05:49 AM    Glucose (POC) 152 (H) 09/11/2022 03:02 PM    Glucose (POC) 146 (H) 09/06/2022 01:21 PM     No results for input(s): PH, PCO2, PO2, HCO3, FIO2 in the last 72 hours. No results for input(s): INR, INREXT, INREXT in the last 72 hours.   All Micro Results Procedure Component Value Units Date/Time    CULTURE, BLOOD [[id number]] Collected: 09/19/22 0536    Order Status: Completed Specimen: Blood Updated: 09/24/22 0605     Special Requests: NO SPECIAL REQUESTS        Culture result: NO GROWTH 5 DAYS       CULTURE, BLOOD [[id number]] Collected: 09/19/22 0536    Order Status: Completed Specimen: Blood Updated: 09/24/22 0605     Special Requests: NO SPECIAL REQUESTS        Culture result: NO GROWTH 5 DAYS       RESPIRATORY VIRUS PANEL W/COVID-19, PCR [[id number]] Collected: 09/19/22 0944    Order Status: Completed Specimen: Nasopharyngeal Updated: 09/19/22 1516     Adenovirus Not detected        Coronavirus 229E Not detected        Coronavirus HKU1 Not detected        Coronavirus CVNL63 Not detected        Coronavirus OC43 Not detected        SARS-CoV-2, PCR Not detected        Metapneumovirus Not detected        Rhinovirus and Enterovirus Not detected        Influenza A Not detected        Influenza B Not detected        Parainfluenza 1 Not detected        Parainfluenza 2 Not detected        Parainfluenza 3 Not detected        Parainfluenza virus 4 Not detected        RSV by PCR Not detected        B. parapertussis, PCR Not detected        Bordetella pertussis - PCR Not detected        Chlamydophila pneumoniae DNA, QL, PCR Not detected        Mycoplasma pneumoniae DNA, QL, PCR Not detected       CULTURE, URINE [[id number]] Collected: 09/19/22 1301    Order Status: Canceled Specimen: Urine from Clean catch     COVID-19 RAPID TEST [[id number]] Collected: 09/19/22 0604    Order Status: Completed Specimen: Nasopharyngeal Updated: 09/19/22 0631     Specimen source Nasopharyngeal        COVID-19 rapid test Not detected        Comment: Rapid Abbott ID Now       Rapid NAAT:  The specimen is NEGATIVE for SARS-CoV-2, the novel coronavirus associated with COVID-19.        Negative results should be treated as presumptive and, if inconsistent with clinical signs and symptoms or necessary for patient management, should be tested with an alternative molecular assay. Negative results do not preclude SARS-CoV-2 infection and should not be used as the sole basis for patient management decisions. This test has been authorized by the FDA under an Emergency Use Authorization (EUA) for use by authorized laboratories. Fact sheet for Healthcare Providers: ConventionBrandMe crowdmarketingdate.co.nz  Fact sheet for Patients: Atmail.co.nz       Methodology: Isothermal Nucleic Acid Amplification         COVID-19 WITH INFLUENZA A/B [[id number]]     Order Status: Canceled Specimen: Nasopharyngeal             I have reviewed notes of prior 24hr. Other pertinent lab: Total time spent with patient: 35 minutes I personally reviewed chart, notes, data and current medications in the medical record. I have personally examined and treated the patient at bedside during this period.                  Care Plan discussed with: Patient, Nursing Staff, and >50% of time spent in counseling and coordination of care    Discussed:  Care Plan    Prophylaxis:   xarelto     Disposition:  SNF/LTC           ___________________________________________________    Attending Physician: Yrn Suggs MD

## 2022-09-25 LAB
ANION GAP SERPL CALC-SCNC: 7 MMOL/L (ref 5–15)
BACTERIA SPEC CULT: NORMAL
BACTERIA SPEC CULT: NORMAL
BUN SERPL-MCNC: 13 MG/DL (ref 6–20)
BUN/CREAT SERPL: 30 (ref 12–20)
CALCIUM SERPL-MCNC: 9 MG/DL (ref 8.5–10.1)
CHLORIDE SERPL-SCNC: 88 MMOL/L (ref 97–108)
CO2 SERPL-SCNC: 30 MMOL/L (ref 21–32)
CREAT SERPL-MCNC: 0.44 MG/DL (ref 0.55–1.02)
GLUCOSE SERPL-MCNC: 100 MG/DL (ref 65–100)
POTASSIUM SERPL-SCNC: 4.2 MMOL/L (ref 3.5–5.1)
SERVICE CMNT-IMP: NORMAL
SERVICE CMNT-IMP: NORMAL
SODIUM SERPL-SCNC: 125 MMOL/L (ref 136–145)

## 2022-09-25 PROCEDURE — 65270000029 HC RM PRIVATE

## 2022-09-25 PROCEDURE — G0378 HOSPITAL OBSERVATION PER HR: HCPCS

## 2022-09-25 PROCEDURE — 74011250637 HC RX REV CODE- 250/637: Performed by: INTERNAL MEDICINE

## 2022-09-25 PROCEDURE — 77010033678 HC OXYGEN DAILY

## 2022-09-25 PROCEDURE — 94761 N-INVAS EAR/PLS OXIMETRY MLT: CPT

## 2022-09-25 PROCEDURE — 94640 AIRWAY INHALATION TREATMENT: CPT

## 2022-09-25 PROCEDURE — 74011000250 HC RX REV CODE- 250: Performed by: INTERNAL MEDICINE

## 2022-09-25 PROCEDURE — 36415 COLL VENOUS BLD VENIPUNCTURE: CPT

## 2022-09-25 PROCEDURE — 80048 BASIC METABOLIC PNL TOTAL CA: CPT

## 2022-09-25 PROCEDURE — 2709999900 HC NON-CHARGEABLE SUPPLY

## 2022-09-25 RX ORDER — BUMETANIDE 1 MG/1
0.5 TABLET ORAL DAILY
Status: DISCONTINUED | OUTPATIENT
Start: 2022-09-26 | End: 2022-09-28 | Stop reason: HOSPADM

## 2022-09-25 RX ADMIN — ACETAMINOPHEN 650 MG: 325 TABLET ORAL at 21:54

## 2022-09-25 RX ADMIN — PANTOPRAZOLE SODIUM 40 MG: 40 TABLET, DELAYED RELEASE ORAL at 08:52

## 2022-09-25 RX ADMIN — ACETAMINOPHEN 650 MG: 325 TABLET ORAL at 14:18

## 2022-09-25 RX ADMIN — BUMETANIDE 1 MG: 1 TABLET ORAL at 08:52

## 2022-09-25 RX ADMIN — LEVALBUTEROL HYDROCHLORIDE 1.25 MG: 1.25 SOLUTION RESPIRATORY (INHALATION) at 19:41

## 2022-09-25 RX ADMIN — Medication 10 ML: at 22:39

## 2022-09-25 RX ADMIN — LEVALBUTEROL HYDROCHLORIDE 1.25 MG: 1.25 SOLUTION RESPIRATORY (INHALATION) at 13:15

## 2022-09-25 RX ADMIN — BUDESONIDE 500 MCG: 0.5 SUSPENSION RESPIRATORY (INHALATION) at 07:35

## 2022-09-25 RX ADMIN — NYSTATIN 500000 UNITS: 100000 SUSPENSION ORAL at 08:51

## 2022-09-25 RX ADMIN — GUAIFENESIN 600 MG: 600 TABLET ORAL at 08:52

## 2022-09-25 RX ADMIN — DICLOFENAC SODIUM 2 G: 10 GEL TOPICAL at 21:54

## 2022-09-25 RX ADMIN — LEVALBUTEROL HYDROCHLORIDE 1.25 MG: 1.25 SOLUTION RESPIRATORY (INHALATION) at 07:30

## 2022-09-25 RX ADMIN — ARFORMOTEROL TARTRATE 15 MCG: 15 SOLUTION RESPIRATORY (INHALATION) at 19:50

## 2022-09-25 RX ADMIN — NYSTATIN 500000 UNITS: 100000 SUSPENSION ORAL at 14:18

## 2022-09-25 RX ADMIN — Medication 10 ML: at 14:21

## 2022-09-25 RX ADMIN — Medication 200 MG: at 18:17

## 2022-09-25 RX ADMIN — NYSTATIN 500000 UNITS: 100000 SUSPENSION ORAL at 21:54

## 2022-09-25 RX ADMIN — ARFORMOTEROL TARTRATE 15 MCG: 15 SOLUTION RESPIRATORY (INHALATION) at 07:35

## 2022-09-25 RX ADMIN — RIVAROXABAN 20 MG: 10 TABLET, FILM COATED ORAL at 18:17

## 2022-09-25 RX ADMIN — Medication 200 MG: at 08:52

## 2022-09-25 RX ADMIN — Medication 200 MG: at 14:18

## 2022-09-25 RX ADMIN — SOTALOL HYDROCHLORIDE 80 MG: 80 TABLET ORAL at 06:50

## 2022-09-25 RX ADMIN — MONTELUKAST 10 MG: 10 TABLET, FILM COATED ORAL at 21:54

## 2022-09-25 RX ADMIN — LEVALBUTEROL HYDROCHLORIDE 1.25 MG: 1.25 SOLUTION RESPIRATORY (INHALATION) at 03:01

## 2022-09-25 RX ADMIN — KETOTIFEN FUMARATE 1 DROP: 0.35 SOLUTION/ DROPS OPHTHALMIC at 08:54

## 2022-09-25 RX ADMIN — Medication 10 ML: at 21:55

## 2022-09-25 RX ADMIN — Medication 10 ML: at 06:50

## 2022-09-25 RX ADMIN — BUDESONIDE 500 MCG: 0.5 SUSPENSION RESPIRATORY (INHALATION) at 19:50

## 2022-09-25 RX ADMIN — NYSTATIN 500000 UNITS: 100000 SUSPENSION ORAL at 18:17

## 2022-09-25 NOTE — PROGRESS NOTES
1826: Dr. Laura Burgos informed that pt's BP is 92/54 and HR 88. Ordered to hold sotalol. 1927: Bedside and Verbal shift change report given to EZEQUIEL Branch (oncoming nurse) by Neeta Soto RN (offgoing nurse). Report included the following information SBAR, Kardex, Intake/Output, MAR, and Accordion.

## 2022-09-25 NOTE — PROGRESS NOTES
Vishal Colunga [facility] 79  [address] [facility], [place], [address] [place] [address]  [phone]      Medical Progress Note      NAME: Ruperot Burdick   :  1940  MRM:  [id number]    Date of service: 2022  9:41 AM       Assessment and Plan:   Dyspnea / Abnormal xray -  Hx of Bronchiectasis, pseudomonas PNA, on jeff nebs at baseline. recurrent and persistent. not hypoxic. No sepsis criteria, unchanged xray and recent multiple rounds of Abx all argue against acute bacterial PNA. Recent stable ECHO. Evaluated by pulmonary and cardiology. Monitor without ABx. Cont po diuretics       2. Paroxysmal A-fib / Chronic anticoagulation - Continue xarelto and Sotalol      3. CHF (congestive heart failure), NYHA class I, chronic, systolic / elevated proBNP/ Venous insufficiency - Stable recent ECHO. Evaluated by cardiology. Continue diuretic and BB      4. Asthma, stable - On Brovana, Pulmicort and prn atrovent and xopenex. Pulmonary following. 5.  Hyponatremia. Likely due to diuretic( decreased dose). Monitor       6. Anemia - Stable since last discharge, and normal serologies. 7.  Weight loss / GERD (gastroesophageal reflux disease) / Hiatal hernia / Candidiasis - She has very concerning unintentional wt loss, anorexia, nausea that persists. She weighed 170lb last year. 128 lb last week. 134lb now. On last visit GI was consulted and recommended outpatient FU. Cont PPI. Emptying study  shows delayed esophageal emptying but not delayed gastric emptying. She is on PO nystatin. 8.  Hx breast cancer - Outpatient follow up. Has been on Boniva      9. Hypothyroid - TSH was normal a few months ago. 10.  Chest pain. Reproducible. Likely musculoskeletal.  Monitor     11.   Thrush/ GERD/ hiatal hernia: continue nystatin, PPI         Authorization for IPR denied by her insurance, family appealed and requested P2P on Monday          Subjective:     Chief Complaint[de-identified] Patient was seen and examined as a follow up for dyspnea. Chart was reviewed. c/o c/o back pain     ROS:  (bold if positive, if negative)    Tolerating PT  Tolerating Diet        Objective:     Last 24hrs VS reviewed since prior progress note.  Most recent are:    Visit Vitals  /60   Pulse 63   Temp 97.5 °F (36.4 °C)   Resp 16   Ht 5' 4\" (1.626 m)   Wt 56.6 kg (124 lb 12.5 oz)   SpO2 91%   BMI 21.42 kg/m²     SpO2 Readings from Last 6 Encounters:   09/25/22 91%   09/14/22 92%   08/23/22 95%   08/10/22 94%   05/01/22 96%   06/14/21 99%    O2 Flow Rate (L/min): 1 l/min     Intake/Output Summary (Last 24 hours) at 9/25/2022 4546  Last data filed at 9/24/2022 1800  Gross per 24 hour   Intake 840 ml   Output --   Net 840 ml          Physical Exam:    Gen:  Well-developed, well-nourished, in no acute distress  HEENT:  Pink conjunctivae, PERRL, hearing intact to voice, moist mucous membranes  Neck:  Supple, without masses, thyroid non-tender  Resp:  No accessory muscle use, rales BL  Card:  No murmurs, normal S1, S2 without thrills, bruits or peripheral edema  Abd:  Soft, non-tender, non-distended, normoactive bowel sounds are present, no palpable organomegaly and no detectable hernias  Lymph:  No cervical or inguinal adenopathy  Musc:  No cyanosis or clubbing  Skin:  No rashes or ulcers, skin turgor is good  Neuro:  Cranial nerves are grossly intact, no focal motor weakness, follows commands appropriately  Psych:  Good insight, oriented to person, place and time, alert  __________________________________________________________________  Medications Reviewed: (see below)  Medications:     Current Facility-Administered Medications   Medication Dose Route Frequency    zolpidem (AMBIEN) tablet 2.5 mg  2.5 mg Oral QHS PRN    calcium carbonate (TUMS) chewable tablet 200 mg [elemental]  200 mg Oral TID WITH MEALS    bumetanide (BUMEX) tablet 1 mg  1 mg Oral DAILY    artificial saliva (MOUTH KOTE) 1 Spray  1 Spray Oral PRN    levalbuterol (Amy Flowers) nebulizer soln 1.25 mg/3 mL  1.25 mg Nebulization Q6H RT    sodium chloride (NS) flush 5-10 mL  5-10 mL IntraVENous PRN    albuterol 5mg / ipratropium 0.5mg neb solution  1 Dose Nebulization Q4H PRN    guaiFENesin ER (MUCINEX) tablet 600 mg  600 mg Oral Q12H    montelukast (SINGULAIR) tablet 10 mg  10 mg Oral QHS    nystatin (MYCOSTATIN) 100,000 unit/mL oral suspension 500,000 Units  500,000 Units Oral QID    ketotifen (ZADITOR) 0.025 % (0.035 %) ophthalmic solution 1 Drop  1 Drop Both Eyes BID    pantoprazole (PROTONIX) tablet 40 mg  40 mg Oral DAILY    sotaloL (BETAPACE) tablet 80 mg  80 mg Oral Q12H    rivaroxaban (XARELTO) tablet 20 mg  20 mg Oral DAILY WITH DINNER    sodium chloride (NS) flush 5-40 mL  5-40 mL IntraVENous Q8H    sodium chloride (NS) flush 5-40 mL  5-40 mL IntraVENous PRN    acetaminophen (TYLENOL) tablet 650 mg  650 mg Oral Q6H PRN    Or    acetaminophen (TYLENOL) suppository 650 mg  650 mg Rectal Q6H PRN    polyethylene glycol (MIRALAX) packet 17 g  17 g Oral DAILY PRN    ondansetron (ZOFRAN ODT) tablet 4 mg  4 mg Oral Q8H PRN    Or    ondansetron (ZOFRAN) injection 4 mg  4 mg IntraVENous Q6H PRN    arformoteroL (BROVANA) neb solution 15 mcg  15 mcg Nebulization BID RT    budesonide (PULMICORT) 500 mcg/2 ml nebulizer suspension  500 mcg Nebulization BID RT    artificial saliva (MOUTH KOTE) 1 Spray  1 Spray Oral PRN    diclofenac (VOLTAREN) 1 % topical gel 2 g  2 g Topical BID PRN        Lab Data Reviewed: (see below)  Lab Review:     Recent Labs     09/24/22  0154   WBC 8.1   HGB 10.1*   HCT 31.3*   *       Recent Labs     09/25/22  0113 09/24/22  0154 09/22/22  1026   * 125* 131*   K 4.2 4.2 5.3*   CL 88* 87* 96*   CO2 30 35* 32    108* 93   BUN 13 12 15   CREA 0.44* 0.43* 0.50*   CA 9.0 9.0 8.7   MG  --   --  1.7       Lab Results   Component Value Date/Time    Glucose (POC) 120 (H) 09/24/2022 07:23 AM    Glucose (POC) 106 09/19/2022 05:49 AM    Glucose (POC) 152 (H) 09/11/2022 03:02 PM    Glucose (POC) 146 (H) 09/06/2022 01:21 PM     No results for input(s): PH, PCO2, PO2, HCO3, FIO2 in the last 72 hours. No results for input(s): INR, INREXT, INREXT in the last 72 hours.   All Micro Results       Procedure Component Value Units Date/Time    CULTURE, BLOOD [[id number]] Collected: 09/19/22 0536    Order Status: Completed Specimen: Blood Updated: 09/25/22 0720     Special Requests: NO SPECIAL REQUESTS        Culture result: NO GROWTH 6 DAYS       CULTURE, BLOOD [[id number]] Collected: 09/19/22 0536    Order Status: Completed Specimen: Blood Updated: 09/25/22 0720     Special Requests: NO SPECIAL REQUESTS        Culture result: NO GROWTH 6 DAYS       RESPIRATORY VIRUS PANEL W/COVID-19, PCR [[id number]] Collected: 09/19/22 0944    Order Status: Completed Specimen: Nasopharyngeal Updated: 09/19/22 1516     Adenovirus Not detected        Coronavirus 229E Not detected        Coronavirus HKU1 Not detected        Coronavirus CVNL63 Not detected        Coronavirus OC43 Not detected        SARS-CoV-2, PCR Not detected        Metapneumovirus Not detected        Rhinovirus and Enterovirus Not detected        Influenza A Not detected        Influenza B Not detected        Parainfluenza 1 Not detected        Parainfluenza 2 Not detected        Parainfluenza 3 Not detected        Parainfluenza virus 4 Not detected        RSV by PCR Not detected        B. parapertussis, PCR Not detected        Bordetella pertussis - PCR Not detected        Chlamydophila pneumoniae DNA, QL, PCR Not detected        Mycoplasma pneumoniae DNA, QL, PCR Not detected       CULTURE, URINE [[id number]] Collected: 09/19/22 1301    Order Status: Canceled Specimen: Urine from Clean catch     COVID-19 RAPID TEST [[id number]] Collected: 09/19/22 0604    Order Status: Completed Specimen: Nasopharyngeal Updated: 09/19/22 0631     Specimen source Nasopharyngeal        COVID-19 rapid test Not detected        Comment: Rapid Abbott ID Now       Rapid NAAT:  The specimen is NEGATIVE for SARS-CoV-2, the novel coronavirus associated with COVID-19. Negative results should be treated as presumptive and, if inconsistent with clinical signs and symptoms or necessary for patient management, should be tested with an alternative molecular assay. Negative results do not preclude SARS-CoV-2 infection and should not be used as the sole basis for patient management decisions. This test has been authorized by the FDA under an Emergency Use Authorization (EUA) for use by authorized laboratories. Fact sheet for Healthcare Providers: FTL SOLARdate.co.nz  Fact sheet for Patients: Adallom.co.nz       Methodology: Isothermal Nucleic Acid Amplification         COVID-19 WITH INFLUENZA A/B [[id number]]     Order Status: Canceled Specimen: Nasopharyngeal             I have reviewed notes of prior 24hr. Other pertinent lab: Total time spent with patient: 35 minutes I personally reviewed chart, notes, data and current medications in the medical record. I have personally examined and treated the patient at bedside during this period.                  Care Plan discussed with: Patient, Nursing Staff, and >50% of time spent in counseling and coordination of care    Discussed:  Care Plan    Prophylaxis:   xarelto     Disposition:  SNF/LTC           ___________________________________________________    Attending Physician: Yrn Suggs MD

## 2022-09-25 NOTE — PROGRESS NOTES
Problem: Falls - Risk of  Goal: *Absence of Falls  Description: Document Bocanegra Blades Fall Risk and appropriate interventions in the flowsheet. Outcome: Progressing Towards Goal  Note: Fall Risk Interventions:            Medication Interventions: Patient to call before getting OOB, Teach patient to arise slowly                   Problem: Patient Education: Go to Patient Education Activity  Goal: Patient/Family Education  Outcome: Progressing Towards Goal     Problem: Patient Education: Go to Patient Education Activity  Goal: Patient/Family Education  Outcome: Progressing Towards Goal     Problem: Pressure Injury - Risk of  Goal: *Prevention of pressure injury  Description: Document Armando Scale and appropriate interventions in the flowsheet.   Outcome: Progressing Towards Goal  Note: Pressure Injury Interventions:  Sensory Interventions: Assess changes in LOC, Keep linens dry and wrinkle-free, Minimize linen layers    Moisture Interventions: Absorbent underpads, Minimize layers    Activity Interventions: Increase time out of bed    Mobility Interventions: HOB 30 degrees or less    Nutrition Interventions: Document food/fluid/supplement intake, Offer support with meals,snacks and hydration    Friction and Shear Interventions: Minimize layers, Apply protective barrier, creams and emollients                Problem: Patient Education: Go to Patient Education Activity  Goal: Patient/Family Education  Outcome: Progressing Towards Goal     Problem: Nutrition Deficit  Goal: *Optimize nutritional status  Outcome: Progressing Towards Goal

## 2022-09-25 NOTE — PROGRESS NOTES
Bedside and Verbal shift change report given to [address] (oncoming nurse) by Bear Mullins RN (offgoing nurse). Report included the following information SBAR, Kardex, Intake/Output, Recent Results, and Quality Measures. Is This A New Presentation, Or A Follow-Up?: Follow Up Hidradenitis Suppurativa

## 2022-09-26 LAB
ANION GAP SERPL CALC-SCNC: 5 MMOL/L (ref 5–15)
BASOPHILS # BLD: 0 K/UL (ref 0–0.1)
BASOPHILS NFR BLD: 0 % (ref 0–1)
BUN SERPL-MCNC: 9 MG/DL (ref 6–20)
BUN/CREAT SERPL: 21 (ref 12–20)
CALCIUM SERPL-MCNC: 9.2 MG/DL (ref 8.5–10.1)
CHLORIDE SERPL-SCNC: 85 MMOL/L (ref 97–108)
CO2 SERPL-SCNC: 37 MMOL/L (ref 21–32)
CREAT SERPL-MCNC: 0.43 MG/DL (ref 0.55–1.02)
DIFFERENTIAL METHOD BLD: ABNORMAL
EOSINOPHIL # BLD: 0.1 K/UL (ref 0–0.4)
EOSINOPHIL NFR BLD: 1 % (ref 0–7)
ERYTHROCYTE [DISTWIDTH] IN BLOOD BY AUTOMATED COUNT: 14.7 % (ref 11.5–14.5)
GLUCOSE SERPL-MCNC: 104 MG/DL (ref 65–100)
HCT VFR BLD AUTO: 31.8 % (ref 35–47)
HGB BLD-MCNC: 10.2 G/DL (ref 11.5–16)
IMM GRANULOCYTES # BLD AUTO: 0.1 K/UL (ref 0–0.04)
IMM GRANULOCYTES NFR BLD AUTO: 1 % (ref 0–0.5)
LYMPHOCYTES # BLD: 1.2 K/UL (ref 0.8–3.5)
LYMPHOCYTES NFR BLD: 12 % (ref 12–49)
MAGNESIUM SERPL-MCNC: 1.6 MG/DL (ref 1.6–2.4)
MCH RBC QN AUTO: 28.7 PG (ref 26–34)
MCHC RBC AUTO-ENTMCNC: 32.1 G/DL (ref 30–36.5)
MCV RBC AUTO: 89.6 FL (ref 80–99)
MONOCYTES # BLD: 0.8 K/UL (ref 0–1)
MONOCYTES NFR BLD: 8 % (ref 5–13)
NEUTS SEG # BLD: 7.7 K/UL (ref 1.8–8)
NEUTS SEG NFR BLD: 78 % (ref 32–75)
NRBC # BLD: 0 K/UL (ref 0–0.01)
NRBC BLD-RTO: 0 PER 100 WBC
PLATELET # BLD AUTO: 498 K/UL (ref 150–400)
PMV BLD AUTO: 8.7 FL (ref 8.9–12.9)
POTASSIUM SERPL-SCNC: 3.2 MMOL/L (ref 3.5–5.1)
RBC # BLD AUTO: 3.55 M/UL (ref 3.8–5.2)
SODIUM SERPL-SCNC: 127 MMOL/L (ref 136–145)
WBC # BLD AUTO: 9.9 K/UL (ref 3.6–11)

## 2022-09-26 PROCEDURE — 85025 COMPLETE CBC W/AUTO DIFF WBC: CPT

## 2022-09-26 PROCEDURE — 94761 N-INVAS EAR/PLS OXIMETRY MLT: CPT

## 2022-09-26 PROCEDURE — G0378 HOSPITAL OBSERVATION PER HR: HCPCS

## 2022-09-26 PROCEDURE — 74011250637 HC RX REV CODE- 250/637: Performed by: INTERNAL MEDICINE

## 2022-09-26 PROCEDURE — 83735 ASSAY OF MAGNESIUM: CPT

## 2022-09-26 PROCEDURE — 94640 AIRWAY INHALATION TREATMENT: CPT

## 2022-09-26 PROCEDURE — 97116 GAIT TRAINING THERAPY: CPT

## 2022-09-26 PROCEDURE — 74011250636 HC RX REV CODE- 250/636: Performed by: INTERNAL MEDICINE

## 2022-09-26 PROCEDURE — 36415 COLL VENOUS BLD VENIPUNCTURE: CPT

## 2022-09-26 PROCEDURE — 77010033678 HC OXYGEN DAILY

## 2022-09-26 PROCEDURE — 65270000029 HC RM PRIVATE

## 2022-09-26 PROCEDURE — 80048 BASIC METABOLIC PNL TOTAL CA: CPT

## 2022-09-26 PROCEDURE — 74011000250 HC RX REV CODE- 250: Performed by: INTERNAL MEDICINE

## 2022-09-26 PROCEDURE — 97535 SELF CARE MNGMENT TRAINING: CPT

## 2022-09-26 PROCEDURE — 97110 THERAPEUTIC EXERCISES: CPT

## 2022-09-26 PROCEDURE — 96374 THER/PROPH/DIAG INJ IV PUSH: CPT

## 2022-09-26 RX ADMIN — GUAIFENESIN 600 MG: 600 TABLET ORAL at 10:22

## 2022-09-26 RX ADMIN — RIVAROXABAN 20 MG: 10 TABLET, FILM COATED ORAL at 17:49

## 2022-09-26 RX ADMIN — Medication 10 ML: at 10:33

## 2022-09-26 RX ADMIN — BUDESONIDE 500 MCG: 0.5 SUSPENSION RESPIRATORY (INHALATION) at 20:32

## 2022-09-26 RX ADMIN — ARFORMOTEROL TARTRATE 15 MCG: 15 SOLUTION RESPIRATORY (INHALATION) at 20:32

## 2022-09-26 RX ADMIN — Medication 10 ML: at 13:50

## 2022-09-26 RX ADMIN — PANTOPRAZOLE SODIUM 40 MG: 40 TABLET, DELAYED RELEASE ORAL at 10:22

## 2022-09-26 RX ADMIN — ONDANSETRON 4 MG: 2 INJECTION INTRAMUSCULAR; INTRAVENOUS at 11:25

## 2022-09-26 RX ADMIN — Medication 10 ML: at 21:01

## 2022-09-26 RX ADMIN — Medication 200 MG: at 10:22

## 2022-09-26 RX ADMIN — BUMETANIDE 0.5 MG: 1 TABLET ORAL at 10:22

## 2022-09-26 RX ADMIN — SOTALOL HYDROCHLORIDE 80 MG: 80 TABLET ORAL at 17:49

## 2022-09-26 RX ADMIN — NYSTATIN 500000 UNITS: 100000 SUSPENSION ORAL at 17:49

## 2022-09-26 RX ADMIN — LEVALBUTEROL HYDROCHLORIDE 1.25 MG: 1.25 SOLUTION RESPIRATORY (INHALATION) at 13:12

## 2022-09-26 RX ADMIN — Medication 200 MG: at 17:49

## 2022-09-26 RX ADMIN — ACETAMINOPHEN 650 MG: 325 TABLET ORAL at 07:03

## 2022-09-26 RX ADMIN — LEVALBUTEROL HYDROCHLORIDE 1.25 MG: 1.25 SOLUTION RESPIRATORY (INHALATION) at 20:25

## 2022-09-26 RX ADMIN — NYSTATIN 500000 UNITS: 100000 SUSPENSION ORAL at 10:22

## 2022-09-26 RX ADMIN — NYSTATIN 500000 UNITS: 100000 SUSPENSION ORAL at 13:50

## 2022-09-26 RX ADMIN — LEVALBUTEROL HYDROCHLORIDE 1.25 MG: 1.25 SOLUTION RESPIRATORY (INHALATION) at 07:32

## 2022-09-26 RX ADMIN — SOTALOL HYDROCHLORIDE 80 MG: 80 TABLET ORAL at 07:03

## 2022-09-26 RX ADMIN — KETOTIFEN FUMARATE 1 DROP: 0.35 SOLUTION/ DROPS OPHTHALMIC at 10:32

## 2022-09-26 RX ADMIN — LEVALBUTEROL HYDROCHLORIDE 1.25 MG: 1.25 SOLUTION RESPIRATORY (INHALATION) at 01:53

## 2022-09-26 RX ADMIN — BUDESONIDE 500 MCG: 0.5 SUSPENSION RESPIRATORY (INHALATION) at 07:37

## 2022-09-26 RX ADMIN — ACETAMINOPHEN 650 MG: 325 TABLET ORAL at 18:46

## 2022-09-26 RX ADMIN — GUAIFENESIN 600 MG: 600 TABLET ORAL at 20:33

## 2022-09-26 RX ADMIN — ARFORMOTEROL TARTRATE 15 MCG: 15 SOLUTION RESPIRATORY (INHALATION) at 07:37

## 2022-09-26 RX ADMIN — MONTELUKAST 10 MG: 10 TABLET, FILM COATED ORAL at 21:01

## 2022-09-26 RX ADMIN — NYSTATIN 500000 UNITS: 100000 SUSPENSION ORAL at 21:00

## 2022-09-26 RX ADMIN — Medication 200 MG: at 11:29

## 2022-09-26 NOTE — PROGRESS NOTES
Problem: Self Care Deficits Care Plan (Adult)  Goal: *Acute Goals and Plan of Care (Insert Text)  Description: FUNCTIONAL STATUS PRIOR TO ADMISSION: Patient was modified independent using a rollator for functional mobility secondary to shortness of breath. HOME SUPPORT: The patient lived with spouse who has memory loss. Patient does not have to physically assist spouse. Patient son and daughter in law live close, but work outside jobs. Occupational Therapy Goals  Initiated 9/20/2022  1. Patient will perform lower body dressing with supervision/set-up within 7 day(s). 2.  Patient will perform bathing with supervision/set-up within 7 day(s). 3.  Patient will perform all aspects of toileting with supervision/set-up within 7 day(s). 4.  Patient will participate in upper extremity therapeutic exercise/activities with supervision/set-up for 5 minutes within 7 day(s). 5.  Patient will utilize energy conservation techniques during functional activities with verbal cues within 7 day(s). Outcome: Progressing Towards Goal   OCCUPATIONAL THERAPY TREATMENT  Patient: Carol Balbuena (17 y.o. female)  Date: 9/26/2022  Diagnosis: Dyspnea [R06.00] <principal problem not specified>      Precautions:    Chart, occupational therapy assessment, plan of care, and goals were reviewed. ASSESSMENT  Patient continues with skilled OT services and is progressing towards goals. Pt transfers to [facility] stand by assist, she manages her own clothes and hygiene. Pt sits in chair for UE exercises and able to engage with 5 min. She returned to bed following tx. Current Level of Function Impacting Discharge (ADLs): stand by assist toileting    Other factors to consider for discharge:          PLAN :  Patient continues to benefit from skilled intervention to address the above impairments. Continue treatment per established plan of care to address goals.     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     OBJECTIVE DATA SUMMARY:   Cognitive/Behavioral Status:  Neurologic State: Alert  Orientation Level: Oriented X4  Cognition: Follows commands             Functional Mobility and Transfers for ADLs:  Bed Mobility:  Sit to Supine: Stand-by assistance    Transfers:  Sit to Stand: Stand-by assistance  Functional Transfers  Toilet Transfer : Stand-by assistance  Adaptive Equipment: Bedside commode       Balance:  Sitting: Intact; Without support  Standing: Impaired; Without support  Standing - Static: Good  Standing - Dynamic : Fair;Occasional    ADL Intervention:                 Type of Bath: Chlorhexidine (CHG)       Therapeutic Exercises:     EXERCISE   Sets   Reps   Active Active Assist   Passive   Comments   Shoulder flex/ext 1 10 [x]           []           []              Shoulder ab/add 1 10 [x]           []           []              Chest presses 1 10 [x]           []           []              Forearm supination/pronation 1 10 [x]           []           []              Wrist flex/ext 1 10 [x]           []           []              Finger flex/ext 1 10 [x]           []           []              Scapular retraction/protraction 1 10 [x]           []           []                 []           []           []                 []           []           []                 []           []           []                 []           []           []                   Activity Tolerance:   Fair    After treatment patient left in no apparent distress:   Sitting in chair and Call bell within reach    COMMUNICATION/COLLABORATION:   The patients plan of care was discussed with: Physical therapist, Occupational therapist, and Registered nurse.      SATISH Donaldson/L  Time Calculation: 23 mins

## 2022-09-26 NOTE — PROGRESS NOTES
Problem: Falls - Risk of  Goal: *Absence of Falls  Description: Document [place] Samuel Fall Risk and appropriate interventions in the flowsheet. Outcome: Progressing Towards Goal  Note: Fall Risk Interventions:            Medication Interventions: Bed/chair exit alarm, Evaluate medications/consider consulting pharmacy, Patient to call before getting OOB, Teach patient to arise slowly, Utilize gait belt for transfers/ambulation    Elimination Interventions: Bed/chair exit alarm, Call light in reach, Toileting schedule/hourly rounds, Toilet paper/wipes in reach, Elevated toilet seat    History of Falls Interventions: Bed/chair exit alarm, Evaluate medications/consider consulting pharmacy, Investigate reason for fall, Room close to nurse's station, Utilize gait belt for transfer/ambulation         Problem: Patient Education: Go to Patient Education Activity  Goal: Patient/Family Education  Outcome: Progressing Towards Goal     Problem: Patient Education: Go to Patient Education Activity  Goal: Patient/Family Education  Outcome: Progressing Towards Goal     Problem: Patient Education: Go to Patient Education Activity  Goal: Patient/Family Education  Outcome: Progressing Towards Goal     Problem: Pressure Injury - Risk of  Goal: *Prevention of pressure injury  Description: Document Armando Scale and appropriate interventions in the flowsheet.   Outcome: Progressing Towards Goal  Note: Pressure Injury Interventions:  Sensory Interventions: Assess changes in LOC, Avoid rigorous massage over bony prominences, Check visual cues for pain, Discuss PT/OT consult with provider, Float heels, Keep linens dry and wrinkle-free, Maintain/enhance activity level, Minimize linen layers, Monitor skin under medical devices, Pad between skin to skin    Moisture Interventions: Absorbent underpads, Limit adult briefs, Maintain skin hydration (lotion/cream), Minimize layers, Moisture barrier    Activity Interventions: Pressure redistribution bed/mattress(bed type), Increase time out of bed    Mobility Interventions: HOB 30 degrees or less, Pressure redistribution bed/mattress (bed type)    Nutrition Interventions: Document food/fluid/supplement intake, Offer support with meals,snacks and hydration    Friction and Shear Interventions: Apply protective barrier, creams and emollients, HOB 30 degrees or less, Lift sheet, Lift team/patient mobility team, Minimize layers, Sit at 90-degree angle                Problem: Patient Education: Go to Patient Education Activity  Goal: Patient/Family Education  Outcome: Progressing Towards Goal     Problem: Nutrition Deficit  Goal: *Optimize nutritional status  Outcome: Progressing Towards Goal

## 2022-09-26 NOTE — PROGRESS NOTES
Name: [place]: [address]   : 1940 Admit Date: 2022   Phone: [phone]  Room: 2/01   PCP: Starla Denver, MD  MRN: [id number]   Date: 2022  Code: Full Code          Chart and notes reviewed. Data reviewed. I review the patient's current medications in the medical record at each encounter. I have evaluated and examined the patient. HPI:    9:07 AM       History was obtained from patient. I was asked by Romi Santizo MD to see René Machado in consultation for a chief complaint of shortness of breath. History of Present Illness:  Ms. Amelia Ceja is an  yo woman with a history of asthma/COPD, pseudomonas pneumonia/colonization, bronchiectasis, former tobacco use, afib, breast cancer s/p chemo/radiation, seronegative RA and GERD who presented with dyspnea. She was recently discharged on  (admitted -) when she was treated for acute respiratory failure, PNA and hyponatremia. Did not require O2 at discharge. She had been admitted from - with PNA. During her most recent hospitalization she was treated with bactrim for stenothrophomonas grown at her prior hospitalization, but unfortunately this had to be stopped due to significant hyponatremia. She was treated with cefepime and discharged home on  cefdinir as she could not take levaquin while on sotalol. Attempts were made to stop the sotalol, but had had afib with RVR and was ultimately placed back on this. At discharge her Trelegy was stopped due to side effects/nausea and she was discharged on brovana/pulmicort. She comes back in with shortness of breath with exertion for the past several days. She also describes trouble swallowing, eating solids. She feels like she needs some sort of rehab as she is too weak at home. She was reportedly in [facility] in route, but has not been so here.      Labs:  WBC 12.7, cr 0.35, proBNP 5179    Images reviewed:  CXR 22: Unchanged patchy bibasilar airspace disease, compatible with pneumonia. Interval history:  Afebrile  BP soft this AM  Sats 97% on 1L   WBC 9.9   K 3.2   Na 127  Blood cultures no growth x 4 days    ROS:  Pt reports she has been OOB to chair. Reports she is still experiencing productive cough w/ yellow sputum. Pt asking about table nebulizer for when she does get home. Reports her appetite is better. Past Medical History:   Diagnosis Date    Asthma     CHF (congestive heart failure), NYHA class I, chronic, systolic (HCC)     Chronic anticoagulation     GERD (gastroesophageal reflux disease)     Hiatal hernia     HX: breast cancer     Right     Hypothyroid     Paroxysmal A-fib (HCC)     Venous insufficiency     bilat LE    Weight loss        Past Surgical History:   Procedure Laterality Date    COLONOSCOPY N/A 2021    COLONOSCOPY AND EGD performed by Fatoumata Smith MD at [address] [place] [address] Drive,[address] BREAST LUMPECTOMY Right 2015    HX CATARACT REMOVAL  2013    Bilateral     HX CHOLECYSTECTOMY      HX DILATION AND CURETTAGE      x2    HX ORTHOPAEDIC  2017    RIGHT foot  hammertoe       History reviewed. No pertinent family history.     Social History     Tobacco Use    Smoking status: Former     Types: Cigarettes     Start date: 2000     Quit date: 2000     Years since quittin.8    Smokeless tobacco: Never   Substance Use Topics    Alcohol use: Not Currently       Allergies   Allergen Reactions    Ace Inhibitors Cough    Iodine Rash    Penicillins Rash       Current Facility-Administered Medications   Medication Dose Route Frequency    bumetanide (BUMEX) tablet 0.5 mg  0.5 mg Oral DAILY    zolpidem (AMBIEN) tablet 2.5 mg  2.5 mg Oral QHS PRN    calcium carbonate (TUMS) chewable tablet 200 mg [elemental]  200 mg Oral TID WITH MEALS    artificial saliva (MOUTH KOTE) 1 Spray  1 Spray Oral PRN    levalbuterol (XOPENEX) nebulizer soln 1.25 mg/3 mL  1.25 mg Nebulization Q6H RT    sodium chloride (NS) flush 5-10 mL  5-10 mL IntraVENous PRN    albuterol 5mg / ipratropium 0.5mg neb solution  1 Dose Nebulization Q4H PRN    guaiFENesin ER (MUCINEX) tablet 600 mg  600 mg Oral Q12H    montelukast (SINGULAIR) tablet 10 mg  10 mg Oral QHS    nystatin (MYCOSTATIN) 100,000 unit/mL oral suspension 500,000 Units  500,000 Units Oral QID    ketotifen (ZADITOR) 0.025 % (0.035 %) ophthalmic solution 1 Drop  1 Drop Both Eyes BID    pantoprazole (PROTONIX) tablet 40 mg  40 mg Oral DAILY    sotaloL (BETAPACE) tablet 80 mg  80 mg Oral Q12H    rivaroxaban (XARELTO) tablet 20 mg  20 mg Oral DAILY WITH DINNER    sodium chloride (NS) flush 5-40 mL  5-40 mL IntraVENous Q8H    sodium chloride (NS) flush 5-40 mL  5-40 mL IntraVENous PRN    acetaminophen (TYLENOL) tablet 650 mg  650 mg Oral Q6H PRN    Or    acetaminophen (TYLENOL) suppository 650 mg  650 mg Rectal Q6H PRN    polyethylene glycol (MIRALAX) packet 17 g  17 g Oral DAILY PRN    ondansetron (ZOFRAN ODT) tablet 4 mg  4 mg Oral Q8H PRN    Or    ondansetron (ZOFRAN) injection 4 mg  4 mg IntraVENous Q6H PRN    arformoteroL (BROVANA) neb solution 15 mcg  15 mcg Nebulization BID RT    budesonide (PULMICORT) 500 mcg/2 ml nebulizer suspension  500 mcg Nebulization BID RT    artificial saliva (MOUTH KOTE) 1 Spray  1 Spray Oral PRN    diclofenac (VOLTAREN) 1 % topical gel 2 g  2 g Topical BID PRN         REVIEW OF SYSTEMS   12 point ROS negative except as stated in the HPI. Physical Exam:   Visit Vitals  BP (!) 99/52 (BP 1 Location: Left upper arm, BP Patient Position: At rest)   Pulse 96   Temp 97.8 °F (36.6 °C)   Resp 17   Ht 5' 4\" (1.626 m)   Wt 55.6 kg (122 lb 9.2 oz)   SpO2 97%   BMI 21.04 kg/m²       General:  Alert, cooperative, no distress, appears stated age. Frail. Head:  Normocephalic, without obvious abnormality, atraumatic. Eyes:  Conjunctivae/corneas clear. Nose: Nares normal. Septum midline.  Mucosa normal.    Throat: Lips, mucosa, and tongue normal.    Neck: Supple, symmetrical, trachea midline, no adenopathy. Lungs:   Diminished; few basilar crackles   Chest wall:  No tenderness or deformity. Heart:  Regular rate and rhythm, S1, S2, no murmur, click, rub or gallop. Abdomen:   Soft, non-tender. Bowel sounds active. Extremities: Extremities normal, atraumatic, no cyanosis or edema. Pulses: 2+ and symmetric all extremities. Skin: Skin color, texture, turgor normal. No rashes or lesions. Neurologic: Grossly non-focal.       Lab Results   Component Value Date/Time    Sodium 127 (L) 09/26/2022 12:23 AM    Potassium 3.2 (L) 09/26/2022 12:23 AM    Chloride 85 (L) 09/26/2022 12:23 AM    CO2 37 (H) 09/26/2022 12:23 AM    BUN 9 09/26/2022 12:23 AM    Creatinine 0.43 (L) 09/26/2022 12:23 AM    Glucose 104 (H) 09/26/2022 12:23 AM    Calcium 9.2 09/26/2022 12:23 AM    Magnesium 1.6 09/26/2022 12:23 AM    Phosphorus 3.4 09/21/2022 01:57 AM       Lab Results   Component Value Date/Time    WBC 9.9 09/26/2022 12:23 AM    HGB 10.2 (L) 09/26/2022 12:23 AM    PLATELET 776 (H) 54/47/2728 12:23 AM    MCV 89.6 09/26/2022 12:23 AM       Lab Results   Component Value Date/Time    Alk.  phosphatase 106 09/20/2022 02:04 AM    Protein, total 6.0 (L) 09/20/2022 02:04 AM    Albumin 2.0 (L) 09/20/2022 02:04 AM    Globulin 4.0 09/20/2022 02:04 AM       Lab Results   Component Value Date/Time    Iron 35 09/04/2022 05:26 PM    TIBC 193 (L) 09/04/2022 05:26 PM    Iron % saturation 18 (L) 09/04/2022 05:26 PM    Ferritin 196 09/04/2022 05:26 PM       Lab Results   Component Value Date/Time    TSH 1.12 09/07/2022 04:57 AM        No results found for: PH, PHI, PCO2, PCO2I, PO2, PO2I, HCO3, HCO3I, FIO2, FIO2I    Lab Results   Component Value Date/Time    Troponin-I, Qt. <0.05 01/13/2021 03:17 AM        Lab Results   Component Value Date/Time    Culture result: NO GROWTH 6 DAYS 09/19/2022 05:36 AM    Culture result: NO GROWTH 6 DAYS 09/19/2022 05:36 AM    Culture result: (A) 09/11/2022 12:10 PM LIGHT Stenotrophomonas (Xantho.) maltophilia CLSI GUIDELINES DO NOT RECOMMEND REPORTING SUSCEPTIBILITIES FOR S. MALTOPHILIA. TRIMETHOPRIM/SULFAMETHOXAZOLE IS THE DRUG OF CHOICE.     Culture result: LIGHT NORMAL RESPIRATORY CANDELARIO 09/11/2022 12:10 PM       No results found for: TOXA1, RPR, HBCM, HBSAG, HAAB, HCAB1, HAAT, G6PD, CRYAC, HIVGT, HIVR, HIV1, HIV12, HIVPC, HIVRPI    No results found for: VANCT, CPK    Lab Results   Component Value Date/Time    Color YELLOW/STRAW 09/19/2022 01:01 PM    Appearance CLEAR 09/19/2022 01:01 PM    pH (UA) 6.0 09/19/2022 01:01 PM    Protein TRACE (A) 09/19/2022 01:01 PM    Glucose Negative 09/19/2022 01:01 PM    Ketone Negative 09/19/2022 01:01 PM    Bilirubin Negative 09/19/2022 01:01 PM    Blood Negative 09/19/2022 01:01 PM    Urobilinogen 0.2 09/19/2022 01:01 PM    Nitrites Negative 09/19/2022 01:01 PM    Leukocyte Esterase SMALL (A) 09/19/2022 01:01 PM    WBC 5-10 09/19/2022 01:01 PM    RBC 0-5 09/19/2022 01:01 PM    Bacteria 1+ (A) 09/19/2022 01:01 PM       IMPRESSION  Acute respiratory failure with hypoxia  Asthma, not in exacerbation  Bronchiectasis  History of pseudomonas PNA, on wayne nebs at baseline  Afib    PLAN  Goal sats 88% or higher  Will need exercise oximetry closer to discharge  Gloriaa/Pulmicort, PRN alyssa  Agree that she does not need additional antibiotics at this time; would not expect complete resolution of infiltrates on CXR in this interval and will need repeat imaging as an outpatient; low threshold to add abx if she decompensates  Diuresis as per Cardiology, watch I&O  Completed course of Wayne nebs  PT/OT consulted  CM consult for table nebulizer per pt's request  Dispo SNF- peer to peer meeting @ noon today    Sparkle Lemus NP student

## 2022-09-26 NOTE — PROGRESS NOTES
Problem: Mobility Impaired (Adult and Pediatric)  Goal: *Acute Goals and Plan of Care (Insert Text)  Description: FUNCTIONAL STATUS PRIOR TO ADMISSION: Patient was modified independent using a rollator for functional mobility. HOME SUPPORT PRIOR TO ADMISSION: The patient lived with spouse with dementia. Pt  did not require assist.    Physical Therapy Goals  Initiated 9/20/2022  1. Patient will move from supine to sit and sit to supine  in bed with independence within 7 day(s). 2.  Patient will transfer from bed to chair and chair to bed with modified independence using the least restrictive device within 7 day(s). 3.  Patient will perform sit to stand with supervision/set-up within 7 day(s). 4.  Patient will ambulate with supervision/set-up for 50 feet with the least restrictive device within 7 day(s). 5.  Patient will ascend/descend 5 stairs with 2 handrail(s) with minimal assistance/contact guard assist within 7 day(s). Outcome: Progressing Towards Goal    PHYSICAL THERAPY TREATMENT  Patient: Tio Krishnamurthy (73 y.o. female)  Date: 9/26/2022  Diagnosis: Dyspnea [R06.00] <principal problem not specified>      Precautions:    Chart, physical therapy assessment, plan of care and goals were reviewed. ASSESSMENT  Patient continues with skilled PT services and is progressing towards goals. Pt received sitting on the recliner, currently on 1L O2, agreeable for PT treatment. Pt participated in there ex's for b/l Le strengthening. Requires SBA for STS transfers and is able to ambulate - 125' with rollator, SBA/CGA, SPO2 - 90% post ambulation. Pt with improved ability to perform transfers and ambulation with decreased level of assist. Increased ambulatory distance of 125'. Recommend IPR vs HHPT  with family care. Current Level of Function Impacting Discharge (mobility/balance): Pt requires SBA/CGA for transfers/mobility.     Other factors to consider for discharge: PLOF, time since onset, decline from functional baseline. PLAN :  Patient continues to benefit from skilled intervention to address the above impairments. Continue treatment per established plan of care. to address goals. Recommendation for discharge: (in order for the patient to meet his/her long term goals)  Therapy 3 hours per day 5-7 days per week    This discharge recommendation:  Has been made in collaboration with the attending provider and/or case management    IF patient discharges home will need the following DME: patient owns DME required for discharge       SUBJECTIVE:   Patient stated  I am feeling better    OBJECTIVE DATA SUMMARY:   Critical Behavior:  Neurologic State: Alert  Orientation Level: Oriented X4  Cognition: Follows commands  Safety/Judgement: Awareness of environment  Functional Mobility Training:    Transfers:  Sit to Stand: Stand-by assistance  Stand to Sit: Stand-by assistance    Balance:  Sitting: Intact; Without support  Standing: Impaired; Without support  Standing - Static: Good  Standing - Dynamic : Fair;Occasional  Ambulation/Gait Training:  Distance (ft): 125 Feet (ft)  Assistive Device: Walker, rollator  Ambulation - Level of Assistance: Stand-by assistance;Contact guard assistance    Speed/Ivory: Slow  Step Length: Left shortened;Right shortened    Therapeutic Exercises:   AP, LAQ, seated marches, hip abduction-adduction - 10 x 2 reps for b/l Le  Pain Ratin/10    Activity Tolerance:   Good and requires rest breaks    After treatment patient left in no apparent distress:   Sitting in chair, Heels elevated for pressure relief, and Call bell within reach    COMMUNICATION/COLLABORATION:   The patients plan of care was discussed with: Registered nurseAna Smith   Time Calculation: 26 mins

## 2022-09-26 NOTE — PROGRESS NOTES
Vishal Clounga [facility] 79  [address] [facility], [place], [address] [place] [address]  [phone]      Medical Progress Note      NAME: Oleg Hahn   :  1940  MRM:  [id number]    Date of service: 2022  9:41 AM       Assessment and Plan:   Dyspnea / Abnormal xray -  Hx of Bronchiectasis, pseudomonas PNA, on jeff nebs at baseline. recurrent and persistent. not hypoxic. No sepsis criteria, unchanged xray and recent multiple rounds of Abx all argue against acute bacterial PNA. Recent stable ECHO. Evaluated by pulmonary and cardiology. Monitor off antibiotics. Pulmonary following     2. Paroxysmal A-fib / Chronic anticoagulation - Continue xarelto and Sotalol. Patient is currently rate controlled     3. CHF (congestive heart failure), NYHA class I, chronic, systolic / elevated proBNP/ Venous insufficiency - Stable recent ECHO. Evaluated by cardiology. Continue diuretic and BB      4. Asthma, stable - On Brovana, Pulmicort and prn atrovent and xopenex. Pulmonary following. 5.  Hyponatremia. Likely due to diuretic( decreased dose). Monitor       6. Anemia - Stable since last discharge, and normal serologies. 7.  Weight loss / GERD (gastroesophageal reflux disease) / Hiatal hernia / Candidiasis - She has very concerning unintentional wt loss, anorexia, nausea that persists. She weighed 170lb last year. 128 lb last week. 134lb now. On last visit GI was consulted and recommended outpatient FU. Cont PPI. Emptying study  shows delayed esophageal emptying but not delayed gastric emptying. She is on PO nystatin. 8.  Hx breast cancer - Outpatient follow up. Has been on Boniva      9. Hypothyroid - TSH was normal a few months ago. 10.  Chest pain. Reproducible. Likely musculoskeletal.  Monitor     11. Thrush/ GERD/ hiatal hernia: continue nystatin, PPI    12. Debility: PT following. She was able to walk 125 Ft without assistance. Patient will benefit from HHPT.  Denied p2p for IPR       Dispo; likely d/c home tomorrow with HHPT         Subjective:     Chief Complaint[de-identified] Patient was seen and examined as a follow up for dyspnea. Chart was reviewed. No new complaint. ROS:  (bold if positive, if negative)    Tolerating PT  Tolerating Diet        Objective:     Last 24hrs VS reviewed since prior progress note.  Most recent are:    Visit Vitals  /70 (BP 1 Location: Left upper arm, BP Patient Position: At rest)   Pulse (!) 58   Temp 97.3 °F (36.3 °C)   Resp 16   Ht 5' 4\" (1.626 m)   Wt 55.6 kg (122 lb 9.2 oz)   SpO2 97%   BMI 21.04 kg/m²     SpO2 Readings from Last 6 Encounters:   09/26/22 97%   09/14/22 92%   08/23/22 95%   08/10/22 94%   05/01/22 96%   06/14/21 99%    O2 Flow Rate (L/min): 1 l/min     Intake/Output Summary (Last 24 hours) at 9/26/2022 1513  Last data filed at 9/26/2022 0800  Gross per 24 hour   Intake 240 ml   Output --   Net 240 ml          Physical Exam:    Gen:  Well-developed, well-nourished, in no acute distress  HEENT:  Pink conjunctivae, PERRL, hearing intact to voice, moist mucous membranes  Neck:  Supple, without masses, thyroid non-tender  Resp:  No accessory muscle use, rales BL  Card:  No murmurs, normal S1, S2 without thrills, bruits or peripheral edema  Abd:  Soft, non-tender, non-distended, normoactive bowel sounds are present, no palpable organomegaly and no detectable hernias  Lymph:  No cervical or inguinal adenopathy  Musc:  No cyanosis or clubbing  Skin:  No rashes or ulcers, skin turgor is good  Neuro:  Cranial nerves are grossly intact, no focal motor weakness, follows commands appropriately  Psych:  Good insight, oriented to person, place and time, alert  __________________________________________________________________  Medications Reviewed: (see below)  Medications:     Current Facility-Administered Medications   Medication Dose Route Frequency    bumetanide (BUMEX) tablet 0.5 mg  0.5 mg Oral DAILY    zolpidem (AMBIEN) tablet 2.5 mg  2.5 mg Oral QHS PRN    calcium carbonate (TUMS) chewable tablet 200 mg [elemental]  200 mg Oral TID WITH MEALS    artificial saliva (MOUTH KOTE) 1 Spray  1 Spray Oral PRN    levalbuterol (XOPENEX) nebulizer soln 1.25 mg/3 mL  1.25 mg Nebulization Q6H RT    sodium chloride (NS) flush 5-10 mL  5-10 mL IntraVENous PRN    albuterol 5mg / ipratropium 0.5mg neb solution  1 Dose Nebulization Q4H PRN    guaiFENesin ER (MUCINEX) tablet 600 mg  600 mg Oral Q12H    montelukast (SINGULAIR) tablet 10 mg  10 mg Oral QHS    nystatin (MYCOSTATIN) 100,000 unit/mL oral suspension 500,000 Units  500,000 Units Oral QID    ketotifen (ZADITOR) 0.025 % (0.035 %) ophthalmic solution 1 Drop  1 Drop Both Eyes BID    pantoprazole (PROTONIX) tablet 40 mg  40 mg Oral DAILY    sotaloL (BETAPACE) tablet 80 mg  80 mg Oral Q12H    rivaroxaban (XARELTO) tablet 20 mg  20 mg Oral DAILY WITH DINNER    sodium chloride (NS) flush 5-40 mL  5-40 mL IntraVENous Q8H    sodium chloride (NS) flush 5-40 mL  5-40 mL IntraVENous PRN    acetaminophen (TYLENOL) tablet 650 mg  650 mg Oral Q6H PRN    Or    acetaminophen (TYLENOL) suppository 650 mg  650 mg Rectal Q6H PRN    polyethylene glycol (MIRALAX) packet 17 g  17 g Oral DAILY PRN    ondansetron (ZOFRAN ODT) tablet 4 mg  4 mg Oral Q8H PRN    Or    ondansetron (ZOFRAN) injection 4 mg  4 mg IntraVENous Q6H PRN    arformoteroL (BROVANA) neb solution 15 mcg  15 mcg Nebulization BID RT    budesonide (PULMICORT) 500 mcg/2 ml nebulizer suspension  500 mcg Nebulization BID RT    artificial saliva (MOUTH KOTE) 1 Spray  1 Spray Oral PRN    diclofenac (VOLTAREN) 1 % topical gel 2 g  2 g Topical BID PRN        Lab Data Reviewed: (see below)  Lab Review:     Recent Labs     09/26/22  0023 09/24/22  0154   WBC 9.9 8.1   HGB 10.2* 10.1*   HCT 31.8* 31.3*   * 467*       Recent Labs     09/26/22  0023 09/25/22  0113 09/24/22  0154   * 125* 125*   K 3.2* 4.2 4.2   CL 85* 88* 87*   CO2 37* 30 35*   * 100 108*   BUN 9 13 12   CREA 0.43* 0.44* 0.43*   CA 9.2 9.0 9.0   MG 1.6  --   --        Lab Results   Component Value Date/Time    Glucose (POC) 120 (H) 09/24/2022 07:23 AM    Glucose (POC) 106 09/19/2022 05:49 AM    Glucose (POC) 152 (H) 09/11/2022 03:02 PM    Glucose (POC) 146 (H) 09/06/2022 01:21 PM     No results for input(s): PH, PCO2, PO2, HCO3, FIO2 in the last 72 hours. No results for input(s): INR, INREXT, INREXT in the last 72 hours.   All Micro Results       Procedure Component Value Units Date/Time    CULTURE, BLOOD [[id number]] Collected: 09/19/22 0536    Order Status: Completed Specimen: Blood Updated: 09/25/22 0720     Special Requests: NO SPECIAL REQUESTS        Culture result: NO GROWTH 6 DAYS       CULTURE, BLOOD [[id number]] Collected: 09/19/22 0536    Order Status: Completed Specimen: Blood Updated: 09/25/22 0720     Special Requests: NO SPECIAL REQUESTS        Culture result: NO GROWTH 6 DAYS       RESPIRATORY VIRUS PANEL W/COVID-19, PCR [[id number]] Collected: 09/19/22 0944    Order Status: Completed Specimen: Nasopharyngeal Updated: 09/19/22 1516     Adenovirus Not detected        Coronavirus 229E Not detected        Coronavirus HKU1 Not detected        Coronavirus CVNL63 Not detected        Coronavirus OC43 Not detected        SARS-CoV-2, PCR Not detected        Metapneumovirus Not detected        Rhinovirus and Enterovirus Not detected        Influenza A Not detected        Influenza B Not detected        Parainfluenza 1 Not detected        Parainfluenza 2 Not detected        Parainfluenza 3 Not detected        Parainfluenza virus 4 Not detected        RSV by PCR Not detected        B. parapertussis, PCR Not detected        Bordetella pertussis - PCR Not detected        Chlamydophila pneumoniae DNA, QL, PCR Not detected        Mycoplasma pneumoniae DNA, QL, PCR Not detected       CULTURE, URINE [[id number]] Collected: 09/19/22 1301    Order Status: Canceled Specimen: Urine from Clean catch     COVID-19 RAPID TEST [[id number]] Collected: 09/19/22 0604    Order Status: Completed Specimen: Nasopharyngeal Updated: 09/19/22 0631     Specimen source Nasopharyngeal        COVID-19 rapid test Not detected        Comment: Rapid Abbott ID Now       Rapid NAAT:  The specimen is NEGATIVE for SARS-CoV-2, the novel coronavirus associated with COVID-19. Negative results should be treated as presumptive and, if inconsistent with clinical signs and symptoms or necessary for patient management, should be tested with an alternative molecular assay. Negative results do not preclude SARS-CoV-2 infection and should not be used as the sole basis for patient management decisions. This test has been authorized by the FDA under an Emergency Use Authorization (EUA) for use by authorized laboratories. Fact sheet for Healthcare Providers: ConventionUpdate.co.nz  Fact sheet for Patients: ConventionUpdate.co.nz       Methodology: Isothermal Nucleic Acid Amplification         COVID-19 WITH INFLUENZA A/B [[id number]]     Order Status: Canceled Specimen: Nasopharyngeal             I have reviewed notes of prior 24hr. Other pertinent lab: Total time spent with patient: 35 minutes I personally reviewed chart, notes, data and current medications in the medical record. I have personally examined and treated the patient at bedside during this period.                  Care Plan discussed with: Patient, Nursing Staff, and >50% of time spent in counseling and coordination of care    Discussed:  Care Plan    Prophylaxis:   xarelto     Disposition:  SNF/LTC           ___________________________________________________    Attending Physician: Sondra De León MD

## 2022-09-26 NOTE — PROGRESS NOTES
9/26/2022   CARE MANAGEMENT NOTE:  CM reviewed EMR. READMISSION:  Pt was admitted with dyspnea, Afib, CHF. Reportedly, pt resides with her  who has dementia. Son, Brissa Garza ([phone]) and DIL are the family contacts. RUR 20%; LOS 7 days     Transition Plan of Care:  Cardiology, Pulmonary are following for medical management  [facility] (pulmonary rehab) accepted but  [address] [place] [address] Dr. Medicare denied. Dtr would like a Peer to Peer - MD, please call [phone] by 12 noon on Monday. Ref # [id number]  6. SNF will be the backup plan if IPR denial is upheld. Referrals were sent to [facility] (accepted), and [facility]  4. [employer] [place] will be needed  5. Outpatient follow up  6. Mode of transportation to be determined     CM will continue to follow pt for definitive discharge plan.   Filiberto

## 2022-09-26 NOTE — PROGRESS NOTES
Bedside shift change report given to [address] [place] [address] (oncoming nurse) by Natanael Nazario (offgoing nurse). Report included the following information SBAR, Kardex, Intake/Output, MAR, and Recent Results.

## 2022-09-26 NOTE — PROGRESS NOTES
Bedside and Verbal shift change report given to [address]  and Valentín Leavitt RN(oncoming nurse) by Boo Gipson RN (offgoing nurse). Report included the following information SBAR, Kardex, Intake/Output, MAR, and Quality Measures.

## 2022-09-27 PROCEDURE — 74011000250 HC RX REV CODE- 250: Performed by: INTERNAL MEDICINE

## 2022-09-27 PROCEDURE — 94640 AIRWAY INHALATION TREATMENT: CPT

## 2022-09-27 PROCEDURE — 65270000029 HC RM PRIVATE

## 2022-09-27 PROCEDURE — 74011250637 HC RX REV CODE- 250/637: Performed by: INTERNAL MEDICINE

## 2022-09-27 PROCEDURE — 77010033678 HC OXYGEN DAILY

## 2022-09-27 PROCEDURE — G0378 HOSPITAL OBSERVATION PER HR: HCPCS

## 2022-09-27 PROCEDURE — 94761 N-INVAS EAR/PLS OXIMETRY MLT: CPT

## 2022-09-27 RX ADMIN — DICLOFENAC SODIUM 2 G: 10 GEL TOPICAL at 21:58

## 2022-09-27 RX ADMIN — ACETAMINOPHEN 650 MG: 325 TABLET ORAL at 21:57

## 2022-09-27 RX ADMIN — Medication 200 MG: at 08:09

## 2022-09-27 RX ADMIN — PANTOPRAZOLE SODIUM 40 MG: 40 TABLET, DELAYED RELEASE ORAL at 08:09

## 2022-09-27 RX ADMIN — LEVALBUTEROL HYDROCHLORIDE 1.25 MG: 1.25 SOLUTION RESPIRATORY (INHALATION) at 01:11

## 2022-09-27 RX ADMIN — Medication 200 MG: at 12:00

## 2022-09-27 RX ADMIN — SOTALOL HYDROCHLORIDE 80 MG: 80 TABLET ORAL at 17:15

## 2022-09-27 RX ADMIN — Medication 10 ML: at 05:06

## 2022-09-27 RX ADMIN — NYSTATIN 500000 UNITS: 100000 SUSPENSION ORAL at 17:15

## 2022-09-27 RX ADMIN — POLYETHYLENE GLYCOL 3350 17 G: 17 POWDER, FOR SOLUTION ORAL at 10:32

## 2022-09-27 RX ADMIN — Medication 10 ML: at 21:58

## 2022-09-27 RX ADMIN — ACETAMINOPHEN 650 MG: 325 TABLET ORAL at 01:48

## 2022-09-27 RX ADMIN — KETOTIFEN FUMARATE 1 DROP: 0.35 SOLUTION/ DROPS OPHTHALMIC at 08:14

## 2022-09-27 RX ADMIN — BUDESONIDE 500 MCG: 0.5 SUSPENSION RESPIRATORY (INHALATION) at 20:06

## 2022-09-27 RX ADMIN — MONTELUKAST 10 MG: 10 TABLET, FILM COATED ORAL at 21:57

## 2022-09-27 RX ADMIN — NYSTATIN 500000 UNITS: 100000 SUSPENSION ORAL at 12:18

## 2022-09-27 RX ADMIN — DICLOFENAC SODIUM 2 G: 10 GEL TOPICAL at 13:58

## 2022-09-27 RX ADMIN — RIVAROXABAN 20 MG: 10 TABLET, FILM COATED ORAL at 17:15

## 2022-09-27 RX ADMIN — NYSTATIN 500000 UNITS: 100000 SUSPENSION ORAL at 08:09

## 2022-09-27 RX ADMIN — GUAIFENESIN 600 MG: 600 TABLET ORAL at 21:57

## 2022-09-27 RX ADMIN — ACETAMINOPHEN 650 MG: 325 TABLET ORAL at 13:58

## 2022-09-27 RX ADMIN — LEVALBUTEROL HYDROCHLORIDE 1.25 MG: 1.25 SOLUTION RESPIRATORY (INHALATION) at 07:33

## 2022-09-27 RX ADMIN — BUMETANIDE 0.5 MG: 1 TABLET ORAL at 08:09

## 2022-09-27 RX ADMIN — ARFORMOTEROL TARTRATE 15 MCG: 15 SOLUTION RESPIRATORY (INHALATION) at 07:38

## 2022-09-27 RX ADMIN — GUAIFENESIN 600 MG: 600 TABLET ORAL at 08:09

## 2022-09-27 RX ADMIN — Medication 200 MG: at 17:15

## 2022-09-27 RX ADMIN — LEVALBUTEROL HYDROCHLORIDE 1.25 MG: 1.25 SOLUTION RESPIRATORY (INHALATION) at 13:16

## 2022-09-27 RX ADMIN — LEVALBUTEROL HYDROCHLORIDE 1.25 MG: 1.25 SOLUTION RESPIRATORY (INHALATION) at 19:59

## 2022-09-27 RX ADMIN — NYSTATIN 500000 UNITS: 100000 SUSPENSION ORAL at 21:57

## 2022-09-27 RX ADMIN — BUDESONIDE 500 MCG: 0.5 SUSPENSION RESPIRATORY (INHALATION) at 07:38

## 2022-09-27 RX ADMIN — Medication 10 ML: at 13:58

## 2022-09-27 RX ADMIN — ARFORMOTEROL TARTRATE 15 MCG: 15 SOLUTION RESPIRATORY (INHALATION) at 20:06

## 2022-09-27 NOTE — PROGRESS NOTES
Vishal Colunga [facility] 79  [address] [facility], [place], [address] [place] [address]  [phone]      Medical Progress Note      NAME: Rosa Palacios   :  1940  MRM:  [id number]    Date of service: 2022  9:41 AM       Assessment and Plan:   Dyspnea / Abnormal xray -  Hx of Bronchiectasis, pseudomonas PNA, on jeff nebs at baseline. recurrent and persistent. not hypoxic. No sepsis criteria, unchanged xray and recent multiple rounds of Abx all argue against acute bacterial PNA. Recent stable ECHO. Evaluated by pulmonary and cardiology. Monitor off antibiotics. Pulmonary following     2. Paroxysmal A-fib / Chronic anticoagulation - Continue xarelto and Sotalol. Patient is currently rate controlled     3. CHF (congestive heart failure), NYHA class I, chronic, systolic / elevated proBNP/ Venous insufficiency - Stable recent ECHO. Evaluated by cardiology. Continue diuretic and BB      4. Asthma, stable - On Brovana, Pulmicort and prn atrovent and xopenex. Pulmonary following. No bronchospastic      5. Hyponatremia. Likely due to diuretic( decreased dose). On bumex      6. Anemia - Stable since last discharge, and normal serologies. 7.  Weight loss / GERD (gastroesophageal reflux disease) / Hiatal hernia / Candidiasis - She has very concerning unintentional wt loss, anorexia, nausea that persists. She weighed 170lb last year. 128 lb last week. 134lb now. On last visit GI was consulted and recommended outpatient FU. Cont PPI. Emptying study  shows delayed esophageal emptying but not delayed gastric emptying. She is on PO nystatin. 8.  Hx breast cancer - Outpatient follow up. Has been on Boniva      9. Hypothyroid - TSH was normal a few months ago. 10.  Chest pain. Reproducible. Likely musculoskeletal.  Monitor     11. Thrush/ GERD/ hiatal hernia: continue nystatin, PPI    12. Hypokalemia: replete and monitor    12. Debility: PT following.  Insurance authorization denied for IPR. Patient would still like to go to [facility]- Self pay       Dispo; D/c tomorrow to [facility]          Subjective:     Chief Complaint[de-identified] Patient was seen and examined as a follow up for dyspnea. Chart was reviewed. No new complaint. ROS:  (bold if positive, if negative)    Tolerating PT  Tolerating Diet        Objective:     Last 24hrs VS reviewed since prior progress note. Most recent are:    Visit Vitals  /65 (BP 1 Location: Left upper arm, BP Patient Position: Reclining)   Pulse 91   Temp 97.5 °F (36.4 °C)   Resp 20   Ht 5' 4\" (1.626 m)   Wt 55.6 kg (122 lb 9.2 oz)   SpO2 92%   BMI 21.04 kg/m²     SpO2 Readings from Last 6 Encounters:   09/27/22 92%   09/14/22 92%   08/23/22 95%   08/10/22 94%   05/01/22 96%   06/14/21 99%    O2 Flow Rate (L/min): 2 l/min     Intake/Output Summary (Last 24 hours) at 9/27/2022 1448  Last data filed at 9/27/2022 0800  Gross per 24 hour   Intake 600 ml   Output --   Net 600 ml          Physical Exam:    Gen:  Well-developed, well-nourished, in no acute distress  HEENT:  Pink conjunctivae, PERRL, hearing intact to voice, moist mucous membranes  Neck:  Supple, without masses, thyroid non-tender  Resp:  No accessory muscle use.  Bi basal crackles  Card:  No murmurs, normal S1, S2 without thrills, bruits or peripheral edema  Abd:  Soft, non-tender, non-distended, normoactive bowel sounds are present, no palpable organomegaly and no detectable hernias  Lymph:  No cervical or inguinal adenopathy  Musc:  No cyanosis or clubbing  Skin:  No rashes or ulcers, skin turgor is good  Neuro:  Cranial nerves are grossly intact, no focal motor weakness, follows commands appropriately  Psych:  Good insight, oriented to person, place and time, alert  __________________________________________________________________  Medications Reviewed: (see below)  Medications:     Current Facility-Administered Medications   Medication Dose Route Frequency    bumetanide (BUMEX) tablet 0.5 mg  0.5 mg Oral DAILY    zolpidem (AMBIEN) tablet 2.5 mg  2.5 mg Oral QHS PRN    calcium carbonate (TUMS) chewable tablet 200 mg [elemental]  200 mg Oral TID WITH MEALS    artificial saliva (MOUTH KOTE) 1 Spray  1 Spray Oral PRN    levalbuterol (XOPENEX) nebulizer soln 1.25 mg/3 mL  1.25 mg Nebulization Q6H RT    sodium chloride (NS) flush 5-10 mL  5-10 mL IntraVENous PRN    albuterol 5mg / ipratropium 0.5mg neb solution  1 Dose Nebulization Q4H PRN    guaiFENesin ER (MUCINEX) tablet 600 mg  600 mg Oral Q12H    montelukast (SINGULAIR) tablet 10 mg  10 mg Oral QHS    nystatin (MYCOSTATIN) 100,000 unit/mL oral suspension 500,000 Units  500,000 Units Oral QID    ketotifen (ZADITOR) 0.025 % (0.035 %) ophthalmic solution 1 Drop  1 Drop Both Eyes BID    pantoprazole (PROTONIX) tablet 40 mg  40 mg Oral DAILY    sotaloL (BETAPACE) tablet 80 mg  80 mg Oral Q12H    rivaroxaban (XARELTO) tablet 20 mg  20 mg Oral DAILY WITH DINNER    sodium chloride (NS) flush 5-40 mL  5-40 mL IntraVENous Q8H    sodium chloride (NS) flush 5-40 mL  5-40 mL IntraVENous PRN    acetaminophen (TYLENOL) tablet 650 mg  650 mg Oral Q6H PRN    Or    acetaminophen (TYLENOL) suppository 650 mg  650 mg Rectal Q6H PRN    polyethylene glycol (MIRALAX) packet 17 g  17 g Oral DAILY PRN    ondansetron (ZOFRAN ODT) tablet 4 mg  4 mg Oral Q8H PRN    Or    ondansetron (ZOFRAN) injection 4 mg  4 mg IntraVENous Q6H PRN    arformoteroL (BROVANA) neb solution 15 mcg  15 mcg Nebulization BID RT    budesonide (PULMICORT) 500 mcg/2 ml nebulizer suspension  500 mcg Nebulization BID RT    artificial saliva (MOUTH KOTE) 1 Spray  1 Spray Oral PRN    diclofenac (VOLTAREN) 1 % topical gel 2 g  2 g Topical BID PRN        Lab Data Reviewed: (see below)  Lab Review:     Recent Labs     09/26/22  0023   WBC 9.9   HGB 10.2*   HCT 31.8*   *       Recent Labs     09/26/22  0023 09/25/22  0113   * 125*   K 3.2* 4.2   CL 85* 88*   CO2 37* 30   * 100   BUN 9 13   CREA 0.43* 0.44*   CA 9.2 9.0   MG 1.6  --        Lab Results   Component Value Date/Time    Glucose (POC) 120 (H) 09/24/2022 07:23 AM    Glucose (POC) 106 09/19/2022 05:49 AM    Glucose (POC) 152 (H) 09/11/2022 03:02 PM    Glucose (POC) 146 (H) 09/06/2022 01:21 PM     No results for input(s): PH, PCO2, PO2, HCO3, FIO2 in the last 72 hours. No results for input(s): INR, INREXT, INREXT in the last 72 hours.   All Micro Results       Procedure Component Value Units Date/Time    CULTURE, BLOOD [[id number]] Collected: 09/19/22 0536    Order Status: Completed Specimen: Blood Updated: 09/25/22 0720     Special Requests: NO SPECIAL REQUESTS        Culture result: NO GROWTH 6 DAYS       CULTURE, BLOOD [[id number]] Collected: 09/19/22 0536    Order Status: Completed Specimen: Blood Updated: 09/25/22 0720     Special Requests: NO SPECIAL REQUESTS        Culture result: NO GROWTH 6 DAYS       RESPIRATORY VIRUS PANEL W/COVID-19, PCR [[id number]] Collected: 09/19/22 0944    Order Status: Completed Specimen: Nasopharyngeal Updated: 09/19/22 1516     Adenovirus Not detected        Coronavirus 229E Not detected        Coronavirus HKU1 Not detected        Coronavirus CVNL63 Not detected        Coronavirus OC43 Not detected        SARS-CoV-2, PCR Not detected        Metapneumovirus Not detected        Rhinovirus and Enterovirus Not detected        Influenza A Not detected        Influenza B Not detected        Parainfluenza 1 Not detected        Parainfluenza 2 Not detected        Parainfluenza 3 Not detected        Parainfluenza virus 4 Not detected        RSV by PCR Not detected        B. parapertussis, PCR Not detected        Bordetella pertussis - PCR Not detected        Chlamydophila pneumoniae DNA, QL, PCR Not detected        Mycoplasma pneumoniae DNA, QL, PCR Not detected       CULTURE, URINE [[id number]] Collected: 09/19/22 1301    Order Status: Canceled Specimen: Urine from Clean catch     COVID-19 RAPID TEST [[id number]] Collected: 09/19/22 0604    Order Status: Completed Specimen: Nasopharyngeal Updated: 09/19/22 0631     Specimen source Nasopharyngeal        COVID-19 rapid test Not detected        Comment: Rapid Abbott ID Now       Rapid NAAT:  The specimen is NEGATIVE for SARS-CoV-2, the novel coronavirus associated with COVID-19. Negative results should be treated as presumptive and, if inconsistent with clinical signs and symptoms or necessary for patient management, should be tested with an alternative molecular assay. Negative results do not preclude SARS-CoV-2 infection and should not be used as the sole basis for patient management decisions. This test has been authorized by the FDA under an Emergency Use Authorization (EUA) for use by authorized laboratories. Fact sheet for Healthcare Providers: ConventionFOCUS Trainrdate.co.nz  Fact sheet for Patients: ConventionFOCUS Trainrdate.co.nz       Methodology: Isothermal Nucleic Acid Amplification         COVID-19 WITH INFLUENZA A/B [[id number]]     Order Status: Canceled Specimen: Nasopharyngeal             I have reviewed notes of prior 24hr. Other pertinent lab: Total time spent with patient: 35 minutes I personally reviewed chart, notes, data and current medications in the medical record. I have personally examined and treated the patient at bedside during this period.                  Care Plan discussed with: Patient, Nursing Staff, and >50% of time spent in counseling and coordination of care    Discussed:  Care Plan    Prophylaxis:   xarelto     Disposition:  SNF/LTC           ___________________________________________________    Attending Physician: Ayaz Flowers MD

## 2022-09-27 NOTE — PROGRESS NOTES
9/27/2022   CARE MANAGEMENT NOTE:  CM reviewed EMR. READMISSION:  Pt was admitted with dyspnea, Afib, CHF. Reportedly, pt resides with her  who has dementia. Son, Ginger Peralta ([phone]) and DIL are the family contacts. RUR 20%; LOS 8 days     Transition Plan of Care:  Cardiology, Pulmonary are following for medical management  Peer to Peer on 9/27 - denial for IPR upheld. Family will pay privately for [facility]. Anticipate available bed on Wed. 9/28. IPR contract provided to pt and await their signature. CM asked liaison to make call to pt for any questions. Ruthie COVARRUBIAS (PT, OT) resumption of care was arranged as a backup plan  [employer] approved nebulizer for home use however it was not delivered to pt given revised dispo  5. Outpatient follow up  6. Family will transport pt home     CM will continue to follow pt until discharged.   Filiberto

## 2022-09-27 NOTE — ROUTINE PROCESS
Bedside and Verbal shift change report given to Giselle Noel and Seda RN  (oncoming nurse) by Madeleine Corbett  (offgoing nurse). Report included the following information SBAR, Kardex, Procedure Summary, MAR, and Recent Results.

## 2022-09-27 NOTE — PROGRESS NOTES
Name: Kali Hollis: [address]   : 1940 Admit Date: 2022   Phone: [phone]  Room: [facility]/   PCP: Jonathan Catherine MD  MRN: [id number]   Date: 2022  Code: Full Code          Chart and notes reviewed. Data reviewed. I review the patient's current medications in the medical record at each encounter. I have evaluated and examined the patient. HPI:    9:07 AM       History was obtained from patient. I was asked by Marcus Orozco MD to see Anthony Rodas in consultation for a chief complaint of shortness of breath. History of Present Illness:  Ms. Jj Caro is an  yo woman with a history of asthma/COPD, pseudomonas pneumonia/colonization, bronchiectasis, former tobacco use, afib, breast cancer s/p chemo/radiation, seronegative RA and GERD who presented with dyspnea. She was recently discharged on  (admitted -) when she was treated for acute respiratory failure, PNA and hyponatremia. Did not require O2 at discharge. She had been admitted from - with PNA. During her most recent hospitalization she was treated with bactrim for stenothrophomonas grown at her prior hospitalization, but unfortunately this had to be stopped due to significant hyponatremia. She was treated with cefepime and discharged home on  cefdinir as she could not take levaquin while on sotalol. Attempts were made to stop the sotalol, but had had afib with RVR and was ultimately placed back on this. At discharge her Trelegy was stopped due to side effects/nausea and she was discharged on brovana/pulmicort. She comes back in with shortness of breath with exertion for the past several days. She also describes trouble swallowing, eating solids. She feels like she needs some sort of rehab as she is too weak at home. She was reportedly in RVR in route, but has not been so here.      Labs:  WBC 12.7, cr 0.35, proBNP 5179    Images reviewed:  CXR 2022: patchy bibasilar infiltrates not significantly changed from prior     Interval history:  SBP 90s this AM. Pt states she feels well, but sotalol held due to borderline BP. Currently 94% on 1 liters  Pt very motivated to go to pulmonary rehab, but insurance not covering at this time per pt      Past Medical History:   Diagnosis Date    Asthma     CHF (congestive heart failure), NYHA class I, chronic, systolic (HCC)     Chronic anticoagulation     GERD (gastroesophageal reflux disease)     Hiatal hernia     HX: breast cancer     Right     Hypothyroid     Paroxysmal A-fib ([address]     Venous insufficiency     bilat LE    Weight loss        Past Surgical History:   Procedure Laterality Date    COLONOSCOPY N/A 2021    COLONOSCOPY AND EGD performed by Grace Villalba MD at [address] BREAST LUMPECTOMY Right 2015    HX CATARACT REMOVAL  2013    Bilateral     HX CHOLECYSTECTOMY      HX DILATION AND CURETTAGE      x2    HX ORTHOPAEDIC  2017    RIGHT foot  hammertoe       History reviewed. No pertinent family history.     Social History     Tobacco Use    Smoking status: Former     Types: Cigarettes     Start date: 2000     Quit date: 2000     Years since quittin.8    Smokeless tobacco: Never   Substance Use Topics    Alcohol use: Not Currently       Allergies   Allergen Reactions    Ace Inhibitors Cough    Iodine Rash    Penicillins Rash       Current Facility-Administered Medications   Medication Dose Route Frequency    bumetanide (BUMEX) tablet 0.5 mg  0.5 mg Oral DAILY    zolpidem (AMBIEN) tablet 2.5 mg  2.5 mg Oral QHS PRN    calcium carbonate (TUMS) chewable tablet 200 mg [elemental]  200 mg Oral TID WITH MEALS    artificial saliva (MOUTH KOTE) 1 Spray  1 Spray Oral PRN    levalbuterol (XOPENEX) nebulizer soln 1.25 mg/3 mL  1.25 mg Nebulization Q6H RT    sodium chloride (NS) flush 5-10 mL  5-10 mL IntraVENous PRN    albuterol 5mg / ipratropium 0.5mg neb solution  1 Dose Nebulization Q4H PRN    guaiFENesin ER (MUCINEX) tablet 600 mg  600 mg Oral Q12H    montelukast (SINGULAIR) tablet 10 mg  10 mg Oral QHS    nystatin (MYCOSTATIN) 100,000 unit/mL oral suspension 500,000 Units  500,000 Units Oral QID    ketotifen (ZADITOR) 0.025 % (0.035 %) ophthalmic solution 1 Drop  1 Drop Both Eyes BID    pantoprazole (PROTONIX) tablet 40 mg  40 mg Oral DAILY    sotaloL (BETAPACE) tablet 80 mg  80 mg Oral Q12H    rivaroxaban (XARELTO) tablet 20 mg  20 mg Oral DAILY WITH DINNER    sodium chloride (NS) flush 5-40 mL  5-40 mL IntraVENous Q8H    sodium chloride (NS) flush 5-40 mL  5-40 mL IntraVENous PRN    acetaminophen (TYLENOL) tablet 650 mg  650 mg Oral Q6H PRN    Or    acetaminophen (TYLENOL) suppository 650 mg  650 mg Rectal Q6H PRN    polyethylene glycol (MIRALAX) packet 17 g  17 g Oral DAILY PRN    ondansetron (ZOFRAN ODT) tablet 4 mg  4 mg Oral Q8H PRN    Or    ondansetron (ZOFRAN) injection 4 mg  4 mg IntraVENous Q6H PRN    arformoteroL (BROVANA) neb solution 15 mcg  15 mcg Nebulization BID RT    budesonide (PULMICORT) 500 mcg/2 ml nebulizer suspension  500 mcg Nebulization BID RT    artificial saliva (MOUTH KOTE) 1 Spray  1 Spray Oral PRN    diclofenac (VOLTAREN) 1 % topical gel 2 g  2 g Topical BID PRN         REVIEW OF SYSTEMS   12 point ROS negative except as stated in the HPI. Physical Exam:   Visit Vitals  BP (!) 92/54 (BP 1 Location: Left upper arm, BP Patient Position: Reclining)   Pulse 91   Temp 97.4 °F (36.3 °C)   Resp 20   Ht 5' 4\" (1.626 m)   Wt 55.6 kg (122 lb 9.2 oz)   SpO2 90%   BMI 21.04 kg/m²       General:  Alert, cooperative, no distress, appears stated age. Frail. Head:  Normocephalic, without obvious abnormality, atraumatic. Eyes:  Conjunctivae/corneas clear. Nose: Nares normal. Septum midline. Mucosa normal.    Throat: Lips, mucosa, and tongue normal.    Neck: Supple, symmetrical, trachea midline, no adenopathy.    Lungs:   Diminished; few basilar crackles   Chest wall:  No tenderness or deformity. Heart:  Regular rate and rhythm, S1, S2 normal, no murmur, click, rub or gallop. Abdomen:   Soft, non-tender. Bowel sounds normal.    Extremities: Extremities normal, atraumatic, no cyanosis or edema. Pulses: 2+ and symmetric all extremities. Skin: Skin color, texture, turgor normal. No rashes or lesions       Neurologic: Grossly nonfocal       Lab Results   Component Value Date/Time    Sodium 127 (L) 09/26/2022 12:23 AM    Potassium 3.2 (L) 09/26/2022 12:23 AM    Chloride 85 (L) 09/26/2022 12:23 AM    CO2 37 (H) 09/26/2022 12:23 AM    BUN 9 09/26/2022 12:23 AM    Creatinine 0.43 (L) 09/26/2022 12:23 AM    Glucose 104 (H) 09/26/2022 12:23 AM    Calcium 9.2 09/26/2022 12:23 AM    Magnesium 1.6 09/26/2022 12:23 AM    Phosphorus 3.4 09/21/2022 01:57 AM       Lab Results   Component Value Date/Time    WBC 9.9 09/26/2022 12:23 AM    HGB 10.2 (L) 09/26/2022 12:23 AM    PLATELET 045 (H) 92/33/2770 12:23 AM    MCV 89.6 09/26/2022 12:23 AM       Lab Results   Component Value Date/Time    Alk.  phosphatase 106 09/20/2022 02:04 AM    Protein, total 6.0 (L) 09/20/2022 02:04 AM    Albumin 2.0 (L) 09/20/2022 02:04 AM    Globulin 4.0 09/20/2022 02:04 AM       Lab Results   Component Value Date/Time    Iron 35 09/04/2022 05:26 PM    TIBC 193 (L) 09/04/2022 05:26 PM    Iron % saturation 18 (L) 09/04/2022 05:26 PM    Ferritin 196 09/04/2022 05:26 PM       Lab Results   Component Value Date/Time    TSH 1.12 09/07/2022 04:57 AM        No results found for: PH, PHI, PCO2, PCO2I, PO2, PO2I, HCO3, HCO3I, FIO2, FIO2I    Lab Results   Component Value Date/Time    Troponin-I, Qt. <0.05 01/13/2021 03:17 AM        Lab Results   Component Value Date/Time    Culture result: NO GROWTH 6 DAYS 09/19/2022 05:36 AM    Culture result: NO GROWTH 6 DAYS 09/19/2022 05:36 AM    Culture result: (A) 09/11/2022 12:10 PM     LIGHT Stenotrophomonas (Xantho.) maltophilia CLSI GUIDELINES DO NOT RECOMMEND REPORTING SUSCEPTIBILITIES FOR S. MALTOPHILIA. TRIMETHOPRIM/SULFAMETHOXAZOLE IS THE DRUG OF CHOICE. Culture result: LIGHT NORMAL RESPIRATORY CANDELARIO 09/11/2022 12:10 PM       No results found for: TOXA1, RPR, HBCM, HBSAG, HAAB, HCAB1, HAAT, G6PD, CRYAC, HIVGT, HIVR, HIV1, HIV12, HIVPC, HIVRPI    No results found for: VANCT, CPK    Lab Results   Component Value Date/Time    Color YELLOW/STRAW 09/19/2022 01:01 PM    Appearance CLEAR 09/19/2022 01:01 PM    pH (UA) 6.0 09/19/2022 01:01 PM    Protein TRACE (A) 09/19/2022 01:01 PM    Glucose Negative 09/19/2022 01:01 PM    Ketone Negative 09/19/2022 01:01 PM    Bilirubin Negative 09/19/2022 01:01 PM    Blood Negative 09/19/2022 01:01 PM    Urobilinogen 0.2 09/19/2022 01:01 PM    Nitrites Negative 09/19/2022 01:01 PM    Leukocyte Esterase SMALL (A) 09/19/2022 01:01 PM    WBC 5-10 09/19/2022 01:01 PM    RBC 0-5 09/19/2022 01:01 PM    Bacteria 1+ (A) 09/19/2022 01:01 PM       IMPRESSION  Acute respiratory failure with hypoxia  Asthma, not in exacerbation  Bronchiectasis  History of pseudomonas PNA, on jeff nebs at baseline  Afib    PLAN  Goal sats 88% or higher  Wean O2 off and will need home O2 assesment at discharge  Brovana/Pulmicort, PRN duonebs  Mucus mobilization appears to be an issue for pt. Discussed percussion vest and pt is very open to this. Will order as outpt. Agree that she does not need additional antibiotics at this time; would not expect complete resolution of infiltrates on CXR in this interval and will need repeat imaging as an outpatient; low threshold to add abx if she decompensates  Diuresis as per Cardiology, watch I&O  Continue Jeff nebs  PT/OT consulted  Dispo SNF.  Pt would like to go [facility] and is very motivated to work with Trevor Carnes MD

## 2022-09-27 NOTE — PROGRESS NOTES
Bedside shift change report given to [address] [place] [address] (oncoming nurse) by Evita Llanos (offgoing nurse). Report included the following information SBAR, Kardex, Intake/Output, MAR, and Recent Results.

## 2022-09-28 VITALS
DIASTOLIC BLOOD PRESSURE: 51 MMHG | WEIGHT: 122.58 LBS | TEMPERATURE: 98.1 F | HEIGHT: 64 IN | RESPIRATION RATE: 16 BRPM | OXYGEN SATURATION: 95 % | BODY MASS INDEX: 20.93 KG/M2 | HEART RATE: 59 BPM | SYSTOLIC BLOOD PRESSURE: 101 MMHG

## 2022-09-28 PROCEDURE — G0378 HOSPITAL OBSERVATION PER HR: HCPCS

## 2022-09-28 PROCEDURE — 74011250637 HC RX REV CODE- 250/637: Performed by: INTERNAL MEDICINE

## 2022-09-28 PROCEDURE — 2709999900 HC NON-CHARGEABLE SUPPLY

## 2022-09-28 PROCEDURE — 74011000250 HC RX REV CODE- 250: Performed by: INTERNAL MEDICINE

## 2022-09-28 PROCEDURE — 94664 DEMO&/EVAL PT USE INHALER: CPT

## 2022-09-28 PROCEDURE — 77010033678 HC OXYGEN DAILY

## 2022-09-28 PROCEDURE — 94640 AIRWAY INHALATION TREATMENT: CPT

## 2022-09-28 RX ORDER — BUMETANIDE 0.5 MG/1
0.5 TABLET ORAL DAILY
Qty: 30 TABLET | Refills: 1 | Status: ON HOLD | OUTPATIENT
Start: 2022-09-29 | End: 2022-10-27

## 2022-09-28 RX ORDER — GUAIFENESIN 600 MG/1
600 TABLET, EXTENDED RELEASE ORAL EVERY 12 HOURS
Qty: 30 TABLET | Refills: 1 | Status: SHIPPED | OUTPATIENT
Start: 2022-09-28

## 2022-09-28 RX ADMIN — Medication 10 ML: at 06:42

## 2022-09-28 RX ADMIN — LEVALBUTEROL HYDROCHLORIDE 1.25 MG: 1.25 SOLUTION RESPIRATORY (INHALATION) at 01:28

## 2022-09-28 RX ADMIN — LEVALBUTEROL HYDROCHLORIDE 1.25 MG: 1.25 SOLUTION RESPIRATORY (INHALATION) at 07:25

## 2022-09-28 RX ADMIN — NYSTATIN 500000 UNITS: 100000 SUSPENSION ORAL at 08:22

## 2022-09-28 RX ADMIN — ACETAMINOPHEN 650 MG: 325 TABLET ORAL at 06:41

## 2022-09-28 RX ADMIN — BUDESONIDE 500 MCG: 0.5 SUSPENSION RESPIRATORY (INHALATION) at 07:31

## 2022-09-28 RX ADMIN — KETOTIFEN FUMARATE 1 DROP: 0.35 SOLUTION/ DROPS OPHTHALMIC at 09:00

## 2022-09-28 RX ADMIN — Medication 200 MG: at 08:22

## 2022-09-28 RX ADMIN — ARFORMOTEROL TARTRATE 15 MCG: 15 SOLUTION RESPIRATORY (INHALATION) at 07:31

## 2022-09-28 RX ADMIN — ONDANSETRON 4 MG: 4 TABLET, ORALLY DISINTEGRATING ORAL at 00:46

## 2022-09-28 RX ADMIN — GUAIFENESIN 600 MG: 600 TABLET ORAL at 08:24

## 2022-09-28 RX ADMIN — PANTOPRAZOLE SODIUM 40 MG: 40 TABLET, DELAYED RELEASE ORAL at 08:24

## 2022-09-28 RX ADMIN — LEVALBUTEROL HYDROCHLORIDE 1.25 MG: 1.25 SOLUTION RESPIRATORY (INHALATION) at 13:46

## 2022-09-28 NOTE — PROGRESS NOTES
I have reviewed discharge instructions with the patient. The patient verbalized understanding.   Helped patient get items from bathroom and get dressed, IV removed aand discharge paperwork signed

## 2022-09-28 NOTE — PROGRESS NOTES
Problem: Falls - Risk of  Goal: *Absence of Falls  Description: Document Nelly Ground Fall Risk and appropriate interventions in the flowsheet. Outcome: Progressing Towards Goal  Note: Fall Risk Interventions:            Medication Interventions: Teach patient to arise slowly, Patient to call before getting OOB    Elimination Interventions: Bed/chair exit alarm, Call light in reach, Patient to call for help with toileting needs    History of Falls Interventions: Bed/chair exit alarm         Problem: Patient Education: Go to Patient Education Activity  Goal: Patient/Family Education  Outcome: Progressing Towards Goal     Problem: Patient Education: Go to Patient Education Activity  Goal: Patient/Family Education  Outcome: Progressing Towards Goal     Problem: Patient Education: Go to Patient Education Activity  Goal: Patient/Family Education  Outcome: Progressing Towards Goal     Problem: Pressure Injury - Risk of  Goal: *Prevention of pressure injury  Description: Document Armando Scale and appropriate interventions in the flowsheet.   Outcome: Progressing Towards Goal  Note: Pressure Injury Interventions:  Sensory Interventions: Assess changes in LOC, Avoid rigorous massage over bony prominences, Check visual cues for pain, Float heels, Keep linens dry and wrinkle-free, Maintain/enhance activity level, Minimize linen layers, Monitor skin under medical devices, Pad between skin to skin    Moisture Interventions: Absorbent underpads    Activity Interventions: Increase time out of bed, Pressure redistribution bed/mattress(bed type)    Mobility Interventions: Pressure redistribution bed/mattress (bed type)    Nutrition Interventions: Document food/fluid/supplement intake, Offer support with meals,snacks and hydration    Friction and Shear Interventions: Minimize layers, Sit at 90-degree angle                Problem: Patient Education: Go to Patient Education Activity  Goal: Patient/Family Education  Outcome: Progressing Towards Goal     Problem: Nutrition Deficit  Goal: *Optimize nutritional status  Outcome: Progressing Towards Goal

## 2022-09-28 NOTE — PROGRESS NOTES
Problem: Falls - Risk of  Goal: *Absence of Falls  Description: Document Bocanegra Blades Fall Risk and appropriate interventions in the flowsheet. Outcome: Progressing Towards Goal  Note: Fall Risk Interventions:            Medication Interventions: Patient to call before getting OOB    Elimination Interventions: Bed/chair exit alarm, Call light in reach    History of Falls Interventions: Bed/chair exit alarm         Problem: Patient Education: Go to Patient Education Activity  Goal: Patient/Family Education  Outcome: Progressing Towards Goal     Problem: Patient Education: Go to Patient Education Activity  Goal: Patient/Family Education  Outcome: Progressing Towards Goal     Problem: Patient Education: Go to Patient Education Activity  Goal: Patient/Family Education  Outcome: Progressing Towards Goal     Problem: Pressure Injury - Risk of  Goal: *Prevention of pressure injury  Description: Document Armando Scale and appropriate interventions in the flowsheet.   Outcome: Progressing Towards Goal  Note: Pressure Injury Interventions:  Sensory Interventions: Assess need for specialty bed    Moisture Interventions: Absorbent underpads, Apply protective barrier, creams and emollients, Minimize layers    Activity Interventions: Assess need for specialty bed, PT/OT evaluation    Mobility Interventions: Chair cushion    Nutrition Interventions: Document food/fluid/supplement intake    Friction and Shear Interventions: Apply protective barrier, creams and emollients                Problem: Patient Education: Go to Patient Education Activity  Goal: Patient/Family Education  Outcome: Progressing Towards Goal     Problem: Nutrition Deficit  Goal: *Optimize nutritional status  Outcome: Progressing Towards Goal

## 2022-09-28 NOTE — PROGRESS NOTES
Bedside and Verbal shift change report given to Apolinar Sauer LPN (oncoming nurse) by Elvira Ordaz RN (offgoing nurse). Report included the following information SBAR, Kardex, Intake/Output, MAR, Recent Results, and Quality Measures.

## 2022-09-28 NOTE — PROGRESS NOTES
Name: Janice Tho: [address]   : 1940 Admit Date: 2022   Phone: [phone]  Room: 2/01   PCP: Orquidea Perez MD  MRN: [id number]   Date: 2022  Code: Full Code          Chart and notes reviewed. Data reviewed. I review the patient's current medications in the medical record at each encounter. I have evaluated and examined the patient. History of Present Illness:  Ms. Michael Lui is an  yo woman with a history of asthma/COPD, pseudomonas pneumonia/colonization, bronchiectasis, former tobacco use, afib, breast cancer s/p chemo/radiation, seronegative RA and GERD who presented with dyspnea. She was recently discharged on  (admitted -) when she was treated for acute respiratory failure, PNA and hyponatremia. Did not require O2 at discharge. She had been admitted from - with PNA. During her most recent hospitalization she was treated with bactrim for stenothrophomonas grown at her prior hospitalization, but unfortunately this had to be stopped due to significant hyponatremia. She was treated with cefepime and discharged home on  cefdinir as she could not take levaquin while on sotalol. Attempts were made to stop the sotalol, but had had afib with RVR and was ultimately placed back on this. At discharge her Trelegy was stopped due to side effects/nausea and she was discharged on brovana/pulmicort. She comes back in with shortness of breath with exertion for the past several days. She also describes trouble swallowing, eating solids. She feels like she needs some sort of rehab as she is too weak at home. She was reportedly in RVR in route, but has not been so here. Labs:  WBC 12.7, cr 0.35, proBNP 5179    Images reviewed:  CXR 22: Unchanged patchy bibasilar airspace disease, compatible with pneumonia.     Interval history:  Afebrile  BP stable  Sats 92% on 1L   WBC 9.9   K 3.2   Na 127  RVP neg  Blood cultures no growth x 6 days    ROS:  Doing okay this morning. Denies SOB at rest.  Reports chest congestion and cough with sputum production. Denies fever or chills. Denies CP. Past Medical History:   Diagnosis Date    Asthma     CHF (congestive heart failure), NYHA class I, chronic, systolic (HCC)     Chronic anticoagulation     GERD (gastroesophageal reflux disease)     Hiatal hernia     HX: breast cancer     Right     Hypothyroid     Paroxysmal A-fib (HCC)     Venous insufficiency     bilat LE    Weight loss        Past Surgical History:   Procedure Laterality Date    COLONOSCOPY N/A 2021    COLONOSCOPY AND EGD performed by Roverto Ochoa MD at [address] BREAST LUMPECTOMY Right 2015    HX CATARACT REMOVAL  2013    Bilateral     HX CHOLECYSTECTOMY      HX DILATION AND CURETTAGE      x2    HX ORTHOPAEDIC  2017    RIGHT foot  hammertoe       History reviewed. No pertinent family history.     Social History     Tobacco Use    Smoking status: Former     Types: Cigarettes     Start date: 2000     Quit date: 2000     Years since quittin.8    Smokeless tobacco: Never   Substance Use Topics    Alcohol use: Not Currently       Allergies   Allergen Reactions    Ace Inhibitors Cough    Iodine Rash    Penicillins Rash       Current Facility-Administered Medications   Medication Dose Route Frequency    bumetanide (BUMEX) tablet 0.5 mg  0.5 mg Oral DAILY    zolpidem (AMBIEN) tablet 2.5 mg  2.5 mg Oral QHS PRN    calcium carbonate (TUMS) chewable tablet 200 mg [elemental]  200 mg Oral TID WITH MEALS    artificial saliva (MOUTH KOTE) 1 Spray  1 Spray Oral PRN    levalbuterol (XOPENEX) nebulizer soln 1.25 mg/3 mL  1.25 mg Nebulization Q6H RT    sodium chloride (NS) flush 5-10 mL  5-10 mL IntraVENous PRN    albuterol 5mg / ipratropium 0.5mg neb solution  1 Dose Nebulization Q4H PRN    guaiFENesin ER (MUCINEX) tablet 600 mg  600 mg Oral Q12H    montelukast (SINGULAIR) tablet 10 mg  10 mg Oral QHS    nystatin (MYCOSTATIN) 100,000 unit/mL oral suspension 500,000 Units  500,000 Units Oral QID    ketotifen (ZADITOR) 0.025 % (0.035 %) ophthalmic solution 1 Drop  1 Drop Both Eyes BID    pantoprazole (PROTONIX) tablet 40 mg  40 mg Oral DAILY    sotaloL (BETAPACE) tablet 80 mg  80 mg Oral Q12H    rivaroxaban (XARELTO) tablet 20 mg  20 mg Oral DAILY WITH DINNER    sodium chloride (NS) flush 5-40 mL  5-40 mL IntraVENous Q8H    sodium chloride (NS) flush 5-40 mL  5-40 mL IntraVENous PRN    acetaminophen (TYLENOL) tablet 650 mg  650 mg Oral Q6H PRN    Or    acetaminophen (TYLENOL) suppository 650 mg  650 mg Rectal Q6H PRN    polyethylene glycol (MIRALAX) packet 17 g  17 g Oral DAILY PRN    ondansetron (ZOFRAN ODT) tablet 4 mg  4 mg Oral Q8H PRN    Or    ondansetron (ZOFRAN) injection 4 mg  4 mg IntraVENous Q6H PRN    arformoteroL (BROVANA) neb solution 15 mcg  15 mcg Nebulization BID RT    budesonide (PULMICORT) 500 mcg/2 ml nebulizer suspension  500 mcg Nebulization BID RT    artificial saliva (MOUTH KOTE) 1 Spray  1 Spray Oral PRN    diclofenac (VOLTAREN) 1 % topical gel 2 g  2 g Topical BID PRN         REVIEW OF SYSTEMS   12 point ROS negative except as stated in the HPI. Physical Exam:   Visit Vitals  BP (!) 101/51 (BP 1 Location: Left upper arm)   Pulse (!) 59   Temp 98.1 °F (36.7 °C)   Resp 16   Ht 5' 4\" (1.626 m)   Wt 55.6 kg (122 lb 9.2 oz)   SpO2 92%   BMI 21.04 kg/m²       General:  Alert, cooperative, no distress, appears stated age. Frail. Head:  Normocephalic, without obvious abnormality, atraumatic. Eyes:  Conjunctivae/corneas clear. Nose: Nares normal. Septum midline. Mucosa normal.    Throat: Lips, mucosa, and tongue normal.    Neck: Supple, symmetrical, trachea midline, no adenopathy. Lungs:   Bilateral crackles/rhonchi; fair air movement   Chest wall:  No tenderness or deformity. Heart:  Regular rate and rhythm, S1, S2, no murmur, click, rub or gallop. Abdomen:   Soft, non-tender.  Bowel sounds active. Extremities: Extremities normal, atraumatic, no cyanosis or edema. Pulses: 2+ and symmetric all extremities. Skin: Skin color, texture, turgor normal. No rashes or lesions. Neurologic: Grossly non-focal.       Lab Results   Component Value Date/Time    Sodium 127 (L) 09/26/2022 12:23 AM    Potassium 3.2 (L) 09/26/2022 12:23 AM    Chloride 85 (L) 09/26/2022 12:23 AM    CO2 37 (H) 09/26/2022 12:23 AM    BUN 9 09/26/2022 12:23 AM    Creatinine 0.43 (L) 09/26/2022 12:23 AM    Glucose 104 (H) 09/26/2022 12:23 AM    Calcium 9.2 09/26/2022 12:23 AM    Magnesium 1.6 09/26/2022 12:23 AM    Phosphorus 3.4 09/21/2022 01:57 AM       Lab Results   Component Value Date/Time    WBC 9.9 09/26/2022 12:23 AM    HGB 10.2 (L) 09/26/2022 12:23 AM    PLATELET 854 (H) 08/38/8954 12:23 AM    MCV 89.6 09/26/2022 12:23 AM       Lab Results   Component Value Date/Time    Alk. phosphatase 106 09/20/2022 02:04 AM    Protein, total 6.0 (L) 09/20/2022 02:04 AM    Albumin 2.0 (L) 09/20/2022 02:04 AM    Globulin 4.0 09/20/2022 02:04 AM       Lab Results   Component Value Date/Time    Iron 35 09/04/2022 05:26 PM    TIBC 193 (L) 09/04/2022 05:26 PM    Iron % saturation 18 (L) 09/04/2022 05:26 PM    Ferritin 196 09/04/2022 05:26 PM       Lab Results   Component Value Date/Time    TSH 1.12 09/07/2022 04:57 AM        No results found for: PH, PHI, PCO2, PCO2I, PO2, PO2I, HCO3, HCO3I, FIO2, FIO2I    Lab Results   Component Value Date/Time    Troponin-I, Qt. <0.05 01/13/2021 03:17 AM        Lab Results   Component Value Date/Time    Culture result: NO GROWTH 6 DAYS 09/19/2022 05:36 AM    Culture result: NO GROWTH 6 DAYS 09/19/2022 05:36 AM    Culture result: (A) 09/11/2022 12:10 PM     LIGHT Stenotrophomonas (Xantho.) maltophilia CLSI GUIDELINES DO NOT RECOMMEND [address] TRIMETHOPRIM/SULFAMETHOXAZOLE IS THE DRUG OF CHOICE.     Culture result: LIGHT NORMAL RESPIRATORY CANDELARIO 09/11/2022 12:10 PM       No results found for: TOXA1, RPR, HBCM, HBSAG, HAAB, HCAB1, HAAT, G6PD, CRYAC, HIVGT, HIVR, HIV1, HIV12, HIVPC, HIVRPI    No results found for: VANCT, CPK    Lab Results   Component Value Date/Time    Color YELLOW/STRAW 09/19/2022 01:01 PM    Appearance CLEAR 09/19/2022 01:01 PM    pH (UA) 6.0 09/19/2022 01:01 PM    Protein TRACE (A) 09/19/2022 01:01 PM    Glucose Negative 09/19/2022 01:01 PM    Ketone Negative 09/19/2022 01:01 PM    Bilirubin Negative 09/19/2022 01:01 PM    Blood Negative 09/19/2022 01:01 PM    Urobilinogen 0.2 09/19/2022 01:01 PM    Nitrites Negative 09/19/2022 01:01 PM    Leukocyte Esterase SMALL (A) 09/19/2022 01:01 PM    WBC 5-10 09/19/2022 01:01 PM    RBC 0-5 09/19/2022 01:01 PM    Bacteria 1+ (A) 09/19/2022 01:01 PM       IMPRESSION  Acute respiratory failure with hypoxia  Asthma, not in exacerbation  Bronchiectasis  History of pseudomonas PNA, on jeff nebs at baseline  Afib    PLAN  Goal sats 88% or higher  Will need exercise oximetry closer to discharge  Gloriaa/Pulmicort, PRN duonebs  Patient would benefit from percussion vest and is agreeable. Will order outpatient. Agree that she does not need additional antibiotics at this time; would not expect complete resolution of infiltrates on CXR in this interval and will need repeat imaging as an outpatient; low threshold to add abx if she decompensates  Diuresis as per Cardiology, watch I&O  Completed course of Jeff nebs  PT/OT  CM consult for home nebulizer   Dispo: [facility]      Patient is stable from a pulmonary standpoint. We will sign off and arrange for outpatient pulmonary follow up in 2 weeks. Please call with questions.     Ursula Archuleta

## 2022-09-28 NOTE — PROGRESS NOTES
9/28/2022   CARE MANAGEMENT NOTE:  CM reviewed EMR. READMISSION:  Pt was admitted with dyspnea, Afib, CHF. Reportedly, pt resides with her  who has dementia. Son, Shan Dowell ([phone]) and DIL are the family contacts. RUR 20%; LOS 9 days     Transition Plan of Care:  [facility] (pulmonary rehab) - accepted pt and bed is available today. Pt agreed to private pay as insurance denied. RN, please call report to [phone] or [phone].    2.  Outpatient follow up  3. Family will transport pt around 1 p.m. No further post discharge needs indicated.   Filiberto

## 2022-09-28 NOTE — DISCHARGE SUMMARY
Discharge Summary       PATIENT ID: Sean Smith  MRN: [id number]   YOB: 1940    DATE OF ADMISSION: 9/19/2022  4:55 AM    DATE OF DISCHARGE: 9/28/2022  PRIMARY CARE PROVIDER: Kim Paul MD       DISCHARGING PHYSICIAN: Hector Smalls MD    To contact this individual call [phone] and ask the  to page. If unavailable ask to be transferred the Adult Hospitalist Department. CONSULTATIONS: IP CONSULT TO PULMONOLOGY  IP CONSULT TO CARDIOLOGY    PROCEDURES/SURGERIES: * No surgery found *    ADMITTING DIAGNOSES & HOSPITAL COURSE:     Dyspnea / Abnormal xray -  Hx of Bronchiectasis, pseudomonas PNA, on jeff nebs at baseline. recurrent and persistent. not hypoxic. No sepsis criteria, unchanged xray and recent multiple rounds of Abx all argue against acute bacterial PNA. Recent stable ECHO. Evaluated by pulmonary and cardiology. Monitor off antibiotics. Follow up with pulmonary on an outpatient basis     2. Paroxysmal A-fib / Chronic anticoagulation - Continue xarelto and Sotalol. Patient is currently rate controlled     3. CHF (congestive heart failure), NYHA class I, chronic, systolic / elevated proBNP/ Venous insufficiency - Stable recent ECHO. Evaluated by cardiology. Continue diuretic and BB      4. Asthma, stable - On Brovana, Pulmicort and prn atrovent and xopenex. Pulmonary following. No bronchospastic      5. Hyponatremia. Likely due to diuretic( decreased dose). On bumex      6. Anemia - Stable since last discharge, and normal serologies. 7.  Weight loss / GERD (gastroesophageal reflux disease) / Hiatal hernia / Candidiasis - She has very concerning unintentional wt loss, anorexia, nausea that persists. She weighed 170lb last year. 128 lb last week. 134lb now. On last visit GI was consulted and recommended outpatient FU. Cont PPI. Emptying study 9/6 shows delayed esophageal emptying but not delayed gastric emptying. She is on PO nystatin.      8. Hx breast cancer - Outpatient follow up. Has been on Boniva      9. Hypothyroid - TSH was normal a few months ago. 10.  Chest pain. Reproducible. Likely musculoskeletal.  Monitor      11. Thrush/ GERD/ hiatal hernia: continue nystatin, PPI     12. Hypokalemia: replete and monitor     12. Debility: PT following. Insurance authorization denied for [facility]. Patient would still like to go to [facility]- Self pay        Dispo; D/c to [facility]. Patient is hemodynamically stable and has improved              DISCHARGE DIAGNOSES / PLAN:      As above       PENDING TEST RESULTS:   At the time of discharge the following test results are still pending:     FOLLOW UP APPOINTMENTS:    Follow-up Information       Follow up With Specialties Details Why [address]  Follow up Resumption of homecare for PT, OT. Please call agency directly with any questions. [phone], [phone] Olvin Louise MD Family Medicine   [address] [place] [address]  [address]  [facility] [facility]  [phone]               ADDITIONAL CARE RECOMMENDATIONS:     DIET: Cardiac Diet    ACTIVITY: Activity as tolerated    WOUND CARE:     EQUIPMENT needed:       DISCHARGE MEDICATIONS:  Current Discharge Medication List        START taking these medications    Details   bumetanide (BUMEX) 0.5 mg tablet Take 1 Tablet by mouth daily. Qty: 30 Tablet, Refills: 1  Start date: 9/29/2022           CONTINUE these medications which have CHANGED    Details   guaiFENesin ER (MUCINEX) 600 mg ER tablet Take 1 Tablet by mouth every twelve (12) hours. Qty: 30 Tablet, Refills: 1  Start date: 9/28/2022           CONTINUE these medications which have NOT CHANGED    Details   nystatin (MYCOSTATIN) 100,000 unit/mL suspension Take 5 mL by mouth four (4) times daily for 20 days. swish and spit  Qty: 400 mL, Refills: 0      arformoteroL (BROVANA) 15 mcg/2 mL nebu neb solution 2 mL by Nebulization route two (2) times a day for 30 days.   Qty: 120 mL, Refills: 0      budesonide (PULMICORT) 0.5 mg/2 mL nbsp 2 mL by Nebulization route two (2) times a day for 30 days. Qty: 60 Each, Refills: 0      levalbuterol (XOPENEX) 1.25 mg/3 mL nebu 3 mL by Nebulization route every six (6) hours as needed for Wheezing or Shortness of Breath for up to 30 days. Qty: 100 Each, Refills: 0      sodium chloride 1 gram tablet Take 1 Tablet by mouth three (3) times daily (with meals) for 30 days. Qty: 90 Tablet, Refills: 0      sotaloL (BETAPACE) 80 mg tablet Take  by mouth two (2) times a day. albuterol 2.5 mg /3 mL (0.083 %) nebu 3 mL, albuterol-ipratropium 2.5 mg-0.5 mg/3 ml nebu 3 mL Take 1 Inhalation by inhalation every four (4) hours as needed. albuterol (PROVENTIL VENTOLIN) 2.5 mg /3 mL (0.083 %) nebu Take 3 mL by inhalation every four (4) hours as needed. pantoprazole (PROTONIX) 40 mg tablet Take 40 mg by mouth daily. Xarelto 20 mg tab tablet Take 20 mg by mouth daily (with dinner). metroNIDAZOLE (METROCREAM) 0.75 % topical cream metronidazole 0.75 % topical cream      montelukast (SINGULAIR) 10 mg tablet montelukast 10 mg tablet      albuterol (PROVENTIL HFA, VENTOLIN HFA, PROAIR HFA) 90 mcg/actuation inhaler Take  by inhalation. diclofenac (VOLTAREN) 1 % gel Apply  to affected area four (4) times daily. acetaminophen (TYLENOL EXTRA STRENGTH PO) Take  by mouth. olopatadine (PATADAY) 0.2 % drop ophthalmic solution Administer 1 Drop to both eyes daily.       tiotropium bromide (SPIRIVA RESPIMAT) 2.5 mcg/actuation inhaler Spiriva Respimat 2.5 mcg/actuation solution for inhalation           STOP taking these medications       cefdinir (OMNICEF) 300 mg capsule Comments:   Reason for Stopping:         cholecalciferol (VITAMIN D3) 25 mcg (1,000 unit) cap Comments:   Reason for Stopping:         azelastine (ASTEPRO) 205.5 mcg (0.15 %) Comments:   Reason for Stopping:         multivitamin (ONE A DAY) tablet Comments:   Reason for Stopping: calcium carbonate/vitamin D3 (CALCIUM + D PO) Comments:   Reason for Stopping:                 NOTIFY YOUR PHYSICIAN FOR ANY OF THE FOLLOWING:   Fever over 101 degrees for 24 hours. Chest pain, shortness of breath, fever, chills, nausea, vomiting, diarrhea, change in mentation, falling, weakness, bleeding. Severe pain or pain not relieved by medications. Or, any other signs or symptoms that you may have questions about. DISPOSITION:    Home With:   OT  PT  HH  RN      x Long term SNF/Inpatient Rehab    Independent/assisted living    Hospice    Other:       PATIENT CONDITION AT DISCHARGE:     Functional status    Poor     Deconditioned    x Independent      Cognition    x Lucid     Forgetful     Dementia      Catheters/lines (plus indication)    Deluca     PICC     PEG    x None      Code status    x Full code     DNR      PHYSICAL EXAMINATION AT DISCHARGE:   Refer to Progress Note    Physical Exam:     Gen:  Well-developed, well-nourished, in no acute distress  HEENT:  Pink conjunctivae, PERRL, hearing intact to voice, moist mucous membranes  Neck:  Supple, without masses, thyroid non-tender  Resp:  No accessory muscle use.  Bi basal crackles  Card:  No murmurs, normal S1, S2 without thrills, bruits or peripheral edema  Abd:  Soft, non-tender, non-distended, normoactive bowel sounds are present, no palpable organomegaly and no detectable hernias  Lymph:  No cervical or inguinal adenopathy  Musc:  No cyanosis or clubbing  Skin:  No rashes or ulcers, skin turgor is good  Neuro:  Cranial nerves are grossly intact, no focal motor weakness, follows commands appropriately  Psych:  Good insight, oriented to person, place and time, alert      CHRONIC MEDICAL DIAGNOSES:  Problem List as of 9/28/2022 Date Reviewed: 9/4/2022            Codes Class Noted - Resolved    Dyspnea ICD-10-CM: R06.00  ICD-9-CM: 786.09  9/4/2022 - Present        Nausea ICD-10-CM: R11.0  ICD-9-CM: 787.02  9/4/2022 - Present        Chest pain ICD-10-CM: R07.9  ICD-9-CM: 786.50  9/4/2022 - Present        Hypokalemia ICD-10-CM: E87.6  ICD-9-CM: 276.8  9/4/2022 - Present        Leukocytosis ICD-10-CM: D72.829  ICD-9-CM: 288.60  9/4/2022 - Present        CHF (congestive heart failure), NYHA class I, chronic, systolic ([facility]) [id number]: I50.22  ICD-9-CM: 428.22, 428.0  9/4/2022 - Present        Weight loss ICD-10-CM: R63.4  ICD-9-CM: 783.21  9/4/2022 - Present        Asthma ICD-10-CM: J45.909  ICD-9-CM: 493.90  Unknown - Present        Paroxysmal A-fib ([address] ICD-10-CM: I48.0  ICD-9-CM: 427.31  Unknown - Present        GERD (gastroesophageal reflux disease) ICD-10-CM: K21.9  ICD-9-CM: 530.81  Unknown - Present        Hiatal hernia ICD-10-CM: K44.9  ICD-9-CM: 553.3  Unknown - Present        Hypothyroid ICD-10-CM: E03.9  ICD-9-CM: 244.9  Unknown - Present        Venous insufficiency ICD-10-CM: I87.2  ICD-9-CM: 459.81  Unknown - Present    Overview Signed 4/30/2022  7:09 AM by Jacqueline Hicks MD     bilat LE             Anemia ICD-10-CM: D64.9  ICD-9-CM: 285.9  4/30/2022 - Present        Chronic anticoagulation ICD-10-CM: Z79.01  ICD-9-CM: V58.61  Unknown - Present        Hyponatremia ICD-10-CM: E87.1  ICD-9-CM: 276.1  4/30/2022 - Present        HX: breast cancer ICD-10-CM: Z85.3  ICD-9-CM: V10.3  2015 - Present    Overview Signed 4/30/2022  7:09 AM by Jacqueline Hicks MD     Right              RESOLVED: Pneumonia ICD-10-CM: J18.9  ICD-9-CM: 500  8/19/2022 - 9/4/2022        RESOLVED: Paroxysmal atrial fibrillation ([facility] ICD-10-CM: I48.0  ICD-9-CM: 427.31  4/30/2022 - 4/30/2022        RESOLVED: A-fib ([facility] ICD-10-CM: I48.91  ICD-9-CM: 427.31  4/30/2022 - 4/30/2022        RESOLVED: Congestive heart failure ([facility] ICD-10-CM: I50.9  ICD-9-CM: 428.0  5/2020 - 9/4/2022        RESOLVED: Invasive ductal carcinoma of right breast ([facility] ICD-10-CM: C50.911  ICD-9-CM: 174.9  8/12/2019 - 4/30/2022    Overview Addendum 8/17/2020 10:59 AM by Sheri Lewis RN     2015: RIGHT breast IDC, 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 MammaPrint: Low risk, luminal type A. Treated in [place] with lumpectomy and SLNBx, re-excision, and adjuvant Anastrozole. On Boniva and Miacalcin for osteoporosis.                 Greater than 30 minutes were spent with the patient on counseling and coordination of care    Signed:   Ayaz Flowers MD  9/28/2022  10:30 AM

## 2022-10-12 ENCOUNTER — TELEPHONE (OUTPATIENT)
Dept: CASE MANAGEMENT | Age: 82
End: 2022-10-12

## 2022-10-12 NOTE — TELEPHONE ENCOUNTER
HEART FAILURE NURSE NAVIGATOR POST DISCHARGE FOLLOW UP PHONE CALL     Telephone voicemail message received from patient on 10/4/22 and received Monday, 10/10 upon return to work after days off. Patient left message she was leaving rehab on 10/5. Telephone call made to patient to return her call and for purpose of care transition from hospitalization. Verified patient name and date of birth as identifiers. Provided introduction to self and role. Reviewed discharge instructions; confirmed patient is in receipt of all prescribed HF medications. Patient confirms she is taking bumex 0.5 mg daily. She has had discussion with Dr Ananth Lang regarding potentially reducing bumex to every other day. Encouraged her to discuss this with Dr [facility], her primary Cardiologist, prior to making a change. Reviewed purpose and action of diuretics as well as HF disease process in setting of atrial fib and chronic lung disease. Reinforced importance of daily weights and dietary restrictions, following low sodium diet. Patient notes she had forgotten initially to weigh daily at first after discharge but she has resumed this practice and today's weight was 126 lbs. Reinforced signs/symptoms of HF and when to notify the physician. Patient denies any current symptoms of HF and notes she feels well. Confirmed knowledge of scheduled follow up appointment:  Patient has seen Dr Ananth Lang, her PCP since discharge from [facility]. She notes Dr Ananth Lang did lab work and is arranged some home health services. Patient has follow up this Friday, Oct 14th with Dr [facility] her primary Cardiologist and in the afternoon with her Pulmonary provider. Discussed ensuring she has portable oxygen for travel since she is on continuous oxygen which was and increase for her. Asked her to bring current medications and discharge instructions to appointment.   She states she plans to reach out to Dr Beau Arenas office nurse to make sure they are aware this is post hospitalization visit. Patient also educated on availability of [facility] for additional support if needed. Patient is also aware of scheduled OP CT noted in Epic for 10/31/22 ordered by her Pulmonary provider. Confirmed patient has transportation to above appointment. Patient given opportunity to ask questions/express concerns. All questions answered with good understanding.

## 2022-10-25 ENCOUNTER — HOSPITAL ENCOUNTER (INPATIENT)
Age: 82
LOS: 6 days | Discharge: HOME HEALTH | DRG: 291 | End: 2022-10-31
Attending: EMERGENCY MEDICINE | Admitting: INTERNAL MEDICINE
Payer: MEDICARE

## 2022-10-25 ENCOUNTER — APPOINTMENT (OUTPATIENT)
Dept: CT IMAGING | Age: 82
DRG: 291 | End: 2022-10-25
Attending: INTERNAL MEDICINE
Payer: MEDICARE

## 2022-10-25 ENCOUNTER — APPOINTMENT (OUTPATIENT)
Dept: GENERAL RADIOLOGY | Age: 82
DRG: 291 | End: 2022-10-25
Attending: EMERGENCY MEDICINE
Payer: MEDICARE

## 2022-10-25 DIAGNOSIS — I48.91 ATRIAL FIBRILLATION, UNSPECIFIED TYPE (HCC): ICD-10-CM

## 2022-10-25 DIAGNOSIS — E87.6 HYPOKALEMIA: ICD-10-CM

## 2022-10-25 DIAGNOSIS — R09.02 HYPOXIA: ICD-10-CM

## 2022-10-25 DIAGNOSIS — R06.02 SOB (SHORTNESS OF BREATH): Primary | ICD-10-CM

## 2022-10-25 DIAGNOSIS — I50.23 ACUTE ON CHRONIC HFREF (HEART FAILURE WITH REDUCED EJECTION FRACTION) (HCC): ICD-10-CM

## 2022-10-25 DIAGNOSIS — E87.1 HYPONATREMIA: ICD-10-CM

## 2022-10-25 PROBLEM — J96.21 ACUTE AND CHRONIC RESPIRATORY FAILURE WITH HYPOXIA (HCC): Status: ACTIVE | Noted: 2022-01-01

## 2022-10-25 LAB
ALBUMIN SERPL-MCNC: 2.5 G/DL (ref 3.5–5)
ALBUMIN/GLOB SERPL: 0.6 {RATIO} (ref 1.1–2.2)
ALP SERPL-CCNC: 74 U/L (ref 45–117)
ALT SERPL-CCNC: 15 U/L (ref 12–78)
ANION GAP SERPL CALC-SCNC: 6 MMOL/L (ref 5–15)
AST SERPL-CCNC: 22 U/L (ref 15–37)
B PERT DNA SPEC QL NAA+PROBE: NOT DETECTED
BASOPHILS # BLD: 0 K/UL (ref 0–0.1)
BASOPHILS NFR BLD: 0 % (ref 0–1)
BILIRUB SERPL-MCNC: 0.2 MG/DL (ref 0.2–1)
BNP SERPL-MCNC: 1998 PG/ML
BORDETELLA PARAPERTUSSIS PCR, BORPAR: NOT DETECTED
BUN SERPL-MCNC: 13 MG/DL (ref 6–20)
BUN/CREAT SERPL: 28 (ref 12–20)
C PNEUM DNA SPEC QL NAA+PROBE: NOT DETECTED
CALCIUM SERPL-MCNC: 9.3 MG/DL (ref 8.5–10.1)
CHLORIDE SERPL-SCNC: 82 MMOL/L (ref 97–108)
CO2 SERPL-SCNC: 37 MMOL/L (ref 21–32)
COMMENT, HOLDF: NORMAL
CREAT SERPL-MCNC: 0.47 MG/DL (ref 0.55–1.02)
CRP SERPL-MCNC: 0.93 MG/DL (ref 0–0.6)
DIFFERENTIAL METHOD BLD: ABNORMAL
EOSINOPHIL # BLD: 0.1 K/UL (ref 0–0.4)
EOSINOPHIL NFR BLD: 1 % (ref 0–7)
ERYTHROCYTE [DISTWIDTH] IN BLOOD BY AUTOMATED COUNT: 13.1 % (ref 11.5–14.5)
FLUAV SUBTYP SPEC NAA+PROBE: NOT DETECTED
FLUBV RNA SPEC QL NAA+PROBE: NOT DETECTED
GLOBULIN SER CALC-MCNC: 4 G/DL (ref 2–4)
GLUCOSE SERPL-MCNC: 99 MG/DL (ref 65–100)
HADV DNA SPEC QL NAA+PROBE: NOT DETECTED
HCOV 229E RNA SPEC QL NAA+PROBE: NOT DETECTED
HCOV HKU1 RNA SPEC QL NAA+PROBE: NOT DETECTED
HCOV NL63 RNA SPEC QL NAA+PROBE: NOT DETECTED
HCOV OC43 RNA SPEC QL NAA+PROBE: NOT DETECTED
HCT VFR BLD AUTO: 31.6 % (ref 35–47)
HGB BLD-MCNC: 9.9 G/DL (ref 11.5–16)
HMPV RNA SPEC QL NAA+PROBE: NOT DETECTED
HPIV1 RNA SPEC QL NAA+PROBE: NOT DETECTED
HPIV2 RNA SPEC QL NAA+PROBE: NOT DETECTED
HPIV3 RNA SPEC QL NAA+PROBE: NOT DETECTED
HPIV4 RNA SPEC QL NAA+PROBE: NOT DETECTED
IMM GRANULOCYTES # BLD AUTO: 0 K/UL (ref 0–0.04)
IMM GRANULOCYTES NFR BLD AUTO: 0 % (ref 0–0.5)
LYMPHOCYTES # BLD: 1.4 K/UL (ref 0.8–3.5)
LYMPHOCYTES NFR BLD: 19 % (ref 12–49)
M PNEUMO DNA SPEC QL NAA+PROBE: NOT DETECTED
MCH RBC QN AUTO: 28.5 PG (ref 26–34)
MCHC RBC AUTO-ENTMCNC: 31.3 G/DL (ref 30–36.5)
MCV RBC AUTO: 91.1 FL (ref 80–99)
MONOCYTES # BLD: 0.7 K/UL (ref 0–1)
MONOCYTES NFR BLD: 9 % (ref 5–13)
NEUTS SEG # BLD: 5.3 K/UL (ref 1.8–8)
NEUTS SEG NFR BLD: 71 % (ref 32–75)
NRBC # BLD: 0 K/UL (ref 0–0.01)
NRBC BLD-RTO: 0 PER 100 WBC
PLATELET # BLD AUTO: 394 K/UL (ref 150–400)
PMV BLD AUTO: 9.1 FL (ref 8.9–12.9)
POTASSIUM SERPL-SCNC: 3.2 MMOL/L (ref 3.5–5.1)
PROCALCITONIN SERPL-MCNC: <0.05 NG/ML
PROT SERPL-MCNC: 6.5 G/DL (ref 6.4–8.2)
RBC # BLD AUTO: 3.47 M/UL (ref 3.8–5.2)
RSV RNA SPEC QL NAA+PROBE: NOT DETECTED
RV+EV RNA SPEC QL NAA+PROBE: NOT DETECTED
SAMPLES BEING HELD,HOLD: NORMAL
SARS-COV-2 PCR, COVPCR: NOT DETECTED
SODIUM SERPL-SCNC: 125 MMOL/L (ref 136–145)
WBC # BLD AUTO: 7.5 K/UL (ref 3.6–11)

## 2022-10-25 PROCEDURE — 71250 CT THORAX DX C-: CPT

## 2022-10-25 PROCEDURE — 84145 PROCALCITONIN (PCT): CPT

## 2022-10-25 PROCEDURE — 85025 COMPLETE CBC W/AUTO DIFF WBC: CPT

## 2022-10-25 PROCEDURE — 83930 ASSAY OF BLOOD OSMOLALITY: CPT

## 2022-10-25 PROCEDURE — 86140 C-REACTIVE PROTEIN: CPT

## 2022-10-25 PROCEDURE — 74011250637 HC RX REV CODE- 250/637: Performed by: INTERNAL MEDICINE

## 2022-10-25 PROCEDURE — 99285 EMERGENCY DEPT VISIT HI MDM: CPT

## 2022-10-25 PROCEDURE — 80053 COMPREHEN METABOLIC PANEL: CPT

## 2022-10-25 PROCEDURE — 36415 COLL VENOUS BLD VENIPUNCTURE: CPT

## 2022-10-25 PROCEDURE — 93005 ELECTROCARDIOGRAM TRACING: CPT

## 2022-10-25 PROCEDURE — 71046 X-RAY EXAM CHEST 2 VIEWS: CPT

## 2022-10-25 PROCEDURE — 83880 ASSAY OF NATRIURETIC PEPTIDE: CPT

## 2022-10-25 PROCEDURE — 0202U NFCT DS 22 TRGT SARS-COV-2: CPT

## 2022-10-25 PROCEDURE — 65270000046 HC RM TELEMETRY

## 2022-10-25 PROCEDURE — 74011250636 HC RX REV CODE- 250/636: Performed by: EMERGENCY MEDICINE

## 2022-10-25 RX ORDER — METOPROLOL SUCCINATE 50 MG/1
50 TABLET, EXTENDED RELEASE ORAL DAILY
COMMUNITY
End: 2022-10-31

## 2022-10-25 RX ORDER — ONDANSETRON HYDROCHLORIDE 8 MG/1
8 TABLET, FILM COATED ORAL
COMMUNITY

## 2022-10-25 RX ORDER — GUAIFENESIN 600 MG/1
600 TABLET, EXTENDED RELEASE ORAL EVERY 12 HOURS
Status: DISCONTINUED | OUTPATIENT
Start: 2022-10-25 | End: 2022-10-31 | Stop reason: HOSPADM

## 2022-10-25 RX ORDER — LEVOTHYROXINE SODIUM 25 UG/1
25 TABLET ORAL
COMMUNITY

## 2022-10-25 RX ORDER — BUMETANIDE 0.25 MG/ML
1 INJECTION INTRAMUSCULAR; INTRAVENOUS EVERY 12 HOURS
Status: DISCONTINUED | OUTPATIENT
Start: 2022-10-25 | End: 2022-10-31 | Stop reason: HOSPADM

## 2022-10-25 RX ORDER — NYSTATIN 100000 [USP'U]/ML
500000 SUSPENSION ORAL 2 TIMES DAILY
COMMUNITY
End: 2022-10-31

## 2022-10-25 RX ORDER — BENZONATATE 100 MG/1
100 CAPSULE ORAL
Status: DISCONTINUED | OUTPATIENT
Start: 2022-10-25 | End: 2022-10-31 | Stop reason: HOSPADM

## 2022-10-25 RX ORDER — DIPHENHYDRAMINE HYDROCHLORIDE 50 MG/ML
25 INJECTION, SOLUTION INTRAMUSCULAR; INTRAVENOUS
Status: DISCONTINUED | OUTPATIENT
Start: 2022-10-25 | End: 2022-10-31 | Stop reason: HOSPADM

## 2022-10-25 RX ORDER — TRAZODONE HYDROCHLORIDE 50 MG/1
25 TABLET ORAL
COMMUNITY

## 2022-10-25 RX ORDER — BUDESONIDE 0.5 MG/2ML
500 INHALANT ORAL 2 TIMES DAILY
COMMUNITY

## 2022-10-25 RX ORDER — ERGOCALCIFEROL 1.25 MG/1
50000 CAPSULE ORAL
COMMUNITY

## 2022-10-25 RX ORDER — POTASSIUM CHLORIDE 750 MG/1
40 TABLET, FILM COATED, EXTENDED RELEASE ORAL
Status: COMPLETED | OUTPATIENT
Start: 2022-10-25 | End: 2022-10-25

## 2022-10-25 RX ORDER — CYANOCOBALAMIN (VITAMIN B-12) 1000 MCG
1 TABLET, EXTENDED RELEASE ORAL DAILY
Status: ON HOLD | COMMUNITY
End: 2022-10-27

## 2022-10-25 RX ORDER — ARFORMOTEROL TARTRATE 15 UG/2ML
15 SOLUTION RESPIRATORY (INHALATION) 2 TIMES DAILY
COMMUNITY

## 2022-10-25 RX ORDER — AMLODIPINE BESYLATE 5 MG/1
5 TABLET ORAL
COMMUNITY
End: 2022-10-31

## 2022-10-25 RX ORDER — IPRATROPIUM BROMIDE AND ALBUTEROL SULFATE 2.5; .5 MG/3ML; MG/3ML
3 SOLUTION RESPIRATORY (INHALATION)
Status: DISCONTINUED | OUTPATIENT
Start: 2022-10-25 | End: 2022-10-28

## 2022-10-25 RX ADMIN — POTASSIUM CHLORIDE 40 MEQ: 750 TABLET, FILM COATED, EXTENDED RELEASE ORAL at 20:48

## 2022-10-25 RX ADMIN — AZITHROMYCIN 500 MG: 500 INJECTION, POWDER, LYOPHILIZED, FOR SOLUTION INTRAVENOUS at 20:45

## 2022-10-25 RX ADMIN — BENZONATATE 100 MG: 100 CAPSULE ORAL at 20:45

## 2022-10-25 NOTE — PROGRESS NOTES
Pharmacy Dosing Services: 10/25/22    Pharmacist adjusted the order for Cefepime 2 gm IV every 12 hours to Cefepime 2 gm IV x 1 loading dose over 30 minutes followed by Cefepime 2 gm IV every 12 hours per pharmacy Extended Infusion B-Lactam Antibiotics protocol. Thank you,   Larry Ibarra.  Tari Myers, PharmD, [facility]

## 2022-10-25 NOTE — H&P
[address]  [address] [facility], [address] [place] [address]  [phone]    Hospitalist Admission History and Physical      NAME:  Binta Lemos   :   [birth year]   MRN:  [id number]     PCP:  Krista Miranda MD     Date/Time of service:  10/25/2022  5:59 PM        Subjective:     CHIEF COMPLAINT: dyspnea     HISTORY OF PRESENT ILLNESS:     The patient is a 79 yo hx of asthma, bronchiectasis, Pafib, sCHF EF 45-50%, presented w/ dyspnea, hypoxia, pulm edema, CHF, afib RVR, hyponatremia. The patient is a poor historian. Her daughter stated that the patient was admitted to [address] [place] [address] last month for afib, heart failure, hypoxia and subsequently went to rehab. Over the past week, the patient has had worsening SOB, associated with increased cough, hypoxia, and LE edema. She saw pulmonary who sent her to the ED. In the ED, WBC was 9.9.  pro BNP was 1,998. CXR with pulm edema. Allergies   Allergen Reactions    Ace Inhibitors Cough    Iodine Rash    Penicillins Rash       Prior to Admission medications    Medication Sig Start Date End Date Taking? Authorizing Provider   bumetanide (BUMEX) 0.5 mg tablet Take 1 Tablet by mouth daily. 22   Magali George MD   guaiFENesin ER (MUCINEX) 600 mg ER tablet Take 1 Tablet by mouth every twelve (12) hours. 22   Magali George MD   sotaloL (BETAPACE) 80 mg tablet Take  by mouth two (2) times a day. Provider, Historical   albuterol 2.5 mg /3 mL (0.083 %) nebu 3 mL, albuterol-ipratropium 2.5 mg-0.5 mg/3 ml nebu 3 mL Take 1 Inhalation by inhalation every four (4) hours as needed. 10/1/21   Audi, MD Abby   albuterol (PROVENTIL VENTOLIN) 2.5 mg /3 mL (0.083 %) nebu Take 3 mL by inhalation every four (4) hours as needed. 3/29/22   Abby Huntley MD   pantoprazole (PROTONIX) 40 mg tablet Take 40 mg by mouth daily.     Provider, Historical   olopatadine (PATADAY) 0.2 % drop ophthalmic solution Administer 1 Drop to both eyes daily.    Provider, Historical   Xarelto 20 mg tab tablet Take 20 mg by mouth daily (with dinner). 20   Provider, Historical   metroNIDAZOLE (METROCREAM) 0.75 % topical cream metronidazole 0.75 % topical cream    Provider, Historical   montelukast (SINGULAIR) 10 mg tablet montelukast 10 mg tablet    Provider, Historical   tiotropium bromide (SPIRIVA RESPIMAT) 2.5 mcg/actuation inhaler Spiriva Respimat 2.5 mcg/actuation solution for inhalation    Provider, Historical   albuterol (PROVENTIL HFA, VENTOLIN HFA, PROAIR HFA) 90 mcg/actuation inhaler Take  by inhalation. Provider, Historical   diclofenac (VOLTAREN) 1 % gel Apply  to affected area four (4) times daily. Provider, Historical   acetaminophen (TYLENOL EXTRA STRENGTH PO) Take  by mouth.     Provider, Historical       Past Medical History:   Diagnosis Date    Asthma     CHF (congestive heart failure), NYHA class I, chronic, systolic (HCC)     Chronic anticoagulation     GERD (gastroesophageal reflux disease)     Hiatal hernia     HX: breast cancer     Right     Hypothyroid     Paroxysmal A-fib (HCC)     Venous insufficiency     bilat LE    Weight loss         Past Surgical History:   Procedure Laterality Date    COLONOSCOPY N/A 2021    COLONOSCOPY AND EGD performed by Estevan Conner MD at [facility]    HX BREAST LUMPECTOMY Right 2015    HX CATARACT REMOVAL  2013    Bilateral     HX CHOLECYSTECTOMY      HX DILATION AND CURETTAGE      x2    HX ORTHOPAEDIC  2017    RIGHT foot  hammertoe       Social History     Tobacco Use    Smoking status: Former     Types: Cigarettes     Start date: 2000     Quit date: 2000     Years since quittin.9    Smokeless tobacco: Never   Substance Use Topics    Alcohol use: Not Currently        Family History   Problem Relation Age of Onset    Hypertension Father         Review of Systems:  (bold if positive, if negative)    Gen:  Eyes:  ENT:  CVS:  Pulm:  Cough, dyspneaGI:  :  MS:  Skin:  Psych: Endo:  Hem:  Renal:  Neuro:          Objective:      VITALS:    Vital signs reviewed; most recent are:    Visit Vitals  BP (!) 94/58 (BP 1 Location: Left arm, BP Patient Position: At rest)   Pulse (!) 120   Temp 97.6 °F (36.4 °C)   Resp 16   Ht 5' 3\" (1.6 m)   Wt 59.4 kg (131 lb)   SpO2 96%   BMI 23.21 kg/m²     SpO2 Readings from Last 6 Encounters:   10/25/22 96%   09/28/22 95%   09/14/22 92%   08/23/22 95%   08/10/22 94%   05/01/22 96%    O2 Flow Rate (L/min): 3 l/min   No intake or output data in the 24 hours ending 10/25/22 9189     Exam:     Physical Exam:    Gen:  elderly, disheveled, frail, mild distress  HEENT:  Pink conjunctivae, PERRL, hearing intact to voice, moist mucous membranes  Neck:  Supple, without masses, thyroid non-tender  Resp:  No accessory muscle use, bilateral rales at bases  Card:  irregular, no murmurs, normal S1, S2 without thrills, 1+ edema  Abd:  Soft, non-tender, non-distended, normoactive bowel sounds are present  Lymph:  No cervical adenopathy  Musc:  No cyanosis or clubbing  Skin:  No rashes   Neuro:  Cranial nerves 3-12 are grossly intact, follows commands appropriately  Psych:  Alert with poor insight. Oriented to person, place, and time    Labs:    No results for input(s): WBC, HGB, HCT, PLT, HGBEXT, HCTEXT, PLTEXT in the last 72 hours. Recent Labs     10/25/22  1545   *   K 3.2*   CL 82*   CO2 37*   GLU 99   BUN 13   CREA 0.47*   CA 9.3   ALB 2.5*   TBILI 0.2   ALT 15     Lab Results   Component Value Date/Time    Glucose (POC) 120 (H) 09/24/2022 07:23 AM    Glucose (POC) 106 09/19/2022 05:49 AM     No results for input(s): PH, PCO2, PO2, HCO3, FIO2 in the last 72 hours. No results for input(s): INR, INREXT in the last 72 hours.     Radiology and EKG reviewed:   CXR reviewed    **Old Records reviewed in [facility]**       Assessment/Plan:       Principal Problem:    79 yo hx of asthma, bronchiectasis, Pafib, sCHF EF 45-50%, presented w/ dyspnea, hypoxia, pulm edema, CHF, afib RVR, hyponatremia    1) Acute and chronic respiratory failure with hypoxia: worsening, unclear etiology. Could be underlying asthma/bronchiectasis vs pneumonia vs pulm edema from CHF. Will obtain chest CT to eval.  Start O2, incentive spirometry. Low threshold for bipap. Consult Pulm    2) Acute on chronic sCHF/pulm edema: last echo with EF 45-50%. Given pulm edema and elevated pro BNP, will start IV bumex. Monitor BMP, I/O. Consult Cards    3) Afib RVR: has Pafib. Likely triggered by pulm issues. Will start dilt gtt. Cont sotalol, xarelto. Defer to Cards    4) Pneumonia: vague opacity on CXR. Awaiting chest CT. Will obtain viral panel, sputum cx. Given hx of pseudomonas, will start IV Cefepime    5) Hyponatremia: acute on chronic. Could be due to CHF. Will check urine and plasma osm. Monitor Na closely on diuretics.   Consult Renal    6) Hypokalemia: replete    Risk of deterioration: high      Total time spent with patient care: 70 Minutes (35 min on critical care)  **I personally saw and examined the patient during this time period**                 Care Plan discussed with: Patient, nursing, family     Discussed:  Care Plan    Prophylaxis:   xarelto    Probable Disposition:  SNF/LTC           ___________________________________________________    Attending Physician: Octavia Harry MD

## 2022-10-25 NOTE — ED NOTES
Pt switched from home O2 tank to ED tank and sats are coming up     Pt reporting dizziness is improving

## 2022-10-25 NOTE — ED PROVIDER NOTES
80-year-old female with history of asthma, CHF, COPD, chronic anticoagulation, GERD, A. fib presents to the emergency department from her pulmonology office with concern for shortness of breath and hypoxia. She typically uses an O2 concentrator which was prescribed for as needed use, although the patient uses a near all the time. She was recently admitted to twice in September to the hospital with pulmonary complaints. When she most recently left the hospital date of September she went to [facility] and from there was prescribed oxygen for home use. Over the last several days she has had worsening shortness of breath with increased oxygen utilization. She saw her primary care doctor who increased her diuretic. Several days later she was seen again and started on antibiotics for UTI. She went to pulmonology today and was found to be hypoxic. There was a concern about whether or not her oxygen source was working or not. On arrival to the emergency department she has hypoxic, this quickly corrected after she was connected to our wall O2 source. The history is provided by the patient, medical records and a relative. Shortness of Breath  This is a recurrent problem. The current episode started more than 2 days ago. Associated symptoms include cough and sputum production. Pertinent negatives include no fever, no headaches, no sore throat, no chest pain, no vomiting, no abdominal pain, no rash and no leg swelling. Associated medical issues include COPD, chronic lung disease and heart failure.       Past Medical History:   Diagnosis Date    Asthma     CHF (congestive heart failure), NYHA class I, chronic, systolic (HCC)     Chronic anticoagulation     GERD (gastroesophageal reflux disease)     Hiatal hernia     HX: breast cancer 2015    Right     Hypothyroid     Paroxysmal A-fib (HCC)     Venous insufficiency     bilat LE    Weight loss        Past Surgical History:   Procedure Laterality Date    COLONOSCOPY N/A 2021    COLONOSCOPY AND EGD performed by Carolyn Watters MD at [address] BREAST LUMPECTOMY Right 2015    HX CATARACT REMOVAL  2013    Bilateral     HX CHOLECYSTECTOMY      HX DILATION AND CURETTAGE      x2    HX ORTHOPAEDIC  2017    RIGHT foot  hammertoe         No family history on file. Social History     Socioeconomic History    Marital status:      Spouse name: Not on file    Number of children: Not on file    Years of education: Not on file    Highest education level: Not on file   Occupational History    Not on file   Tobacco Use    Smoking status: Former     Types: Cigarettes     Start date: 2000     Quit date: 2000     Years since quittin.9    Smokeless tobacco: Never   Vaping Use    Vaping Use: Never used   Substance and Sexual Activity    Alcohol use: Not Currently    Drug use: Never    Sexual activity: Not on file   Other Topics Concern    Not on file   Social History Narrative    Not on file     Social Determinants of Health     Financial Resource Strain: Not on file   Food Insecurity: Not on file   Transportation Needs: Not on file   Physical Activity: Not on file   Stress: Not on file   Social Connections: Not on file   Intimate Partner Violence: Not on file   Housing Stability: Not on file         ALLERGIES: Ace inhibitors, Iodine, and Penicillins    Review of Systems   Constitutional:  Negative for fatigue and fever. HENT:  Negative for sneezing and sore throat. Respiratory:  Positive for cough, sputum production and shortness of breath. Cardiovascular:  Negative for chest pain and leg swelling. Gastrointestinal:  Negative for abdominal pain, diarrhea, nausea and vomiting. Genitourinary:  Negative for difficulty urinating and dysuria. Musculoskeletal:  Negative for arthralgias and myalgias. Skin:  Negative for color change and rash. Neurological:  Negative for weakness and headaches.    Psychiatric/Behavioral:  Negative for agitation and behavioral problems. Vitals:    10/25/22 1402 10/25/22 1406 10/25/22 1415 10/25/22 1417   BP:  101/63 (!) 94/58    Pulse: (!) 105 (!) 107 (!) 120    Resp:   16    Temp:  98.6 °F (37 °C) 97.6 °F (36.4 °C)    SpO2: (!) 79% 96%     Weight:    59.4 kg (131 lb)   Height:    5' 3\" (1.6 m)            Physical Exam  Vitals and nursing note reviewed. Constitutional:       General: She is not in acute distress. Appearance: Normal appearance. She is well-developed. She is not ill-appearing, toxic-appearing or diaphoretic. HENT:      Head: Normocephalic and atraumatic. Nose: Nose normal.      Mouth/Throat:      Mouth: Mucous membranes are moist.      Pharynx: Oropharynx is clear. Eyes:      Extraocular Movements: Extraocular movements intact. Conjunctiva/sclera: Conjunctivae normal.      Pupils: Pupils are equal, round, and reactive to light. Cardiovascular:      Rate and Rhythm: Tachycardia present. Rhythm irregular. Pulses: Normal pulses. Heart sounds: Normal heart sounds. Pulmonary:      Effort: Pulmonary effort is normal. No respiratory distress. Breath sounds: Rhonchi present. Chest:      Chest wall: No tenderness. Abdominal:      General: Abdomen is flat. There is no distension. Palpations: Abdomen is soft. Tenderness: There is no abdominal tenderness. There is no guarding or rebound. Musculoskeletal:         General: No swelling, tenderness, deformity or signs of injury. Normal range of motion. Cervical back: Normal range of motion and neck supple. No rigidity. No muscular tenderness. Right lower leg: No edema. Left lower leg: No edema. Skin:     General: Skin is warm and dry. Capillary Refill: Capillary refill takes less than 2 seconds. Neurological:      General: No focal deficit present. Mental Status: She is alert and oriented to person, place, and time.    Psychiatric:         Mood and Affect: Mood normal.         Behavior: Behavior normal.        MDM  Number of Diagnoses or Management Options  Atrial fibrillation, unspecified type ([address]  Hypoxia  SOB (shortness of breath)  Diagnosis management comments: 70-year-old female presents as above with episodes of hypoxia. Her BNP is not significantly elevated from her baseline, however, there are changes on chest x-ray concerning infiltrate versus worsening edema. Will cover with antibiotics and admit to the hospital for further management. She was recently treated for UTI with amoxicillin despite penicillin allergy and reports some erythema to her face today. Amount and/or Complexity of Data Reviewed  Clinical lab tests: reviewed  Tests in the radiology section of CPT®: reviewed  Tests in the medicine section of CPT®: reviewed  Decide to obtain previous medical records or to obtain history from someone other than the patient: yes        ED Course as of 10/25/22 [place] [address] Oct 25, 2022   2096   ED EKG interpretation:  Rhythm: Atrial fibrillation with ventricular response at a rate of 96. Axis: Left axis deviation. ST segment:  No concerning ST elevations or depressions. This EKG was interpreted by Balaji Gotti MD,ED Provider. [JM]      ED Course User Index  [JM] Becky Tsai MD       Procedures          Perfect Serve Consult for Admission  5:11 PM    ED Room Number: PR43/45  Patient Name and age: Ruperto Burdick 80 y.o.  female  Working Diagnosis:   1. SOB (shortness of breath)    2. Hypoxia    3.  Atrial fibrillation, unspecified type ([address]        COVID-19 Suspicion:  yes  Sepsis present:  no  Reassessment needed: N/A  Code Status:  Full Code  Readmission: no  Isolation Requirements:  yes  Recommended Level of Care:  telemetry  Department:[facility] - [phone]  Other: Low risk for COVID, will test.  Recently started on amoxicillin for UTI despite penicillin allergy

## 2022-10-26 PROBLEM — Z66 DNR (DO NOT RESUSCITATE): Status: ACTIVE | Noted: 2022-10-26

## 2022-10-26 LAB
ALBUMIN SERPL-MCNC: 2.8 G/DL (ref 3.5–5)
ALBUMIN/GLOB SERPL: 0.8 {RATIO} (ref 1.1–2.2)
ALP SERPL-CCNC: 74 U/L (ref 45–117)
ALT SERPL-CCNC: 14 U/L (ref 12–78)
ANION GAP SERPL CALC-SCNC: 3 MMOL/L (ref 5–15)
AST SERPL-CCNC: 14 U/L (ref 15–37)
BILIRUB DIRECT SERPL-MCNC: 0.1 MG/DL (ref 0–0.2)
BILIRUB SERPL-MCNC: 0.2 MG/DL (ref 0.2–1)
BNP SERPL-MCNC: 1707 PG/ML
BUN SERPL-MCNC: 10 MG/DL (ref 6–20)
BUN/CREAT SERPL: 23 (ref 12–20)
CALCIUM SERPL-MCNC: 9.5 MG/DL (ref 8.5–10.1)
CALCULATED R AXIS, ECG10: -17 DEGREES
CALCULATED T AXIS, ECG11: 12 DEGREES
CHLORIDE SERPL-SCNC: 84 MMOL/L (ref 97–108)
CO2 SERPL-SCNC: 43 MMOL/L (ref 21–32)
CREAT SERPL-MCNC: 0.44 MG/DL (ref 0.55–1.02)
CRP SERPL-MCNC: 1.17 MG/DL (ref 0–0.6)
D DIMER PPP FEU-MCNC: 0.26 MG/L FEU (ref 0–0.65)
DIAGNOSIS, 93000: NORMAL
ERYTHROCYTE [DISTWIDTH] IN BLOOD BY AUTOMATED COUNT: 13.2 % (ref 11.5–14.5)
EST. AVERAGE GLUCOSE BLD GHB EST-MCNC: 100 MG/DL
GLOBULIN SER CALC-MCNC: 3.6 G/DL (ref 2–4)
GLUCOSE SERPL-MCNC: 113 MG/DL (ref 65–100)
HBA1C MFR BLD: 5.1 % (ref 4–5.6)
HCT VFR BLD AUTO: 30.2 % (ref 35–47)
HGB BLD-MCNC: 9.5 G/DL (ref 11.5–16)
MAGNESIUM SERPL-MCNC: 1.6 MG/DL (ref 1.6–2.4)
MAGNESIUM SERPL-MCNC: 1.7 MG/DL (ref 1.6–2.4)
MCH RBC QN AUTO: 28.7 PG (ref 26–34)
MCHC RBC AUTO-ENTMCNC: 31.5 G/DL (ref 30–36.5)
MCV RBC AUTO: 91.2 FL (ref 80–99)
NRBC # BLD: 0 K/UL (ref 0–0.01)
NRBC BLD-RTO: 0 PER 100 WBC
OSMOLALITY SERPL: 271 MOSM/KG H2O
OSMOLALITY UR: 298 MOSM/KG H2O
PHOSPHATE SERPL-MCNC: 3 MG/DL (ref 2.6–4.7)
PLATELET # BLD AUTO: 398 K/UL (ref 150–400)
PMV BLD AUTO: 9.1 FL (ref 8.9–12.9)
POTASSIUM SERPL-SCNC: 3.7 MMOL/L (ref 3.5–5.1)
PROT SERPL-MCNC: 6.4 G/DL (ref 6.4–8.2)
Q-T INTERVAL, ECG07: 338 MS
QRS DURATION, ECG06: 88 MS
QTC CALCULATION (BEZET), ECG08: 427 MS
RBC # BLD AUTO: 3.31 M/UL (ref 3.8–5.2)
SODIUM SERPL-SCNC: 130 MMOL/L (ref 136–145)
SODIUM UR-SCNC: 22 MMOL/L
VENTRICULAR RATE, ECG03: 96 BPM
WBC # BLD AUTO: 6.7 K/UL (ref 3.6–11)

## 2022-10-26 PROCEDURE — 97535 SELF CARE MNGMENT TRAINING: CPT

## 2022-10-26 PROCEDURE — 97165 OT EVAL LOW COMPLEX 30 MIN: CPT

## 2022-10-26 PROCEDURE — 74011250636 HC RX REV CODE- 250/636: Performed by: INTERNAL MEDICINE

## 2022-10-26 PROCEDURE — 85027 COMPLETE CBC AUTOMATED: CPT

## 2022-10-26 PROCEDURE — 74011000258 HC RX REV CODE- 258: Performed by: INTERNAL MEDICINE

## 2022-10-26 PROCEDURE — 74011250637 HC RX REV CODE- 250/637: Performed by: INTERNAL MEDICINE

## 2022-10-26 PROCEDURE — 74011000250 HC RX REV CODE- 250: Performed by: INTERNAL MEDICINE

## 2022-10-26 PROCEDURE — 77010033678 HC OXYGEN DAILY

## 2022-10-26 PROCEDURE — 85379 FIBRIN DEGRADATION QUANT: CPT

## 2022-10-26 PROCEDURE — 83880 ASSAY OF NATRIURETIC PEPTIDE: CPT

## 2022-10-26 PROCEDURE — 83735 ASSAY OF MAGNESIUM: CPT

## 2022-10-26 PROCEDURE — 83935 ASSAY OF URINE OSMOLALITY: CPT

## 2022-10-26 PROCEDURE — 74011250636 HC RX REV CODE- 250/636: Performed by: NURSE PRACTITIONER

## 2022-10-26 PROCEDURE — 80048 BASIC METABOLIC PNL TOTAL CA: CPT

## 2022-10-26 PROCEDURE — 36415 COLL VENOUS BLD VENIPUNCTURE: CPT

## 2022-10-26 PROCEDURE — APPSS30 APP SPLIT SHARED TIME 16-30 MINUTES: Performed by: NURSE PRACTITIONER

## 2022-10-26 PROCEDURE — 97530 THERAPEUTIC ACTIVITIES: CPT

## 2022-10-26 PROCEDURE — 99223 1ST HOSP IP/OBS HIGH 75: CPT | Performed by: STUDENT IN AN ORGANIZED HEALTH CARE EDUCATION/TRAINING PROGRAM

## 2022-10-26 PROCEDURE — 93005 ELECTROCARDIOGRAM TRACING: CPT

## 2022-10-26 PROCEDURE — 86140 C-REACTIVE PROTEIN: CPT

## 2022-10-26 PROCEDURE — 65270000046 HC RM TELEMETRY

## 2022-10-26 PROCEDURE — 74011250637 HC RX REV CODE- 250/637: Performed by: NURSE PRACTITIONER

## 2022-10-26 PROCEDURE — 84100 ASSAY OF PHOSPHORUS: CPT

## 2022-10-26 PROCEDURE — 97162 PT EVAL MOD COMPLEX 30 MIN: CPT

## 2022-10-26 PROCEDURE — 74011250637 HC RX REV CODE- 250/637

## 2022-10-26 PROCEDURE — 80076 HEPATIC FUNCTION PANEL: CPT

## 2022-10-26 PROCEDURE — 83036 HEMOGLOBIN GLYCOSYLATED A1C: CPT

## 2022-10-26 PROCEDURE — 84300 ASSAY OF URINE SODIUM: CPT

## 2022-10-26 RX ORDER — FACIAL-BODY WIPES
10 EACH TOPICAL DAILY PRN
Status: DISCONTINUED | OUTPATIENT
Start: 2022-10-26 | End: 2022-10-31 | Stop reason: HOSPADM

## 2022-10-26 RX ORDER — PANTOPRAZOLE SODIUM 40 MG/1
40 TABLET, DELAYED RELEASE ORAL DAILY
Status: DISCONTINUED | OUTPATIENT
Start: 2022-10-26 | End: 2022-10-31 | Stop reason: HOSPADM

## 2022-10-26 RX ORDER — ONDANSETRON 4 MG/1
4 TABLET, ORALLY DISINTEGRATING ORAL
Status: DISCONTINUED | OUTPATIENT
Start: 2022-10-26 | End: 2022-10-27

## 2022-10-26 RX ORDER — SODIUM CHLORIDE 0.9 % (FLUSH) 0.9 %
5-40 SYRINGE (ML) INJECTION AS NEEDED
Status: DISCONTINUED | OUTPATIENT
Start: 2022-10-26 | End: 2022-10-31 | Stop reason: HOSPADM

## 2022-10-26 RX ORDER — SODIUM CHLORIDE 0.9 % (FLUSH) 0.9 %
5-40 SYRINGE (ML) INJECTION EVERY 8 HOURS
Status: DISCONTINUED | OUTPATIENT
Start: 2022-10-26 | End: 2022-10-31 | Stop reason: HOSPADM

## 2022-10-26 RX ORDER — AMOXICILLIN 250 MG
1 CAPSULE ORAL 2 TIMES DAILY
Status: DISCONTINUED | OUTPATIENT
Start: 2022-10-26 | End: 2022-10-31 | Stop reason: HOSPADM

## 2022-10-26 RX ORDER — ACETAMINOPHEN 325 MG/1
650 TABLET ORAL
Status: DISCONTINUED | OUTPATIENT
Start: 2022-10-26 | End: 2022-10-31 | Stop reason: HOSPADM

## 2022-10-26 RX ORDER — POTASSIUM CHLORIDE 750 MG/1
40 TABLET, FILM COATED, EXTENDED RELEASE ORAL EVERY 4 HOURS
Status: COMPLETED | OUTPATIENT
Start: 2022-10-26 | End: 2022-10-26

## 2022-10-26 RX ORDER — SOTALOL HYDROCHLORIDE 80 MG/1
80 TABLET ORAL 2 TIMES DAILY
Status: DISCONTINUED | OUTPATIENT
Start: 2022-10-26 | End: 2022-10-26

## 2022-10-26 RX ORDER — ALBUTEROL SULFATE 0.83 MG/ML
2.5 SOLUTION RESPIRATORY (INHALATION)
Status: DISCONTINUED | OUTPATIENT
Start: 2022-10-26 | End: 2022-10-28

## 2022-10-26 RX ORDER — ACETAMINOPHEN 650 MG/1
650 SUPPOSITORY RECTAL
Status: DISCONTINUED | OUTPATIENT
Start: 2022-10-26 | End: 2022-10-31 | Stop reason: HOSPADM

## 2022-10-26 RX ORDER — MAGNESIUM SULFATE 1 G/100ML
1 INJECTION INTRAVENOUS ONCE
Status: COMPLETED | OUTPATIENT
Start: 2022-10-26 | End: 2022-10-26

## 2022-10-26 RX ORDER — SODIUM CHLORIDE 9 MG/ML
25 INJECTION, SOLUTION INTRAVENOUS AS NEEDED
Status: DISCONTINUED | OUTPATIENT
Start: 2022-10-26 | End: 2022-10-31 | Stop reason: HOSPADM

## 2022-10-26 RX ORDER — TRAZODONE HYDROCHLORIDE 50 MG/1
TABLET ORAL
Status: COMPLETED
Start: 2022-10-26 | End: 2022-10-26

## 2022-10-26 RX ORDER — MONTELUKAST SODIUM 10 MG/1
10 TABLET ORAL
Status: DISCONTINUED | OUTPATIENT
Start: 2022-10-26 | End: 2022-10-31 | Stop reason: HOSPADM

## 2022-10-26 RX ORDER — AZITHROMYCIN 250 MG/1
500 TABLET, FILM COATED ORAL DAILY
Status: COMPLETED | OUTPATIENT
Start: 2022-10-26 | End: 2022-10-28

## 2022-10-26 RX ORDER — TRAZODONE HYDROCHLORIDE 50 MG/1
50 TABLET ORAL ONCE
Status: COMPLETED | OUTPATIENT
Start: 2022-10-26 | End: 2022-10-26

## 2022-10-26 RX ORDER — NYSTATIN 100000 [USP'U]/ML
500000 SUSPENSION ORAL 4 TIMES DAILY
Status: DISCONTINUED | OUTPATIENT
Start: 2022-10-26 | End: 2022-10-31 | Stop reason: HOSPADM

## 2022-10-26 RX ORDER — PROCHLORPERAZINE EDISYLATE 5 MG/ML
10 INJECTION INTRAMUSCULAR; INTRAVENOUS
Status: DISCONTINUED | OUTPATIENT
Start: 2022-10-26 | End: 2022-10-31 | Stop reason: HOSPADM

## 2022-10-26 RX ORDER — METOPROLOL SUCCINATE 50 MG/1
50 TABLET, EXTENDED RELEASE ORAL DAILY
Status: DISCONTINUED | OUTPATIENT
Start: 2022-10-26 | End: 2022-10-27

## 2022-10-26 RX ADMIN — NYSTATIN 500000 UNITS: 100000 SUSPENSION ORAL at 12:28

## 2022-10-26 RX ADMIN — AZITHROMYCIN MONOHYDRATE 500 MG: 250 TABLET ORAL at 08:35

## 2022-10-26 RX ADMIN — METOPROLOL SUCCINATE 50 MG: 50 TABLET, EXTENDED RELEASE ORAL at 09:00

## 2022-10-26 RX ADMIN — SODIUM CHLORIDE 5 MG/HR: 900 INJECTION, SOLUTION INTRAVENOUS at 18:55

## 2022-10-26 RX ADMIN — ACETAMINOPHEN 650 MG: 325 TABLET, FILM COATED ORAL at 11:12

## 2022-10-26 RX ADMIN — POTASSIUM CHLORIDE 40 MEQ: 750 TABLET, FILM COATED, EXTENDED RELEASE ORAL at 12:28

## 2022-10-26 RX ADMIN — GUAIFENESIN 600 MG: 600 TABLET, EXTENDED RELEASE ORAL at 21:29

## 2022-10-26 RX ADMIN — GUAIFENESIN 600 MG: 600 TABLET, EXTENDED RELEASE ORAL at 08:35

## 2022-10-26 RX ADMIN — CEFEPIME 2 G: 2 INJECTION, POWDER, FOR SOLUTION INTRAVENOUS at 05:13

## 2022-10-26 RX ADMIN — ACETAMINOPHEN 650 MG: 325 TABLET, FILM COATED ORAL at 01:10

## 2022-10-26 RX ADMIN — NYSTATIN 500000 UNITS: 100000 SUSPENSION ORAL at 21:30

## 2022-10-26 RX ADMIN — SENNOSIDES AND DOCUSATE SODIUM 1 TABLET: 50; 8.6 TABLET ORAL at 21:29

## 2022-10-26 RX ADMIN — PANTOPRAZOLE SODIUM 40 MG: 40 TABLET, DELAYED RELEASE ORAL at 08:35

## 2022-10-26 RX ADMIN — POTASSIUM CHLORIDE 40 MEQ: 750 TABLET, FILM COATED, EXTENDED RELEASE ORAL at 08:35

## 2022-10-26 RX ADMIN — TRAZODONE HYDROCHLORIDE 50 MG: 50 TABLET ORAL at 03:30

## 2022-10-26 RX ADMIN — RIVAROXABAN 20 MG: 20 TABLET, FILM COATED ORAL at 21:29

## 2022-10-26 RX ADMIN — BUMETANIDE 1 MG: 0.25 INJECTION INTRAMUSCULAR; INTRAVENOUS at 05:14

## 2022-10-26 RX ADMIN — BUMETANIDE 1 MG: 0.25 INJECTION INTRAMUSCULAR; INTRAVENOUS at 17:10

## 2022-10-26 RX ADMIN — Medication 10 ML: at 21:30

## 2022-10-26 RX ADMIN — ONDANSETRON 4 MG: 4 TABLET, ORALLY DISINTEGRATING ORAL at 11:12

## 2022-10-26 RX ADMIN — NYSTATIN 500000 UNITS: 100000 SUSPENSION ORAL at 17:10

## 2022-10-26 RX ADMIN — POTASSIUM CHLORIDE 40 MEQ: 750 TABLET, FILM COATED, EXTENDED RELEASE ORAL at 17:10

## 2022-10-26 RX ADMIN — Medication 10 ML: at 14:00

## 2022-10-26 RX ADMIN — RIVAROXABAN 20 MG: 20 TABLET, FILM COATED ORAL at 03:30

## 2022-10-26 RX ADMIN — MONTELUKAST 10 MG: 10 TABLET, FILM COATED ORAL at 21:29

## 2022-10-26 RX ADMIN — Medication 10 ML: at 07:52

## 2022-10-26 RX ADMIN — SENNOSIDES AND DOCUSATE SODIUM 1 TABLET: 50; 8.6 TABLET ORAL at 07:53

## 2022-10-26 RX ADMIN — CEFEPIME 2 G: 2 INJECTION, POWDER, FOR SOLUTION INTRAVENOUS at 17:10

## 2022-10-26 RX ADMIN — MAGNESIUM SULFATE HEPTAHYDRATE 1 G: 1 INJECTION, SOLUTION INTRAVENOUS at 13:10

## 2022-10-26 NOTE — PROGRESS NOTES
Problem: Self Care Deficits Care Plan (Adult)  Goal: *Acute Goals and Plan of Care (Insert Text)  Description: FUNCTIONAL STATUS PRIOR TO ADMISSION: Patient was modified independent using a rollator for functional mobility. HOME SUPPORT PRIOR TO ADMISSION: Pt is caregiver for spouse; family provided assistance to patient for stair negotiation prior to admission. Occupational Therapy Goals  Initiated 10/26/2022  1. Patient will perform grooming with modified independence standing at the sink >5 minutes within 7 day(s). 2.  Patient will perform upper body dressing and bathing with modified independence within 7 day(s). 3.  Patient will perform lower body dressing and bathing with supervision/set-up within 7 day(s). 4.  Patient will perform toilet transfers with supervision/set-up within 7 day(s). 5.  Patient will perform all aspects of toileting with supervision/set-up within 7 day(s). 6.  Patient will participate in upper extremity therapeutic exercise/activities with modified independence for 10 minutes within 7 day(s). 7.  Patient will utilize energy conservation techniques during functional activities with verbal cues within 7 day(s). Outcome: Progressing Towards Goal   OCCUPATIONAL THERAPY EVALUATION  Patient: Clayton Camacho (16 y.o. female)  Date: 10/26/2022  Primary Diagnosis: Atrial fibrillation with rapid ventricular response ([facility] [address] [I48.91]       Precautions:  Fall, Skin    ASSESSMENT  Based on the objective data described below, the patient presents with decreased activity tolerance, generalized weakness, impaired balance, elevated HR, decreased endurance, and increased supplemental O2 need with activity following admission on 10/25/22 for Afib with RVR with c/o SOB. Patient with recent hospital admission where he was sent to inpatient rehab and recently discharged home with home health. She lives with her  who has dementia and she provides light assistance.   Patient has good support from her children who live locally. Today, she performed bed mobility and stand-pivot transfer to the chair, and the [facility] for toileting with x1 person assist.  She is limited to short distance transfers d/t elevated HR and SOB with associated low O2. HR ranging from 90s to 137 bpm with activity. SpO2 dropped to 85% on 2L O2 with activity therefore increased to 4L O2 to maintain SpO2 >90%. Education provided regarding energy conservation, pacing, and pursed lip breathing techniques. Patient with good to fair tolerance for ADLs. Patient would benefit from continued skilled OT to progress towards goals and improve overall independence. Current Level of Function Impacting Discharge (ADLs/self-care): Patient required mod I to CGA for functional mobility. Patient is independent to setup level for UB ADLs and required CGA to min A for LB ADLs. Functional Outcome Measure: The patient scored Total: 55/100 on the Barthel Index outcome measure. Patient will benefit from skilled therapy intervention to address the above noted impairments. PLAN :  Recommendations and Planned Interventions: self care training, functional mobility training, therapeutic exercise, visual/perceptual training, cognitive retraining, patient education, home safety training, and family training/education    Frequency/Duration: Patient will be followed by occupational therapy 5 times a week to address goals. Recommendation for discharge: (in order for the patient to meet his/her long term goals)  Occupational therapy at least 2 days/week in the home     This discharge recommendation:  Has been made in collaboration with the attending provider and/or case management    IF patient discharges home will need the following DME: none       SUBJECTIVE:   Patient agreeable to OT evaluation.       OBJECTIVE DATA SUMMARY:   HISTORY:   Past Medical History:   Diagnosis Date    Asthma     CHF (congestive heart failure), NYHA class I, chronic, systolic (HCC)     Chronic anticoagulation     GERD (gastroesophageal reflux disease)     Hiatal hernia     HX: breast cancer 2015    Right     Hypothyroid     Paroxysmal A-fib (HCC)     Venous insufficiency     bilat LE    Weight loss      Past Surgical History:   Procedure Laterality Date    COLONOSCOPY N/A 6/14/2021    COLONOSCOPY AND EGD performed by Estevan Conner MD at [address] BREAST LUMPECTOMY Right 08/24/2015    HX CATARACT REMOVAL  2013    Bilateral     HX CHOLECYSTECTOMY      HX DILATION AND CURETTAGE      x2    HX ORTHOPAEDIC  2017    RIGHT foot  hammertoe       Expanded or extensive additional review of patient history:     Home Situation  Home Environment: Private residence  # Steps to Enter: 5  Rails to Enter: Yes  Hand Rails : Bilateral  One/Two Story Residence: One story  Living Alone: No  Support Systems: Child(cornell), Spouse/Significant Other  Patient Expects to be Discharged to[de-identified] Home  Current DME Used/Available at Home: Oxygen, portable, Grab bars, Shower chair, Raised toilet seat, Walker, rollator, Walker, rolling, Cane, straight, Commode, bedside (2-3L O2 at home)  Tub or Shower Type: Shower    Hand dominance: Right    EXAMINATION OF PERFORMANCE DEFICITS:  Cognitive/Behavioral Status:  Neurologic State: Alert  Orientation Level: Oriented X4  Cognition: Appropriate decision making; Follows commands; Appropriate for age attention/concentration; Appropriate safety awareness  Perception: Appears intact  Perseveration: No perseveration noted  Safety/Judgement: Decreased awareness of environment;Decreased awareness of need for assistance;Decreased awareness of need for safety;Decreased insight into deficits    Skin: Intact in the uppers    Edema: None noted in the uppers    Hearing:   Auditory  Auditory Impairment: None    Vision/Perceptual:    Tracking: Able to track stimulus in all quadrants w/o difficulty    Diplopia: No    Acuity: Within Defined Limits    Corrective Lenses: Glasses    Range of Motion:  WDL in the uppers    Strength:  Strength: Generally decreased, functional in the uppers    Coordination:  Fine Motor Skills-Upper: Left Intact; Right Intact    Gross Motor Skills-Upper: Left Intact; Right Intact    Tone & Sensation:  Tone: Normal  Sensation: Intact    Balance:  Sitting: Without support  Sitting - Static: Good (unsupported)  Sitting - Dynamic: Good (unsupported)  Standing: With support  Standing - Static: Fair  Standing - Dynamic : Fair    Functional Mobility and Transfers for ADLs:  Bed Mobility:  Rolling: Modified independent  Supine to Sit: Modified independent  Sit to Supine: Modified independent  Scooting: Modified independent    Transfers:  Sit to Stand: Contact guard assistance  Stand to Sit: Contact guard assistance  Bed to Chair: Contact guard assistance  Bathroom Mobility: Contact guard assistance  Toilet Transfer : Contact guard assistance    ADL Assessment:  Feeding: Independent    Oral Facial Hygiene/Grooming: Setup    Bathing: Minimum assistance    Upper Body Dressing: Setup    Lower Body Dressing: Minimum assistance    Toileting: Contact guard assistance    Cognitive Retraining  Safety/Judgement: Decreased awareness of environment;Decreased awareness of need for assistance;Decreased awareness of need for safety;Decreased insight into deficits    Functional Measure:    Barthel Index:  Bathin  Bladder: 10  Bowels: 10  Groomin  Dressin  Feeding: 10  Mobility: 0  Stairs: 0  Toilet Use: 5  Transfer (Bed to Chair and Back): 10  Total: 55/100      The Barthel ADL Index: Guidelines  1. The index should be used as a record of what a patient does, not as a record of what a patient could do. 2. The main aim is to establish degree of independence from any help, physical or verbal, however minor and for whatever reason. 3. The need for supervision renders the patient not independent.   4. A patient's performance should be established using the best available evidence. Asking the patient, friends/relatives and nurses are the usual sources, but direct observation and common sense are also important. However direct testing is not needed. 5. Usually the patient's performance over the preceding 24-48 hours is important, but occasionally longer periods will be relevant. 6. Middle categories imply that the patient supplies over 50 per cent of the effort. 7. Use of aids to be independent is allowed. Score Interpretation (from [address] [facility] [place] [address])    Independent   60-79 Minimally independent   40-59 Partially dependent   20-39 Very dependent   <20 Totally dependent     -Real Olivas., Barthel, D.W. (1965). Functional evaluation: the Barthel Index. [address] ([address] (1997). The Barthel activities of daily living index: self-reporting versus actual performance in the old (> or = 75 years). Journal of [address] [place] [address] 45(7), [address] [facility], JOSÉ LUIS, Devon Li., [facility]. (1999). Measuring the change in disability after inpatient rehabilitation; comparison of the responsiveness of the Barthel Index and Functional [place] Measure. Journal of Neurology, Neurosurgery, and Psychiatry, 66(4), 647-990. Carin Orellana, N.J.A, RUBEN Bermudez, & Trina Menendez MAnaA. (2004) Assessment of post-stroke quality of life in cost-effectiveness studies: The usefulness of the Barthel Index and the EuroQoL-5D.  Quality of Life Research, 15, 586-06     Occupational Therapy Evaluation Charge Determination   History Examination Decision-Making   LOW Complexity : Brief history review  LOW Complexity : 1-3 performance deficits relating to physical, cognitive , or psychosocial skils that result in activity limitations and / or participation restrictions  LOW Complexity : No comorbidities that affect functional and no verbal or physical assistance needed to complete eval tasks       Based on the above components, the patient evaluation is determined to be of the following complexity level: LOW     Activity Tolerance:   Good    After treatment patient left in no apparent distress:    Sitting in chair, Call bell within reach, and Bed / chair alarm activated    COMMUNICATION/EDUCATION:   The patients plan of care was discussed with: Physical therapist, Registered nurse, and patient . Home safety education was provided and the patient/caregiver indicated understanding., Patient/family have participated as able in goal setting and plan of care. , and Patient/family agree to work toward stated goals and plan of care. This patients plan of care is appropriate for delegation to [facility].     Thank you for this referral.  Trever Muñoz, OTR/L  Time Calculation: 55 mins

## 2022-10-26 NOTE — ED NOTES
Cardizem drip held at this time due to difficulty finding channel for IV pump and pt HR being stable between 90 and 110. BP stable as well.

## 2022-10-26 NOTE — CONSULTS
[facility]     Name: Brit Galarza MRN: [id number]   : 1940 Hospital: [address]   Date: 10/26/2022        Impression Plan   Acute respiratory failure  Hypoxia  RLL PNA- aspiration? Bronchiectasis                 Wean O2 to keep sats above 90%  Pt has a hx of pseudomonas as well as stenotrophomonas colonization. The RLL PNA likely pseudomonas vs aspiration PNA (GERD related). Less likely stenotrophomonas since pt is improving on cefepime. Nystatin for outpt thrush treatment  Continue bumex, may be able to decrease some since no pleural effusions on CT chest  Continue protonix  OOB into chair           Radiology  ( personally reviewed) CT chest reviewed: RLL extensive infiltrate, RML infiltrate   ABG No results for input(s): PHI, PO2I, PCO2I in the last 72 hours. Subjective     Cc: hypoxia    81 yo with PMHx of bronchiectasis, recurrent PNAs, pseudomonas/stenotrophomonas colonization and CHF presenting with increasing SOB and severe hypoxia in pulmonary clinic. In ER was found to be in a-fib with RVR. Pt admitted and CT chest revealed RLL and RML infiltrates. Pt received diuretics as well. Leg edema has resolved this morning. Managed on Trelegy 200, Wayne nebs, montelukast, Flonase/azelastine, and ProAir/albuterol nebs PRN. Pt denies any nausea/vomiting in the last few weeks. She does have some GERD sxs. Non-smoker.      Past Medical History:   Diagnosis Date    Asthma     CHF (congestive heart failure), NYHA class I, chronic, systolic (HCC)     Chronic anticoagulation     GERD (gastroesophageal reflux disease)     Hiatal hernia     HX: breast cancer     Right     Hypothyroid     Paroxysmal A-fib (HCC)     Venous insufficiency     bilat LE    Weight loss       Past Surgical History:   Procedure Laterality Date    COLONOSCOPY N/A 2021    COLONOSCOPY AND EGD performed by Francine Evans MD at [address] LUMPECTOMY Right 2015    HX CATARACT REMOVAL  2013    Bilateral     HX CHOLECYSTECTOMY      HX DILATION AND CURETTAGE      x2    HX ORTHOPAEDIC  2017    RIGHT foot  hammertoe      Prior to Admission medications    Medication Sig Start Date End Date Taking? Authorizing Provider   arformoteroL (BROVANA) 15 mcg/2 mL nebu neb solution 15 mcg by Nebulization route two (2) times a day. Yes Provider, Historical   budesonide (PULMICORT) 0.5 mg/2 mL nbsp 500 mcg by Nebulization route two (2) times a day. Yes Provider, Historical   nystatin (MYCOSTATIN) 100,000 unit/mL suspension Take  by mouth two (2) times a day. swish and spit   Yes Provider, Historical   metoprolol succinate (TOPROL-XL) 100 mg tablet Take 50 mg by mouth daily. Yes Provider, Historical   levothyroxine (SYNTHROID) 25 mcg tablet Take 25 mcg by mouth Daily (before breakfast). Yes Provider, Historical   traZODone (DESYREL) 50 mg tablet Take 50 mg by mouth nightly. 25-50mg nightly   Yes Provider, Historical   ergocalciferol (ERGOCALCIFEROL) 1,250 mcg (50,000 unit) capsule Take 50,000 Units by mouth every seven (7) days. On sundays   Yes Provider, Historical   iron, carbonyl (FEOSOL) 45 mg tab Take 1 Tablet by mouth daily. Yes Provider, Historical   fluticasone (VERAMYST) 27.5 mcg/actuation nasal spray 2 Sprays by Nasal route daily. Yes Provider, Historical   ondansetron hcl (Zofran) 8 mg tablet Take 8 mg by mouth every eight (8) hours as needed for Nausea or Vomiting (prn). Yes Provider, Historical   amLODIPine (NORVASC) 5 mg tablet Take 5 mg by mouth daily. For spb over 150   Yes Provider, Historical   bumetanide (BUMEX) 0.5 mg tablet Take 1 Tablet by mouth daily. Patient taking differently: Take 1.5 mg by mouth daily. 9/29/22  Yes Beatriz Valerio MD   guaiFENesin ER (MUCINEX) 600 mg ER tablet Take 1 Tablet by mouth every twelve (12) hours. 9/28/22  Yes Beatriz Valerio MD   pantoprazole (PROTONIX) 40 mg tablet Take 40 mg by mouth daily.    Yes Provider, Historical olopatadine (PATADAY) 0.2 % drop ophthalmic solution Administer 1 Drop to both eyes daily. Yes Provider, Historical   Xarelto 20 mg tab tablet Take 20 mg by mouth daily (with dinner). 7/31/20  Yes Provider, Historical   metroNIDAZOLE (METROCREAM) 0.75 % topical cream Apply  to affected area two (2) times a day. Yes Provider, Historical   montelukast (SINGULAIR) 10 mg tablet montelukast 10 mg tablet   Yes Provider, Historical   diclofenac (VOLTAREN) 1 % gel Apply  to affected area four (4) times daily. Yes Provider, Historical   acetaminophen (TYLENOL EXTRA STRENGTH PO) Take  by mouth. Yes Provider, Historical   sotaloL (BETAPACE) 80 mg tablet Take  by mouth two (2) times a day. Patient not taking: Reported on 10/25/2022    Provider, Historical   albuterol 2.5 mg /3 mL (0.083 %) nebu 3 mL, albuterol-ipratropium 2.5 mg-0.5 mg/3 ml nebu 3 mL Take 1 Inhalation by inhalation every four (4) hours as needed. Patient not taking: Reported on 10/25/2022 10/1/21   Other, MD Abby   albuterol (PROVENTIL VENTOLIN) 2.5 mg /3 mL (0.083 %) nebu Take 3 mL by inhalation every four (4) hours as needed. Patient not taking: Reported on 10/25/2022 3/29/22   Abby Huntley MD   tiotropium bromide (SPIRIVA RESPIMAT) 2.5 mcg/actuation inhaler Spiriva Respimat 2.5 mcg/actuation solution for inhalation  Patient not taking: Reported on 10/25/2022    Provider, Historical   albuterol (PROVENTIL HFA, VENTOLIN HFA, PROAIR HFA) 90 mcg/actuation inhaler Take  by inhalation.   Patient not taking: Reported on 10/25/2022    Provider, Historical     Current Facility-Administered Medications   Medication Dose Route Frequency    montelukast (SINGULAIR) tablet 10 mg  10 mg Oral QHS    pantoprazole (PROTONIX) tablet 40 mg  40 mg Oral DAILY    sotaloL (BETAPACE) tablet 80 mg  80 mg Oral BID    sodium chloride (NS) flush 5-40 mL  5-40 mL IntraVENous Q8H    senna-docusate (PERICOLACE) 8.6-50 mg per tablet 1 Tablet  1 Tablet Oral BID    azithromycin (ZITHROMAX) tablet 500 mg  500 mg Oral DAILY    rivaroxaban (XARELTO) tablet 20 mg  20 mg Oral DAILY WITH DINNER    azithromycin (ZITHROMAX) 500 mg in 0.9% sodium chloride 250 mL ([id number])  500 mg IntraVENous Q24H    bumetanide (BUMEX) injection 1 mg  1 mg IntraVENous Q12H    guaiFENesin ER (MUCINEX) tablet 600 mg  600 mg Oral Q12H    dilTIAZem (CARDIZEM) 100 mg in 0.9% sodium chloride (MBP/ADV) 100 mL infusion  0-15 mg/hr IntraVENous TITRATE    cefepime (MAXIPIME) 2 g in 0.9% sodium chloride (MBP/ADV) 100 mL MBP  2 g IntraVENous Q12H     Allergies   Allergen Reactions    Ace Inhibitors Cough    Iodine Rash    Penicillins Rash      Social History     Tobacco Use    Smoking status: Former     Types: Cigarettes     Start date: 2000     Quit date: 2000     Years since quittin.9    Smokeless tobacco: Never   Substance Use Topics    Alcohol use: Not Currently      Family History   Problem Relation Age of Onset    Hypertension Father           Laboratory: I have personally reviewed the critical care flowsheet and labs. Recent Labs     10/25/22  1854   WBC 7.5   HGB 9.9*   HCT 31.6*        Recent Labs     10/25/22  1545   *   K 3.2*   CL 82*   CO2 37*   GLU 99   BUN 13   CREA 0.47*   CA 9.3   ALB 2.5*   ALT 15       Objective:     Mode Rate Tidal Volume Pressure FiO2 PEEP                    Vital Signs:     TMAX(24)      Intake/Output:   Last shift:         Last 3 shifts: No intake/output data recorded. [id number]  Intake/Output Summary (Last 24 hours) at 10/26/2022 0746  Last data filed at 10/26/2022 0729  Gross per 24 hour   Intake 930 ml   Output 100 ml   Net 830 ml     EXAM:   GENERAL: Speaking in full long sentences, awake, alert, HEENT:  PERRL, EOMI, no alar flaring or epistaxis, oral mucosa moist without cyanosis, NECK:  no jugular vein distention, no retractions, no thyromegaly or masses, LUNGS:Crackles in right lower lobe, HEART:  Regular rate and rhythm with no MGR; no edema is present, ABDOMEN:  soft with no tenderness, bowel sounds present, EXTREMITIES:  warm with no cyanosis, SKIN:  no jaundice or ecchymosis, and NEUROLOGIC:  alert and oriented, grossly non-focal    Bairon Moreno MD  [facility]

## 2022-10-26 NOTE — PROGRESS NOTES
[facility]  Cardiology Care Note                  [x]Initial visit     []Established visit     Patient Name: [facility]  Primary Cardiologist: Dr Lois Bowens Cardiologist: Edi Cole MD     Reason for consult: a fib RVR     HPI:       Maria Teresa Wan is a 80 y.o. female with PMH significant for  Pafib, HF mr EF 45-50% decline is likely tachycardia induced, asthma who presented w/ dyspnea, hypoxia, pulm edema, CHF, afib RVR, hyponatremia. The patient is a poor historian. Per chart review the patient was admitted to [facility] last month for afib, heart failure, hypoxia and subsequently went to rehab. Over the past week, the patient has had worsening SOB, associated with increased cough, hypoxia, and LE edema. She saw pulmonary who sent her to the ED    SUBJECTIVE:      Maria Teresa Wan reports no chest pain or SOB. Assessment and Plan     1 a fib RVR: pt was changed from sotalol to toprol XL 50 mg on 10/14 by 's Safia Avery and Edwin Molina. Resume toprol XL   Anticoagulated with xarelto. Has appt in November to discuss Watchman device with Dr Edwin Molina. 2 HF mr EF: pBNP improved since last admission. Cont loops. 3 chronic hyponatremia:   4 asthma:   5 hypokalemia: replete po.          ____________________________________________________________    Cardiac testing  22    ECHO ADULT COMPLETE 2022    Interpretation Summary    Left Ventricle: Mildly reduced left ventricular systolic function with a visually estimated EF of 45 - 50%. Left ventricle size is normal. Normal wall thickness. Mild global hypokinesis present. Grade I diastolic dysfunction with normal LAP. Aortic Valve: Valve structure is normal. Mild sclerosis of the aortic valve cusp. Mitral Valve: Mild regurgitation. Tricuspid Valve: Mildly elevated RVSP.     Mildly improved lv function compared to study 4/2022. Signed by: Charo Pena DO on 9/6/2022 11:24 AM              Most recent HS troponins:  No results for input(s): TROPHS in the last 72 hours. No lab exists for component:  CKMB    ECG:   EKG Results       Procedure 720 Value Units Date/Time    EKG, 12 LEAD, INITIAL [[id number]] Collected: 10/25/22 1546    Order Status: Completed Updated: 10/25/22 1551     Ventricular Rate 96 BPM      QRS Duration 88 ms      Q-T Interval 338 ms      QTC Calculation (Bezet) 427 ms      Calculated R Axis -17 degrees      Calculated T Axis 12 degrees      Diagnosis --     Atrial fibrillation  Minimal voltage criteria for LVH, may be normal variant ( R in aVL )  Abnormal ECG  When compared with ECG of 19-SEP-2022 05:17,  No significant change was found                Review of Systems:    [x]All other systems reviewed and all negative except as written in HPI    [] Patient unable to provide secondary to condition       Past Medical History:   Diagnosis Date    Asthma     CHF (congestive heart failure), NYHA class I, chronic, systolic (HCC)     Chronic anticoagulation     GERD (gastroesophageal reflux disease)     Hiatal hernia     HX: breast cancer 2015    Right     Hypothyroid     Paroxysmal A-fib ([address]     Venous insufficiency     bilat LE    Weight loss      Past Surgical History:   Procedure Laterality Date    COLONOSCOPY N/A 6/14/2021    COLONOSCOPY AND EGD performed by Stephanie Banda MD at [facility] [address]    HX BREAST LUMPECTOMY Right 08/24/2015    HX CATARACT REMOVAL  2013    Bilateral     HX CHOLECYSTECTOMY      HX DILATION AND CURETTAGE      x2    HX ORTHOPAEDIC  2017    RIGHT foot  hammertoe     Social Hx:  reports that she quit smoking about 21 years ago. Her smoking use included cigarettes. She started smoking about 22 years ago. She has never used smokeless tobacco. She reports that she does not currently use alcohol. She reports that she does not use drugs.   Family Hx: family history includes Hypertension in her father. Allergies   Allergen Reactions    Ace Inhibitors Cough    Iodine Rash    Penicillins Rash          OBJECTIVE:  Wt Readings from Last 3 Encounters:   10/25/22 59.1 kg (130 lb 4.7 oz)   09/25/22 55.6 kg (122 lb 9.2 oz)   09/14/22 57 kg (125 lb 10.6 oz)       Intake/Output Summary (Last 24 hours) at 10/26/2022 0807  Last data filed at 10/26/2022 3151  Gross per 24 hour   Intake 930 ml   Output 100 ml   Net 830 ml       Physical Exam:    Vitals:   Vitals:    10/26/22 0300 10/26/22 0400 10/26/22 0500 10/26/22 0600   BP: 117/79      Pulse: 90 (!) 123 (!) 110    Resp: 15 22 17    Temp:  97.6 °F (36.4 °C)     SpO2: 98% 94%  92%   Weight:       Height:         Telemetry: a fib     Gen: Well-developed, well-nourished, in no acute distress  Neck: Supple, No JVD, No Carotid Bruit  Resp: Dim bases   Card: irregular Rate,Rythm, Normal S1, S2, No murmurs, rubs or gallop. Abd:   Soft, non-tender, non-distended, BS+   MSK: No cyanosis  Skin: No rashes    Neuro: Moving all four extremities, follows commands appropriately  Psych: Limited  insight, oriented to person, place, alert, Nml Affect  LE: No edema    Data Review:     Radiology:   XR Results (most recent):  Results from Hospital Encounter encounter on 10/25/22    XR CHEST PA LAT    Narrative  EXAM: XR CHEST PA LAT    INDICATION: Shortness of breath    COMPARISON: September 5, 2022    TECHNIQUE: PA and lateral chest views    FINDINGS: Cardiac size is unchanged and normal. Interstitial edema is present,  elevation of the right hemidiaphragm with blunting of both costophrenic angles. Impression  New or increased Interstitial edema with bibasilar atelectasis or  infiltrate, cannot exclude effusions.       Recent Labs     10/25/22  1545   *   K 3.2*   CL 82*   CO2 37*   BUN 13   CREA 0.47*   GLU 99   CA 9.3     Recent Labs     10/25/22  1854   WBC 7.5   HGB 9.9*   HCT 31.6*        Recent Labs     10/25/22  1545   AP 74 No results for input(s): CHOL, LDLC in the last 72 hours.     No lab exists for component: TGL, HDLC,  HBA1C      Current meds:    Current Facility-Administered Medications:     montelukast (SINGULAIR) tablet 10 mg, 10 mg, Oral, QHS, Dwight Novak MD    pantoprazole (PROTONIX) tablet 40 mg, 40 mg, Oral, DAILY, Dwight Novak MD    sotaloL (BETAPACE) tablet 80 mg, 80 mg, Oral, BID, Phoenix Novak MD    sodium chloride (NS) flush 5-40 mL, 5-40 mL, IntraVENous, Q8H, Phoenix Novak MD, 10 mL at 10/26/22 0752    sodium chloride (NS) flush 5-40 mL, 5-40 mL, IntraVENous, PRN, Dwight Novak MD    0.9% sodium chloride infusion 25 mL, 25 mL, IntraVENous, PRN, Dwight Novak MD    acetaminophen (TYLENOL) tablet 650 mg, 650 mg, Oral, Q6H PRN **OR** acetaminophen (TYLENOL) suppository 650 mg, 650 mg, Rectal, Q6H PRN, Phoenix Novak MD    senna-docusate (PERICOLACE) 8.6-50 mg per tablet 1 Tablet, 1 Tablet, Oral, BID, Phoenix Nvoak MD, 1 Tablet at 10/26/22 0753    bisacodyL (DULCOLAX) suppository 10 mg, 10 mg, Rectal, DAILY PRN, wDight Novak MD    prochlorperazine (COMPAZINE) injection 10 mg, 10 mg, IntraVENous, Q6H PRN, Dwight Novak MD    acetaminophen (TYLENOL) tablet 650 mg, 650 mg, Oral, Q6H PRN, James Martinez MD, 650 mg at 10/26/22 0110    azithromycin (ZITHROMAX) tablet 500 mg, 500 mg, Oral, DAILY, James Martinez MD    rivaroxaban (XARELTO) tablet 20 mg, 20 mg, Oral, DAILY WITH DINNER, Kyle Lombardi MD    potassium chloride SR (KLOR-CON 10) tablet 40 mEq, 40 mEq, Oral, Q4H, Emory Busman, MD    azithromycin (ZITHROMAX) 500 mg in 0.9% sodium chloride 250 mL ([id number]), 500 mg, IntraVENous, Q24H, Dwight Novak MD, Last Rate: 250 mL/hr at 10/25/22 2045, 500 mg at 10/25/22 2045    bumetanide (BUMEX) injection 1 mg, 1 mg, IntraVENous, Q12H, Phoenix Novak MD, 1 mg at 10/26/22 0514    albuterol-ipratropium (DUO-NEB) 2.5 MG-0.5 MG/3 ML, 3 mL, Nebulization, Q6H PRN, Dwight Novak MD    guaiFENesin ER (MUCINEX) tablet 600 mg, 600 mg, Oral, Q12H, Maria De Jesus Novak MD    benzonatate (TESSALON) capsule 100 mg, 100 mg, Oral, TID PRN, Dwight Novak MD, 100 mg at 10/25/22 2045    dilTIAZem (CARDIZEM) 100 mg in 0.9% sodium chloride (MBP/ADV) 100 mL infusion, 0-15 mg/hr, IntraVENous, TITRATE, Dwight Novak MD    diphenhydrAMINE (BENADRYL) injection 25 mg, 25 mg, IntraVENous, Q6H PRN, Dwight Novak MD    cefepime (MAXIPIME) 2 g in 0.9% sodium chloride (MBP/ADV) 100 mL MBP, 2 g, IntraVENous, Q12H, Dwight Novak MD, Last Rate: 25 mL/hr at 10/26/22 0513, 2 g at 10/26/22 577 [facility],     [facility], [address] [place] [address], [facility] [address] Jaime Vela  [phone]      Qamar Garrett MD

## 2022-10-26 NOTE — PROGRESS NOTES
Vishal Colunga [facility] [address]  [address] [facility], [address] [place] [address]  [phone]      Hospitalist  Progress Note      NAME:         Clayton Camacho   :        1940  MRM:        [id number]    Date of service: 10/26/2022      Chief complaint: SOB    Interval HPI: Patient still with SOB, moderate, at rest and associated with pleuritic chest pain. No fever or chills. No nausea or vomiting. Objective:    Vital Signs:    Visit Vitals  /79   Pulse (!) 110   Temp 97.6 °F (36.4 °C)   Resp 17   Ht 5' 3\" (1.6 m)   Wt 59.1 kg (130 lb 4.7 oz)   SpO2 92%   Breastfeeding No   BMI 23.08 kg/m²        Intake/Output Summary (Last 24 hours) at 10/26/2022 0800  Last data filed at 10/26/2022 5580  Gross per 24 hour   Intake 930 ml   Output 100 ml   Net 830 ml        Physical Examination:    General:   Weak and ill looking, not in distress   Eyes:   pink conjunctivae, PERRLA with no discharge. ENT:   no ottorrhea or rhinorrhea with dry mucous membranes  Neck: no masses, thyroid non-tender and trachea central.  Pulm:  no accessory muscle use, clear breath sounds without crackles or wheezes. Reproducible right rib cage discomfort  Card:  no JVD or murmurs, has atrial fibrillation with RVR,without thrills, bruits or peripheral edema  Abd:  Soft, non-tender, non-distended, normoactive bowel sounds with no palpable organomegaly  Musc:  No cyanosis, clubbing, atrophy or deformities. Skin:  No rashes, bruising or ulcers. Neuro: Awake and alert.  Generally a non focal exam. Follows commands appropriately  Psych:  Has a fair insight to her illness     Current Facility-Administered Medications   Medication Dose Route Frequency    montelukast (SINGULAIR) tablet 10 mg  10 mg Oral QHS    pantoprazole (PROTONIX) tablet 40 mg  40 mg Oral DAILY    sotaloL (BETAPACE) tablet 80 mg  80 mg Oral BID    sodium chloride (NS) flush 5-40 mL  5-40 mL IntraVENous Q8H    sodium chloride (NS) flush 5-40 mL  5-40 mL IntraVENous PRN    0.9% sodium chloride infusion 25 mL  25 mL IntraVENous PRN    acetaminophen (TYLENOL) tablet 650 mg  650 mg Oral Q6H PRN    Or    acetaminophen (TYLENOL) suppository 650 mg  650 mg Rectal Q6H PRN    senna-docusate (PERICOLACE) 8.6-50 mg per tablet 1 Tablet  1 Tablet Oral BID    bisacodyL (DULCOLAX) suppository 10 mg  10 mg Rectal DAILY PRN    prochlorperazine (COMPAZINE) injection 10 mg  10 mg IntraVENous Q6H PRN    acetaminophen (TYLENOL) tablet 650 mg  650 mg Oral Q6H PRN    azithromycin (ZITHROMAX) tablet 500 mg  500 mg Oral DAILY    rivaroxaban (XARELTO) tablet 20 mg  20 mg Oral DAILY WITH DINNER    azithromycin (ZITHROMAX) 500 mg in 0.9% sodium chloride 250 mL ([id number])  500 mg IntraVENous Q24H    bumetanide (BUMEX) injection 1 mg  1 mg IntraVENous Q12H    albuterol-ipratropium (DUO-NEB) 2.5 MG-0.5 MG/3 ML  3 mL Nebulization Q6H PRN    guaiFENesin ER (MUCINEX) tablet 600 mg  600 mg Oral Q12H    benzonatate (TESSALON) capsule 100 mg  100 mg Oral TID PRN    dilTIAZem (CARDIZEM) 100 mg in 0.9% sodium chloride (MBP/ADV) 100 mL infusion  0-15 mg/hr IntraVENous TITRATE    diphenhydrAMINE (BENADRYL) injection 25 mg  25 mg IntraVENous Q6H PRN    cefepime (MAXIPIME) 2 g in 0.9% sodium chloride (MBP/ADV) 100 mL MBP  2 g IntraVENous Q12H        Laboratory data and review:    Recent Labs     10/25/22  1854   WBC 7.5   HGB 9.9*   HCT 31.6*        Recent Labs     10/25/22  1545   *   K 3.2*   CL 82*   CO2 37*   GLU 99   BUN 13   CREA 0.47*   CA 9.3   ALB 2.5*   ALT 15     No components found for: [place]    Diagnostics: Imaging studies have been reviewed    Telemetry reviewed by me: atrial fibrillation     Assessment and Plan:    Acute and chronic respiratory failure with hypoxia ([address] (10/25/2022) POA: likely due to CHF exacerbation vs pneumonia. CT chest showed cardiomegaly with basilar infiltrates. Now on 3L/min.  Continue IV diuresis, empiric antibiotics and wean oxygen as tolerated    Acute on chronic HFrEF (heart failure with reduced ejection fraction) ([address] (9/4/2022) POA: trigger likely the A fib vs pneumonia. Recent Echocardiogram done 9/2022 showed an EF of 45-50% with a grade 1 diastolic dysfunction. Continue IV Bumex. Consult cardiology    Atrial fibrillation with rapid ventricular response ([address] (10/25/2022) POA: has a hx of PAF. Now rate controlled. Continue Diltiazem gtt and wean as tolerated. Xarelto, Toprol. Cardiology to see    Pneumonia (8/19/2022) POA: suspected and bibasilar as noted on CTA chest. Continue empiric IV Cefepime and Azithromycin    Hyponatremia (4/30/2022) /  Hypokalemia (9/4/2022) POA: low Na likely chronic from CHF and diuresis.  Replete K+ and follow electrolytes    Hypothyroidism POA: recent TSH was normal. Continue Levothyroxine                Care Plan discussed with: Patient, Family, and Nursing Staff    Discussed:  Care Plan and D/C Planning    Prophylaxis:   Xarelto    Anticipated Disposition:   PT, OT, RN           ___________________________________________________    Attending Physician:   Sonal Gottlieb MD

## 2022-10-26 NOTE — PROGRESS NOTES
[facility]  Cardiology Care Note                  [x]Initial visit     []Established visit     Patient Name: Mayra Singh - V:[id number]  Primary Cardiologist: Dr Antonieta Jones Cardiologist: Kyle Ornelas DO      Reason for consult: a fib RVR     HPI:       Darreld Gosselin is a 80 y.o. female with PMH significant for  Pafib, HF mr EF 45-50% decline is likely tachycardia induced, asthma who presented w/ dyspnea, hypoxia, pulm edema, CHF, afib RVR, hyponatremia. The patient is a poor historian. Per chart review the patient was admitted to [address] [place] [address] last month for afib, heart failure, hypoxia and subsequently went to rehab. Over the past week, the patient has had worsening SOB, associated with increased cough, hypoxia, and LE edema. She saw pulmonary who sent her to the ED    SUBJECTIVE:      Darreld Gosselin reports no chest pain or SOB. Assessment and Plan     1 a fib RVR: pt was changed from sotalol to toprol XL 50 mg on 10/14 by s Darrell Shepard and Daniela Grey. Resume toprol XL   Anticoagulated with xarelto. Has appt in November to discuss Watchman device with Dr Daniela Grey. 2 HF mr EF: pBNP improved since last admission. Cont loops. 3 chronic hyponatremia:   4 asthma:   5 hypokalemia: replete po.          ____________________________________________________________    Cardiac testing  09/04/22    ECHO ADULT COMPLETE 09/06/2022 9/6/2022    Interpretation Summary    Left Ventricle: Mildly reduced left ventricular systolic function with a visually estimated EF of 45 - 50%. Left ventricle size is normal. Normal wall thickness. Mild global hypokinesis present. Grade I diastolic dysfunction with normal LAP. Aortic Valve: Valve structure is normal. Mild sclerosis of the aortic valve cusp. Mitral Valve: Mild regurgitation. Tricuspid Valve: Mildly elevated RVSP.     Mildly improved lv function compared to study 4/2022. Signed by: Odessa Mayberry DO on 9/6/2022 11:24 AM              Most recent HS troponins:  No results for input(s): TROPHS in the last 72 hours. No lab exists for component:  CKMB    ECG:   EKG Results       Procedure 720 Value Units Date/Time    EKG, 12 LEAD, INITIAL [[id number]] Collected: 10/25/22 1546    Order Status: Completed Updated: 10/25/22 1551     Ventricular Rate 96 BPM      QRS Duration 88 ms      Q-T Interval 338 ms      QTC Calculation (Bezet) 427 ms      Calculated R Axis -17 degrees      Calculated T Axis 12 degrees      Diagnosis --     Atrial fibrillation  Minimal voltage criteria for LVH, may be normal variant ( R in aVL )  Abnormal ECG  When compared with ECG of 19-SEP-2022 05:17,  No significant change was found                Review of Systems:    [x]All other systems reviewed and all negative except as written in HPI    [] Patient unable to provide secondary to condition       Past Medical History:   Diagnosis Date    Asthma     CHF (congestive heart failure), NYHA class I, chronic, systolic (HCC)     Chronic anticoagulation     GERD (gastroesophageal reflux disease)     Hiatal hernia     HX: breast cancer 2015    Right     Hypothyroid     Paroxysmal A-fib ([address]     Venous insufficiency     bilat LE    Weight loss      Past Surgical History:   Procedure Laterality Date    COLONOSCOPY N/A 6/14/2021    COLONOSCOPY AND EGD performed by Fatoumata Serrano MD at [facility] 2    HX BREAST LUMPECTOMY Right 08/24/2015    HX CATARACT REMOVAL  2013    Bilateral     HX CHOLECYSTECTOMY      HX DILATION AND CURETTAGE      x2    HX ORTHOPAEDIC  2017    RIGHT foot  hammertoe     Social Hx:  reports that she quit smoking about 21 years ago. Her smoking use included cigarettes. She started smoking about 22 years ago. She has never used smokeless tobacco. She reports that she does not currently use alcohol. She reports that she does not use drugs.   Family Hx: family history includes Hypertension in her father. Allergies   Allergen Reactions    Ace Inhibitors Cough    Iodine Rash    Penicillins Rash          OBJECTIVE:  Wt Readings from Last 3 Encounters:   10/25/22 59.1 kg (130 lb 4.7 oz)   09/25/22 55.6 kg (122 lb 9.2 oz)   09/14/22 57 kg (125 lb 10.6 oz)       Intake/Output Summary (Last 24 hours) at 10/26/2022 1359  Last data filed at 10/26/2022 1050  Gross per 24 hour   Intake 930 ml   Output 700 ml   Net 230 ml         Physical Exam:    Vitals:   Vitals:    10/26/22 1100 10/26/22 1200 10/26/22 1300 10/26/22 1327   BP: (!) 107/95 119/76 (!) 129/109    Pulse: 90 (!) 107  (!) 110   Resp: 17 17     Temp:  97.9 °F (36.6 °C)     SpO2: 94% 90%  91%   Weight:       Height:         Telemetry: a fib     Gen: Well-developed, well-nourished, in no acute distress  Neck: Supple, No JVD, No Carotid Bruit  Resp: Dim bases   Card: irregular Rate,Rythm, Normal S1, S2, No murmurs, rubs or gallop. Abd:   Soft, non-tender, non-distended, BS+   MSK: No cyanosis  Skin: No rashes    Neuro: Moving all four extremities, follows commands appropriately  Psych: Limited  insight, oriented to person, place, alert, Nml Affect  LE: No edema    Data Review:     Radiology:   XR Results (most recent):  Results from Hospital Encounter encounter on 10/25/22    XR CHEST PA LAT    Narrative  EXAM: XR CHEST PA LAT    INDICATION: Shortness of breath    COMPARISON: September 5, 2022    TECHNIQUE: PA and lateral chest views    FINDINGS: Cardiac size is unchanged and normal. Interstitial edema is present,  elevation of the right hemidiaphragm with blunting of both costophrenic angles. Impression  New or increased Interstitial edema with bibasilar atelectasis or  infiltrate, cannot exclude effusions.       Recent Labs     10/26/22  1030 10/25/22  1545   * 125*   K 3.7 3.2*   CL 84* 82*   CO2 43* 37*   BUN 10 13   CREA 0.44* 0.47*   * 99   PHOS 3.0  --    CA 9.5 9.3       Recent Labs 10/26/22  1030 10/25/22  1854   WBC 6.7 7.5   HGB 9.5* 9.9*   HCT 30.2* 31.6*    394       Recent Labs     10/26/22  1030 10/25/22  1545   AP 74 74       No results for input(s): CHOL, LDLC in the last 72 hours.     No lab exists for component: TGL, HDLC,  HBA1C      Current meds:    Current Facility-Administered Medications:     montelukast (SINGULAIR) tablet 10 mg, 10 mg, Oral, QHS, DoDwight MD    pantoprazole (PROTONIX) tablet 40 mg, 40 mg, Oral, DAILY, DoJulia MD, 40 mg at 10/26/22 0835    sodium chloride (NS) flush 5-40 mL, 5-40 mL, IntraVENous, Q8H, Julia Novak MD, 10 mL at 10/26/22 0752    sodium chloride (NS) flush 5-40 mL, 5-40 mL, IntraVENous, PRN, Julia Novak MD    0.9% sodium chloride infusion 25 mL, 25 mL, IntraVENous, PRN, Dwight Novak MD    acetaminophen (TYLENOL) tablet 650 mg, 650 mg, Oral, Q6H PRN **OR** acetaminophen (TYLENOL) suppository 650 mg, 650 mg, Rectal, Q6H PRN, Julia Novak MD    senna-docusate (PERICOLACE) 8.6-50 mg per tablet 1 Tablet, 1 Tablet, Oral, BID, Julia Novak MD, 1 Tablet at 10/26/22 0753    bisacodyL (DULCOLAX) suppository 10 mg, 10 mg, Rectal, DAILY PRN, Dwight Novak MD    prochlorperazine (COMPAZINE) injection 10 mg, 10 mg, IntraVENous, Q6H PRN, Julia Novak MD    acetaminophen (TYLENOL) tablet 650 mg, 650 mg, Oral, Q6H PRN, James Martinez MD, 650 mg at 10/26/22 1112    azithromycin (ZITHROMAX) tablet 500 mg, 500 mg, Oral, DAILY, James Martinez MD, 500 mg at 10/26/22 0835    rivaroxaban (XARELTO) tablet 20 mg, 20 mg, Oral, DAILY WITH DINNER, Rachael Lombardi MD    potassium chloride SR (KLOR-CON 10) tablet 40 mEq, 40 mEq, Oral, Q4H, Cristiano Braxton MD, 40 mEq at 10/26/22 1228    metoprolol succinate (TOPROL-XL) XL tablet 50 mg, 50 mg, Oral, DAILY, Leesa Avila NP, 50 mg at 10/26/22 0900    ondansetron (ZOFRAN ODT) tablet 4 mg, 4 mg, Oral, Q8H PRN, Leesa Avila NP, 4 mg at 10/26/22 1112    nystatin (MYCOSTATIN) 100,000 unit/mL oral suspension 500,000 Units, 500,000 Units, Oral, QID, Irlanda León MD, 500,000 Units at 10/26/22 1228    magnesium sulfate 1 g/100 ml IVPB (premix or compounded), 1 g, IntraVENous, ONCE, Power Bergman MD, Last Rate: 100 mL/hr at 10/26/22 1310, 1 g at 10/26/22 1310    bumetanide (BUMEX) injection 1 mg, 1 mg, IntraVENous, Q12H, Dwight Novak MD, 1 mg at 10/26/22 0514    albuterol-ipratropium (DUO-NEB) 2.5 MG-0.5 MG/3 ML, 3 mL, Nebulization, Q6H PRN, Dwight Novak MD    guaiFENesin ER (MUCINEX) tablet 600 mg, 600 mg, Oral, Q12H, Dwight Novak MD, 600 mg at 10/26/22 0835    benzonatate (TESSALON) capsule 100 mg, 100 mg, Oral, TID PRN, Dwight Novak MD, 100 mg at 10/25/22 2045    dilTIAZem (CARDIZEM) 100 mg in 0.9% sodium chloride (MBP/ADV) 100 mL infusion, 0-15 mg/hr, IntraVENous, TITRATE, Dwight Novak MD    diphenhydrAMINE (BENADRYL) injection 25 mg, 25 mg, IntraVENous, Q6H PRN, Dwight Novak MD    cefepime (MAXIPIME) 2 g in 0.9% sodium chloride (MBP/ADV) 100 mL MBP, 2 g, IntraVENous, Q12H, Dwight Novak MD, Last Rate: 25 mL/hr at 10/26/22 0513, 2 g at 10/26/22 577 [facility],     [facility], [address] [place] [address], [facility] [address] Jaime Vela  [phone]      Rohith Lantigua MD

## 2022-10-26 NOTE — NURSE NAVIGATOR
Chart reviewed by Heart Failure Nurse Navigator. Heart Failure database completed. Patient was sent to ED from Pulmonary office visit for hypoxia, irregular heartbeat, and shortness of breath. She is admitted with atrial fib with RVR and  acute on chronic respiratory failure with possible pneumonia and acute on chronic HFmrEF components. Cardiology and Pulmonary are consulted. Patient has had recent admissions:  8/19/22 to 8/23/22 with pneumonia and acute COPD.  9/4/22 to 9/12/22 with hyponatremia, hypokalemia, hypomagnesemia, paroxysmal atrial fib. Patient was denied by insurance for pulmonary rehab and went home with Amruperto Kulkarni  9/19/22 to 9/28/22 with paroxysmal atrial fib, dyspnea which was multifactorial.  Patient went to [facility] rehab by self pay as insurance again denied coverage for IP rehab. EF:  45 to 50% on 9/6/22    ACEi/ARB/ARNi: **    BB: Metoprolol succinate 50 mg daily    Aldosterone Antagonist: **    Obstructive Sleep Apnea Screening:   Referred to Sleep Medicine:     CRT **. NYHA Functional Class **. Heart Failure Teach Back in Patient Education. Heart Failure Avoiding Triggers on Discharge Instructions. Cardiologist: Dr Neymar Rodriguez at [address] [place] [address]    Electrophysiologist: Dr Miracle Muro at [facility] discharge follow up phone call to be made within 48-72 hours of discharge.

## 2022-10-26 NOTE — PROGRESS NOTES
2130 Received report from [address] , [facility] ED. Bedside and Verbal shift change report given to Barby (oncoming nurse) by Mary Ann Liu (offgoing nurse). Report included the following information SBAR, ED Summary, Procedure Summary, Intake/Output, MAR, and Cardiac Rhythm Afib rate controlled . Primary Nurse Jeovanny Taylor, RN and Linda Garcia RN performed a dual skin assessment on this patient No impairment noted   Pt on 3L NC O2 here and at baseline at home. Pt awake a/ox4. Lungs diminished. Pt in briefs from home and incontinence pads. Pt able to use bedside commode with assistance. Med req done. Pt not on any albuterol nebs. Son Mary Ann Dobbs called for update and med verification. Pt takes xarelto at home in the evening not morning. Pt is on trazodone in the evening to help her sleep. Pt requesting both. And would like some miralax to avoid constipation. 2200 Pt IV placed in ED not patent. ABX not running. 0000 Multiple Ultrasound guided IV's attempted with poor or no success. Pt able to take PO meds well. Dr. Avinash Crenshaw called to exchange ABX to PO.   0009 Finally able to get iv started in Left AC. Pt still complaining of discomfort but iv flushes well and pain is not at iv site when indicated but in the triceps area. 0200 Pt has been up to the bathroom multiple times. Pt heart rate under 100. Dilt still off.   0400 pt has already received her xerelto and her trazodone but pt is still not resting. Very restless. 0600 Pt complaining about Bumex, doesn't want to keep getting up to commode but refused purewick. PT WANTS TO BE DNR/DNI. WANTS TO MAKE SURE SON DOES NOT OVERRIDE HER WISHES DESPITE BEING HER POWER OF  AND MEDICAL DECISION MAKER. PT VERY AWARE AND COGNIZANT OF HER DECISION AND WISHES.

## 2022-10-26 NOTE — PROGRESS NOTES
Telemetry notified nursing at 6:11 that patient's heartrate began sustaining in the 130s-150s. Nursing assessed patient who was using the bedside commode. Upon return to chair, heart rate remained elevated. Rapid response called. EKG completed confirming rapid afib. Cardizem drip ordered and started (cefepime paused until second line can be established). Patient upgraded to stepdown but no beds available on [facility]. To transfer to ICU. TRANSFER - OUT REPORT:    Verbal report given to Danny (name) on Jory Blizzard  being transferred to ICU (unit) for change in patient condition(rapid afib)       Report consisted of patients Situation, Background, Assessment and   Recommendations(SBAR). Information from the following report(s) SBAR, Kardex, MAR, Accordion, and Recent Results was reviewed with the receiving nurse. Lines:   Extended Dwell Peripheral IV (Active)   Criteria for Appropriate Use Limited/no vessel suitable for conventional peripheral access 10/26/22 1522   Site Assessment Clean, dry, & intact 10/26/22 1522   Phlebitis Assessment 0 10/26/22 1522   Infiltration Assessment 0 10/26/22 1522   Line Status Blood return noted;Capped;Flushed 10/26/22 1200   Line Care Connections checked and tightened 10/26/22 1522   Alcohol Cap Used Yes 10/26/22 1522   Date of Last Dressing Change 10/26/22 10/26/22 0400   Dressing Type Tape;Transparent 10/26/22 1522   Dressing Status Clean, dry, & intact 10/26/22 1522        Opportunity for questions and clarification was provided.       Patient transported with:   Monitor  O2 @ 3 liters  Patient's medications from home  Registered Nurse

## 2022-10-26 NOTE — CONSULTS
[place] office   [address] [place] [address], [address] [place] [address]  Phone: [phone]  [phone]   [email or web address]    NEPHROLOGY CONSULT NOTE     Patient: Anthony Rodas MRN: [id number]  PCP: Jonathan Catherine MD   :     1940  Age:   80 y.o. Sex:  female      Referring physician: Elayne Cadena MD  Reason for consultation: 80 y.o. female with Atrial fibrillation with rapid ventricular response ([address] [W75.01] complicated by   Admission Date: 10/25/2022  2:06 PM  LOS: 1 day        ASSESSMENT and PLAN :   Hypo osmolar hypervolemic hyponatremia   Fluid overload  Hypokalemia   A fib with RVR    -Na improved to 130  -check urine sodium, osmolality, serum osmolality, uric acid  -continue Bumex 1 mg IV BID  -agree with KCL repletion  -give mag sulfate   -FR 1L  -Goal of sodium correction 6 meq in 24 hr           Subjective:   HPI: Anthony Rodas is a 80 y.o. CF with PNHx of asthma, bronchiectasis, Pafib, sCHF EF 45-50%, presented w/ dyspnea, hypoxia, pulm edema, CHF, afib RVR. Lab show hyponatremia with sodium of 125,K 3.2, chloride 82 and bicarb 37. CT chest + for cardiomegaly and Fluid overload. Patient is treated with IV loop diuretics. Repeat sodium 130. Patient is c/o sob and feeling weak.     Past Medical Hx:   Past Medical History:   Diagnosis Date    Asthma     CHF (congestive heart failure), NYHA class I, chronic, systolic (HCC)     Chronic anticoagulation     GERD (gastroesophageal reflux disease)     Hiatal hernia     HX: breast cancer     Right     Hypothyroid     Paroxysmal A-fib (HCC)     Venous insufficiency     bilat LE    Weight loss         Past Surgical Hx:     Past Surgical History:   Procedure Laterality Date    COLONOSCOPY N/A 2021    COLONOSCOPY AND EGD performed by Molly Carrillo MD at [address] [place] [address] 10    HX BREAST LUMPECTOMY Right 2015    HX CATARACT REMOVAL      Bilateral     HX CHOLECYSTECTOMY      HX DILATION AND CURETTAGE x2    HX ORTHOPAEDIC  2017    RIGHT foot  hammertoe       Medications:  Prior to Admission medications    Medication Sig Start Date End Date Taking? Authorizing Provider   arformoteroL (BROVANA) 15 mcg/2 mL nebu neb solution 15 mcg by Nebulization route two (2) times a day. Yes Provider, Historical   budesonide (PULMICORT) 0.5 mg/2 mL nbsp 500 mcg by Nebulization route two (2) times a day. Yes Provider, Historical   nystatin (MYCOSTATIN) 100,000 unit/mL suspension Take  by mouth two (2) times a day. swish and spit   Yes Provider, Historical   metoprolol succinate (TOPROL-XL) 100 mg tablet Take 50 mg by mouth daily. Yes Provider, Historical   levothyroxine (SYNTHROID) 25 mcg tablet Take 25 mcg by mouth Daily (before breakfast). Yes Provider, Historical   traZODone (DESYREL) 50 mg tablet Take 50 mg by mouth nightly. 25-50mg nightly   Yes Provider, Historical   ergocalciferol (ERGOCALCIFEROL) 1,250 mcg (50,000 unit) capsule Take 50,000 Units by mouth every seven (7) days. On sundays   Yes Provider, Historical   iron, carbonyl (FEOSOL) 45 mg tab Take 1 Tablet by mouth daily. Yes Provider, Historical   fluticasone (VERAMYST) 27.5 mcg/actuation nasal spray 2 Sprays by Nasal route daily. Yes Provider, Historical   ondansetron hcl (Zofran) 8 mg tablet Take 8 mg by mouth every eight (8) hours as needed for Nausea or Vomiting (prn). Yes Provider, Historical   amLODIPine (NORVASC) 5 mg tablet Take 5 mg by mouth daily. For spb over 150   Yes Provider, Historical   bumetanide (BUMEX) 0.5 mg tablet Take 1 Tablet by mouth daily. Patient taking differently: Take 1.5 mg by mouth daily. 9/29/22  Yes Sukh Mak MD   guaiFENesin ER (MUCINEX) 600 mg ER tablet Take 1 Tablet by mouth every twelve (12) hours. 9/28/22  Yes Sukh Mak MD   pantoprazole (PROTONIX) 40 mg tablet Take 40 mg by mouth daily.    Yes Provider, Historical   olopatadine (PATADAY) 0.2 % drop ophthalmic solution Administer 1 Drop to both eyes daily. Yes Provider, Historical   Xarelto 20 mg tab tablet Take 20 mg by mouth daily (with dinner). 7/31/20  Yes Provider, Historical   metroNIDAZOLE (METROCREAM) 0.75 % topical cream Apply  to affected area two (2) times a day. Yes Provider, Historical   montelukast (SINGULAIR) 10 mg tablet montelukast 10 mg tablet   Yes Provider, Historical   diclofenac (VOLTAREN) 1 % gel Apply  to affected area four (4) times daily. Yes Provider, Historical   acetaminophen (TYLENOL EXTRA STRENGTH PO) Take  by mouth. Yes Provider, Historical   sotaloL (BETAPACE) 80 mg tablet Take  by mouth two (2) times a day. Patient not taking: Reported on 10/25/2022    Provider, Historical   albuterol 2.5 mg /3 mL (0.083 %) nebu 3 mL, albuterol-ipratropium 2.5 mg-0.5 mg/3 ml nebu 3 mL Take 1 Inhalation by inhalation every four (4) hours as needed. Patient not taking: Reported on 10/25/2022 10/1/21   Other, MD Abby   albuterol (PROVENTIL VENTOLIN) 2.5 mg /3 mL (0.083 %) nebu Take 3 mL by inhalation every four (4) hours as needed. Patient not taking: Reported on 10/25/2022 3/29/22   Abby Huntley MD   tiotropium bromide (SPIRIVA RESPIMAT) 2.5 mcg/actuation inhaler Spiriva Respimat 2.5 mcg/actuation solution for inhalation  Patient not taking: Reported on 10/25/2022    Provider, Historical   albuterol (PROVENTIL HFA, VENTOLIN HFA, PROAIR HFA) 90 mcg/actuation inhaler Take  by inhalation. Patient not taking: Reported on 10/25/2022    Provider, Historical       Allergies   Allergen Reactions    Ace Inhibitors Cough    Iodine Rash    Penicillins Rash       Social Hx:  reports that she quit smoking about 21 years ago. Her smoking use included cigarettes. She started smoking about 22 years ago. She has never used smokeless tobacco. She reports that she does not currently use alcohol. She reports that she does not use drugs.      Family History   Problem Relation Age of Onset    Hypertension Father        Review of Systems:  A twelve point review of system was performed today. Pertinent positives and negatives are mentioned in the HPI. The reminder of the ROS is negative and noncontributory. Objective:    Vitals:    Vitals:    10/26/22 0600 10/26/22 0800 10/26/22 0828 10/26/22 0900   BP:  112/69  110/68   Pulse:  (!) 101  95   Resp:  21     Temp:  97.7 °F (36.5 °C)     SpO2: 92% 97% 94%    Weight:       Height:         I&O's:  10/25 0701 - 10/26 0700  In: 930 [P.O.:500; I.V.:430]  Out: 100 [Urine:100]  Visit Vitals  /68   Pulse 95   Temp 97.7 °F (36.5 °C)   Resp 21   Ht 5' 3\" (1.6 m)   Wt 59.1 kg (130 lb 4.7 oz)   SpO2 94%   Breastfeeding No   BMI 23.08 kg/m²       Physical Exam:      GENERAL : sitting in chair with mild sob  HEENT: AT NC PEERLA   NECK: Supple no JVP  CVS: S1 S2 irregularly irregular   RS: diminished bs with crackles +  ABDOMEN: soft NT ND positive BS  EXTREMITY: No edema clubbing or cyanosis, pedal pulse +  NEUROLOGY: AAA X3, no focal deficit or asterixis      Laboratory Results:    BMP:   Lab Results   Component Value Date/Time     (L) 10/26/2022 10:30 AM    K 3.7 10/26/2022 10:30 AM    CL 84 (L) 10/26/2022 10:30 AM    CO2 43 (HH) 10/26/2022 10:30 AM    AGAP 3 (L) 10/26/2022 10:30 AM     (H) 10/26/2022 10:30 AM    BUN 10 10/26/2022 10:30 AM    CREA 0.44 (L) 10/26/2022 10:30 AM         No results found for this or any previous visit. CBC W/O DIFF    Collection Time: 10/26/22 10:30 AM   Result Value Ref Range    WBC 6.7 3.6 - 11.0 K/uL    RBC 3.31 (L) 3.80 - 5.20 M/uL    HGB 9.5 (L) 11.5 - 16.0 g/dL    HCT 30.2 (L) 35.0 - 47.0 %    MCV 91.2 80.0 - 99.0 FL    MCH 28.7 26.0 - 34.0 PG    MCHC 31.5 30.0 - 36.5 g/dL    RDW 13.2 11.5 - 14.5 %    PLATELET 853 397 - 102 K/uL    MPV 9.1 8.9 - 12.9 FL    NRBC 0.0 0  WBC    ABSOLUTE NRBC 0.00 0.00 - 0.01 K/uL          Thank you for consulting [facility] in the care of your patient.

## 2022-10-26 NOTE — PROGRESS NOTES
Problem: Mobility Impaired (Adult and Pediatric)  Goal: *Acute Goals and Plan of Care (Insert Text)  Description: FUNCTIONAL STATUS PRIOR TO ADMISSION: Patient was modified independent using a rollator for functional mobility. HOME SUPPORT PRIOR TO ADMISSION: Pt is caregiver for spouse; family provided assistance to patient for stair negotiation prior to admission. Physical Therapy Goals  Initiated 10/26/2022  1. Patient will move from supine to sit and sit to supine in bed with modified independence within 7 day(s). 2.  Patient will transfer from bed to chair and chair to bed with modified independence using the least restrictive device within 7 day(s). 3.  Patient will perform sit to stand with modified independence within 7 day(s). 4.  Patient will ambulate with modified independence for 100 feet with the least restrictive device within 7 day(s). 5.  Patient will ascend/descend 4 stairs with one handrail(s) with minimal assistance/contact guard assist within 7 day(s). Outcome: Not Met   PHYSICAL THERAPY EVALUATION  Patient: Max Durán (3 y.o. female)  Date: 10/26/2022  Primary Diagnosis: Atrial fibrillation with rapid ventricular response ([address] [I48.91]       Precautions:   Fall, Skin    ASSESSMENT  Based on the objective data described below, the patient presents with tachycardia, decreased endurance, decreased safety awareness, generally decreased strength, and limited functional mobility on day 1 of admission with afib RVR. Pt reports mod I baseline and was discharged home from [facility] approximately 2 weeks ago with home health services. She presents today with tachycardia as high as 129 with bedside commode transfer and requires up to min A for brief mobility. She requires frequent cues to await safe room set-up and for attention to environment.  She verbalizes intent to mobilize out of bed to chair again later today with staff assist.     Current Level of Function Impacting Discharge (mobility/balance): min A + cues    Functional Outcome Measure: The patient scored 55 on the Barthel outcome measure which is indicative of partially dependent status. Other factors to consider for discharge: none additional     Patient will benefit from skilled therapy intervention to address the above noted impairments. PLAN :  Recommendations and Planned Interventions: bed mobility training, transfer training, gait training, therapeutic exercises, neuromuscular re-education, patient and family training/education, and therapeutic activities      Frequency/Duration: Patient will be followed by physical therapy:  5 times a week to address goals. Recommendation for discharge: (in order for the patient to meet his/her long term goals)  Physical therapy at least 2 days/week in the home AND ensure assist and/or supervision for safety with all functional mobility pending progress    This discharge recommendation:  Has not yet been discussed the attending provider and/or case management    IF patient discharges home will need the following DME: none         SUBJECTIVE:   Patient stated I'm so tired. I didn't sleep at all last night.   Pt received seated on bedside commode, agreeable to PT and cleared by RN.       OBJECTIVE DATA SUMMARY:   HISTORY:    Past Medical History:   Diagnosis Date    Asthma     CHF (congestive heart failure), NYHA class I, chronic, systolic (HCC)     Chronic anticoagulation     GERD (gastroesophageal reflux disease)     Hiatal hernia     HX: breast cancer 2015    Right     Hypothyroid     Paroxysmal A-fib (HCC)     Venous insufficiency     bilat LE    Weight loss      Past Surgical History:   Procedure Laterality Date    COLONOSCOPY N/A 6/14/2021    COLONOSCOPY AND EGD performed by Tameka Hoffman MD at [address] BREAST LUMPECTOMY Right 08/24/2015    HX CATARACT REMOVAL  2013    Bilateral     HX CHOLECYSTECTOMY      HX DILATION AND CURETTAGE      x2    HX ORTHOPAEDIC  2017 RIGHT foot  hammertoe       Personal factors and/or comorbidities impacting plan of care: as above    Home Situation  Home Environment: Private residence  # Steps to Enter: 5  Rails to Enter: Yes  Hand Rails : Bilateral  One/Two Story Residence: One story  Living Alone: No  Support Systems: Child(cornell), Spouse/Significant Other  Patient Expects to be Discharged to[de-identified] Home  Current DME Used/Available at Home: Oxygen, portable, Grab bars, Shower chair, Raised toilet seat, Walker, rollator, Walker, rolling, Cane, straight, Commode, bedside (2-3L O2 at home)  Tub or Shower Type: Shower    EXAMINATION/PRESENTATION/DECISION MAKING:   Critical Behavior:  Neurologic State: Alert  Orientation Level: Oriented X4  Cognition: Appropriate decision making, Follows commands, Appropriate for age attention/concentration, Appropriate safety awareness  Safety/Judgement: Decreased awareness of environment, Decreased awareness of need for assistance, Decreased awareness of need for safety, Decreased insight into deficits  Hearing: Auditory  Auditory Impairment: None  Skin:  LE exposed skin intact; L lower leg tender to light touch which she associates with BP cuff placement.  RN notified  Edema: 1+ LEs  Range Of Motion:  AROM: Generally decreased, functional                       Strength:    Strength: Generally decreased, functional                    Tone & Sensation:   Tone: Normal              Sensation: Intact               Coordination:  Coordination: Within functional limits  Vision:   Tracking: Able to track stimulus in all quadrants w/o difficulty  Diplopia: No  Acuity: Within Defined Limits  Corrective Lenses: Glasses  Functional Mobility:  Bed Mobility:        Sit to Supine: Modified independent  Scooting: Modified independent  Transfers:  Sit to Stand: Contact guard assistance  Stand to Sit: Contact guard assistance                    Other: bedside commode CGA  Balance:   Sitting: Without support  Sitting - Static: Good (unsupported)  Sitting - Dynamic: Good (unsupported)  Standing: With support  Standing - Static: Fair  Standing - Dynamic : Fair  Ambulation/Gait Training:  Distance (ft): 4 Feet (ft)  Assistive Device:  (hand held assist)  Ambulation - Level of Assistance: Contact guard assistance        Gait Abnormalities: Decreased step clearance        Base of Support: Widened     Speed/Ivory: Pace decreased (<100 feet/min)                                Functional Measure:  Barthel Index:    Bathin  Bladder: 10  Bowels: 10  Groomin  Dressin  Feeding: 10  Mobility: 0  Stairs: 0  Toilet Use: 5  Transfer (Bed to Chair and Back): 10  Total: 55/100       The Barthel ADL Index: Guidelines  1. The index should be used as a record of what a patient does, not as a record of what a patient could do. 2. The main aim is to establish degree of independence from any help, physical or verbal, however minor and for whatever reason. 3. The need for supervision renders the patient not independent. 4. A patient's performance should be established using the best available evidence. Asking the patient, friends/relatives and nurses are the usual sources, but direct observation and common sense are also important. However direct testing is not needed. 5. Usually the patient's performance over the preceding 24-48 hours is important, but occasionally longer periods will be relevant. 6. Middle categories imply that the patient supplies over 50 per cent of the effort. 7. Use of aids to be independent is allowed. Score Interpretation (from [facility])    Independent   60-79 Minimally independent   40-59 Partially dependent   20-39 Very dependent   <20 Totally dependent     -Real Olivas., Barthel, D.W. (1965). Functional evaluation: the Barthel Index. [facility] ([address] (1997). The Barthel activities of daily living index: self-reporting versus actual performance in the old (> or = 75 years).  Journal of American Geriatric Society 45(5), [facility], JOSÉ LUIS, Kiya Caraballo.Deann. (1999). Measuring the change in disability after inpatient rehabilitation; comparison of the responsiveness of the Barthel Index and Functional [place] Measure. Journal of Neurology, Neurosurgery, and Psychiatry, 66(4), 229-253. JEF Trujillo, RUBEN Bermudez, & Savanah Steiner M.A. (2004) Assessment of post-stroke quality of life in cost-effectiveness studies: The usefulness of the Barthel Index and the EuroQoL-5D. Quality of Life Research, 15, 337-99            Physical Therapy Evaluation Charge Determination   History Examination Presentation Decision-Making   HIGH Complexity :3+ comorbidities / personal factors will impact the outcome/ POC  HIGH Complexity : 4+ Standardized tests and measures addressing body structure, function, activity limitation and / or participation in recreation  MEDIUM Complexity : Evolving with changing characteristics  Other outcome measures barthel  MEDIUM      Based on the above components, the patient evaluation is determined to be of the following complexity level: MEDIUM    Pain Rating:  States rib discomfort; skin discomfort at L lower leg; does not rate  Offered rest, repositioning, encouragement    Activity Tolerance:   requires frequent rest breaks    After treatment patient left in no apparent distress:   Supine in bed, Call bell within reach, Bed / chair alarm activated, Side rails x 3, and RN to address BP cuff, SCDs and alarming IV    COMMUNICATION/EDUCATION:   The patients plan of care was discussed with: Occupational therapist and Registered nurse. Fall prevention education was provided and the patient/caregiver indicated understanding., Patient/family have participated as able in goal setting and plan of care. , and Patient/family agree to work toward stated goals and plan of care.     Thank you for this referral.  Trish Caraballo Nidia Standard, PT, DPT   Time Calculation: 28 mins

## 2022-10-26 NOTE — PROGRESS NOTES
0700 Bedside and Verbal shift change report given to [address], RN (oncoming nurse) by Martha Paige (offgoing nurse). Report included the following information SBAR, Kardex, ED Summary, Procedure Summary, Intake/Output, MAR, Recent Results, Med Rec Status, Cardiac Rhythm Afib, Alarm Parameters , Quality Measures, and Dual Neuro Assessment. Primary Nurse Tena Swan RN and Martha Paige RN performed a dual skin assessment on this patient No impairment noted  Armando score, see flowsheet. 0830 Dr. Prieto Kat at bedside. Updated regarding pt assessment, labs, VS and medications. 1246 [address] cardiology NP at bedside. Updated regarding pt assessment, labs, VS and medications. 1426 Message sent to Dr. Prieto Kat regarding nebs. Pt requesting scheduled nebs - only PRN duo neb ordered. 9616 TRANSFER - OUT REPORT:    Verbal report given to EZEQUIEL Edge(name) on Eugene Borden  being transferred to [facility](unit) for routine progression of care       Report consisted of patients Situation, Background, Assessment and   Recommendations(SBAR). Information from the following report(s) SBAR, Kardex, ED Summary, Procedure Summary, Intake/Output, MAR, Recent Results, Med Rec Status, Cardiac Rhythm Afib, Alarm Parameters , Quality Measures, and Dual Neuro Assessment was reviewed with the receiving nurse.     Lines:   Extended Dwell Peripheral IV (Active)   Criteria for Appropriate Use Limited/no vessel suitable for conventional peripheral access 10/26/22 1200   Site Assessment Clean, dry, & intact 10/26/22 1200   Phlebitis Assessment 0 10/26/22 1200   Infiltration Assessment 0 10/26/22 1200   Line Status Blood return noted;Capped;Flushed 10/26/22 1200   Line Care Connections checked and tightened;Cap changed 10/26/22 1200   Alcohol Cap Used Yes 10/26/22 1200   Date of Last Dressing Change 10/26/22 10/26/22 0400   Dressing Type Tape;Transparent 10/26/22 1200   Dressing Status Clean, dry, & intact 10/26/22 1200        Opportunity for questions and clarification was provided.       Patient transported with:   Monitor  O2 @ 3L NC and tele monitor liters  Tech

## 2022-10-27 LAB
ANION GAP SERPL CALC-SCNC: 1 MMOL/L (ref 5–15)
BUN SERPL-MCNC: 11 MG/DL (ref 6–20)
BUN/CREAT SERPL: 20 (ref 12–20)
CALCIUM SERPL-MCNC: 8.9 MG/DL (ref 8.5–10.1)
CHLORIDE SERPL-SCNC: 90 MMOL/L (ref 97–108)
CO2 SERPL-SCNC: 42 MMOL/L (ref 21–32)
CREAT SERPL-MCNC: 0.56 MG/DL (ref 0.55–1.02)
GLUCOSE SERPL-MCNC: 101 MG/DL (ref 65–100)
HCT VFR BLD AUTO: 29.4 % (ref 35–47)
HGB BLD-MCNC: 9.2 G/DL (ref 11.5–16)
MAGNESIUM SERPL-MCNC: 2 MG/DL (ref 1.6–2.4)
POTASSIUM SERPL-SCNC: 4.6 MMOL/L (ref 3.5–5.1)
SODIUM SERPL-SCNC: 133 MMOL/L (ref 136–145)

## 2022-10-27 PROCEDURE — 94640 AIRWAY INHALATION TREATMENT: CPT

## 2022-10-27 PROCEDURE — 74011000258 HC RX REV CODE- 258: Performed by: INTERNAL MEDICINE

## 2022-10-27 PROCEDURE — 74011250637 HC RX REV CODE- 250/637: Performed by: INTERNAL MEDICINE

## 2022-10-27 PROCEDURE — 99233 SBSQ HOSP IP/OBS HIGH 50: CPT | Performed by: STUDENT IN AN ORGANIZED HEALTH CARE EDUCATION/TRAINING PROGRAM

## 2022-10-27 PROCEDURE — 85018 HEMOGLOBIN: CPT

## 2022-10-27 PROCEDURE — APPSS30 APP SPLIT SHARED TIME 16-30 MINUTES: Performed by: NURSE PRACTITIONER

## 2022-10-27 PROCEDURE — 74011250636 HC RX REV CODE- 250/636: Performed by: INTERNAL MEDICINE

## 2022-10-27 PROCEDURE — 94664 DEMO&/EVAL PT USE INHALER: CPT

## 2022-10-27 PROCEDURE — 36415 COLL VENOUS BLD VENIPUNCTURE: CPT

## 2022-10-27 PROCEDURE — 83735 ASSAY OF MAGNESIUM: CPT

## 2022-10-27 PROCEDURE — 97116 GAIT TRAINING THERAPY: CPT

## 2022-10-27 PROCEDURE — 80048 BASIC METABOLIC PNL TOTAL CA: CPT

## 2022-10-27 PROCEDURE — 65270000046 HC RM TELEMETRY

## 2022-10-27 PROCEDURE — 74011000250 HC RX REV CODE- 250: Performed by: INTERNAL MEDICINE

## 2022-10-27 PROCEDURE — 77010033678 HC OXYGEN DAILY

## 2022-10-27 PROCEDURE — 97110 THERAPEUTIC EXERCISES: CPT

## 2022-10-27 PROCEDURE — 97535 SELF CARE MNGMENT TRAINING: CPT

## 2022-10-27 PROCEDURE — 74011250637 HC RX REV CODE- 250/637: Performed by: NURSE PRACTITIONER

## 2022-10-27 RX ORDER — BUMETANIDE 0.5 MG/1
1.5 TABLET ORAL DAILY
Status: ON HOLD | COMMUNITY
End: 2022-10-31 | Stop reason: SDUPTHER

## 2022-10-27 RX ORDER — TOBRAMYCIN INHALATION SOLUTION 300 MG/5ML
300 INHALANT RESPIRATORY (INHALATION) 2 TIMES DAILY
COMMUNITY
End: 2022-10-31

## 2022-10-27 RX ORDER — LANOLIN ALCOHOL/MO/W.PET/CERES
325 CREAM (GRAM) TOPICAL DAILY
COMMUNITY

## 2022-10-27 RX ORDER — LEVALBUTEROL TARTRATE 45 UG/1
1 AEROSOL, METERED ORAL
COMMUNITY

## 2022-10-27 RX ORDER — DICLOFENAC SODIUM 10 MG/G
4 GEL TOPICAL 4 TIMES DAILY
Status: DISCONTINUED | OUTPATIENT
Start: 2022-10-27 | End: 2022-10-31 | Stop reason: HOSPADM

## 2022-10-27 RX ORDER — ONDANSETRON 2 MG/ML
4 INJECTION INTRAMUSCULAR; INTRAVENOUS
Status: DISCONTINUED | OUTPATIENT
Start: 2022-10-27 | End: 2022-10-27

## 2022-10-27 RX ORDER — AZELASTINE HCL 205.5 UG/1
1 SPRAY NASAL DAILY
COMMUNITY

## 2022-10-27 RX ORDER — TRAZODONE HYDROCHLORIDE 50 MG/1
25 TABLET ORAL
COMMUNITY

## 2022-10-27 RX ORDER — CALCIUM CARBONATE 200(500)MG
1 TABLET,CHEWABLE ORAL DAILY
COMMUNITY

## 2022-10-27 RX ORDER — AMOXICILLIN 875 MG/1
875 TABLET, FILM COATED ORAL 2 TIMES DAILY
COMMUNITY
Start: 2022-10-22 | End: 2022-10-31

## 2022-10-27 RX ORDER — LEVOTHYROXINE SODIUM 25 UG/1
25 TABLET ORAL
Status: DISCONTINUED | OUTPATIENT
Start: 2022-10-27 | End: 2022-10-31 | Stop reason: HOSPADM

## 2022-10-27 RX ORDER — ONDANSETRON 2 MG/ML
4 INJECTION INTRAMUSCULAR; INTRAVENOUS
Status: DISCONTINUED | OUTPATIENT
Start: 2022-10-27 | End: 2022-10-31 | Stop reason: HOSPADM

## 2022-10-27 RX ORDER — MULTIVIT WITH MINERALS/HERBS
1 TABLET ORAL DAILY
COMMUNITY

## 2022-10-27 RX ORDER — METOPROLOL TARTRATE 50 MG/1
50 TABLET ORAL 2 TIMES DAILY
Status: DISCONTINUED | OUTPATIENT
Start: 2022-10-27 | End: 2022-10-31 | Stop reason: HOSPADM

## 2022-10-27 RX ADMIN — ACETAMINOPHEN 650 MG: 325 TABLET, FILM COATED ORAL at 01:19

## 2022-10-27 RX ADMIN — BUMETANIDE 1 MG: 0.25 INJECTION INTRAMUSCULAR; INTRAVENOUS at 06:38

## 2022-10-27 RX ADMIN — AZITHROMYCIN MONOHYDRATE 500 MG: 250 TABLET ORAL at 08:32

## 2022-10-27 RX ADMIN — RIVAROXABAN 20 MG: 20 TABLET, FILM COATED ORAL at 17:07

## 2022-10-27 RX ADMIN — METOPROLOL TARTRATE 50 MG: 50 TABLET ORAL at 17:07

## 2022-10-27 RX ADMIN — Medication 10 ML: at 06:42

## 2022-10-27 RX ADMIN — PANTOPRAZOLE SODIUM 40 MG: 40 TABLET, DELAYED RELEASE ORAL at 08:32

## 2022-10-27 RX ADMIN — SENNOSIDES AND DOCUSATE SODIUM 1 TABLET: 50; 8.6 TABLET ORAL at 08:32

## 2022-10-27 RX ADMIN — DICLOFENAC SODIUM 4 G: 10 GEL TOPICAL at 09:17

## 2022-10-27 RX ADMIN — CEFEPIME 2 G: 2 INJECTION, POWDER, FOR SOLUTION INTRAVENOUS at 17:07

## 2022-10-27 RX ADMIN — SENNOSIDES AND DOCUSATE SODIUM 1 TABLET: 50; 8.6 TABLET ORAL at 21:11

## 2022-10-27 RX ADMIN — ACETAZOLAMIDE 250 MG: 500 INJECTION, POWDER, LYOPHILIZED, FOR SOLUTION INTRAVENOUS at 21:11

## 2022-10-27 RX ADMIN — DICLOFENAC SODIUM 4 G: 10 GEL TOPICAL at 17:07

## 2022-10-27 RX ADMIN — LEVOTHYROXINE SODIUM 25 MCG: 0.03 TABLET ORAL at 09:16

## 2022-10-27 RX ADMIN — NYSTATIN 500000 UNITS: 100000 SUSPENSION ORAL at 17:07

## 2022-10-27 RX ADMIN — GUAIFENESIN 600 MG: 600 TABLET, EXTENDED RELEASE ORAL at 21:11

## 2022-10-27 RX ADMIN — METOPROLOL TARTRATE 50 MG: 50 TABLET ORAL at 08:32

## 2022-10-27 RX ADMIN — NYSTATIN 500000 UNITS: 100000 SUSPENSION ORAL at 21:11

## 2022-10-27 RX ADMIN — NYSTATIN 500000 UNITS: 100000 SUSPENSION ORAL at 13:44

## 2022-10-27 RX ADMIN — DICLOFENAC SODIUM 4 G: 10 GEL TOPICAL at 21:11

## 2022-10-27 RX ADMIN — Medication 10 ML: at 13:45

## 2022-10-27 RX ADMIN — IPRATROPIUM BROMIDE AND ALBUTEROL SULFATE 3 ML: .5; 3 SOLUTION RESPIRATORY (INHALATION) at 21:52

## 2022-10-27 RX ADMIN — GUAIFENESIN 600 MG: 600 TABLET, EXTENDED RELEASE ORAL at 08:32

## 2022-10-27 RX ADMIN — Medication 10 ML: at 22:41

## 2022-10-27 RX ADMIN — NYSTATIN 500000 UNITS: 100000 SUSPENSION ORAL at 08:31

## 2022-10-27 RX ADMIN — MONTELUKAST 10 MG: 10 TABLET, FILM COATED ORAL at 21:11

## 2022-10-27 RX ADMIN — ALBUTEROL SULFATE 2.5 MG: 2.5 SOLUTION RESPIRATORY (INHALATION) at 20:11

## 2022-10-27 RX ADMIN — DICLOFENAC SODIUM 4 G: 10 GEL TOPICAL at 13:45

## 2022-10-27 RX ADMIN — CEFEPIME 2 G: 2 INJECTION, POWDER, FOR SOLUTION INTRAVENOUS at 06:38

## 2022-10-27 RX ADMIN — ACETAZOLAMIDE 250 MG: 500 INJECTION, POWDER, LYOPHILIZED, FOR SOLUTION INTRAVENOUS at 09:37

## 2022-10-27 NOTE — PROGRESS NOTES
Vishal Colunga [facility] [place] [address]  [address] [facility], [place], [id number] [place] [address]  [phone]      Hospitalist  Progress Note      NAME:         Judy Hubbard   :        1940  MRM:        [id number]    Date of service: 10/27/2022      Chief complaint: SOB, cough, RVR    Interval HPI:   10/26 - Patient still with SOB, moderate, at rest and associated with pleuritic chest pain. No fever or chills. No nausea or vomiting. 10/27 - patient went into RVR again last evening requiring a diltiazem gtt. She has a mild cough but no fever or chills. No nausea or vomiting     Objective:    Vital Signs:    Visit Vitals  BP (!) 106/57   Pulse (!) 101   Temp 98 °F (36.7 °C)   Resp 16   Ht 5' 3\" (1.6 m)   Wt 59.1 kg (130 lb 4.7 oz)   SpO2 100%   Breastfeeding No   BMI 23.08 kg/m²        Intake/Output Summary (Last 24 hours) at 10/27/2022 0716  Last data filed at 10/26/2022 1050  Gross per 24 hour   Intake --   Output 600 ml   Net -600 ml          Physical Examination:    General:   Weak and ill looking, not in distress   Eyes:   pink conjunctivae, PERRLA with no discharge. ENT:   no ottorrhea or rhinorrhea with dry mucous membranes  Neck: no masses, thyroid non-tender and trachea central.  Pulm:  clear breath sounds without crackles or wheezes. Reproducible right rib cage discomfort  Card:  no JVD or murmurs, has atrial fibrillation, without thrills, bruits or peripheral edema  Abd:  Soft, non-tender, non-distended, normoactive bowel sounds with no palpable organomegaly  Musc:  No cyanosis, clubbing, atrophy or deformities. Skin:  No rashes, bruising or ulcers. Neuro: Awake and alert.  Generally a non focal exam.   Psych:  Has a fair insight to her illness     Current Facility-Administered Medications   Medication Dose Route Frequency    montelukast (SINGULAIR) tablet 10 mg  10 mg Oral QHS    pantoprazole (PROTONIX) tablet 40 mg  40 mg Oral DAILY sodium chloride (NS) flush 5-40 mL  5-40 mL IntraVENous Q8H    sodium chloride (NS) flush 5-40 mL  5-40 mL IntraVENous PRN    0.9% sodium chloride infusion 25 mL  25 mL IntraVENous PRN    acetaminophen (TYLENOL) tablet 650 mg  650 mg Oral Q6H PRN    Or    acetaminophen (TYLENOL) suppository 650 mg  650 mg Rectal Q6H PRN    senna-docusate (PERICOLACE) 8.6-50 mg per tablet 1 Tablet  1 Tablet Oral BID    bisacodyL (DULCOLAX) suppository 10 mg  10 mg Rectal DAILY PRN    prochlorperazine (COMPAZINE) injection 10 mg  10 mg IntraVENous Q6H PRN    acetaminophen (TYLENOL) tablet 650 mg  650 mg Oral Q6H PRN    azithromycin (ZITHROMAX) tablet 500 mg  500 mg Oral DAILY    rivaroxaban (XARELTO) tablet 20 mg  20 mg Oral DAILY WITH DINNER    metoprolol succinate (TOPROL-XL) XL tablet 50 mg  50 mg Oral DAILY    ondansetron (ZOFRAN ODT) tablet 4 mg  4 mg Oral Q8H PRN    nystatin (MYCOSTATIN) 100,000 unit/mL oral suspension 500,000 Units  500,000 Units Oral QID    albuterol (PROVENTIL VENTOLIN) nebulizer solution 2.5 mg  2.5 mg Nebulization BID RT    dilTIAZem (CARDIZEM) 100 mg in 0.9% sodium chloride (MBP/ADV) 100 mL infusion  0-15 mg/hr IntraVENous TITRATE    bumetanide (BUMEX) injection 1 mg  1 mg IntraVENous Q12H    albuterol-ipratropium (DUO-NEB) 2.5 MG-0.5 MG/3 ML  3 mL Nebulization Q6H PRN    guaiFENesin ER (MUCINEX) tablet 600 mg  600 mg Oral Q12H    benzonatate (TESSALON) capsule 100 mg  100 mg Oral TID PRN    diphenhydrAMINE (BENADRYL) injection 25 mg  25 mg IntraVENous Q6H PRN    cefepime (MAXIPIME) 2 g in 0.9% sodium chloride (MBP/ADV) 100 mL MBP  2 g IntraVENous Q12H        Laboratory data and review:    Recent Labs     10/27/22  0147 10/26/22  1030 10/25/22  1854   WBC  --  6.7 7.5   HGB 9.2* 9.5* 9.9*   HCT 29.4* 30.2* 31.6*   PLT  --  398 394       Recent Labs     10/27/22  0147 10/26/22  1030 10/25/22  1545   * 130* 125*   K 4.6 3.7 3.2*   CL 90* 84* 82*   CO2 42* 43* 37*   * 113* 99   BUN 11 10 13 CREA 0.56 0.44* 0.47*   CA 8.9 9.5 9.3   MG  --  1.6  1.7  --    PHOS  --  3.0  --    ALB  --  2.8* 2.5*   ALT  --  14 15       No components found for: [place]    Diagnostics: Imaging studies have been reviewed    Telemetry reviewed by me: atrial fibrillation     Assessment and Plan:    Acute and chronic respiratory failure with hypoxia ([facility] (10/25/2022) POA: likely due to CHF exacerbation vs pneumonia. CT chest showed cardiomegaly with basilar infiltrates. Still on 3L/min. Continue IV diuresis, empiric antibiotics. Wean oxygen as tolerated to keep saturations > 90%. Ambulate as tolerated     Acute on chronic HFrEF (heart failure with reduced ejection fraction) ([address] (9/4/2022) POA: trigger likely the A fib vs pneumonia. Recent Echocardiogram done 9/2022 showed an EF of 45-50% with a grade 1 diastolic dysfunction. Continue IV Bumex. Low Na diet. Monitor output. Cardiology following. Atrial fibrillation with rapid ventricular response ([address] (10/25/2022) POA: has a hx of PAF. Had RVR again last night and now rate controlled. Continue Diltiazem gtt and wean as tolerated. Xarelto, Metoprolol. Cardiology following and will guide on further medication adjustments     Pneumonia (8/19/2022) / Hx Bronchiectasis POA: suspected and bibasilar as noted on CTA chest. Continue empiric IV Cefepime and Azithromycin. Clinically stable     Hyponatremia (4/30/2022) /  Hypokalemia (9/4/2022) POA: low Na likely chronic from CHF and diuresis. K+ now normal and Na better. Monitor renal function     Hypothyroidism POA: recent TSH was normal. Continue Levothyroxine    Anemia - likely from chronic illness.  Monitor                Care Plan discussed with: Patient, Family, and Nursing Staff    Discussed:  Care Plan and D/C Planning    Prophylaxis:   Xarelto    Anticipated Disposition:   PT, OT, RN           ___________________________________________________    Attending Physician:   Mae Hoyos MD

## 2022-10-27 NOTE — PROGRESS NOTES
Bedside and Verbal shift change report given to Tyler (oncoming nurse) by Shanna Haney (offgoing nurse). Report included the following information SBAR, Kardex, Intake/Output, MAR, Recent Results, and Cardiac Rhythm A Fib . Primary Nurse Rodolfo Nunez and Shanna Haney, RN performed a dual skin assessment on this patient No impairment noted  Armando score is see flow sheet    0800 Morning Assessment, VS being monitored, assisted p/t to bedside commode, spoke with family member to bring in ACP and home medications for pharmacy. Neuro: Ox4  Cardiac: A Fib. Respiratory: 3L NC  GI: Regular  : BSC  IV: 20/22 G R AC    1000: Patient turning self, watching TV quietly in bed  1200: Reassessment, VSS, p/t resting quietly eating lunch  1400: P/t working with PT/OT  1600: Reassessment, VSS, family at bedside  1800:p/t assisted to [facility] - then moved to bed - p/t resting quietly    Bedside and Verbal shift change report given to Krys Piña (oncoming nurse) by Tyler (offgoing nurse).  Report included the following information SBAR, Kardex, Intake/Output, MAR, Recent Results, and Cardiac Rhythm SR .

## 2022-10-27 NOTE — PROGRESS NOTES
1900- TRANSFER - IN REPORT:    Verbal report received from Luis E(name) on Clayton Ebbing  being received from [facility](unit) for change in patient condition(A-fib RVR)      Report consisted of patients Situation, Background, Assessment and   Recommendations(SBAR). Information from the following report(s) SBAR, Kardex, Intake/Output, MAR, Recent Results, Cardiac Rhythm aifb RVR, Alarm Parameters , and Quality Measures was reviewed with the receiving nurse. Opportunity for questions and clarification was provided. Assessment completed upon patients arrival to unit and care assumed. See flowsheets for all assessments. See MAR for all medication administrations. 22 [facility] notified of critical CO2 of 42. No new orders. 0700- Bedside and Verbal shift change report given to Alejandro (oncoming nurse) by Kelly Lentz (offgoing nurse). Report included the following information SBAR, Kardex, Intake/Output, MAR, Recent Results, Cardiac Rhythm aifb, Alarm Parameters , and Quality Measures.

## 2022-10-27 NOTE — ADT AUTH CERT NOTES
Comments  Comment          Segundo Goldsmith NPI :  FACILITY TAX ID :      [id number]  [facility]  [address]             Patient Name :Douglas Devine   : 1940 (82 yrs)  MRN : [id number]     Patient Mailing Address [address] [place] [address] [] , [phone]                                                               . Insurance Plan Payor: Ra Islas / Plan: Lorice Dakins / Product Type: Managed Care Medicare /      Primary Coverage Subscriber ID :      Secondary Coverage:  N/A     Secondary Subscriber ID :        Current Patient Class : INPATIENT  Admit Date : 10/25/2022     REQUESTED LEVEL OF CARE: INPATIENT [101]                                                           Diagnosis : Atrial fibrillation with rapid ventricular response ([address]                          ICD10 Code : Atrial fibrillation with rapid ventricular response ([address] [I48.91]     Current Room and Bed 3006/01     Admitting and Attending Info:  Admitting Provider : Cornelio Bowman MD   NPI: [id number]  Admitting Provider Phone.  [phone]  Admitting Provider Address: [address] [place] [address]

## 2022-10-27 NOTE — ACP (ADVANCE CARE PLANNING)
Advance Care Planning Note     Name: Sukh Villalobos   YOB: 1940   MRN: [id number]   Admission Date: 10/25/2022  2:06 PM     Date of discussion:  10/26/2022         Active Diagnoses:     Principal Problem:    Acute and chronic respiratory failure with hypoxia ([facility] (10/25/2022)    Active Problems:    Hypothyroidism ()      Hyponatremia (4/30/2022)      Pneumonia (8/19/2022)      Hypokalemia (9/4/2022)      Acute on chronic HFrEF (heart failure with reduced ejection fraction) ([address] (9/4/2022)      Atrial fibrillation with rapid ventricular response ([facility] [address] (10/25/2022)      DNR (do not resuscitate) (10/26/2022)           These active diagnoses are of sufficient risk that focused discussion on advance care planning is indicated in order to allow the patient to thoughtfully consider personal goals of care; and, if situations arise that prevent the ability to personally give input, to ensure appropriate representation of their personal desires for different levels and aggressiveness of care. Discussion:     Persons present and participating in discussion: Patient and tech      Discussion: Spoke at length re: code status. She states she has lived a good life and does not want CPR/intubation to prolong her life should her time come. She has seen other family members suffer and doesn't \"want to live like that\". She is AAOX3. DNR form signed for pt to obtain for her records. Her son is her POA and he is aware of her wishes        Time Spent:     Total time spent face-to-face in education and discussion: 16 minutes.              Melanie Mccormack MD

## 2022-10-27 NOTE — PROGRESS NOTES
1900 - Bedside shift change report given to [id number] Julianna Pretty (oncoming nurse) by Chang Guillermo RN (offgoing nurse). Report included the following information SBAR, ED Summary, Recent Results, and Cardiac Rhythm Afib .     2100 - Gave patient bedtime medication - patient tolerated pill medication just fine. Gave patient nystatin and it seemed to have gone down incorrectly and patient has been in a coughing spell complaining that she feels a very thick mucus plus in her throat that will not come up. Respiratory therapist was called to bedside to try another Prn neb treatment, with little improvement patient began spitting up thick dark yellow sputum little by little. Nurse called son [address] [facility] to bring her vest as this helps her cough up mucus at home - Daughter-in-law at bedside with vest from home. Around 2200 patient's coughing spell subsides after 15 mins of vest chest physiotherapy. 2130 - Asked hospitalist MD Canelo Dsouza if zofran order can be changed to IV instead of PO since patient is excessively coughing and dry heaving - received orders to change route to IV.

## 2022-10-27 NOTE — PROGRESS NOTES
Max Durán  YOB: 1940      Assessment & Plan:     Hypo osmolar hypervolemic hyponatremia   Fluid overload  Hypokalemia   A fib with RVR  Metabolic alkalosis     -Na improved to 133  -urine study reviewed  -hold bumex and give diamox 250 mg IV BID for 4 dose  -FR 1L  -Goal of sodium correction 6 meq in 24 hr         Subjective:   CC: F/U Hyponatremia   HPI: Patient seen , NA better 133  ROS:  Current Facility-Administered Medications   Medication Dose Route Frequency    metoprolol tartrate (LOPRESSOR) tablet 50 mg  50 mg Oral BID    diclofenac (VOLTAREN) 1 % topical gel 4 g  4 g Topical QID    levothyroxine (SYNTHROID) tablet 25 mcg  25 mcg Oral ACB    acetaZOLAMIDE (DIAMOX) 250 mg in sterile water (preservative free) 2.5 mL injection  250 mg IntraVENous Q12H    montelukast (SINGULAIR) tablet 10 mg  10 mg Oral QHS    pantoprazole (PROTONIX) tablet 40 mg  40 mg Oral DAILY    sodium chloride (NS) flush 5-40 mL  5-40 mL IntraVENous Q8H    sodium chloride (NS) flush 5-40 mL  5-40 mL IntraVENous PRN    0.9% sodium chloride infusion 25 mL  25 mL IntraVENous PRN    acetaminophen (TYLENOL) tablet 650 mg  650 mg Oral Q6H PRN    Or    acetaminophen (TYLENOL) suppository 650 mg  650 mg Rectal Q6H PRN    senna-docusate (PERICOLACE) 8.6-50 mg per tablet 1 Tablet  1 Tablet Oral BID    bisacodyL (DULCOLAX) suppository 10 mg  10 mg Rectal DAILY PRN    prochlorperazine (COMPAZINE) injection 10 mg  10 mg IntraVENous Q6H PRN    acetaminophen (TYLENOL) tablet 650 mg  650 mg Oral Q6H PRN    azithromycin (ZITHROMAX) tablet 500 mg  500 mg Oral DAILY    rivaroxaban (XARELTO) tablet 20 mg  20 mg Oral DAILY WITH DINNER    ondansetron (ZOFRAN ODT) tablet 4 mg  4 mg Oral Q8H PRN    nystatin (MYCOSTATIN) 100,000 unit/mL oral suspension 500,000 Units  500,000 Units Oral QID    albuterol (PROVENTIL VENTOLIN) nebulizer solution 2.5 mg  2.5 mg Nebulization BID RT    dilTIAZem (CARDIZEM) 100 mg in 0.9% sodium chloride (MBP/ADV) 100 mL infusion  0-15 mg/hr IntraVENous TITRATE    [Held by provider] bumetanide (BUMEX) injection 1 mg  1 mg IntraVENous Q12H    albuterol-ipratropium (DUO-NEB) 2.5 MG-0.5 MG/3 ML  3 mL Nebulization Q6H PRN    guaiFENesin ER (MUCINEX) tablet 600 mg  600 mg Oral Q12H    benzonatate (TESSALON) capsule 100 mg  100 mg Oral TID PRN    diphenhydrAMINE (BENADRYL) injection 25 mg  25 mg IntraVENous Q6H PRN    cefepime (MAXIPIME) 2 g in 0.9% sodium chloride (MBP/ADV) 100 mL MBP  2 g IntraVENous Q12H          Objective:     Vitals:  Blood pressure 101/63, pulse 98, temperature 98.6 °F (37 °C), resp. rate 20, height 5' 3\" (1.6 m), weight 59.1 kg (130 lb 4.7 oz), SpO2 100 %, not currently breastfeeding. Temp (24hrs), Av.1 °F (36.7 °C), Min:97.6 °F (36.4 °C), Max:98.6 °F (37 °C)      Intake and Output:  No intake/output data recorded. 10/25 1901 - 10/27 0700  In: 930 [P.O.:500; I.V.:430]  Out: 700 [Urine:700]    Physical Exam:                   Physical Examination:   GENERAL ASSESSMENT: NAD  HEENT:Nontraumatic   CHEST:diminished BS +  HEART: S1S2  ABDOMEN: Soft,NT,  :[place]uca:   EXTREMITY: no EDEMA  NEURO:Grossly non focal          ECG/rhythm:    Data Review      No results for input(s): TNIPOC in the last 72 hours. No lab exists for component: ITNL   No results for input(s): CPK, CKMB, TROIQ in the last 72 hours. Recent Labs     10/27/22  0147 10/26/22  1030 10/25/22  1854 10/25/22  1545   * 130*  --  125*   K 4.6 3.7  --  3.2*   CL 90* 84*  --  82*   CO2 42* 43*  --  37*   BUN 11 10  --  13   CREA 0.56 0.44*  --  0.47*   * 113*  --  99   PHOS  --  3.0  --   --    MG 2.0 1.6  1.7  --   --    CA 8.9 9.5  --  9.3   ALB  --  2.8*  --  2.5*   WBC  --  6.7 7.5  --    HGB 9.2* 9.5* 9.9*  --    HCT 29.4* 30.2* 31.6*  --    PLT  --  398 394  --       No results for input(s): INR, PTP, APTT, INREXT in the last 72 hours.   Needs: urine analysis, urine sodium, protein and creatinine  Lab Results   Component Value Date/Time    Sodium,urine random 22 10/26/2022 02:51 PM    Creatinine, urine random 33.00 09/07/2022 10:14 AM               Discussed with:   PATIENT     : Andre Gordon MD  10/27/2022        [facility]:  [email or web address]  Jorgebetsy Gallegos office:  [address] [address]  [place], [address] [place] [address]  Phone: [phone]  Fax :     [phone]    [place] office:  [address] [facility]  [facility]  Phone - [phone]  Fax - [phone]

## 2022-10-27 NOTE — PROGRESS NOTES
Problem: Self Care Deficits Care Plan (Adult)  Goal: *Acute Goals and Plan of Care (Insert Text)  Description: FUNCTIONAL STATUS PRIOR TO ADMISSION: Patient was modified independent using a rollator for functional mobility. HOME SUPPORT PRIOR TO ADMISSION: Pt is caregiver for spouse; family provided assistance to patient for stair negotiation prior to admission. Occupational Therapy Goals  Initiated 10/26/2022  1. Patient will perform grooming with modified independence standing at the sink >5 minutes within 7 day(s). 2.  Patient will perform upper body dressing and bathing with modified independence within 7 day(s). 3.  Patient will perform lower body dressing and bathing with supervision/set-up within 7 day(s). 4.  Patient will perform toilet transfers with supervision/set-up within 7 day(s). 5.  Patient will perform all aspects of toileting with supervision/set-up within 7 day(s). 6.  Patient will participate in upper extremity therapeutic exercise/activities with modified independence for 10 minutes within 7 day(s). 7.  Patient will utilize energy conservation techniques during functional activities with verbal cues within 7 day(s). Outcome: Progressing Towards Goal     OCCUPATIONAL THERAPY TREATMENT  Patient: Judy Hubbard (30 y.o. female)  Date: 10/27/2022  Diagnosis: Atrial fibrillation with rapid ventricular response (HCC) [I48.91] Acute and chronic respiratory failure with hypoxia [facility])      Precautions: Fall, Skin  Chart, occupational therapy assessment, plan of care, and goals were reviewed. ASSESSMENT  Patient continues with skilled OT services and is progressing towards goals. Patient today received on BSC, reporting feeling increased SOB this AM. Saturations stable in mid 90s on 4L via nasal cannula and HR elevated and irregular while on BSC.  She is able to complete toileting tasks with overall supervision while seated and requires up to CGA/min A for standing portions of tasks. Noted mild instability in standing to return to bed without use of AD, but pt manages bed mobility independently. She requires cues for PLB during all tasks and HR lowers once settled in bed at end of session. Continue to recommend follow up [facility] therapy and increased support/assistance. Current Level of Function Impacting Discharge (ADLs): overall CGA/min A     Other factors to consider for discharge: pt is caregiver for spouse         PLAN :  Patient continues to benefit from skilled intervention to address the above impairments. Continue treatment per established plan of care to address goals. Recommend with staff: OOB to chair for meals; mobility to bathroom vs. SPT to [facility] for toileting    Recommend next OT session: standing ADLs at sink    Recommendation for discharge: (in order for the patient to meet his/her long term goals)  Occupational therapy at least 2 days/week in the home AND ensure assist and/or supervision for safety with ADLs and mobility    This discharge recommendation:  Has been made in collaboration with the attending provider and/or case management    IF patient discharges home will need the following DME: TBD       SUBJECTIVE:   Patient stated I feel sort of short of breath this morning.     OBJECTIVE DATA SUMMARY:   Cognitive/Behavioral Status:  Neurologic State: Alert  Orientation Level: Oriented X4  Cognition: Appropriate decision making; Appropriate for age attention/concentration; Appropriate safety awareness; Follows commands  Perception: Appears intact  Perseveration: No perseveration noted  Safety/Judgement: Awareness of environment; Fall prevention;Good awareness of safety precautions; Home safety; Insight into deficits    Functional Mobility and Transfers for ADLs:  Bed Mobility:  Rolling: Modified independent  Supine to Sit: Modified independent  Sit to Supine: Modified independent    Transfers:  Sit to Stand: Contact guard assistance  Functional Transfers  Toilet Transfer : Contact guard assistance;Minimum assistance  Cues: Verbal cues provided;Physical assistance  Adaptive Equipment: Bedside commode  Bed to Chair: Contact guard assistance    Balance:  Sitting: Intact  Standing: Intact; Without support  Standing - Static: Fair  Standing - Dynamic : Fair;Constant support    ADL Intervention:  Grooming  Grooming Assistance: Set-up; Supervision  Position Performed: Seated edge of bed  Washing Hands: Set-up; Supervision    Cognitive Retraining  Safety/Judgement: Awareness of environment; Fall prevention;Good awareness of safety precautions; Home safety; Insight into deficits    Pain:  Pt reporting no pain    Activity Tolerance:   Fair, desaturates with exertion and requires oxygen, requires rest breaks, and observed SOB with activity    After treatment patient left in no apparent distress:   Supine in bed, Call bell within reach, Bed / chair alarm activated, and Side rails x 3    COMMUNICATION/COLLABORATION:   The patients plan of care was discussed with: Physical therapist and Registered nurse.      Yahaira Borden OT  Time Calculation: 11 mins

## 2022-10-27 NOTE — CONSULTS
[facility]     Name: Sukh Villalobos MRN: [id number]   : 1940 Hospital: [address]   Date: 10/27/2022        Impression Plan   Acute respiratory failure  Hypoxia  RLL PNA- aspiration? Bronchiectasis  Afib with RVR               Wean O2 to keep sats above 90%  Pt has a hx of pseudomonas as well as stenotrophomonas colonization. The RLL PNA likely pseudomonas vs aspiration PNA (GERD related). Less likely stenotrophomonas since pt is improving on cefepime. Nystatin for outpt thrush treatment  Continue bumex, may be able to decrease some since no pleural effusions on CT chest  Continue protonix  Rate control per cardiology - on dilt drip and BB  OOB into chair when HR stable           Radiology  (personally reviewed) CT chest reviewed: RLL extensive infiltrate, RML infiltrate       Subjective     Cc: hypoxia    79 yo with PMHx of bronchiectasis, recurrent PNAs, pseudomonas/stenotrophomonas colonization and CHF presenting with increasing SOB and severe hypoxia in pulmonary clinic. In ER was found to be in a-fib with RVR. Pt admitted and CT chest revealed RLL and RML infiltrates. Pt received diuretics as well. Leg edema has resolved this morning. Managed on Trelegy 200, Wayne nebs, montelukast, Flonase/azelastine, and ProAir/albuterol nebs PRN. Pt denies any nausea/vomiting in the last few weeks. She does have some GERD sxs. Non-smoker. Interval history  Afebrile  HR 100s  Afib with RVR again overnight  BP soft but stable  Sats 100% on 3L  Bicarb 42  proBNP 1707  RVP neg    ROS: SOB currently improved. Notes HR increases with using the BSC. Has mild cough. Denies fever or chills. Denies CP.     Past Medical History:   Diagnosis Date    Asthma     CHF (congestive heart failure), NYHA class I, chronic, systolic (HCC)     Chronic anticoagulation     GERD (gastroesophageal reflux disease)     Hiatal hernia     HX: breast cancer 2015    Right     Hypothyroid     Paroxysmal A-fib ([facility] [address]     Venous insufficiency     bilat LE    Weight loss       Past Surgical History:   Procedure Laterality Date    COLONOSCOPY N/A 6/14/2021    COLONOSCOPY AND EGD performed by Hilda Alexander MD at [address] BREAST LUMPECTOMY Right 08/24/2015    HX CATARACT REMOVAL  2013    Bilateral     HX CHOLECYSTECTOMY      HX DILATION AND CURETTAGE      x2    HX ORTHOPAEDIC  2017    RIGHT foot  hammertoe      Prior to Admission medications    Medication Sig Start Date End Date Taking? Authorizing Provider   arformoteroL (BROVANA) 15 mcg/2 mL nebu neb solution 15 mcg by Nebulization route two (2) times a day. Yes Provider, Historical   budesonide (PULMICORT) 0.5 mg/2 mL nbsp 500 mcg by Nebulization route two (2) times a day. Yes Provider, Historical   nystatin (MYCOSTATIN) 100,000 unit/mL suspension Take  by mouth two (2) times a day. swish and spit   Yes Provider, Historical   metoprolol succinate (TOPROL-XL) 100 mg tablet Take 50 mg by mouth daily. Yes Provider, Historical   levothyroxine (SYNTHROID) 25 mcg tablet Take 25 mcg by mouth Daily (before breakfast). Yes Provider, Historical   traZODone (DESYREL) 50 mg tablet Take 50 mg by mouth nightly. 25-50mg nightly   Yes Provider, Historical   ergocalciferol (ERGOCALCIFEROL) 1,250 mcg (50,000 unit) capsule Take 50,000 Units by mouth every seven (7) days. On sundays   Yes Provider, Historical   iron, carbonyl (FEOSOL) 45 mg tab Take 1 Tablet by mouth daily. Yes Provider, Historical   fluticasone (VERAMYST) 27.5 mcg/actuation nasal spray 2 Sprays by Nasal route daily. Yes Provider, Historical   ondansetron hcl (Zofran) 8 mg tablet Take 8 mg by mouth every eight (8) hours as needed for Nausea or Vomiting (prn). Yes Provider, Historical   amLODIPine (NORVASC) 5 mg tablet Take 5 mg by mouth daily. For spb over 150   Yes Provider, Historical   bumetanide (BUMEX) 0.5 mg tablet Take 1 Tablet by mouth daily.   Patient taking differently: Take 1.5 mg by mouth daily. 9/29/22  Yes Dixon Navarro MD   guaiFENesin ER (MUCINEX) 600 mg ER tablet Take 1 Tablet by mouth every twelve (12) hours. 9/28/22  Yes Dixon Navarro MD   pantoprazole (PROTONIX) 40 mg tablet Take 40 mg by mouth daily. Yes Provider, Historical   olopatadine (PATADAY) 0.2 % drop ophthalmic solution Administer 1 Drop to both eyes daily. Yes Provider, Historical   Xarelto 20 mg tab tablet Take 20 mg by mouth daily (with dinner). 7/31/20  Yes Provider, Historical   metroNIDAZOLE (METROCREAM) 0.75 % topical cream Apply  to affected area two (2) times a day. Yes Provider, Historical   montelukast (SINGULAIR) 10 mg tablet montelukast 10 mg tablet   Yes Provider, Historical   diclofenac (VOLTAREN) 1 % gel Apply  to affected area four (4) times daily. Yes Provider, Historical   acetaminophen (TYLENOL EXTRA STRENGTH PO) Take  by mouth. Yes Provider, Historical   sotaloL (BETAPACE) 80 mg tablet Take  by mouth two (2) times a day. Patient not taking: Reported on 10/25/2022    Provider, Historical   albuterol 2.5 mg /3 mL (0.083 %) nebu 3 mL, albuterol-ipratropium 2.5 mg-0.5 mg/3 ml nebu 3 mL Take 1 Inhalation by inhalation every four (4) hours as needed. Patient not taking: Reported on 10/25/2022 10/1/21   Other, MD Abby   albuterol (PROVENTIL VENTOLIN) 2.5 mg /3 mL (0.083 %) nebu Take 3 mL by inhalation every four (4) hours as needed. Patient not taking: Reported on 10/25/2022 3/29/22   Other, MD Abby   tiotropium bromide (SPIRIVA RESPIMAT) 2.5 mcg/actuation inhaler Spiriva Respimat 2.5 mcg/actuation solution for inhalation  Patient not taking: Reported on 10/25/2022    Provider, Historical   albuterol (PROVENTIL HFA, VENTOLIN HFA, PROAIR HFA) 90 mcg/actuation inhaler Take  by inhalation.   Patient not taking: Reported on 10/25/2022    Provider, Historical     Current Facility-Administered Medications   Medication Dose Route Frequency    metoprolol tartrate (LOPRESSOR) tablet 50 mg  50 mg Oral BID    diclofenac (VOLTAREN) 1 % topical gel 4 g  4 g Topical QID    montelukast (SINGULAIR) tablet 10 mg  10 mg Oral QHS    pantoprazole (PROTONIX) tablet 40 mg  40 mg Oral DAILY    sodium chloride (NS) flush 5-40 mL  5-40 mL IntraVENous Q8H    senna-docusate (PERICOLACE) 8.6-50 mg per tablet 1 Tablet  1 Tablet Oral BID    azithromycin (ZITHROMAX) tablet 500 mg  500 mg Oral DAILY    rivaroxaban (XARELTO) tablet 20 mg  20 mg Oral DAILY WITH DINNER    nystatin (MYCOSTATIN) 100,000 unit/mL oral suspension 500,000 Units  500,000 Units Oral QID    albuterol (PROVENTIL VENTOLIN) nebulizer solution 2.5 mg  2.5 mg Nebulization BID RT    dilTIAZem (CARDIZEM) 100 mg in 0.9% sodium chloride (MBP/ADV) 100 mL infusion  0-15 mg/hr IntraVENous TITRATE    bumetanide (BUMEX) injection 1 mg  1 mg IntraVENous Q12H    guaiFENesin ER (MUCINEX) tablet 600 mg  600 mg Oral Q12H    cefepime (MAXIPIME) 2 g in 0.9% sodium chloride (MBP/ADV) 100 mL MBP  2 g IntraVENous Q12H     Allergies   Allergen Reactions    Ace Inhibitors Cough    Iodine Rash    Penicillins Rash      Social History     Tobacco Use    Smoking status: Former     Types: Cigarettes     Start date: 2000     Quit date: 2000     Years since quittin.9    Smokeless tobacco: Never   Substance Use Topics    Alcohol use: Not Currently      Family History   Problem Relation Age of Onset    Hypertension Father           Laboratory: I have personally reviewed the flowsheet and labs.      Recent Labs     10/27/22  0147 10/26/22  1030 10/25/22  1854   WBC  --  6.7 7.5   HGB 9.2* 9.5* 9.9*   HCT 29.4* 30.2* 31.6*   PLT  --  398 394       Recent Labs     10/27/22  0147 10/26/22  1030 10/25/22  1545   * 130* 125*   K 4.6 3.7 3.2*   CL 90* 84* 82*   CO2 42* 43* 37*   * 113* 99   BUN 11 10 13   CREA 0.56 0.44* 0.47*   CA 8.9 9.5 9.3   MG  --  1.6  1.7  --    PHOS  --  3.0  --    ALB  --  2.8* 2.5*   ALT  --   15         Objective:   Visit Vitals  BP (!) 106/57 Pulse (!) 101   Temp 98 °F (36.7 °C)   Resp 16   Ht 5' 3\" (1.6 m)   Wt 59.1 kg (130 lb 4.7 oz)   SpO2 100%   Breastfeeding No   BMI 23.08 kg/m²       Intake/Output Summary (Last 24 hours) at 10/27/2022 0830  Last data filed at 10/26/2022 1050  Gross per 24 hour   Intake --   Output 450 ml   Net -450 ml       EXAM:   GENERAL: Speaking in full long sentences, awake, alert, HEENT:  anicteric, EOMI, no alar flaring or epistaxis, oral mucosa moist without cyanosis, NECK:  no jugular vein distention, no retractions, no thyromegaly or masses, LUNGS:Crackles in right lower lobe, HEART:  Irregular and mildly tachy, with no MGR; no edema is present, ABDOMEN:  soft with no tenderness, bowel sounds present, EXTREMITIES:  warm with no cyanosis, SKIN:  no jaundice or ecchymosis, and NEUROLOGIC:  alert and oriented, grossly non-focal    LEROY Lindsey  [facility]

## 2022-10-27 NOTE — PROGRESS NOTES
Problem: Mobility Impaired (Adult and Pediatric)  Goal: *Acute Goals and Plan of Care (Insert Text)  Description: FUNCTIONAL STATUS PRIOR TO ADMISSION: Patient was modified independent using a rollator for functional mobility. HOME SUPPORT PRIOR TO ADMISSION: Pt is caregiver for spouse; family provided assistance to patient for stair negotiation prior to admission. Physical Therapy Goals  Initiated 10/26/2022  1. Patient will move from supine to sit and sit to supine in bed with modified independence within 7 day(s). 2.  Patient will transfer from bed to chair and chair to bed with modified independence using the least restrictive device within 7 day(s). 3.  Patient will perform sit to stand with modified independence within 7 day(s). 4.  Patient will ambulate with modified independence for 100 feet with the least restrictive device within 7 day(s). 5.  Patient will ascend/descend 4 stairs with one handrail(s) with minimal assistance/contact guard assist within 7 day(s). Outcome: Progressing Towards Goal   PHYSICAL THERAPY TREATMENT  Patient: Gladis Cooper (65 y.o. female)  Date: 10/27/2022  Diagnosis: Atrial fibrillation with rapid ventricular response (HCC) [I48.91] Acute and chronic respiratory failure with hypoxia [facility])      Precautions: Fall, Skin  Chart, physical therapy assessment, plan of care and goals were reviewed. ASSESSMENT  Patient continues with skilled PT services and is progressing towards goals. Patient demonstrates improved activity tolerance today with HR generally within lower range (90s to 115) during mobility and SpO2 in mid to high 90s on 3L with activity. Completed several laps in room today with only CGA needed and assist with lines. Tried to get patient up to chair but no recliners available, RN to check for one later. Completed seated LE and UE therex at EOB too with good dynamic sitting balance noted, cues needed for rest breaks occasionally.        Current Level of Function Impacting Discharge (mobility/balance): CGA, gait x 61' with RW    Other factors to consider for discharge: caregiver for spouse         PLAN :  Patient continues to benefit from skilled intervention to address the above impairments. Continue treatment per established plan of care. to address goals. Recommendation for discharge: (in order for the patient to meet his/her long term goals)  Physical therapy at least 2 days/week in the home AND ensure assist and/or supervision for safety with mobility and self care    This discharge recommendation:  Has been made in collaboration with the attending provider and/or case management    IF patient discharges home will need the following DME: patient owns DME required for discharge       SUBJECTIVE:   Patient stated I really want to get out of this bed.     OBJECTIVE DATA SUMMARY:   Critical Behavior:  Neurologic State: Alert  Orientation Level: Oriented X4  Cognition: Follows commands  Safety/Judgement: Decreased awareness of environment, Decreased awareness of need for assistance, Decreased awareness of need for safety, Decreased insight into deficits  Functional Mobility Training:  Bed Mobility:  Rolling: Modified independent  Supine to Sit: Modified independent  Sit to Supine: Modified independent           Transfers:  Sit to Stand: Contact guard assistance  Stand to Sit: Contact guard assistance        Bed to Chair: Contact guard assistance                    Balance:  Sitting: Intact  Standing: Impaired  Standing - Static: Good;Constant support  Standing - Dynamic : Fair;Constant support  Ambulation/Gait Training:  Distance (ft): 50 Feet (ft)  Assistive Device: Gait belt;Walker, rolling  Ambulation - Level of Assistance: Contact guard assistance        Gait Abnormalities: Decreased step clearance        Base of Support: Widened     Speed/Ivory: Pace decreased (<100 feet/min)                            Therapeutic Exercises:   Seated LAQs, marches, UE flexion and abduction, scap squeezes, toe and heel raises  Pain Rating:  None    Activity Tolerance:   Fair and observed SOB with activity    After treatment patient left in no apparent distress:   Supine in bed and Call bell within reach    COMMUNICATION/COLLABORATION:   The patients plan of care was discussed with: Occupational therapist and Registered nurse.      Alex Tsai, PT   Time Calculation: 28 mins

## 2022-10-27 NOTE — PROGRESS NOTES
Hospital Day 2:   4:18 PM- Pt remains in ICU- CM met with pt to complete readmission assessment- pt states she is trying to sleep, wishes for CM to follow up tomorrow. Pt is currently open with [facility] services- (RN, PT and OT). CM will follow up with pt tomorrow to complete initial assessment.      Jg Morales, MSW, [address]

## 2022-10-27 NOTE — PROGRESS NOTES
Admission Medication Reconciliation:     Information obtained from:    Patient via interview  RxQuery data available¹:  YES    Comments/Recommendations:   Patient able to confirm name, , allergies, and preferred pharmacy  Updated PTA medication list       ¹RxQuery pharmacy benefit data reflects medications filled and processed through the patient's insurance, however   this data does NOT capture whether the medication was picked up or is currently being taken by the patient. Prior to Admission Medications   Prescriptions Last Dose Informant Taking? Xarelto 20 mg tab tablet 10/24/2022 at 1800  Yes   Sig: Take 20 mg by mouth daily (with dinner). acetaminophen (TYLENOL) 500 mg tablet 10/24/2022  Yes   Sig: Take 500 mg by mouth every six (6) hours as needed for Pain. amLODIPine (NORVASC) 5 mg tablet   Yes   Sig: Take 5 mg by mouth daily as needed (The patient takes only if her systolic blood pressue is over 150). For spb over 150   amoxicillin (AMOXIL) 875 mg tablet 10/25/2022  Yes   Sig: Take 875 mg by mouth two (2) times a day. arformoteroL (BROVANA) 15 mcg/2 mL nebu neb solution 10/25/2022 at 0900  Yes   Sig: 15 mcg by Nebulization route two (2) times a day. azelastine (ASTEPRO) 205.5 mcg (0.15 %) 10/25/2022  Yes   Si [place] by Both Nostrils route daily. b complex vitamins tablet 10/25/2022  Yes   Sig: Take 1 Tablet by mouth daily. budesonide (PULMICORT) 0.5 mg/2 mL nbsp 10/25/2022 at 0900  Yes   Si mcg by Nebulization route two (2) times a day. bumetanide (BUMEX) 0.5 mg tablet 10/25/2022  Yes   Sig: Take 1.5 mg by mouth daily. calcium carbonate (TUMS) 200 mg calcium (500 mg) chew 10/25/2022  Yes   Sig: Take 1 Tablet by mouth daily.    diclofenac (VOLTAREN) 1 % gel 10/25/2022 at 0900  Yes   Sig: Apply  to affected area four (4) times daily as needed for Pain.   ergocalciferol (ERGOCALCIFEROL) 1,250 mcg (50,000 unit) capsule 10/18/2022 at 0900  Yes   Sig: Take 50,000 Units by mouth every . ferrous sulfate 325 mg (65 mg iron) tablet 10/25/2022  Yes   Sig: Take 325 mg by mouth daily. fluticasone (VERAMYST) 27.5 mcg/actuation nasal spray 10/24/2022 at 0900  Yes   Si Sprays by Nasal route daily. guaiFENesin ER (MUCINEX) 600 mg ER tablet 10/25/2022 at 0900  Yes   Sig: Take 1 Tablet by mouth every twelve (12) hours. levalbuterol tartrate (XOPENEX) 45 mcg/actuation inhaler   Yes   Sig: Take 1 Puff by inhalation every four (4) hours as needed for Wheezing or Shortness of Breath. levothyroxine (SYNTHROID) 25 mcg tablet 10/24/2022 at   Yes   Sig: Take 25 mcg by mouth nightly. metoprolol succinate (TOPROL-XL) 50 mg XL tablet 10/25/2022 at 0900  Yes   Sig: Take 50 mg by mouth daily. metroNIDAZOLE (METROCREAM) 0.75 % topical cream 10/25/2022 at 0900  Yes   Sig: Apply  to affected area two (2) times a day. montelukast (SINGULAIR) 10 mg tablet 10/25/2022 at 0900  Yes   Sig: Take 10 mg by mouth daily. nystatin (MYCOSTATIN) 100,000 unit/mL suspension 10/25/2022 at 0900  Yes   Sig: Take 500,000 Units by mouth two (2) times a day. swish and swallow   olopatadine (PATADAY) 0.2 % drop ophthalmic solution 10/25/2022 at 0900  Yes   Sig: Administer 1 Drop to both eyes daily. ondansetron hcl (Zofran) 8 mg tablet   Yes   Sig: Take 8 mg by mouth every eight (8) hours as needed for Nausea or Vomiting (prn). pantoprazole (PROTONIX) 40 mg tablet 10/25/2022 at 0900  Yes   Sig: Take 40 mg by mouth daily. tobramycin, PF, (Wayne) 300 mg/5 mL nebulizer solution 10/25/2022  Yes   Si mg by Nebulization route two (2) times a day. 2 weeks on and 2 weeks off -   traZODone (DESYREL) 50 mg tablet 10/24/2022 at   Yes   Sig: Take 25 mg by mouth nightly. traZODone (DESYREL) 50 mg tablet   Yes   Sig: Take 25 mg by mouth nightly as needed for Sleep (in addition to scheduled dose if needed for sleep).       Facility-Administered Medications: None         Please contact the main inpatient pharmacy with any questions or concerns at [phone] and we will direct you to the clinical pharmacist covering this patient's care while in-house.    Tank Mitchell, PharmD, BCPS

## 2022-10-27 NOTE — PROGRESS NOTES
Problem: Falls - Risk of  Goal: *Absence of Falls  Description: Document Walter Roche Fall Risk and appropriate interventions in the flowsheet.   Outcome: Progressing Towards Goal  Note: Fall Risk Interventions:  Mobility Interventions: Bed/chair exit alarm, Communicate number of staff needed for ambulation/transfer, OT consult for ADLs, Patient to call before getting OOB, PT Consult for mobility concerns, PT Consult for assist device competence, Strengthening exercises (ROM-active/passive), Utilize walker, cane, or other assistive device         Medication Interventions: Assess postural VS orthostatic hypotension, Bed/chair exit alarm, Evaluate medications/consider consulting pharmacy, Patient to call before getting OOB, Teach patient to arise slowly    Elimination Interventions: Bed/chair exit alarm, Call light in reach, Patient to call for help with toileting needs, Toileting schedule/hourly rounds, Toilet paper/wipes in reach    History of Falls Interventions: Bed/chair exit alarm, Consult care management for discharge planning, Door open when patient unattended, Evaluate medications/consider consulting pharmacy, Investigate reason for fall, Room close to nurse's station, Utilize gait belt for transfer/ambulation, Assess for delayed presentation/identification of injury for 48 hrs (comment for end date), Vital signs minimum Q4HRs X 24 hrs (comment for end date)         Problem: Patient Education: Go to Patient Education Activity  Goal: Patient/Family Education  Outcome: Progressing Towards Goal     Problem: Patient Education: Go to Patient Education Activity  Goal: Patient/Family Education  Outcome: Progressing Towards Goal     Problem: Patient Education: Go to Patient Education Activity  Goal: Patient/Family Education  Outcome: Progressing Towards Goal

## 2022-10-27 NOTE — PROGRESS NOTES
Spiritual Care Assessment/Progress Note  [address]      NAME: Gladis Cooper      MRN: [id number]  AGE: 80 y.o.  SEX: female  Druze Affiliation: [employer]ar[employer]   Language: English     10/27/2022     Total Time (in minutes): 45     Spiritual Assessment begun in [facility] through conversation with:         [x]Patient        [] Family    [] Friend(s)        Reason for Consult: Initial/Spiritual assessment, critical care     Spiritual beliefs: (Please include comment if needed)     [] Identifies with a kallie tradition:         [] Supported by a kallie community:            [] Claims no spiritual orientation:           [x] Seeking spiritual identity:                [] Adheres to an individual form of spirituality:           [] Not able to assess:                           Identified resources for coping:      [] Prayer                               [] Music                  [] Guided Imagery     [x] Family/friends                 [] Pet visits     [] Devotional reading                         [] Unknown     [] Other:                                               Interventions offered during this visit: (See comments for more details)    Patient Interventions: Affirmation of emotions/emotional suffering, Coping skills reviewed/reinforced, Initial/Spiritual assessment, Critical care, Life review/legacy, Normalization of emotional/spiritual concerns, Affirmation of kallie           Plan of Care:     [x] Support spiritual and/or cultural needs    [] Support AMD and/or advance care planning process      [] Support grieving process   [] Coordinate Rites and/or Rituals    [] Coordination with community clergy   [] No spiritual needs identified at this time   [] Detailed Plan of Care below (See Comments)  [] Make referral to Music Therapy  [] Make referral to Pet Therapy     [] Make referral to Addiction services  [] Make referral to [facility]  [] Make referral to Spiritual Care Partner  [] No future visits requested        [] Contact Spiritual Care for further referrals     Comments: Visit for spiritual assessment: Ms Lian Thomas talked about how much better she is feeling. 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 They have 2 children and no grands. Both Ms Lian Thomas  and her spouse worked for [employer] and worked around the  [employer]. She shared stories to their travels aboard. No spiritual need noted. Chart reviewed. Explored spiritual and emotional needs; facilitated life review/storytelling, provided ministry of presence, empathic listening; and continued cultivated a relationship of care and support. Assured of continued  support. Visited by: Becky Brandon.  Autumn Leader, [address] [place] [address] Road paging Service [phone] Fall with Harm Risk

## 2022-10-27 NOTE — PROGRESS NOTES
1831: Responded to RRT for Elevated Heart Rate    Provider at bedside: no  Interventions ordered: EKG -Afib RVR   Sepsis Suspected: no  Transfer to Higher Level of Care: yes-  Stepdown    Dr. Brigida Ordaz notified, order to start Cardizem gtt, follow up with cardiology, and transfer to stepdown. Primary RN contacted cards, no addition orders given. Assisted transfer to ICU room 6. New IV placed.       Visit Vitals  /71   Pulse (!) 148   Temp 98.3 °F (36.8 °C)   Resp 16   Ht 5' 3\" (1.6 m)   Wt 59.1 kg (130 lb 4.7 oz)   SpO2 98%   Breastfeeding No   BMI 23.08 kg/m²        Tyesha Baker RN

## 2022-10-27 NOTE — PROGRESS NOTES
[facility]  Cardiology Care Note                  [x]Initial visit     [x]Established visit     Patient Name: Colette Baker - HXX:[id number]  Primary Cardiologist: Dr Cassia Chavez Cardiologist: Fiona Oliva DO      Reason for consult: a fib RVR     HPI:       Jory Blizzard is a 80 y.o. female with PMH significant for  Pafib, HF mr EF 45-50% decline is likely tachycardia induced, asthma who presented w/ dyspnea, hypoxia, pulm edema, CHF, afib RVR, hyponatremia. The patient is a poor historian. Per chart review the patient was admitted to [facility] last month for afib, heart failure, hypoxia and subsequently went to rehab. Over the past week, the patient has had worsening SOB, associated with increased cough, hypoxia, and LE edema. She saw pulmonary who sent her to the ED    SUBJECTIVE:      Jory Blizzard reports no chest pain or SOB. Assessment and Plan     1 a fib RVR: recurrent RVR overnight requiring IV dilt resumption. Will change BB to 25 mg Q 6 hrs, ween IV dilt, may need digoxin. Check Mg++    Anticoagulated with xarelto. Has appt in November to discuss Watchman device with Dr Cristal Carreon. 2 HF mr EF: pBNP improved since last admission. Cont IV loops. 3 chronic hyponatremia: Na 133  4 asthma:            ____________________________________________________________    Cardiac testing  09/04/22    ECHO ADULT COMPLETE 09/06/2022 9/6/2022    Interpretation Summary    Left Ventricle: Mildly reduced left ventricular systolic function with a visually estimated EF of 45 - 50%. Left ventricle size is normal. Normal wall thickness. Mild global hypokinesis present. Grade I diastolic dysfunction with normal LAP. Aortic Valve: Valve structure is normal. Mild sclerosis of the aortic valve cusp. Mitral Valve: Mild regurgitation.     Tricuspid Valve: Mildly elevated RVSP.    Mildly improved lv function compared to study 4/2022. Signed by: Odessa Mayberry DO on 9/6/2022 11:24 AM              Most recent HS troponins:  No results for input(s): TROPHS in the last 72 hours. No lab exists for component:  CKMB    ECG:   EKG Results       Procedure 720 Value Units Date/Time    EKG, 12 LEAD, INITIAL [[id number]] Collected: 10/25/22 1546    Order Status: Completed Updated: 10/26/22 1402     Ventricular Rate 96 BPM      QRS Duration 88 ms      Q-T Interval 338 ms      QTC Calculation (Bezet) 427 ms      Calculated R Axis -17 degrees      Calculated T Axis 12 degrees      Diagnosis --     Atrial fibrillation  Minimal voltage criteria for LVH, may be normal variant ( R in aVL )  Abnormal ECG  When compared with ECG of 19-SEP-2022 05:17,  No significant change was found  Confirmed by Wayne Beal M.D., Christopher Presser ([id number]) on 10/26/2022 2:02:23 PM                Review of Systems:    [x]All other systems reviewed and all negative except as written in HPI    [] Patient unable to provide secondary to condition       Past Medical History:   Diagnosis Date    Asthma     CHF (congestive heart failure), NYHA class I, chronic, systolic (HCC)     Chronic anticoagulation     GERD (gastroesophageal reflux disease)     Hiatal hernia     HX: breast cancer 2015    Right     Hypothyroid     Paroxysmal A-fib ([address]     Venous insufficiency     bilat LE    Weight loss      Past Surgical History:   Procedure Laterality Date    COLONOSCOPY N/A 6/14/2021    COLONOSCOPY AND EGD performed by Fatoumata Serrano MD at [facility] ENDOSCOPY    HX BREAST LUMPECTOMY Right 08/24/2015    HX CATARACT REMOVAL  2013    Bilateral     HX CHOLECYSTECTOMY      HX DILATION AND CURETTAGE      x2    HX ORTHOPAEDIC  2017    RIGHT foot  hammertoe     Social Hx:  reports that she quit smoking about 21 years ago. Her smoking use included cigarettes. She started smoking about 22 years ago.  She has never used smokeless tobacco. She reports that she does not currently use alcohol. She reports that she does not use drugs. Family Hx: family history includes Hypertension in her father. Allergies   Allergen Reactions    Ace Inhibitors Cough    Iodine Rash    Penicillins Rash          OBJECTIVE:  Wt Readings from Last 3 Encounters:   10/25/22 59.1 kg (130 lb 4.7 oz)   09/25/22 55.6 kg (122 lb 9.2 oz)   09/14/22 57 kg (125 lb 10.6 oz)       Intake/Output Summary (Last 24 hours) at 10/27/2022 0725  Last data filed at 10/26/2022 1050  Gross per 24 hour   Intake --   Output 600 ml   Net -600 ml         Physical Exam:    Vitals:   Vitals:    10/27/22 0600 10/27/22 0615 10/27/22 0630 10/27/22 0645   BP: 110/61  (!) 106/57    Pulse: 92 98 94 (!) 101   Resp: 13 16 14 16   Temp:       SpO2: 100% 100% 100% 100%   Weight:       Height:         Telemetry: a fib     Gen: Well-developed, well-nourished, in no acute distress  Neck: Supple, No JVD, No Carotid Bruit  Resp: Dim bases   Card: irregular Rate,Rythm, Normal S1, S2, No murmurs, rubs or gallop. Abd:   Soft, non-tender, non-distended, BS+   MSK: No cyanosis  Skin: No rashes    Neuro: Moving all four extremities, follows commands appropriately  Psych: Limited  insight, oriented to person, place, alert, Nml Affect  LE: No edema    Data Review:     Radiology:   XR Results (most recent):  Results from Hospital Encounter encounter on 10/25/22    XR CHEST PA LAT    Narrative  EXAM: XR CHEST PA LAT    INDICATION: Shortness of breath    COMPARISON: September 5, 2022    TECHNIQUE: PA and lateral chest views    FINDINGS: Cardiac size is unchanged and normal. Interstitial edema is present,  elevation of the right hemidiaphragm with blunting of both costophrenic angles. Impression  New or increased Interstitial edema with bibasilar atelectasis or  infiltrate, cannot exclude effusions.       Recent Labs     10/27/22  0147 10/26/22  1030   * 130*   K 4.6 3.7   CL 90* 84*   CO2 42* 43*   BUN 11 10   CREA 0.56 0.44*   * 113* PHOS  --  3.0   CA 8.9 9.5       Recent Labs     10/27/22  0147 10/26/22  1030 10/25/22  1854   WBC  --  6.7 7.5   HGB 9.2* 9.5* 9.9*   HCT 29.4* 30.2* 31.6*   PLT  --  398 394       Recent Labs     10/26/22  1030 10/25/22  1545   AP 74 74       No results for input(s): CHOL, LDLC in the last 72 hours.     No lab exists for component: TGL, HDLC,  HBA1C      Current meds:    Current Facility-Administered Medications:     metoprolol tartrate (LOPRESSOR) tablet 50 mg, 50 mg, Oral, BID, Leesa Avila NP    montelukast (SINGULAIR) tablet 10 mg, 10 mg, Oral, QHS, Dwight Novak MD, 10 mg at 10/26/22 2129    pantoprazole (PROTONIX) tablet 40 mg, 40 mg, Oral, DAILY, Dwight Novak MD, 40 mg at 10/26/22 0835    sodium chloride (NS) flush 5-40 mL, 5-40 mL, IntraVENous, Q8H, Dwight Novak MD, 10 mL at 10/27/22 1216    sodium chloride (NS) flush 5-40 mL, 5-40 mL, IntraVENous, PRN, Dwight Novak MD    0.9% sodium chloride infusion 25 mL, 25 mL, IntraVENous, PRN, Dwight Novak MD    acetaminophen (TYLENOL) tablet 650 mg, 650 mg, Oral, Q6H PRN **OR** acetaminophen (TYLENOL) suppository 650 mg, 650 mg, Rectal, Q6H PRN, Do, Romualdo Lennox, MD    senna-docusate (PERICOLACE) 8.6-50 mg per tablet 1 Tablet, 1 Tablet, Oral, BID, Do, Romualdo Lennox, MD, 1 Tablet at 10/26/22 2129    bisacodyL (DULCOLAX) suppository 10 mg, 10 mg, Rectal, DAILY PRN, Dwight Novak MD    prochlorperazine (COMPAZINE) injection 10 mg, 10 mg, IntraVENous, Q6H PRN, Dwight Novak MD    acetaminophen (TYLENOL) tablet 650 mg, 650 mg, Oral, Q6H PRN, James Martinez MD, 650 mg at 10/27/22 0119    azithromycin (ZITHROMAX) tablet 500 mg, 500 mg, Oral, DAILY, James Martinez MD, 500 mg at 10/26/22 0835    rivaroxaban (XARELTO) tablet 20 mg, 20 mg, Oral, DAILY WITH DINNER, Sadaf Lombardi MD, 20 mg at 10/26/22 2129    ondansetron (ZOFRAN ODT) tablet 4 mg, 4 mg, Oral, Q8H PRN, Leesa Avila NP, 4 mg at 10/26/22 1112    nystatin (MYCOSTATIN) 100,000 unit/mL oral suspension 500,000 Units, 500,000 Units, Oral, QID, Tracey Brooks MD, 500,000 Units at 10/26/22 2130    albuterol (PROVENTIL VENTOLIN) nebulizer solution 2.5 mg, 2.5 mg, Nebulization, BID RT, Tracey Brooks MD    dilTIAZem (CARDIZEM) 100 mg in 0.9% sodium chloride (MBP/ADV) 100 mL infusion, 0-15 mg/hr, IntraVENous, TITRATE, Govind Edwards MD, Last Rate: 5 mL/hr at 10/27/22 0653, 5 mg/hr at 10/27/22 0653    bumetanide (BUMEX) injection 1 mg, 1 mg, IntraVENous, Q12H, Do, Katy Mcneil MD, 1 mg at 10/27/22 8720    albuterol-ipratropium (DUO-NEB) 2.5 MG-0.5 MG/3 ML, 3 mL, Nebulization, Q6H PRN, DoDwight MD    guaiFENesin ER (MUCINEX) tablet 600 mg, 600 mg, Oral, Q12H, Do, Katy Mcneil MD, 600 mg at 10/26/22 2129    benzonatate (TESSALON) capsule 100 mg, 100 mg, Oral, TID PRN, Do, Katy Mcneil MD, 100 mg at 10/25/22 2045    diphenhydrAMINE (BENADRYL) injection 25 mg, 25 mg, IntraVENous, Q6H PRN, Dwight Novak MD    cefepime (MAXIPIME) 2 g in 0.9% sodium chloride (MBP/ADV) 100 mL MBP, 2 g, IntraVENous, Q12H, Dwight Novak MD, Last Rate: 25 mL/hr at 10/27/22 0638, 2 g at 10/27/22 2623 [facility], NP    [facility] [address] [place] [address], [address] [place] [address], [address]  [phone]      Beverly Pedro MD

## 2022-10-28 ENCOUNTER — APPOINTMENT (OUTPATIENT)
Dept: GENERAL RADIOLOGY | Age: 82
DRG: 291 | End: 2022-10-28
Attending: INTERNAL MEDICINE
Payer: MEDICARE

## 2022-10-28 LAB
ATRIAL RATE: 108 BPM
ATRIAL RATE: 144 BPM
CALCULATED R AXIS, ECG10: -14 DEGREES
CALCULATED R AXIS, ECG10: -15 DEGREES
CALCULATED T AXIS, ECG11: 131 DEGREES
CALCULATED T AXIS, ECG11: 24 DEGREES
DIAGNOSIS, 93000: NORMAL
DIAGNOSIS, 93000: NORMAL
Q-T INTERVAL, ECG07: 254 MS
Q-T INTERVAL, ECG07: 364 MS
QRS DURATION, ECG06: 120 MS
QRS DURATION, ECG06: 66 MS
QTC CALCULATION (BEZET), ECG08: 383 MS
QTC CALCULATION (BEZET), ECG08: 521 MS
VENTRICULAR RATE, ECG03: 123 BPM
VENTRICULAR RATE, ECG03: 137 BPM

## 2022-10-28 PROCEDURE — 74011250637 HC RX REV CODE- 250/637: Performed by: INTERNAL MEDICINE

## 2022-10-28 PROCEDURE — 97116 GAIT TRAINING THERAPY: CPT

## 2022-10-28 PROCEDURE — 94640 AIRWAY INHALATION TREATMENT: CPT

## 2022-10-28 PROCEDURE — 94762 N-INVAS EAR/PLS OXIMTRY CONT: CPT

## 2022-10-28 PROCEDURE — 74230 X-RAY XM SWLNG FUNCJ C+: CPT

## 2022-10-28 PROCEDURE — 74011250636 HC RX REV CODE- 250/636: Performed by: INTERNAL MEDICINE

## 2022-10-28 PROCEDURE — 74011250637 HC RX REV CODE- 250/637: Performed by: NURSE PRACTITIONER

## 2022-10-28 PROCEDURE — 97535 SELF CARE MNGMENT TRAINING: CPT

## 2022-10-28 PROCEDURE — 77010033678 HC OXYGEN DAILY

## 2022-10-28 PROCEDURE — 74011250636 HC RX REV CODE- 250/636: Performed by: NURSE PRACTITIONER

## 2022-10-28 PROCEDURE — 92611 MOTION FLUOROSCOPY/SWALLOW: CPT

## 2022-10-28 PROCEDURE — 74011000250 HC RX REV CODE- 250: Performed by: INTERNAL MEDICINE

## 2022-10-28 PROCEDURE — 65270000046 HC RM TELEMETRY

## 2022-10-28 PROCEDURE — 74011000258 HC RX REV CODE- 258: Performed by: INTERNAL MEDICINE

## 2022-10-28 PROCEDURE — 97530 THERAPEUTIC ACTIVITIES: CPT

## 2022-10-28 PROCEDURE — 99232 SBSQ HOSP IP/OBS MODERATE 35: CPT | Performed by: STUDENT IN AN ORGANIZED HEALTH CARE EDUCATION/TRAINING PROGRAM

## 2022-10-28 PROCEDURE — APPSS30 APP SPLIT SHARED TIME 16-30 MINUTES: Performed by: NURSE PRACTITIONER

## 2022-10-28 RX ORDER — LEVALBUTEROL TARTRATE 45 UG/1
1 AEROSOL, METERED ORAL
Status: DISCONTINUED | OUTPATIENT
Start: 2022-10-28 | End: 2022-10-31 | Stop reason: HOSPADM

## 2022-10-28 RX ORDER — DIGOXIN 0.25 MG/ML
250 INJECTION INTRAMUSCULAR; INTRAVENOUS ONCE
Status: COMPLETED | OUTPATIENT
Start: 2022-10-28 | End: 2022-10-28

## 2022-10-28 RX ADMIN — Medication 10 ML: at 21:36

## 2022-10-28 RX ADMIN — ACETAMINOPHEN 650 MG: 325 TABLET, FILM COATED ORAL at 21:51

## 2022-10-28 RX ADMIN — DIGOXIN 250 MCG: 0.25 INJECTION INTRAMUSCULAR; INTRAVENOUS at 08:17

## 2022-10-28 RX ADMIN — CEFEPIME 2 G: 2 INJECTION, POWDER, FOR SOLUTION INTRAVENOUS at 17:27

## 2022-10-28 RX ADMIN — GUAIFENESIN 600 MG: 600 TABLET, EXTENDED RELEASE ORAL at 08:10

## 2022-10-28 RX ADMIN — AZITHROMYCIN MONOHYDRATE 500 MG: 250 TABLET ORAL at 08:10

## 2022-10-28 RX ADMIN — GUAIFENESIN 600 MG: 600 TABLET, EXTENDED RELEASE ORAL at 21:34

## 2022-10-28 RX ADMIN — CEFEPIME 2 G: 2 INJECTION, POWDER, FOR SOLUTION INTRAVENOUS at 06:40

## 2022-10-28 RX ADMIN — ALBUTEROL SULFATE 2.5 MG: 2.5 SOLUTION RESPIRATORY (INHALATION) at 20:05

## 2022-10-28 RX ADMIN — LEVOTHYROXINE SODIUM 25 MCG: 0.03 TABLET ORAL at 08:10

## 2022-10-28 RX ADMIN — ACETAZOLAMIDE 250 MG: 500 INJECTION, POWDER, LYOPHILIZED, FOR SOLUTION INTRAVENOUS at 08:09

## 2022-10-28 RX ADMIN — DIGOXIN 250 MCG: 0.25 INJECTION INTRAMUSCULAR; INTRAVENOUS at 14:44

## 2022-10-28 RX ADMIN — METOPROLOL TARTRATE 50 MG: 50 TABLET ORAL at 17:26

## 2022-10-28 RX ADMIN — MONTELUKAST 10 MG: 10 TABLET, FILM COATED ORAL at 21:51

## 2022-10-28 RX ADMIN — SENNOSIDES AND DOCUSATE SODIUM 1 TABLET: 50; 8.6 TABLET ORAL at 08:10

## 2022-10-28 RX ADMIN — DICLOFENAC SODIUM 4 G: 10 GEL TOPICAL at 21:51

## 2022-10-28 RX ADMIN — DICLOFENAC SODIUM 4 G: 10 GEL TOPICAL at 08:39

## 2022-10-28 RX ADMIN — RIVAROXABAN 20 MG: 20 TABLET, FILM COATED ORAL at 17:26

## 2022-10-28 RX ADMIN — Medication 10 ML: at 14:35

## 2022-10-28 RX ADMIN — PANTOPRAZOLE SODIUM 40 MG: 40 TABLET, DELAYED RELEASE ORAL at 08:10

## 2022-10-28 RX ADMIN — Medication 10 ML: at 06:40

## 2022-10-28 RX ADMIN — ONDANSETRON 4 MG: 2 INJECTION INTRAMUSCULAR; INTRAVENOUS at 08:16

## 2022-10-28 RX ADMIN — ALBUTEROL SULFATE 2.5 MG: 2.5 SOLUTION RESPIRATORY (INHALATION) at 08:00

## 2022-10-28 NOTE — PROGRESS NOTES
Problem: Falls - Risk of  Goal: *Absence of Falls  Description: Document Cherylle Cockayne Fall Risk and appropriate interventions in the flowsheet.   Outcome: Progressing Towards Goal  Note: Fall Risk Interventions:  Mobility Interventions: Bed/chair exit alarm, Patient to call before getting OOB         Medication Interventions: Patient to call before getting OOB, Teach patient to arise slowly    Elimination Interventions: Call light in reach    History of Falls Interventions: Bed/chair exit alarm, Door open when patient unattended, Room close to nurse's station         Problem: Patient Education: Go to Patient Education Activity  Goal: Patient/Family Education  Outcome: Progressing Towards Goal     Problem: Patient Education: Go to Patient Education Activity  Goal: Patient/Family Education  Outcome: Progressing Towards Goal     Problem: Patient Education: Go to Patient Education Activity  Goal: Patient/Family Education  Outcome: Progressing Towards Goal     Problem: Patient Education: Go to Patient Education Activity  Goal: Patient/Family Education  Outcome: Progressing Towards Goal

## 2022-10-28 NOTE — PROGRESS NOTES
Bedside and Verbal shift change report given to Casey Jimenez RN (oncoming nurse) by [facility], RN (offgoing nurse). Report included the following information SBAR, Kardex, MAR, and Cardiac Rhythm Afib .    0700: Pt resting in bed. No c/o pain. Instructed pt to call for assistance before getting out of bed. Call light in reach. Bedside and Verbal shift change report given to Carlyle Manriquez RN (oncoming nurse) by Casey Jimenez RN (offgoing nurse). Report included the following information SBAR, Kardex, MAR, and Cardiac Rhythm Afib .

## 2022-10-28 NOTE — PROGRESS NOTES
Rosa Palacios  YOB: 1940      Assessment & Plan:     Hypo osmolar hypervolemic hyponatremia   Fluid overload  Hypokalemia   A fib with RVR  Metabolic alkalosis 42     -Na improved to 133  -urine study reviewed  -hold bumex and give diamox 500 mg IV BID for 4 dose  -FR 1L  -Goal of sodium correction 6 meq in 24 hr         Subjective:   CC: F/U Hyponatremia   HPI: Patient seen , NA better 133, sob +  ROS:  Current Facility-Administered Medications   Medication Dose Route Frequency    acetaZOLAMIDE (DIAMOX) 500 mg in sterile water (preservative free) 5 mL injection  500 mg IntraVENous Q12H    digoxin (LANOXIN) injection 250 mcg  250 mcg IntraVENous ONCE    metoprolol tartrate (LOPRESSOR) tablet 50 mg  50 mg Oral BID    diclofenac (VOLTAREN) 1 % topical gel 4 g  4 g Topical QID    levothyroxine (SYNTHROID) tablet 25 mcg  25 mcg Oral ACB    ondansetron (ZOFRAN) injection 4 mg  4 mg IntraVENous Q4H PRN    montelukast (SINGULAIR) tablet 10 mg  10 mg Oral QHS    pantoprazole (PROTONIX) tablet 40 mg  40 mg Oral DAILY    sodium chloride (NS) flush 5-40 mL  5-40 mL IntraVENous Q8H    sodium chloride (NS) flush 5-40 mL  5-40 mL IntraVENous PRN    0.9% sodium chloride infusion 25 mL  25 mL IntraVENous PRN    acetaminophen (TYLENOL) tablet 650 mg  650 mg Oral Q6H PRN    Or    acetaminophen (TYLENOL) suppository 650 mg  650 mg Rectal Q6H PRN    senna-docusate (PERICOLACE) 8.6-50 mg per tablet 1 Tablet  1 Tablet Oral BID    bisacodyL (DULCOLAX) suppository 10 mg  10 mg Rectal DAILY PRN    prochlorperazine (COMPAZINE) injection 10 mg  10 mg IntraVENous Q6H PRN    acetaminophen (TYLENOL) tablet 650 mg  650 mg Oral Q6H PRN    rivaroxaban (XARELTO) tablet 20 mg  20 mg Oral DAILY WITH DINNER    nystatin (MYCOSTATIN) 100,000 unit/mL oral suspension 500,000 Units  500,000 Units Oral QID    albuterol (PROVENTIL VENTOLIN) nebulizer solution 2.5 mg  2.5 mg Nebulization BID RT    dilTIAZem (CARDIZEM) 100 mg in 0.9% sodium chloride (MBP/ADV) 100 mL infusion  0-15 mg/hr IntraVENous TITRATE    [Held by provider] bumetanide (BUMEX) injection 1 mg  1 mg IntraVENous Q12H    albuterol-ipratropium (DUO-NEB) 2.5 MG-0.5 MG/3 ML  3 mL Nebulization Q6H PRN    guaiFENesin ER (MUCINEX) tablet 600 mg  600 mg Oral Q12H    benzonatate (TESSALON) capsule 100 mg  100 mg Oral TID PRN    diphenhydrAMINE (BENADRYL) injection 25 mg  25 mg IntraVENous Q6H PRN    cefepime (MAXIPIME) 2 g in 0.9% sodium chloride (MBP/ADV) 100 mL MBP  2 g IntraVENous Q12H          Objective:     Vitals:  Blood pressure 101/63, pulse 91, temperature 97.3 °F (36.3 °C), resp. rate 14, height 5' 3\" (1.6 m), weight 59.1 kg (130 lb 4.7 oz), SpO2 100 %, not currently breastfeeding. Temp (24hrs), Av.8 °F (36.6 °C), Min:97.3 °F (36.3 °C), Max:98.7 °F (37.1 °C)      Intake and Output:  10/28 0701 - 10/28 1900  In: 200 [P.O.:200]  Out: -   10/26 1901 - 10/28 0700  In: 1130.5 [P.O.:800; I.V.:330.5]  Out: 1400 [Urine:1400]    Physical Exam:                   Physical Examination:   GENERAL ASSESSMENT: NAD  HEENT:Nontraumatic   CHEST:diminished BS +  HEART: S1S2  ABDOMEN: Soft,NT,  :[place]:   EXTREMITY: no EDEMA  NEURO:Grossly non focal          ECG/rhythm:    Data Review      No results for input(s): TNIPOC in the last 72 hours. No lab exists for component: ITNL   No results for input(s): CPK, CKMB, TROIQ in the last 72 hours. Recent Labs     10/27/22  0147 10/26/22  1030 10/25/22  1854 10/25/22  1545   * 130*  --  125*   K 4.6 3.7  --  3.2*   CL 90* 84*  --  82*   CO2 42* 43*  --  37*   BUN 11 10  --  13   CREA 0.56 0.44*  --  0.47*   * 113*  --  99   PHOS  --  3.0  --   --    MG 2.0 1.6  1.7  --   --    CA 8.9 9.5  --  9.3   ALB  --  2.8*  --  2.5*   WBC  --  6.7 7.5  --    HGB 9.2* 9.5* 9.9*  --    HCT 29.4* 30.2* 31.6*  --    PLT  --  398 394  --       No results for input(s): INR, PTP, APTT, INREXT, INREXT in the last 72 hours.   Needs: urine analysis, urine sodium, protein and creatinine  Lab Results   Component Value Date/Time    Sodium,urine random 22 10/26/2022 02:51 PM    Creatinine, urine random 33.00 09/07/2022 10:14 AM               Discussed with:   PATIENT     : Nikki Peter MD  10/28/2022        [facility]:  [email or web address]  [place] office:  [address], [address] [place] [address],[address]  [place], [address] [place] [address]  Phone: [phone]  Fax :     [phone]    [place] office:  [address] [facility], [address]  Phone - [phone]  Fax - [phone]

## 2022-10-28 NOTE — PROGRESS NOTES
Vishal Colunga [facility] [address]  [address] [facility], [address] [place] [address]  [phone]      Hospitalist  Progress Note      NAME:         Jory Blizzard   :        1940  MRM:        [id number]    Date of service: 10/28/2022      Chief complaint: SOB, cough, A fib    Interval HPI:   10/26 - Patient still with SOB, moderate, at rest and associated with pleuritic chest pain. No fever or chills. No nausea or vomiting. 10/27 - patient went into RVR again last evening requiring a diltiazem gtt. She has a mild cough but no fever or chills. No nausea or vomiting   10/28 - she remains stable but had coughing episodes. HR now better controled. BPs low normal. No cough or fever. No chills or rigors    Objective:    Vital Signs:    Visit Vitals  /63 (BP 1 Location: Left upper arm, BP Patient Position: At rest)   Pulse 97   Temp 97.5 °F (36.4 °C)   Resp 13   Ht 5' 3\" (1.6 m)   Wt 59.1 kg (130 lb 4.7 oz)   SpO2 100%   Breastfeeding No   BMI 23.08 kg/m²        Intake/Output Summary (Last 24 hours) at 10/28/2022 0659  Last data filed at 10/27/2022 1800  Gross per 24 hour   Intake 1130.5 ml   Output 1400 ml   Net -269.5 ml          Physical Examination:    General:   Weak and ill looking, not in distress, dyspneic   Eyes:   pink conjunctivae, PERRLA with no discharge. ENT:   no ottorrhea or rhinorrhea with dry mucous membranes  Neck: no masses, thyroid non-tender and trachea central.  Pulm:  clear breath sounds without crackles or wheezes. Reproducible right rib cage discomfort  Card:  no JVD or murmurs, has atrial fibrillation, without thrills, bruits   Abd:  Soft, non-tender, non-distended, normoactive bowel sounds   Musc:  No cyanosis, clubbing, atrophy or deformities. Skin:  No rashes, bruising or ulcers. Neuro: Awake and alert.  Generally a non focal exam.   Psych:  Has a fair insight to her illness     Current Facility-Administered Medications   Medication Dose Route Frequency    metoprolol tartrate (LOPRESSOR) tablet 50 mg  50 mg Oral BID    diclofenac (VOLTAREN) 1 % topical gel 4 g  4 g Topical QID    levothyroxine (SYNTHROID) tablet 25 mcg  25 mcg Oral ACB    acetaZOLAMIDE (DIAMOX) 250 mg in sterile water (preservative free) 2.5 mL injection  250 mg IntraVENous Q12H    ondansetron (ZOFRAN) injection 4 mg  4 mg IntraVENous Q4H PRN    montelukast (SINGULAIR) tablet 10 mg  10 mg Oral QHS    pantoprazole (PROTONIX) tablet 40 mg  40 mg Oral DAILY    sodium chloride (NS) flush 5-40 mL  5-40 mL IntraVENous Q8H    sodium chloride (NS) flush 5-40 mL  5-40 mL IntraVENous PRN    0.9% sodium chloride infusion 25 mL  25 mL IntraVENous PRN    acetaminophen (TYLENOL) tablet 650 mg  650 mg Oral Q6H PRN    Or    acetaminophen (TYLENOL) suppository 650 mg  650 mg Rectal Q6H PRN    senna-docusate (PERICOLACE) 8.6-50 mg per tablet 1 Tablet  1 Tablet Oral BID    bisacodyL (DULCOLAX) suppository 10 mg  10 mg Rectal DAILY PRN    prochlorperazine (COMPAZINE) injection 10 mg  10 mg IntraVENous Q6H PRN    acetaminophen (TYLENOL) tablet 650 mg  650 mg Oral Q6H PRN    azithromycin (ZITHROMAX) tablet 500 mg  500 mg Oral DAILY    rivaroxaban (XARELTO) tablet 20 mg  20 mg Oral DAILY WITH DINNER    nystatin (MYCOSTATIN) 100,000 unit/mL oral suspension 500,000 Units  500,000 Units Oral QID    albuterol (PROVENTIL VENTOLIN) nebulizer solution 2.5 mg  2.5 mg Nebulization BID RT    dilTIAZem (CARDIZEM) 100 mg in 0.9% sodium chloride (MBP/ADV) 100 mL infusion  0-15 mg/hr IntraVENous TITRATE    [Held by provider] bumetanide (BUMEX) injection 1 mg  1 mg IntraVENous Q12H    albuterol-ipratropium (DUO-NEB) 2.5 MG-0.5 MG/3 ML  3 mL Nebulization Q6H PRN    guaiFENesin ER (MUCINEX) tablet 600 mg  600 mg Oral Q12H    benzonatate (TESSALON) capsule 100 mg  100 mg Oral TID PRN    diphenhydrAMINE (BENADRYL) injection 25 mg  25 mg IntraVENous Q6H PRN    cefepime (MAXIPIME) 2 g in 0.9% sodium chloride (MBP/ADV) 100 mL MBP  2 g IntraVENous Q12H        Laboratory data and review:    Recent Labs     10/27/22  0147 10/26/22  1030 10/25/22  1854   WBC  --  6.7 7.5   HGB 9.2* 9.5* 9.9*   HCT 29.4* 30.2* 31.6*   PLT  --  398 394       Recent Labs     10/27/22  0147 10/26/22  1030 10/25/22  1545   * 130* 125*   K 4.6 3.7 3.2*   CL 90* 84* 82*   CO2 42* 43* 37*   * 113* 99   BUN 11 10 13   CREA 0.56 0.44* 0.47*   CA 8.9 9.5 9.3   MG 2.0 1.6  1.7  --    PHOS  --  3.0  --    ALB  --  2.8* 2.5*   ALT  --  14 15       No components found for: [place]    Diagnostics: Imaging studies have been reviewed    Telemetry reviewed by me: atrial fibrillation     Assessment and Plan:    Acute and chronic respiratory failure with hypoxia ([address] (10/25/2022) POA: likely due to CHF exacerbation vs pneumonia given CT chest showed cardiomegaly with basilar infiltrates. She is on supplemental oxygen and now at 3L/min which is her baseline. Continue chest physical therapy, IV diuresis, empiric antibiotics. PT, OT as tolerated. CM for discharge planning. Acute on chronic HFrEF (heart failure with reduced ejection fraction) ([address] (9/4/2022) POA: trigger likely the A fib vs pneumonia. Recent Echocardiogram done 9/2022 showed an EF of 45-50% with a grade 1 diastolic dysfunction. She remains dyspneic. Continue IV Bumex. Low Na diet. Monitor output. Cardiology following. Atrial fibrillation with rapid ventricular response ([address] (10/25/2022) POA: has a hx of PAF. Had RVR again last night and now rate controlled. Continue Diltiazem gtt and wean as tolerated. Xarelto, Metoprolol (with parameters). Cardiology following. Pneumonia (8/19/2022) / Hx Bronchiectasis POA: suspected and bibasilar as noted on CTA chest. Clinically still with symptoms. Continue empiric Azithromycin and IV Cefepime. Monitor      Hyponatremia (4/30/2022) /  Hypokalemia (9/4/2022) POA: low Na likely chronic from CHF and diuresis.  K+ now normal and Na stable. Continue to follow renal function     Hypothyroidism POA: recent TSH was normal. Continue Levothyroxine    Anemia - likely from chronic illness.  Monitor                Care Plan discussed with: Patient, Family, and Nursing Staff    Discussed:  Care Plan and D/C Planning    Prophylaxis:   Xarelto    Anticipated Disposition:   PT, OT, RN vs TBD           ___________________________________________________    Attending Physician:   Chris Kaminski MD

## 2022-10-28 NOTE — PROGRESS NOTES
TRANSFER - IN REPORT:    Verbal report received from Shen RN(name) on Darya Ablert  being received from ICU(unit) for routine progression of care      Report consisted of patients Situation, Background, Assessment and   Recommendations(SBAR). Information from the following report(s) SBAR, Kardex, ED Summary, Intake/Output, MAR, and Recent Results was reviewed with the receiving nurse. Opportunity for questions and clarification was provided. Assessment completed upon patients arrival to unit and care assumed. Bedside shift change report given to [address] [place], [place] [address] Road (oncoming nurse) by Richie Dunbar RN (offgoing nurse). Report included the following information SBAR, Kardex, ED Summary, Intake/Output, MAR, and Recent Results.

## 2022-10-28 NOTE — CONSULTS
[facility]     Name: Sean Smith MRN: [id number]   : 1940 Hospital: [address]   Date: 10/28/2022        Impression Plan   Acute respiratory failure  Hypoxia  RLL PNA- aspiration? Bronchiectasis  Afib with RVR               Wean O2 to keep sats above 90%  Pt has a hx of pseudomonas as well as stenotrophomonas colonization. The RLL PNA likely pseudomonas vs aspiration PNA (GERD related or dysphagia related based on pt's hx). Less likely stenotrophomonas since pt is improving on cefepime. Has seen speech in the past with no obvious abnormalities at that time. Willl get MBS. Negative in   Nystatin for outpt thrush treatment  Continue bumex, may be able to decrease some since no pleural effusions on CT chest  Continue protonix  Aspiration precautions. Rate control per cardiology   OOB into chair when HR stable  Will see as needed over the weekend           Radiology  (personally reviewed) CT chest reviewed: RLL extensive infiltrate, RML infiltrate       Subjective     Cc: hypoxia    79 yo with PMHx of bronchiectasis, recurrent PNAs, pseudomonas/stenotrophomonas colonization and CHF presenting with increasing SOB and severe hypoxia in pulmonary clinic. In ER was found to be in a-fib with RVR. Pt admitted and CT chest revealed RLL and RML infiltrates. Pt received diuretics as well. Leg edema has resolved this morning. Managed on Trelegy 200, Wayne nebs, montelukast, Flonase/azelastine, and ProAir/albuterol nebs PRN. Pt denies any nausea/vomiting in the last few weeks. She does have some GERD sxs. Non-smoker. Interval history  Had a choking episode on nystatin yesterday. States that this has happened to her a few times at home, but not this severely. Feels better now. Sats 100% on 5L- weaned while I was in the room.        Past Medical History:   Diagnosis Date    Asthma     CHF (congestive heart failure), NYHA class I, chronic, systolic (HCC)     Chronic anticoagulation     GERD (gastroesophageal reflux disease)     Hiatal hernia     HX: breast cancer 2015    Right     Hypothyroid     Paroxysmal A-fib (HCC)     Venous insufficiency     bilat LE    Weight loss       Past Surgical History:   Procedure Laterality Date    COLONOSCOPY N/A 6/14/2021    COLONOSCOPY AND EGD performed by Tameka Hoffman MD at [address] [facility] [address] BREAST LUMPECTOMY Right 08/24/2015    HX CATARACT REMOVAL  2013    Bilateral     HX CHOLECYSTECTOMY      HX DILATION AND CURETTAGE      x2    HX ORTHOPAEDIC  2017    RIGHT foot  hammertoe      Prior to Admission medications    Medication Sig Start Date End Date Taking? Authorizing Provider   bumetanide (BUMEX) 0.5 mg tablet Take 1.5 mg by mouth daily. Yes Provider, Historical   ferrous sulfate 325 mg (65 mg iron) tablet Take 325 mg by mouth daily. Yes Provider, Historical   traZODone (DESYREL) 50 mg tablet Take 25 mg by mouth nightly as needed for Sleep (in addition to scheduled dose if needed for sleep). Yes Provider, Historical   amoxicillin (AMOXIL) 875 mg tablet Take 875 mg by mouth two (2) times a day. 10/22/22 10/28/22 Yes Provider, Historical   azelastine (ASTEPRO) 205.5 mcg (0.15 %) 1 [place] by Both Nostrils route daily. Yes Provider, Historical   tobramycin, PF, (Wayne) 300 mg/5 mL nebulizer solution 300 mg by Nebulization route two (2) times a day. 2 weeks on and 2 weeks off -   Yes Provider, Historical   calcium carbonate (TUMS) 200 mg calcium (500 mg) chew Take 1 Tablet by mouth daily. Yes Provider, Historical   b complex vitamins tablet Take 1 Tablet by mouth daily. Yes Provider, Historical   levalbuterol tartrate (XOPENEX) 45 mcg/actuation inhaler Take 1 Puff by inhalation every four (4) hours as needed for Wheezing or Shortness of Breath. Yes Provider, Historical   arformoteroL (BROVANA) 15 mcg/2 mL nebu neb solution 15 mcg by Nebulization route two (2) times a day.    Yes Provider, Historical   budesonide (PULMICORT) 0.5 mg/2 mL nbsp 500 mcg by Nebulization route two (2) times a day. Yes Provider, Historical   nystatin (MYCOSTATIN) 100,000 unit/mL suspension Take 500,000 Units by mouth two (2) times a day. swish and swallow   Yes Provider, Historical   metoprolol succinate (TOPROL-XL) 50 mg XL tablet Take 50 mg by mouth daily. Yes Provider, Historical   levothyroxine (SYNTHROID) 25 mcg tablet Take 25 mcg by mouth nightly. Yes Provider, Historical   traZODone (DESYREL) 50 mg tablet Take 25 mg by mouth nightly. Yes Provider, Historical   ergocalciferol (ERGOCALCIFEROL) 1,250 mcg (50,000 unit) capsule Take 50,000 Units by mouth every Sunday. Yes Provider, Historical   fluticasone (VERAMYST) 27.5 mcg/actuation nasal spray 2 Sprays by Nasal route daily. Yes Provider, Historical   ondansetron hcl (Zofran) 8 mg tablet Take 8 mg by mouth every eight (8) hours as needed for Nausea or Vomiting (prn). Yes Provider, Historical   amLODIPine (NORVASC) 5 mg tablet Take 5 mg by mouth daily as needed (The patient takes only if her systolic blood pressue is over 150). For spb over 150   Yes Provider, Historical   guaiFENesin ER (MUCINEX) 600 mg ER tablet Take 1 Tablet by mouth every twelve (12) hours. 9/28/22  Yes Hilario Burgess MD   pantoprazole (PROTONIX) 40 mg tablet Take 40 mg by mouth daily. Yes Provider, Historical   olopatadine (PATADAY) 0.2 % drop ophthalmic solution Administer 1 Drop to both eyes daily. Yes Provider, Historical   Xarelto 20 mg tab tablet Take 20 mg by mouth daily (with dinner). 7/31/20  Yes Provider, Historical   metroNIDAZOLE (METROCREAM) 0.75 % topical cream Apply  to affected area two (2) times a day. Yes Provider, Historical   montelukast (SINGULAIR) 10 mg tablet Take 10 mg by mouth daily. Yes Provider, Historical   diclofenac (VOLTAREN) 1 % gel Apply  to affected area four (4) times daily as needed for Pain.    Yes Provider, Historical   acetaminophen (TYLENOL) 500 mg tablet Take 500 mg by mouth every six (6) hours as needed for Pain. Yes Provider, Historical     Current Facility-Administered Medications   Medication Dose Route Frequency    metoprolol tartrate (LOPRESSOR) tablet 50 mg  50 mg Oral BID    diclofenac (VOLTAREN) 1 % topical gel 4 g  4 g Topical QID    levothyroxine (SYNTHROID) tablet 25 mcg  25 mcg Oral ACB    acetaZOLAMIDE (DIAMOX) 250 mg in sterile water (preservative free) 2.5 mL injection  250 mg IntraVENous Q12H    montelukast (SINGULAIR) tablet 10 mg  10 mg Oral QHS    pantoprazole (PROTONIX) tablet 40 mg  40 mg Oral DAILY    sodium chloride (NS) flush 5-40 mL  5-40 mL IntraVENous Q8H    senna-docusate (PERICOLACE) 8.6-50 mg per tablet 1 Tablet  1 Tablet Oral BID    rivaroxaban (XARELTO) tablet 20 mg  20 mg Oral DAILY WITH DINNER    nystatin (MYCOSTATIN) 100,000 unit/mL oral suspension 500,000 Units  500,000 Units Oral QID    albuterol (PROVENTIL VENTOLIN) nebulizer solution 2.5 mg  2.5 mg Nebulization BID RT    dilTIAZem (CARDIZEM) 100 mg in 0.9% sodium chloride (MBP/ADV) 100 mL infusion  0-15 mg/hr IntraVENous TITRATE    [Held by provider] bumetanide (BUMEX) injection 1 mg  1 mg IntraVENous Q12H    guaiFENesin ER (MUCINEX) tablet 600 mg  600 mg Oral Q12H    cefepime (MAXIPIME) 2 g in 0.9% sodium chloride (MBP/ADV) 100 mL MBP  2 g IntraVENous Q12H     Allergies   Allergen Reactions    Ace Inhibitors Cough    Iodine Rash    Penicillins Rash      Social History     Tobacco Use    Smoking status: Former     Types: Cigarettes     Start date: 2000     Quit date: 2000     Years since quittin.9    Smokeless tobacco: Never   Substance Use Topics    Alcohol use: Not Currently      Family History   Problem Relation Age of Onset    Hypertension Father           Laboratory: I have personally reviewed the flowsheet and labs.      Recent Labs     10/27/22  0147 10/26/22  1030 10/25/22  1854   WBC  --  6.7 7.5   HGB 9.2* 9.5* 9.9*   HCT 29.4* 30.2* 31.6*   PLT  --  398 394       Recent Labs     10/27/22  0147 10/26/22  1030 10/25/22  1545   * 130* 125*   K 4.6 3.7 3.2*   CL 90* 84* 82*   CO2 42* 43* 37*   * 113* 99   BUN 11 10 13   CREA 0.56 0.44* 0.47*   CA 8.9 9.5 9.3   MG 2.0 1.6  1.7  --    PHOS  --  3.0  --    ALB  --  2.8* 2.5*   ALT  --  14 15         Objective:   Visit Vitals  BP (!) 107/54   Pulse 100   Temp 97.5 °F (36.4 °C)   Resp (!) 32   Ht 5' 3\" (1.6 m)   Wt 59.1 kg (130 lb 4.7 oz)   SpO2 100%   Breastfeeding No   BMI 23.08 kg/m²       Intake/Output Summary (Last 24 hours) at 10/28/2022 3353  Last data filed at 10/27/2022 1800  Gross per 24 hour   Intake 730.5 ml   Output 1000 ml   Net -269.5 ml       EXAM:   GENERAL: Speaking in full long sentences, awake, alert, HEENT:  anicteric, EOMI, no alar flaring or epistaxis, oral mucosa moist without cyanosis, NECK:  no jugular vein distention, no retractions, no thyromegaly or masses, LUNGS:Crackles in right lower lobe, HEART:  Irregular and mildly tachy, with no MGR; no edema is present, ABDOMEN:  soft with no tenderness, bowel sounds present, EXTREMITIES:  warm with no cyanosis, SKIN:  no jaundice or ecchymosis, and NEUROLOGIC:  alert and oriented, grossly non-focal    Manjit Allen MD  [facility]

## 2022-10-28 NOTE — PROGRESS NOTES
Ultrasound IV by Ellis Martell RN :  Procedure Note    Patient meets criteria for US IV insertion. Ultrasound IV education provided to patient. Opportunities for questions given. Ultrasound used for PIV placement:  20 gauge 8 cm Arrow Endurance Extended Dwell(may stay in place for 29 days)  Left cephalic location. 1 X Attempt(s). Flushed with ease; blood return. Procedure tolerated well. Primary RN aware of IV placement and added to LDA.       Ellis Martell RN

## 2022-10-28 NOTE — PROGRESS NOTES
Problem: Mobility Impaired (Adult and Pediatric)  Goal: *Acute Goals and Plan of Care (Insert Text)  Description: FUNCTIONAL STATUS PRIOR TO ADMISSION: Patient was modified independent using a rollator for functional mobility. HOME SUPPORT PRIOR TO ADMISSION: Pt is caregiver for spouse; family provided assistance to patient for stair negotiation prior to admission. Physical Therapy Goals  Initiated 10/26/2022  1. Patient will move from supine to sit and sit to supine in bed with modified independence within 7 day(s). 2.  Patient will transfer from bed to chair and chair to bed with modified independence using the least restrictive device within 7 day(s). 3.  Patient will perform sit to stand with modified independence within 7 day(s). 4.  Patient will ambulate with modified independence for 100 feet with the least restrictive device within 7 day(s). 5.  Patient will ascend/descend 4 stairs with one handrail(s) with minimal assistance/contact guard assist within 7 day(s). Outcome: Progressing Towards Goal   PHYSICAL THERAPY TREATMENT  Patient: Sean Smith (79 y.o. female)  Date: 10/28/2022  Diagnosis: Atrial fibrillation with rapid ventricular response (HCC) [I48.91] Acute and chronic respiratory failure with hypoxia [facility])      Precautions: Fall, Skin  Chart, physical therapy assessment, plan of care and goals were reviewed. ASSESSMENT  Patient continues with skilled PT services and is progressing towards goals. Patient eager to work with therapy today, reports asthma/choking event overnight but better now, though on 4L. Completed toileting at [facility] then gait in laps around room on 4L. Sats > 95% but patient with increased SOB. Reiterated PLB and energy conservation, pacing. Will cont to benefit from skilled PT to work on improved endurance with functional mobility. Current Level of Function Impacting Discharge (mobility/balance):  CGA with gait in room    Other factors to consider for discharge:          PLAN :  Patient continues to benefit from skilled intervention to address the above impairments. Continue treatment per established plan of care. to address goals. Recommendation for discharge: (in order for the patient to meet his/her long term goals)  Physical therapy at least 2 days/week in the home AND ensure assist and/or supervision for safety with mobility and self care    This discharge recommendation:  Has been made in collaboration with the attending provider and/or case management    IF patient discharges home will need the following DME: patient owns DME required for discharge       SUBJECTIVE:   Patient stated I thought I was going to die last night.     OBJECTIVE DATA SUMMARY:   Critical Behavior:  Neurologic State: Alert  Orientation Level: Oriented to person, Oriented to place, Oriented to situation  Cognition: Appropriate decision making, Appropriate for age attention/concentration, Appropriate safety awareness, Follows commands  Safety/Judgement: Decreased insight into deficits  Functional Mobility Training:  Bed Mobility:           Scooting: Modified independent        Transfers:  Sit to Stand: Contact guard assistance  Stand to Sit: Contact guard assistance        Bed to Chair: Contact guard assistance                    Balance:  Sitting: Intact  Sitting - Static: Good (unsupported)  Sitting - Dynamic: Good (unsupported)  Standing: Impaired; With support  Standing - Static: Fair  Standing - Dynamic : Fair  Ambulation/Gait Training:  Distance (ft): 40 Feet (ft)  Assistive Device: Gait belt;Walker, rolling  Ambulation - Level of Assistance: Contact guard assistance        Gait Abnormalities: Decreased step clearance              Speed/Ivory: Pace decreased (<100 feet/min)  Step Length: Right shortened;Left shortened             Pain Rating:  None reported    Activity Tolerance:   Fair, desaturates with exertion and requires oxygen, and observed SOB with activity    After treatment patient left in no apparent distress:   Sitting in chair and Call bell within reach    COMMUNICATION/COLLABORATION:   The patients plan of care was discussed with: Occupational therapist and Registered nurse.      Lisa Kim, PT   Time Calculation: 23 mins

## 2022-10-28 NOTE — PROGRESS NOTES
Problem: Dysphagia (Adult)  Goal: *Acute Goals and Plan of Care (Insert Text)  Description: Swallowing goals initiated 10-28-22  1) tolerate diet and pills without s/s aspiration using small careful sips as warm up before taking other PO by 10-31-22  Outcome: Not Progressing Towards Goal   [address] STUDY  Patient: Jory Blizzard (17 y.o. female)  Date: 10/28/2022  Primary Diagnosis: Atrial fibrillation with rapid ventricular response (HCC) [I48.91]       Precautions: aspiration  Fall, Skin    ASSESSMENT :  Based on the objective data described below, the patient presents with trace amount of silent aspiration on the first sip of liquids. .She also had trace amount of penetration after the swallow, some of which may also be silently aspirated in small amounts. This is a decline in status from July 2021 when she had a MBS with normal oral-pharyngeal swallow and reflux/post prandial aspiration risk was more of a concern. There appeared to be a warm up effect for swallow today, as swallow timing improved with additional boluses. Admitted 10-25-22 with hypoxia, pulm edema. She choked on nystatin this am.   PMH:  asthma, bronchiectasis, Pafib, sCHF EF 45-50%, presented w/ dyspnea, hypoxia, pulm edema, CHF, afib RVR, hyponatremia. The patient is a poor historian. patient was admitted to [facility] last month for afib, heart failure, hypoxia and subsequently went to rehab. Patient will benefit from skilled intervention to address the above impairments. Patients rehabilitation potential is considered to be Fair     PLAN :  Recommendations and Planned Interventions:  Continue diet as tolerated. TAKE A FEW SIPS OF WATER AND SWALLOW BEFORE TAKING ANY MEDS OR OTHER PO. Frequency/Duration: Patient will be followed by speech-language pathology 2 times a week to address goals.   Discharge Recommendations: None     SUBJECTIVE:   Patient stated Don't tell me I have not take my nystatin again today. My throat still burns from when it choked me. OBJECTIVE:     Past Medical History:   Diagnosis Date    Asthma     CHF (congestive heart failure), NYHA class I, chronic, systolic (HCC)     Chronic anticoagulation     GERD (gastroesophageal reflux disease)     Hiatal hernia     HX: breast cancer 2015    Right     Hypothyroid     Paroxysmal A-fib (HCC)     Venous insufficiency     bilat LE    Weight loss      Past Surgical History:   Procedure Laterality Date    COLONOSCOPY N/A 6/14/2021    COLONOSCOPY AND EGD performed by Nam Tsai MD at [address] BREAST LUMPECTOMY Right 08/24/2015    HX CATARACT REMOVAL  2013    Bilateral     HX CHOLECYSTECTOMY      HX DILATION AND CURETTAGE      x2    HX ORTHOPAEDIC  2017    RIGHT foot  hammertoe     Prior Level of Function/Home Situation:   Home Situation  Home Environment: Private residence  # Steps to Enter: 5  Rails to Enter: Yes  Hand Rails : Bilateral  One/Two Story Residence: One story  Living Alone: No  Support Systems: Child(cornell), Spouse/Significant Other  Patient Expects to be Discharged to[de-identified] Home  Current DME Used/Available at Home: Oxygen, portable, Grab bars, Shower chair, Raised toilet seat, Walker, rollator, Walker, rolling, Cane, straight, Commode, bedside (2-3L O2 at home)  Tub or Shower Type: Shower  Diet prior to admission: regular, thins  Current Diet:  regular, thins   Radiologist: Iris Romano  Film Views: Lateral  Patient Position: upright in Hausted chair    Trial 1: Trial 2:   Consistency Presented: Thin liquid; Solid;Pill/Tablet;Pudding     How Presented: Self-fed/presented;Spoon;Straw;Cup/sip      ORAL PHASE:      Bolus Acceptance: No impairment     Bolus Formation/Control: No impairment:    :     Propulsion: No impairment     Oral Residue: Lingual (mild)  PHARYNGEAL PHASE:      Initiation of Swallow: Triggered at vallecula     Timing: Pooling 1-5 sec     Penetration: None; After swallow;From residual     Aspiration/Timing: Silent ; Trace; After (trace)     Pharyngeal Clearance: Vallecular residue; Less than 10% (s/p with purees, solids-mild)      CHIN TUCK DID help to reduce penetration slightly, but this had to be cued. Trial 3: Trial 4:                            :    :                                                                        Decreased Tongue Base Retraction?: Yes (mild)  Laryngeal Elevation: Reduced excursion with laryngeal vestibule gap  Aspiration/Penetration Score: 8 (Aspiration-Contrast passes cords/glottis with no effort to eject, ie/silent aspiration)  Pharyngeal Symmetry: Not assessed  Pharyngeal-Esophageal Segment: No impairment             NOMS:   The NOMS functional outcome measure was used to quantify this patient's level of swallowing impairment. Based on the NOMS, the patient was determined to be at level 6 for swallow function         NOMS Swallowing Levels:  Level 1 (CN): NPO  Level 2 (CM): NPO but takes consistency in therapy  Level 3 (CL): Takes less than 50% of nutrition p.o. and continues with nonoral feedings; and/or safe with mod cues; and/or max diet restriction  Level 4 (CK): Safe swallow but needs mod cues; and/or mod diet restriction; and/or still requires some nonoral feeding/supplements  Level 5 (CJ): Safe swallow with min diet restriction; and/or needs min cues  Level 6 (CI): Independent with p.o.; rare cues; usually self cues; may need to avoid some foods or needs extra time  Level 7 ([address] [place] [address]): Independent for all p.o.  BRYAN. (2003). National Outcomes Measurement System (NOMS): Adult Speech-Language Pathology User's Guide. COMMUNICATION/EDUCATION:   Patient was educated regarding Her deficit(s) of dysphagia as this relates to Her diagnosis of acute on chronic resp issues. .  She demonstrated Fair understanding as evidenced by discussion. Will need cues/reminders about results and recommendations.  .    The patients plan of care including findings from MBS, recommendations, planned interventions, and recommended diet changes were discussed with: Registered nurse. Patient/family have participated as able in goal setting and plan of care. Patient/family agree to work toward stated goals and plan of care.     Thank you for this referral.  Davis Solis, SLP  Time Calculation: 20 mins

## 2022-10-28 NOTE — PROGRESS NOTES
Problem: Self Care Deficits Care Plan (Adult)  Goal: *Acute Goals and Plan of Care (Insert Text)  Description: FUNCTIONAL STATUS PRIOR TO ADMISSION: Patient was modified independent using a rollator for functional mobility. HOME SUPPORT PRIOR TO ADMISSION: Pt is caregiver for spouse; family provided assistance to patient for stair negotiation prior to admission. Occupational Therapy Goals  Initiated 10/26/2022  1. Patient will perform grooming with modified independence standing at the sink >5 minutes within 7 day(s). 2.  Patient will perform upper body dressing and bathing with modified independence within 7 day(s). 3.  Patient will perform lower body dressing and bathing with supervision/set-up within 7 day(s). 4.  Patient will perform toilet transfers with supervision/set-up within 7 day(s). 5.  Patient will perform all aspects of toileting with supervision/set-up within 7 day(s). 6.  Patient will participate in upper extremity therapeutic exercise/activities with modified independence for 10 minutes within 7 day(s). 7.  Patient will utilize energy conservation techniques during functional activities with verbal cues within 7 day(s). Outcome: Progressing Towards Goal     OCCUPATIONAL THERAPY TREATMENT  Patient: Max Durán (89 y.o. female)  Date: 10/28/2022  Diagnosis: Atrial fibrillation with rapid ventricular response (HCC) [I48.91] Acute and chronic respiratory failure with hypoxia [facility])      Precautions: Fall, Skin  Chart, occupational therapy assessment, plan of care, and goals were reviewed. ASSESSMENT  Patient continues with skilled OT services and is progressing towards goals. Patient today is highly motivated to participate with therapy, able to perform serial ADL and mobility tasks and limited by hypotension (see below). She offers excellent efforts and completes ADL transfers with overall CGA and manages seated toileting with SBA.  Prolonged standing grooming at sink deferred due to BP but pt receptive to all instruction this session. She tolerates all activity on 4L via nasal cannula. BP during session: At rest (sitting in chair; pre activity): 101/63, 98 bpm  Sitting (on BSC; post transfer): 86/53, 102 bpm  Standin/61, 111 bpm  Sitting (in chair; post activity): 98/67, 92 bpm    Current Level of Function Impacting Discharge (ADLs): CGA for ADL transfers; up to min A for LB dressing; SBA/CGA for toileting    Other factors to consider for discharge: supplemental O2 needs; hypotension         PLAN :  Patient continues to benefit from skilled intervention to address the above impairments. Continue treatment per established plan of care to address goals. Recommend with staff: OOB to chair for all meals; mobility to bathroom vs. BSC for toileting    Recommend next OT session: standing tasks at sink as BP allows    Recommendation for discharge: (in order for the patient to meet his/her long term goals)  Occupational therapy at least 2 days/week in the home AND ensure assist and/or supervision for safety with ADLs and mobility    This discharge recommendation:  Has been made in collaboration with the attending provider and/or case management    IF patient discharges home will need the following DME: anticipate pt has needed DME       SUBJECTIVE:   Patient stated I want to do what I can.     OBJECTIVE DATA SUMMARY:   Cognitive/Behavioral Status:  Neurologic State: Alert  Orientation Level: Oriented X4  Cognition: Appropriate decision making; Appropriate for age attention/concentration; Appropriate safety awareness; Follows commands  Perception: Appears intact  Perseveration: No perseveration noted  Safety/Judgement: Awareness of environment; Fall prevention;Good awareness of safety precautions; Home safety; Insight into deficits    Functional Mobility and Transfers for ADLs:  Bed Mobility:  Scooting: Modified independent    Transfers:  Sit to Stand: Contact guard assistance  Functional Transfers  Toilet Transfer : Contact guard assistance;Minimum assistance;Assist x1;Adaptive equipment  Adaptive Equipment: Bedside commode;Walker (comment)  Bed to Chair: Contact guard assistance    Balance:  Sitting: Intact  Sitting - Static: Good (unsupported)  Sitting - Dynamic: Good (unsupported)  Standing: Impaired; With support  Standing - Static: Fair  Standing - Dynamic : Fair    ADL Intervention:  Toileting  Bladder Hygiene: Stand-by assistance  Bowel Hygiene: Stand-by assistance  Clothing Management: Contact guard assistance  Cues: Verbal cues provided  Adaptive Equipment: Devin Nunez; Other (comment) ([facility])    Cognitive Retraining  Safety/Judgement: Awareness of environment; Fall prevention;Good awareness of safety precautions; Home safety; Insight into deficits    Pain:  Pt reporting minimal pain    Activity Tolerance:   Fair, desaturates with exertion and requires oxygen, requires rest breaks, and observed SOB with activity    After treatment patient left in no apparent distress:   Sitting in chair and Call bell within reach    COMMUNICATION/COLLABORATION:   The patients plan of care was discussed with: Physical therapist and Registered nurse.      Teresa Nichole OT  Time Calculation: 25 mins

## 2022-10-28 NOTE — PROGRESS NOTES
[facility]  Cardiology Care Note                  []Initial visit     [x]Established visit     Patient Name: Omar Rick - UHP:[id number]  Primary Cardiologist: Dr Roberts Holland Cardiologist: Meera Martinez DO      Reason for consult: a fib RVR     HPI:       Gladis Cooper is a 80 y.o. female with PMH significant for  Pafib, HF mr EF 45-50% decline is likely tachycardia induced, asthma who presented w/ dyspnea, hypoxia, pulm edema, CHF, afib RVR, hyponatremia. Over the past week, the patient has had worsening SOB, associated with increased cough, hypoxia, and LE edema. SUBJECTIVE:      Gladis Cooper reports SOB     Assessment and Plan     1 a fib RVR: cont BB , dose IV digoxin this am. Anticoagulated with xarelto. Has appt in November to discuss Watchman device with Dr Pascale Waters. Cont BB unless systolic BP < 90. Defer resumption of sotalol to Dr Pascale Waters  2 HF mr EF: pBNP improved since last admission. Hold IV loops given low BP.   3 chronic hyponatremia: Na 133 10/27  4 asthma: per medicine team     Will see prn        ____________________________________________________________        ECHO ADULT COMPLETE 09/06/2022 9/6/2022    Interpretation Summary    Left Ventricle: Mildly reduced left ventricular systolic function with a visually estimated EF of 45 - 50%. Left ventricle size is normal. Normal wall thickness. Mild global hypokinesis present. Grade I diastolic dysfunction with normal LAP. Aortic Valve: Valve structure is normal. Mild sclerosis of the aortic valve cusp. Mitral Valve: Mild regurgitation. Tricuspid Valve: Mildly elevated RVSP. Mildly improved lv function compared to study 4/2022. Signed by: Meera Martinez DO on 9/6/2022 11:24 AM              Most recent HS troponins:  No results for input(s): TROPHS in the last 72 hours.     No lab exists for component:  CKMB    ECG:   EKG Results       Procedure 720 Value Units Date/Time    EKG, 12 LEAD, INITIAL [[id number]]     Order Status: Sent     EKG, 12 LEAD, INITIAL [[id number]]     Order Status: Sent     EKG, 12 LEAD, INITIAL [[id number]] Collected: 10/25/22 1546    Order Status: Completed Updated: 10/26/22 1402     Ventricular Rate 96 BPM      QRS Duration 88 ms      Q-T Interval 338 ms      QTC Calculation (Bezet) 427 ms      Calculated R Axis -17 degrees      Calculated T Axis 12 degrees      Diagnosis --     Atrial fibrillation  Minimal voltage criteria for LVH, may be normal variant ( R in aVL )  Abnormal ECG  When compared with ECG of 19-SEP-2022 05:17,  No significant change was found  Confirmed by Faiza Burden M.D., Ritchie Black ([id number]) on 10/26/2022 2:02:23 PM                Review of Systems:    [x]All other systems reviewed and all negative except as written in HPI    [] Patient unable to provide secondary to condition       Past Medical History:   Diagnosis Date    Asthma     CHF (congestive heart failure), NYHA class I, chronic, systolic (HCC)     Chronic anticoagulation     GERD (gastroesophageal reflux disease)     Hiatal hernia     HX: breast cancer 2015    Right     Hypothyroid     Paroxysmal A-fib ([address]     Venous insufficiency     bilat LE    Weight loss      Past Surgical History:   Procedure Laterality Date    COLONOSCOPY N/A 6/14/2021    COLONOSCOPY AND EGD performed by Meghan Tierney MD at [facility] ENDOSCOPY    HX BREAST LUMPECTOMY Right 08/24/2015    HX CATARACT REMOVAL  2013    Bilateral     HX CHOLECYSTECTOMY      HX DILATION AND CURETTAGE      x2    HX ORTHOPAEDIC  2017    RIGHT foot  hammertoe     Social Hx:  reports that she quit smoking about 21 years ago. Her smoking use included cigarettes. She started smoking about 22 years ago. She has never used smokeless tobacco. She reports that she does not currently use alcohol. She reports that she does not use drugs.   Family Hx: family history includes Hypertension in her father. Allergies   Allergen Reactions    Ace Inhibitors Cough    Iodine Rash    Penicillins Rash          OBJECTIVE:  Wt Readings from Last 3 Encounters:   10/25/22 59.1 kg (130 lb 4.7 oz)   09/25/22 55.6 kg (122 lb 9.2 oz)   09/14/22 57 kg (125 lb 10.6 oz)       Intake/Output Summary (Last 24 hours) at 10/28/2022 0802  Last data filed at 10/27/2022 1800  Gross per 24 hour   Intake 730.5 ml   Output 1000 ml   Net -269.5 ml         Physical Exam:    Vitals:   Vitals:    10/28/22 0300 10/28/22 0400 10/28/22 0700 10/28/22 0732   BP: (!) 92/54 106/63 (!) 107/54    Pulse: (!) 110 97 100    Resp: 25 13 (!) 32    Temp:  97.5 °F (36.4 °C)     SpO2:  100% 100% 100%   Weight:       Height:         Telemetry: a fib     Gen: Well-developed, well-nourished, in no acute distress  Neck: Supple, No JVD, No Carotid Bruit  Resp: Dim bases   Card: irregular Rate,Rythm, Normal S1, S2, No murmurs, rubs or gallop. Abd:   Soft, non-tender, non-distended, BS+   MSK: No cyanosis  Skin: No rashes    Neuro: Moving all four extremities, follows commands appropriately  Psych: Limited  insight, oriented to person, place, alert, Nml Affect  LE: No edema    Data Review:     Radiology:   XR Results (most recent):  Results from Hospital Encounter encounter on 10/25/22    XR CHEST PA LAT    Narrative  EXAM: XR CHEST PA LAT    INDICATION: Shortness of breath    COMPARISON: September 5, 2022    TECHNIQUE: PA and lateral chest views    FINDINGS: Cardiac size is unchanged and normal. Interstitial edema is present,  elevation of the right hemidiaphragm with blunting of both costophrenic angles. Impression  New or increased Interstitial edema with bibasilar atelectasis or  infiltrate, cannot exclude effusions.       Recent Labs     10/27/22  0147 10/26/22  1030   * 130*   K 4.6 3.7   CL 90* 84*   CO2 42* 43*   BUN 11 10   CREA 0.56 0.44*   * 113*   PHOS  --  3.0   CA 8.9 9.5       Recent Labs     10/27/22  0147 10/26/22  1030 10/25/22  1854   WBC  --  6.7 7.5   HGB 9.2* 9.5* 9.9*   HCT 29.4* 30.2* 31.6*   PLT  --  398 394       Recent Labs     10/26/22  1030 10/25/22  1545   AP 74 74       No results for input(s): CHOL, LDLC in the last 72 hours.     No lab exists for component: TGL, HDLC,  HBA1C      Current meds:    Current Facility-Administered Medications:     metoprolol tartrate (LOPRESSOR) tablet 50 mg, 50 mg, Oral, BID, Leesa Avila NP, 50 mg at 10/27/22 1707    diclofenac (VOLTAREN) 1 % topical gel 4 g, 4 g, Topical, QID, Govind Edwards MD, 4 g at 10/27/22 2111    levothyroxine (SYNTHROID) tablet 25 mcg, 25 mcg, Oral, ACB, Govind Edwards MD, 25 mcg at 10/27/22 0916    acetaZOLAMIDE (DIAMOX) 250 mg in sterile water (preservative free) 2.5 mL injection, 250 mg, IntraVENous, Q12H, Power Bergman MD, 250 mg at 10/27/22 2111    ondansetron (ZOFRAN) injection 4 mg, 4 mg, IntraVENous, Q4H PRN, James Martinez MD    montelukast (SINGULAIR) tablet 10 mg, 10 mg, Oral, QHS, Dwight Novak MD, 10 mg at 10/27/22 2111    pantoprazole (PROTONIX) tablet 40 mg, 40 mg, Oral, DAILY, Dwight Novak MD, 40 mg at 10/27/22 2668    sodium chloride (NS) flush 5-40 mL, 5-40 mL, IntraVENous, Q8H, Dwight Novak MD, 10 mL at 10/28/22 0640    sodium chloride (NS) flush 5-40 mL, 5-40 mL, IntraVENous, PRN, Dwight Novak MD    0.9% sodium chloride infusion 25 mL, 25 mL, IntraVENous, PRN, Dwight Novak MD    acetaminophen (TYLENOL) tablet 650 mg, 650 mg, Oral, Q6H PRN **OR** acetaminophen (TYLENOL) suppository 650 mg, 650 mg, Rectal, Q6H PRN, Dwight Novak MD    senna-docusate (PERICOLACE) 8.6-50 mg per tablet 1 Tablet, 1 Tablet, Oral, BID, Dwight Novak MD, 1 Tablet at 10/27/22 2111    bisacodyL (DULCOLAX) suppository 10 mg, 10 mg, Rectal, DAILY PRN, Dwight Novak MD    prochlorperazine (COMPAZINE) injection 10 mg, 10 mg, IntraVENous, Q6H PRN, Dwight Novak MD    acetaminophen (TYLENOL) tablet 650 mg, 650 mg, Oral, Q6H PRN, James Mendiola MD, 650 mg at 10/27/22 0119    azithromycin (ZITHROMAX) tablet 500 mg, 500 mg, Oral, DAILY, James Martinez MD, 500 mg at 10/27/22 9623    rivaroxaban (XARELTO) tablet 20 mg, 20 mg, Oral, DAILY WITH DINNER, Sol Lombardi MD, 20 mg at 10/27/22 1707    nystatin (MYCOSTATIN) 100,000 unit/mL oral suspension 500,000 Units, 500,000 Units, Oral, QID, Roe Campo MD, 500,000 Units at 10/27/22 2111    albuterol (PROVENTIL VENTOLIN) nebulizer solution 2.5 mg, 2.5 mg, Nebulization, BID RT, Roe Campo MD, 2.5 mg at 10/28/22 0800    dilTIAZem (CARDIZEM) 100 mg in 0.9% sodium chloride (MBP/ADV) 100 mL infusion, 0-15 mg/hr, IntraVENous, TITRATE, Nicole Galdamez MD, Stopped at 10/27/22 3692    [Held by provider] bumetanide (BUMEX) injection 1 mg, 1 mg, IntraVENous, Q12H, Dwight Novak MD, 1 mg at 10/27/22 9241    albuterol-ipratropium (DUO-NEB) 2.5 MG-0.5 MG/3 ML, 3 mL, Nebulization, Q6H PRN, Dwight Novak MD, 3 mL at 10/27/22 2152    guaiFENesin ER (MUCINEX) tablet 600 mg, 600 mg, Oral, Q12H, Dwight Novak MD, 600 mg at 10/27/22 2111    benzonatate (TESSALON) capsule 100 mg, 100 mg, Oral, TID PRN, Dwight Novak MD, 100 mg at 10/25/22 2045    diphenhydrAMINE (BENADRYL) injection 25 mg, 25 mg, IntraVENous, Q6H PRN, Dwight Novak MD    cefepime (MAXIPIME) 2 g in 0.9% sodium chloride (MBP/ADV) 100 mL MBP, 2 g, IntraVENous, Q12H, Dwight Novak MD, Last Rate: 25 mL/hr at 10/28/22 0640, 2 g at 10/28/22 9320 [facility], NP    [facility] [id number] [address] [place] [address], [address] [place] [address], [facility] [address] Jaime Vela  [phone]      Angela Cornejo MD

## 2022-10-29 PROCEDURE — 77010033678 HC OXYGEN DAILY

## 2022-10-29 PROCEDURE — 74011250636 HC RX REV CODE- 250/636: Performed by: INTERNAL MEDICINE

## 2022-10-29 PROCEDURE — 74011250637 HC RX REV CODE- 250/637: Performed by: INTERNAL MEDICINE

## 2022-10-29 PROCEDURE — 65270000046 HC RM TELEMETRY

## 2022-10-29 PROCEDURE — 74011000250 HC RX REV CODE- 250: Performed by: INTERNAL MEDICINE

## 2022-10-29 PROCEDURE — 74011250637 HC RX REV CODE- 250/637: Performed by: NURSE PRACTITIONER

## 2022-10-29 PROCEDURE — 74011000258 HC RX REV CODE- 258: Performed by: INTERNAL MEDICINE

## 2022-10-29 RX ORDER — METRONIDAZOLE 500 MG/100ML
500 INJECTION, SOLUTION INTRAVENOUS EVERY 12 HOURS
Status: DISCONTINUED | OUTPATIENT
Start: 2022-10-29 | End: 2022-10-31 | Stop reason: HOSPADM

## 2022-10-29 RX ADMIN — ACETAMINOPHEN 650 MG: 325 TABLET, FILM COATED ORAL at 06:24

## 2022-10-29 RX ADMIN — ACETAMINOPHEN 650 MG: 325 TABLET, FILM COATED ORAL at 16:19

## 2022-10-29 RX ADMIN — Medication 10 ML: at 22:40

## 2022-10-29 RX ADMIN — DICLOFENAC SODIUM 4 G: 10 GEL TOPICAL at 22:39

## 2022-10-29 RX ADMIN — METRONIDAZOLE 500 MG: 500 INJECTION, SOLUTION INTRAVENOUS at 20:39

## 2022-10-29 RX ADMIN — ONDANSETRON 4 MG: 2 INJECTION INTRAMUSCULAR; INTRAVENOUS at 17:49

## 2022-10-29 RX ADMIN — ONDANSETRON 4 MG: 2 INJECTION INTRAMUSCULAR; INTRAVENOUS at 08:33

## 2022-10-29 RX ADMIN — DICLOFENAC SODIUM 4 G: 10 GEL TOPICAL at 08:34

## 2022-10-29 RX ADMIN — PANTOPRAZOLE SODIUM 40 MG: 40 TABLET, DELAYED RELEASE ORAL at 08:33

## 2022-10-29 RX ADMIN — ACETAZOLAMIDE 500 MG: 500 INJECTION, POWDER, LYOPHILIZED, FOR SOLUTION INTRAVENOUS at 22:38

## 2022-10-29 RX ADMIN — ACETAMINOPHEN 650 MG: 325 TABLET, FILM COATED ORAL at 21:37

## 2022-10-29 RX ADMIN — Medication 10 ML: at 06:27

## 2022-10-29 RX ADMIN — SENNOSIDES AND DOCUSATE SODIUM 1 TABLET: 50; 8.6 TABLET ORAL at 20:39

## 2022-10-29 RX ADMIN — MONTELUKAST 10 MG: 10 TABLET, FILM COATED ORAL at 21:37

## 2022-10-29 RX ADMIN — METRONIDAZOLE 500 MG: 500 INJECTION, SOLUTION INTRAVENOUS at 08:34

## 2022-10-29 RX ADMIN — LEVOTHYROXINE SODIUM 25 MCG: 0.03 TABLET ORAL at 06:24

## 2022-10-29 RX ADMIN — GUAIFENESIN 600 MG: 600 TABLET, EXTENDED RELEASE ORAL at 20:39

## 2022-10-29 RX ADMIN — DICLOFENAC SODIUM 4 G: 10 GEL TOPICAL at 17:49

## 2022-10-29 RX ADMIN — METOPROLOL TARTRATE 50 MG: 50 TABLET ORAL at 08:33

## 2022-10-29 RX ADMIN — CEFEPIME 2 G: 2 INJECTION, POWDER, FOR SOLUTION INTRAVENOUS at 06:25

## 2022-10-29 RX ADMIN — DICLOFENAC SODIUM 4 G: 10 GEL TOPICAL at 12:13

## 2022-10-29 RX ADMIN — GUAIFENESIN 600 MG: 600 TABLET, EXTENDED RELEASE ORAL at 08:33

## 2022-10-29 RX ADMIN — CEFEPIME 2 G: 2 INJECTION, POWDER, FOR SOLUTION INTRAVENOUS at 17:49

## 2022-10-29 RX ADMIN — RIVAROXABAN 20 MG: 20 TABLET, FILM COATED ORAL at 16:20

## 2022-10-29 RX ADMIN — METOPROLOL TARTRATE 50 MG: 50 TABLET ORAL at 17:50

## 2022-10-29 RX ADMIN — ACETAZOLAMIDE 500 MG: 500 INJECTION, POWDER, LYOPHILIZED, FOR SOLUTION INTRAVENOUS at 08:39

## 2022-10-29 NOTE — PROGRESS NOTES
0700 Bedside shift change report given to [address] Balatonchristy Belle (oncoming nurse) by Lenny Cardoza RN (offgoing nurse). Report included the following information SBAR, Kardex, ED Summary, Intake/Output, MAR, and Recent Results. This patient was assisted with Intentional Toileting every 2 hours during this shift as appropriate. Documentation of ambulation and output reflected on Flowsheet as appropriate. Purposeful hourly rounding was completed using AIDET and 5Ps. Outcomes of PHR documented as they occurred. Bed alarm in use as appropriate. Dual Suction and ambubag in place. Bedside shift change report given to Xavier Valentine (oncoming nurse) by Cornel Larsen RN (offgoing nurse). Report included the following information SBAR, Kardex, ED Summary, Intake/Output, MAR, and Recent Results.

## 2022-10-29 NOTE — PROGRESS NOTES
Vishal Colunga [facility] [address]  [address] [facility], [place], [id number] [place] [address]  [phone]      Hospitalist  Progress Note      NAME:         Ruperto Burdick   :        1940  MRM:        [id number]    Date of service: 10/29/2022      Chief complaint: SOB, cough, A fib    Interval HPI:   10/26 - Patient still with SOB, moderate, at rest and associated with pleuritic chest pain. No fever or chills. No nausea or vomiting. 10/27 - patient went into RVR again last evening requiring a diltiazem gtt. She has a mild cough but no fever or chills. No nausea or vomiting   10/28 - she remains stable but had coughing episodes. HR now better controled. BPs low normal. No cough or fever. No chills or rigors  10/29 - She remains weak with variable low BPs. Dyspneic. No cough or fever. Not eating much    Objective:    Vital Signs:    Visit Vitals  BP (!) 91/50 (BP 1 Location: Left leg, BP Patient Position: At rest)   Pulse 80   Temp 97.6 °F (36.4 °C)   Resp 17   Ht 5' 3\" (1.6 m)   Wt 59.1 kg (130 lb 4.7 oz)   SpO2 98%   Breastfeeding No   BMI 23.08 kg/m²        Intake/Output Summary (Last 24 hours) at 10/29/2022 0745  Last data filed at 10/28/2022 1800  Gross per 24 hour   Intake 320 ml   Output 1 ml   Net 319 ml          Physical Examination:    General:   Weak and ill looking, not in distress, dyspneic   Eyes:   pink conjunctivae, PERRLA with no discharge. ENT:   no ottorrhea or rhinorrhea with dry mucous membranes  Neck: no masses, thyroid non-tender and trachea central.  Pulm:  clear breath sounds without crackles or wheezes. Card:  no JVD or murmurs, has atrial fibrillation, without thrills, bruits   Abd:  Soft, non-tender, non-distended, normoactive bowel sounds   Musc:  No cyanosis, clubbing, atrophy or deformities. Skin:  No rashes, bruising or ulcers. Neuro: Awake and alert.  Generally a non focal exam.   Psych:  Has a fair insight to her illness     Current Facility-Administered Medications   Medication Dose Route Frequency    acetaZOLAMIDE (DIAMOX) 500 mg in sterile water (preservative free) 5 mL injection  500 mg IntraVENous Q12H    levalbuterol tartrate (XOPENEX) 45 mcg/actuation inhaler HFAA 1 Puff (Patient Supplied)  1 Puff Inhalation Q4H PRN    metoprolol tartrate (LOPRESSOR) tablet 50 mg  50 mg Oral BID    diclofenac (VOLTAREN) 1 % topical gel 4 g  4 g Topical QID    levothyroxine (SYNTHROID) tablet 25 mcg  25 mcg Oral ACB    ondansetron (ZOFRAN) injection 4 mg  4 mg IntraVENous Q4H PRN    montelukast (SINGULAIR) tablet 10 mg  10 mg Oral QHS    pantoprazole (PROTONIX) tablet 40 mg  40 mg Oral DAILY    sodium chloride (NS) flush 5-40 mL  5-40 mL IntraVENous Q8H    sodium chloride (NS) flush 5-40 mL  5-40 mL IntraVENous PRN    0.9% sodium chloride infusion 25 mL  25 mL IntraVENous PRN    acetaminophen (TYLENOL) tablet 650 mg  650 mg Oral Q6H PRN    Or    acetaminophen (TYLENOL) suppository 650 mg  650 mg Rectal Q6H PRN    senna-docusate (PERICOLACE) 8.6-50 mg per tablet 1 Tablet  1 Tablet Oral BID    bisacodyL (DULCOLAX) suppository 10 mg  10 mg Rectal DAILY PRN    prochlorperazine (COMPAZINE) injection 10 mg  10 mg IntraVENous Q6H PRN    acetaminophen (TYLENOL) tablet 650 mg  650 mg Oral Q6H PRN    rivaroxaban (XARELTO) tablet 20 mg  20 mg Oral DAILY WITH DINNER    nystatin (MYCOSTATIN) 100,000 unit/mL oral suspension 500,000 Units  500,000 Units Oral QID    dilTIAZem (CARDIZEM) 100 mg in 0.9% sodium chloride (MBP/ADV) 100 mL infusion  0-15 mg/hr IntraVENous TITRATE    [Held by provider] bumetanide (BUMEX) injection 1 mg  1 mg IntraVENous Q12H    guaiFENesin ER (MUCINEX) tablet 600 mg  600 mg Oral Q12H    benzonatate (TESSALON) capsule 100 mg  100 mg Oral TID PRN    diphenhydrAMINE (BENADRYL) injection 25 mg  25 mg IntraVENous Q6H PRN    cefepime (MAXIPIME) 2 g in 0.9% sodium chloride (MBP/ADV) 100 mL MBP  2 g IntraVENous Q12H Laboratory data and review:    Recent Labs     10/27/22  0147 10/26/22  1030   WBC  --  6.7   HGB 9.2* 9.5*   HCT 29.4* 30.2*   PLT  --  398       Recent Labs     10/27/22  0147 10/26/22  1030   * 130*   K 4.6 3.7   CL 90* 84*   CO2 42* 43*   * 113*   BUN 11 10   CREA 0.56 0.44*   CA 8.9 9.5   MG 2.0 1.6  1.7   PHOS  --  3.0   ALB  --  2.8*   ALT  --  14       No components found for: [place]    Diagnostics: Imaging studies have been reviewed    Telemetry reviewed by me: atrial fibrillation     Assessment and Plan:    Acute and chronic respiratory failure with hypoxia ([facility] (10/25/2022) POA: likely due to CHF exacerbation vs pneumonia given CT chest showed cardiomegaly with basilar infiltrates. She is on supplemental oxygen and now at 3L/min which is her baseline. Continue chest physical therapy, IV diuresis, antibiotics. PT, OT as tolerated. She remains frail. Continue to monitor in hospital        Acute on chronic HFrEF (heart failure with reduced ejection fraction) ([facility] (9/4/2022) POA: trigger likely the A fib vs pneumonia. Recent Echocardiogram done 9/2022 showed an EF of 45-50% with a grade 1 diastolic dysfunction. She remains dyspneic with exertion. Hold Bumex given low BPs. Continue Metoprolol. Low Na diet. Monitor output. Cardiology following. Atrial fibrillation with rapid ventricular response ([facility] (10/25/2022) POA: has a hx of PAF. Has episodic RVR but now rate controlled. Continue Metoprolol, Xarelto. Cardiology following. Pneumonia (8/19/2022) / Hx Bronchiectasis POA: suspected and bibasilar as noted on CTA chest. Clinically still with symptoms. MBS showed trace aspiration. Seen by speech therapy. Completed Azithromycin. Continue IV Cefepime and would add Metronidazole. Hyponatremia (4/30/2022) /  Hypokalemia (9/4/2022) POA: low Na likely chronic from CHF and diuresis. K+ now normal and Na stable.  Continue to follow renal function     Hypothyroidism POA: recent TSH was normal. Continue Levothyroxine    Anemia - likely from chronic illness.  Monitor                Care Plan discussed with: Patient, Family, and Nursing Staff    Discussed:  Care Plan and D/C Planning    Prophylaxis:   Xarelto    Anticipated Disposition:   PT, OT, RN vs TBD           ___________________________________________________    Attending Physician:   Davis Parker MD

## 2022-10-30 LAB
ANION GAP SERPL CALC-SCNC: 6 MMOL/L (ref 5–15)
BASOPHILS # BLD: 0.1 K/UL (ref 0–0.1)
BASOPHILS NFR BLD: 1 % (ref 0–1)
BUN SERPL-MCNC: 12 MG/DL (ref 6–20)
BUN/CREAT SERPL: 31 (ref 12–20)
CALCIUM SERPL-MCNC: 9.1 MG/DL (ref 8.5–10.1)
CHLORIDE SERPL-SCNC: 99 MMOL/L (ref 97–108)
CO2 SERPL-SCNC: 32 MMOL/L (ref 21–32)
CREAT SERPL-MCNC: 0.39 MG/DL (ref 0.55–1.02)
DIFFERENTIAL METHOD BLD: ABNORMAL
EOSINOPHIL # BLD: 0.1 K/UL (ref 0–0.4)
EOSINOPHIL NFR BLD: 2 % (ref 0–7)
ERYTHROCYTE [DISTWIDTH] IN BLOOD BY AUTOMATED COUNT: 13.6 % (ref 11.5–14.5)
GLUCOSE SERPL-MCNC: 109 MG/DL (ref 65–100)
HCT VFR BLD AUTO: 29.6 % (ref 35–47)
HGB BLD-MCNC: 9 G/DL (ref 11.5–16)
IMM GRANULOCYTES # BLD AUTO: 0 K/UL (ref 0–0.04)
IMM GRANULOCYTES NFR BLD AUTO: 0 % (ref 0–0.5)
LYMPHOCYTES # BLD: 1.1 K/UL (ref 0.8–3.5)
LYMPHOCYTES NFR BLD: 17 % (ref 12–49)
MCH RBC QN AUTO: 29 PG (ref 26–34)
MCHC RBC AUTO-ENTMCNC: 30.4 G/DL (ref 30–36.5)
MCV RBC AUTO: 95.5 FL (ref 80–99)
MONOCYTES # BLD: 0.6 K/UL (ref 0–1)
MONOCYTES NFR BLD: 11 % (ref 5–13)
NEUTS SEG # BLD: 4.2 K/UL (ref 1.8–8)
NEUTS SEG NFR BLD: 69 % (ref 32–75)
NRBC # BLD: 0 K/UL (ref 0–0.01)
NRBC BLD-RTO: 0 PER 100 WBC
PLATELET # BLD AUTO: 382 K/UL (ref 150–400)
PMV BLD AUTO: 9.3 FL (ref 8.9–12.9)
POTASSIUM SERPL-SCNC: 3.1 MMOL/L (ref 3.5–5.1)
RBC # BLD AUTO: 3.1 M/UL (ref 3.8–5.2)
SODIUM SERPL-SCNC: 137 MMOL/L (ref 136–145)
WBC # BLD AUTO: 6.1 K/UL (ref 3.6–11)

## 2022-10-30 PROCEDURE — 74011250637 HC RX REV CODE- 250/637: Performed by: INTERNAL MEDICINE

## 2022-10-30 PROCEDURE — 74011000250 HC RX REV CODE- 250: Performed by: INTERNAL MEDICINE

## 2022-10-30 PROCEDURE — 74011250636 HC RX REV CODE- 250/636: Performed by: INTERNAL MEDICINE

## 2022-10-30 PROCEDURE — 77010033678 HC OXYGEN DAILY

## 2022-10-30 PROCEDURE — 85025 COMPLETE CBC W/AUTO DIFF WBC: CPT

## 2022-10-30 PROCEDURE — 74011000258 HC RX REV CODE- 258: Performed by: INTERNAL MEDICINE

## 2022-10-30 PROCEDURE — 65270000046 HC RM TELEMETRY

## 2022-10-30 PROCEDURE — 74011250637 HC RX REV CODE- 250/637: Performed by: NURSE PRACTITIONER

## 2022-10-30 PROCEDURE — 36415 COLL VENOUS BLD VENIPUNCTURE: CPT

## 2022-10-30 PROCEDURE — 80048 BASIC METABOLIC PNL TOTAL CA: CPT

## 2022-10-30 RX ORDER — POLYETHYLENE GLYCOL 3350 17 G/17G
17 POWDER, FOR SOLUTION ORAL DAILY
Status: DISCONTINUED | OUTPATIENT
Start: 2022-10-30 | End: 2022-10-31 | Stop reason: HOSPADM

## 2022-10-30 RX ORDER — TRAZODONE HYDROCHLORIDE 50 MG/1
25 TABLET ORAL
Status: DISCONTINUED | OUTPATIENT
Start: 2022-10-30 | End: 2022-10-31 | Stop reason: HOSPADM

## 2022-10-30 RX ORDER — POTASSIUM CHLORIDE 750 MG/1
40 TABLET, FILM COATED, EXTENDED RELEASE ORAL EVERY 4 HOURS
Status: COMPLETED | OUTPATIENT
Start: 2022-10-30 | End: 2022-10-30

## 2022-10-30 RX ADMIN — ACETAMINOPHEN 650 MG: 325 TABLET, FILM COATED ORAL at 06:18

## 2022-10-30 RX ADMIN — DICLOFENAC SODIUM 4 G: 10 GEL TOPICAL at 09:30

## 2022-10-30 RX ADMIN — ACETAZOLAMIDE 500 MG: 500 INJECTION, POWDER, LYOPHILIZED, FOR SOLUTION INTRAVENOUS at 13:54

## 2022-10-30 RX ADMIN — METOPROLOL TARTRATE 50 MG: 50 TABLET ORAL at 08:46

## 2022-10-30 RX ADMIN — POTASSIUM CHLORIDE 40 MEQ: 750 TABLET, FILM COATED, EXTENDED RELEASE ORAL at 12:39

## 2022-10-30 RX ADMIN — METRONIDAZOLE 500 MG: 500 INJECTION, SOLUTION INTRAVENOUS at 20:49

## 2022-10-30 RX ADMIN — CEFEPIME 2 G: 2 INJECTION, POWDER, FOR SOLUTION INTRAVENOUS at 18:30

## 2022-10-30 RX ADMIN — GUAIFENESIN 600 MG: 600 TABLET, EXTENDED RELEASE ORAL at 20:49

## 2022-10-30 RX ADMIN — Medication 10 ML: at 06:29

## 2022-10-30 RX ADMIN — ACETAMINOPHEN 650 MG: 325 TABLET, FILM COATED ORAL at 21:05

## 2022-10-30 RX ADMIN — POLYETHYLENE GLYCOL 3350 17 G: 17 POWDER, FOR SOLUTION ORAL at 12:00

## 2022-10-30 RX ADMIN — Medication 10 ML: at 14:45

## 2022-10-30 RX ADMIN — PANTOPRAZOLE SODIUM 40 MG: 40 TABLET, DELAYED RELEASE ORAL at 08:46

## 2022-10-30 RX ADMIN — MONTELUKAST 10 MG: 10 TABLET, FILM COATED ORAL at 20:49

## 2022-10-30 RX ADMIN — ONDANSETRON 4 MG: 2 INJECTION INTRAMUSCULAR; INTRAVENOUS at 08:47

## 2022-10-30 RX ADMIN — METOPROLOL TARTRATE 50 MG: 50 TABLET ORAL at 18:30

## 2022-10-30 RX ADMIN — NYSTATIN 500000 UNITS: 100000 SUSPENSION ORAL at 20:57

## 2022-10-30 RX ADMIN — CEFEPIME 2 G: 2 INJECTION, POWDER, FOR SOLUTION INTRAVENOUS at 06:20

## 2022-10-30 RX ADMIN — DICLOFENAC SODIUM 4 G: 10 GEL TOPICAL at 12:43

## 2022-10-30 RX ADMIN — POTASSIUM CHLORIDE 40 MEQ: 750 TABLET, FILM COATED, EXTENDED RELEASE ORAL at 08:45

## 2022-10-30 RX ADMIN — DICLOFENAC SODIUM 4 G: 10 GEL TOPICAL at 20:51

## 2022-10-30 RX ADMIN — RIVAROXABAN 20 MG: 20 TABLET, FILM COATED ORAL at 16:21

## 2022-10-30 RX ADMIN — GUAIFENESIN 600 MG: 600 TABLET, EXTENDED RELEASE ORAL at 08:45

## 2022-10-30 RX ADMIN — NYSTATIN 500000 UNITS: 100000 SUSPENSION ORAL at 18:30

## 2022-10-30 RX ADMIN — METRONIDAZOLE 500 MG: 500 INJECTION, SOLUTION INTRAVENOUS at 08:56

## 2022-10-30 RX ADMIN — LEVOTHYROXINE SODIUM 25 MCG: 0.03 TABLET ORAL at 06:19

## 2022-10-30 RX ADMIN — Medication 10 ML: at 20:50

## 2022-10-30 RX ADMIN — TRAZODONE HYDROCHLORIDE 25 MG: 50 TABLET ORAL at 20:49

## 2022-10-30 RX ADMIN — BENZONATATE 100 MG: 100 CAPSULE ORAL at 21:05

## 2022-10-30 RX ADMIN — SENNOSIDES AND DOCUSATE SODIUM 1 TABLET: 50; 8.6 TABLET ORAL at 08:46

## 2022-10-30 NOTE — DISCHARGE INSTRUCTIONS
Hospital Medicine DISCHARGE INSTRUCTIONS    NAME: Toi Ruvalcaba   :  1940   MRN:  [id number]     Date:     10/31/2022    ADMIT DATE: 10/25/2022     DISCHARGE DATE: 10/31/2022     PRINCIPAL ADMITTING DIAGNOSIS:  Atrial fibrillation with rapid ventricular response ([facility] [address] [address] [I48.91]    DISCHARGE DIAGNOSES:  Principal Problem:    Acute and chronic respiratory failure with hypoxia ([address] (10/25/2022)    Acute on chronic HFrEF (heart failure with reduced ejection fraction) ([address]     Atrial fibrillation with rapid ventricular response ([address] (10/25/2022)    Hypothyroidism     Pneumonia (2022)    DNR (do not resuscitate) (10/26/2022)    MEDICATIONS:    It is important that medications are taken exactly as they are prescribed on the discharge medication instructions and keep them your  in the bottles provided by the pharmacist.   Keep a list of the medication names, dosages, and times to be taken at all times. Do not take other medications without consulting your doctor. Recommended diet:  Cardiac Diet    Recommended activity: Activity as tolerated    Post discharge care:    Notify follow up health care provider or return to the emergency department if you cannot get hold of your doctor if you feel worse or experience symptoms similar to those that brought you to hospital    Return patient's own med. Located in [facility] patient specific [facility]. Follow up      Nikki Santoyo MD  [address] [facility]  [address] [facility]  [phone]    Go to  for the regular follow up       Information obtained by :  I understand that if any problems occur once I am at home I am to contact my physician and I understand and acknowledge receipt of the instructions indicated above.                                                                                                                                            Physician's or R.N.'s Signature Date/Time                                                                                                                                              Patient or Representative Signature                                                          Date/Time

## 2022-10-30 NOTE — PROGRESS NOTES
0700  Bedside and Verbal shift change report given to Corey Yeh RN (oncoming nurse) by Kyleigh Andre RN (offgoing nurse). Report included the following information SBAR, Kardex, Procedure Summary, Intake/Output, MAR, Recent Results, and Med Rec Status. 1900  Bedside and Verbal shift change report given to Leyla Ingram RN (oncoming nurse) by Corey Yeh RN (offgoing nurse). Report included the following information SBAR, Kardex, Procedure Summary, Intake/Output, MAR, Recent Results, and Med Rec Status.

## 2022-10-30 NOTE — PROGRESS NOTES
Vishal Caicedoelsen [facility]  [address] [facility], [address] [place] [address]  [phone]      Hospitalist  Progress Note      NAME:         Cyndia Duane   :        1940  MRM:        [id number]    Date of service: 10/30/2022      Chief complaint: SOB, cough, A fib    Interval HPI:   10/26 - Patient still with SOB, moderate, at rest and associated with pleuritic chest pain. No fever or chills. No nausea or vomiting. 10/27 - patient went into RVR again last evening requiring a diltiazem gtt. She has a mild cough but no fever or chills. No nausea or vomiting   10/28 - she remains stable but had coughing episodes. HR now better controled. BPs low normal. No cough or fever. No chills or rigors  10/29 - She remains weak with variable low BPs. Dyspneic. No cough or fever. Not eating much  10/30 - generalized weakness, poor appetite. Slight cough. No cough or fever    Objective:    Vital Signs:    Visit Vitals  /63   Pulse 77   Temp 97.2 °F (36.2 °C)   Resp 15   Ht 5' 3\" (1.6 m)   Wt 59.2 kg (130 lb 9.6 oz)   SpO2 99%   Breastfeeding No   BMI 23.13 kg/m²        Intake/Output Summary (Last 24 hours) at 10/30/2022 0817  Last data filed at 10/29/2022 2012  Gross per 24 hour   Intake --   Output 0 ml   Net 0 ml          Physical Examination:    General:   Weak, frail and ill looking, not in distress, dyspneic   Eyes:   pink conjunctivae, PERRLA with no discharge. ENT:   no ottorrhea or rhinorrhea with dry mucous membranes  Pulm:  clear breath sounds with mild scattered crackles. No wheezes. Card:  no JVD or murmurs, has atrial fibrillation, without thrills, bruits   Abd:  Soft, non-tender, non-distended, normoactive bowel sounds   Musc:  No cyanosis, clubbing, atrophy or deformities. Skin:  No rashes, bruising or ulcers. Neuro: Awake and alert.  Generally a non focal exam.   Psych:  Has a fair insight to her illness     Current Facility-Administered Medications   Medication Dose Route Frequency    potassium chloride SR (KLOR-CON 10) tablet 40 mEq  40 mEq Oral Q4H    metroNIDAZOLE (FLAGYL) IVPB premix 500 mg  500 mg IntraVENous Q12H    acetaZOLAMIDE (DIAMOX) 500 mg in sterile water (preservative free) 5 mL injection  500 mg IntraVENous Q12H    levalbuterol tartrate (XOPENEX) 45 mcg/actuation inhaler HFAA 1 Puff (Patient Supplied)  1 Puff Inhalation Q4H PRN    metoprolol tartrate (LOPRESSOR) tablet 50 mg  50 mg Oral BID    diclofenac (VOLTAREN) 1 % topical gel 4 g  4 g Topical QID    levothyroxine (SYNTHROID) tablet 25 mcg  25 mcg Oral ACB    ondansetron (ZOFRAN) injection 4 mg  4 mg IntraVENous Q4H PRN    montelukast (SINGULAIR) tablet 10 mg  10 mg Oral QHS    pantoprazole (PROTONIX) tablet 40 mg  40 mg Oral DAILY    sodium chloride (NS) flush 5-40 mL  5-40 mL IntraVENous Q8H    sodium chloride (NS) flush 5-40 mL  5-40 mL IntraVENous PRN    0.9% sodium chloride infusion 25 mL  25 mL IntraVENous PRN    acetaminophen (TYLENOL) tablet 650 mg  650 mg Oral Q6H PRN    Or    acetaminophen (TYLENOL) suppository 650 mg  650 mg Rectal Q6H PRN    senna-docusate (PERICOLACE) 8.6-50 mg per tablet 1 Tablet  1 Tablet Oral BID    bisacodyL (DULCOLAX) suppository 10 mg  10 mg Rectal DAILY PRN    prochlorperazine (COMPAZINE) injection 10 mg  10 mg IntraVENous Q6H PRN    acetaminophen (TYLENOL) tablet 650 mg  650 mg Oral Q6H PRN    rivaroxaban (XARELTO) tablet 20 mg  20 mg Oral DAILY WITH DINNER    nystatin (MYCOSTATIN) 100,000 unit/mL oral suspension 500,000 Units  500,000 Units Oral QID    dilTIAZem (CARDIZEM) 100 mg in 0.9% sodium chloride (MBP/ADV) 100 mL infusion  0-15 mg/hr IntraVENous TITRATE    [Held by provider] bumetanide (BUMEX) injection 1 mg  1 mg IntraVENous Q12H    guaiFENesin ER (MUCINEX) tablet 600 mg  600 mg Oral Q12H    benzonatate (TESSALON) capsule 100 mg  100 mg Oral TID PRN    diphenhydrAMINE (BENADRYL) injection 25 mg  25 mg IntraVENous Q6H PRN    cefepime (MAXIPIME) 2 g in 0.9% sodium chloride (MBP/ADV) 100 mL MBP  2 g IntraVENous Q12H        Laboratory data and review:    Recent Labs     10/30/22  0312   WBC 6.1   HGB 9.0*   HCT 29.6*          Recent Labs     10/30/22  0312      K 3.1*   CL 99   CO2 32   *   BUN 12   CREA 0.39*   CA 9.1       No components found for: [place]    Diagnostics: Imaging studies have been reviewed    Telemetry reviewed by me: atrial fibrillation     Assessment and Plan:    Acute and chronic respiratory failure with hypoxia ([facility] (10/25/2022) POA: likely due to CHF exacerbation vs pneumonia given CT chest showed cardiomegaly with basilar infiltrates. Still coughing. She is on supplemental oxygen and now at 3L/min which is her baseline. Continue chest physical therapy, antibiotics. PT, OT as tolerated with plan for home with [place] likely in AM          Acute on chronic HFrEF (heart failure with reduced ejection fraction) ([facility] [address] (9/4/2022) POA: trigger likely the A fib vs pneumonia. Recent Echocardiogram done 9/2022 showed an EF of 45-50% with a grade 1 diastolic dysfunction. She remains dyspneic with exertion. Hold Bumex given low BPs. Continue Metoprolol. Low Na diet. Monitor output. Cardiology following. Atrial fibrillation with rapid ventricular response ([facility] [address] (10/25/2022) POA: has a hx of PAF. Has episodic RVR but has remained rate controlled. Continue Metoprolol, Xarelto. Cardiology following. Pneumonia (8/19/2022) / Hx Bronchiectasis POA: suspected and bibasilar as noted on CTA chest. Clinically still with symptoms. MBS showed trace aspiration. Seen by speech therapy. Completed Azithromycin. Continue IV Cefepime and would add Metronidazole. Hyponatremia (4/30/2022) /  Hypokalemia (9/6/7710) / Metabolic alkalosis POA: low Na likely chronic from CHF and diuresis. Replete K+. Na stable. On Diamox.  Continue to follow renal function     Hypothyroidism POA: recent TSH was normal. Continue Levothyroxine    Anemia - likely from chronic illness.  Monitor                Care Plan discussed with: Patient, Family, and Nursing Staff    Discussed:  Care Plan and D/C Planning    Prophylaxis:   Xarelto    Anticipated Disposition:   PT, OT, RN           ___________________________________________________    Attending Physician:   Janelle Palma MD

## 2022-10-30 NOTE — PROGRESS NOTES
2300 Bedside shift change report given to Mandy (oncoming nurse) by Karishma Houser (offgoing nurse). Report included the following information SBAR, Intake/Output, MAR, Recent Results, and Cardiac Rhythm Afib .      0700 Bedside shift change report given to Nino Fuentes RN (oncoming nurse) by Mandy (offgoing nurse). Report included the following information SBAR, Intake/Output, MAR, Recent Results, and Cardiac Rhythm NSR . This patient was assisted with Intentional Toileting every 2 hours during this shift as appropriate. Documentation of ambulation and output reflected on Flowsheet as appropriate. Purposeful hourly rounding was completed using AIDET and 5Ps. Outcomes of PHR documented as they occurred. Bed alarm in use as appropriate. Dual Suction and ambubag in place.

## 2022-10-31 VITALS
DIASTOLIC BLOOD PRESSURE: 63 MMHG | OXYGEN SATURATION: 94 % | SYSTOLIC BLOOD PRESSURE: 104 MMHG | RESPIRATION RATE: 20 BRPM | HEIGHT: 63 IN | HEART RATE: 94 BPM | TEMPERATURE: 98 F | WEIGHT: 126.76 LBS | BODY MASS INDEX: 22.46 KG/M2

## 2022-10-31 PROCEDURE — 74011250637 HC RX REV CODE- 250/637: Performed by: NURSE PRACTITIONER

## 2022-10-31 PROCEDURE — 74011250637 HC RX REV CODE- 250/637: Performed by: INTERNAL MEDICINE

## 2022-10-31 PROCEDURE — 74011250636 HC RX REV CODE- 250/636: Performed by: INTERNAL MEDICINE

## 2022-10-31 PROCEDURE — 92526 ORAL FUNCTION THERAPY: CPT

## 2022-10-31 PROCEDURE — 77010033678 HC OXYGEN DAILY

## 2022-10-31 PROCEDURE — 74011000250 HC RX REV CODE- 250: Performed by: INTERNAL MEDICINE

## 2022-10-31 RX ORDER — BUMETANIDE 0.5 MG/1
0.5 TABLET ORAL DAILY
Qty: 30 TABLET | Refills: 0 | Status: SHIPPED
Start: 2022-10-31 | End: 2022-11-30

## 2022-10-31 RX ORDER — METOPROLOL TARTRATE 50 MG/1
50 TABLET ORAL 2 TIMES DAILY
Qty: 60 TABLET | Refills: 0 | Status: SHIPPED | OUTPATIENT
Start: 2022-10-31 | End: 2022-11-30

## 2022-10-31 RX ADMIN — Medication 10 ML: at 06:55

## 2022-10-31 RX ADMIN — GUAIFENESIN 600 MG: 600 TABLET, EXTENDED RELEASE ORAL at 09:00

## 2022-10-31 RX ADMIN — PANTOPRAZOLE SODIUM 40 MG: 40 TABLET, DELAYED RELEASE ORAL at 09:00

## 2022-10-31 RX ADMIN — NYSTATIN 500000 UNITS: 100000 SUSPENSION ORAL at 09:00

## 2022-10-31 RX ADMIN — DICLOFENAC SODIUM 4 G: 10 GEL TOPICAL at 09:01

## 2022-10-31 RX ADMIN — LEVOTHYROXINE SODIUM 25 MCG: 0.03 TABLET ORAL at 06:55

## 2022-10-31 RX ADMIN — ONDANSETRON 4 MG: 2 INJECTION INTRAMUSCULAR; INTRAVENOUS at 09:00

## 2022-10-31 RX ADMIN — METRONIDAZOLE 500 MG: 500 INJECTION, SOLUTION INTRAVENOUS at 09:02

## 2022-10-31 RX ADMIN — METOPROLOL TARTRATE 50 MG: 50 TABLET ORAL at 10:19

## 2022-10-31 NOTE — CONSULTS
[facility]     Name: Ruperto Burdick MRN: [id number]   : 1940 Hospital: [facility]   Date: 10/31/2022        Impression Plan   Acute respiratory failure  Hypoxia  RLL PNA- aspiration? Bronchiectasis  Afib with RVR               Wean O2 to keep sats above 90%  Pt has a hx of pseudomonas as well as stenotrophomonas colonization. The RLL PNA likely pseudomonas vs aspiration PNA (GERD related or dysphagia related based on pt's hx). Less likely stenotrophomonas since pt is improving on cefepime and completed a course 10/3. Has seen speech in the past with no obvious abnormalities at that time. MBS with mild abnormality of the swallowing mechanism (had a warm effect for swallow with swallow timing improving with additional boluses). Nystatin for outpt thrush treatment  Diuresis as needed  Continue protonix  Aspiration precautions. Rate control per cardiology   OOB into chair when HR stable             Radiology  (personally reviewed) CT chest reviewed: RLL extensive infiltrate, RML infiltrate       Subjective     Cc: hypoxia    79 yo with PMHx of bronchiectasis, recurrent PNAs, pseudomonas/stenotrophomonas colonization and CHF presenting with increasing SOB and severe hypoxia in pulmonary clinic. In ER was found to be in a-fib with RVR. Pt admitted and CT chest revealed RLL and RML infiltrates. Pt received diuretics as well. Leg edema has resolved this morning. Managed on Trelegy 200, Wayne nebs, montelukast, Flonase/azelastine, and ProAir/albuterol nebs PRN. Pt denies any nausea/vomiting in the last few weeks. She does have some GERD sxs. Non-smoker. Interval history  No acute events. Satting well on 3L. Denies further choking events. Cough is wet, minimally productive today.       Past Medical History:   Diagnosis Date    Asthma     CHF (congestive heart failure), NYHA class I, chronic, systolic (HCC)     Chronic anticoagulation     GERD (gastroesophageal reflux disease)     Hiatal hernia     HX: breast cancer 2015    Right     Hypothyroid     Paroxysmal A-fib ([address]     Venous insufficiency     bilat LE    Weight loss       Past Surgical History:   Procedure Laterality Date    COLONOSCOPY N/A 6/14/2021    COLONOSCOPY AND EGD performed by Tameka Hoffman MD at [address] BREAST LUMPECTOMY Right 08/24/2015    HX CATARACT REMOVAL  2013    Bilateral     HX CHOLECYSTECTOMY      HX DILATION AND CURETTAGE      x2    HX ORTHOPAEDIC  2017    RIGHT foot  hammertoe      Prior to Admission medications    Medication Sig Start Date End Date Taking? Authorizing Provider   bumetanide (BUMEX) 0.5 mg tablet Take 1.5 mg by mouth daily. Yes Provider, Historical   ferrous sulfate 325 mg (65 mg iron) tablet Take 325 mg by mouth daily. Yes Provider, Historical   traZODone (DESYREL) 50 mg tablet Take 25 mg by mouth nightly as needed for Sleep (in addition to scheduled dose if needed for sleep). Yes Provider, Historical   azelastine (ASTEPRO) 205.5 mcg (0.15 %) 1 [place] by Both Nostrils route daily. Yes Provider, Historical   tobramycin, PF, (Wayne) 300 mg/5 mL nebulizer solution 300 mg by Nebulization route two (2) times a day. 2 weeks on and 2 weeks off -   Yes Provider, Historical   calcium carbonate (TUMS) 200 mg calcium (500 mg) chew Take 1 Tablet by mouth daily. Yes Provider, Historical   b complex vitamins tablet Take 1 Tablet by mouth daily. Yes Provider, Historical   levalbuterol tartrate (XOPENEX) 45 mcg/actuation inhaler Take 1 Puff by inhalation every four (4) hours as needed for Wheezing or Shortness of Breath. Yes Provider, Historical   arformoteroL (BROVANA) 15 mcg/2 mL nebu neb solution 15 mcg by Nebulization route two (2) times a day. Yes Provider, Historical   budesonide (PULMICORT) 0.5 mg/2 mL nbsp 500 mcg by Nebulization route two (2) times a day.    Yes Provider, Historical   nystatin (MYCOSTATIN) 100,000 unit/mL suspension Take 500,000 Units by mouth two (2) times a day. swish and swallow   Yes Provider, Historical   metoprolol succinate (TOPROL-XL) 50 mg XL tablet Take 50 mg by mouth daily. Yes Provider, Historical   levothyroxine (SYNTHROID) 25 mcg tablet Take 25 mcg by mouth nightly. Yes Provider, Historical   traZODone (DESYREL) 50 mg tablet Take 25 mg by mouth nightly. Yes Provider, Historical   ergocalciferol (ERGOCALCIFEROL) 1,250 mcg (50,000 unit) capsule Take 50,000 Units by mouth every Sunday. Yes Provider, Historical   fluticasone (VERAMYST) 27.5 mcg/actuation nasal spray 2 Sprays by Nasal route daily. Yes Provider, Historical   ondansetron hcl (Zofran) 8 mg tablet Take 8 mg by mouth every eight (8) hours as needed for Nausea or Vomiting (prn). Yes Provider, Historical   amLODIPine (NORVASC) 5 mg tablet Take 5 mg by mouth daily as needed (The patient takes only if her systolic blood pressue is over 150). For spb over 150   Yes Provider, Historical   guaiFENesin ER (MUCINEX) 600 mg ER tablet Take 1 Tablet by mouth every twelve (12) hours. 9/28/22  Yes Claudell Parrot, MD   pantoprazole (PROTONIX) 40 mg tablet Take 40 mg by mouth daily. Yes Provider, Historical   olopatadine (PATADAY) 0.2 % drop ophthalmic solution Administer 1 Drop to both eyes daily. Yes Provider, Historical   Xarelto 20 mg tab tablet Take 20 mg by mouth daily (with dinner). 7/31/20  Yes Provider, Historical   metroNIDAZOLE (METROCREAM) 0.75 % topical cream Apply  to affected area two (2) times a day. Yes Provider, Historical   montelukast (SINGULAIR) 10 mg tablet Take 10 mg by mouth daily. Yes Provider, Historical   diclofenac (VOLTAREN) 1 % gel Apply  to affected area four (4) times daily as needed for Pain. Yes Provider, Historical   acetaminophen (TYLENOL) 500 mg tablet Take 500 mg by mouth every six (6) hours as needed for Pain.    Yes Provider, Historical     Current Facility-Administered Medications   Medication Dose Route Frequency    traZODone (DESYREL) tablet 25 mg  25 mg Oral QHS    polyethylene glycol (MIRALAX) packet 17 g  17 g Oral DAILY    metroNIDAZOLE (FLAGYL) IVPB premix 500 mg  500 mg IntraVENous Q12H    metoprolol tartrate (LOPRESSOR) tablet 50 mg  50 mg Oral BID    diclofenac (VOLTAREN) 1 % topical gel 4 g  4 g Topical QID    levothyroxine (SYNTHROID) tablet 25 mcg  25 mcg Oral ACB    montelukast (SINGULAIR) tablet 10 mg  10 mg Oral QHS    pantoprazole (PROTONIX) tablet 40 mg  40 mg Oral DAILY    sodium chloride (NS) flush 5-40 mL  5-40 mL IntraVENous Q8H    senna-docusate (PERICOLACE) 8.6-50 mg per tablet 1 Tablet  1 Tablet Oral BID    rivaroxaban (XARELTO) tablet 20 mg  20 mg Oral DAILY WITH DINNER    nystatin (MYCOSTATIN) 100,000 unit/mL oral suspension 500,000 Units  500,000 Units Oral QID    dilTIAZem (CARDIZEM) 100 mg in 0.9% sodium chloride (MBP/ADV) 100 mL infusion  0-15 mg/hr IntraVENous TITRATE    [Held by provider] bumetanide (BUMEX) injection 1 mg  1 mg IntraVENous Q12H    guaiFENesin ER (MUCINEX) tablet 600 mg  600 mg Oral Q12H     Allergies   Allergen Reactions    Ace Inhibitors Cough    Iodine Rash    Penicillins Rash      Social History     Tobacco Use    Smoking status: Former     Types: Cigarettes     Start date: 2000     Quit date: 2000     Years since quittin.9    Smokeless tobacco: Never   Substance Use Topics    Alcohol use: Not Currently      Family History   Problem Relation Age of Onset    Hypertension Father           Laboratory: I have personally reviewed the flowsheet and labs.      Recent Labs     10/30/22  0312   WBC 6.1   HGB 9.0*   HCT 29.6*          Recent Labs     10/30/22  0312      K 3.1*   CL 99   CO2 32   *   BUN 12   CREA 0.39*   CA 9.1         Objective:   Visit Vitals  BP 95/65 (BP 1 Location: Left arm, BP Patient Position: At rest)   Pulse 77   Temp 97.7 °F (36.5 °C)   Resp 20   Ht 5' 3\" (1.6 m)   Wt 57.5 kg (126 lb 12.2 oz)   SpO2 100%   Breastfeeding No   BMI 22.46 kg/m² Intake/Output Summary (Last 24 hours) at 10/31/2022 0849  Last data filed at 10/30/2022 2301  Gross per 24 hour   Intake 180 ml   Output 0 ml   Net 180 ml       EXAM:   GENERAL: Speaking in full long sentences, awake, alert, HEENT:  anicteric, EOMI, no alar flaring or epistaxis, oral mucosa moist without cyanosis, NECK:  no jugular vein distention, no retractions, no thyromegaly or masses, LUNGS:Crackles in right lower lobe and transmitted upper airway rhonchi that clear with cough, HEART:  Irregular and mildly tachy, with no MGR; no edema is present, ABDOMEN:  soft with no tenderness, bowel sounds present, EXTREMITIES:  warm with no cyanosis, SKIN:  no jaundice or ecchymosis, and NEUROLOGIC:  alert and oriented, grossly non-focal    Kuldeep Patel MD  [facility]

## 2022-10-31 NOTE — DISCHARGE SUMMARY
Vishal Colunga [facility]  [address] [facility], [address] [place] [address]  Tel: [phone] Discharge Summary    Patient ID:    Phil Angulo  Age:              80 y.o.    : 1940  MRN:             [id number]     PCP: Lisandro Apodaca MD     Date of Admission: 10/25/2022    Date of Discharge:  10/31/2022    Principal admission Diagnosis:   Atrial fibrillation with rapid ventricular response [facility]) [I48.91]    Discharge Diagnoses:  Principal Problem:    Acute and chronic respiratory failure with hypoxia ([address] (10/25/2022)    Acute on chronic HFrEF (heart failure with reduced ejection fraction) ([address] (2022)    Atrial fibrillation with rapid ventricular response ([address] (10/25/2022)    Hypothyroidism ()    Hyponatremia (2022)    Pneumonia (2022)    Hypokalemia (2022)    DNR (do not resuscitate) (10/26/2022)    Hospital Course:     Ms. Selin Gibbs is a 80 y.o. admitted to [facility] and treated for the following:    Acute and chronic respiratory failure with hypoxia ([address] (10/25/2022) POA: likely due to CHF exacerbation vs pneumonia given CT chest showed cardiomegaly with basilar infiltrates. Symptoms much improved and now at baseline at 3L/min. Continue chest physical therapy, antibiotics. PT, OT as tolerated . Discharged home in stable condition. Given her co-morbidities, she is at risk of re-admission and should this occur, there may need to be discussions about [address] and perhaps hospice           Acute on chronic HFrEF (heart failure with reduced ejection fraction) ([address] (2022) POA: trigger likely the A fib vs pneumonia. Recent Echocardiogram done 2022 showed an EF of 45-50% with a grade 1 diastolic dysfunction. She minimal dyspnea with exertion. Continue Metoprolol, low dose Bumex. Low Na diet.  Outpatient follow up with her primary cardiologist       Atrial fibrillation with rapid ventricular response ([address] (10/25/2022) POA: has a hx of PAF. Has episodic RVR but has remained rate controlled. Continue Metoprolol, Xarelto. Pneumonia (8/19/2022) / Hx Bronchiectasis POA: suspected and bibasilar as noted on CTA chest. Clinically still with symptoms. MBS showed trace aspiration. Seen by speech therapy. Completed Azithromycin and IV Cefepime. Hyponatremia (4/30/2022) /  Hypokalemia (9/5/2475) / Metabolic alkalosis POA: low Na likely chronic from CHF and diuresis. Replete K+. Na stable. Stable       Hypothyroidism POA: recent TSH was normal. Continue Levothyroxine     Anemia - likely from chronic illness. Stable. Discharge Exam:  Visit Vitals  /60 (BP 1 Location: Left arm, BP Patient Position: At rest)   Pulse 62   Temp 98.2 °F (36.8 °C)   Resp 20   Ht 5' 3\" (1.6 m)   Wt 57.5 kg (126 lb 12.2 oz)   SpO2 93%   Breastfeeding No   BMI 22.46 kg/m²        Patient has been seen and examined. General: weak and frail but not in acute distress   Pulm: clear bu decreased breath sounds without wheezes  Card: no murmurs, normal S1, S2 without thrills, bruits   Abd:    soft, non-tender, normoactive bowel sounds  Skin: no rashes and skin turgor is good  Neuro: awake, alert and has a non focal     Activity: Activity as tolerated    Diet: Cardiac Diet    Current Discharge Medication List        START taking these medications    Details   metoprolol tartrate (LOPRESSOR) 50 mg tablet Take 1 Tablet by mouth two (2) times a day for 30 days. Qty: 60 Tablet, Refills: 0           CONTINUE these medications which have CHANGED    Details   bumetanide (BUMEX) 0.5 mg tablet Take 1 Tablet by mouth daily for 30 days. Qty: 30 Tablet, Refills: 0           CONTINUE these medications which have NOT CHANGED    Details   ferrous sulfate 325 mg (65 mg iron) tablet Take 325 mg by mouth daily.       traZODone (DESYREL) 50 mg tablet Take 25 mg by mouth nightly as needed for Sleep (in addition to scheduled dose if needed for sleep). azelastine (ASTEPRO) 205.5 mcg (0.15 %) 1 [place] by Both Nostrils route daily. calcium carbonate (TUMS) 200 mg calcium (500 mg) chew Take 1 Tablet by mouth daily. b complex vitamins tablet Take 1 Tablet by mouth daily. levalbuterol tartrate (XOPENEX) 45 mcg/actuation inhaler Take 1 Puff by inhalation every four (4) hours as needed for Wheezing or Shortness of Breath. arformoteroL (BROVANA) 15 mcg/2 mL nebu neb solution 15 mcg by Nebulization route two (2) times a day. budesonide (PULMICORT) 0.5 mg/2 mL nbsp 500 mcg by Nebulization route two (2) times a day. levothyroxine (SYNTHROID) 25 mcg tablet Take 25 mcg by mouth nightly. traZODone (DESYREL) 50 mg tablet Take 25 mg by mouth nightly.      ergocalciferol (ERGOCALCIFEROL) 1,250 mcg (50,000 unit) capsule Take 50,000 Units by mouth every Sunday. fluticasone (VERAMYST) 27.5 mcg/actuation nasal spray 2 Sprays by Nasal route daily. ondansetron hcl (Zofran) 8 mg tablet Take 8 mg by mouth every eight (8) hours as needed for Nausea or Vomiting (prn). guaiFENesin ER (MUCINEX) 600 mg ER tablet Take 1 Tablet by mouth every twelve (12) hours. Qty: 30 Tablet, Refills: 1      pantoprazole (PROTONIX) 40 mg tablet Take 40 mg by mouth daily. olopatadine (PATADAY) 0.2 % drop ophthalmic solution Administer 1 Drop to both eyes daily. Xarelto 20 mg tab tablet Take 20 mg by mouth daily (with dinner). metroNIDAZOLE (METROCREAM) 0.75 % topical cream Apply  to affected area two (2) times a day. montelukast (SINGULAIR) 10 mg tablet Take 10 mg by mouth daily. diclofenac (VOLTAREN) 1 % gel Apply  to affected area four (4) times daily as needed for Pain. acetaminophen (TYLENOL) 500 mg tablet Take 500 mg by mouth every six (6) hours as needed for Pain.            STOP taking these medications       amoxicillin (AMOXIL) 875 mg tablet Comments: Reason for Stopping:         tobramycin, PF, (Wayne) 300 mg/5 mL nebulizer solution Comments:   Reason for Stopping:         nystatin (MYCOSTATIN) 100,000 unit/mL suspension Comments:   Reason for Stopping:         metoprolol succinate (TOPROL-XL) 50 mg XL tablet Comments:   Reason for Stopping:         amLODIPine (NORVASC) 5 mg tablet Comments:   Reason for Stopping:               Return patient's own med. Located in [facility] patient specific bin. Follow up      Staci Moore MD  [address] [place] [address]  [phone]    Go to  for the regular follow up      Follow-up tests or labs: None    Discharge Condition: Stable    Disposition: home    Time taken to co-ordinate and arrange discharge:  35 minutes.     Signed:  Merlin Braver, MD       10/31/2022   10:56 AM

## 2022-10-31 NOTE — PROGRESS NOTES
Problem: Dysphagia (Adult)  Goal: *Acute Goals and Plan of Care (Insert Text)  Description: Swallowing goals initiated 10-28-22  1) tolerate diet and pills without s/s aspiration using small careful sips as warm up before taking other PO by 10-31-22  Outcome: Progressing Towards Goal   SPEECH LANGUAGE PATHOLOGY DYSPHAGIA TREATMENT  Patient: Eugene Borden (23 y.o. female)  Date: 10/31/2022  Diagnosis: Atrial fibrillation with rapid ventricular response (HCC) [I48.91] Acute and chronic respiratory failure with hypoxia ([facility])      Precautions: aspiration Fall, Skin    ASSESSMENT:  Patient with persistent mild weakness in swallow with suspected aspiration at times with thins. She is trying to use strategies, but suspect it will be a slow progress to build up her strength again, especially since she will return home instead of rehab. PLAN:  Recommendations and Planned Interventions:  Continue diet as tolerated. Consider supplements. Before taking pills or eating, take 2 prior swallows of water to warm up her swallow. Try to avoid mixing solids and liquid consistencies. Patient continues to benefit from skilled intervention to address the above impairments. Continue treatment per established plan of care. Discharge Recommendations:  Home Health     SUBJECTIVE:   Patient stated I'm going home. They are trying to work out the oxygen. OBJECTIVE:   Cognitive and Communication Status:  Neurologic State: Alert  Orientation Level: Oriented to person, Oriented to place, Oriented to situation, Oriented to time  Cognition: Follows commands  Perception: Appears intact  Perseveration: No perseveration noted  Safety/Judgement: Decreased insight into deficits  Dysphagia Treatment:  Oral Assessment:     P.O. Trials:  Patient Position: upright in chair  Vocal quality prior to P.O.: No impairment  Consistency Presented: Mechanical soft; Thin liquid  How Presented: Self-fed/presented;Spoon;Straw   ORAL PHASE:   Bolus Acceptance: No impairment  Bolus Formation/Control: Impaired (\" I take small bites and chew thoroughly\")  Type of Impairment: Mastication  Propulsion: Delayed (# of seconds) (mild)   PHARYNGEAL PHASE:   Initiation of Swallow: No impairment  Laryngeal Elevation: Weak (mild)  Aspiration Signs/Symptoms:  (delayed cough 1/1 on thin liquids)      We discussed MBS results. She was using most of the strategies discussed. No self-reported coughing instances. She reported that she stopped taking nystatin. No overt thrush in oral cavity. Exercises:  Laryngeal Exercises:                                                                                                                                   Pain:  Pain Scale 1: Numeric (0 - 10)  Pain Intensity 1: 0       After treatment:   Patient left in no apparent distress sitting up in chair and Nursing notified    COMMUNICATION/EDUCATION:   Patient was educated regarding her deficit(s) of ysphagia  as this relates to her diagnosis of acute on chronic resp failure. She demonstrated Fair understanding as evidenced by discussion. .    The patient's plan of care including recommendations, planned interventions, and recommended diet changes were discussed with: Registered nurse.      Delia Alvarado, SLP  Time Calculation: 15 mins

## 2022-10-31 NOTE — PROGRESS NOTES
Hospital Follow Up with PCP  Treasure Kim for 11/02/2022 at 8:00am. This was the ONLY time that practice had available

## 2022-10-31 NOTE — PROGRESS NOTES
10/31/22      Medicare pt has received, reviewed, and signed 2nd IM letter informing them of their right to appeal the discharge. Signed copied has been placed on pt bedside chart.

## 2022-10-31 NOTE — NURSE NAVIGATOR
Met with patient at bedside. Introduced self and role of HF NN. Reviewed care transition plan. Confirmed next scheduled appointment with Dr Alethea Cameron for 11/18. Requested PCP follow up appointment. Educated patient on scope of service and availability of [facility] and gave flyer. Patient checked her calendar and email and confirmed she has a pulmonary appointment on 11/3 as well. AVS updated. Reviewed symptoms of HF, atrial fib, worsening pneumonia. Patient given new HF calendar, low sodium diet handout, HF zone handout, and ACC handout on atrial fib and HF. Patient weighs daily and knows parameters for calling her Cardiologist.  Reviewed monitoring of pulse, weight, and BP and using weight calendar to record to bring to providers.

## 2022-10-31 NOTE — PROGRESS NOTES
1318:  Called [facility] to request pt's portable tank be changed to continous flow O2. A referral was sent in [facility]. [facility] will deliver portable tank to pt this afternoon. Reason for Admission:   Atrial fib with RVR  Patient has a past medical history of bronchiectasis, recurrent PNAs, pseudomonas/stenotrophomonas colonization and CHF presenting with increasing SOB and severe hypoxia in pulmonary clinic. RUR Score:     25%, LOS 5 days      PCP: First and Last name:  Lance Hagan MD     Name of Practice:   Are you a current patient: Yes/No:  yes   Approximate date of last visit: 10/23/22   Can you do a virtual visit with your PCP:   no              Resources/supports as identified by patient/family:   children and other family, neighbors, home health agency, spouse                Top Challenges facing patient (as identified by patient/family and CM): Finances/Medication cost?      Has insurance and gets her medications [facility] on [address]? Family is driving for pt for now              Support system or lack thereof? See above                     Living arrangements? Pt lives with her  (who has dementia) in a 1 story house with 5 entry steps. Self-care/ADLs/Cognition? Pt is independent with ADL's and uses DME with ambulation. She is A&Ox4. DME at home: rollator, straight cane, RW, BSC, raised toilets, grab bars, shower chair  Has O2 from [address]. Current Advanced Directive/Advance Care Plan:  DNR  Verified on file    Healthcare Decision Maker:   Click here to complete [facility] including selection of the Healthcare Decision Maker Relationship (ie \"Primary\")        Payor Source Payor: Beverley Bingham / Plan: Kacie Stone / Product Type: Managed Care Medicare /                             Plan for utilizing home health:    [facility] and nursing.   A referral was sent in [facility] to resume services. Transition of Care Plan:     PT/OT/SLP following  Pulmonary following  Referral for resumption of care sent to [facility]; AVS sent  CM following  Family to transport home at d/c  Pt to follow up OP    Care Management Interventions  PCP Verified by CM: Yes  Mode of Transport at Discharge: Other (see comment)  Transition of Care Consult (CM Consult): Home Health, Discharge [place] [id number] [address]: (S) No  Reason Outside [facility]: Patient already serviced by other home care/hospice agency  Discharge Durable Medical Equipment: No  Physical Therapy Consult: Yes  Occupational Therapy Consult: Yes  Speech Therapy Consult: Yes  Support Systems: Spouse/Significant Other, Child(cornell), Other Family Member(s), Friend/Neighbor  Confirm Follow Up Transport: Family  Discharge Location  Patient Expects to be Discharged to[de-identified] Home with home health  SILVIA Peters

## 2022-11-01 ENCOUNTER — TELEPHONE (OUTPATIENT)
Dept: CASE MANAGEMENT | Age: 82
End: 2022-11-01

## 2022-11-01 NOTE — TELEPHONE ENCOUNTER
HEART FAILURE NURSE NAVIGATOR POST DISCHARGE FOLLOW UP PHONE CALL     HF NN contacted patient by telephone to perform post hospital discharge follow up call. Verified patient name and date of birth as identifiers. Provided introduction to self and role. Reviewed discharge instructions; confirmed patient is in receipt of all prescribed HF medications. Reviewed all medications with daughter. No discrepancies found. Patient was on potassium chloride prescribed shortly before last admission and not on home medication list.  Daughter will discuss with Dr Robin Bower who had prescribed it at follow up tomorrow. Reviewed with daughter the doses of PO potassium given here and the most recent potassium levels. Reinforced importance of daily weights and dietary restrictions, following low sodium diet. Reviewed with daughter. Reinforced signs/symptoms of HF and when to notify the physician. Reviewed with daughter. No symptoms noted and feeling well so far. Daughter notes an old IV site with a bruise, some light pink, and no warmth when touched which she will keep an eye on and also show to Dr Robin Bower at follow up tomorrow. Confirmed knowledge of scheduled follow up appointment:  Patient has PCP, Cardiology, and Pulmonary appointments scheduled and aware of them. Confirmed patient has transportation to above appointment. Patient given opportunity to ask questions/express concerns. All questions answered with good understanding.

## 2022-11-23 NOTE — PROGRESS NOTES
Cardiology Progress Note     Patient: Clayton Camacho MRN: [id number]     YOB: 1940  Age: 80 y.o. Sex: female      Admit Date: 9/19/2022       Assessment/Plan     1 A/C HF mr EF: dose IV bumex times1 today. . Start bumex 1 mg po in am.   2 PAF: cont sotalol and xarelto. HR 80-90's.   3 PNA: per medicine team   4 dry mouth/esophageal candidiasis: per medicine team   5 Hx hyponatremia: cont fld restriction. Will sign off and see prn     OP follow up Dr Jovon Gomez     NP spent __9__ minutes reviewing chart, labs, diagnostics and coordination of care . NP spent _10___ minutes with pt/ and son in examination and care plan review. HPI     Clayton Camacho is a 80 y.o. 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 She had hyponatremia and received tolvaptan last admission . Per chart review she started taking NaCl tablets and has gained a few pounds. ED finds no hypoxia, a fib CVR. The patient denies chest pain, dependent edema, diaphoresis, HERRERA, orthopnea, palpitations, PND, shortness of breath, claudication or syncope. Review of Symptoms:  Constitutional: negative for fevers  ENT: negative   Respiratory: positive for cough, wheezing, or dyspnea on exertion  Gastrointestinal: negative for vomiting  Genitourinary: + frequency   Musculoskeletal:negative for back pain  Neurological: negative for headaches  Other systems reviewed and negative except as above. 09/04/22    ECHO ADULT COMPLETE 09/06/2022 9/6/2022    Interpretation Summary  Formatting of this result is different from the original.      Left Ventricle: Mildly reduced left ventricular systolic function with a visually estimated EF of 45 - 50%. Left ventricle size is normal. Normal wall thickness. Mild global hypokinesis present. Grade I diastolic dysfunction with normal LAP.     Aortic Valve: Valve structure is normal. Mild sclerosis of the aortic valve cusp. Mitral Valve: Mild regurgitation. Tricuspid Valve: Mildly elevated RVSP. Mildly improved lv function compared to study 2022. Signed by: Fiona Oliva DO on 2022 11:24 AM        Social History     Tobacco Use    Smoking status: Former     Types: Cigarettes     Start date: 2000     Quit date: 2000     Years since quittin.8    Smokeless tobacco: Never   Substance Use Topics    Alcohol use: Not Currently     History reviewed. No pertinent family history.     Current Facility-Administered Medications   Medication Dose Route Frequency    magnesium sulfate 2 g/50 ml IVPB (premix or compounded)  2 g IntraVENous ONCE    bumetanide (BUMEX) injection 1 mg  1 mg IntraVENous DAILY    sodium chloride (NS) flush 5-10 mL  5-10 mL IntraVENous PRN    albuterol 5mg / ipratropium 0.5mg neb solution  1 Dose Nebulization Q4H PRN    guaiFENesin ER (MUCINEX) tablet 600 mg  600 mg Oral Q12H    montelukast (SINGULAIR) tablet 10 mg  10 mg Oral QHS    nystatin (MYCOSTATIN) 100,000 unit/mL oral suspension 500,000 Units  500,000 Units Oral QID    ketotifen (ZADITOR) 0.025 % (0.035 %) ophthalmic solution 1 Drop  1 Drop Both Eyes BID    pantoprazole (PROTONIX) tablet 40 mg  40 mg Oral DAILY    sotaloL (BETAPACE) tablet 80 mg  80 mg Oral Q12H    rivaroxaban (XARELTO) tablet 20 mg  20 mg Oral DAILY WITH DINNER    sodium chloride (NS) flush 5-40 mL  5-40 mL IntraVENous Q8H    sodium chloride (NS) flush 5-40 mL  5-40 mL IntraVENous PRN    acetaminophen (TYLENOL) tablet 650 mg  650 mg Oral Q6H PRN    Or    acetaminophen (TYLENOL) suppository 650 mg  650 mg Rectal Q6H PRN    polyethylene glycol (MIRALAX) packet 17 g  17 g Oral DAILY PRN    ondansetron (ZOFRAN ODT) tablet 4 mg  4 mg Oral Q8H PRN    Or    ondansetron (ZOFRAN) injection 4 mg  4 mg IntraVENous Q6H PRN    arformoteroL (BROVANA) neb solution 15 mcg  15 mcg Nebulization BID RT    budesonide (PULMICORT) 500 mcg/2 ml nebulizer suspension  500 mcg Heterosexual Nebulization BID RT    artificial saliva (MOUTH KOTE) 1 Spray  1 Spray Oral PRN    tobramycin (NEBCIN) injection 300 mg  300 mg Inhalation BID RT    potassium chloride SR (KLOR-CON 10) tablet 20 mEq  20 mEq Oral TID    diclofenac (VOLTAREN) 1 % topical gel 2 g  2 g Topical BID PRN       Objective:     Vitals:    09/20/22 2139 09/20/22 2350 09/21/22 0012 09/21/22 0609   BP: (!) 145/84  128/70 139/76   Pulse: 84 81 92 91   Resp: 18  18 18   Temp: 97.8 °F (36.6 °C)  97.7 °F (36.5 °C) 97.8 °F (36.6 °C)   SpO2: 95%  95% 96%   Weight:       Height:            Intake and Output:  Current Shift: No intake/output data recorded. Last three shifts: 09/19 1901 - 09/21 0700  In: 26 [P.O.:360; I.V.:100]  Out: 0           Gen: Well-developed, well-nourished, in no acute distress  Neck: Supple,No JVD, No Carotid Bruit,   Resp: Tachypneic, No accessory muscle use,  diminished bases. Card: irregular Rhythm, Normal S1, S2, No murmurs, rubs or gallop.   Abd:  Soft, non-tender, non-distended,BS+,   MSK: No cyanosis  Skin: No rashes    Neuro: moving all four extremities , follows commands appropriately  Psych:  Fair insight, oriented to person, place , alert, Nml Affect  LE: No edema        TELEMETRY: a fib     Lab/Data Review:  BMP:   Lab Results   Component Value Date/Time     (L) 09/21/2022 01:57 AM    K 4.6 09/21/2022 01:57 AM    CL 98 09/21/2022 01:57 AM    CO2 30 09/21/2022 01:57 AM    AGAP 6 09/21/2022 01:57 AM     (H) 09/21/2022 01:57 AM    BUN 17 09/21/2022 01:57 AM    CREA 0.42 (L) 09/21/2022 01:57 AM    GFRAA >60 09/21/2022 01:57 AM    GFRNA >60 09/21/2022 01:57 AM     CBC:   Lab Results   Component Value Date/Time    WBC 8.4 09/21/2022 01:57 AM    HGB 9.1 (L) 09/21/2022 01:57 AM    HCT 28.6 (L) 09/21/2022 01:57 AM     (H) 09/21/2022 01:57 AM       Latest Reference Range & Units 9/19/22 05:36   Troponin-High Sensitivity 0 - 51 ng/L 12   NT pro-BNP <450 PG/ML 5,179 (H)   (H): Data is abnormally high  Signed By: Savage Schroeder NP     September 21, 2022

## 2022-11-28 ENCOUNTER — OFFICE VISIT (OUTPATIENT)
Dept: NEUROLOGY | Age: 82
End: 2022-11-28
Payer: MEDICARE

## 2022-11-28 VITALS
RESPIRATION RATE: 14 BRPM | SYSTOLIC BLOOD PRESSURE: 126 MMHG | DIASTOLIC BLOOD PRESSURE: 68 MMHG | OXYGEN SATURATION: 90 % | HEART RATE: 103 BPM

## 2022-11-28 DIAGNOSIS — G93.40 ENCEPHALOPATHY: Primary | ICD-10-CM

## 2022-11-28 PROCEDURE — 1111F DSCHRG MED/CURRENT MED MERGE: CPT | Performed by: PSYCHIATRY & NEUROLOGY

## 2022-11-28 PROCEDURE — 1090F PRES/ABSN URINE INCON ASSESS: CPT | Performed by: PSYCHIATRY & NEUROLOGY

## 2022-11-28 PROCEDURE — G8536 NO DOC ELDER MAL SCRN: HCPCS | Performed by: PSYCHIATRY & NEUROLOGY

## 2022-11-28 PROCEDURE — G8399 PT W/DXA RESULTS DOCUMENT: HCPCS | Performed by: PSYCHIATRY & NEUROLOGY

## 2022-11-28 PROCEDURE — 1123F ACP DISCUSS/DSCN MKR DOCD: CPT | Performed by: PSYCHIATRY & NEUROLOGY

## 2022-11-28 PROCEDURE — G8510 SCR DEP NEG, NO PLAN REQD: HCPCS | Performed by: PSYCHIATRY & NEUROLOGY

## 2022-11-28 PROCEDURE — 99214 OFFICE O/P EST MOD 30 MIN: CPT | Performed by: PSYCHIATRY & NEUROLOGY

## 2022-11-28 PROCEDURE — G8420 CALC BMI NORM PARAMETERS: HCPCS | Performed by: PSYCHIATRY & NEUROLOGY

## 2022-11-28 PROCEDURE — G8427 DOCREV CUR MEDS BY ELIG CLIN: HCPCS | Performed by: PSYCHIATRY & NEUROLOGY

## 2022-11-28 PROCEDURE — 1101F PT FALLS ASSESS-DOCD LE1/YR: CPT | Performed by: PSYCHIATRY & NEUROLOGY

## 2022-11-28 NOTE — PROGRESS NOTES
[facility] and [address] at [facility] at [address] [place] [address], [address] [facility] [address]   [phone]              Chief Complaint   Patient presents with    Hospital Follow Up     Current Outpatient Medications   Medication Sig Dispense Refill    metoprolol tartrate (LOPRESSOR) 50 mg tablet Take 1 Tablet by mouth two (2) times a day for 30 days. 60 Tablet 0    bumetanide (BUMEX) 0.5 mg tablet Take 1 Tablet by mouth daily for 30 days. 30 Tablet 0    ferrous sulfate 325 mg (65 mg iron) tablet Take 325 mg by mouth daily. traZODone (DESYREL) 50 mg tablet Take 25 mg by mouth nightly as needed for Sleep (in addition to scheduled dose if needed for sleep). azelastine (ASTEPRO) 205.5 mcg (0.15 %) 1 Isabela by Both Nostrils route daily. calcium carbonate (TUMS) 200 mg calcium (500 mg) chew Take 1 Tablet by mouth daily. b complex vitamins tablet Take 1 Tablet by mouth daily. levalbuterol tartrate (XOPENEX) 45 mcg/actuation inhaler Take 1 Puff by inhalation every four (4) hours as needed for Wheezing or Shortness of Breath. arformoteroL (BROVANA) 15 mcg/2 mL nebu neb solution 15 mcg by Nebulization route two (2) times a day. budesonide (PULMICORT) 0.5 mg/2 mL nbsp 500 mcg by Nebulization route two (2) times a day. levothyroxine (SYNTHROID) 25 mcg tablet Take 25 mcg by mouth nightly. traZODone (DESYREL) 50 mg tablet Take 25 mg by mouth nightly.      ergocalciferol (ERGOCALCIFEROL) 1,250 mcg (50,000 unit) capsule Take 50,000 Units by mouth every Sunday. fluticasone (VERAMYST) 27.5 mcg/actuation nasal spray 2 Sprays by Nasal route daily. ondansetron hcl (ZOFRAN) 8 mg tablet Take 8 mg by mouth every eight (8) hours as needed for Nausea or Vomiting (prn).       guaiFENesin ER (MUCINEX) 600 mg ER tablet Take 1 Tablet by mouth every twelve (12) hours. 30 Tablet 1    pantoprazole (PROTONIX) 40 mg tablet Take 40 mg by mouth daily. olopatadine (PATADAY) 0.2 % drop ophthalmic solution Administer 1 Drop to both eyes daily. Xarelto 20 mg tab tablet Take 20 mg by mouth daily (with dinner). metroNIDAZOLE (METROCREAM) 0.75 % topical cream Apply  to affected area two (2) times a day. montelukast (SINGULAIR) 10 mg tablet Take 10 mg by mouth daily. diclofenac (VOLTAREN) 1 % gel Apply  to affected area four (4) times daily as needed for Pain. acetaminophen (TYLENOL) 500 mg tablet Take 500 mg by mouth every six (6) hours as needed for Pain. Allergies   Allergen Reactions    Ace Inhibitors Cough    Iodine Rash    Penicillins Rash     Social History     Tobacco Use    Smoking status: Former     Packs/day: 0.00     Years: 0.00     Pack years: 0.00     Types: Cigarettes     Start date: 2000     Quit date: 2000     Years since quittin.0    Smokeless tobacco: Never    Tobacco comments:     stopped 3/13/2000   Vaping Use    Vaping Use: Never used   Substance Use Topics    Alcohol use: Not Currently    Drug use: Never   40-year-old lady returns today for follow-up after her hospitalization at [facility] about 2 month ago. She came in with atrial fibrillation and rapid ventricular response. She was also noted to have acute on chronic heart failure and pneumonia. I saw her in neurologic consultation for complaints of numbness and tingling and I found her to be encephalopathic. She was hyponatremic. MRI of the brain was unremarkable with age-related changes. Metabolic panel from 860 with a sodium of 125 and sodium from  of this year 137. CBC from  with hemoglobin of 9  Magnesium  normal at 2  EEG 2022 slow in keeping with encephalopathy    She is here with her son today.   She continues to have difficulty with her A. fib, her CHF and she continues to be short of breath on full-time oxygen now. She notes she has been in the hospital 2 or 3 times since I saw her. They have been watching her sodium and potassium. She is on potassium supplementation now. No further episodes of confusion or numbness. Examination  Visit Vitals  /68 (BP 1 Location: Left upper arm, BP Patient Position: Sitting, BP Cuff Size: Adult)   Pulse (!) 103   Resp 14   SpO2 90% Comment: 2.5 liters O2     Pleasant lady. She has her oxygen on today. Short of breath when speaking. She is awake alert and oriented to November 27, 2022 and knows were on the second floor and the building address starts with a 6. She is able to calculate without difficulty. She recalls the current and previous presidents. She follows all commands. Cranial nerves are intact 2-12. No nystagmus. She has no pronation and no drift. She resists fully in the upper and lower extremities in all muscle groups today testing. Her reflexes are symmetrical.  Ambulates with a Rollator    Impression/Plan  Encephalopathy secondary to hyponatremia resolved  Evaluation from a neurologic standpoint has been unremarkable  Neurologically she is intact  Follow-up as needed    Vinicio Ribera MD        This note was created using voice recognition software. Despite editing, there may be syntax errors.

## 2022-12-05 NOTE — ED NOTES
CONSULT NOTE:  I spoke with Dr. Vee Mendoza of the adult hospitalist team. Discussed patient's presentation, history, physical assessment, and available diagnostic results.  He will evaluate, write orders and admit the patient to the hospital. 11:12 AM

## 2022-12-08 ENCOUNTER — TRANSCRIBE ORDER (OUTPATIENT)
Dept: SCHEDULING | Age: 82
End: 2022-12-08

## 2022-12-08 DIAGNOSIS — J69.0: Primary | ICD-10-CM

## 2022-12-28 ENCOUNTER — HOSPITAL ENCOUNTER (OUTPATIENT)
Dept: CT IMAGING | Age: 82
Discharge: HOME | End: 2022-12-28
Attending: NURSE PRACTITIONER
Payer: MEDICARE

## 2022-12-28 DIAGNOSIS — J69.0: ICD-10-CM

## 2022-12-28 PROCEDURE — 71250 CT THORAX DX C-: CPT

## 2023-01-01 ENCOUNTER — HOSPICE ADMISSION (OUTPATIENT)
Dept: HOSPICE | Facility: HOSPICE | Age: 83
End: 2023-01-01

## 2023-01-01 ENCOUNTER — HOSPITAL ENCOUNTER (INPATIENT)
Age: 83
LOS: 3 days | End: 2023-03-23
Attending: EMERGENCY MEDICINE | Admitting: INTERNAL MEDICINE
Payer: MEDICARE

## 2023-01-01 ENCOUNTER — APPOINTMENT (OUTPATIENT)
Dept: GENERAL RADIOLOGY | Age: 83
End: 2023-01-01
Attending: EMERGENCY MEDICINE
Payer: MEDICARE

## 2023-01-01 ENCOUNTER — APPOINTMENT (OUTPATIENT)
Dept: CT IMAGING | Age: 83
End: 2023-01-01
Attending: INTERNAL MEDICINE
Payer: MEDICARE

## 2023-01-01 ENCOUNTER — APPOINTMENT (OUTPATIENT)
Dept: GENERAL RADIOLOGY | Age: 83
End: 2023-01-01
Attending: INTERNAL MEDICINE
Payer: MEDICARE

## 2023-01-01 ENCOUNTER — APPOINTMENT (OUTPATIENT)
Dept: NON INVASIVE DIAGNOSTICS | Age: 83
End: 2023-01-01
Attending: INTERNAL MEDICINE
Payer: MEDICARE

## 2023-01-01 VITALS
SYSTOLIC BLOOD PRESSURE: 91 MMHG | HEIGHT: 64 IN | BODY MASS INDEX: 24.84 KG/M2 | DIASTOLIC BLOOD PRESSURE: 54 MMHG | RESPIRATION RATE: 17 BRPM | WEIGHT: 145.5 LBS | TEMPERATURE: 97.4 F | HEART RATE: 99 BPM | OXYGEN SATURATION: 99 %

## 2023-01-01 DIAGNOSIS — Z51.5 PALLIATIVE CARE ENCOUNTER: ICD-10-CM

## 2023-01-01 DIAGNOSIS — E87.1 HYPONATREMIA: ICD-10-CM

## 2023-01-01 DIAGNOSIS — E88.09 HYPOALBUMINEMIA: ICD-10-CM

## 2023-01-01 DIAGNOSIS — R06.02 SHORTNESS OF BREATH: ICD-10-CM

## 2023-01-01 DIAGNOSIS — R06.00 DYSPNEA, UNSPECIFIED TYPE: Primary | ICD-10-CM

## 2023-01-01 DIAGNOSIS — I50.30 DIASTOLIC CONGESTIVE HEART FAILURE, UNSPECIFIED HF CHRONICITY (HCC): ICD-10-CM

## 2023-01-01 DIAGNOSIS — R09.02 HYPOXIA: ICD-10-CM

## 2023-01-01 DIAGNOSIS — I48.91 ATRIAL FIBRILLATION WITH RAPID VENTRICULAR RESPONSE (HCC): ICD-10-CM

## 2023-01-01 DIAGNOSIS — E87.6 HYPOKALEMIA: ICD-10-CM

## 2023-01-01 LAB
ALBUMIN SERPL-MCNC: 2.7 G/DL (ref 3.5–5)
ALBUMIN SERPL-MCNC: 2.9 G/DL (ref 3.5–5)
ALBUMIN/GLOB SERPL: 0.8 (ref 1.1–2.2)
ALBUMIN/GLOB SERPL: 0.9 (ref 1.1–2.2)
ALP SERPL-CCNC: 101 U/L (ref 45–117)
ALP SERPL-CCNC: 101 U/L (ref 45–117)
ALT SERPL-CCNC: 18 U/L (ref 12–78)
ALT SERPL-CCNC: 19 U/L (ref 12–78)
ANION GAP SERPL CALC-SCNC: ABNORMAL MMOL/L (ref 5–15)
APPEARANCE UR: CLEAR
ARTERIAL PATENCY WRIST A: POSITIVE
AST SERPL-CCNC: 17 U/L (ref 15–37)
AST SERPL-CCNC: 21 U/L (ref 15–37)
BACTERIA URNS QL MICRO: ABNORMAL /HPF
BASOPHILS # BLD: 0 K/UL (ref 0–0.1)
BASOPHILS # BLD: 0 K/UL (ref 0–0.1)
BASOPHILS # BLD: 0.1 K/UL (ref 0–0.1)
BASOPHILS NFR BLD: 0 % (ref 0–1)
BDY SITE: ABNORMAL
BILIRUB DIRECT SERPL-MCNC: 0.3 MG/DL (ref 0–0.2)
BILIRUB SERPL-MCNC: 0.4 MG/DL (ref 0.2–1)
BILIRUB SERPL-MCNC: 0.6 MG/DL (ref 0.2–1)
BILIRUB UR QL: NEGATIVE
BNP SERPL-MCNC: 2621 PG/ML
BUN SERPL-MCNC: 22 MG/DL (ref 6–20)
BUN SERPL-MCNC: 25 MG/DL (ref 6–20)
BUN SERPL-MCNC: 26 MG/DL (ref 6–20)
BUN SERPL-MCNC: 30 MG/DL (ref 6–20)
BUN/CREAT SERPL: 44 (ref 12–20)
BUN/CREAT SERPL: 45 (ref 12–20)
BUN/CREAT SERPL: 45 (ref 12–20)
BUN/CREAT SERPL: 61 (ref 12–20)
CALCIUM SERPL-MCNC: 8.6 MG/DL (ref 8.5–10.1)
CALCIUM SERPL-MCNC: 8.7 MG/DL (ref 8.5–10.1)
CALCIUM SERPL-MCNC: 9 MG/DL (ref 8.5–10.1)
CALCIUM SERPL-MCNC: 9 MG/DL (ref 8.5–10.1)
CALCULATED R AXIS, ECG10: -13 DEGREES
CALCULATED R AXIS, ECG10: -4 DEGREES
CALCULATED T AXIS, ECG11: -39 DEGREES
CALCULATED T AXIS, ECG11: 62 DEGREES
CHLORIDE SERPL-SCNC: 68 MMOL/L (ref 97–108)
CHLORIDE SERPL-SCNC: 70 MMOL/L (ref 97–108)
CHLORIDE SERPL-SCNC: 70 MMOL/L (ref 97–108)
CHLORIDE SERPL-SCNC: 71 MMOL/L (ref 97–108)
CO2 SERPL-SCNC: >45 MMOL/L (ref 21–32)
COLOR UR: ABNORMAL
COMMENT, HOLDF: NORMAL
CREAT SERPL-MCNC: 0.41 MG/DL (ref 0.55–1.02)
CREAT SERPL-MCNC: 0.5 MG/DL (ref 0.55–1.02)
CREAT SERPL-MCNC: 0.58 MG/DL (ref 0.55–1.02)
CREAT SERPL-MCNC: 0.67 MG/DL (ref 0.55–1.02)
DIAGNOSIS, 93000: NORMAL
DIAGNOSIS, 93000: NORMAL
DIFFERENTIAL METHOD BLD: ABNORMAL
EOSINOPHIL # BLD: 0 K/UL (ref 0–0.4)
EOSINOPHIL NFR BLD: 0 % (ref 0–7)
EPITH CASTS URNS QL MICRO: ABNORMAL /LPF
ERYTHROCYTE [DISTWIDTH] IN BLOOD BY AUTOMATED COUNT: 12.5 % (ref 11.5–14.5)
ERYTHROCYTE [DISTWIDTH] IN BLOOD BY AUTOMATED COUNT: 12.7 % (ref 11.5–14.5)
ERYTHROCYTE [DISTWIDTH] IN BLOOD BY AUTOMATED COUNT: 13 % (ref 11.5–14.5)
GAS FLOW.O2 O2 DELIVERY SYS: ABNORMAL
GLOBULIN SER CALC-MCNC: 3.4 G/DL (ref 2–4)
GLOBULIN SER CALC-MCNC: 3.4 G/DL (ref 2–4)
GLUCOSE SERPL-MCNC: 103 MG/DL (ref 65–100)
GLUCOSE SERPL-MCNC: 160 MG/DL (ref 65–100)
GLUCOSE SERPL-MCNC: 86 MG/DL (ref 65–100)
GLUCOSE SERPL-MCNC: 89 MG/DL (ref 65–100)
GLUCOSE UR STRIP.AUTO-MCNC: NEGATIVE MG/DL
HCO3 BLD-SCNC: 62.2 MMOL/L (ref 22–26)
HCT VFR BLD AUTO: 28 % (ref 35–47)
HCT VFR BLD AUTO: 29.5 % (ref 35–47)
HCT VFR BLD AUTO: 29.9 % (ref 35–47)
HGB BLD-MCNC: 8.6 G/DL (ref 11.5–16)
HGB BLD-MCNC: 8.7 G/DL (ref 11.5–16)
HGB BLD-MCNC: 8.9 G/DL (ref 11.5–16)
HGB UR QL STRIP: ABNORMAL
HYALINE CASTS URNS QL MICRO: ABNORMAL /LPF (ref 0–2)
IMM GRANULOCYTES # BLD AUTO: 0.1 K/UL (ref 0–0.04)
IMM GRANULOCYTES # BLD AUTO: 0.1 K/UL (ref 0–0.04)
IMM GRANULOCYTES # BLD AUTO: 0.2 K/UL (ref 0–0.04)
IMM GRANULOCYTES NFR BLD AUTO: 1 % (ref 0–0.5)
INR PPP: 1.9 (ref 0.9–1.1)
KETONES UR QL STRIP.AUTO: NEGATIVE MG/DL
LACTATE SERPL-SCNC: 1.9 MMOL/L (ref 0.4–2)
LEUKOCYTE ESTERASE UR QL STRIP.AUTO: ABNORMAL
LYMPHOCYTES # BLD: 0.5 K/UL (ref 0.8–3.5)
LYMPHOCYTES # BLD: 0.7 K/UL (ref 0.8–3.5)
LYMPHOCYTES # BLD: 1 K/UL (ref 0.8–3.5)
LYMPHOCYTES NFR BLD: 2 % (ref 12–49)
LYMPHOCYTES NFR BLD: 5 % (ref 12–49)
LYMPHOCYTES NFR BLD: 9 % (ref 12–49)
MAGNESIUM SERPL-MCNC: 1.7 MG/DL (ref 1.6–2.4)
MAGNESIUM SERPL-MCNC: 1.7 MG/DL (ref 1.6–2.4)
MAGNESIUM SERPL-MCNC: 1.9 MG/DL (ref 1.6–2.4)
MCH RBC QN AUTO: 29.1 PG (ref 26–34)
MCH RBC QN AUTO: 29.2 PG (ref 26–34)
MCH RBC QN AUTO: 29.5 PG (ref 26–34)
MCHC RBC AUTO-ENTMCNC: 29.5 G/DL (ref 30–36.5)
MCHC RBC AUTO-ENTMCNC: 29.8 G/DL (ref 30–36.5)
MCHC RBC AUTO-ENTMCNC: 30.7 G/DL (ref 30–36.5)
MCV RBC AUTO: 95.9 FL (ref 80–99)
MCV RBC AUTO: 97.7 FL (ref 80–99)
MCV RBC AUTO: 99 FL (ref 80–99)
MONOCYTES # BLD: 0.9 K/UL (ref 0–1)
MONOCYTES # BLD: 1.2 K/UL (ref 0–1)
MONOCYTES # BLD: 1.6 K/UL (ref 0–1)
MONOCYTES NFR BLD: 12 % (ref 5–13)
MONOCYTES NFR BLD: 6 % (ref 5–13)
MONOCYTES NFR BLD: 7 % (ref 5–13)
NEUTS SEG # BLD: 16.9 K/UL (ref 1.8–8)
NEUTS SEG # BLD: 20.8 K/UL (ref 1.8–8)
NEUTS SEG # BLD: 6.2 K/UL (ref 1.8–8)
NEUTS SEG NFR BLD: 78 % (ref 32–75)
NEUTS SEG NFR BLD: 88 % (ref 32–75)
NEUTS SEG NFR BLD: 90 % (ref 32–75)
NITRITE UR QL STRIP.AUTO: NEGATIVE
NRBC # BLD: 0 K/UL (ref 0–0.01)
NRBC BLD-RTO: 0 PER 100 WBC
O2/TOTAL GAS SETTING VFR VENT: 2 %
PCO2 BLD: 82.2 MMHG (ref 35–45)
PH BLD: 7.49 (ref 7.35–7.45)
PH UR STRIP: 7 (ref 5–8)
PLATELET # BLD AUTO: 244 K/UL (ref 150–400)
PLATELET # BLD AUTO: 288 K/UL (ref 150–400)
PLATELET # BLD AUTO: 296 K/UL (ref 150–400)
PMV BLD AUTO: 9.1 FL (ref 8.9–12.9)
PMV BLD AUTO: 9.5 FL (ref 8.9–12.9)
PMV BLD AUTO: 9.5 FL (ref 8.9–12.9)
PO2 BLD: 139 MMHG (ref 80–100)
POTASSIUM SERPL-SCNC: 2.6 MMOL/L (ref 3.5–5.1)
POTASSIUM SERPL-SCNC: 3 MMOL/L (ref 3.5–5.1)
POTASSIUM SERPL-SCNC: 3.4 MMOL/L (ref 3.5–5.1)
POTASSIUM SERPL-SCNC: 3.5 MMOL/L (ref 3.5–5.1)
POTASSIUM SERPL-SCNC: 3.6 MMOL/L (ref 3.5–5.1)
PROT SERPL-MCNC: 6.1 G/DL (ref 6.4–8.2)
PROT SERPL-MCNC: 6.3 G/DL (ref 6.4–8.2)
PROT UR STRIP-MCNC: NEGATIVE MG/DL
PROTHROMBIN TIME: 19 SEC (ref 9–11.1)
Q-T INTERVAL, ECG07: 336 MS
Q-T INTERVAL, ECG07: 354 MS
QRS DURATION, ECG06: 86 MS
QRS DURATION, ECG06: 86 MS
QTC CALCULATION (BEZET), ECG08: 446 MS
QTC CALCULATION (BEZET), ECG08: 451 MS
RBC # BLD AUTO: 2.92 M/UL (ref 3.8–5.2)
RBC # BLD AUTO: 2.98 M/UL (ref 3.8–5.2)
RBC # BLD AUTO: 3.06 M/UL (ref 3.8–5.2)
RBC #/AREA URNS HPF: ABNORMAL /HPF (ref 0–5)
RBC MORPH BLD: ABNORMAL
RBC MORPH BLD: ABNORMAL
SAMPLES BEING HELD,HOLD: NORMAL
SAO2 % BLD: 99.1 % (ref 92–97)
SODIUM SERPL-SCNC: 124 MMOL/L (ref 136–145)
SODIUM SERPL-SCNC: 125 MMOL/L (ref 136–145)
SODIUM SERPL-SCNC: 125 MMOL/L (ref 136–145)
SODIUM SERPL-SCNC: 126 MMOL/L (ref 136–145)
SP GR UR REFRACTOMETRY: 1.01 (ref 1–1.03)
SPECIMEN TYPE: ABNORMAL
TROPONIN I SERPL HS-MCNC: 8 NG/L (ref 0–51)
TSH SERPL DL<=0.05 MIU/L-ACNC: 3.11 UIU/ML (ref 0.36–3.74)
UA: UC IF INDICATED,UAUC: ABNORMAL
UROBILINOGEN UR QL STRIP.AUTO: 0.2 EU/DL (ref 0.2–1)
VENTRICULAR RATE, ECG03: 106 BPM
VENTRICULAR RATE, ECG03: 98 BPM
WBC # BLD AUTO: 19.3 K/UL (ref 3.6–11)
WBC # BLD AUTO: 23.1 K/UL (ref 3.6–11)
WBC # BLD AUTO: 7.9 K/UL (ref 3.6–11)
WBC URNS QL MICRO: ABNORMAL /HPF (ref 0–4)

## 2023-01-01 PROCEDURE — 74011250637 HC RX REV CODE- 250/637: Performed by: INTERNAL MEDICINE

## 2023-01-01 PROCEDURE — 71045 X-RAY EXAM CHEST 1 VIEW: CPT

## 2023-01-01 PROCEDURE — 85025 COMPLETE CBC W/AUTO DIFF WBC: CPT

## 2023-01-01 PROCEDURE — 99222 1ST HOSP IP/OBS MODERATE 55: CPT | Performed by: INTERNAL MEDICINE

## 2023-01-01 PROCEDURE — 99233 SBSQ HOSP IP/OBS HIGH 50: CPT | Performed by: INTERNAL MEDICINE

## 2023-01-01 PROCEDURE — 96375 TX/PRO/DX INJ NEW DRUG ADDON: CPT

## 2023-01-01 PROCEDURE — 83735 ASSAY OF MAGNESIUM: CPT

## 2023-01-01 PROCEDURE — 84132 ASSAY OF SERUM POTASSIUM: CPT

## 2023-01-01 PROCEDURE — 83605 ASSAY OF LACTIC ACID: CPT

## 2023-01-01 PROCEDURE — 36415 COLL VENOUS BLD VENIPUNCTURE: CPT

## 2023-01-01 PROCEDURE — 94761 N-INVAS EAR/PLS OXIMETRY MLT: CPT

## 2023-01-01 PROCEDURE — 80048 BASIC METABOLIC PNL TOTAL CA: CPT

## 2023-01-01 PROCEDURE — 36600 WITHDRAWAL OF ARTERIAL BLOOD: CPT

## 2023-01-01 PROCEDURE — 74011000250 HC RX REV CODE- 250: Performed by: INTERNAL MEDICINE

## 2023-01-01 PROCEDURE — 65270000029 HC RM PRIVATE

## 2023-01-01 PROCEDURE — 99285 EMERGENCY DEPT VISIT HI MDM: CPT

## 2023-01-01 PROCEDURE — 96366 THER/PROPH/DIAG IV INF ADDON: CPT

## 2023-01-01 PROCEDURE — 74011250636 HC RX REV CODE- 250/636

## 2023-01-01 PROCEDURE — 96365 THER/PROPH/DIAG IV INF INIT: CPT

## 2023-01-01 PROCEDURE — 74011000250 HC RX REV CODE- 250: Performed by: EMERGENCY MEDICINE

## 2023-01-01 PROCEDURE — 82803 BLOOD GASES ANY COMBINATION: CPT

## 2023-01-01 PROCEDURE — APPSS30 APP SPLIT SHARED TIME 16-30 MINUTES: Performed by: NURSE PRACTITIONER

## 2023-01-01 PROCEDURE — 74011250637 HC RX REV CODE- 250/637: Performed by: NURSE PRACTITIONER

## 2023-01-01 PROCEDURE — 74011250636 HC RX REV CODE- 250/636: Performed by: INTERNAL MEDICINE

## 2023-01-01 PROCEDURE — 84484 ASSAY OF TROPONIN QUANT: CPT

## 2023-01-01 PROCEDURE — 74011000250 HC RX REV CODE- 250: Performed by: NURSE PRACTITIONER

## 2023-01-01 PROCEDURE — 94640 AIRWAY INHALATION TREATMENT: CPT

## 2023-01-01 PROCEDURE — 81001 URINALYSIS AUTO W/SCOPE: CPT

## 2023-01-01 PROCEDURE — 80053 COMPREHEN METABOLIC PANEL: CPT

## 2023-01-01 PROCEDURE — 85610 PROTHROMBIN TIME: CPT

## 2023-01-01 PROCEDURE — 83880 ASSAY OF NATRIURETIC PEPTIDE: CPT

## 2023-01-01 PROCEDURE — 94664 DEMO&/EVAL PT USE INHALER: CPT

## 2023-01-01 PROCEDURE — 93005 ELECTROCARDIOGRAM TRACING: CPT

## 2023-01-01 PROCEDURE — 96367 TX/PROPH/DG ADDL SEQ IV INF: CPT

## 2023-01-01 PROCEDURE — 74011250636 HC RX REV CODE- 250/636: Performed by: NURSE PRACTITIONER

## 2023-01-01 PROCEDURE — 80076 HEPATIC FUNCTION PANEL: CPT

## 2023-01-01 PROCEDURE — 87040 BLOOD CULTURE FOR BACTERIA: CPT

## 2023-01-01 PROCEDURE — 74011250637 HC RX REV CODE- 250/637: Performed by: EMERGENCY MEDICINE

## 2023-01-01 PROCEDURE — 77010033678 HC OXYGEN DAILY

## 2023-01-01 PROCEDURE — 84443 ASSAY THYROID STIM HORMONE: CPT

## 2023-01-01 PROCEDURE — 74011250636 HC RX REV CODE- 250/636: Performed by: EMERGENCY MEDICINE

## 2023-01-01 PROCEDURE — 51798 US URINE CAPACITY MEASURE: CPT

## 2023-01-01 PROCEDURE — 99223 1ST HOSP IP/OBS HIGH 75: CPT | Performed by: NURSE PRACTITIONER

## 2023-01-01 PROCEDURE — 77030013140 HC MSK NEB VYRM -A

## 2023-01-01 PROCEDURE — 74176 CT ABD & PELVIS W/O CONTRAST: CPT

## 2023-01-01 RX ORDER — ARFORMOTEROL TARTRATE 15 UG/2ML
15 SOLUTION RESPIRATORY (INHALATION)
Status: DISCONTINUED | OUTPATIENT
Start: 2023-01-01 | End: 2023-01-01 | Stop reason: HOSPADM

## 2023-01-01 RX ORDER — MAGNESIUM SULFATE HEPTAHYDRATE 40 MG/ML
2 INJECTION, SOLUTION INTRAVENOUS ONCE
Status: COMPLETED | OUTPATIENT
Start: 2023-01-01 | End: 2023-01-01

## 2023-01-01 RX ORDER — LORAZEPAM 0.5 MG/1
0.5 TABLET ORAL
Status: DISCONTINUED | OUTPATIENT
Start: 2023-01-01 | End: 2023-01-01 | Stop reason: HOSPADM

## 2023-01-01 RX ORDER — LEVOTHYROXINE SODIUM 25 UG/1
25 TABLET ORAL
Status: DISCONTINUED | OUTPATIENT
Start: 2023-01-01 | End: 2023-01-01

## 2023-01-01 RX ORDER — ACETAMINOPHEN 650 MG/1
650 SUPPOSITORY RECTAL
Status: DISCONTINUED | OUTPATIENT
Start: 2023-01-01 | End: 2023-01-01 | Stop reason: HOSPADM

## 2023-01-01 RX ORDER — TRAZODONE HYDROCHLORIDE 50 MG/1
25 TABLET ORAL
Status: DISCONTINUED | OUTPATIENT
Start: 2023-01-01 | End: 2023-01-01

## 2023-01-01 RX ORDER — POTASSIUM CHLORIDE 750 MG/1
20 TABLET, FILM COATED, EXTENDED RELEASE ORAL
Status: COMPLETED | OUTPATIENT
Start: 2023-01-01 | End: 2023-01-01

## 2023-01-01 RX ORDER — MEGESTROL ACETATE 40 MG/ML
200 SUSPENSION ORAL
Status: DISCONTINUED | OUTPATIENT
Start: 2023-01-01 | End: 2023-01-01

## 2023-01-01 RX ORDER — MORPHINE SULFATE 2 MG/ML
2 INJECTION, SOLUTION INTRAMUSCULAR; INTRAVENOUS
Status: DISCONTINUED | OUTPATIENT
Start: 2023-01-01 | End: 2023-01-01 | Stop reason: HOSPADM

## 2023-01-01 RX ORDER — ARFORMOTEROL TARTRATE 15 UG/2ML
15 SOLUTION RESPIRATORY (INHALATION) 2 TIMES DAILY
Status: DISCONTINUED | OUTPATIENT
Start: 2023-01-01 | End: 2023-01-01

## 2023-01-01 RX ORDER — ACETAMINOPHEN 325 MG/1
650 TABLET ORAL
Status: DISCONTINUED | OUTPATIENT
Start: 2023-01-01 | End: 2023-01-01 | Stop reason: HOSPADM

## 2023-01-01 RX ORDER — POTASSIUM CHLORIDE 1.5 G/1.77G
20 POWDER, FOR SOLUTION ORAL DAILY
COMMUNITY

## 2023-01-01 RX ORDER — MONTELUKAST SODIUM 10 MG/1
10 TABLET ORAL DAILY
Status: DISCONTINUED | OUTPATIENT
Start: 2023-01-01 | End: 2023-01-01

## 2023-01-01 RX ORDER — LORAZEPAM 2 MG/ML
1 INJECTION INTRAMUSCULAR
Status: DISCONTINUED | OUTPATIENT
Start: 2023-01-01 | End: 2023-01-01 | Stop reason: HOSPADM

## 2023-01-01 RX ORDER — SODIUM CHLORIDE 0.9 % (FLUSH) 0.9 %
5-40 SYRINGE (ML) INJECTION EVERY 8 HOURS
Status: DISCONTINUED | OUTPATIENT
Start: 2023-01-01 | End: 2023-01-01 | Stop reason: HOSPADM

## 2023-01-01 RX ORDER — TRAMADOL HYDROCHLORIDE 50 MG/1
50 TABLET ORAL
Status: DISCONTINUED | OUTPATIENT
Start: 2023-01-01 | End: 2023-01-01 | Stop reason: HOSPADM

## 2023-01-01 RX ORDER — ARFORMOTEROL TARTRATE 15 UG/2ML
15 SOLUTION RESPIRATORY (INHALATION)
Status: DISCONTINUED | OUTPATIENT
Start: 2023-01-01 | End: 2023-01-01

## 2023-01-01 RX ORDER — MIRTAZAPINE 15 MG/1
7.5 TABLET, FILM COATED ORAL
COMMUNITY

## 2023-01-01 RX ORDER — ONDANSETRON 2 MG/ML
4 INJECTION INTRAMUSCULAR; INTRAVENOUS
Status: DISCONTINUED | OUTPATIENT
Start: 2023-01-01 | End: 2023-01-01 | Stop reason: HOSPADM

## 2023-01-01 RX ORDER — SODIUM CHLORIDE 0.9 % (FLUSH) 0.9 %
5-40 SYRINGE (ML) INJECTION AS NEEDED
Status: DISCONTINUED | OUTPATIENT
Start: 2023-01-01 | End: 2023-01-01 | Stop reason: HOSPADM

## 2023-01-01 RX ORDER — METOPROLOL TARTRATE 50 MG/1
50 TABLET ORAL 2 TIMES DAILY
Status: DISCONTINUED | OUTPATIENT
Start: 2023-01-01 | End: 2023-01-01

## 2023-01-01 RX ORDER — POLYETHYLENE GLYCOL 3350 17 G/17G
17 POWDER, FOR SOLUTION ORAL DAILY PRN
Status: DISCONTINUED | OUTPATIENT
Start: 2023-01-01 | End: 2023-01-01 | Stop reason: HOSPADM

## 2023-01-01 RX ORDER — TOBRAMYCIN INHALATION SOLUTION 300 MG/5ML
INHALANT RESPIRATORY (INHALATION)
COMMUNITY
Start: 2023-01-01

## 2023-01-01 RX ORDER — MIRTAZAPINE 15 MG/1
7.5 TABLET, FILM COATED ORAL
Status: DISCONTINUED | OUTPATIENT
Start: 2023-01-01 | End: 2023-01-01

## 2023-01-01 RX ORDER — IPRATROPIUM BROMIDE AND ALBUTEROL SULFATE 2.5; .5 MG/3ML; MG/3ML
3 SOLUTION RESPIRATORY (INHALATION)
Status: COMPLETED | OUTPATIENT
Start: 2023-01-01 | End: 2023-01-01

## 2023-01-01 RX ORDER — MORPHINE SULFATE 4 MG/ML
INJECTION, SOLUTION INTRAMUSCULAR; INTRAVENOUS
Status: COMPLETED
Start: 2023-01-01 | End: 2023-01-01

## 2023-01-01 RX ORDER — BUDESONIDE 0.5 MG/2ML
500 INHALANT ORAL 2 TIMES DAILY
Status: DISCONTINUED | OUTPATIENT
Start: 2023-01-01 | End: 2023-01-01

## 2023-01-01 RX ORDER — MEGESTROL ACETATE 40 MG/ML
200 SUSPENSION ORAL
COMMUNITY

## 2023-01-01 RX ORDER — ONDANSETRON 4 MG/1
4 TABLET, ORALLY DISINTEGRATING ORAL
Status: DISCONTINUED | OUTPATIENT
Start: 2023-01-01 | End: 2023-01-01 | Stop reason: HOSPADM

## 2023-01-01 RX ORDER — ONDANSETRON 2 MG/ML
4 INJECTION INTRAMUSCULAR; INTRAVENOUS
Status: DISCONTINUED | OUTPATIENT
Start: 2023-01-01 | End: 2023-01-01

## 2023-01-01 RX ORDER — MORPHINE SULFATE 2 MG/ML
2 INJECTION, SOLUTION INTRAMUSCULAR; INTRAVENOUS
Status: DISCONTINUED | OUTPATIENT
Start: 2023-01-01 | End: 2023-01-01

## 2023-01-01 RX ORDER — HYDRALAZINE HYDROCHLORIDE 20 MG/ML
20 INJECTION INTRAMUSCULAR; INTRAVENOUS
Status: DISCONTINUED | OUTPATIENT
Start: 2023-01-01 | End: 2023-01-01 | Stop reason: HOSPADM

## 2023-01-01 RX ORDER — PANTOPRAZOLE SODIUM 40 MG/1
40 TABLET, DELAYED RELEASE ORAL DAILY
Status: DISCONTINUED | OUTPATIENT
Start: 2023-01-01 | End: 2023-01-01

## 2023-01-01 RX ORDER — BUDESONIDE 0.5 MG/2ML
500 INHALANT ORAL
Status: DISCONTINUED | OUTPATIENT
Start: 2023-01-01 | End: 2023-01-01 | Stop reason: HOSPADM

## 2023-01-01 RX ORDER — ONDANSETRON 4 MG/1
4 TABLET, ORALLY DISINTEGRATING ORAL
Status: DISCONTINUED | OUTPATIENT
Start: 2023-01-01 | End: 2023-01-01

## 2023-01-01 RX ORDER — SIMETHICONE 80 MG
80 TABLET,CHEWABLE ORAL
Status: DISCONTINUED | OUTPATIENT
Start: 2023-01-01 | End: 2023-01-01 | Stop reason: HOSPADM

## 2023-01-01 RX ORDER — TRAMADOL HYDROCHLORIDE 50 MG/1
50 TABLET ORAL
COMMUNITY

## 2023-01-01 RX ORDER — POTASSIUM CHLORIDE 7.45 MG/ML
10 INJECTION INTRAVENOUS ONCE
Status: COMPLETED | OUTPATIENT
Start: 2023-01-01 | End: 2023-01-01

## 2023-01-01 RX ORDER — POTASSIUM CHLORIDE 750 MG/1
40 TABLET, FILM COATED, EXTENDED RELEASE ORAL
Status: COMPLETED | OUTPATIENT
Start: 2023-01-01 | End: 2023-01-01

## 2023-01-01 RX ORDER — BUDESONIDE 0.5 MG/2ML
500 INHALANT ORAL
Status: DISCONTINUED | OUTPATIENT
Start: 2023-01-01 | End: 2023-01-01

## 2023-01-01 RX ORDER — METOPROLOL TARTRATE 50 MG/1
50 TABLET ORAL 2 TIMES DAILY
COMMUNITY

## 2023-01-01 RX ORDER — GLYCOPYRROLATE 0.2 MG/ML
0.2 INJECTION INTRAMUSCULAR; INTRAVENOUS
Status: DISCONTINUED | OUTPATIENT
Start: 2023-01-01 | End: 2023-01-01 | Stop reason: HOSPADM

## 2023-01-01 RX ORDER — LIDOCAINE 4 G/100G
1 PATCH TOPICAL EVERY 24 HOURS
Status: DISCONTINUED | OUTPATIENT
Start: 2023-01-01 | End: 2023-01-01

## 2023-01-01 RX ORDER — BUMETANIDE 1 MG/1
1 TABLET ORAL DAILY
COMMUNITY

## 2023-01-01 RX ORDER — BUMETANIDE 0.25 MG/ML
2 INJECTION INTRAMUSCULAR; INTRAVENOUS EVERY 12 HOURS
Status: DISCONTINUED | OUTPATIENT
Start: 2023-01-01 | End: 2023-01-01

## 2023-01-01 RX ADMIN — PANTOPRAZOLE SODIUM 40 MG: 40 TABLET, DELAYED RELEASE ORAL at 08:38

## 2023-01-01 RX ADMIN — PANTOPRAZOLE SODIUM 40 MG: 40 TABLET, DELAYED RELEASE ORAL at 08:58

## 2023-01-01 RX ADMIN — LORAZEPAM 1 MG: 2 INJECTION INTRAMUSCULAR; INTRAVENOUS at 19:39

## 2023-01-01 RX ADMIN — POTASSIUM CHLORIDE 20 MEQ: 750 TABLET, EXTENDED RELEASE ORAL at 11:53

## 2023-01-01 RX ADMIN — SODIUM CHLORIDE, PRESERVATIVE FREE 5 ML: 5 INJECTION INTRAVENOUS at 05:07

## 2023-01-01 RX ADMIN — ARFORMOTEROL TARTRATE 15 MCG: 15 SOLUTION RESPIRATORY (INHALATION) at 19:56

## 2023-01-01 RX ADMIN — POTASSIUM CHLORIDE 40 MEQ: 750 TABLET, EXTENDED RELEASE ORAL at 00:44

## 2023-01-01 RX ADMIN — BUDESONIDE 500 MCG: 0.5 SUSPENSION RESPIRATORY (INHALATION) at 08:06

## 2023-01-01 RX ADMIN — BUDESONIDE 500 MCG: 0.5 SUSPENSION RESPIRATORY (INHALATION) at 07:39

## 2023-01-01 RX ADMIN — BUDESONIDE 500 MCG: 0.5 SUSPENSION RESPIRATORY (INHALATION) at 08:32

## 2023-01-01 RX ADMIN — SIMETHICONE 80 MG: 80 TABLET, CHEWABLE ORAL at 19:29

## 2023-01-01 RX ADMIN — GLYCOPYRROLATE 0.2 MG: 0.2 INJECTION, SOLUTION INTRAMUSCULAR; INTRAVENOUS at 06:34

## 2023-01-01 RX ADMIN — SODIUM CHLORIDE, PRESERVATIVE FREE 10 ML: 5 INJECTION INTRAVENOUS at 06:11

## 2023-01-01 RX ADMIN — BUDESONIDE 500 MCG: 0.5 SUSPENSION RESPIRATORY (INHALATION) at 19:56

## 2023-01-01 RX ADMIN — ONDANSETRON 4 MG: 2 INJECTION INTRAMUSCULAR; INTRAVENOUS at 11:53

## 2023-01-01 RX ADMIN — MORPHINE SULFATE 2 MG: 2 INJECTION, SOLUTION INTRAMUSCULAR; INTRAVENOUS at 17:58

## 2023-01-01 RX ADMIN — ARFORMOTEROL TARTRATE 15 MCG: 15 SOLUTION RESPIRATORY (INHALATION) at 08:32

## 2023-01-01 RX ADMIN — ONDANSETRON 4 MG: 2 INJECTION INTRAMUSCULAR; INTRAVENOUS at 23:50

## 2023-01-01 RX ADMIN — SODIUM CHLORIDE, PRESERVATIVE FREE 10 ML: 5 INJECTION INTRAVENOUS at 21:31

## 2023-01-01 RX ADMIN — BUMETANIDE 2 MG: 0.25 INJECTION INTRAMUSCULAR; INTRAVENOUS at 01:06

## 2023-01-01 RX ADMIN — ARFORMOTEROL TARTRATE 15 MCG: 15 SOLUTION RESPIRATORY (INHALATION) at 08:06

## 2023-01-01 RX ADMIN — RIVAROXABAN 20 MG: 20 TABLET, FILM COATED ORAL at 17:44

## 2023-01-01 RX ADMIN — TRAZODONE HYDROCHLORIDE 25 MG: 50 TABLET ORAL at 01:57

## 2023-01-01 RX ADMIN — MORPHINE SULFATE 2 MG: 2 INJECTION, SOLUTION INTRAMUSCULAR; INTRAVENOUS at 06:46

## 2023-01-01 RX ADMIN — MEGESTROL ACETATE 200 MG: 400 SUSPENSION ORAL at 08:11

## 2023-01-01 RX ADMIN — TRAMADOL HYDROCHLORIDE 50 MG: 50 TABLET ORAL at 14:08

## 2023-01-01 RX ADMIN — CEFTRIAXONE 1 G: 1 INJECTION, POWDER, FOR SOLUTION INTRAMUSCULAR; INTRAVENOUS at 09:15

## 2023-01-01 RX ADMIN — TRAMADOL HYDROCHLORIDE 50 MG: 50 TABLET ORAL at 08:37

## 2023-01-01 RX ADMIN — MEGESTROL ACETATE 200 MG: 400 SUSPENSION ORAL at 14:07

## 2023-01-01 RX ADMIN — ARFORMOTEROL TARTRATE 15 MCG: 15 SOLUTION RESPIRATORY (INHALATION) at 07:39

## 2023-01-01 RX ADMIN — METOPROLOL TARTRATE 50 MG: 50 TABLET ORAL at 08:58

## 2023-01-01 RX ADMIN — METHYLPREDNISOLONE SODIUM SUCCINATE 60 MG: 125 INJECTION, POWDER, FOR SOLUTION INTRAMUSCULAR; INTRAVENOUS at 20:14

## 2023-01-01 RX ADMIN — BUMETANIDE 2 MG: 0.25 INJECTION INTRAMUSCULAR; INTRAVENOUS at 14:09

## 2023-01-01 RX ADMIN — MORPHINE SULFATE 4 MG: 4 INJECTION INTRAVENOUS at 12:54

## 2023-01-01 RX ADMIN — TRAZODONE HYDROCHLORIDE 25 MG: 50 TABLET ORAL at 21:12

## 2023-01-01 RX ADMIN — MEGESTROL ACETATE 200 MG: 400 SUSPENSION ORAL at 08:58

## 2023-01-01 RX ADMIN — POTASSIUM CHLORIDE 10 MEQ: 7.46 INJECTION, SOLUTION INTRAVENOUS at 22:33

## 2023-01-01 RX ADMIN — MORPHINE SULFATE 2 MG: 2 INJECTION, SOLUTION INTRAMUSCULAR; INTRAVENOUS at 09:15

## 2023-01-01 RX ADMIN — MEGESTROL ACETATE 200 MG: 400 SUSPENSION ORAL at 17:44

## 2023-01-01 RX ADMIN — SODIUM CHLORIDE, PRESERVATIVE FREE 5 ML: 5 INJECTION INTRAVENOUS at 02:04

## 2023-01-01 RX ADMIN — BUMETANIDE 2 MG: 0.25 INJECTION INTRAMUSCULAR; INTRAVENOUS at 01:30

## 2023-01-01 RX ADMIN — RIVAROXABAN 20 MG: 20 TABLET, FILM COATED ORAL at 17:32

## 2023-01-01 RX ADMIN — METOPROLOL TARTRATE 50 MG: 50 TABLET ORAL at 08:15

## 2023-01-01 RX ADMIN — BUMETANIDE 2 MG: 0.25 INJECTION INTRAMUSCULAR; INTRAVENOUS at 01:58

## 2023-01-01 RX ADMIN — IPRATROPIUM BROMIDE AND ALBUTEROL SULFATE 3 ML: .5; 3 SOLUTION RESPIRATORY (INHALATION) at 20:14

## 2023-01-01 RX ADMIN — MEGESTROL ACETATE 200 MG: 400 SUSPENSION ORAL at 11:53

## 2023-01-01 RX ADMIN — BUMETANIDE 2 MG: 0.25 INJECTION INTRAMUSCULAR; INTRAVENOUS at 12:54

## 2023-01-01 RX ADMIN — TRAMADOL HYDROCHLORIDE 50 MG: 50 TABLET ORAL at 02:36

## 2023-01-01 RX ADMIN — TRAZODONE HYDROCHLORIDE 25 MG: 50 TABLET ORAL at 23:41

## 2023-01-01 RX ADMIN — POTASSIUM BICARBONATE 40 MEQ: 782 TABLET, EFFERVESCENT ORAL at 08:58

## 2023-01-01 RX ADMIN — MORPHINE SULFATE 2 MG: 2 INJECTION, SOLUTION INTRAMUSCULAR; INTRAVENOUS at 17:44

## 2023-01-01 RX ADMIN — MEGESTROL ACETATE 200 MG: 400 SUSPENSION ORAL at 08:37

## 2023-01-01 RX ADMIN — ACETAMINOPHEN 650 MG: 325 TABLET ORAL at 08:38

## 2023-01-01 RX ADMIN — LORAZEPAM 0.5 MG: 0.5 TABLET ORAL at 09:15

## 2023-01-01 RX ADMIN — POTASSIUM BICARBONATE 40 MEQ: 782 TABLET, EFFERVESCENT ORAL at 17:44

## 2023-01-01 RX ADMIN — SODIUM CHLORIDE, PRESERVATIVE FREE 10 ML: 5 INJECTION INTRAVENOUS at 05:52

## 2023-01-01 RX ADMIN — MIRTAZAPINE 7.5 MG: 15 TABLET, FILM COATED ORAL at 01:57

## 2023-01-01 RX ADMIN — MORPHINE SULFATE 2 MG: 2 INJECTION, SOLUTION INTRAMUSCULAR; INTRAVENOUS at 23:41

## 2023-01-01 RX ADMIN — MONTELUKAST 10 MG: 10 TABLET, FILM COATED ORAL at 08:58

## 2023-01-01 RX ADMIN — METOPROLOL TARTRATE 50 MG: 50 TABLET ORAL at 17:32

## 2023-01-01 RX ADMIN — POTASSIUM BICARBONATE 40 MEQ: 782 TABLET, EFFERVESCENT ORAL at 08:38

## 2023-01-01 RX ADMIN — SODIUM CHLORIDE, PRESERVATIVE FREE 10 ML: 5 INJECTION INTRAVENOUS at 13:00

## 2023-01-01 RX ADMIN — TRAMADOL HYDROCHLORIDE 50 MG: 50 TABLET ORAL at 22:20

## 2023-01-01 RX ADMIN — LORAZEPAM 1 MG: 2 INJECTION INTRAMUSCULAR; INTRAVENOUS at 06:34

## 2023-01-01 RX ADMIN — POTASSIUM BICARBONATE 40 MEQ: 782 TABLET, EFFERVESCENT ORAL at 02:02

## 2023-01-01 RX ADMIN — ONDANSETRON 4 MG: 2 INJECTION INTRAMUSCULAR; INTRAVENOUS at 14:09

## 2023-01-01 RX ADMIN — ARFORMOTEROL TARTRATE 15 MCG: 15 SOLUTION RESPIRATORY (INHALATION) at 20:04

## 2023-01-01 RX ADMIN — PANTOPRAZOLE SODIUM 40 MG: 40 TABLET, DELAYED RELEASE ORAL at 08:15

## 2023-01-01 RX ADMIN — CEFTRIAXONE 1 G: 1 INJECTION, POWDER, FOR SOLUTION INTRAMUSCULAR; INTRAVENOUS at 10:42

## 2023-01-01 RX ADMIN — MONTELUKAST 10 MG: 10 TABLET, FILM COATED ORAL at 08:37

## 2023-01-01 RX ADMIN — BUDESONIDE 500 MCG: 0.5 SUSPENSION RESPIRATORY (INHALATION) at 20:04

## 2023-01-01 RX ADMIN — METOPROLOL TARTRATE 50 MG: 50 TABLET ORAL at 08:38

## 2023-01-01 RX ADMIN — METOPROLOL TARTRATE 50 MG: 50 TABLET ORAL at 17:44

## 2023-01-01 RX ADMIN — SODIUM CHLORIDE, PRESERVATIVE FREE 10 ML: 5 INJECTION INTRAVENOUS at 22:36

## 2023-01-01 RX ADMIN — SODIUM CHLORIDE, PRESERVATIVE FREE 10 ML: 5 INJECTION INTRAVENOUS at 06:47

## 2023-01-01 RX ADMIN — MONTELUKAST 10 MG: 10 TABLET, FILM COATED ORAL at 08:15

## 2023-01-01 RX ADMIN — MEGESTROL ACETATE 200 MG: 400 SUSPENSION ORAL at 17:32

## 2023-01-01 RX ADMIN — LEVOTHYROXINE SODIUM 25 MCG: 0.03 TABLET ORAL at 21:14

## 2023-01-01 RX ADMIN — MAGNESIUM SULFATE HEPTAHYDRATE 2 G: 40 INJECTION, SOLUTION INTRAVENOUS at 20:14

## 2023-01-01 RX ADMIN — MIRTAZAPINE 7.5 MG: 15 TABLET, FILM COATED ORAL at 21:13

## 2023-02-02 ENCOUNTER — HOSPITAL ENCOUNTER (EMERGENCY)
Age: 83
Discharge: HOME | End: 2023-02-02
Attending: STUDENT IN AN ORGANIZED HEALTH CARE EDUCATION/TRAINING PROGRAM
Payer: COMMERCIAL

## 2023-02-02 ENCOUNTER — APPOINTMENT (OUTPATIENT)
Dept: GENERAL RADIOLOGY | Age: 83
End: 2023-02-02
Attending: STUDENT IN AN ORGANIZED HEALTH CARE EDUCATION/TRAINING PROGRAM
Payer: COMMERCIAL

## 2023-02-02 ENCOUNTER — APPOINTMENT (OUTPATIENT)
Dept: CT IMAGING | Age: 83
End: 2023-02-02
Attending: STUDENT IN AN ORGANIZED HEALTH CARE EDUCATION/TRAINING PROGRAM
Payer: COMMERCIAL

## 2023-02-02 VITALS
TEMPERATURE: 97.4 F | OXYGEN SATURATION: 100 % | WEIGHT: 129 LBS | BODY MASS INDEX: 22.02 KG/M2 | HEIGHT: 64 IN | RESPIRATION RATE: 17 BRPM | HEART RATE: 92 BPM | SYSTOLIC BLOOD PRESSURE: 126 MMHG | DIASTOLIC BLOOD PRESSURE: 64 MMHG

## 2023-02-02 DIAGNOSIS — E87.1 HYPONATREMIA: ICD-10-CM

## 2023-02-02 DIAGNOSIS — E83.42 HYPOMAGNESEMIA: ICD-10-CM

## 2023-02-02 DIAGNOSIS — E87.6 HYPOKALEMIA: ICD-10-CM

## 2023-02-02 DIAGNOSIS — M54.12 CERVICAL RADICULOPATHY: Primary | ICD-10-CM

## 2023-02-02 LAB
ALBUMIN SERPL-MCNC: 3.3 G/DL (ref 3.5–5)
ALBUMIN/GLOB SERPL: 0.9 (ref 1.1–2.2)
ALP SERPL-CCNC: 88 U/L (ref 45–117)
ALT SERPL-CCNC: 16 U/L (ref 12–78)
ANION GAP SERPL CALC-SCNC: 9 MMOL/L (ref 5–15)
AST SERPL-CCNC: 16 U/L (ref 15–37)
BASOPHILS # BLD: 0.1 K/UL (ref 0–0.1)
BASOPHILS NFR BLD: 1 % (ref 0–1)
BILIRUB SERPL-MCNC: 1 MG/DL (ref 0.2–1)
BNP SERPL-MCNC: 1867 PG/ML
BUN SERPL-MCNC: 10 MG/DL (ref 6–20)
BUN/CREAT SERPL: 18 (ref 12–20)
CALCIUM SERPL-MCNC: 9 MG/DL (ref 8.5–10.1)
CHLORIDE SERPL-SCNC: 76 MMOL/L (ref 97–108)
CO2 SERPL-SCNC: 43 MMOL/L (ref 21–32)
COMMENT, HOLDF: NORMAL
CREAT SERPL-MCNC: 0.57 MG/DL (ref 0.55–1.02)
DIFFERENTIAL METHOD BLD: ABNORMAL
EOSINOPHIL # BLD: 0.1 K/UL (ref 0–0.4)
EOSINOPHIL NFR BLD: 1 % (ref 0–7)
ERYTHROCYTE [DISTWIDTH] IN BLOOD BY AUTOMATED COUNT: 12.5 % (ref 11.5–14.5)
GLOBULIN SER CALC-MCNC: 3.6 G/DL (ref 2–4)
GLUCOSE SERPL-MCNC: 109 MG/DL (ref 65–100)
HCT VFR BLD AUTO: 31.3 % (ref 35–47)
HGB BLD-MCNC: 9.8 G/DL (ref 11.5–16)
IMM GRANULOCYTES # BLD AUTO: 0 K/UL (ref 0–0.04)
IMM GRANULOCYTES NFR BLD AUTO: 0 % (ref 0–0.5)
LYMPHOCYTES # BLD: 0.6 K/UL (ref 0.8–3.5)
LYMPHOCYTES NFR BLD: 10 % (ref 12–49)
MAGNESIUM SERPL-MCNC: 1.5 MG/DL (ref 1.6–2.4)
MCH RBC QN AUTO: 29.3 PG (ref 26–34)
MCHC RBC AUTO-ENTMCNC: 31.3 G/DL (ref 30–36.5)
MCV RBC AUTO: 93.7 FL (ref 80–99)
MONOCYTES # BLD: 0.6 K/UL (ref 0–1)
MONOCYTES NFR BLD: 9 % (ref 5–13)
NEUTS SEG # BLD: 5 K/UL (ref 1.8–8)
NEUTS SEG NFR BLD: 79 % (ref 32–75)
NRBC # BLD: 0 K/UL (ref 0–0.01)
NRBC BLD-RTO: 0 PER 100 WBC
PLATELET # BLD AUTO: 282 K/UL (ref 150–400)
PMV BLD AUTO: 9.3 FL (ref 8.9–12.9)
POTASSIUM SERPL-SCNC: 3.4 MMOL/L (ref 3.5–5.1)
PROT SERPL-MCNC: 6.9 G/DL (ref 6.4–8.2)
RBC # BLD AUTO: 3.34 M/UL (ref 3.8–5.2)
RBC MORPH BLD: ABNORMAL
SAMPLES BEING HELD,HOLD: NORMAL
SODIUM SERPL-SCNC: 128 MMOL/L (ref 136–145)
TROPONIN I SERPL HS-MCNC: 7 NG/L (ref 0–51)
WBC # BLD AUTO: 6.4 K/UL (ref 3.6–11)

## 2023-02-02 PROCEDURE — 72125 CT NECK SPINE W/O DYE: CPT

## 2023-02-02 PROCEDURE — 83880 ASSAY OF NATRIURETIC PEPTIDE: CPT

## 2023-02-02 PROCEDURE — 85025 COMPLETE CBC W/AUTO DIFF WBC: CPT

## 2023-02-02 PROCEDURE — 36415 COLL VENOUS BLD VENIPUNCTURE: CPT

## 2023-02-02 PROCEDURE — 93005 ELECTROCARDIOGRAM TRACING: CPT

## 2023-02-02 PROCEDURE — 80053 COMPREHEN METABOLIC PANEL: CPT

## 2023-02-02 PROCEDURE — 84484 ASSAY OF TROPONIN QUANT: CPT

## 2023-02-02 PROCEDURE — 99285 EMERGENCY DEPT VISIT HI MDM: CPT

## 2023-02-02 PROCEDURE — 83735 ASSAY OF MAGNESIUM: CPT

## 2023-02-02 PROCEDURE — 71046 X-RAY EXAM CHEST 2 VIEWS: CPT

## 2023-02-02 NOTE — ED NOTES
I have reviewed discharge instructions with the patient. The patient verbalized understanding. Patient leaves ED with family in no acute distress.

## 2023-02-02 NOTE — ED TRIAGE NOTES
Pt arrives to the ER for complaints of tingling and numbness to left arm that started around last night. Pt is unsure of what time exactly. Daughter states that she noticed a lump to the left side of patients neck. Daughter reports a cognitive decline in patient over the last two weeks. Pt a&ox4    PMH of CHF, degenerative disc disease, spinal stenosis and afib. Pt wears 2.5L nasal cannula at baseline.

## 2023-02-02 NOTE — ED PROVIDER NOTES
Patient is a 79 yo female with afib, spinal stenosis, Now endorsing tingling and numbness started at an unknown time yesterday. No other neruo deficits. Change in mental status over the last 2-3 weeks. Patient is on 2.5-3L NC at home. New lump on left neck. SOB at baseline. No chest pain. The history is provided by the patient and a relative.       Past Medical History:   Diagnosis Date    Asthma     Cancer ([address] 7/1/15    CHF (congestive heart failure), NYHA class I, chronic, systolic (HCC)     Chronic anticoagulation     GERD (gastroesophageal reflux disease)     Hiatal hernia     HX: breast cancer     Right     Hypothyroid     Paroxysmal A-fib (HCC)     Venous insufficiency     bilat LE    Weight loss        Past Surgical History:   Procedure Laterality Date    COLONOSCOPY N/A 2021    COLONOSCOPY AND EGD performed by Fatoumata Smith MD at [facility]    HX BREAST LUMPECTOMY Right 2015    HX CATARACT REMOVAL  2013    Bilateral     HX CHOLECYSTECTOMY      HX DILATION AND CURETTAGE      x2    HX ORTHOPAEDIC  2017    RIGHT foot  hammertoe         Family History:   Problem Relation Age of Onset    Hypertension Father     Heart Disease Mother     Heart Disease Sister        Social History     Socioeconomic History    Marital status:      Spouse name: Not on file    Number of children: Not on file    Years of education: Not on file    Highest education level: Not on file   Occupational History    Not on file   Tobacco Use    Smoking status: Former     Packs/day: 0.00     Years: 0.00     Pack years: 0.00     Types: Cigarettes     Start date: 2000     Quit date: 2000     Years since quittin.1    Smokeless tobacco: Never    Tobacco comments:     stopped 3/13/2000   Vaping Use    Vaping Use: Never used   Substance and Sexual Activity    Alcohol use: Not Currently    Drug use: Never    Sexual activity: Not Currently     Partners: Female, Male     Birth control/protection: None   Other Topics Concern    Not on file   Social History Narrative    Not on file     Social Determinants of Health     Financial Resource Strain: Not on file   Food Insecurity: Not on file   Transportation Needs: Not on file   Physical Activity: Not on file   Stress: Not on file   Social Connections: Not on file   Intimate Partner Violence: Not on file   Housing Stability: Not on file         ALLERGIES: Ace inhibitors, Iodine, and Penicillins    Review of Systems   Constitutional:  Positive for activity change. Negative for chills, diaphoresis and fever. Respiratory:  Positive for shortness of breath. Cardiovascular:  Negative for chest pain. Vitals:    02/02/23 1120 02/02/23 1205 02/02/23 1207 02/02/23 1220   BP: 125/83 126/79  126/64   Pulse: 90   92   Resp: 16 22  17   Temp: 97.4 °F (36.3 °C)      SpO2: 100% 100% 94% 100%   Weight: 58.5 kg (129 lb)      Height: 5' 4\" (1.626 m)               Physical Exam  Vitals and nursing note reviewed. Constitutional:       Appearance: Normal appearance. HENT:      Head: Normocephalic and atraumatic. Right Ear: External ear normal.      Left Ear: External ear normal.   Eyes:      Conjunctiva/sclera: Conjunctivae normal.   Cardiovascular:      Rate and Rhythm: Normal rate and regular rhythm. Pulses: Normal pulses. Heart sounds: Normal heart sounds. Pulmonary:      Effort: Pulmonary effort is normal.      Breath sounds: Normal breath sounds. Chest:      Chest wall: No deformity or tenderness. Abdominal:      Palpations: Abdomen is soft. Tenderness: There is no abdominal tenderness. Musculoskeletal:         General: No deformity or signs of injury. Normal range of motion. Skin:     General: Skin is warm and dry. Coloration: Skin is not jaundiced. Neurological:      General: No focal deficit present. Mental Status: She is alert and oriented to person, place, and time. Sensory: No sensory deficit. Motor: No weakness. Psychiatric:         Mood and Affect: Mood normal.         Behavior: Behavior normal.        Medical Decision Making  Amount and/or Complexity of Data Reviewed  External Data Reviewed: labs and notes. Labs: ordered. Decision-making details documented in ED Course. Radiology: ordered and independent interpretation performed. ECG/medicine tests: ordered and independent interpretation performed. Decision-making details documented in ED Course. Risk  OTC drugs. ED Course as of 02/02/23 1309   Thu Feb 02, 2023   1145 EKG with A. fib with a rate of 96. Cannot rule out anterior infarct, intervals within normal limits. [AL]   1154 HGB(!): 9.8 [AL]   1222 Magnesium(!): 1.5 [AL]   1222 NT pro-BNP(!): 1,867  Appears to be baseline [AL]   1222 Sodium(!): 128 [AL]   1222 Troponin-High Sensitivity: 7 [AL]   1223 CO2(!!): 43  Hx of COPD on 2-3L NC [AL]   1227 Chloride(!): 76 [AL]   1227 Potassium(!): 3.4 [AL]      ED Course User Index  [AL] Swathi Langford MD       LABORATORY RESULTS:  Labs Reviewed   CBC WITH AUTOMATED DIFF - Abnormal; Notable for the following components:       Result Value    RBC 3.34 (*)     HGB 9.8 (*)     HCT 31.3 (*)     NEUTROPHILS 79 (*)     LYMPHOCYTES 10 (*)     ABS. LYMPHOCYTES 0.6 (*)     All other components within normal limits   METABOLIC PANEL, COMPREHENSIVE - Abnormal; Notable for the following components:    Sodium 128 (*)     Potassium 3.4 (*)     Chloride 76 (*)     CO2 43 (*)     Glucose 109 (*)     Albumin 3.3 (*)     A-G Ratio 0.9 (*)     All other components within normal limits   NT-PRO BNP - Abnormal; Notable for the following components:    NT pro-BNP 1,867 (*)     All other components within normal limits   MAGNESIUM - Abnormal; Notable for the following components:    Magnesium 1.5 (*)     All other components within normal limits   SAMPLES BEING HELD   TROPONIN-HIGH SENSITIVITY       IMAGING RESULTS:  CT SPINE CERV WO CONT   Final Result   1. Study limited by motion. 2.  No acute fracture nor subluxation. 3.   Multilevel degenerative changes with stepwise anterolisthesis from C2-C6. C5-C6 mild spinal canal stenosis. XR CHEST PA LAT   Final Result   Bibasilar volume loss and/or airspace disease in the bilateral pleural   effusions. MEDICATIONS GIVEN:  Medications - No data to display    Differential diagnosis: Cervical spine pathology, electrolyte abnormality, cervical radiculopathy, spinal stenosis, electrolyte abnormality, CHF exacerbation, A. fib    ED physician interpretation of imaging: Chest x-ray without focal pneumonia, radiology read consistent with patient's CHF  ED physician interpretation of EKG: No STEMI. See my interpretation EKG in ED course above. ED physician interpretation of laboratory results: Patient has mild hypokalemia and hypomagnesemia. Patient takes supplementary potassium due to her Bumex use. This appears stable at this time and is appropriate for continued oral supplementation. We will add on magnesium supplementation. Patient does have hyponatremia but this appears to be patient's baseline fluctuates from in the 120s to 130s. Family states she is not having symptoms like she has had in the past with hyponatremia when it was in the 110s    MDM: Patient is a 3year-old female with complex medical history presented to ED with some left-sided neck pain as well as some numbness and tingling in her left hand. Numbness and tingling intermittent and not present on reeval.  Suspect radiculopathy, CT scan shows no acute fractures or significant stenosis. Outpatient follow-up appropriate for this finding. Patient has multiple electrolyte abnormalities but based on her chronic diseases these are appear stable and baseline. CHF and A. fib stable now as well. Patient stable for discharge home and outpatient follow-up and lab checks. Further personalized recommendations for outpatient care as below.     Key discharge instructions and summary of care: You presented to the ED with some neck pain and left hand numbness and tingling is intermittent. This improved while in the ED. Labs chest x-ray and EKG were done in the ED. He have some electrolyte abnormalities including very mild hypomagnesemia and hypokalemia. He also have some mild hyponatremia. This appears to be baseline for you. Recommend finding a balance between increasing salt intake but also not having too much weight gain because of your heart failure. Your heart failure appears to be stable. Oxygen is stable. CT scan of your neck showed no fractures or dislocations or cord compression. You have mild canal stenosis. Recommend following up with your orthopedic doctor for this. Take Tylenol, Salonpas and apply heat. Recommend continuing your potassium, add a magnesium supplement as well, over-the-counter 40 mg magnesium is appropriate, take for the next 2 weeks. Follow-up with your PCP. The patient has been re-evaluated and feeling better. Patient is stable for discharge. All available radiology and laboratory results have been reviewed with patient and/or available family. Patient and/or family verbally conveyed their understanding and agreement of the patient's signs, symptoms, diagnosis, treatment and prognosis and additionally agree to follow-up as recommended in the discharge instructions or to return to the Emergency Department should their condition change or worsen prior to their follow-up appointment. All questions have been answered and patient and/or available family express understanding. IMPRESSION:  1. Cervical radiculopathy    2. Hyponatremia    3. Hypokalemia    4. Hypomagnesemia        DISPOSITION: Discharged     Salvador Hinojosa MD      Procedures

## 2023-02-02 NOTE — DISCHARGE INSTRUCTIONS
You presented to the ED with some neck pain and left hand numbness and tingling is intermittent. This improved while in the ED. Labs chest x-ray and EKG were done in the ED. He have some electrolyte abnormalities including very mild hypomagnesemia and hypokalemia. He also have some mild hyponatremia. This appears to be baseline for you. Recommend finding a balance between increasing salt intake but also not having too much weight gain because of your heart failure. Your heart failure appears to be stable. Oxygen is stable. CT scan of your neck showed no fractures or dislocations or cord compression. You have mild canal stenosis. Recommend following up with your orthopedic doctor for this. Take Tylenol, Salonpas and apply heat. Recommend continuing your potassium, add a magnesium supplement as well, over-the-counter 40 mg magnesium is appropriate, take for the next 2 weeks. Follow-up with your PCP.

## 2023-02-03 LAB
CALCULATED R AXIS, ECG10: 83 DEGREES
CALCULATED T AXIS, ECG11: 33 DEGREES
DIAGNOSIS, 93000: NORMAL
Q-T INTERVAL, ECG07: 332 MS
QRS DURATION, ECG06: 76 MS
QTC CALCULATION (BEZET), ECG08: 419 MS
VENTRICULAR RATE, ECG03: 96 BPM

## 2023-03-19 NOTE — ED TRIAGE NOTES
Pt via [facility] for c/o SOB and took a rescue inhaler PTA. Hx of COPD, wears 2-4L NC at baseline. EMS 12 lead shows afib. Pt aaox4, gcs 14, rr even and unlabored.

## 2023-03-20 PROBLEM — I50.30 DIASTOLIC CONGESTIVE HEART FAILURE (HCC): Status: ACTIVE | Noted: 2023-01-01

## 2023-03-20 NOTE — PROGRESS NOTES
Hospitalist Progress Note      NAME: Clover Coffman   :  1940  MRM:  [id number]    Date/Time: 3/20/2023  6:52 PM             Subjective:     Chief Complaint:  dyspnea    Patient continues to have dyspnea and lower extremity edema. On IV Bumex. Potassium significantly low, repleted. Also has some lower abdominal pain, no dysuria. Bladder scan showed 140 cc. No fever. Objective:       Vitals:          Last 24hrs VS reviewed since prior progress note. Most recent are:    Visit Vitals  /64 (BP 1 Location: Left upper arm, BP Patient Position: At rest)   Pulse 98   Temp 98.6 °F (37 °C)   Resp 18   Ht 5' 4\" (1.626 m)   Wt 65.5 kg (144 lb 6.4 oz)   SpO2 96%   BMI 24.79 kg/m²     SpO2 Readings from Last 6 Encounters:   23 96%   23 100%   22 90%   10/31/22 94%   22 95%   22 92%    O2 Flow Rate (L/min): 4 l/min     Intake/Output Summary (Last 24 hours) at 3/20/2023 1852  Last data filed at 3/20/2023 1522  Gross per 24 hour   Intake 0 ml   Output 1300 ml   Net -1300 ml          Exam:     Physical Exam:    General:   No acute distress, resting comfortably in bed. Heart:  RRR, No MRG  Lungs: Basilar rales. Decreased breath sounds at bases. Non-labored breathing. Abdomen:  Soft . Nondistended. NTTP. BS present. Extremities: 1+ lower extremity edema. : Mild tenderness over bladder. Neuro:  Alert and interactive. No focal deficits. Psych:  Mood stable.     Medications Reviewed: (see below)    Lab Data Reviewed: (see below)    ______________________________________________________________________    Medications:     Current Facility-Administered Medications   Medication Dose Route Frequency    levothyroxine (SYNTHROID) tablet 25 mcg  25 mcg Oral QHS    megestroL (MEGACE) 400 mg/10 mL (10 mL) oral suspension 200 mg  200 mg Oral TID WITH MEALS    metoprolol tartrate (LOPRESSOR) tablet 50 mg  50 mg Oral BID    mirtazapine (REMERON) tablet 7.5 mg  7.5 mg Oral QHS montelukast (SINGULAIR) tablet 10 mg  10 mg Oral DAILY    pantoprazole (PROTONIX) tablet 40 mg  40 mg Oral DAILY    traMADoL (ULTRAM) tablet 50 mg  50 mg Oral Q8H PRN    traZODone (DESYREL) tablet 25 mg  25 mg Oral QHS    rivaroxaban (XARELTO) tablet 20 mg  20 mg Oral DAILY WITH DINNER    bumetanide (BUMEX) injection 2 mg  2 mg IntraVENous Q12H    hydrALAZINE (APRESOLINE) 20 mg/mL injection 20 mg  20 mg IntraVENous Q4H PRN    potassium bicarb-citric acid (EFFER-K) tablet 40 mEq  40 mEq Oral DAILY    sodium chloride (NS) flush 5-40 mL  5-40 mL IntraVENous Q8H    sodium chloride (NS) flush 5-40 mL  5-40 mL IntraVENous PRN    acetaminophen (TYLENOL) tablet 650 mg  650 mg Oral Q6H PRN    Or    acetaminophen (TYLENOL) suppository 650 mg  650 mg Rectal Q6H PRN    polyethylene glycol (MIRALAX) packet 17 g  17 g Oral DAILY PRN    ondansetron (ZOFRAN ODT) tablet 4 mg  4 mg Oral Q8H PRN    Or    ondansetron (ZOFRAN) injection 4 mg  4 mg IntraVENous Q6H PRN    budesonide (PULMICORT) 500 mcg/2 ml nebulizer suspension  500 mcg Nebulization BID RT    arformoteroL (BROVANA) neb solution 15 mcg  15 mcg Nebulization BID RT    lidocaine 4 % patch 1 Patch  1 Patch TransDERmal Q24H            Lab Review:     Recent Labs     03/19/23 2000   WBC 7.9   HGB 8.6*   HCT 28.0*        Recent Labs     03/20/23 0910 03/19/23 2034   * 124*   K 3.6 2.6*   CL 71* 68*   CO2 >45* >45*   * 103*   BUN 22* 25*   CREA 0.50* 0.41*   CA 9.0 8.6   MG  --  1.7   ALB  --  2.7*   ALT  --  18   INR  --  1.9*     No components found for: [place]         Assessment / Plan:      Active Problems:    Diastolic congestive heart failure ([address] (3/20/2023)        Alec Edmonds is a 80 y.o.  female with history of chronic systolic CHF and atrial fibrillation presenting with acute onset of dyspnea and lower extremity edema secondary to acute CHF exacerbation    Acute on chronic systolic CHF exacerbation, EF 45 to 50%  Bilateral pleural effusions  -Continue IV Bumex  -Follow chest x-ray  -Consult cardiology  -Last echocardiogram in September 2022, order repeat study  -Monitor BMP with diuresis    Lower abdominal pain  - possibly gas or contipation  -Bladder scan negative for retention  -Check urinalysis and CT of the abdomen and pelvis, no contrast due to allergy  - simethicone prn    Hypervolemic Hyponatremia:  - Mild hyponatremia most likely d/t volume overload  - diuresis as above  - follow BMP     Severe hypokalemia  - repleted  - daily Kcl  - follow BMP    Metabolic alkalosis  - secondary to diuresis     HTN  Atrial fibrillation  - continue home xarelto   - Cont w/ Metoprolol 50mg bid     Hypothyroidism  - Synthroid 25mcg po daily     Chronic Respiratory Failure, on 3 L O2 at home  Suspect COPD  - Cont w/ Duonebs prn  - Cont w/ Pulmicort prn           DVT ppx:  xarelto    Dispo:  HH vs SNF    DNR        Risk of deterioration: high         Total time spent with patient care: 45 min. I personally saw and examined the patient during this time period.                                                                                                                  Care Plan discussed with: patient, nurse, cm, family    Discussed:  Care Plan                ___________________________________________________    Attending Physician: Vishal Burnette MD

## 2023-03-20 NOTE — ED PROVIDER NOTES
41-year-old female presents emergency department chief complaint of shortness of breath. She does have a history of COPD and has been using nebulizer treatments at home without help. She has had no recent steroid use. She is on blood thinners secondary to atrial fibrillation. She also has a congenital history of congestive heart failure. Denies any chest pain, denies any fever or chills. The history is provided by the patient and the EMS personnel. Shortness of Breath  This is a recurrent problem. Associated symptoms include leg swelling. Pertinent negatives include no fever, no headaches, no ear pain, no neck pain, no PND, no orthopnea and no chest pain.       Past Medical History:   Diagnosis Date    Asthma     Cancer ([facility] [address] 7/1/15    CHF (congestive heart failure), NYHA class I, chronic, systolic (HCC)     Chronic anticoagulation     GERD (gastroesophageal reflux disease)     Hiatal hernia     HX: breast cancer 2015    Right     Hypothyroid     Paroxysmal A-fib (HCC)     Venous insufficiency     bilat LE    Weight loss        Past Surgical History:   Procedure Laterality Date    COLONOSCOPY N/A 6/14/2021    COLONOSCOPY AND EGD performed by Janine Pettit MD at [facility] ENDOSCOPY    HX BREAST LUMPECTOMY Right 08/24/2015    HX CATARACT REMOVAL  2013    Bilateral     HX CHOLECYSTECTOMY      HX DILATION AND CURETTAGE      x2    HX ORTHOPAEDIC  2017    RIGHT foot  hammertoe         Family History:   Problem Relation Age of Onset    Hypertension Father     Heart Disease Mother     Heart Disease Sister        Social History     Socioeconomic History    Marital status:      Spouse name: Not on file    Number of children: Not on file    Years of education: Not on file    Highest education level: Not on file   Occupational History    Not on file   Tobacco Use    Smoking status: Former     Packs/day: 0.00     Years: 0.00     Pack years: 0.00     Types: Cigarettes     Start date: 8/12/2000     Quit date: 12/1/2000 Years since quittin.3    Smokeless tobacco: Never    Tobacco comments:     stopped 3/13/2000   Vaping Use    Vaping Use: Never used   Substance and Sexual Activity    Alcohol use: Not Currently    Drug use: Never    Sexual activity: Not Currently     Partners: Female, Male     Birth control/protection: None   Other Topics Concern    Not on file   Social History Narrative    Not on file     Social Determinants of Health     Financial Resource Strain: Not on file   Food Insecurity: Not on file   Transportation Needs: Not on file   Physical Activity: Not on file   Stress: Not on file   Social Connections: Not on file   Intimate Partner Violence: Not on file   Housing Stability: Not on file         ALLERGIES: Ace inhibitors, Iodine, and Penicillins    Review of Systems   Constitutional:  Negative for fever. HENT:  Negative for ear pain. Respiratory:  Positive for shortness of breath. Cardiovascular:  Positive for leg swelling. Negative for chest pain, orthopnea and PND. Musculoskeletal:  Negative for neck pain. Neurological:  Negative for headaches. All other systems reviewed and are negative. Vitals:    239 23   BP: 105/63    Pulse: 87    Resp: 17    Temp: 98 °F (36.7 °C)    SpO2: 100% 98%   Weight: 65.5 kg (144 lb 6.4 oz)    Height: 5' 4\" (1.626 m)             Physical Exam  Vitals and nursing note reviewed. Constitutional:       Comments: Appears much older than stated age, is pale but nontoxic in appearance, respiratory distress noted   HENT:      Head: Normocephalic and atraumatic. Eyes:      Extraocular Movements: Extraocular movements intact. Pupils: Pupils are equal, round, and reactive to light. Cardiovascular:      Rate and Rhythm: Normal rate and regular rhythm. Pulmonary:      Comments: Increased respiratory rate, decreased breath sounds throughout, conversational dyspnea, prolonged expiratory phase. Chest:      Chest wall: No mass.    Abdominal: Palpations: Abdomen is soft. Musculoskeletal:      Cervical back: Normal range of motion. Right lower leg: Edema present. Left lower leg: Edema present. Skin:     General: Skin is warm and dry. Neurological:      General: No focal deficit present. Medical Decision Making  Amount and/or Complexity of Data Reviewed  Labs: ordered. Radiology: ordered. ECG/medicine tests: ordered. Risk  OTC drugs. Prescription drug management. EKG: Fibrillation, ventricular rate 98, QRS 86, QTc 451, no acute ST changes. 59-year-old female with a history of COPD, atrial fibrillation and congestive heart failure presents emergency department chief complaint of increasing shortness of breath. She states has been using her nebulizer at home without relief. She also has a history of low sodium and low potassium. When she presented she was in moderate amount of distress and she was giving breathing treatments albuterol Atrovent x1, a decreased dose of steroids of 60 mg of Solu-Medrol as it was unknown if she was currently on steroid treatment when she presented to the emergency department, she was also given magnesium 2 g IV piggyback. She did have improved breath sounds after the initial nebulizer treatment but she was still extremely tight in the lower fields. She otherwise appears nontoxic but did have decreased breath sounds as previously annotated, irregular heart rate, pedal edema, otherwise normal exam.  Her son did arrive and states that she has not been on any steroids recently. She had been given increased doses of Bumex at home secondary to unintentional weight gain.   She has a history of low potassium and low sodium in the past.  Metabolic panel showed a sodium of 124, potassium of 2.6, chloride 68, CO2 greater than 45, BUN 25, creatinine is 0.41, BNP was 2621, Troponin is 8, chest x-ray shows bilateral pleural effusions but held on Bumex or Lasix at this time secondary to mild hypotension. We will continue to monitor. We will continue nebulizer treatments as well as gave patient p.o. potassium replacement as well as IV potassium replacement. Magnesium level is pending but she has already been given magnesium as part of her difficulty breathing work-up. Perfect Serve Consult for Admission  10:01 PM    ED Room Number: ER08/08  Patient Name and age: Lizette Lowers 80 y.o.  female  Working Diagnosis:   1. Dyspnea, unspecified type    2. Hypokalemia    3. Hyponatremia        COVID-19 Suspicion:  no  Sepsis present:  no  Reassessment needed: no  Code Status:  Full Code  Readmission: no  Isolation Requirements:  no  Recommended Level of Care:  telemetry  Department: [facility] - [phone]  Admitting Provider:     Other: Total critical care time spent exclusive of procedures:  40 minutes.          Procedures

## 2023-03-20 NOTE — H&P
Hospitalist Admission Note    NAME:  Holli Hodgkin   :  1940   MRN:  [id number]     Date/Time:  3/19/2023 11:56 PM    Patient PCP: Eulogio Maldonado MD  ________________________________________________________________________    Given the patient's current clinical presentation, I have a high level of concern for decompensation if discharged from the emergency department. Complex decision making was performed, which includes reviewing the patient's available past medical records, laboratory results, and x-ray films. My assessment of this patient's clinical condition and my plan of care is as follows. Assessment / Plan:    Acute on Chronic HF w/ PEF exacerbation:    The patient comes in w/ worsening dyspnea, weight gain and lower extremity edema w/ PMH HF w/ PEF    VSS - Normal oxy sat on 4L NC  WBC normal, Hg 8.9  Na 124, K 2.6  CO2 45 (23 43)  BUN 25, Cr 0.41, GFR >60  BNP 2621  ABG 7.49/82/139  CR - poor inspiratory effort but bilateral pleural effusion    Admit and cont to monitor  Cont diuresis w/ IV Bumex 2mg bid  Monitor I/O, daily weight, fluid restriction and low salt diet    2. Hypervolemic Hyponatremia:    Mild hyponatremia most likely d/t volume overload  Cont w/ diuresis and re-evaluate Na    3. Hypokalemia:    Replete w/ K and cont w/ aggressive diuresis    4. Elevated Bicarb:    Due to constant diuresis w/ prior value noted to be 43  Cont to monitor w/ repeat BMP    5. Respiratory Alkalosis:    pH 7.49, CO2 82  Most likely compensated w/ aggressive diuresis    6. HTN:    Cont w/ Metoprolol 50mg bid    7. Hypothyroid:    Cont w/ Synthroid 25mcg po daily    8. Chronic Respiratory Failure: On 3L NC baseline due to HF w/ PEF  Cont w/ Duonebs prn  Cont w/ Pulmicort prn    9.   pAFIB:    Cont w/ Metoprolol 50mg bid  Cont w/ Xarelto 20mg daily    Body mass index is 24.79 kg/m².:  18.5 - 24.9:  Normal weight    I have personally reviewed the radiographs, laboratory data in Epic and decisions and statements above are based partially on this personal interpretation. Code Status: DNR/DNI  Surrogate Decision Maker  Leopoldo Lynn (son)  [phone]    Prophylaxis:  Xarelto     Subjective:   CHIEF COMPLAINT: shortness of breath     HISTORY OF PRESENT ILLNESS:       The patient is a 81 y/o C F w/ PMH pAFIB and HF w/PEF (Ef 40-45%) w/ chronic hypoxic respiratory failure on 3L NC who comes in with worsening dyspnea since this Friday. At baseline she is 130 pounds and is on Bumex 1mg daily but her son has increased her medication to 3mg and 4mg without any increased urinary output. This morning he noted a weight of 134.7 pounds. She has no fever, chills or night sweats. She has no chest pain or palpitations,  Associated symptoms include non-productive cough, increasing lower extremity edema and weight gain. At baseline she can ambulate short distances but recently w/ her dyspnea she has had difficulty w/ ambulating. On ROS she notes of left sided chest pain which she only describes as a sore pain without any radiating, aggravating or alleviating symptoms.       Past Medical History:   Diagnosis Date    Asthma     Cancer ([facility] 7/1/15    CHF (congestive heart failure), NYHA class I, chronic, systolic (HCC)     Chronic anticoagulation     GERD (gastroesophageal reflux disease)     Hiatal hernia     HX: breast cancer 2015    Right     Hypothyroid     Paroxysmal A-fib (HCC)     Venous insufficiency     bilat LE    Weight loss       Past Surgical History:   Procedure Laterality Date    COLONOSCOPY N/A 6/14/2021    COLONOSCOPY AND EGD performed by Priya Green MD at [id number] [place] [address],[address] BREAST LUMPECTOMY Right 08/24/2015    HX CATARACT REMOVAL  2013    Bilateral     HX CHOLECYSTECTOMY      HX DILATION AND CURETTAGE      x2    HX ORTHOPAEDIC  2017    RIGHT foot  hammertoe     Social History     Tobacco Use    Smoking status: Former     Packs/day: 0.00     Years: 0.00     Pack years: 0.00 Types: Cigarettes     Start date: 2000     Quit date: 2000     Years since quittin.3    Smokeless tobacco: Never    Tobacco comments:     stopped 3/13/2000   Substance Use Topics    Alcohol use: Not Currently      Family History   Problem Relation Age of Onset    Hypertension Father     Heart Disease Mother     Heart Disease Sister         Allergies   Allergen Reactions    Ace Inhibitors Cough    Iodine Rash    Penicillins Rash        Prior to Admission medications    Medication Sig Start Date End Date Taking? Authorizing Provider   metoprolol tartrate (LOPRESSOR) 50 mg tablet Take 50 mg by mouth two (2) times a day. Yes Other, MD Abby   bumetanide (BUMEX) 1 mg tablet Take 1 mg by mouth daily. Dose increases when weight is up or fluid build up   Yes Audi, MD Abby   potassium chloride (Klor-Con) 20 mEq pack Take 20 mEq by mouth daily. Yes Audi, MD Abby   mirtazapine (REMERON) 15 mg tablet Take 7.5 mg by mouth nightly. Yes Other, MD Abby   traMADoL (ULTRAM) 50 mg tablet Take 50 mg by mouth every eight (8) hours as needed for Pain. Yes Other, MD Abby   SODIUM CHLORIDE PO Take  by mouth. Yes Other, MD Abby   megestroL (MEGACE) 400 mg/10 mL (40 mg/mL) suspension Take 200 mg by mouth three (3) times daily (with meals). Yes Other, MD Abby   ferrous sulfate 325 mg (65 mg iron) tablet Take 325 mg by mouth daily. Yes Provider, Historical   azelastine (ASTEPRO) 205.5 mcg (0.15 %) 1 Fluker by Both Nostrils route daily. Yes Provider, Historical   arformoteroL (BROVANA) 15 mcg/2 mL nebu neb solution 15 mcg by Nebulization route two (2) times a day. Yes Provider, Historical   budesonide (PULMICORT) 0.5 mg/2 mL nbsp 500 mcg by Nebulization route two (2) times a day. Yes Provider, Historical   levothyroxine (SYNTHROID) 25 mcg tablet Take 25 mcg by mouth nightly. Yes Provider, Historical   traZODone (DESYREL) 50 mg tablet Take 25 mg by mouth nightly.    Yes Provider, Historical ergocalciferol (ERGOCALCIFEROL) 1,250 mcg (50,000 unit) capsule Take 50,000 Units by mouth every Sunday. Yes Provider, Historical   fluticasone (VERAMYST) 27.5 mcg/actuation nasal spray 2 Sprays by Nasal route daily. Yes Provider, Historical   ondansetron hcl (ZOFRAN) 8 mg tablet Take 8 mg by mouth every eight (8) hours as needed for Nausea or Vomiting (prn). Yes Provider, Historical   guaiFENesin ER (MUCINEX) 600 mg ER tablet Take 1 Tablet by mouth every twelve (12) hours. 9/28/22  Yes Cindy Phillips MD   pantoprazole (PROTONIX) 40 mg tablet Take 40 mg by mouth daily. Yes Provider, Historical   Xarelto 20 mg tab tablet Take 20 mg by mouth daily (with dinner). 7/31/20  Yes Provider, Historical   montelukast (SINGULAIR) 10 mg tablet Take 10 mg by mouth daily. Yes Provider, Historical   tobramycin, PF, (MILAGRO) 300 mg/5 mL nebulizer solution  2/20/23   Other, MD Abby   calcium carbonate (TUMS) 200 mg calcium (500 mg) chew Take 1 Tablet by mouth daily. Patient not taking: Reported on 3/19/2023    Provider, Historical   b complex vitamins tablet Take 1 Tablet by mouth daily. Patient not taking: Reported on 3/19/2023    Provider, Historical   levalbuterol tartrate (XOPENEX) 45 mcg/actuation inhaler Take 1 Puff by inhalation every four (4) hours as needed for Wheezing or Shortness of Breath. Patient not taking: Reported on 3/19/2023    Provider, Historical   olopatadine (PATADAY) 0.2 % drop ophthalmic solution Administer 1 Drop to both eyes daily. Patient not taking: Reported on 3/19/2023    Provider, Historical   metroNIDAZOLE (METROCREAM) 0.75 % topical cream Apply  to affected area two (2) times a day. Patient not taking: Reported on 3/19/2023    Provider, Historical   diclofenac (VOLTAREN) 1 % gel Apply  to affected area four (4) times daily as needed for Pain.   Patient not taking: Reported on 3/19/2023    Provider, Historical   acetaminophen (TYLENOL) 500 mg tablet Take 500 mg by mouth every six (6) hours as needed for Pain.   Patient not taking: Reported on 3/19/2023    Provider, Historical       REVIEW OF SYSTEMS:  See HPI for details  General: negative for fever, chills, sweats, weakness, weight loss  Eyes: negative for blurred vision, eye pain, loss of vision, diplopia  Ear Nose and Throat: negative for rhinorrhea, pharyngitis, otalgia, tinnitus, speech or swallowing difficulties  Respiratory:  negative for pleuritic pain, +cough w/o sputum production, wheezing, +SOB, +HERRERA  Cardiology:  + chest pain, palpitations, orthopnea, PND, +edema, syncope   Gastrointestinal: negative for abdominal pain, +N/V, dysphagia, change in bowel habits, bleeding  Genitourinary: negative for frequency, urgency, dysuria, hematuria, incontinence  Muskuloskeletal : negative for arthralgia, myalgia  Hematology: negative for easy bruising, bleeding, lymphadenopathy  Dermatological: negative for rash, ulceration, mole change, new lesion  Endocrine: negative for hot flashes or polydipsia  Neurological: negative for headache, dizziness, confusion, focal weakness, paresthesia, memory loss, gait disturbance  Psychological: negative for anxiety, depression, agitation    Objective:   VITALS:    Visit Vitals  /63   Pulse 100   Temp 98 °F (36.7 °C)   Resp 17   Ht 5' 4\" (1.626 m)   Wt 65.5 kg (144 lb 6.4 oz)   SpO2 99%   BMI 24.79 kg/m²     PHYSICAL EXAM:    Physical Exam:    Gen: Well-developed, well-nourished, in no acute distress  HEENT:  Pink conjunctivae, PERRL, hearing intact to voice, moist mucous membranes  Neck: Supple, without masses, thyroid non-tender  Resp: Bilateral crackles, No accessory muscle use,   Card: No murmurs, normal S1, S2 without thrills, bruits, +non-pitting peripheral edema  Abd:  Soft, non-tender, non-distended, normoactive bowel sounds are present, no palpable organomegaly and no detectable hernias  Lymph:  No cervical or inguinal adenopathy  Musc: No cyanosis or clubbing  Skin: No rashes or ulcers, skin turgor is good  Neuro:  Cranial nerves are grossly intact, no focal motor weakness, follows commands appropriately  Psych: oriented to person, place and time, alert  _______________________________________________________________________  Care Plan discussed with:  Pt's condition, Imaging findings, Lab findings, Assessment, and Care Plan discussed with: Patient  _______________________________________________________________________    Probable disposition:  Home  ________________________________________________________________________    Comments   >50% of visit spent in counseling and coordination of care  Chart reviewed  Discussion with patient and/or family and questions answered     ________________________________________________________________________  Signed: Agustín Herbert MD        Procedures: see electronic medical records for all procedures/Xrays and details which were not copied into this note but were reviewed prior to creation of Plan. LAB DATA REVIEWED:    Recent Results (from the past 24 hour(s))   CBC WITH AUTOMATED DIFF    Collection Time: 03/19/23  8:00 PM   Result Value Ref Range    WBC 7.9 3.6 - 11.0 K/uL    RBC 2.92 (L) 3.80 - 5.20 M/uL    HGB 8.6 (L) 11.5 - 16.0 g/dL    HCT 28.0 (L) 35.0 - 47.0 %    MCV 95.9 80.0 - 99.0 FL    MCH 29.5 26.0 - 34.0 PG    MCHC 30.7 30.0 - 36.5 g/dL    RDW 12.5 11.5 - 14.5 %    PLATELET 146 290 - 753 K/uL    MPV 9.5 8.9 - 12.9 FL    NRBC 0.0 0  WBC    ABSOLUTE NRBC 0.00 0.00 - 0.01 K/uL    NEUTROPHILS 78 (H) 32 - 75 %    LYMPHOCYTES 9 (L) 12 - 49 %    MONOCYTES 12 5 - 13 %    EOSINOPHILS 0 0 - 7 %    BASOPHILS 0 0 - 1 %    IMMATURE GRANULOCYTES 1 (H) 0.0 - 0.5 %    ABS. NEUTROPHILS 6.2 1.8 - 8.0 K/UL    ABS. LYMPHOCYTES 0.7 (L) 0.8 - 3.5 K/UL    ABS. MONOCYTES 0.9 0.0 - 1.0 K/UL    ABS. EOSINOPHILS 0.0 0.0 - 0.4 K/UL    ABS. BASOPHILS 0.0 0.0 - 0.1 K/UL    ABS. IMM.  GRANS. 0.1 (H) 0.00 - 0.04 K/UL    DF AUTOMATED      RBC COMMENTS STOMATOCYTES  PRESENT SAMPLES BEING HELD    Collection Time: 03/19/23  8:00 PM   Result Value Ref Range    SAMPLES BEING HELD  1SST     COMMENT        Add-on orders for these samples will be processed based on acceptable specimen integrity and analyte stability, which may vary by analyte. TROPONIN-HIGH SENSITIVITY    Collection Time: 03/19/23  8:00 PM   Result Value Ref Range    Troponin-High Sensitivity 8 0 - 51 ng/L   METABOLIC PANEL, COMPREHENSIVE    Collection Time: 03/19/23  8:34 PM   Result Value Ref Range    Sodium 124 (L) 136 - 145 mmol/L    Potassium 2.6 (LL) 3.5 - 5.1 mmol/L    Chloride 68 (L) 97 - 108 mmol/L    CO2 >45 (HH) 21 - 32 mmol/L    Anion gap Cannot be calculated 5 - 15 mmol/L    Glucose 103 (H) 65 - 100 mg/dL    BUN 25 (H) 6 - 20 MG/DL    Creatinine 0.41 (L) 0.55 - 1.02 MG/DL    BUN/Creatinine ratio 61 (H) 12 - 20      eGFR >60 >60 ml/min/1.73m2    Calcium 8.6 8.5 - 10.1 MG/DL    Bilirubin, total 0.4 0.2 - 1.0 MG/DL    ALT (SGPT) 18 12 - 78 U/L    AST (SGOT) 17 15 - 37 U/L    Alk.  phosphatase 101 45 - 117 U/L    Protein, total 6.1 (L) 6.4 - 8.2 g/dL    Albumin 2.7 (L) 3.5 - 5.0 g/dL    Globulin 3.4 2.0 - 4.0 g/dL    A-G Ratio 0.8 (L) 1.1 - 2.2     NT-PRO BNP    Collection Time: 03/19/23  8:34 PM   Result Value Ref Range    NT pro-BNP 2,621 (H) <450 PG/ML   PROTHROMBIN TIME + INR    Collection Time: 03/19/23  8:34 PM   Result Value Ref Range    INR 1.9 (H) 0.9 - 1.1      Prothrombin time 19.0 (H) 9.0 - 11.1 sec   MAGNESIUM    Collection Time: 03/19/23  8:34 PM   Result Value Ref Range    Magnesium 1.7 1.6 - 2.4 mg/dL   POC G3 - PUL    Collection Time: 03/19/23  9:30 PM   Result Value Ref Range    FIO2 (POC) 2 %    pH (POC) 7.49 (H) 7.35 - 7.45      pCO2 (POC) 82.2 (H) 35.0 - 45.0 MMHG    pO2 (POC) 139 (H) 80 - 100 MMHG    HCO3 (POC) 62.2 (H) 22 - 26 MMOL/L    sO2 (POC) 99.1 (H) 92 - 97 %    Site RIGHT RADIAL      Device: NASAL CANNULA      Allens test (POC) Positive      Specimen type (POC) ARTERIAL

## 2023-03-20 NOTE — PROGRESS NOTES
3/20/2023  3:28 PM  CM completed assessment w/ pt in person  and also w/ son Kaila Fong via phone. Charted demographics verified, pt lives w/ spouse who has dementia in a 1 story home w/ 5 CASSANDRA. Pt's son coordinates almost 24/7 care for pt and spouse among family, extended family and caregivers. At baseline pt is ambulatory using a rollator, she was usually able to dress herself but recently needs assist w/ all ADLs, they get [employer] for their meals   Reason for Admission:   Emergent for Acute on Chronic HF   Pt is a 79 yo female who presents to [facility] c/o worsening SOB, dyspnea for 2 days as well as weight gain   Past Medical History:   Diagnosis Date    Asthma      Cancer ([address] 7/1/15    CHF (congestive heart failure), NYHA class I, chronic, systolic (HCC)      Chronic anticoagulation      GERD (gastroesophageal reflux disease)      Hiatal hernia      HX: breast cancer 2015     Right     Hypothyroid      Paroxysmal A-fib (HCC)      Venous insufficiency       bilat LE    Weight loss                   RUR Score:   23 % High Risk of Readmission/Red        PCP: First and Last name:  Eulogio Maldonado MD     Name of Practice:   Are you a current patient: Yes/No:yes   Approximate date of last visit:  30 days    Can you do a virtual visit with your PCP: yes with family assist              Resources/supports as identified by patient/family:   pt has very supportive family and extended family who assist sw/ her care                 Top Challenges facing patient (as identified by patient/family and CM): Finances/Medication cost?      Medicare A/B, [employer], pt uses [facility] [address] and is usually covered for her medications                Transportation? Family               Support system or lack thereof? Pt has supportive family and extended family  who assist w/ her care                      Living arrangements? Pt lives w/ spouse who has dementia              Self-care/ADLs/Cognition?   AAO x 4, pt is unable to perform ADLs independently           Current Advanced Directive/Advance Care Plan:  DNR  HCDM/NOK zeny [place] (c) [phone]    Healthcare Decision Maker:   Click here to complete [employer] including selection of the Healthcare Decision Maker Relationship (ie \"Primary\")        Payor Source Payor: [place] MEDICARE / Plan: Yahir Perry / Product Type: Medicare /                             Plan for utilizing home health: pt is open to [facility] for SN, and PT  DME: Home O2 through [address] @ baseline, ( pt has 2 concentrators and portables), nebulizer, rollator, raised toilet seat, shower chair, grab bars                    Transition of Care Plan:         RUR 23 % High Risk of Readmission/red  Hospital admission for medical management   Consider palliative consult for GOC   Pt would benefit from PT eval to determine skilled needs at DC, pt has been to [facility] for pulm rehab in the past   CM to follow through for treatment/response  CM sent H&P to [facility] in [address] when stable likely  to home  w/ [facility]  Outpatient follow up PCP, specialists  Transport TBD Family vs  [facility], family has portable O2 for transport        Care Management Interventions  PCP Verified by CM:  Yes Calin Hollingsworth MD)  Palliative Care Criteria Met (RRAT>21 & CHF Dx)?: No  Mode of Transport at Discharge: Self (Family vs  [facility])  Physical Therapy Consult: No  Occupational Therapy Consult: No  Speech Therapy Consult: No  Support Systems: Child(corn[place]), Caregiver/Home Care Staff  Confirm Follow Up Transport: Family  The Plan for Transition of Care is Related to the Following Treatment Goals : Acute on [place] CHF, Home Health  The Patient and/or Patient Representative was Provided with a Choice of Provider and Agrees with the Discharge Plan?: Yes  Discharge Location  Patient Expects to be Discharged to[de-identified] Home with home health ([facility])  TRE Doyle

## 2023-03-20 NOTE — ED NOTES
TRANSFER - OUT REPORT:    Verbal report given to [facility] on Unknown [place]  being transferred to [phone] for routine progression of care       Report consisted of patients Situation, Background, Assessment and   Recommendations(SBAR). Information from the following report(s) SBAR, Kardex, ED Summary, Intake/Output, MAR, Recent Results, Med Rec Status, and Cardiac Rhythm afib  was reviewed with the receiving nurse. Lines:   Peripheral IV 03/19/23 Posterior; Left Forearm (Active)   Site Assessment Clean, dry, & intact 03/19/23 2010   Phlebitis Assessment 0 03/19/23 2010   Infiltration Assessment 0 03/19/23 2010   Dressing Status Clean, dry, & intact 03/19/23 2010   Dressing Type Transparent 03/19/23 2010   Hub Color/Line Status Blue;Flushed 03/19/23 2010   Action Taken Blood drawn 03/19/23 2010        Opportunity for questions and clarification was provided.       Patient transported with:   O2 @ 4 liters  Tech

## 2023-03-20 NOTE — PROGRESS NOTES
Bedside and Verbal shift change report given to Patrick Walden (oncoming nurse) by Jad Barksdale RN (offgoing nurse). Report included the following information SBAR, Kardex, Intake/Output, MAR, Recent Results, and Med Rec Status.

## 2023-03-20 NOTE — NURSE NAVIGATOR
Chart reviewed by Heart Failure Nurse Navigator. Heart Failure database completed. Patient came to ED by EMS for shortness of breath which did not respond to rescue inhaler. She is admitted with acute on chronic HFmrEF and hyponatremia. She has PMH of asthma, atrial fib, HF mildly reduced EF, hypothyroidism, GERD, pneumonia with pseudomonas and stenotrophomonas colonization, and cancer. She had multiple hospitalizations from August through October 2022. Readmission Risk Score is 23. EF:  Most recent Echo 9/6/22 with EF of 45 to 50% with mild global hypokinesis and grade 1 diastolic dysfunction. ACEi/ARB/ARNi: **    BB: Metoprolol tartrate 50 mg BID    Aldosterone Antagonist: **    Obstructive Sleep Apnea Screening:   Referred to Sleep Medicine:     CRT **. NYHA Functional Class **. Heart Failure Teach Back in Patient Education. Heart Failure Avoiding Triggers on Discharge Instructions. Cardiologist: Dr Goran Busby at [facility]    Electrophysiologist: Dr Avelina Quick at [address] [place] [address] discharge follow up phone call to be made within 48-72 hours of discharge.

## 2023-03-21 NOTE — PROGRESS NOTES
Physician Progress Note      PATIENT:               Jv Lawrence  CSN #:                  [id number]  :                       1940  ADMIT DATE:       3/19/2023 7:45 PM  [address] [place] DATE:  RESPONDING  PROVIDER #:        Pascale Hawk MD          QUERY TEXT:    Good afternoon. Patient admitted with CHF exacerbation, noted to have atrial fibrillation and is maintained on Xarelto. If possible, please document in progress notes and discharge summary if you are evaluating and/or treating any of the following: The medical record reflects the following:    Risk Factors: CHF, atrial fibrillation, HTN, hypothyroidism, COPD with chronic respiratory failure    Clinical Indicators: Ekg: Atrial fibrillation Possible Anterior infarct; INR 1.9; PT 19.0; hgb 8.6, 8.9; hct 28.0, 29.9; plt 288, 296; from  Cardiology consult note: \" A-fib: on metoprolol, Xarelto\"; HR 80s-124    Treatment: Ekg, vitals per unit protocol, Cardiology consult, labs, Xarelto      Thank you,  Jessy Villalta RN, CDI  Options provided:  -- Secondary hypercoagulable state in a patient with atrial fibrillation  -- Other - I will add my own diagnosis  -- Disagree - Not applicable / Not valid  -- Disagree - Clinically unable to determine / Unknown  -- Refer to Clinical Documentation Reviewer    PROVIDER RESPONSE TEXT:    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     Query created by: Luna Luciano on 3/21/2023 12:53 PM      Electronically signed by:  Pascale Hawk MD 3/21/2023 2:38 PM

## 2023-03-21 NOTE — PROGRESS NOTES
Hospitalist Progress Note      NAME: Rock Posada   :  1940  MRM:  [id number]    Date/Time: 3/21/2023  6:52 PM             Subjective:     Chief Complaint: Back pain    Back pain is significantly worse today, mostly on the left. Also has lower abdominal pain. Urinalysis was positive. Started on Rocephin. White count significantly increased to 23. Suspect pyelonephritis. Blood and urine culture sent. Patient requesting comfort care, thinks she is going to die. Discussed with patient's son and daughter, palliative consult ordered for goals of care discussion. Cardiology following, also recommended addressing goals of care       Objective:       Vitals:          Last 24hrs VS reviewed since prior progress note. Most recent are:    Visit Vitals  BP 99/63 (BP 1 Location: Left upper arm, BP Patient Position: At rest)   Pulse 91   Temp 97.7 °F (36.5 °C)   Resp 14   Ht 5' 4\" (1.626 m)   Wt 65.5 kg (144 lb 6.4 oz)   SpO2 94%   BMI 24.79 kg/m²     SpO2 Readings from Last 6 Encounters:   23 94%   23 100%   22 90%   10/31/22 94%   22 95%   22 92%    O2 Flow Rate (L/min): 3 l/min     Intake/Output Summary (Last 24 hours) at 3/21/2023 1605  Last data filed at 3/20/2023 2338  Gross per 24 hour   Intake 520 ml   Output 500 ml   Net 20 ml            Exam:     Physical Exam:    General:   Moderate discomfort  Heart:  RRR, No MRG  Lungs: Basilar rales. Decreased breath sounds at bases. Non-labored breathing. Abdomen:  Soft . Nondistended. NTTP. BS present. Extremities: 1+ lower extremity edema. : Mild tenderness over bladder,, left CVA tenderness   Neuro:  Alert and interactive. No focal deficits. Psych:  Mood stable.     Medications Reviewed: (see below)    Lab Data Reviewed: (see below)    ______________________________________________________________________    Medications:     Current Facility-Administered Medications   Medication Dose Route Frequency    cefTRIAXone (ROCEPHIN) 1 g in 0.9% sodium chloride 10 mL IV syringe  1 g IntraVENous Q24H    potassium bicarb-citric acid (EFFER-K) tablet 40 mEq  40 mEq Oral BID    morphine injection 2 mg  2 mg IntraVENous Q2H PRN    levothyroxine (SYNTHROID) tablet 25 mcg  25 mcg Oral QHS    megestroL (MEGACE) 400 mg/10 mL (10 mL) oral suspension 200 mg  200 mg Oral TID WITH MEALS    metoprolol tartrate (LOPRESSOR) tablet 50 mg  50 mg Oral BID    mirtazapine (REMERON) tablet 7.5 mg  7.5 mg Oral QHS    montelukast (SINGULAIR) tablet 10 mg  10 mg Oral DAILY    pantoprazole (PROTONIX) tablet 40 mg  40 mg Oral DAILY    traMADoL (ULTRAM) tablet 50 mg  50 mg Oral Q8H PRN    traZODone (DESYREL) tablet 25 mg  25 mg Oral QHS    rivaroxaban (XARELTO) tablet 20 mg  20 mg Oral DAILY WITH DINNER    bumetanide (BUMEX) injection 2 mg  2 mg IntraVENous Q12H    hydrALAZINE (APRESOLINE) 20 mg/mL injection 20 mg  20 mg IntraVENous Q4H PRN    sodium chloride (NS) flush 5-40 mL  5-40 mL IntraVENous Q8H    sodium chloride (NS) flush 5-40 mL  5-40 mL IntraVENous PRN    acetaminophen (TYLENOL) tablet 650 mg  650 mg Oral Q6H PRN    Or    acetaminophen (TYLENOL) suppository 650 mg  650 mg Rectal Q6H PRN    polyethylene glycol (MIRALAX) packet 17 g  17 g Oral DAILY PRN    ondansetron (ZOFRAN ODT) tablet 4 mg  4 mg Oral Q8H PRN    Or    ondansetron (ZOFRAN) injection 4 mg  4 mg IntraVENous Q6H PRN    budesonide (PULMICORT) 500 mcg/2 ml nebulizer suspension  500 mcg Nebulization BID RT    arformoteroL (BROVANA) neb solution 15 mcg  15 mcg Nebulization BID RT    simethicone (MYLICON) tablet 80 mg  80 mg Oral QID PRN    lidocaine 4 % patch 1 Patch  1 Patch TransDERmal Q24H            Lab Review:     Recent Labs     03/21/23  0117 03/19/23 2000   WBC 23.1* 7.9   HGB 8.9* 8.6*   HCT 29.9* 28.0*    288       Recent Labs     03/21/23  0117 03/20/23  0910 03/19/23 2034   * 125* 124*   K 3.0* 3.6 2.6*   CL 70* 71* 68*   CO2 >45* >45* >45*   GLU 86 160* 103*   BUN 26* 22* 25*   CREA 0.58 0.50* 0.41*   CA 9.0 9.0 8.6   MG 1.7  --  1.7   ALB 2.9*  --  2.7*   ALT 19  --  18   INR  --   --  1.9*       No components found for: [place]         Assessment / Plan: Active Problems:    Diastolic congestive heart failure ([address] (3/20/2023)      Abraham Arana is a 80 y.o.  female with history of chronic systolic CHF and atrial fibrillation presenting with acute onset of dyspnea and lower extremity edema secondary to acute CHF exacerbation    Acute on chronic systolic CHF exacerbation, EF 45 to 50%  Bilateral pleural effusions  -Continue IV Bumex  -Follow chest x-ray  -Cardiology following  -Last echocardiogram in September 2022, order repeat study  -Monitor BMP with diuresis  -Palliative care consulted to address goals of care    Acute pyelonephritis  Leukocytosis  -Left flank tenderness and positive urinalysis  -Continue Rocephin, urine and blood cultures ordered  -Follow CBC  -Morphine as needed pain    Hypervolemic Hyponatremia:  - Mild hyponatremia most likely d/t volume overload  - diuresis as above  - follow BMP     Severe hypokalemia  - replete aggressively  - daily Kcl  - follow BMP    Metabolic alkalosis  - secondary to diuresis     HTN  Atrial fibrillation  - continue home xarelto   - Cont w/ Metoprolol 50mg bid     Hypothyroidism  - Synthroid 25mcg po daily     Chronic Respiratory Failure, on 3 L O2 at home  Suspect COPD  - Cont w/ Duonebs prn  - Cont w/ Pulmicort prn           DVT ppx:  xarelto    Dispo:  HH vs SNF    DNR        Risk of deterioration: high         Total time spent with patient care: 45 min. I personally saw and examined the patient during this time period.                                                                                                                  Care Plan discussed with: patient, nurse, cm, family    Discussed:  Care Plan                ___________________________________________________    Attending Physician: Dinah Benítez MD

## 2023-03-21 NOTE — PROGRESS NOTES
Pt. Extremely tearful in pain requesting to \"end it all, take me out\". I discussed with pt the possibilities of palliative and hospice. The pt demonstrates capacity and requests to be made hospice. Message sent to Dr. Casa Santos.

## 2023-03-21 NOTE — PROGRESS NOTES
Saw patient's son in the hospital. Initiated a relationship of support. Offered a listening ear. Reviewed coping. Reflected value statements. Advised of  availability. Please contact Spiritual Care for further referrals.     [address], Javier [address], Ramon [address], [facility]  Staff   Paging service: [phone] (JOSE)

## 2023-03-21 NOTE — PROGRESS NOTES
ATTENDING CARDIOLOGIST    The patient was personally examined and chart reviewed. All the elements of history and examination were personally performed and I agree with the plan as listed by advanced practice provider. Treatment plan was addressed with the patient. Subjective:  Tearful  Something about her son  Does not want to live  Mild dyspnea  Discussed diuresis    Blood pressure 99/63, pulse 84, temperature 97.4 °F (36.3 °C), resp. rate 16, height 5' 4\" (1.626 m), weight 66 kg (145 lb 8.1 oz), SpO2 97 %. Heart: Normal rate, regular rhythm, normal S1/S2, no murmur  Lungs: Clear  Abdomen: Soft, nontender, nondistended  Extremities: no edema      A/P:   Acute on chronic systolic heart failure - EF 45% - obtain [facility] records, continue IV diuresis bumex 2mg IV BID. Goals of care discussions to be had. Atrial fibrillation - on Xarelto + metoprolol 50 BID  HTN - low normal.  Leukocytosis      []    High complexity decision making was performed  []    Patient is at high-risk of decompensation with multiple organ involvement      Awilda Vernon. Gabriel Manzano MD, [facility]  [facility] [address], [address]                                   Office [phone]      Fax [phone] [address] [facility]                    Cardiology Care Note     [x]Initial Encounter     []Follow-up    Patient Name: Magnus More POT:[id number]  Primary Cardiologist: DRAKE  Consulting Cardiologist: [facility] Physicians: Luz Oseguera MD     Reason for encounter: CHF    HPI:       Delisa Chan is a 80 y.o. female with PMH significant for CHF EF 45-50%, hyponatremia, a-fib on Xarelto, HTN, chronic hypoxic resp failure on home O2 and hypothryoidism. Pt is poor historian, falls asleep during discussion. Information obtained from records.  She is well known to our office from prior several admissions for CHF. Pt presented with worsening SOB since last Friday, associated with weight gain, cough and increased lower ext edema. Worse with activity/ambulating. Her son tried increasing her home bumex but no increased urinary output was noted and pt had no relief of her symptoms. Per pt, her SOB has been an ongoing issue for the last 6 months. She has had multiple hospitalizations, with last here in Oct. She reports sig back pain but no CP, palpitations. Subjective: Keiry Solomon reports  neck and back pain . Assessment and Plan     Acute on chronic HF mildly reduced EF: known EF of 45-50%, repeat echo. Cont IV diuresis. On home O2. Will contact [facility] for updated records. Would consider palliative consult to address goals of care     2. Chronic hyponatremia, hypervolemic hyponatremia: cont IV diuretics, fluid restrict     3. Hypokalemia: aggressively replete    4. Alkalosis: likely contractual/due to diuretics, appears chronic but slightly worse than baseline. Consider diamox     5. Leukocytosis: WBC up today to 23.1K, afebrile. Work up per primary team     6. A-fib: on metoprolol, Xarelto     7. HTN: BP currently soft, cont metoprolol, bumex as able    8. Hypothyroid: on synthroid    9. Anemia, chronic       ____________________________________________________________    Cardiac testing  09/04/22    ECHO ADULT COMPLETE 09/06/2022 9/6/2022    Interpretation Summary    Left Ventricle: Mildly reduced left ventricular systolic function with a visually estimated EF of 45 - 50%. Left ventricle size is normal. Normal wall thickness. Mild global hypokinesis present. Grade I diastolic dysfunction with normal LAP. Aortic Valve: Valve structure is normal. Mild sclerosis of the aortic valve cusp. Mitral Valve: Mild regurgitation. Tricuspid Valve: Mildly elevated RVSP. Mildly improved lv function compared to study 4/2022.     Signed by: Erika Merchant DO on 9/6/2022 11:24 AM    Most recent HS troponins:  Recent Labs     03/19/23 2000   TROPHS 8       ECG: atrial fibrillation, rate 90's    Review of Systems:    []All other systems reviewed and all negative except as written in HPI    [x] Patient unable to provide secondary to condition    Past Medical History:   Diagnosis Date    Asthma     Cancer ([facility] 7/1/15    CHF (congestive heart failure), NYHA class I, chronic, systolic (HCC)     Chronic anticoagulation     GERD (gastroesophageal reflux disease)     Hiatal hernia     HX: breast cancer 2015    Right     Hypothyroid     Paroxysmal A-fib ([facility]     Venous insufficiency     bilat LE    Weight loss      Past Surgical History:   Procedure Laterality Date    COLONOSCOPY N/A 6/14/2021    COLONOSCOPY AND EGD performed by Suleiman Bolivar MD at [id number] [place] [address] Drive,[address] BREAST LUMPECTOMY Right 08/24/2015    HX CATARACT REMOVAL  2013    Bilateral     HX CHOLECYSTECTOMY      HX DILATION AND CURETTAGE      x2    HX ORTHOPAEDIC  2017    RIGHT foot  hammertoe     Social Hx:  reports that she quit smoking about 22 years ago. Her smoking use included cigarettes. She started smoking about 22 years ago. She has never used smokeless tobacco. She reports that she does not currently use alcohol. She reports that she does not use drugs. Family Hx: family history includes Heart Disease in her mother and sister; Hypertension in her father.   Allergies   Allergen Reactions    Ace Inhibitors Cough    Iodine Rash    Penicillins Rash          OBJECTIVE:  Wt Readings from Last 3 Encounters:   03/19/23 65.5 kg (144 lb 6.4 oz)   02/02/23 58.5 kg (129 lb)   10/31/22 57.5 kg (126 lb 12.2 oz)       Intake/Output Summary (Last 24 hours) at 3/21/2023 0959  Last data filed at 3/20/2023 2338  Gross per 24 hour   Intake 720 ml   Output 1000 ml   Net -280 ml       Physical Exam:    Vitals:   Vitals:    03/20/23 2330 03/21/23 0442 03/21/23 0800 03/21/23 0810   BP: 114/65 (!) 93/58     Pulse: 97 88 84    Resp: 16 25     Temp: 98.8 °F (37.1 °C) 97.9 °F (36.6 °C)     SpO2: 97% 100%  95%   Weight:       Height:         Telemetry: AFIB    Gen: Frail, lethargic, tachypneic w/ speaking   Neck: Supple, JVD difficult to assess, No Carotid Bruit  Resp: No accessory muscle use, diminished bases, No rales or rhonchi  Card: irregular Rate,Rythm, Normal S1, S2, No murmurs, rubs or gallop. No thrills. Abd:   Soft, non-tender, non-distended, BS+   MSK: No cyanosis  Skin: No rashes    Neuro: Moving all four extremities, follows commands appropriately  Psych: Fair insight, oriented to person, place  LE: 3+ edema    Data Review:     Radiology:   XR Results (most recent):  Results from Hospital Encounter encounter on 03/19/23    XR CHEST PORT    Narrative  EXAM:  XR CHEST PORT    INDICATION: Congestive heart failure, pleural effusions    COMPARISON: 3/19/2023    TECHNIQUE: Upright portable chest AP view 0630 hours    FINDINGS: The patient is on a cardiac monitor. There is no change in the  cardiomegaly. The lungs demonstrate persistent moderate pulmonary edema which is  unchanged. Bilateral pleural effusions with bibasilar atelectasis left greater  than right is unchanged. Impression  1. No significant interval change pulmonary edema pattern, pleural effusions and  bibasilar atelectasis      Recent Labs     03/21/23 0117 03/20/23  0910   * 125*   K 3.0* 3.6   CL 70* 71*   CO2 >45* >45*   BUN 26* 22*   CREA 0.58 0.50*   GLU 86 160*   CA 9.0 9.0     Recent Labs     03/21/23 0117 03/19/23 2000   WBC 23.1* 7.9   HGB 8.9* 8.6*   HCT 29.9* 28.0*    288     Recent Labs     03/21/23 0117 03/19/23 2034   PTP  --  19.0*   INR  --  1.9*    101     No results for input(s): CHOL, LDLC in the last 72 hours.     No lab exists for component: TGL, HDLC,  HBA1C      Current meds:    Current Facility-Administered Medications:     cefTRIAXone (ROCEPHIN) 1 g in 0.9% sodium chloride 10 mL IV syringe, 1 g, IntraVENous, Q24H, Gilbert Nation MD levothyroxine (SYNTHROID) tablet 25 mcg, 25 mcg, Oral, QHS, Eduarda Ron MD, 25 mcg at 03/20/23 2114    megestroL (MEGACE) 400 mg/10 mL (10 mL) oral suspension 200 mg, 200 mg, Oral, TID WITH MEALS, Cleopatra Tam MD, 200 mg at 03/21/23 0837    metoprolol tartrate (LOPRESSOR) tablet 50 mg, 50 mg, Oral, BID, Eduarda Ron MD, 50 mg at 03/21/23 5221    mirtazapine (REMERON) tablet 7.5 mg, 7.5 mg, Oral, QHS, Eduarda Ron MD, 7.5 mg at 03/20/23 2113    montelukast (SINGULAIR) tablet 10 mg, 10 mg, Oral, DAILY, Eduarda Ron MD, 10 mg at 03/21/23 0837    pantoprazole (PROTONIX) tablet 40 mg, 40 mg, Oral, DAILY, Eduarda Ron MD, 40 mg at 03/21/23 1944    traMADoL (ULTRAM) tablet 50 mg, 50 mg, Oral, Q8H PRN, Eduarda Ron MD, 50 mg at 03/21/23 5028    traZODone (DESYREL) tablet 25 mg, 25 mg, Oral, QHS, Eduarda Ron MD, 25 mg at 03/20/23 2112    rivaroxaban (XARELTO) tablet 20 mg, 20 mg, Oral, DAILY WITH DINNER, Eduarda Ron MD, 20 mg at 03/20/23 1732    bumetanide (BUMEX) injection 2 mg, 2 mg, IntraVENous, Q12H, Eduarda Ron MD, 2 mg at 03/21/23 0106    hydrALAZINE (APRESOLINE) 20 mg/mL injection 20 mg, 20 mg, IntraVENous, Q4H PRN, Eduarda Ron MD    potassium bicarb-citric acid (EFFER-K) tablet 40 mEq, 40 mEq, Oral, DAILY, Eduarda Ron MD, 40 mEq at 03/21/23 0516    sodium chloride (NS) flush 5-40 mL, 5-40 mL, IntraVENous, Q8H, Eduarda Ron MD, 10 mL at 03/21/23 6942    sodium chloride (NS) flush 5-40 mL, 5-40 mL, IntraVENous, PRN, Eduarda Ron MD    acetaminophen (TYLENOL) tablet 650 mg, 650 mg, Oral, Q6H PRN, 650 mg at 03/21/23 0838 **OR** acetaminophen (TYLENOL) suppository 650 mg, 650 mg, Rectal, Q6H PRN, Eduarda Ron MD    polyethylene glycol (MIRALAX) packet 17 g, 17 g, Oral, DAILY PRN, Eduarda Ron MD    ondansetron (ZOFRAN ODT) tablet 4 mg, 4 mg, Oral, Q8H PRN **OR** ondansetron (ZOFRAN) injection 4 mg, 4 mg, IntraVENous, Q6H PRN, Eduarda Ron MD, 4 mg at 03/20/23 7420    budesonide (PULMICORT) 500 mcg/2 ml nebulizer suspension, 500 mcg, Nebulization, BID RT, Octavia Rowell MD, 500 mcg at 03/21/23 0806    arformoteroL (BROVANA) neb solution 15 mcg, 15 mcg, Nebulization, BID RT, Octavia Rowell MD, 15 mcg at 03/21/23 0806    simethicone (MYLICON) tablet 80 mg, 80 mg, Oral, QID PRN, Octavia Rowell MD, 80 mg at 03/20/23 1929    lidocaine 4 % patch 1 Patch, 1 Patch, TransDERmal, Q24H, Amina Koo DO, 1 Patch at 03/20/23 2011    Waqas Slater NP    [facility]  Call center: [phone]  (Yodit Hinojosa MD

## 2023-03-21 NOTE — PROGRESS NOTES
1530: Patient reporting pain in her pelvic area around bladder when urinating. Bladder appears a little distended. Bladder scan done showing 171mL. RN notified Dr. Ellie Ray. RN also reported critical CO2 <45. MD acknowledged. Ordered UA w/ reflex culture. 1815: RN notified MD of patient having abdominal pain, different from pain she had earlier. She describes it as feeling like she has gas trapped. RN notified Dr. Ellie Ray. 1931: Bedside and Verbal shift change report given to Mera Brito RN (oncoming nurse) by Chace Johnson RN (offgoing nurse).  Report included the following information SBAR, Kardex, Intake/Output, MAR, Accordion, and Recent Results. '

## 2023-03-21 NOTE — PROGRESS NOTES
Spiritual Care Assessment/Progress Note  [address]      NAME: Gardenia Adair      MRN: [id number]  AGE: 80 y.o. SEX: female  Presybeterian Affiliation: Yazdanism   Language: English     3/21/2023     Total Time (in minutes): 20     Spiritual Assessment begun in [facility]  MED SURG 2 through conversation with:         []Patient        [] Family    [] Friend(s)        Reason for Consult: Palliative Care, Initial/Spiritual Assessment     Spiritual beliefs: (Please include comment if needed)     [] Identifies with a kallie tradition:         [] Supported by a kallie community:            [] Claims no spiritual orientation:           [] Seeking spiritual identity:                [] Adheres to an individual form of spirituality:           [x] Not able to assess:                           Identified resources for coping:      [] Prayer                               [] Music                  [] Guided Imagery     [x] Family/friends                 [] Pet visits     [] Devotional reading                         [] Unknown     [] Other:                                               Interventions offered during this visit: (See comments for more details)    Patient Interventions: Affirmation of emotions/emotional suffering     Family/Friend(s):  Affirmation of emotions/emotional suffering     Plan of Care:     [] Support spiritual and/or cultural needs    [] Support AMD and/or advance care planning process      [] Support grieving process   [] Coordinate Rites and/or Rituals    [] Coordination with community clergy   [] No spiritual needs identified at this time   [] Detailed Plan of Care below (See Comments)  [] Make referral to Music Therapy  [] Make referral to Pet Therapy     [] Make referral to Addiction services  [] Make referral to [facility]  [] Make referral to Spiritual Care Partner  [] No future visits requested        [x] Contact Spiritual Care for further referrals     Comments:  visit for the patient on Med Surg. Reviewed patient's chart and spoke with patient's nurse. Upon arrival, the pt introduced her visitors, her , daughter, and friend. Pt's son expected to arrive soon. Introduced self and chaplaincy. Pt appeared to be enjoying her visitors.  advised of availability. Provided an empathetic presence. Reflected value statements. Affirmed autonomy in decision making. Please contact Spiritual Care for further referrals.     [address], [address], Ramon [address], [facility]  Staff   Paging service: [phone] (PRAROBERTO)

## 2023-03-21 NOTE — PROGRESS NOTES
3/21/2023   CARE MANAGEMENT NOTE:  CM reviewed EMR and handoff received from previous  Kristy Asencio). Pt was admitted with CHF. Reportedly, pt resides with her  who has dementia. Son, Jacquelyn Garcia ([phone]) is the primary family contact. RUR 22%    Transition Plan of Care:  Plan is for pt to return home with her   [facility], PT). Resumption orders were sent to agency. [facility] Hercules provides home oxygen at 3 L baseline  Outpatient follow up  Family vs wheelchair [facility] transport    CM will continue to follow pt until discharged.   Filiberto

## 2023-03-21 NOTE — CONSULTS
Palliative Medicine Consult  [place]: [phone])    Patient Name: Purnima Neumann  YOB: 1940    Date of Initial Consult: March 22, 2023  Reason for Consult: Care Decisions  Requesting Provider: Dr. Rene Figueroa   Primary Care Physician: Karina Land MD     SUMMARY:   Purnima Neumann is a 80 y.o. admitted on 3/19/2023 from home with a diagnosis of acute on chronic HF, hypervolemic hyponatremia, elevated bicarb, hypokalemia, respiratory alkalosis, leukocytosis . Pt presented with worsening shortness of breath. PMH: CHF, afib, HTN, COPD/chronic hypoxic respiratory failure on home oxygen, hypothyroidism, chronic hyponatremia, chronic anemia, GERD, venous insufficiency, hiatal hernia, rt breast cancer, chronic back pain. Current medical issues leading to Palliative Medicine involvement include: support with care decisions given acute illness with high risk of compromise considering underlying morbidities. Social:  60+ years with 2 adult children. Lives at home with spouse who has advance dementia. DNR  Surrogate Decision Maker  Camryn Wren (son)  [phone]     PALLIATIVE DIAGNOSES:   Palliative Care Encounter  Hypoalbuminemia  Shortness of breath  Hypoxia  Comfort Care     PLAN:   Family meeting with at the bedside with daughter in law and caregiver along with Sugey Arriaza LCSW. Services introduced  The pt just received morphine so her participation in discussion is limited. She did express that her desire is comfort at this time. Family reports that multiple discussions have already occurred with the pt and family and they are anticipating hospice today. Family shared the pt has had a steady decline since COVID about 7 months ago. She was clear of her wishes with family in this instance and family would like to honor her. We discussed initiating comfort care at this time. I explained comfort in detail including which therapies would be discontinued.  Reassured that comfort does not hasten death and goal is to eliminate suffering. Family agree to proceed  Comfort care orders placed  Hospice consult already placed  Initial consult note routed to primary continuity provider and/or primary health care team members  Communicated plan of care with: Palliative IDUrsula ARAIZA 192 Team     GOALS OF CARE / TREATMENT PREFERENCES:     GOALS OF CARE:  Patient/Health Care Proxy Stated Goals: Comfort    TREATMENT PREFERENCES:   Code Status: DNR    Patient and family's personal goals include: comfort     Advance Care Planning:  [] [facility] Interdisciplinary Team has updated the ACP Navigator with Health Care Decision Maker and Patient Capacity    Primary Decision Maker: Erich Frausto - Jt - [phone]    Secondary Decision Maker: Joaquin More Daughter - [phone]      Advance Care Planning 3/22/2023   Confirm Advance Directive Yes, on file   Does the patient have other document types Do Not Resuscitate       Medical Interventions: Comfort measures       Other:    As far as possible, the palliative care team has discussed with patient / health care proxy about goals of care / treatment preferences for patient.      HISTORY:     History obtained from: chart, team, family    CHIEF COMPLAINT: Pt admitted with aforementioned history and issues    HPI/SUBJECTIVE:    The patient is:   [] Verbal and participatory  [x] Non-participatory due to: medical condition        Clinical Pain Assessment (nonverbal scale for severity on nonverbal patients):   Clinical Pain Assessment  Severity: 0          Duration: for how long has pt been experiencing pain (e.g., 2 days, 1 month, years)  Frequency: how often pain is an issue (e.g., several times per day, once every few days, constant)     FUNCTIONAL ASSESSMENT:     Palliative Performance Scale (PPS):  PPS: 30       PSYCHOSOCIAL/SPIRITUAL SCREENING:     Palliative IDT has assessed this patient for cultural preferences / practices and a referral made as appropriate to needs (Cultural Services, Patient Advocacy, Ethics, etc.)    Any spiritual / Moravian concerns:  [] Yes /  [x] No  [] Unable to obtain this information  If \"Yes\" to discuss with pastoral care during IDT     Does caregiver feel burdened by caring for their loved one:   [] Yes /  [x] No /  [] No Caregiver Present/Available [] No Caregiver [] Pt Lives at [place] [address]  If \"Yes\" to discuss with social work during IDT    Anticipatory grief assessment:   [x] Normal  / [] Maladaptive   [] Unable to obtain this information  [] n/a  If \"Maladaptive\" to discuss with social work during IDT    ESAS Anxiety: Anxiety: 0    ESAS Depression:          REVIEW OF SYSTEMS:     Positive and pertinent negative findings in ROS are noted above in HPI. The following systems were [x] reviewed objectively/ [] unable to be reviewed as noted in HPI  Other findings are noted below. Systems: constitutional, ears/nose/mouth/throat, respiratory, gastrointestinal, genitourinary, musculoskeletal, integumentary, neurologic, psychiatric, endocrine. Positive findings noted below. Modified ESAS Completed by: provider   Fatigue: 10 Drowsiness: 8     Pain: 0   Anxiety: 0       Dyspnea: 0           Stool Occurrence(s): 0        PHYSICAL EXAM:     From RN flowsheet:  Wt Readings from Last 3 Encounters:   03/21/23 145 lb 8.1 oz (66 kg)   02/02/23 129 lb (58.5 kg)   10/31/22 126 lb 12.2 oz (57.5 kg)     Blood pressure 106/60, pulse (!) 103, temperature 98 °F (36.7 °C), resp. rate 16, height 5' 4\" (1.626 m), weight 145 lb 8.1 oz (66 kg), SpO2 100 %.     Pain Scale 1: Numeric (0 - 10)  Pain Intensity 1: 5  Pain Onset 1: chronic  Pain Location 1: Buttocks  Pain Orientation 1: Posterior  Pain Description 1: Aching  Pain Intervention(s) 1: Emotional support, Distraction  Last bowel movement, if known:     Constitutional: frail, confused, somnolent  Eyes: pupils equal, anicteric  ENMT: no nasal discharge, moist mucous membranes  Cardiovascular: LLE bilat, distal pulses intact  Respiratory: breathing not labored, symmetric  Gastrointestinal: soft non-tender  Musculoskeletal: no deformity, no tenderness to palpation  Skin: warm, dry  Neurologic: confusion  Psychiatric:+ hallucinations  Other:       HISTORY:     Active Problems:    Diastolic congestive heart failure ([facility] [address] (3/20/2023)    Past Medical History:   Diagnosis Date    Asthma     Cancer ([facility] [address] 7/1/15    CHF (congestive heart failure), NYHA class I, chronic, systolic (HCC)     Chronic anticoagulation     GERD (gastroesophageal reflux disease)     Hiatal hernia     HX: breast cancer     Right     Hypothyroid     Paroxysmal A-fib (HCC)     Venous insufficiency     bilat LE    Weight loss       Past Surgical History:   Procedure Laterality Date    COLONOSCOPY N/A 2021    COLONOSCOPY AND EGD performed by Ciera Kilpatrick MD at [facility] ENDOSCOPY    HX BREAST LUMPECTOMY Right 2015    HX CATARACT REMOVAL  2013    Bilateral     HX CHOLECYSTECTOMY      HX DILATION AND CURETTAGE      x2    HX ORTHOPAEDIC  2017    RIGHT foot  hammertoe      Family History   Problem Relation Age of Onset    Hypertension Father     Heart Disease Mother     Heart Disease Sister       History reviewed, no pertinent family history.   Social History     Tobacco Use    Smoking status: Former     Packs/day: 0.00     Years: 0.00     Pack years: 0.00     Types: Cigarettes     Start date: 2000     Quit date: 2000     Years since quittin.3    Smokeless tobacco: Never    Tobacco comments:     stopped 3/13/2000   Substance Use Topics    Alcohol use: Not Currently     Allergies   Allergen Reactions    Ace Inhibitors Cough    Iodine Rash    Penicillins Rash      Current Facility-Administered Medications   Medication Dose Route Frequency    LORazepam (ATIVAN) tablet 0.5 mg  0.5 mg Oral Q6H PRN    LORazepam (ATIVAN) injection 1 mg  1 mg IntraVENous Q15MIN PRN    glycopyrrolate (ROBINUL) injection 0.2 mg  0.2 mg IntraVENous Q4H PRN morphine injection 2 mg  2 mg IntraVENous Q30MIN PRN    ondansetron (ZOFRAN) injection 4 mg  4 mg IntraVENous Q4H PRN    Or    ondansetron (ZOFRAN ODT) tablet 4 mg  4 mg Oral Q8H PRN    potassium bicarb-citric acid (EFFER-K) tablet 40 mEq  40 mEq Oral BID    levothyroxine (SYNTHROID) tablet 25 mcg  25 mcg Oral QHS    megestroL (MEGACE) 400 mg/10 mL (10 mL) oral suspension 200 mg  200 mg Oral TID WITH MEALS    metoprolol tartrate (LOPRESSOR) tablet 50 mg  50 mg Oral BID    mirtazapine (REMERON) tablet 7.5 mg  7.5 mg Oral QHS    montelukast (SINGULAIR) tablet 10 mg  10 mg Oral DAILY    pantoprazole (PROTONIX) tablet 40 mg  40 mg Oral DAILY    traMADoL (ULTRAM) tablet 50 mg  50 mg Oral Q8H PRN    traZODone (DESYREL) tablet 25 mg  25 mg Oral QHS    rivaroxaban (XARELTO) tablet 20 mg  20 mg Oral DAILY WITH DINNER    bumetanide (BUMEX) injection 2 mg  2 mg IntraVENous Q12H    hydrALAZINE (APRESOLINE) 20 mg/mL injection 20 mg  20 mg IntraVENous Q4H PRN    sodium chloride (NS) flush 5-40 mL  5-40 mL IntraVENous Q8H    sodium chloride (NS) flush 5-40 mL  5-40 mL IntraVENous PRN    acetaminophen (TYLENOL) tablet 650 mg  650 mg Oral Q6H PRN    Or    acetaminophen (TYLENOL) suppository 650 mg  650 mg Rectal Q6H PRN    polyethylene glycol (MIRALAX) packet 17 g  17 g Oral DAILY PRN    budesonide (PULMICORT) 500 mcg/2 ml nebulizer suspension  500 mcg Nebulization BID RT    arformoteroL (BROVANA) neb solution 15 mcg  15 mcg Nebulization BID RT    simethicone (MYLICON) tablet 80 mg  80 mg Oral QID PRN    lidocaine 4 % patch 1 Patch  1 Patch TransDERmal Q24H          LAB AND IMAGING FINDINGS:     Lab Results   Component Value Date/Time    WBC 19.3 (H) 03/22/2023 01:22 AM    HGB 8.7 (L) 03/22/2023 01:22 AM    PLATELET 911 46/08/5333 01:22 AM     Lab Results   Component Value Date/Time    Sodium 126 (L) 03/22/2023 01:22 AM    Potassium 3.4 (L) 03/22/2023 01:22 AM    Potassium 3.5 03/22/2023 01:22 AM    Chloride 70 (L) 03/22/2023 01:22 AM    CO2 >45 (HH) 03/22/2023 01:22 AM    BUN 30 (H) 03/22/2023 01:22 AM    Creatinine 0.67 03/22/2023 01:22 AM    Calcium 8.7 03/22/2023 01:22 AM    Magnesium 1.9 03/22/2023 01:22 AM    Phosphorus 3.0 10/26/2022 10:30 AM      Lab Results   Component Value Date/Time    Alk.  phosphatase 101 03/21/2023 01:17 AM    Protein, total 6.3 (L) 03/21/2023 01:17 AM    Albumin 2.9 (L) 03/21/2023 01:17 AM    Globulin 3.4 03/21/2023 01:17 AM     Lab Results   Component Value Date/Time    INR 1.9 (H) 03/19/2023 08:34 PM    Prothrombin time 19.0 (H) 03/19/2023 08:34 PM      Lab Results   Component Value Date/Time    Iron 35 09/04/2022 05:26 PM    TIBC 193 (L) 09/04/2022 05:26 PM    Iron % saturation 18 (L) 09/04/2022 05:26 PM    Ferritin 196 09/04/2022 05:26 PM      No results found for: PH, PCO2, PO2  No components found for: GLPOC   No results found for: CPK, CKMB             Total time: 70  minutes

## 2023-03-22 NOTE — HOSPICE
Hospice RN Note: consult received. Chart reviewed. Pt not responsive at this time, no family present. Family met with palliative medicine team this AM and made the decision to transition to comfort care. Per nursing staff, pt was more alert this AM and talking with family. Has since had a dose of IV ativan and sublingual ativan; is not arousing for my assessment or able to communicate. Call to son/AREN Baker [phone] left VM to callback for hospice discussion. Call to daughter/2nd AREN Leal [phone] she is on her way back to [place] and defers to her brother Sheila Baker for decision making. DaughterAlf is aware that pt is at end of life and will likely pass in hours to short day. Will await conversation with Sheila Baker - agree with comfort orders and medications. Addendum: spoke with sonSheila on the phone and reviewed conversations with medical team today. Son reports he is aware pt is transitioning and at end of life. Reviewed comfort care vs inpatient hospice admission which requires signing of medicare documentation etc. Son will be at hospital later tonight and doesn't see the need to start that process yet as she is comfortable and could pass tonight. Pt is completely unresponsive with mottling to lower extremities. Suggest RN continue with PRN doses of Morphine and Ativan overnight to maintain pt comfort. Hospice will follow up tomorrow.

## 2023-03-22 NOTE — PROGRESS NOTES
Hospitalist Progress Note      NAME: Purnima Neumann   :  1940  MRM:  [id number]    Date/Time: 3/22/2023  6:52 PM             Subjective:     Chief Complaint: Back pain    Rapid response this morning for hypoxia. Placed on nonrebreather. Significant amount of pain and anxiety. Again asking for comfort care. Added IV Ativan as needed. Family has been notified. Hospice consulted. Anticipate enrollment in hospice [facility]. Objective:       Vitals:          Last 24hrs VS reviewed since prior progress note. Most recent are:    Visit Vitals  /60 (BP 1 Location: Left upper arm, BP Patient Position: At rest)   Pulse (!) 103   Temp 98 °F (36.7 °C)   Resp 16   Ht 5' 4\" (1.626 m)   Wt 66 kg (145 lb 8.1 oz)   SpO2 100%   BMI 24.98 kg/m²     SpO2 Readings from Last 6 Encounters:   23 100%   23 100%   22 90%   10/31/22 94%   22 95%   22 92%    O2 Flow Rate (L/min): 6 l/min     Intake/Output Summary (Last 24 hours) at 3/22/2023 1659  Last data filed at 3/22/2023 1513  Gross per 24 hour   Intake 520 ml   Output 500 ml   Net 20 ml            Exam:     Physical Exam:    General:   Respiratory distress  Heart: Regular tachycardia no MRG  Lungs: Basilar rales. Decreased breath sounds at bases. Tachypnea, hypoxia  Abdomen:  Soft . Nondistended. NTTP. BS present. Extremities: 1+ lower extremity edema. : Mild tenderness over bladder, left CVA tenderness   Neuro:  Alert and interactive. No focal deficits. Psych:  Mood stable.     Medications Reviewed: (see below)    Lab Data Reviewed: (see below)    ______________________________________________________________________    Medications:     Current Facility-Administered Medications   Medication Dose Route Frequency    LORazepam (ATIVAN) tablet 0.5 mg  0.5 mg Oral Q6H PRN    LORazepam (ATIVAN) injection 1 mg  1 mg IntraVENous Q15MIN PRN    glycopyrrolate (ROBINUL) injection 0.2 mg  0.2 mg IntraVENous Q4H PRN    morphine injection 2 mg  2 mg IntraVENous Q30MIN PRN    ondansetron (ZOFRAN) injection 4 mg  4 mg IntraVENous Q4H PRN    Or    ondansetron (ZOFRAN ODT) tablet 4 mg  4 mg Oral Q8H PRN    potassium bicarb-citric acid (EFFER-K) tablet 40 mEq  40 mEq Oral BID    levothyroxine (SYNTHROID) tablet 25 mcg  25 mcg Oral QHS    megestroL (MEGACE) 400 mg/10 mL (10 mL) oral suspension 200 mg  200 mg Oral TID WITH MEALS    metoprolol tartrate (LOPRESSOR) tablet 50 mg  50 mg Oral BID    mirtazapine (REMERON) tablet 7.5 mg  7.5 mg Oral QHS    montelukast (SINGULAIR) tablet 10 mg  10 mg Oral DAILY    pantoprazole (PROTONIX) tablet 40 mg  40 mg Oral DAILY    traMADoL (ULTRAM) tablet 50 mg  50 mg Oral Q8H PRN    traZODone (DESYREL) tablet 25 mg  25 mg Oral QHS    rivaroxaban (XARELTO) tablet 20 mg  20 mg Oral DAILY WITH DINNER    bumetanide (BUMEX) injection 2 mg  2 mg IntraVENous Q12H    hydrALAZINE (APRESOLINE) 20 mg/mL injection 20 mg  20 mg IntraVENous Q4H PRN    sodium chloride (NS) flush 5-40 mL  5-40 mL IntraVENous Q8H    sodium chloride (NS) flush 5-40 mL  5-40 mL IntraVENous PRN    acetaminophen (TYLENOL) tablet 650 mg  650 mg Oral Q6H PRN    Or    acetaminophen (TYLENOL) suppository 650 mg  650 mg Rectal Q6H PRN    polyethylene glycol (MIRALAX) packet 17 g  17 g Oral DAILY PRN    budesonide (PULMICORT) 500 mcg/2 ml nebulizer suspension  500 mcg Nebulization BID RT    arformoteroL (BROVANA) neb solution 15 mcg  15 mcg Nebulization BID RT    simethicone (MYLICON) tablet 80 mg  80 mg Oral QID PRN    lidocaine 4 % patch 1 Patch  1 Patch TransDERmal Q24H            Lab Review:     Recent Labs     03/22/23  0122 03/21/23  0117 03/19/23 2000   WBC 19.3* 23.1* 7.9   HGB 8.7* 8.9* 8.6*   HCT 29.5* 29.9* 28.0*    296 288       Recent Labs     03/22/23  0122 03/21/23  0117 03/20/23  0910 03/19/23 2034   * 125* 125* 124*   K 3.5  3.4* 3.0* 3.6 2.6*   CL 70* 70* 71* 68*   CO2 >45* >45* >45* >45*   GLU 89 86 160* 103*   BUN 30* 26* 22* 25*   CREA 0.67 0.58 0.50* 0.41*   CA 8.7 9.0 9.0 8.6   MG 1.9 1.7  --  1.7   ALB  --  2.9*  --  2.7*   ALT  --  19  --  18   INR  --   --   --  1.9*       No components found for: [place]         Assessment / Plan: Active Problems:    Diastolic congestive heart failure ([address] (3/20/2023)      Unknown Merlin is a 80 y.o.  female with history of chronic systolic CHF and atrial fibrillation presenting with acute onset of dyspnea and lower extremity edema secondary to acute CHF exacerbation    Acute hypoxic respiratory failure, now requiring nonrebreather  Acute on chronic systolic CHF exacerbation, EF 45 to 50%  Bilateral pleural effusions  -Patient has requested transition to comfort care and family is agreeable  -Palliative care was consulted, started comfort measures only  -Hospice has been consulted but patient not yet enrolled pending discussion with family  -Morphine and Ativan have been added as needed for comfort  -Discontinue Bumex and Rocephin.   Cancel all other medications not aimed for comfort.  -Cardiology was following, recommended goals of care discussion    Acute pyelonephritis  Leukocytosis  -Left flank tenderness and positive urinalysis  -Was started on Rocephin, urine and blood cultures ordered  -Discontinue antibiotics due to transition to comfort measures only    Hypervolemic Hyponatremia:  - Mild hyponatremia most likely d/t volume overload  -Improving with diuresis     Severe hypokalemia  - repleted aggressively  -Discontinue further lab studies due to transition to comfort care    Metabolic alkalosis  - secondary to diuresis     HTN  Atrial fibrillation  -Discontinue home Xarelto and metoprolol due to transition comfort care    Hypothyroidism  - Discontinue home Synthroid due to transition to comfort care    Chronic Respiratory Failure, on 3 L O2 at home  Suspect COPD  - Cont w/ Duonebs prn  - Cont w/ Pulmicort prn           DVT ppx: Not indicated as patient is comfort measures only    Dispo: [facility] hospice    DNR        Risk of deterioration: high         Total time spent with patient care: 45 min. I personally saw and examined the patient during this time period.                                                                                                                  Care Plan discussed with: patient, nurse, cm, family    Discussed:  Care Plan                ___________________________________________________    Attending Physician: Sandro Baer MD

## 2023-03-22 NOTE — PROGRESS NOTES
12: RN called rapid response on patient. Patient o2 at 70% on 4L NC. RN placed patient on 6L NC and elevated HOB. Patients o2 levels increased to 85%. Respiratory applied 15L NRB and patients o2 saturations at 100%. MD at bedside. Patient weaned off NRB and placed back on 6L NC. o2 saturations at 97%. Consult to hospice and palliative placed. Patients states \"Just let me die. \" Ativan ordered for patients anxiety. No further interventions at this time. 1130: RN entered patients room to administered prescribed medication. Patients breathing is labored and apneic. Patients eyes are closed and not opening to verbal or physical stimulation. RN held medication due to aspiration risk. Comfort measures in place. Awaiting hospice to see patient.

## 2023-03-22 NOTE — PROGRESS NOTES
Palliative Medicine      Code Status: DNR    Advance Care Planning:  Advance Care Planning 3/22/2023   Confirm Advance Directive Yes, on file   Does the patient have other document types Do Not Resuscitate       Patient / Family Encounter Documentation    Participants (names):  Pt, dtr-in-law Janel Calderon, ANGELA's aunt (who also serves as a caregiver for pt), Palliative Medicine (NP Cameron Valentin)    Narrative:  Met with family at bedside. Pt was awake but very weak, engaged only minimally. Pt has been  to  [place] for approximately 58 yrs, has two adult children (son Lana Ortiz lives locally, dtr Denise Riley resides in [facility]). Pt worked for [employer], traveled extensively over her career. Pt lives at home with , who has advanced dementia, does recognize pt but would not be able to comprehend pt's medical issues, per ANGELA. Pt's health has been deteriorating over the past several months; pt has been in and out of SNFs and hospitals since January. Pt has AMD on file dated 10/2/2019 which appoints  Birgit Jeffrey, son Marcy Briscoe, and dtr Jackson Ramos as medical POAs in respective order.  has dementia; son Lana Ortiz is primary contact/surrogate decision maker. Pt has DDNR in place dated 10/26/2022. Pt and family have made decision to pursue comfort measures; family does not want pt to suffer. Psychosocial Issues Identified/ Resilience Factors:  ANGELA was tearful but shared that she finds comfort in the idea that no decision made out of love is a wrong decision. Family has reassured pt that her  will be well cared for. ANGELA shared that family had been surprised when pt requested to see a  in January, as pt was never very active in a [facility]. ANGELA requested that Chaplains continue to visit while pt is inpt. Caregiver [place]:  Pt and  have 24/7 care through family and caregivers.   ANGELA shared that she just returned from [place] late last night, was moving her own father to a facility in [place]. Son and DIL were both tending to parents late into the night. Does the caregiver feel confident administering medication? Pt is not expected to survive hospitalization. Does the caregiver need any help connecting with community resources? Hospice  Does the caregiver feel confident assisting with activities of daily living? Pt is not expected to survive hospitalization. Goals of Care / Plan:  Pt is now comfort measures only; hospice consult has been placed for possible in hospice. Discussed with RN, Care Manager, , and [facility] liaison. Will follow for support. Thank you for including Palliative Medicine in the care of Ms. Rachael Dela Cruz.      sanam  DE [place], [facility]-  [phone])

## 2023-03-22 NOTE — PROGRESS NOTES
Problem: Pressure Injury - Risk of  Goal: *Prevention of pressure injury  Description: Document Armando Scale and appropriate interventions in the flowsheet. Outcome: Progressing Towards Goal  Note: Pressure Injury Interventions:  Sensory Interventions: Assess changes in LOC    Moisture Interventions: Absorbent underpads, Internal/External urinary devices    Activity Interventions: Assess need for specialty bed, Pressure redistribution bed/mattress(bed type)    Mobility Interventions: Assess need for specialty bed    Nutrition Interventions: Document food/fluid/supplement intake    Friction and Shear Interventions: Apply protective barrier, creams and emollients       Problem: Pain  Goal: *PALLIATIVE CARE:  Alleviation of Pain  Outcome: Progressing Towards Goal     Problem: Patient Education: Go to Patient Education Activity  Goal: Patient/Family Education  Outcome: Progressing Towards Goal   Problem: Falls - Risk of  Goal: *Absence of Falls  Description: Document Higinio Fall Risk and appropriate interventions in the flowsheet.   Outcome: Progressing Towards Goal  Note: Fall Risk Interventions:

## 2023-03-22 NOTE — PROGRESS NOTES
Received a [address] for the patient on Med Surg. Reviewed patient's chart and spoke with patient's nurse. Pt's daughter-in-law (DIL), Dawn Hoang, and the DIL's aunt, Russ Banegas were present. Pt appeared to be sleeping soundly. Family shared pt's recent spiritual concerns as her health declined. Family needed to leave to attend to appointments, and requested  sit with patient for awhile.  offered words of comfort from her own kallie tradition, \"In life and in death we belong to God. \" Page Spiritual Care as needed.      [address], [address], [facility]  Staff   Paging service: [phone] (JOSE)

## 2023-03-22 NOTE — PROGRESS NOTES
Rapid Called at 8913    Responded to RRT at [phone] for hypoxia. Patient's oxygen saturations in the 60s on 4L NC. Primary RN increased O2 to 6L and saturations came up to mid 80s. Patient repositioned in bed and placed on 15L NRB. Now at 100%. Dr Max Newby at bedside to assess. Patient reports, \"I just want to go\". MD to place consult for palliative/hospice. Patient weaned off NRB and placed back on 6L NC with oxygen saturations at 96-98%. No further interventions ordered at this time. Provider at bedside: yes    Interventions ordered: Other (Comment)    Sepsis Suspected: no    Transfer to Higher Level of Care: NO    Visit Vitals  /60 (BP 1 Location: Left upper arm, BP Patient Position: At rest)   Pulse (!) 103   Temp 98 °F (36.7 °C)   Resp 16   Ht 5' 4\" (1.626 m)   Wt 66 kg (145 lb 8.1 oz)   SpO2 91%   BMI 24.98 kg/m²        Rapid Ended at 0859    RRT RN assisted with transport to accepting unit N/A.     Edwyna Goldberg, RN

## 2023-03-22 NOTE — PROGRESS NOTES
3/22/2023   CARE MANAGEMENT NOTE:  CM reviewed EMR. Pt was admitted with CHF. Reportedly, pt resides with her  who has dementia. Son, Mj Torres (N.[phone]) is the primary family contact. RUR 23%     Transition Plan of Care:  Pt was made comfort measures  CM received consult for hospice and a referral was made to [facility] will continue to follow pt until discharged.   Filiberto

## 2023-03-22 NOTE — ACP (ADVANCE CARE PLANNING)
Primary Decision MakerKeven Jaylen Waters - [phone]    Secondary Decision Maker: Elsy Morse - Daughter - [phone] Planning 3/22/2023   Confirm Advance Directive Yes, on file   Does the patient have other document types Do Not Resuscitate      Pt has AMD on file dated 10/2/2019 which appoints  Graciella Landau, son Waldemar Bermudez, and dtr Gareth Vazquez as medical POAs in respective order.  has dementia; son Leopold Leech is primary contact. Pt has DDNR in place dated 10/26/2022.

## 2023-03-22 NOTE — PROGRESS NOTES
ATTENDING CARDIOLOGIST    The patient was personally examined and chart reviewed. All the elements of history and examination were personally performed and I agree with the plan as listed by advanced practice provider. Treatment plan was addressed with the patient. Subjective:      Blood pressure 106/60, pulse (!) 103, temperature 98 °F (36.7 °C), resp. rate 16, height 5' 4\" (1.626 m), weight 66 kg (145 lb 8.1 oz), SpO2 100 %. Heart: Normal rate, regular rhythm, normal S1/S2, no murmur  Lungs: Clear  Abdomen: Soft, nontender, nondistended  Extremities: no edema      A/P:   Acute on chronic systolic heart failure - EF 45% - obtain [facility] records, continue IV diuresis bumex 2mg IV BID. Goals of care discussions to be had. Atrial fibrillation - on Xarelto + metoprolol 50 BID  HTN - low normal.  Leukocytosis      []    High complexity decision making was performed  []    Patient is at high-risk of decompensation with multiple organ involvement      Dione Lemus. Aakash Marcus MD, [facility]  [facility] [address]. [address], [address]                                   Office [phone]      Fax [phone] [facility]                    Cardiology Care Note     []Initial Encounter     [x]Follow-up    Patient Name: Rachel KINNEY:[id number]  Primary Cardiologist: DRAKE  Consulting Cardiologist: Danny Reeves Cardiology Physicians: Fifi Ott MD     Reason for encounter: CHF    HPI:       Lizette Roth is a 80 y.o. female with PMH significant for CHF EF 45-50%, hyponatremia, a-fib on Xarelto, HTN, chronic hypoxic resp failure on home O2 and hypothryoidism. Pt is poor historian, falls asleep during discussion. Information obtained from records. She is well known to our office from prior several admissions for CHF.      Pt presented with worsening SOB since last Friday, associated with weight gain, cough and increased lower ext edema. Worse with activity/ambulating. Her son tried increasing her home bumex but no increased urinary output was noted and pt had no relief of her symptoms. Per pt, her SOB has been an ongoing issue for the last 6 months. She has had multiple hospitalizations, with last here in Oct. She reports sig back pain but no CP, palpitations. Subjective: Purnima Neumann reports  back and chest pain . Assessment and Plan     Acute on chronic HF mildly reduced EF: known EF of 45-50%, ECHO pending. Palliative consult to address goals of care , hospice consult has been placed. On IV loops, fair response however increasing hypoxia this am requiring higher oxygen liter flow. 2. Chronic hyponatremia, hypervolemic hyponatremia: cont IV diuretics, fluid restrict     3. Hypokalemia: improved, replete with V diuresis     4. Alkalosis: likely contractual/due to diuretics, appears chronic but slightly worse than baseline. Consider diamox     5. Leukocytosis:  Work up per primary team     6. A-fib: on metoprolol, Xarelto     7. HTN: BP currently soft, cont metoprolol, bumex as able    8. Hypothyroid: on synthroid    9. Anemia, chronic       ____________________________________________________________    Cardiac testing  09/04/22    ECHO ADULT COMPLETE 09/06/2022 9/6/2022    Interpretation Summary    Left Ventricle: Mildly reduced left ventricular systolic function with a visually estimated EF of 45 - 50%. Left ventricle size is normal. Normal wall thickness. Mild global hypokinesis present. Grade I diastolic dysfunction with normal LAP. Aortic Valve: Valve structure is normal. Mild sclerosis of the aortic valve cusp. Mitral Valve: Mild regurgitation. Tricuspid Valve: Mildly elevated RVSP.           Most recent HS troponins:  Recent Labs     03/19/23 2000   TROPHS 8         ECG: atrial fibrillation, rate 90's    Review of Systems:    []All other systems reviewed and all negative except as written in HPI    [x] Patient unable to provide secondary to condition    Past Medical History:   Diagnosis Date    Asthma     Cancer ([facility] 7/1/15    CHF (congestive heart failure), NYHA class I, chronic, systolic (HCC)     Chronic anticoagulation     GERD (gastroesophageal reflux disease)     Hiatal hernia     HX: breast cancer 2015    Right     Hypothyroid     Paroxysmal A-fib ([facility] [address]     Venous insufficiency     bilat LE    Weight loss      Past Surgical History:   Procedure Laterality Date    COLONOSCOPY N/A 6/14/2021    COLONOSCOPY AND EGD performed by Nain Cueto MD at [address] [place] [address] Drive,[address] BREAST LUMPECTOMY Right 08/24/2015    HX CATARACT REMOVAL  2013    Bilateral     HX CHOLECYSTECTOMY      HX DILATION AND CURETTAGE      x2    HX ORTHOPAEDIC  2017    RIGHT foot  hammertoe     Social Hx:  reports that she quit smoking about 22 years ago. Her smoking use included cigarettes. She started smoking about 22 years ago. She has never used smokeless tobacco. She reports that she does not currently use alcohol. She reports that she does not use drugs. Family Hx: family history includes Heart Disease in her mother and sister; Hypertension in her father. Allergies   Allergen Reactions    Ace Inhibitors Cough    Iodine Rash    Penicillins Rash          OBJECTIVE:  Wt Readings from Last 3 Encounters:   03/21/23 66 kg (145 lb 8.1 oz)   02/02/23 58.5 kg (129 lb)   10/31/22 57.5 kg (126 lb 12.2 oz)       Intake/Output Summary (Last 24 hours) at 3/22/2023 0736  Last data filed at 3/22/2023 0124  Gross per 24 hour   Intake --   Output 500 ml   Net -500 ml         Physical Exam:    Vitals:   Vitals:    03/21/23 1639 03/21/23 1959 03/21/23 2036 03/21/23 2321   BP: 99/63  (!) 89/52    Pulse:   82 (!) 118   Resp:   16    Temp:   97.4 °F (36.3 °C)    SpO2:  97% 91%    Weight: 66 kg (145 lb 8.1 oz)      Height: 5' 4\" (1.626 m)        Telemetry: AFIB    Gen:  In no acute distress , eating breakfast   Neck: Supple, JVD difficult to assess, No Carotid Bruit  Resp: No accessory muscle use, diminished bases, No rales or rhonchi  Card: irregular Rate,Rythm, Normal S1, S2, No murmurs, rubs or gallop. Abd:   Soft, non-tender, non-distended, BS+   MSK: No cyanosis  Skin: No rashes    Neuro: Moving all four extremities, follows commands appropriately  Psych: Fair insight, oriented to person, place  LE: 2-3+ edema    Data Review:     Radiology:   XR Results (most recent):  Results from Hospital Encounter encounter on 03/19/23    XR CHEST PORT    Narrative  EXAM:  XR CHEST PORT    INDICATION: Congestive heart failure, pleural effusions    COMPARISON: 3/19/2023    TECHNIQUE: Upright portable chest AP view 0630 hours    FINDINGS: The patient is on a cardiac monitor. There is no change in the  cardiomegaly. The lungs demonstrate persistent moderate pulmonary edema which is  unchanged. Bilateral pleural effusions with bibasilar atelectasis left greater  than right is unchanged. Impression  1. No significant interval change pulmonary edema pattern, pleural effusions and  bibasilar atelectasis      Recent Labs     03/22/23  0122 03/21/23  0117   * 125*   K 3.5  3.4* 3.0*   CL 70* 70*   CO2 >45* >45*   BUN 30* 26*   CREA 0.67 0.58   GLU 89 86   CA 8.7 9.0       Recent Labs     03/22/23  0122 03/21/23  0117   WBC 19.3* 23.1*   HGB 8.7* 8.9*   HCT 29.5* 29.9*    296       Recent Labs     03/21/23 0117 03/19/23 2034   PTP  --  19.0*   INR  --  1.9*    101       No results for input(s): CHOL, LDLC in the last 72 hours.     No lab exists for component: TGL, HDLC,  HBA1C      Current meds:    Current Facility-Administered Medications:     cefTRIAXone (ROCEPHIN) 1 g in 0.9% sodium chloride 10 mL IV syringe, 1 g, IntraVENous, Q24H, Nanette Hayes MD, 1 g at 03/21/23 1042    potassium bicarb-citric acid (EFFER-K) tablet 40 mEq, 40 mEq, Oral, BID, Vesta Favre Ferrel Gordon, NP, 40 mEq at 03/21/23 1744    morphine injection 2 mg, 2 mg, IntraVENous, Q2H PRN, Jose Carlos Pete MD, 2 mg at 03/22/23 2789    levothyroxine (SYNTHROID) tablet 25 mcg, 25 mcg, Oral, QHS, Francisco Krishnamurthy MD, 25 mcg at 03/20/23 2114    megestroL (MEGACE) 400 mg/10 mL (10 mL) oral suspension 200 mg, 200 mg, Oral, TID WITH MEALS, Morris Tam MD, 200 mg at 03/21/23 1744    metoprolol tartrate (LOPRESSOR) tablet 50 mg, 50 mg, Oral, BID, Francisco Krishnamurthy MD, 50 mg at 03/21/23 1744    mirtazapine (REMERON) tablet 7.5 mg, 7.5 mg, Oral, QHS, Francisco Krishnamurthy MD, 7.5 mg at 03/20/23 2113    montelukast (SINGULAIR) tablet 10 mg, 10 mg, Oral, DAILY, Francisco Krishnamurthy MD, 10 mg at 03/21/23 0837    pantoprazole (PROTONIX) tablet 40 mg, 40 mg, Oral, DAILY, Francisco Krishnamurthy MD, 40 mg at 03/21/23 3318    traMADoL (ULTRAM) tablet 50 mg, 50 mg, Oral, Q8H PRN, Francisco Krishnamurthy MD, 50 mg at 03/21/23 1404    traZODone (DESYREL) tablet 25 mg, 25 mg, Oral, QHS, Francisco Krishnamurthy MD, 25 mg at 03/21/23 2341    rivaroxaban (XARELTO) tablet 20 mg, 20 mg, Oral, DAILY WITH DINNER, Francisco Krishnamurthy MD, 20 mg at 03/21/23 1744    bumetanide (BUMEX) injection 2 mg, 2 mg, IntraVENous, Q12H, Francisco Krishnamurthy MD, 2 mg at 03/22/23 0130    hydrALAZINE (APRESOLINE) 20 mg/mL injection 20 mg, 20 mg, IntraVENous, Q4H PRN, Francisco Krishnamurthy MD    sodium chloride (NS) flush 5-40 mL, 5-40 mL, IntraVENous, Q8H, Francisco Krishnamurthy MD, 10 mL at 03/22/23 0647    sodium chloride (NS) flush 5-40 mL, 5-40 mL, IntraVENous, PRN, Francisco Krishnamurthy MD    acetaminophen (TYLENOL) tablet 650 mg, 650 mg, Oral, Q6H PRN, 650 mg at 03/21/23 0838 **OR** acetaminophen (TYLENOL) suppository 650 mg, 650 mg, Rectal, Q6H PRN, Francisco Krishnamurthy MD    polyethylene glycol (MIRALAX) packet 17 g, 17 g, Oral, DAILY PRN, Francisco Krishnamurthy MD    ondansetron (ZOFRAN ODT) tablet 4 mg, 4 mg, Oral, Q8H PRN **OR** ondansetron (ZOFRAN) injection 4 mg, 4 mg, IntraVENous, Q6H PRN, Francisco Krishnamurthy MD, 4 mg at 03/21/23 1153    budesonide (PULMICORT) 500 mcg/2 ml nebulizer suspension, 500 mcg, Nebulization, BID RT, Jose Carlos Pete MD, 500 mcg at 03/21/23 1956    arformoteroL (BROVANA) neb solution 15 mcg, 15 mcg, Nebulization, BID RT, Den Dorman MD, 15 mcg at 03/21/23 1956    simethicone (MYLICON) tablet 80 mg, 80 mg, Oral, QID PRN, Den Dorman MD, 80 mg at 03/20/23 1929    lidocaine 4 % patch 1 Patch, 1 Patch, TransDERmal, Q24H, Messi Styles DO, 1 Patch at 03/21/23 2028    Tarcy Aly NP    [facility]  Call center: (I) [phone]  (f) [phone]      Robinson Cormier MD

## 2023-03-22 NOTE — PROGRESS NOTES
Bedside shift change report given to [facility] (oncoming nurse) by Purnima Rojas (offgoing nurse). Report included the following information SBAR, MAR, and Recent Results.

## 2023-03-23 NOTE — PROGRESS NOTES
Responded to notification of Miss Frausto's death. No family present. Per staff family is coming in. Silent prayer at bedside. Advised nurse to contact [facility] for any further referrals.   Horacio Parnell., MS., [facility]  Page a  [phone]- Sae Klein ([phone])

## 2023-03-23 NOTE — DISCHARGE SUMMARY
Hospitalist Discharge Summary     Patient ID:  Jackelin Erwin  [id number]  1 y.o.  1940    Admit date: 3/19/2023    Discharge date and time: 3/23/2023    Admission Diagnoses: Diastolic congestive heart failure ([facility] [I50.30]      Discharge Diagnoses: Active Problems:    Diastolic congestive heart failure ([address] (3/20/2023)         Acute hypoxic respiratory failure, now requiring nonrebreather  Acute on chronic systolic CHF exacerbation, EF 45 to 50%  Bilateral pleural effusions  Acute pyelonephritis  Leukocytosis  Hypervolemic Hyponatremia:  Severe hypokalemia  Metabolic alkalosis  HTN  Atrial fibrillation  Hypothyroidism  Chronic Respiratory Failure, on 3 L O2 at home  Suspect COPD        Hospital Course: Jackelin Erwin is a 80 y.o.  female with history of chronic systolic CHF EF 05-67% and atrial fibrillation presenting with acute onset of dyspnea and lower extremity edema with acute hypoxic respiratory failure requiring 6 L of oxygen via nasal canula. She had increasing bilateral pleural effusions on chest xray and an elevated BNP of 262. She was admitted to the hospitalist service for treatment of acute acute CHF exacerbation. She was also noted to have Hypervolemic hyponatremia secondary to CHF. The patient was started on IV Bumex. Cardiology was consulted. She complained of lower abdominal pain and flank pain. CT abdomen and pelvis without contrast was negative for acute process. Her WBC increased to 23 and her urinalsysis was positive indicating UTI. Rocephin was started. She continued to have abdominal pain and was noted to have left CVA tenderness. Morphine was added for pain control. At that point, the patient clearly stated that she wanted our focus to shift to comfort measures. Cardiology recommended goals of care discussion. Palliative care was consulted. The patients condition and  prognosis and her stated wished were reviewed with the family.  The decision was ultimately made to transition to comfort measures only. The was given morphine and ativan as needed for palliation. She passed away before enrollment in hospice. Her code status was DNR.            PCP: Bianca Silverio MD     Consults: Cardiology, Palliative care, Hospice    Condition of patient at discharge:      Discharge Exam:     Visit Vitals  BP (!) 91/54 (BP 1 Location: Right upper arm, BP Patient Position: At rest)   Pulse 99   Temp 97.4 °F (36.3 °C)   Resp 17   Ht 5' 4\" (1.626 m)   Wt 66 kg (145 lb 8.1 oz)   SpO2 99%   BMI 24.98 kg/m²        See death note        Current Discharge Medication List             Approximate time spent in patient care on day of discharge: 30 mins    Signed:  Rabia Lei MD  3/23/2023  7:44 AM

## 2023-03-23 NOTE — PROGRESS NOTES
Informed by primary RN that she believe patient had past away. This RN went into room and assessed patient with primary RN Thao Villatoro. At bedside to see patient who has . Patient is unresponsive. Absent of breath sounds. Pupils fixed and dilated. Apical pulse absent after ausculting for 60sec. Dr. Suzanne Latif pronounced patient at 7:29am. Pastoral care paged. Nursing Supervisor informed. Family informed as well.

## 2023-03-23 NOTE — PROGRESS NOTES
Bedside shift change report given to [address] [place] [address] (oncoming nurse) by Carlos Enrique Chino RN (offgoing nurse), upon RN rounds, pt. Was pulseless and no more RR noted, pupils fully dilated. Pt is noted to be on comfort care only.

## 2023-03-23 NOTE — PROGRESS NOTES
Problem: Pressure Injury - Risk of  Goal: *Prevention of pressure injury  Description: Document Armando Scale and appropriate interventions in the flowsheet. Outcome: Progressing Towards Goal  Note: Pressure Injury Interventions:  Sensory Interventions: Assess changes in LOC    Moisture Interventions: Absorbent underpads, Apply protective barrier, creams and emollients, Check for incontinence Q2 hours and as needed    Activity Interventions: Pressure redistribution bed/mattress(bed type), Increase time out of bed    Mobility Interventions: Assess need for specialty bed, Pressure redistribution bed/mattress (bed type)    Nutrition Interventions: Document food/fluid/supplement intake, Discuss nutritional consult with provider, Offer support with meals,snacks and hydration    Friction and Shear Interventions: Apply protective barrier, creams and emollients, Minimize layers, Lift sheet                Problem: Patient Education: Go to Patient Education Activity  Goal: Patient/Family Education  Outcome: Progressing Towards Goal     Problem: Falls - Risk of  Goal: *Absence of Falls  Description: Document Bertrum Mast Fall Risk and appropriate interventions in the flowsheet.   Outcome: Progressing Towards Goal  Note: Fall Risk Interventions:

## 2023-03-23 NOTE — DEATH NOTE
Death Pronouncement Note    Patient's Name: Holli Hodgkin   Patient's YOB: 1940  MRN Number: [id number]    Admitting Provider: Sebastian Vela MD  Attending Provider: Annia Leigh MD     Patient was examined and the following were absent: Pulses, Blood Pressure, and Respiratory effort    I declared the patient dead on 3/23/2023 at 7:29 AM    Preliminary Cause of Death: CHF (congestive heart failure) [facility])     Electronically signed by Canelo Alcantara MD on 3/23/2023 at 7:42 AM

## 2023-03-24 NOTE — PROGRESS NOTES
Physician Progress Note      PATIENT:               Manjula Gil  CSN #:                  [id number]  :                       1940  ADMIT DATE:       3/19/2023 7:45 PM  [address] [place] DATE:        3/23/2023 7:29 AM  RESPONDING  PROVIDER #:        Sergio Jensen MD          QUERY TEXT:    Good morning. Patient admitted with acute onset dyspnea and lower extremity swelling. Noted documentation of acute on chronic systolic CHF exacerbation in  PN and  discharge summary and diastolic congestive heart failure is listed on active problem list in  PN and  discharge summary. If possible, please further clarify if you were treating any of the following:       The medical record reflects the following:    Risk Factors: Atrial fibrillation, HTN, hypothyroidism, suspected COPD with chronic respiratory failure on supplemental O2    Clinical Indicators: from  discharge summary: \"Acute on chronic systolic CHF exacerbation, EF 45 to 50%\"; from  Cardiology consult note: \"Acute on chronic HF mildly reduced EF: known EF of 45-50%\"; from  discharge summary active problem list: \"Active Problems:  Diastolic congestive heart failure\"; BNP 2,621;  CXR: No significant interval change pulmonary edema pattern, pleural effusions and bibasilar atelectasis    Treatment: Cardiology consult, labs, CXR, IV Bumex, monitor        Thank you,  Roxanne Malcolm RN, CDI  Options provided:  -- Acute on chronic systolic CHF exacerbation confirmed and diastolic congestive heart failure was ruled out  -- Acute on chronic systolic CHF exacerbation and diastolic congestive heart failure confirmed  -- Other - I will add my own diagnosis  -- Disagree - Not applicable / Not valid  -- Disagree - Clinically unable to determine / Unknown  -- Refer to Clinical Documentation Reviewer    PROVIDER RESPONSE TEXT:    After study, acute on chronic systolic CHF exacerbation was confirmed and diastolic congestive heart failure was ruled out. Query created by: Luis A Madsen on 3/23/2023 9:09 AM      QUERY TEXT:    Good afternoon. Pt admitted with CHF exacerbation. Pt noted to have acute pyelonephritis. If possible, please document in the progress notes and discharge summary if you are evaluating and /or treating any of the following: The medical record reflects the following:    Risk Factors: CHF, atrial fibrillation, HTN, hypothyroidism, chronic respiratory failure with supplemental O2 requirement at baseline    Clinical Indicators: WBC 7.9, 23.1, 19.3; u/a: trace leukocyte esterase, 2+ bacteria; max temp 98.8; HR ; SBP ; from 03/21 PN: \"Acute pyelonephritis Leukocytosis -Left flank tenderness and positive urinalysis -Continue Rocephin, urine and blood cultures ordered -Follow CBC -Morphine as needed pain\"    Treatment: Labs, u/a, vital signs per unit protocol, IV Ceftriaxone      Thank you,  Tiny Ellis RN, CDI  Options provided:  -- Sepsis, present on admission  -- Sepsis, developed following admission  -- Acute pyelonephritis without Sepsis  -- Sepsis was ruled out  -- Other - I will add my own diagnosis  -- Disagree - Not applicable / Not valid  -- Disagree - Clinically unable to determine / Unknown  -- Refer to Clinical Documentation Reviewer    PROVIDER RESPONSE TEXT:    This patient has acute pyelonephritis without Sepsis.     Query created by: Luis A Madsen on 3/22/2023 12:41 PM      Electronically signed by:  Umair Tran MD 3/24/2023 7:05 AM

## 2023-03-26 LAB
BACTERIA SPEC CULT: NORMAL
SERVICE CMNT-IMP: NORMAL

## 2023-03-27 LAB
BACTERIA SPEC CULT: NORMAL
SERVICE CMNT-IMP: NORMAL

## (undated) DEVICE — ELECTRODE,RADIOTRANSLUCENT,FOAM,3PK: Brand: MEDLINE

## (undated) DEVICE — BITEBLOCK ENDOSCP 60FR MAXI WHT POLYETH STURDY W/ VELC WVN

## (undated) DEVICE — SET ADMIN 16ML TBNG L100IN 2 Y INJ SITE IV PIGGY BK DISP

## (undated) DEVICE — SOLIDIFIER FLD 2OZ 1500CC N DISINF IN BTL DISP SAFESORB

## (undated) DEVICE — Device

## (undated) DEVICE — CONTAINER SPEC 20 ML LID NEUT BUFF FORMALIN 10 % POLYPR STS

## (undated) DEVICE — BAG BELONG PT PERS CLEAR HANDL

## (undated) DEVICE — SIMPLICITY FLUFF UNDERPAD 23X36, MODERATE: Brand: SIMPLICITY

## (undated) DEVICE — 3M™ CUROS™ DISINFECTING CAP FOR NEEDLELESS CONNECTORS 270/CARTON 20 CARTONS/CASE CFF1-270: Brand: CUROS™

## (undated) DEVICE — BAG SPEC BIOHZRD 10 X 10 IN --

## (undated) DEVICE — CATH IV AUTOGRD BC BLU 22GA 25 -- INSYTE

## (undated) DEVICE — CUFF RMFG BP INF SZ 11 DISP -- LAWSON OEM ITEM 238915

## (undated) DEVICE — SOLIDIFIER MEDC 1200ML -- CONVERT TO 356117

## (undated) DEVICE — 1200 GUARD II KIT W/5MM TUBE W/O VAC TUBE: Brand: GUARDIAN

## (undated) DEVICE — KIT COLON W/ 1.1OZ LUB AND 2 END

## (undated) DEVICE — CANN NASAL O2 CAPNOGRAPHY AD -- FILTERLINE

## (undated) DEVICE — CANNULA CUSH AD W/ 14FT TBG

## (undated) DEVICE — (D)SENSOR RMFG 02 PULS OXMTR -- DISC BY MFR USE ITEM 133445

## (undated) DEVICE — FORCEPS BX L240CM JAW DIA2.8MM L CAP W/ NDL MIC MESH TOOTH

## (undated) DEVICE — CATH IV AUTOGRD BC PNK 20GA 25 -- INSYTE